# Patient Record
Sex: MALE | Race: WHITE | Employment: OTHER | URBAN - METROPOLITAN AREA
[De-identification: names, ages, dates, MRNs, and addresses within clinical notes are randomized per-mention and may not be internally consistent; named-entity substitution may affect disease eponyms.]

---

## 2017-01-04 ENCOUNTER — TELEPHONE (OUTPATIENT)
Dept: FAMILY MEDICINE CLINIC | Age: 82
End: 2017-01-04

## 2017-01-06 ENCOUNTER — HOSPITAL ENCOUNTER (OUTPATIENT)
Dept: OTHER | Age: 82
Discharge: HOME | End: 2017-01-06
Attending: PODIATRIST | Admitting: PODIATRIST

## 2017-01-06 LAB
ANION GAP SERPL CALCULATED.3IONS-SCNC: 14 MMOL/L (ref 3–16)
BASOPHILS ABSOLUTE: 0.1 K/UL (ref 0–0.2)
BASOPHILS RELATIVE PERCENT: 0.8 %
BUN BLDV-MCNC: 39 MG/DL (ref 7–20)
CALCIUM SERPL-MCNC: 9.3 MG/DL (ref 8.3–10.6)
CHLORIDE BLD-SCNC: 106 MMOL/L (ref 99–110)
CO2: 25 MMOL/L (ref 21–32)
CREAT SERPL-MCNC: 1.3 MG/DL (ref 0.8–1.3)
EOSINOPHILS ABSOLUTE: 0.2 K/UL (ref 0–0.6)
EOSINOPHILS RELATIVE PERCENT: 3.1 %
GFR AFRICAN AMERICAN: >60
GFR NON-AFRICAN AMERICAN: 53
GLUCOSE BLD-MCNC: 235 MG/DL (ref 70–99)
HCT VFR BLD CALC: 30.6 % (ref 40.5–52.5)
HEMOGLOBIN: 10.3 G/DL (ref 13.5–17.5)
INR BLD: 2.73 (ref 0.85–1.16)
LYMPHOCYTES ABSOLUTE: 1 K/UL (ref 1–5.1)
LYMPHOCYTES RELATIVE PERCENT: 13.1 %
MCH RBC QN AUTO: 32 PG (ref 26–34)
MCHC RBC AUTO-ENTMCNC: 33.8 G/DL (ref 31–36)
MCV RBC AUTO: 94.6 FL (ref 80–100)
MONOCYTES ABSOLUTE: 0.7 K/UL (ref 0–1.3)
MONOCYTES RELATIVE PERCENT: 9.2 %
NEUTROPHILS ABSOLUTE: 5.7 K/UL (ref 1.7–7.7)
NEUTROPHILS RELATIVE PERCENT: 73.8 %
PDW BLD-RTO: 15.8 % (ref 12.4–15.4)
PLATELET # BLD: 213 K/UL (ref 135–450)
PMV BLD AUTO: 10.3 FL (ref 5–10.5)
POTASSIUM SERPL-SCNC: 5.1 MMOL/L (ref 3.5–5.1)
PROTHROMBIN TIME: 31.8 SEC (ref 9.8–13)
RBC # BLD: 3.23 M/UL (ref 4.2–5.9)
SEDIMENTATION RATE, ERYTHROCYTE: 47 MM/HR (ref 0–20)
SODIUM BLD-SCNC: 145 MMOL/L (ref 136–145)
URIC ACID, SERUM: 10.1 MG/DL (ref 3.5–7.2)
WBC # BLD: 7.8 K/UL (ref 4–11)

## 2017-01-07 LAB
ESTIMATED AVERAGE GLUCOSE: 125.5 MG/DL
HBA1C MFR BLD: 6 %

## 2017-01-09 ENCOUNTER — TELEPHONE (OUTPATIENT)
Dept: CARDIOLOGY CLINIC | Age: 82
End: 2017-01-09

## 2017-01-09 ENCOUNTER — TELEPHONE (OUTPATIENT)
Dept: PHARMACY | Age: 82
End: 2017-01-09

## 2017-01-16 ENCOUNTER — ANTI-COAG VISIT (OUTPATIENT)
Dept: PHARMACY | Age: 82
End: 2017-01-16

## 2017-01-16 DIAGNOSIS — I48.20 CHRONIC ATRIAL FIBRILLATION (HCC): ICD-10-CM

## 2017-01-16 LAB
INR BLD: 1.2
PROTIME: 14 SECONDS

## 2017-01-18 ENCOUNTER — NURSE ONLY (OUTPATIENT)
Dept: CARDIOLOGY CLINIC | Age: 82
End: 2017-01-18

## 2017-01-18 DIAGNOSIS — Z95.0 CARDIAC PACEMAKER: ICD-10-CM

## 2017-01-18 DIAGNOSIS — Z95.0 PACEMAKER: ICD-10-CM

## 2017-01-18 DIAGNOSIS — I48.20 CHRONIC ATRIAL FIBRILLATION (HCC): ICD-10-CM

## 2017-01-18 PROCEDURE — 93296 REM INTERROG EVL PM/IDS: CPT | Performed by: INTERNAL MEDICINE

## 2017-01-18 PROCEDURE — 93294 REM INTERROG EVL PM/LDLS PM: CPT | Performed by: INTERNAL MEDICINE

## 2017-01-26 ENCOUNTER — TELEPHONE (OUTPATIENT)
Dept: FAMILY MEDICINE CLINIC | Age: 82
End: 2017-01-26

## 2017-01-27 ENCOUNTER — HOSPITAL ENCOUNTER (OUTPATIENT)
Dept: OTHER | Age: 82
Discharge: HOME | End: 2017-01-27
Attending: INTERNAL MEDICINE | Admitting: INTERNAL MEDICINE

## 2017-01-27 ENCOUNTER — OFFICE VISIT (OUTPATIENT)
Dept: FAMILY MEDICINE CLINIC | Age: 82
End: 2017-01-27

## 2017-01-27 ENCOUNTER — TELEPHONE (OUTPATIENT)
Dept: FAMILY MEDICINE CLINIC | Age: 82
End: 2017-01-27

## 2017-01-27 VITALS
HEART RATE: 88 BPM | DIASTOLIC BLOOD PRESSURE: 70 MMHG | TEMPERATURE: 97.7 F | HEIGHT: 70 IN | RESPIRATION RATE: 16 BRPM | SYSTOLIC BLOOD PRESSURE: 124 MMHG | BODY MASS INDEX: 24.48 KG/M2 | WEIGHT: 171 LBS

## 2017-01-27 DIAGNOSIS — M1A.9XX1 CHRONIC GOUT INVOLVING TOE OF LEFT FOOT WITH TOPHUS, UNSPECIFIED CAUSE: ICD-10-CM

## 2017-01-27 DIAGNOSIS — D64.9 ANEMIA, UNSPECIFIED TYPE: Primary | ICD-10-CM

## 2017-01-27 LAB — URIC ACID, SERUM: 9.4 MG/DL (ref 3.5–7.2)

## 2017-01-27 PROCEDURE — 4040F PNEUMOC VAC/ADMIN/RCVD: CPT | Performed by: INTERNAL MEDICINE

## 2017-01-27 PROCEDURE — G8420 CALC BMI NORM PARAMETERS: HCPCS | Performed by: INTERNAL MEDICINE

## 2017-01-27 PROCEDURE — 99213 OFFICE O/P EST LOW 20 MIN: CPT | Performed by: INTERNAL MEDICINE

## 2017-01-27 PROCEDURE — 1036F TOBACCO NON-USER: CPT | Performed by: INTERNAL MEDICINE

## 2017-01-27 PROCEDURE — G8598 ASA/ANTIPLAT THER USED: HCPCS | Performed by: INTERNAL MEDICINE

## 2017-01-27 PROCEDURE — G8427 DOCREV CUR MEDS BY ELIG CLIN: HCPCS | Performed by: INTERNAL MEDICINE

## 2017-01-27 PROCEDURE — 1123F ACP DISCUSS/DSCN MKR DOCD: CPT | Performed by: INTERNAL MEDICINE

## 2017-01-27 PROCEDURE — G8484 FLU IMMUNIZE NO ADMIN: HCPCS | Performed by: INTERNAL MEDICINE

## 2017-01-27 RX ORDER — ALLOPURINOL 100 MG/1
100 TABLET ORAL DAILY
Qty: 30 TABLET | Refills: 3 | Status: SHIPPED | OUTPATIENT
Start: 2017-01-27 | End: 2017-03-06 | Stop reason: SDUPTHER

## 2017-01-27 ASSESSMENT — ENCOUNTER SYMPTOMS
SHORTNESS OF BREATH: 0
WHEEZING: 0

## 2017-02-06 RX ORDER — RAMIPRIL 10 MG/1
CAPSULE ORAL
Qty: 60 CAPSULE | Refills: 5 | Status: SHIPPED | OUTPATIENT
Start: 2017-02-06 | End: 2017-08-04 | Stop reason: SDUPTHER

## 2017-02-08 ENCOUNTER — NURSE ONLY (OUTPATIENT)
Dept: FAMILY MEDICINE CLINIC | Age: 82
End: 2017-02-08

## 2017-02-08 DIAGNOSIS — D64.9 ANEMIA, UNSPECIFIED TYPE: ICD-10-CM

## 2017-02-08 DIAGNOSIS — R19.5 HEME POSITIVE STOOL: Primary | ICD-10-CM

## 2017-02-08 LAB
CONTROL: POSITIVE
HEMOCCULT STL QL: POSITIVE

## 2017-02-08 PROCEDURE — 99999 PR OFFICE/OUTPT VISIT,PROCEDURE ONLY: CPT | Performed by: INTERNAL MEDICINE

## 2017-02-08 PROCEDURE — 82274 ASSAY TEST FOR BLOOD FECAL: CPT | Performed by: INTERNAL MEDICINE

## 2017-02-14 ENCOUNTER — TELEPHONE (OUTPATIENT)
Dept: FAMILY MEDICINE CLINIC | Age: 82
End: 2017-02-14

## 2017-02-15 ENCOUNTER — ANTI-COAG VISIT (OUTPATIENT)
Dept: PHARMACY | Age: 82
End: 2017-02-15

## 2017-02-15 DIAGNOSIS — I48.20 CHRONIC ATRIAL FIBRILLATION (HCC): ICD-10-CM

## 2017-02-15 LAB
INR BLD: 3.1
PROTIME: 37.1 SECONDS

## 2017-03-01 ENCOUNTER — HOSPITAL ENCOUNTER (OUTPATIENT)
Dept: OTHER | Age: 82
Discharge: HOME | End: 2017-03-31
Attending: INTERNAL MEDICINE | Admitting: INTERNAL MEDICINE

## 2017-03-02 RX ORDER — SOTALOL HYDROCHLORIDE 80 MG/1
TABLET ORAL
Qty: 60 TABLET | Refills: 5 | Status: SHIPPED | OUTPATIENT
Start: 2017-03-02 | End: 2017-09-05 | Stop reason: SDUPTHER

## 2017-03-03 ENCOUNTER — OFFICE VISIT (OUTPATIENT)
Dept: FAMILY MEDICINE CLINIC | Age: 82
End: 2017-03-03

## 2017-03-03 VITALS
SYSTOLIC BLOOD PRESSURE: 116 MMHG | HEART RATE: 72 BPM | WEIGHT: 174 LBS | RESPIRATION RATE: 16 BRPM | DIASTOLIC BLOOD PRESSURE: 72 MMHG | TEMPERATURE: 97.5 F | HEIGHT: 70 IN | BODY MASS INDEX: 24.91 KG/M2

## 2017-03-03 DIAGNOSIS — M1A.9XX0 CHRONIC GOUT WITHOUT TOPHUS, UNSPECIFIED CAUSE, UNSPECIFIED SITE: Primary | ICD-10-CM

## 2017-03-03 DIAGNOSIS — I48.20 CHRONIC ATRIAL FIBRILLATION (HCC): ICD-10-CM

## 2017-03-03 DIAGNOSIS — E11.51 DM (DIABETES MELLITUS), TYPE 2 WITH PERIPHERAL VASCULAR COMPLICATIONS (HCC): ICD-10-CM

## 2017-03-03 DIAGNOSIS — H90.0 HEARING LOSS, CONDUCTIVE, BILATERAL: ICD-10-CM

## 2017-03-03 DIAGNOSIS — Z01.00 DIABETIC EYE EXAM (HCC): ICD-10-CM

## 2017-03-03 DIAGNOSIS — M1A.9XX0 CHRONIC GOUT WITHOUT TOPHUS, UNSPECIFIED CAUSE, UNSPECIFIED SITE: ICD-10-CM

## 2017-03-03 DIAGNOSIS — E11.9 DIABETIC EYE EXAM (HCC): ICD-10-CM

## 2017-03-03 LAB
ALBUMIN SERPL-MCNC: 4.4 G/DL (ref 3.4–5)
ANION GAP SERPL CALCULATED.3IONS-SCNC: 15 MMOL/L (ref 3–16)
BUN BLDV-MCNC: 26 MG/DL (ref 7–20)
CALCIUM SERPL-MCNC: 9.2 MG/DL (ref 8.3–10.6)
CHLORIDE BLD-SCNC: 102 MMOL/L (ref 99–110)
CO2: 24 MMOL/L (ref 21–32)
CREAT SERPL-MCNC: 1.1 MG/DL (ref 0.8–1.3)
GFR AFRICAN AMERICAN: >60
GFR NON-AFRICAN AMERICAN: >60
GLUCOSE BLD-MCNC: 224 MG/DL (ref 70–99)
PHOSPHORUS: 3.3 MG/DL (ref 2.5–4.9)
POTASSIUM SERPL-SCNC: 5.1 MMOL/L (ref 3.5–5.1)
SODIUM BLD-SCNC: 141 MMOL/L (ref 136–145)
URIC ACID, SERUM: 7.6 MG/DL (ref 3.5–7.2)

## 2017-03-03 PROCEDURE — 99214 OFFICE O/P EST MOD 30 MIN: CPT | Performed by: INTERNAL MEDICINE

## 2017-03-03 PROCEDURE — G8427 DOCREV CUR MEDS BY ELIG CLIN: HCPCS | Performed by: INTERNAL MEDICINE

## 2017-03-03 PROCEDURE — G8484 FLU IMMUNIZE NO ADMIN: HCPCS | Performed by: INTERNAL MEDICINE

## 2017-03-03 PROCEDURE — 1036F TOBACCO NON-USER: CPT | Performed by: INTERNAL MEDICINE

## 2017-03-03 PROCEDURE — G8420 CALC BMI NORM PARAMETERS: HCPCS | Performed by: INTERNAL MEDICINE

## 2017-03-03 PROCEDURE — G8598 ASA/ANTIPLAT THER USED: HCPCS | Performed by: INTERNAL MEDICINE

## 2017-03-03 PROCEDURE — 4040F PNEUMOC VAC/ADMIN/RCVD: CPT | Performed by: INTERNAL MEDICINE

## 2017-03-03 PROCEDURE — 1123F ACP DISCUSS/DSCN MKR DOCD: CPT | Performed by: INTERNAL MEDICINE

## 2017-03-03 ASSESSMENT — ENCOUNTER SYMPTOMS
WHEEZING: 0
BLOOD IN STOOL: 0
SHORTNESS OF BREATH: 0

## 2017-03-06 DIAGNOSIS — E11.51 DM (DIABETES MELLITUS), TYPE 2 WITH PERIPHERAL VASCULAR COMPLICATIONS (HCC): Primary | ICD-10-CM

## 2017-03-06 DIAGNOSIS — M1A.9XX1 CHRONIC GOUT INVOLVING TOE OF LEFT FOOT WITH TOPHUS, UNSPECIFIED CAUSE: ICD-10-CM

## 2017-03-06 RX ORDER — ALLOPURINOL 100 MG/1
200 TABLET ORAL DAILY
Qty: 30 TABLET | Refills: 3 | Status: SHIPPED | OUTPATIENT
Start: 2017-03-06 | End: 2017-12-16 | Stop reason: SDUPTHER

## 2017-03-07 ENCOUNTER — TELEPHONE (OUTPATIENT)
Dept: FAMILY MEDICINE CLINIC | Age: 82
End: 2017-03-07

## 2017-03-07 ENCOUNTER — TELEPHONE (OUTPATIENT)
Dept: CARDIOLOGY CLINIC | Age: 82
End: 2017-03-07

## 2017-03-09 ENCOUNTER — HOSPITAL ENCOUNTER (OUTPATIENT)
Dept: OTHER | Age: 82
Discharge: HOME | End: 2017-03-09
Attending: INTERNAL MEDICINE | Admitting: INTERNAL MEDICINE

## 2017-03-09 DIAGNOSIS — E11.51 DM (DIABETES MELLITUS), TYPE 2 WITH PERIPHERAL VASCULAR COMPLICATIONS (HCC): ICD-10-CM

## 2017-03-09 LAB
ESTIMATED AVERAGE GLUCOSE: 119.8 MG/DL
HBA1C MFR BLD: 5.8 %

## 2017-03-15 ENCOUNTER — ANTI-COAG VISIT (OUTPATIENT)
Dept: PHARMACY | Age: 82
End: 2017-03-15

## 2017-03-15 DIAGNOSIS — I48.20 CHRONIC ATRIAL FIBRILLATION (HCC): ICD-10-CM

## 2017-03-15 LAB
INR BLD: 2.2
PROTIME: 26.2 SECONDS

## 2017-03-15 RX ORDER — AMLODIPINE BESYLATE AND ATORVASTATIN CALCIUM 5; 20 MG/1; MG/1
TABLET, FILM COATED ORAL
Qty: 30 TABLET | Refills: 6 | Status: SHIPPED | OUTPATIENT
Start: 2017-03-15 | End: 2017-09-28 | Stop reason: SDUPTHER

## 2017-03-27 ENCOUNTER — PAT TELEPHONE (OUTPATIENT)
Dept: PREADMISSION TESTING | Age: 82
End: 2017-03-27

## 2017-03-27 ENCOUNTER — SURG/PROC ORDERS (OUTPATIENT)
Dept: ANESTHESIOLOGY | Age: 82
End: 2017-03-27

## 2017-03-27 VITALS — HEIGHT: 70 IN | BODY MASS INDEX: 25.05 KG/M2 | WEIGHT: 175 LBS

## 2017-03-27 RX ORDER — SODIUM CHLORIDE 0.9 % (FLUSH) 0.9 %
10 SYRINGE (ML) INJECTION PRN
Status: CANCELLED | OUTPATIENT
Start: 2017-03-27

## 2017-03-27 RX ORDER — SODIUM CHLORIDE 0.9 % (FLUSH) 0.9 %
10 SYRINGE (ML) INJECTION EVERY 12 HOURS SCHEDULED
Status: CANCELLED | OUTPATIENT
Start: 2017-03-27

## 2017-03-27 RX ORDER — SODIUM CHLORIDE 9 MG/ML
INJECTION, SOLUTION INTRAVENOUS CONTINUOUS
Status: CANCELLED | OUTPATIENT
Start: 2017-03-27

## 2017-03-29 ENCOUNTER — HOSPITAL ENCOUNTER (OUTPATIENT)
Dept: ENDOSCOPY | Age: 82
Discharge: HOME | End: 2017-03-29
Attending: INTERNAL MEDICINE | Admitting: INTERNAL MEDICINE

## 2017-03-29 VITALS
OXYGEN SATURATION: 98 % | SYSTOLIC BLOOD PRESSURE: 123 MMHG | DIASTOLIC BLOOD PRESSURE: 71 MMHG | HEART RATE: 84 BPM | RESPIRATION RATE: 18 BRPM | TEMPERATURE: 97.1 F

## 2017-03-29 LAB
ANION GAP SERPL CALCULATED.3IONS-SCNC: 15 MMOL/L (ref 3–16)
APTT: 27.9 SEC (ref 21–31.8)
BUN BLDV-MCNC: 21 MG/DL (ref 7–20)
CALCIUM SERPL-MCNC: 9.7 MG/DL (ref 8.3–10.6)
CHLORIDE BLD-SCNC: 98 MMOL/L (ref 99–110)
CO2: 26 MMOL/L (ref 21–32)
CREAT SERPL-MCNC: 1 MG/DL (ref 0.8–1.3)
GFR AFRICAN AMERICAN: >60
GFR NON-AFRICAN AMERICAN: >60
GLUCOSE BLD-MCNC: 140 MG/DL (ref 70–99)
GLUCOSE BLD-MCNC: 153 MG/DL (ref 70–99)
GLUCOSE BLD-MCNC: 155 MG/DL (ref 70–99)
INR BLD: 1.12 (ref 0.85–1.15)
PERFORMED ON: ABNORMAL
PERFORMED ON: ABNORMAL
POTASSIUM SERPL-SCNC: 4.5 MMOL/L (ref 3.5–5.1)
PROTHROMBIN TIME: 12.6 SEC (ref 9.6–13)
SODIUM BLD-SCNC: 139 MMOL/L (ref 136–145)

## 2017-03-29 PROCEDURE — 93010 ELECTROCARDIOGRAM REPORT: CPT | Performed by: INTERNAL MEDICINE

## 2017-03-29 RX ORDER — FENTANYL CITRATE 50 UG/ML
50 INJECTION, SOLUTION INTRAMUSCULAR; INTRAVENOUS EVERY 5 MIN PRN
Status: DISCONTINUED | OUTPATIENT
Start: 2017-03-29 | End: 2017-03-30 | Stop reason: HOSPADM

## 2017-03-29 RX ORDER — MEPERIDINE HYDROCHLORIDE 25 MG/ML
12.5 INJECTION INTRAMUSCULAR; INTRAVENOUS; SUBCUTANEOUS EVERY 5 MIN PRN
Status: DISCONTINUED | OUTPATIENT
Start: 2017-03-29 | End: 2017-03-30 | Stop reason: HOSPADM

## 2017-03-29 RX ORDER — SODIUM CHLORIDE 0.9 % (FLUSH) 0.9 %
10 SYRINGE (ML) INJECTION PRN
Status: DISCONTINUED | OUTPATIENT
Start: 2017-03-29 | End: 2017-03-30 | Stop reason: HOSPADM

## 2017-03-29 RX ORDER — OXYCODONE HYDROCHLORIDE AND ACETAMINOPHEN 5; 325 MG/1; MG/1
2 TABLET ORAL PRN
Status: ACTIVE | OUTPATIENT
Start: 2017-03-29 | End: 2017-03-29

## 2017-03-29 RX ORDER — ONDANSETRON 2 MG/ML
4 INJECTION INTRAMUSCULAR; INTRAVENOUS
Status: ACTIVE | OUTPATIENT
Start: 2017-03-29 | End: 2017-03-29

## 2017-03-29 RX ORDER — SODIUM CHLORIDE 0.9 % (FLUSH) 0.9 %
10 SYRINGE (ML) INJECTION EVERY 12 HOURS SCHEDULED
Status: DISCONTINUED | OUTPATIENT
Start: 2017-03-29 | End: 2017-03-30 | Stop reason: HOSPADM

## 2017-03-29 RX ORDER — SODIUM CHLORIDE 9 MG/ML
INJECTION, SOLUTION INTRAVENOUS CONTINUOUS
Status: DISCONTINUED | OUTPATIENT
Start: 2017-03-29 | End: 2017-03-30 | Stop reason: HOSPADM

## 2017-03-29 RX ORDER — FENTANYL CITRATE 50 UG/ML
25 INJECTION, SOLUTION INTRAMUSCULAR; INTRAVENOUS EVERY 5 MIN PRN
Status: DISCONTINUED | OUTPATIENT
Start: 2017-03-29 | End: 2017-03-30 | Stop reason: HOSPADM

## 2017-03-29 RX ORDER — MORPHINE SULFATE 2 MG/ML
1 INJECTION, SOLUTION INTRAMUSCULAR; INTRAVENOUS EVERY 5 MIN PRN
Status: DISCONTINUED | OUTPATIENT
Start: 2017-03-29 | End: 2017-03-30 | Stop reason: HOSPADM

## 2017-03-29 RX ORDER — OXYCODONE HYDROCHLORIDE AND ACETAMINOPHEN 5; 325 MG/1; MG/1
1 TABLET ORAL PRN
Status: ACTIVE | OUTPATIENT
Start: 2017-03-29 | End: 2017-03-29

## 2017-03-29 RX ORDER — MORPHINE SULFATE 2 MG/ML
2 INJECTION, SOLUTION INTRAMUSCULAR; INTRAVENOUS EVERY 5 MIN PRN
Status: DISCONTINUED | OUTPATIENT
Start: 2017-03-29 | End: 2017-03-30 | Stop reason: HOSPADM

## 2017-03-29 ASSESSMENT — PAIN SCALES - GENERAL
PAINLEVEL_OUTOF10: 0
PAINLEVEL_OUTOF10: 0

## 2017-03-29 ASSESSMENT — ENCOUNTER SYMPTOMS: SHORTNESS OF BREATH: 0

## 2017-03-30 LAB
EKG ATRIAL RATE: 76 BPM
EKG DIAGNOSIS: NORMAL
EKG Q-T INTERVAL: 360 MS
EKG QRS DURATION: 76 MS
EKG QTC CALCULATION (BAZETT): 454 MS
EKG R AXIS: 70 DEGREES
EKG T AXIS: -36 DEGREES
EKG VENTRICULAR RATE: 96 BPM

## 2017-04-12 ENCOUNTER — ANTI-COAG VISIT (OUTPATIENT)
Dept: PHARMACY | Age: 82
End: 2017-04-12

## 2017-04-12 DIAGNOSIS — I48.20 CHRONIC ATRIAL FIBRILLATION (HCC): ICD-10-CM

## 2017-04-12 LAB — INR BLD: 2.3

## 2017-04-19 ENCOUNTER — NURSE ONLY (OUTPATIENT)
Dept: CARDIOLOGY CLINIC | Age: 82
End: 2017-04-19

## 2017-04-19 DIAGNOSIS — Z95.0 CARDIAC PACEMAKER: ICD-10-CM

## 2017-04-19 DIAGNOSIS — I48.20 CHRONIC ATRIAL FIBRILLATION (HCC): ICD-10-CM

## 2017-04-19 DIAGNOSIS — Z95.0 PACEMAKER: ICD-10-CM

## 2017-04-19 PROCEDURE — 93296 REM INTERROG EVL PM/IDS: CPT | Performed by: INTERNAL MEDICINE

## 2017-04-19 PROCEDURE — 93294 REM INTERROG EVL PM/LDLS PM: CPT | Performed by: INTERNAL MEDICINE

## 2017-05-10 ENCOUNTER — ANTI-COAG VISIT (OUTPATIENT)
Dept: PHARMACY | Age: 82
End: 2017-05-10

## 2017-05-10 DIAGNOSIS — I48.20 CHRONIC ATRIAL FIBRILLATION (HCC): ICD-10-CM

## 2017-05-10 LAB
INR BLD: 1.7
PROTIME: 20.8 SECONDS

## 2017-05-17 ENCOUNTER — OFFICE VISIT (OUTPATIENT)
Dept: CARDIOLOGY CLINIC | Age: 82
End: 2017-05-17

## 2017-05-17 VITALS
DIASTOLIC BLOOD PRESSURE: 62 MMHG | WEIGHT: 174 LBS | HEIGHT: 70 IN | HEART RATE: 78 BPM | SYSTOLIC BLOOD PRESSURE: 120 MMHG | BODY MASS INDEX: 24.91 KG/M2 | OXYGEN SATURATION: 96 %

## 2017-05-17 DIAGNOSIS — I10 ESSENTIAL HYPERTENSION: ICD-10-CM

## 2017-05-17 DIAGNOSIS — Z95.0 CARDIAC PACEMAKER: ICD-10-CM

## 2017-05-17 DIAGNOSIS — E78.00 HYPERCHOLESTEREMIA: ICD-10-CM

## 2017-05-17 DIAGNOSIS — I25.10 CORONARY ARTERY DISEASE INVOLVING NATIVE CORONARY ARTERY OF NATIVE HEART WITHOUT ANGINA PECTORIS: Primary | ICD-10-CM

## 2017-05-17 PROCEDURE — 4040F PNEUMOC VAC/ADMIN/RCVD: CPT | Performed by: INTERNAL MEDICINE

## 2017-05-17 PROCEDURE — 1036F TOBACCO NON-USER: CPT | Performed by: INTERNAL MEDICINE

## 2017-05-17 PROCEDURE — G8598 ASA/ANTIPLAT THER USED: HCPCS | Performed by: INTERNAL MEDICINE

## 2017-05-17 PROCEDURE — G8420 CALC BMI NORM PARAMETERS: HCPCS | Performed by: INTERNAL MEDICINE

## 2017-05-17 PROCEDURE — 1123F ACP DISCUSS/DSCN MKR DOCD: CPT | Performed by: INTERNAL MEDICINE

## 2017-05-17 PROCEDURE — G8427 DOCREV CUR MEDS BY ELIG CLIN: HCPCS | Performed by: INTERNAL MEDICINE

## 2017-05-17 PROCEDURE — 99214 OFFICE O/P EST MOD 30 MIN: CPT | Performed by: INTERNAL MEDICINE

## 2017-06-07 ENCOUNTER — ANTI-COAG VISIT (OUTPATIENT)
Dept: PHARMACY | Age: 82
End: 2017-06-07

## 2017-06-07 DIAGNOSIS — I48.20 CHRONIC ATRIAL FIBRILLATION (HCC): ICD-10-CM

## 2017-06-07 LAB
INR BLD: 2.5
PROTIME: 29.5 SECONDS

## 2017-06-12 ENCOUNTER — OFFICE VISIT (OUTPATIENT)
Dept: FAMILY MEDICINE CLINIC | Age: 82
End: 2017-06-12

## 2017-06-12 VITALS
TEMPERATURE: 97.7 F | BODY MASS INDEX: 25.05 KG/M2 | HEIGHT: 70 IN | OXYGEN SATURATION: 98 % | DIASTOLIC BLOOD PRESSURE: 64 MMHG | SYSTOLIC BLOOD PRESSURE: 122 MMHG | WEIGHT: 175 LBS | HEART RATE: 70 BPM | RESPIRATION RATE: 18 BRPM

## 2017-06-12 DIAGNOSIS — I10 ESSENTIAL HYPERTENSION: ICD-10-CM

## 2017-06-12 DIAGNOSIS — M10.072 ACUTE IDIOPATHIC GOUT INVOLVING TOE OF LEFT FOOT: ICD-10-CM

## 2017-06-12 DIAGNOSIS — R35.1 NOCTURIA: ICD-10-CM

## 2017-06-12 DIAGNOSIS — I48.20 CHRONIC ATRIAL FIBRILLATION (HCC): ICD-10-CM

## 2017-06-12 DIAGNOSIS — E11.51 DM (DIABETES MELLITUS), TYPE 2 WITH PERIPHERAL VASCULAR COMPLICATIONS (HCC): ICD-10-CM

## 2017-06-12 DIAGNOSIS — E78.00 HYPERCHOLESTEREMIA: Primary | ICD-10-CM

## 2017-06-12 DIAGNOSIS — E55.9 VITAMIN D DEFICIENCY: ICD-10-CM

## 2017-06-12 PROCEDURE — G8427 DOCREV CUR MEDS BY ELIG CLIN: HCPCS | Performed by: INTERNAL MEDICINE

## 2017-06-12 PROCEDURE — G8598 ASA/ANTIPLAT THER USED: HCPCS | Performed by: INTERNAL MEDICINE

## 2017-06-12 PROCEDURE — 1123F ACP DISCUSS/DSCN MKR DOCD: CPT | Performed by: INTERNAL MEDICINE

## 2017-06-12 PROCEDURE — G8417 CALC BMI ABV UP PARAM F/U: HCPCS | Performed by: INTERNAL MEDICINE

## 2017-06-12 PROCEDURE — 4040F PNEUMOC VAC/ADMIN/RCVD: CPT | Performed by: INTERNAL MEDICINE

## 2017-06-12 PROCEDURE — 99214 OFFICE O/P EST MOD 30 MIN: CPT | Performed by: INTERNAL MEDICINE

## 2017-06-12 PROCEDURE — 1036F TOBACCO NON-USER: CPT | Performed by: INTERNAL MEDICINE

## 2017-06-12 ASSESSMENT — ENCOUNTER SYMPTOMS
CONSTIPATION: 0
DIARRHEA: 0
SHORTNESS OF BREATH: 0
WHEEZING: 0

## 2017-06-12 ASSESSMENT — PATIENT HEALTH QUESTIONNAIRE - PHQ9
1. LITTLE INTEREST OR PLEASURE IN DOING THINGS: 0
SUM OF ALL RESPONSES TO PHQ QUESTIONS 1-9: 0
2. FEELING DOWN, DEPRESSED OR HOPELESS: 0
SUM OF ALL RESPONSES TO PHQ9 QUESTIONS 1 & 2: 0

## 2017-06-19 ENCOUNTER — HOSPITAL ENCOUNTER (OUTPATIENT)
Dept: OTHER | Age: 82
Discharge: HOME | End: 2017-06-19
Attending: INTERNAL MEDICINE | Admitting: INTERNAL MEDICINE

## 2017-06-19 DIAGNOSIS — E11.51 DM (DIABETES MELLITUS), TYPE 2 WITH PERIPHERAL VASCULAR COMPLICATIONS (HCC): ICD-10-CM

## 2017-06-19 DIAGNOSIS — E55.9 VITAMIN D DEFICIENCY: ICD-10-CM

## 2017-06-19 DIAGNOSIS — E78.00 HYPERCHOLESTEREMIA: ICD-10-CM

## 2017-06-19 LAB
A/G RATIO: 1.4 (ref 1.1–2.2)
ALBUMIN SERPL-MCNC: 4.1 G/DL (ref 3.4–5)
ALP BLD-CCNC: 62 U/L (ref 40–129)
ALT SERPL-CCNC: 22 U/L (ref 10–40)
ANION GAP SERPL CALCULATED.3IONS-SCNC: 12 MMOL/L (ref 3–16)
AST SERPL-CCNC: 24 U/L (ref 15–37)
BILIRUB SERPL-MCNC: 0.8 MG/DL (ref 0–1)
BUN BLDV-MCNC: 24 MG/DL (ref 7–20)
CALCIUM SERPL-MCNC: 9 MG/DL (ref 8.3–10.6)
CHLORIDE BLD-SCNC: 105 MMOL/L (ref 99–110)
CHOLESTEROL, TOTAL: 118 MG/DL (ref 0–199)
CO2: 25 MMOL/L (ref 21–32)
CREAT SERPL-MCNC: 1 MG/DL (ref 0.8–1.3)
ESTIMATED AVERAGE GLUCOSE: 122.6 MG/DL
GFR AFRICAN AMERICAN: >60
GFR NON-AFRICAN AMERICAN: >60
GLOBULIN: 2.9 G/DL
GLUCOSE BLD-MCNC: 142 MG/DL (ref 70–99)
HBA1C MFR BLD: 5.9 %
HDLC SERPL-MCNC: 37 MG/DL (ref 40–60)
LDL CHOLESTEROL CALCULATED: 52 MG/DL
POTASSIUM SERPL-SCNC: 4.9 MMOL/L (ref 3.5–5.1)
SODIUM BLD-SCNC: 142 MMOL/L (ref 136–145)
TOTAL PROTEIN: 7 G/DL (ref 6.4–8.2)
TRIGL SERPL-MCNC: 143 MG/DL (ref 0–150)
VITAMIN D 25-HYDROXY: 32.9 NG/ML
VLDLC SERPL CALC-MCNC: 29 MG/DL

## 2017-06-20 RX ORDER — WARFARIN SODIUM 5 MG/1
TABLET ORAL
Qty: 90 TABLET | Refills: 1 | Status: SHIPPED | OUTPATIENT
Start: 2017-06-20 | End: 2018-02-02 | Stop reason: SDUPTHER

## 2017-07-05 ENCOUNTER — ANTI-COAG VISIT (OUTPATIENT)
Dept: PHARMACY | Age: 82
End: 2017-07-05

## 2017-07-05 DIAGNOSIS — I48.20 CHRONIC ATRIAL FIBRILLATION (HCC): ICD-10-CM

## 2017-07-05 LAB — INR BLD: 2.4

## 2017-07-17 ENCOUNTER — TELEPHONE (OUTPATIENT)
Dept: CARDIOLOGY CLINIC | Age: 82
End: 2017-07-17

## 2017-07-17 ENCOUNTER — OFFICE VISIT (OUTPATIENT)
Dept: FAMILY MEDICINE CLINIC | Age: 82
End: 2017-07-17

## 2017-07-17 VITALS
RESPIRATION RATE: 18 BRPM | DIASTOLIC BLOOD PRESSURE: 66 MMHG | SYSTOLIC BLOOD PRESSURE: 130 MMHG | BODY MASS INDEX: 25.05 KG/M2 | HEIGHT: 70 IN | OXYGEN SATURATION: 97 % | HEART RATE: 62 BPM | WEIGHT: 175 LBS | TEMPERATURE: 97.6 F

## 2017-07-17 DIAGNOSIS — I48.20 CHRONIC ATRIAL FIBRILLATION (HCC): ICD-10-CM

## 2017-07-17 DIAGNOSIS — E11.51 DM (DIABETES MELLITUS), TYPE 2 WITH PERIPHERAL VASCULAR COMPLICATIONS (HCC): ICD-10-CM

## 2017-07-17 DIAGNOSIS — Z95.0 CARDIAC PACEMAKER: Primary | ICD-10-CM

## 2017-07-17 DIAGNOSIS — I10 ESSENTIAL HYPERTENSION: ICD-10-CM

## 2017-07-17 PROCEDURE — 99213 OFFICE O/P EST LOW 20 MIN: CPT | Performed by: INTERNAL MEDICINE

## 2017-07-17 PROCEDURE — G8427 DOCREV CUR MEDS BY ELIG CLIN: HCPCS | Performed by: INTERNAL MEDICINE

## 2017-07-17 PROCEDURE — G8598 ASA/ANTIPLAT THER USED: HCPCS | Performed by: INTERNAL MEDICINE

## 2017-07-17 PROCEDURE — 1036F TOBACCO NON-USER: CPT | Performed by: INTERNAL MEDICINE

## 2017-07-17 PROCEDURE — G8417 CALC BMI ABV UP PARAM F/U: HCPCS | Performed by: INTERNAL MEDICINE

## 2017-07-17 PROCEDURE — 4040F PNEUMOC VAC/ADMIN/RCVD: CPT | Performed by: INTERNAL MEDICINE

## 2017-07-17 PROCEDURE — 1123F ACP DISCUSS/DSCN MKR DOCD: CPT | Performed by: INTERNAL MEDICINE

## 2017-07-17 PROCEDURE — 93000 ELECTROCARDIOGRAM COMPLETE: CPT | Performed by: INTERNAL MEDICINE

## 2017-07-17 ASSESSMENT — ENCOUNTER SYMPTOMS
SHORTNESS OF BREATH: 0
DIARRHEA: 0
BACK PAIN: 1
WHEEZING: 0
CONSTIPATION: 0

## 2017-08-02 ENCOUNTER — TELEPHONE (OUTPATIENT)
Dept: PHARMACY | Age: 82
End: 2017-08-02

## 2017-08-02 ENCOUNTER — NURSE ONLY (OUTPATIENT)
Dept: CARDIOLOGY CLINIC | Age: 82
End: 2017-08-02

## 2017-08-02 DIAGNOSIS — Z95.0 PACEMAKER: ICD-10-CM

## 2017-08-02 DIAGNOSIS — Z95.0 CARDIAC PACEMAKER: ICD-10-CM

## 2017-08-02 DIAGNOSIS — I48.20 CHRONIC ATRIAL FIBRILLATION (HCC): ICD-10-CM

## 2017-08-02 PROCEDURE — 93294 REM INTERROG EVL PM/LDLS PM: CPT | Performed by: INTERNAL MEDICINE

## 2017-08-02 PROCEDURE — 93296 REM INTERROG EVL PM/IDS: CPT | Performed by: INTERNAL MEDICINE

## 2017-08-07 RX ORDER — RAMIPRIL 10 MG/1
CAPSULE ORAL
Qty: 60 CAPSULE | Refills: 11 | Status: SHIPPED | OUTPATIENT
Start: 2017-08-07 | End: 2018-08-06 | Stop reason: SDUPTHER

## 2017-08-11 ENCOUNTER — ANTI-COAG VISIT (OUTPATIENT)
Dept: PHARMACY | Age: 82
End: 2017-08-11

## 2017-08-11 DIAGNOSIS — I48.20 CHRONIC ATRIAL FIBRILLATION (HCC): ICD-10-CM

## 2017-08-11 LAB — INR BLD: 3.1

## 2017-09-08 ENCOUNTER — ANTI-COAG VISIT (OUTPATIENT)
Dept: PHARMACY | Age: 82
End: 2017-09-08

## 2017-09-08 DIAGNOSIS — I48.20 CHRONIC ATRIAL FIBRILLATION (HCC): ICD-10-CM

## 2017-09-08 LAB
INR BLD: 3.2
PROTIME: 38.7 SECONDS

## 2017-09-18 ENCOUNTER — OFFICE VISIT (OUTPATIENT)
Dept: FAMILY MEDICINE CLINIC | Age: 82
End: 2017-09-18

## 2017-09-18 VITALS
WEIGHT: 175 LBS | HEIGHT: 70 IN | RESPIRATION RATE: 18 BRPM | BODY MASS INDEX: 25.05 KG/M2 | DIASTOLIC BLOOD PRESSURE: 68 MMHG | TEMPERATURE: 98 F | SYSTOLIC BLOOD PRESSURE: 136 MMHG | HEART RATE: 68 BPM | OXYGEN SATURATION: 98 %

## 2017-09-18 DIAGNOSIS — Z95.0 CARDIAC PACEMAKER: ICD-10-CM

## 2017-09-18 DIAGNOSIS — I25.10 CORONARY ARTERY DISEASE INVOLVING NATIVE CORONARY ARTERY OF NATIVE HEART WITHOUT ANGINA PECTORIS: ICD-10-CM

## 2017-09-18 DIAGNOSIS — H90.0 HEARING LOSS, CONDUCTIVE, BILATERAL: ICD-10-CM

## 2017-09-18 DIAGNOSIS — Z79.01 ANTICOAGULANT LONG-TERM USE: ICD-10-CM

## 2017-09-18 DIAGNOSIS — N18.30 CKD STAGE 3 DUE TO TYPE 2 DIABETES MELLITUS (HCC): ICD-10-CM

## 2017-09-18 DIAGNOSIS — E11.51 DM (DIABETES MELLITUS), TYPE 2 WITH PERIPHERAL VASCULAR COMPLICATIONS (HCC): Primary | ICD-10-CM

## 2017-09-18 DIAGNOSIS — E11.22 CKD STAGE 3 DUE TO TYPE 2 DIABETES MELLITUS (HCC): ICD-10-CM

## 2017-09-18 DIAGNOSIS — I10 ESSENTIAL HYPERTENSION: ICD-10-CM

## 2017-09-18 DIAGNOSIS — M1A.9XX0 CHRONIC GOUT WITHOUT TOPHUS, UNSPECIFIED CAUSE, UNSPECIFIED SITE: ICD-10-CM

## 2017-09-18 DIAGNOSIS — I48.20 CHRONIC ATRIAL FIBRILLATION (HCC): ICD-10-CM

## 2017-09-18 PROCEDURE — 99214 OFFICE O/P EST MOD 30 MIN: CPT | Performed by: INTERNAL MEDICINE

## 2017-09-18 PROCEDURE — G0008 ADMIN INFLUENZA VIRUS VAC: HCPCS | Performed by: INTERNAL MEDICINE

## 2017-09-18 PROCEDURE — G8598 ASA/ANTIPLAT THER USED: HCPCS | Performed by: INTERNAL MEDICINE

## 2017-09-18 PROCEDURE — G8427 DOCREV CUR MEDS BY ELIG CLIN: HCPCS | Performed by: INTERNAL MEDICINE

## 2017-09-18 PROCEDURE — 4040F PNEUMOC VAC/ADMIN/RCVD: CPT | Performed by: INTERNAL MEDICINE

## 2017-09-18 PROCEDURE — 1036F TOBACCO NON-USER: CPT | Performed by: INTERNAL MEDICINE

## 2017-09-18 PROCEDURE — G8417 CALC BMI ABV UP PARAM F/U: HCPCS | Performed by: INTERNAL MEDICINE

## 2017-09-18 PROCEDURE — 90662 IIV NO PRSV INCREASED AG IM: CPT | Performed by: INTERNAL MEDICINE

## 2017-09-18 PROCEDURE — 1123F ACP DISCUSS/DSCN MKR DOCD: CPT | Performed by: INTERNAL MEDICINE

## 2017-09-18 ASSESSMENT — ENCOUNTER SYMPTOMS
CONSTIPATION: 0
SHORTNESS OF BREATH: 1
DIARRHEA: 0
WHEEZING: 0

## 2017-09-21 ENCOUNTER — HOSPITAL ENCOUNTER (OUTPATIENT)
Dept: OTHER | Age: 82
Discharge: HOME | End: 2017-09-21
Attending: INTERNAL MEDICINE | Admitting: INTERNAL MEDICINE

## 2017-09-21 DIAGNOSIS — E11.51 DM (DIABETES MELLITUS), TYPE 2 WITH PERIPHERAL VASCULAR COMPLICATIONS (HCC): ICD-10-CM

## 2017-09-21 DIAGNOSIS — I10 ESSENTIAL HYPERTENSION: ICD-10-CM

## 2017-09-21 LAB
ALBUMIN SERPL-MCNC: 4.2 G/DL (ref 3.4–5)
ANION GAP SERPL CALCULATED.3IONS-SCNC: 13 MMOL/L (ref 3–16)
BUN BLDV-MCNC: 36 MG/DL (ref 7–20)
CALCIUM SERPL-MCNC: 9.1 MG/DL (ref 8.3–10.6)
CHLORIDE BLD-SCNC: 103 MMOL/L (ref 99–110)
CO2: 26 MMOL/L (ref 21–32)
CREAT SERPL-MCNC: 1.2 MG/DL (ref 0.8–1.3)
GFR AFRICAN AMERICAN: >60
GFR NON-AFRICAN AMERICAN: 58
GLUCOSE BLD-MCNC: 153 MG/DL (ref 70–99)
PHOSPHORUS: 4.1 MG/DL (ref 2.5–4.9)
POTASSIUM SERPL-SCNC: 5 MMOL/L (ref 3.5–5.1)
SODIUM BLD-SCNC: 142 MMOL/L (ref 136–145)

## 2017-09-22 ENCOUNTER — TELEPHONE (OUTPATIENT)
Dept: FAMILY MEDICINE CLINIC | Age: 82
End: 2017-09-22

## 2017-09-22 DIAGNOSIS — R79.9 ELEVATED BUN: Primary | ICD-10-CM

## 2017-09-22 LAB
ESTIMATED AVERAGE GLUCOSE: 119.8 MG/DL
HBA1C MFR BLD: 5.8 %

## 2017-09-29 ENCOUNTER — ANTI-COAG VISIT (OUTPATIENT)
Dept: PHARMACY | Age: 82
End: 2017-09-29

## 2017-09-29 DIAGNOSIS — I48.20 CHRONIC ATRIAL FIBRILLATION (HCC): ICD-10-CM

## 2017-09-29 LAB
INR BLD: 3.3
PROTIME: 39.5 SECONDS

## 2017-10-01 RX ORDER — AMLODIPINE BESYLATE AND ATORVASTATIN CALCIUM 5; 20 MG/1; MG/1
TABLET, FILM COATED ORAL
Qty: 90 TABLET | Refills: 3 | Status: SHIPPED | OUTPATIENT
Start: 2017-10-01 | End: 2018-09-19 | Stop reason: SDUPTHER

## 2017-10-27 ENCOUNTER — ANTI-COAG VISIT (OUTPATIENT)
Dept: PHARMACY | Age: 82
End: 2017-10-27

## 2017-10-27 DIAGNOSIS — I48.20 CHRONIC ATRIAL FIBRILLATION (HCC): ICD-10-CM

## 2017-10-27 LAB
INR BLD: 4
PROTIME: 47.9 SECONDS

## 2017-10-27 NOTE — PROGRESS NOTES
Mr. Frank Gatica is a 80 y.o. y/o male with history of Afib who presents today for anticoagulation monitoring and adjustment. Pertinent PMH: ICD-pacemaker.  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  Patient Reported Findings:  Yes     No  [x]   []       Patient verifies current dosing regimen as listed  []   [x]       S/S bleeding/bruising/swelling/SOB  []   [x]       Blood in urine or stool  []   [x]       Procedures scheduled in the future at this time  []   [x]       Missed Dose  []   [x]       Extra Dose  []   [x]       Change in medications  [x]   []       Change in health/diet/appetite- has not been eating vegetables because he has had more back pain   []   [x]       Change in alcohol use  []   [x]       Change in activity  []   [x]       Hospital admission  []   [x]       Emergency department visit  []   [x]       Other complaints    Clinical Outcomes:  Yes     No  []   [x]       Major bleeding event  []   [x]       Thromboembolic event  Duration of warfarin Therapy: indefinite  INR Range:  2.0-3.0    INR 4.0 today d/t diet changes recently  Hold dose today only then decrease weekly dose to 2.5 mg on Mon, Wed & Fri  and 5 mg all other days(8.3% decrease)  Recheck INR 2 weeks with wife    Refferring cardiologist is Dr. Prema Soto   INR (no units)   Date Value   10/27/2017 4   09/29/2017 3.3   09/08/2017 3.2   08/11/2017 3.1

## 2017-11-01 ENCOUNTER — HOSPITAL ENCOUNTER (OUTPATIENT)
Dept: OTHER | Age: 82
Discharge: HOME | End: 2017-11-30
Attending: INTERNAL MEDICINE | Admitting: INTERNAL MEDICINE

## 2017-11-08 ENCOUNTER — NURSE ONLY (OUTPATIENT)
Dept: CARDIOLOGY CLINIC | Age: 82
End: 2017-11-08

## 2017-11-08 DIAGNOSIS — Z95.0 CARDIAC PACEMAKER: ICD-10-CM

## 2017-11-08 DIAGNOSIS — Z95.0 PACEMAKER: ICD-10-CM

## 2017-11-08 DIAGNOSIS — I48.20 CHRONIC ATRIAL FIBRILLATION (HCC): ICD-10-CM

## 2017-11-08 PROCEDURE — 93296 REM INTERROG EVL PM/IDS: CPT | Performed by: INTERNAL MEDICINE

## 2017-11-08 PROCEDURE — 93294 REM INTERROG EVL PM/LDLS PM: CPT | Performed by: INTERNAL MEDICINE

## 2017-11-10 ENCOUNTER — ANTI-COAG VISIT (OUTPATIENT)
Dept: PHARMACY | Age: 82
End: 2017-11-10

## 2017-11-10 DIAGNOSIS — I48.20 CHRONIC ATRIAL FIBRILLATION (HCC): ICD-10-CM

## 2017-11-10 LAB
INR BLD: 1.9
PROTIME: 23.2 SECONDS

## 2017-11-10 NOTE — PROGRESS NOTES
Mr. Marlyn Villalobos is a 80 y.o. y/o male with history of Afib who presents today for anticoagulation monitoring and adjustment. Pertinent PMH: ICD-pacemaker. 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  Patient Reported Findings:  Yes     No  [x]   []       Patient verifies current dosing regimen as listed  []   [x]       S/S bleeding/bruising/swelling/SOB  []   [x]       Blood in urine or stool  []   [x]       Procedures scheduled in the future at this time  []   [x]       Missed Dose  []   [x]       Extra Dose  [x]   []       Change in medications patient realized after looking at medication list that was supposed to be taking allopurinol twice daily rather than once daily. Patient is going to begin taking as directed, twice daily starting today, could cause an increase in INR  [x]   []       Change in health/diet/appetite- has been eating vegetables in order to help INR  []   [x]       Change in alcohol use  []   [x]       Change in activity  []   [x]       Hospital admission  []   [x]       Emergency department visit  []   [x]       Other complaints    Clinical Outcomes:  Yes     No  []   [x]       Major bleeding event  []   [x]       Thromboembolic event  Duration of warfarin Therapy: indefinite  INR Range:  2.0-3.0    INR 1.9 today   Continue weekly dose of 2.5 mg on Mon, Wed & Fri  and 5 mg all other days  Return to normal vit k/vegetable intake  Recheck INR 2 weeks with wife, 11/27    Dr. Robbin Lafleur has left, need to determine new cardiologist and get new collaborative at next appt.      Refferring cardiologist is Dr. Robbin Lafleur   INR (no units)   Date Value   11/10/2017 1.9   10/27/2017 4   09/29/2017 3.3   09/08/2017 3.2

## 2017-11-14 ENCOUNTER — HOSPITAL ENCOUNTER (OUTPATIENT)
Dept: OTHER | Age: 82
Discharge: HOME | End: 2017-11-14
Attending: INTERNAL MEDICINE | Admitting: INTERNAL MEDICINE

## 2017-11-14 DIAGNOSIS — R79.9 ELEVATED BUN: ICD-10-CM

## 2017-11-14 LAB
ALBUMIN SERPL-MCNC: 4.4 G/DL (ref 3.4–5)
ANION GAP SERPL CALCULATED.3IONS-SCNC: 15 MMOL/L (ref 3–16)
BUN BLDV-MCNC: 27 MG/DL (ref 7–20)
CALCIUM SERPL-MCNC: 9 MG/DL (ref 8.3–10.6)
CHLORIDE BLD-SCNC: 104 MMOL/L (ref 99–110)
CO2: 24 MMOL/L (ref 21–32)
CREAT SERPL-MCNC: 1.1 MG/DL (ref 0.8–1.3)
GFR AFRICAN AMERICAN: >60
GFR NON-AFRICAN AMERICAN: >60
GLUCOSE BLD-MCNC: 237 MG/DL (ref 70–99)
PHOSPHORUS: 3 MG/DL (ref 2.5–4.9)
POTASSIUM SERPL-SCNC: 4.6 MMOL/L (ref 3.5–5.1)
SODIUM BLD-SCNC: 143 MMOL/L (ref 136–145)

## 2017-11-17 ENCOUNTER — NURSE ONLY (OUTPATIENT)
Dept: FAMILY MEDICINE CLINIC | Age: 82
End: 2017-11-17

## 2017-11-17 ENCOUNTER — TELEPHONE (OUTPATIENT)
Dept: FAMILY MEDICINE CLINIC | Age: 82
End: 2017-11-17

## 2017-11-17 DIAGNOSIS — R73.9 HIGH BLOOD SUGAR: Primary | ICD-10-CM

## 2017-11-17 LAB — HBA1C MFR BLD: 6.3 %

## 2017-11-17 PROCEDURE — 83036 HEMOGLOBIN GLYCOSYLATED A1C: CPT | Performed by: INTERNAL MEDICINE

## 2017-11-17 NOTE — TELEPHONE ENCOUNTER
Call pt ,  hga1c is up 1/2 of a point    Would like him to start metformin  Watch for gi side effects. Did he change his diet? Fu in  3 months.    Tell him to ck glucose  1-2 times a day  If glucose goes down too low let us know  Let us know with any sweating , shaking

## 2017-11-27 ENCOUNTER — ANTI-COAG VISIT (OUTPATIENT)
Dept: PHARMACY | Age: 82
End: 2017-11-27

## 2017-11-27 DIAGNOSIS — I48.20 CHRONIC ATRIAL FIBRILLATION (HCC): ICD-10-CM

## 2017-11-27 LAB
INR BLD: 2.4
PROTIME: 28.6 SECONDS

## 2017-12-01 ENCOUNTER — HOSPITAL ENCOUNTER (OUTPATIENT)
Dept: OTHER | Age: 82
Discharge: HOME | End: 2017-12-31
Attending: INTERNAL MEDICINE | Admitting: INTERNAL MEDICINE

## 2017-12-16 DIAGNOSIS — M1A.9XX1 CHRONIC GOUT INVOLVING TOE OF LEFT FOOT WITH TOPHUS, UNSPECIFIED CAUSE: ICD-10-CM

## 2017-12-18 RX ORDER — ALLOPURINOL 100 MG/1
200 TABLET ORAL DAILY
Qty: 60 TABLET | Refills: 0 | Status: SHIPPED | OUTPATIENT
Start: 2017-12-18 | End: 2018-01-18 | Stop reason: SDUPTHER

## 2017-12-28 ENCOUNTER — ANTI-COAG VISIT (OUTPATIENT)
Dept: PHARMACY | Age: 82
End: 2017-12-28

## 2017-12-28 DIAGNOSIS — I48.20 CHRONIC ATRIAL FIBRILLATION (HCC): ICD-10-CM

## 2017-12-28 LAB
INR BLD: 2.9
PROTIME: 34.6 SECONDS

## 2017-12-28 NOTE — PROGRESS NOTES
Mr. Suzanne Hernandez is a 80 y.o. y/o male with history of Afib who presents today for anticoagulation monitoring and adjustment. Pertinent PMH: ICD-pacemaker. 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  Patient Reported Findings:  Yes     No  [x]   []       Patient verifies current dosing regimen as listed  []   [x]       S/S bleeding/bruising/swelling/SOB  []   [x]       Blood in urine or stool  []   [x]       Procedures scheduled in the future at this time  []   [x]       Missed Dose  []   [x]       Extra Dose  [x]   []       Change in medications patient realized after looking at medication list that was supposed to be taking allopurinol twice daily rather than once daily. Patient is going to begin taking as directed, twice daily starting today, could cause an increase in INR  [x]   []       Change in health/diet/appetite- has been eating vegetables in order to help INR  []   [x]       Change in alcohol use  []   [x]       Change in activity  []   [x]       Hospital admission  []   [x]       Emergency department visit  []   [x]       Other complaints    Clinical Outcomes:  Yes     No  []   [x]       Major bleeding event  []   [x]       Thromboembolic event  Duration of warfarin Therapy: indefinite  INR Range:  2.0-3.0    INR 2.9 today   Continue weekly dose of 2.5 mg on Mon, Wed & Fri  and 5 mg all other days  Encouraged to maintain a consistency of vegetables/salads.   Recheck INR in 4 weeks with wife, 1/25     Referring cardiologist will be Dr. Enrique Ramírez   INR (no units)   Date Value   12/28/2017 2.9   11/27/2017 2.4   11/10/2017 1.9   10/27/2017 4

## 2018-01-01 ENCOUNTER — HOSPITAL ENCOUNTER (OUTPATIENT)
Dept: OTHER | Age: 83
Discharge: HOME | End: 2018-01-31
Attending: INTERNAL MEDICINE | Admitting: INTERNAL MEDICINE

## 2018-01-18 DIAGNOSIS — M1A.9XX1 CHRONIC GOUT INVOLVING TOE OF LEFT FOOT WITH TOPHUS, UNSPECIFIED CAUSE: ICD-10-CM

## 2018-01-18 RX ORDER — ALLOPURINOL 100 MG/1
200 TABLET ORAL DAILY
Qty: 60 TABLET | Refills: 0 | Status: SHIPPED | OUTPATIENT
Start: 2018-01-18 | End: 2018-02-19 | Stop reason: SDUPTHER

## 2018-01-22 ENCOUNTER — HOSPITAL ENCOUNTER (OUTPATIENT)
Dept: OTHER | Age: 83
Discharge: HOME | End: 2018-01-22
Attending: INTERNAL MEDICINE | Admitting: INTERNAL MEDICINE

## 2018-01-22 ENCOUNTER — OFFICE VISIT (OUTPATIENT)
Dept: FAMILY MEDICINE CLINIC | Age: 83
End: 2018-01-22

## 2018-01-22 VITALS
SYSTOLIC BLOOD PRESSURE: 110 MMHG | DIASTOLIC BLOOD PRESSURE: 60 MMHG | HEART RATE: 87 BPM | WEIGHT: 169 LBS | RESPIRATION RATE: 12 BRPM | BODY MASS INDEX: 24.6 KG/M2

## 2018-01-22 DIAGNOSIS — R05.9 COUGH: ICD-10-CM

## 2018-01-22 DIAGNOSIS — E11.22 CKD STAGE 3 DUE TO TYPE 2 DIABETES MELLITUS (HCC): ICD-10-CM

## 2018-01-22 DIAGNOSIS — N18.30 CKD STAGE 3 DUE TO TYPE 2 DIABETES MELLITUS (HCC): ICD-10-CM

## 2018-01-22 DIAGNOSIS — E11.51 DM (DIABETES MELLITUS), TYPE 2 WITH PERIPHERAL VASCULAR COMPLICATIONS (HCC): ICD-10-CM

## 2018-01-22 DIAGNOSIS — I10 ESSENTIAL HYPERTENSION: ICD-10-CM

## 2018-01-22 DIAGNOSIS — E78.00 HYPERCHOLESTEREMIA: ICD-10-CM

## 2018-01-22 DIAGNOSIS — E11.51 DM (DIABETES MELLITUS), TYPE 2 WITH PERIPHERAL VASCULAR COMPLICATIONS (HCC): Primary | ICD-10-CM

## 2018-01-22 LAB
A/G RATIO: 1.7 (ref 1.1–2.2)
ALBUMIN SERPL-MCNC: 4.4 G/DL (ref 3.4–5)
ALP BLD-CCNC: 83 U/L (ref 40–129)
ALT SERPL-CCNC: 21 U/L (ref 10–40)
ANION GAP SERPL CALCULATED.3IONS-SCNC: 14 MMOL/L (ref 3–16)
AST SERPL-CCNC: 25 U/L (ref 15–37)
BILIRUB SERPL-MCNC: 0.6 MG/DL (ref 0–1)
BUN BLDV-MCNC: 27 MG/DL (ref 7–20)
CALCIUM SERPL-MCNC: 9.3 MG/DL (ref 8.3–10.6)
CHLORIDE BLD-SCNC: 101 MMOL/L (ref 99–110)
CO2: 26 MMOL/L (ref 21–32)
CREAT SERPL-MCNC: 1.1 MG/DL (ref 0.8–1.3)
CREATININE URINE: 110.1 MG/DL (ref 39–259)
GFR AFRICAN AMERICAN: >60
GFR NON-AFRICAN AMERICAN: >60
GLOBULIN: 2.6 G/DL
GLUCOSE BLD-MCNC: 125 MG/DL (ref 70–99)
MICROALBUMIN UR-MCNC: 22 MG/DL
MICROALBUMIN/CREAT UR-RTO: 199.8 MG/G (ref 0–30)
POTASSIUM SERPL-SCNC: 5.1 MMOL/L (ref 3.5–5.1)
SODIUM BLD-SCNC: 141 MMOL/L (ref 136–145)
TOTAL PROTEIN: 7 G/DL (ref 6.4–8.2)

## 2018-01-22 PROCEDURE — G8484 FLU IMMUNIZE NO ADMIN: HCPCS | Performed by: INTERNAL MEDICINE

## 2018-01-22 PROCEDURE — 1123F ACP DISCUSS/DSCN MKR DOCD: CPT | Performed by: INTERNAL MEDICINE

## 2018-01-22 PROCEDURE — G8427 DOCREV CUR MEDS BY ELIG CLIN: HCPCS | Performed by: INTERNAL MEDICINE

## 2018-01-22 PROCEDURE — 4040F PNEUMOC VAC/ADMIN/RCVD: CPT | Performed by: INTERNAL MEDICINE

## 2018-01-22 PROCEDURE — 99214 OFFICE O/P EST MOD 30 MIN: CPT | Performed by: INTERNAL MEDICINE

## 2018-01-22 PROCEDURE — G8598 ASA/ANTIPLAT THER USED: HCPCS | Performed by: INTERNAL MEDICINE

## 2018-01-22 PROCEDURE — G8420 CALC BMI NORM PARAMETERS: HCPCS | Performed by: INTERNAL MEDICINE

## 2018-01-22 PROCEDURE — 1036F TOBACCO NON-USER: CPT | Performed by: INTERNAL MEDICINE

## 2018-01-22 RX ORDER — AMOXICILLIN AND CLAVULANATE POTASSIUM 875; 125 MG/1; MG/1
1 TABLET, FILM COATED ORAL 2 TIMES DAILY
Qty: 16 TABLET | Refills: 0 | Status: SHIPPED | OUTPATIENT
Start: 2018-01-22 | End: 2018-01-30

## 2018-01-22 ASSESSMENT — ENCOUNTER SYMPTOMS
COUGH: 1
WHEEZING: 0
DIARRHEA: 0
VOMITING: 0

## 2018-01-22 NOTE — PROGRESS NOTES
DAILY Yes Cathy Barr MD   warfarin (COUMADIN) 5 MG tablet TAKE 1 TABLET BY MOUTH DAILY, EXCEPT 1/2 TABLET ON FRIDAYS AS DIRECTED. Yes Cathy Barr MD   Accu-Chek Softclix Lancets MISC Test once daily. Yes Amelia Avendaño MD   glucose blood VI test strips (ACCU-CHEK AKOSUA) strip Test 3 times daily. He is a newly diagnosed diabetic who will require medication titration; Dx: E11.51 Yes Amelia Avendaño MD   Blood Glucose Monitoring Suppl (ACCU-CHEK AKOSUA PLUS) W/DEVICE KIT 1 kit by Does not apply route 3 times daily Patient to check blood sugar 3 times a day and PRN, he is a newly diagnosed diabetic who will require medication titration ;  LABA1C      8.6    ; ICD code- 250.02. Yes Tony Shin MD   warfarin (COUMADIN) 2.5 MG tablet Take 2.5 mg by mouth Once Indications: only on Friday  Yes Historical Provider, MD   vitamin D (CHOLECALCIFEROL) 1000 UNITS TABS tablet Take 1,000 Units by mouth daily Yes Historical Provider, MD   loratadine (CLARITIN) 10 MG tablet Take 10 mg by mouth daily. Yes Historical Provider, MD   aspirin 81 MG EC tablet Take 81 mg by mouth daily.  Yes Historical Provider, MD       Past Surgical History:   Procedure Laterality Date    ABSCESS DRAINAGE  7/20/15    right foot    APPENDECTOMY      CARDIAC CATHETERIZATION  2003    Left    COLONOSCOPY      CORONARY ARTERY BYPASS GRAFT  1996    x3    KIDNEY STONE SURGERY  1980    OTHER SURGICAL HISTORY Right 7/17/15    right fifth toe I and D    PACEMAKER PLACEMENT  2005    TOE AMPUTATION Right 7.16.15    right pinkey toe     TONSILLECTOMY AND ADENOIDECTOMY         Social History     Social History    Marital status:      Spouse name: Keyona Rodriguez Number of children: 1    Years of education: N/A     Occupational History    retired--[employer]      Social History Main Topics    Smoking status: Former Smoker     Years: 0.50     Quit date: 1/10/1950    Smokeless tobacco: Never Used      Comment: counseled on tobacco exposure avoidance   

## 2018-01-23 ENCOUNTER — OFFICE VISIT (OUTPATIENT)
Dept: CARDIOLOGY CLINIC | Age: 83
End: 2018-01-23

## 2018-01-23 ENCOUNTER — PROCEDURE VISIT (OUTPATIENT)
Dept: CARDIOLOGY CLINIC | Age: 83
End: 2018-01-23

## 2018-01-23 VITALS
SYSTOLIC BLOOD PRESSURE: 130 MMHG | DIASTOLIC BLOOD PRESSURE: 60 MMHG | HEART RATE: 62 BPM | BODY MASS INDEX: 24.21 KG/M2 | HEIGHT: 70 IN | WEIGHT: 169.1 LBS

## 2018-01-23 DIAGNOSIS — I10 ESSENTIAL HYPERTENSION: Primary | ICD-10-CM

## 2018-01-23 DIAGNOSIS — Z95.0 CARDIAC PACEMAKER: ICD-10-CM

## 2018-01-23 DIAGNOSIS — I25.10 CORONARY ARTERY DISEASE INVOLVING NATIVE CORONARY ARTERY OF NATIVE HEART WITHOUT ANGINA PECTORIS: ICD-10-CM

## 2018-01-23 PROCEDURE — 99214 OFFICE O/P EST MOD 30 MIN: CPT | Performed by: NURSE PRACTITIONER

## 2018-01-23 PROCEDURE — 93280 PM DEVICE PROGR EVAL DUAL: CPT | Performed by: INTERNAL MEDICINE

## 2018-01-23 PROCEDURE — 1123F ACP DISCUSS/DSCN MKR DOCD: CPT | Performed by: NURSE PRACTITIONER

## 2018-01-23 PROCEDURE — 4040F PNEUMOC VAC/ADMIN/RCVD: CPT | Performed by: NURSE PRACTITIONER

## 2018-01-23 PROCEDURE — G8420 CALC BMI NORM PARAMETERS: HCPCS | Performed by: NURSE PRACTITIONER

## 2018-01-23 PROCEDURE — 1036F TOBACCO NON-USER: CPT | Performed by: NURSE PRACTITIONER

## 2018-01-23 PROCEDURE — G8484 FLU IMMUNIZE NO ADMIN: HCPCS | Performed by: NURSE PRACTITIONER

## 2018-01-23 PROCEDURE — G8598 ASA/ANTIPLAT THER USED: HCPCS | Performed by: NURSE PRACTITIONER

## 2018-01-23 PROCEDURE — G8427 DOCREV CUR MEDS BY ELIG CLIN: HCPCS | Performed by: NURSE PRACTITIONER

## 2018-01-23 NOTE — COMMUNICATION BODY
Cardiac Follow up    Referring Provider:  Ashkan Randolph MD     Chief Complaint   Patient presents with    Coronary Artery Disease     No cardiac complaints    Atrial Fibrillation    Hypertension        History of Present Illness:  Mr. Breana Mclaughlin is here for a follow up visit. He underwent coronary bypass graft surgery in 1996 presents for reevaluation. He has h/o HTN and PAF  He has SSS and has a [employer] pacer. He is on coumadin for PAF. Today, Shira Isabel states that he is doing well cardiac wise. He states that he is busy helping take care of his wife at home and he worries about her and her declining psychological condition. He has no chest pain shortness of breath palpitations dizziness or edema. He recently had URI and had to cancel his stress echo    Past Medical History: has a past medical history of Actinic keratosis; Actinic keratosis; Atrial fibrillation ([address] Bilateral carotid artery stenosis; CAD (coronary artery disease); Cellulitis; Glucose intolerance (malabsorption); Hypertrophy of prostate without urinary obstruction and other lower urinary tract symptoms (LUTS); Intermittent atrial fibrillation ([address] Kidney stone; Occult blood in stool; and Pacemaker. Surgical History: has a past surgical history that includes Tonsillectomy and adenoidectomy; Appendectomy; Kidney stone surgery (1980); Coronary artery bypass graft (1996); Cardiac catheterization (2003); pacemaker placement (2005); other surgical history (Right, 7/17/15); Abscess Drainage (7/20/15); Toe amputation (Right, 7.16.15); and Colonoscopy. Social History: reports that he quit smoking about 68 years ago. He quit after 0.50 years of use. He has never used smokeless tobacco. He reports that he does not drink alcohol or use drugs. Family History:family history includes Diabetes in his mother; Stroke in his father.    Home Medications:  Outpatient Encounter Prescriptions as of 1/23/2018   Medication Sig Dispense Refill   

## 2018-01-23 NOTE — LETTER
artery bypass graft (1996); Cardiac catheterization (2003); pacemaker placement (2005); other surgical history (Right, 7/17/15); Abscess Drainage (7/20/15); Toe amputation (Right, 7.16.15); and Colonoscopy. Social History: reports that he quit smoking about 68 years ago. He quit after 0.50 years of use. He has never used smokeless tobacco. He reports that he does not drink alcohol or use drugs. Family History:family history includes Diabetes in his mother; Stroke in his father. Home Medications:  Outpatient Encounter Prescriptions as of 1/23/2018   Medication Sig Dispense Refill    amoxicillin-clavulanate (AUGMENTIN) 875-125 MG per tablet Take 1 tablet by mouth 2 times daily for 8 days 16 tablet 0    allopurinol (ZYLOPRIM) 100 MG tablet Take 2 tablets by mouth daily 60 tablet 0    metFORMIN (GLUCOPHAGE) 500 MG tablet Take 1 tablet by mouth 2 times daily (with meals) 60 tablet 3    amLODIPine-atorvastatatin (CADUET) 5-20 MG per tablet TAKE 1 TABLET BY MOUTH EVERY DAY 90 tablet 3    sotalol (BETAPACE) 80 MG tablet TAKE 1 TABLET BY MOUTH TWICE DAILY 180 tablet 3    ramipril (ALTACE) 10 MG capsule TAKE 1 CAPSULE BY MOUTH TWICE DAILY 60 capsule 11    warfarin (COUMADIN) 5 MG tablet TAKE 1 TABLET BY MOUTH DAILY, EXCEPT 1/2 TABLET ON FRIDAYS AS DIRECTED. 90 tablet 1    Accu-Chek Softclix Lancets MISC Test once daily. 100 each 3    glucose blood VI test strips (ACCU-CHEK AKOSUA) strip Test 3 times daily. He is a newly diagnosed diabetic who will require medication titration;  Dx: E11.51 100 each 4    Blood Glucose Monitoring Suppl (ACCU-CHEK AKOSUA PLUS) W/DEVICE KIT 1 kit by Does not apply route 3 times daily Patient to check blood sugar 3 times a day and PRN, he is a newly diagnosed diabetic who will require medication titration ;  LABA1C      8.6    ; ICD code- 250.02. 1 kit 0    warfarin (COUMADIN) 2.5 MG tablet Take 2.5 mg by mouth Once Indications: only on Friday

## 2018-01-23 NOTE — PROGRESS NOTES
Cardiac Follow up    Referring Provider:  Marlene Alba MD     Chief Complaint   Patient presents with    Coronary Artery Disease     No cardiac complaints    Atrial Fibrillation    Hypertension        History of Present Illness:  Mr. Kanu Valderrama is here for a follow up visit. He underwent coronary bypass graft surgery in 1996 presents for reevaluation. He has h/o HTN and PAF  He has SSS and has a [employer] pacer. He is on coumadin for PAF. Today, Maria M Lara states that he is doing well cardiac wise. He states that he is busy helping take care of his wife at home and he worries about her and her declining psychological condition. He has no chest pain shortness of breath palpitations dizziness or edema. He recently had URI and had to cancel his stress echo    Past Medical History: has a past medical history of Actinic keratosis; Actinic keratosis; Atrial fibrillation ([address] Bilateral carotid artery stenosis; CAD (coronary artery disease); Cellulitis; Glucose intolerance (malabsorption); Hypertrophy of prostate without urinary obstruction and other lower urinary tract symptoms (LUTS); Intermittent atrial fibrillation ([address] Kidney stone; Occult blood in stool; and Pacemaker. Surgical History: has a past surgical history that includes Tonsillectomy and adenoidectomy; Appendectomy; Kidney stone surgery (1980); Coronary artery bypass graft (1996); Cardiac catheterization (2003); pacemaker placement (2005); other surgical history (Right, 7/17/15); Abscess Drainage (7/20/15); Toe amputation (Right, 7.16.15); and Colonoscopy. Social History: reports that he quit smoking about 68 years ago. He quit after 0.50 years of use. He has never used smokeless tobacco. He reports that he does not drink alcohol or use drugs. Family History:family history includes Diabetes in his mother; Stroke in his father.    Home Medications:  Outpatient Encounter Prescriptions as of 1/23/2018   Medication Sig Dispense Refill    vertigo. No mouth sores or sore throat. · Cardiovascular: Reviewed in HPI  · Respiratory: No cough or wheezing, no sputum production. No hematemesis. · Gastrointestinal: No abdominal pain, appetite loss, blood in stools. No change in bowel or bladder habits. · Genitourinary: No dysuria, trouble voiding, or hematuria. · Musculoskeletal:  No gait disturbance, weakness or joint complaints. · Integumentary: No rash or pruritis. · Neurological: No headache, diplopia, change in muscle strength, numbness or tingling. No change in gait, balance, coordination, mood, affect, memory, mentation, behavior. · Psychiatric: No anxiety, no depression. · Endocrine: No malaise, fatigue or temperature intolerance. No excessive thirst, fluid intake, or urination. No tremor. · Hematologic/Lymphatic: No abnormal bruising or bleeding, blood clots or swollen lymph nodes. · Allergic/Immunologic: No nasal congestion or hives. Physical Examination:    Vitals:    01/23/18 0910   BP: 130/60   Pulse: 62        Constitutional and General Appearance:  Well-nourished, well-developed, in no acute distress   Respiratory:  · Normal excursion and expansion without use of accessory muscles  · Resp Auscultation: Normal breath sounds without dullness  Cardiovascular:  · The apical impulses not displaced  · Heart tones are crisp and normal  · Cervical veins are not engorged  · The carotid upstroke is normal in amplitude and contour. Soft left carotid bruit. · regular rate and rhythm  ·  No murmurs, gallops, or rubs  · There is no clubbing, cyanosis of the extremities.   · L ft edema and red   · Femoral Arteries: 2+ and equal  · Pedal Pulses: 2+ and equal   Abdomen:  · No masses or tenderness  · Bowel sounds present  · No organomegaly appreciated  Neurological/Psychiatric:  · Alert and oriented in all spheres  · Moves all extremities well  · Exhibits normal gait balance and coordination  · No abnormalities of mood, affect, memory, mentation,

## 2018-01-25 ENCOUNTER — ANTI-COAG VISIT (OUTPATIENT)
Dept: PHARMACY | Age: 83
End: 2018-01-25

## 2018-01-25 DIAGNOSIS — I48.20 CHRONIC ATRIAL FIBRILLATION (HCC): ICD-10-CM

## 2018-01-25 LAB
INR BLD: 2.5
PROTIME: 29.8 SECONDS

## 2018-02-01 ENCOUNTER — HOSPITAL ENCOUNTER (OUTPATIENT)
Dept: OTHER | Age: 83
Discharge: HOME | End: 2018-02-28
Attending: INTERNAL MEDICINE | Admitting: INTERNAL MEDICINE

## 2018-02-02 RX ORDER — WARFARIN SODIUM 5 MG/1
TABLET ORAL
Qty: 90 TABLET | Refills: 1 | Status: SHIPPED | OUTPATIENT
Start: 2018-02-02 | End: 2019-01-22 | Stop reason: SDUPTHER

## 2018-02-19 DIAGNOSIS — M1A.9XX1 CHRONIC GOUT INVOLVING TOE OF LEFT FOOT WITH TOPHUS, UNSPECIFIED CAUSE: ICD-10-CM

## 2018-02-19 RX ORDER — ALLOPURINOL 100 MG/1
200 TABLET ORAL DAILY
Qty: 60 TABLET | Refills: 3 | Status: SHIPPED | OUTPATIENT
Start: 2018-02-19 | End: 2018-06-20 | Stop reason: SDUPTHER

## 2018-02-22 ENCOUNTER — ANTI-COAG VISIT (OUTPATIENT)
Dept: PHARMACY | Age: 83
End: 2018-02-22

## 2018-02-22 DIAGNOSIS — I48.20 CHRONIC ATRIAL FIBRILLATION (HCC): ICD-10-CM

## 2018-02-22 LAB
INR BLD: 1.7
PROTIME: 20.3 SECONDS

## 2018-02-22 NOTE — PROGRESS NOTES
Mr. Mary Lara is a 80 y.o. y/o male with history of Afib who presents today for anticoagulation monitoring and adjustment. Pertinent PMH: ICD-pacemaker. 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  Patient Reported Findings:  Yes     No  [x]   []       Patient verifies current dosing regimen as listed  []   [x]       S/S bleeding/bruising/swelling/SOB  []   [x]       Blood in urine or stool  []   [x]       Procedures scheduled in the future at this time  []   [x]       Missed Dose  []   [x]       Extra Dose  [x]   []       Change in medications started on metformin   [x]   []       Change in health/diet/appetite-states that since it is winter, hasn't been eating as many vegetables   []   [x]       Change in alcohol use  []   [x]       Change in activity  []   [x]       Hospital admission  []   [x]       Emergency department visit  []   [x]       Other complaints    Clinical Outcomes:  Yes     No  []   [x]       Major bleeding event  []   [x]       Thromboembolic event  Duration of warfarin Therapy: indefinite  INR Range:  2.0-3.0    INR 1.7 today   Since normally therapeutic at this weekly dose, take 5 mg tomorrow then continue weekly dose of 2.5 mg on Mon, Wed & Fri  and 5 mg all other days  Encouraged to maintain a consistency of vegetables/salads.   Recheck INR in 3 weeks with wife, 3/15    Referring cardiologist will be Dr. Iraj Herrera   INR (no units)   Date Value   02/22/2018 1.7   01/25/2018 2.5   12/28/2017 2.9   11/27/2017 2.4

## 2018-02-23 DIAGNOSIS — I25.10 CORONARY ARTERY DISEASE INVOLVING NATIVE CORONARY ARTERY OF NATIVE HEART WITHOUT ANGINA PECTORIS: Primary | ICD-10-CM

## 2018-02-23 DIAGNOSIS — E78.00 HYPERCHOLESTEREMIA: ICD-10-CM

## 2018-02-26 ENCOUNTER — HOSPITAL ENCOUNTER (OUTPATIENT)
Dept: NON INVASIVE DIAGNOSTICS | Age: 83
Discharge: HOME | End: 2018-02-26
Attending: NURSE PRACTITIONER | Admitting: NURSE PRACTITIONER

## 2018-02-26 DIAGNOSIS — I25.10 ATHEROSCLEROTIC HEART DISEASE OF NATIVE CORONARY ARTERY WITHOUT ANGINA PECTORIS: ICD-10-CM

## 2018-02-26 LAB
LEFT VENTRICULAR EJECTION FRACTION HIGH VALUE: 55 %
LEFT VENTRICULAR EJECTION FRACTION MODE: NORMAL

## 2018-03-01 ENCOUNTER — HOSPITAL ENCOUNTER (OUTPATIENT)
Dept: OTHER | Age: 83
Discharge: HOME | End: 2018-03-31
Attending: INTERNAL MEDICINE | Admitting: INTERNAL MEDICINE

## 2018-03-12 ENCOUNTER — PAT TELEPHONE (OUTPATIENT)
Dept: PREADMISSION TESTING | Age: 83
End: 2018-03-12

## 2018-03-12 VITALS — BODY MASS INDEX: 24.73 KG/M2 | WEIGHT: 167 LBS | HEIGHT: 69 IN

## 2018-03-12 NOTE — PROGRESS NOTES
[facility] time___0930_________        Surgery time___1030_________    Take the following medications with a sip of water:    Do not eat or drink anything after 12:00 midnight prior to your surgery. EXCEPT PREP  This includes water chewing gum, mints and ice chips. You may brush your teeth and gargle the morning of your surgery, but do not swallow the water    You may be asked to stop blood thinners such as Coumadin, Plavix, Fragmin, Lovenox, etc., or any anti-inflammatories such as:  Aspirin, Ibuprofen, Advil, Naproxen prior to your surgery. We also ask that you stop any OTC medications such as fish oil, vitamin E, glucosamine, garlic, Multivitamins, COQ 10, etc.    We ask that you do not smoke 24 hours prior to surgery  We ask that you do not  drink any alcoholic beverages 24 hours prior to surgery     You must make arrangements for a responsible adult to take you home after your surgery. For your safety you will not be allowed to leave alone or drive yourself home. Your surgery will be cancelled if you do not have a ride home. Also for your safety, it is strongly suggested that someone stay with you the first 24 hours after your surgery. A parent or legal guardian must accompany a child scheduled for surgery and plan to stay at the hospital until the child is discharged. Please do not bring other children with you. For your comfort, please wear simple loose fitting clothing to the hospital.  Please do not bring valuables. Do not wear any make-up or nail polish on your fingers or toes      For your safety, please do not wear any jewelry or body piercing's on the day of surgery. All jewelry must be removed. If you have dentures, they will be removed before going to operating room. For your convenience, we will provide you with a container.     If you wear contact lenses or glasses, they will be removed, please bring a case for them.     If you have a living will and a durable power of  for healthcare, please bring in a copy. As part of our patient safety program to minimize surgical site infections, we ask you to do the following:    · Please notify your surgeon if you develop any illness between         now and the  day of your surgery. · This includes a cough, cold, fever, sore throat, nausea,         or vomiting, and diarrhea, etc.  ·  Please notify your surgeon if you experience dizziness, shortness         of breath or blurred vision between now and the time of your surgery. You may shower the night before surgery or the morning of   your surgery with an antibacterial soap. You will need to bring a photo ID and insurance card    [facility] has an onsite pharmacy, would you like to utilize our pharmacy     If you will be staying overnight and use a C-pap machine, please bring   your C-pap to hospital     Our goal is to provide you with excellent care, therefore, visitors will be limited to two(2) in the room at a time so that we may focus on providing this care for you. Please contact pre-admission testing if you have any further questions. [facility] phone number:  [phone]  Please note these are generalized instructions for all surgical cases, you may be provided with more specific instructions according to your surgery.

## 2018-03-14 ENCOUNTER — TELEPHONE (OUTPATIENT)
Dept: PHARMACY | Age: 83
End: 2018-03-14

## 2018-03-16 RX ORDER — SODIUM CHLORIDE 0.9 % (FLUSH) 0.9 %
10 SYRINGE (ML) INJECTION PRN
Status: CANCELLED | OUTPATIENT
Start: 2018-03-16

## 2018-03-16 RX ORDER — SODIUM CHLORIDE 0.9 % (FLUSH) 0.9 %
10 SYRINGE (ML) INJECTION EVERY 12 HOURS SCHEDULED
Status: CANCELLED | OUTPATIENT
Start: 2018-03-16

## 2018-03-16 RX ORDER — SODIUM CHLORIDE 9 MG/ML
INJECTION, SOLUTION INTRAVENOUS CONTINUOUS
Status: CANCELLED | OUTPATIENT
Start: 2018-03-16

## 2018-03-19 ENCOUNTER — HOSPITAL ENCOUNTER (OUTPATIENT)
Dept: ENDOSCOPY | Age: 83
Discharge: HOME | End: 2018-03-20
Attending: INTERNAL MEDICINE | Admitting: INTERNAL MEDICINE

## 2018-03-21 ENCOUNTER — ANTI-COAG VISIT (OUTPATIENT)
Dept: PHARMACY | Age: 83
End: 2018-03-21

## 2018-03-21 DIAGNOSIS — I48.20 CHRONIC ATRIAL FIBRILLATION (HCC): ICD-10-CM

## 2018-03-21 LAB
INR BLD: 2.4
PROTIME: 28.9 SECONDS

## 2018-03-28 ENCOUNTER — HOSPITAL ENCOUNTER (OUTPATIENT)
Dept: ENDOSCOPY | Age: 83
Discharge: HOME | End: 2018-03-28
Admitting: INTERNAL MEDICINE

## 2018-03-28 VITALS
WEIGHT: 161.8 LBS | DIASTOLIC BLOOD PRESSURE: 56 MMHG | HEIGHT: 69 IN | OXYGEN SATURATION: 98 % | HEART RATE: 62 BPM | BODY MASS INDEX: 23.96 KG/M2 | RESPIRATION RATE: 18 BRPM | SYSTOLIC BLOOD PRESSURE: 120 MMHG | TEMPERATURE: 97 F

## 2018-03-28 LAB
GLUCOSE BLD-MCNC: 112 MG/DL (ref 70–99)
INTERNATIONAL NORMALIZATION RATIO, POC: 1.1
PERFORMED ON: ABNORMAL
PROTHROMBIN TIME, POC: 13.3

## 2018-03-28 RX ORDER — OXYCODONE HYDROCHLORIDE 5 MG/1
10 TABLET ORAL PRN
Status: ACTIVE | OUTPATIENT
Start: 2018-03-28 | End: 2018-03-28

## 2018-03-28 RX ORDER — MORPHINE SULFATE 4 MG/ML
1 INJECTION, SOLUTION INTRAMUSCULAR; INTRAVENOUS EVERY 5 MIN PRN
Status: DISCONTINUED | OUTPATIENT
Start: 2018-03-28 | End: 2018-03-29 | Stop reason: HOSPADM

## 2018-03-28 RX ORDER — SODIUM CHLORIDE 0.9 % (FLUSH) 0.9 %
10 SYRINGE (ML) INJECTION EVERY 12 HOURS SCHEDULED
Status: DISCONTINUED | OUTPATIENT
Start: 2018-03-28 | End: 2018-03-29 | Stop reason: HOSPADM

## 2018-03-28 RX ORDER — HYDRALAZINE HYDROCHLORIDE 20 MG/ML
5 INJECTION INTRAMUSCULAR; INTRAVENOUS EVERY 10 MIN PRN
Status: DISCONTINUED | OUTPATIENT
Start: 2018-03-28 | End: 2018-03-29 | Stop reason: HOSPADM

## 2018-03-28 RX ORDER — MEPERIDINE HYDROCHLORIDE 25 MG/ML
12.5 INJECTION INTRAMUSCULAR; INTRAVENOUS; SUBCUTANEOUS EVERY 5 MIN PRN
Status: DISCONTINUED | OUTPATIENT
Start: 2018-03-28 | End: 2018-03-29 | Stop reason: HOSPADM

## 2018-03-28 RX ORDER — DIPHENHYDRAMINE HYDROCHLORIDE 50 MG/ML
12.5 INJECTION INTRAMUSCULAR; INTRAVENOUS
Status: ACTIVE | OUTPATIENT
Start: 2018-03-28 | End: 2018-03-28

## 2018-03-28 RX ORDER — LABETALOL HYDROCHLORIDE 5 MG/ML
5 INJECTION, SOLUTION INTRAVENOUS EVERY 10 MIN PRN
Status: DISCONTINUED | OUTPATIENT
Start: 2018-03-28 | End: 2018-03-29 | Stop reason: HOSPADM

## 2018-03-28 RX ORDER — METOCLOPRAMIDE HYDROCHLORIDE 5 MG/ML
10 INJECTION INTRAMUSCULAR; INTRAVENOUS
Status: ACTIVE | OUTPATIENT
Start: 2018-03-28 | End: 2018-03-28

## 2018-03-28 RX ORDER — SODIUM CHLORIDE 9 MG/ML
INJECTION, SOLUTION INTRAVENOUS CONTINUOUS
Status: DISCONTINUED | OUTPATIENT
Start: 2018-03-28 | End: 2018-03-29 | Stop reason: HOSPADM

## 2018-03-28 RX ORDER — SODIUM CHLORIDE 9 MG/ML
INJECTION, SOLUTION INTRAVENOUS CONTINUOUS
Status: DISCONTINUED | OUTPATIENT
Start: 2018-03-28 | End: 2018-03-28 | Stop reason: SDUPTHER

## 2018-03-28 RX ORDER — OXYCODONE HYDROCHLORIDE 5 MG/1
5 TABLET ORAL PRN
Status: ACTIVE | OUTPATIENT
Start: 2018-03-28 | End: 2018-03-28

## 2018-03-28 RX ORDER — PROMETHAZINE HYDROCHLORIDE 25 MG/ML
6.25 INJECTION, SOLUTION INTRAMUSCULAR; INTRAVENOUS
Status: ACTIVE | OUTPATIENT
Start: 2018-03-28 | End: 2018-03-28

## 2018-03-28 RX ORDER — SODIUM CHLORIDE 0.9 % (FLUSH) 0.9 %
10 SYRINGE (ML) INJECTION PRN
Status: DISCONTINUED | OUTPATIENT
Start: 2018-03-28 | End: 2018-03-29 | Stop reason: HOSPADM

## 2018-03-28 RX ADMIN — SODIUM CHLORIDE: 9 INJECTION, SOLUTION INTRAVENOUS at 09:26

## 2018-03-28 ASSESSMENT — PAIN SCALES - GENERAL
PAINLEVEL_OUTOF10: 0
PAINLEVEL_OUTOF10: 0

## 2018-03-28 ASSESSMENT — PAIN SCALES - WONG BAKER: WONGBAKER_NUMERICALRESPONSE: 0

## 2018-03-28 ASSESSMENT — PAIN - FUNCTIONAL ASSESSMENT: PAIN_FUNCTIONAL_ASSESSMENT: 0-10

## 2018-03-28 NOTE — ANESTHESIA PRE-OP
Historical Provider, MD       Current medications:    Current Outpatient Prescriptions   Medication Sig Dispense Refill    allopurinol (ZYLOPRIM) 100 MG tablet Take 2 tablets by mouth daily 60 tablet 3    warfarin (COUMADIN) 5 MG tablet 1 tab daily except for half tab Fridays as directed 90 tablet 1    glucose blood VI test strips (ACCU-CHEK AKOSUA) strip Test 3 times daily. He is a newly diagnosed diabetic who will require medication titration; Dx: E11.51 100 each 4    metFORMIN (GLUCOPHAGE) 500 MG tablet Take 1 tablet by mouth 2 times daily (with meals) 60 tablet 3    amLODIPine-atorvastatatin (CADUET) 5-20 MG per tablet TAKE 1 TABLET BY MOUTH EVERY DAY 90 tablet 3    sotalol (BETAPACE) 80 MG tablet TAKE 1 TABLET BY MOUTH TWICE DAILY 180 tablet 3    ramipril (ALTACE) 10 MG capsule TAKE 1 CAPSULE BY MOUTH TWICE DAILY 60 capsule 11    Accu-Chek Softclix Lancets MISC Test once daily. 100 each 3    Blood Glucose Monitoring Suppl (ACCU-CHEK AKOSUA PLUS) W/DEVICE KIT 1 kit by Does not apply route 3 times daily Patient to check blood sugar 3 times a day and PRN, he is a newly diagnosed diabetic who will require medication titration ;  LABA1C      8.6    ; ICD code- 250.02. 1 kit 0    warfarin (COUMADIN) 2.5 MG tablet Take 2.5 mg by mouth Once       vitamin D (CHOLECALCIFEROL) 1000 UNITS TABS tablet Take 1,000 Units by mouth daily      loratadine (CLARITIN) 10 MG tablet Take 10 mg by mouth daily.  aspirin 81 MG EC tablet Take 81 mg by mouth daily.        Current Facility-Administered Medications   Medication Dose Route Frequency Provider Last Rate Last Dose    0.9 % sodium chloride infusion   Intravenous Continuous Darlene Cockayne, MD        0.9 % sodium chloride infusion   Intravenous Continuous James Travis MD        sodium chloride flush 0.9 % injection 10 mL  10 mL Intravenous 2 times per day James Travis MD        sodium chloride flush 0.9 % injection 10 mL  10 mL Intravenous PRN James Travis MD exposure avoidance    Alcohol use No                                Counseling given: Not Answered      Vital Signs (Current): There were no vitals filed for this visit. BP Readings from Last 3 Encounters:   01/23/18 130/60   01/22/18 110/60   09/18/17 136/68       NPO Status:                                                                                 BMI:   Wt Readings from Last 3 Encounters:   03/12/18 167 lb (75.8 kg)   01/23/18 169 lb 1.6 oz (76.7 kg)   01/22/18 169 lb (76.7 kg)     There is no height or weight on file to calculate BMI. Anesthesia Evaluation  Patient summary reviewed and Nursing notes reviewed no history of anesthetic complications:   Airway: Mallampati: II  TM distance: >3 FB   Neck ROM: full  Mouth opening: > = 3 FB Dental:          Pulmonary:Negative Pulmonary ROS                              Cardiovascular:    (+) hypertension:, pacemaker:, CAD:, CABG/stent:, dysrhythmias: atrial fibrillation,                   Neuro/Psych:   Negative Neuro/Psych ROS              GI/Hepatic/Renal:             Endo/Other:    (+) DiabetesType II DM, , .                 Abdominal:           Vascular: negative vascular ROS. Anesthesia Plan      general     ASA 1    (28-year-old male presents for colonoscopy. Plan general anesthesia with ASA standard monitors. Questions answered. Patient agreeable with anesthetic plan.  )  Induction: intravenous. Anesthetic plan and risks discussed with patient. Plan discussed with CRNA.     Attending anesthesiologist reviewed and agrees with Pre Eval content        Mary De La Rosa MD   3/28/2018

## 2018-04-01 ENCOUNTER — HOSPITAL ENCOUNTER (OUTPATIENT)
Dept: OTHER | Age: 83
Discharge: HOME | End: 2018-04-30
Attending: INTERNAL MEDICINE | Admitting: INTERNAL MEDICINE

## 2018-04-18 ENCOUNTER — ANTI-COAG VISIT (OUTPATIENT)
Dept: PHARMACY | Age: 83
End: 2018-04-18

## 2018-04-18 DIAGNOSIS — I48.20 CHRONIC ATRIAL FIBRILLATION (HCC): ICD-10-CM

## 2018-04-18 LAB
INR BLD: 2.1
PROTIME: 25.5 SECONDS

## 2018-04-23 ENCOUNTER — OFFICE VISIT (OUTPATIENT)
Dept: FAMILY MEDICINE CLINIC | Age: 83
End: 2018-04-23

## 2018-04-23 VITALS
OXYGEN SATURATION: 98 % | HEIGHT: 69 IN | SYSTOLIC BLOOD PRESSURE: 120 MMHG | RESPIRATION RATE: 16 BRPM | DIASTOLIC BLOOD PRESSURE: 60 MMHG | BODY MASS INDEX: 24.59 KG/M2 | WEIGHT: 166 LBS | HEART RATE: 64 BPM

## 2018-04-23 DIAGNOSIS — R97.20 ELEVATED PSA: ICD-10-CM

## 2018-04-23 DIAGNOSIS — R35.1 NOCTURIA: Primary | ICD-10-CM

## 2018-04-23 DIAGNOSIS — I10 ESSENTIAL HYPERTENSION: ICD-10-CM

## 2018-04-23 DIAGNOSIS — E11.51 DM (DIABETES MELLITUS), TYPE 2 WITH PERIPHERAL VASCULAR COMPLICATIONS (HCC): ICD-10-CM

## 2018-04-23 PROCEDURE — 99214 OFFICE O/P EST MOD 30 MIN: CPT | Performed by: INTERNAL MEDICINE

## 2018-04-23 PROCEDURE — G8598 ASA/ANTIPLAT THER USED: HCPCS | Performed by: INTERNAL MEDICINE

## 2018-04-23 PROCEDURE — 1036F TOBACCO NON-USER: CPT | Performed by: INTERNAL MEDICINE

## 2018-04-23 PROCEDURE — 1123F ACP DISCUSS/DSCN MKR DOCD: CPT | Performed by: INTERNAL MEDICINE

## 2018-04-23 PROCEDURE — G8420 CALC BMI NORM PARAMETERS: HCPCS | Performed by: INTERNAL MEDICINE

## 2018-04-23 PROCEDURE — G8427 DOCREV CUR MEDS BY ELIG CLIN: HCPCS | Performed by: INTERNAL MEDICINE

## 2018-04-23 PROCEDURE — 4040F PNEUMOC VAC/ADMIN/RCVD: CPT | Performed by: INTERNAL MEDICINE

## 2018-04-23 ASSESSMENT — ENCOUNTER SYMPTOMS
SHORTNESS OF BREATH: 1
WHEEZING: 0

## 2018-04-30 DIAGNOSIS — E11.51 DM (DIABETES MELLITUS), TYPE 2 WITH PERIPHERAL VASCULAR COMPLICATIONS (HCC): ICD-10-CM

## 2018-04-30 DIAGNOSIS — R35.1 NOCTURIA: ICD-10-CM

## 2018-04-30 DIAGNOSIS — I10 ESSENTIAL HYPERTENSION: ICD-10-CM

## 2018-04-30 DIAGNOSIS — R97.20 ELEVATED PSA: ICD-10-CM

## 2018-04-30 LAB
A/G RATIO: 1.6 (ref 1.1–2.2)
ALBUMIN SERPL-MCNC: 4.3 G/DL (ref 3.4–5)
ALP BLD-CCNC: 62 U/L (ref 40–129)
ALT SERPL-CCNC: 13 U/L (ref 10–40)
ANION GAP SERPL CALCULATED.3IONS-SCNC: 14 MMOL/L (ref 3–16)
AST SERPL-CCNC: 17 U/L (ref 15–37)
BILIRUB SERPL-MCNC: 0.6 MG/DL (ref 0–1)
BUN BLDV-MCNC: 28 MG/DL (ref 7–20)
CALCIUM SERPL-MCNC: 8.9 MG/DL (ref 8.3–10.6)
CHLORIDE BLD-SCNC: 103 MMOL/L (ref 99–110)
CO2: 24 MMOL/L (ref 21–32)
CREAT SERPL-MCNC: 1.1 MG/DL (ref 0.8–1.3)
CREATININE URINE: 68.7 MG/DL (ref 39–259)
GFR AFRICAN AMERICAN: >60
GFR NON-AFRICAN AMERICAN: >60
GLOBULIN: 2.7 G/DL
GLUCOSE BLD-MCNC: 252 MG/DL (ref 70–99)
MICROALBUMIN UR-MCNC: 14.7 MG/DL
MICROALBUMIN/CREAT UR-RTO: 214 MG/G (ref 0–30)
POTASSIUM SERPL-SCNC: 4.8 MMOL/L (ref 3.5–5.1)
PROSTATE SPECIFIC ANTIGEN: 2.83 NG/ML (ref 0–4)
SODIUM BLD-SCNC: 141 MMOL/L (ref 136–145)
TOTAL PROTEIN: 7 G/DL (ref 6.4–8.2)

## 2018-05-01 ENCOUNTER — HOSPITAL ENCOUNTER (OUTPATIENT)
Dept: OTHER | Age: 83
Discharge: HOME | End: 2018-05-31
Attending: INTERNAL MEDICINE | Admitting: INTERNAL MEDICINE

## 2018-05-01 ENCOUNTER — NURSE ONLY (OUTPATIENT)
Dept: CARDIOLOGY CLINIC | Age: 83
End: 2018-05-01

## 2018-05-01 DIAGNOSIS — Z95.0 PACEMAKER: ICD-10-CM

## 2018-05-01 DIAGNOSIS — I48.20 CHRONIC ATRIAL FIBRILLATION (HCC): ICD-10-CM

## 2018-05-01 LAB
ESTIMATED AVERAGE GLUCOSE: 116.9 MG/DL
HBA1C MFR BLD: 5.7 %

## 2018-05-02 PROCEDURE — 93296 REM INTERROG EVL PM/IDS: CPT | Performed by: INTERNAL MEDICINE

## 2018-05-02 PROCEDURE — 93294 REM INTERROG EVL PM/LDLS PM: CPT | Performed by: INTERNAL MEDICINE

## 2018-05-09 DIAGNOSIS — R97.20 ELEVATED PSA: Primary | ICD-10-CM

## 2018-05-16 ENCOUNTER — ANTI-COAG VISIT (OUTPATIENT)
Dept: PHARMACY | Age: 83
End: 2018-05-16

## 2018-05-16 DIAGNOSIS — I48.20 CHRONIC ATRIAL FIBRILLATION (HCC): ICD-10-CM

## 2018-05-16 LAB
INR BLD: 1.8
PROTIME: 21.9 SECONDS

## 2018-06-01 ENCOUNTER — HOSPITAL ENCOUNTER (OUTPATIENT)
Dept: OTHER | Age: 83
Discharge: HOME | End: 2018-06-30
Attending: INTERNAL MEDICINE | Admitting: INTERNAL MEDICINE

## 2018-06-13 ENCOUNTER — ANTI-COAG VISIT (OUTPATIENT)
Dept: PHARMACY | Age: 83
End: 2018-06-13

## 2018-06-13 DIAGNOSIS — I48.20 CHRONIC ATRIAL FIBRILLATION (HCC): ICD-10-CM

## 2018-06-13 LAB
INR BLD: 1.7
PROTIME: 20.1 SECONDS

## 2018-06-20 DIAGNOSIS — M1A.9XX1 CHRONIC GOUT INVOLVING TOE OF LEFT FOOT WITH TOPHUS, UNSPECIFIED CAUSE: ICD-10-CM

## 2018-06-20 RX ORDER — ALLOPURINOL 100 MG/1
200 TABLET ORAL DAILY
Qty: 60 TABLET | Refills: 5 | Status: SHIPPED | OUTPATIENT
Start: 2018-06-20 | End: 2018-12-19 | Stop reason: SDUPTHER

## 2018-07-01 ENCOUNTER — HOSPITAL ENCOUNTER (OUTPATIENT)
Dept: OTHER | Age: 83
Discharge: HOME | End: 2018-07-31
Attending: INTERNAL MEDICINE | Admitting: INTERNAL MEDICINE

## 2018-07-13 ENCOUNTER — ANTI-COAG VISIT (OUTPATIENT)
Dept: PHARMACY | Age: 83
End: 2018-07-13

## 2018-07-13 DIAGNOSIS — I48.20 CHRONIC ATRIAL FIBRILLATION (HCC): ICD-10-CM

## 2018-07-13 LAB
INR BLD: 3.5
PROTIME: 41.5 SECONDS

## 2018-07-23 ENCOUNTER — OFFICE VISIT (OUTPATIENT)
Dept: FAMILY MEDICINE CLINIC | Age: 83
End: 2018-07-23

## 2018-07-23 VITALS
HEART RATE: 68 BPM | OXYGEN SATURATION: 98 % | DIASTOLIC BLOOD PRESSURE: 80 MMHG | HEIGHT: 69 IN | RESPIRATION RATE: 16 BRPM | BODY MASS INDEX: 24.88 KG/M2 | WEIGHT: 168 LBS | SYSTOLIC BLOOD PRESSURE: 140 MMHG

## 2018-07-23 DIAGNOSIS — I10 ESSENTIAL HYPERTENSION: ICD-10-CM

## 2018-07-23 DIAGNOSIS — M1A.9XX0 CHRONIC GOUT WITHOUT TOPHUS, UNSPECIFIED CAUSE, UNSPECIFIED SITE: ICD-10-CM

## 2018-07-23 DIAGNOSIS — E13.69 OSTEOMYELITIS DUE TO SECONDARY DIABETES (HCC): ICD-10-CM

## 2018-07-23 DIAGNOSIS — I48.20 CHRONIC ATRIAL FIBRILLATION (HCC): ICD-10-CM

## 2018-07-23 DIAGNOSIS — Z13.220 SCREENING FOR HYPERLIPIDEMIA: Primary | ICD-10-CM

## 2018-07-23 DIAGNOSIS — E11.51 DM (DIABETES MELLITUS), TYPE 2 WITH PERIPHERAL VASCULAR COMPLICATIONS (HCC): ICD-10-CM

## 2018-07-23 DIAGNOSIS — M86.9 OSTEOMYELITIS DUE TO SECONDARY DIABETES (HCC): ICD-10-CM

## 2018-07-23 PROCEDURE — 1123F ACP DISCUSS/DSCN MKR DOCD: CPT | Performed by: INTERNAL MEDICINE

## 2018-07-23 PROCEDURE — 4040F PNEUMOC VAC/ADMIN/RCVD: CPT | Performed by: INTERNAL MEDICINE

## 2018-07-23 PROCEDURE — G8598 ASA/ANTIPLAT THER USED: HCPCS | Performed by: INTERNAL MEDICINE

## 2018-07-23 PROCEDURE — 1036F TOBACCO NON-USER: CPT | Performed by: INTERNAL MEDICINE

## 2018-07-23 PROCEDURE — G8427 DOCREV CUR MEDS BY ELIG CLIN: HCPCS | Performed by: INTERNAL MEDICINE

## 2018-07-23 PROCEDURE — G8420 CALC BMI NORM PARAMETERS: HCPCS | Performed by: INTERNAL MEDICINE

## 2018-07-23 PROCEDURE — 1101F PT FALLS ASSESS-DOCD LE1/YR: CPT | Performed by: INTERNAL MEDICINE

## 2018-07-23 PROCEDURE — 99214 OFFICE O/P EST MOD 30 MIN: CPT | Performed by: INTERNAL MEDICINE

## 2018-07-23 ASSESSMENT — PATIENT HEALTH QUESTIONNAIRE - PHQ9
SUM OF ALL RESPONSES TO PHQ9 QUESTIONS 1 & 2: 0
2. FEELING DOWN, DEPRESSED OR HOPELESS: 0
1. LITTLE INTEREST OR PLEASURE IN DOING THINGS: 0
SUM OF ALL RESPONSES TO PHQ QUESTIONS 1-9: 0

## 2018-07-23 ASSESSMENT — ENCOUNTER SYMPTOMS
SHORTNESS OF BREATH: 1
WHEEZING: 1
BLOOD IN STOOL: 0
NAUSEA: 0
DIARRHEA: 0
CONSTIPATION: 1

## 2018-07-23 NOTE — PROGRESS NOTES
7/23/2018    Chief Complaint   Patient presents with    Diabetes     f/u   dm     Lab Results   Component Value Date    LABA1C 5.7 04/30/2018    LABA1C 6.3 11/17/2017    LABA1C 5.8 09/21/2017       Not cking with fingersticks - does not want to  Do this ,  Says it hurts  Watching diet  Dropped out of rehab, setting up his own exercise    afib  Pacemaker   - ck ed regularly    Sees dr held but he  Retired 1 months ago  On coumadin    Had  Amputation left baby toe for gout yrs ago. No foot problems now        HPI    Review of Systems   Constitutional: Negative for appetite change and unexpected weight change. Respiratory: Positive for shortness of breath (if he does too much) and wheezing (occasionally wheezing,  not using inhaler , but has one). Gastrointestinal: Positive for constipation (sometimes). Negative for blood in stool, diarrhea and nausea. Neurological: Negative for dizziness and light-headedness.        Health Maintenance   Topic Date Due    Shingles Vaccine (1 of 2 - 2 Dose Series) 11/12/1983    Diabetic retinal exam  12/15/2016    Lipid screen  06/19/2018    Flu vaccine (1) 09/01/2018    Diabetic foot exam  09/18/2018    A1C test (Diabetic or Prediabetic)  10/30/2018    Potassium monitoring  04/30/2019    Creatinine monitoring  04/30/2019    DTaP/Tdap/Td vaccine (3 - Td) 01/11/2026    Pneumococcal low/med risk  Completed      Social History     Social History    Marital status:      Spouse name: Negrita Blanton Number of children: 1    Years of education: N/A     Occupational History    retired--[employer]      Social History Main Topics    Smoking status: Former Smoker     Years: 0.50     Quit date: 1/10/1950    Smokeless tobacco: Never Used      Comment: counseled on tobacco exposure avoidance    Alcohol use No    Drug use: No    Sexual activity: Not Asked     Other Topics Concern    None     Social History Narrative    None     Family History   Problem Relation Age of Onset   

## 2018-07-24 ENCOUNTER — OFFICE VISIT (OUTPATIENT)
Dept: CARDIOLOGY CLINIC | Age: 83
End: 2018-07-24

## 2018-07-24 ENCOUNTER — HOSPITAL ENCOUNTER (OUTPATIENT)
Dept: OTHER | Age: 83
Discharge: HOME | End: 2018-07-24
Attending: INTERNAL MEDICINE | Admitting: INTERNAL MEDICINE

## 2018-07-24 VITALS
HEIGHT: 69 IN | BODY MASS INDEX: 24.73 KG/M2 | HEART RATE: 82 BPM | DIASTOLIC BLOOD PRESSURE: 80 MMHG | SYSTOLIC BLOOD PRESSURE: 130 MMHG | WEIGHT: 167 LBS

## 2018-07-24 DIAGNOSIS — Z79.01 ANTICOAGULANT LONG-TERM USE: ICD-10-CM

## 2018-07-24 DIAGNOSIS — Z13.220 SCREENING FOR HYPERLIPIDEMIA: ICD-10-CM

## 2018-07-24 DIAGNOSIS — E11.51 DM (DIABETES MELLITUS), TYPE 2 WITH PERIPHERAL VASCULAR COMPLICATIONS (HCC): ICD-10-CM

## 2018-07-24 DIAGNOSIS — E78.00 HYPERCHOLESTEREMIA: Primary | ICD-10-CM

## 2018-07-24 DIAGNOSIS — I10 ESSENTIAL HYPERTENSION: ICD-10-CM

## 2018-07-24 DIAGNOSIS — Z95.0 CARDIAC PACEMAKER: ICD-10-CM

## 2018-07-24 LAB
ALBUMIN SERPL-MCNC: 4.3 G/DL (ref 3.4–5)
ANION GAP SERPL CALCULATED.3IONS-SCNC: 8 MMOL/L (ref 3–16)
BUN BLDV-MCNC: 19 MG/DL (ref 7–20)
CALCIUM SERPL-MCNC: 9.3 MG/DL (ref 8.3–10.6)
CHLORIDE BLD-SCNC: 106 MMOL/L (ref 99–110)
CHOLESTEROL, TOTAL: 110 MG/DL (ref 0–199)
CO2: 27 MMOL/L (ref 21–32)
CREAT SERPL-MCNC: 1 MG/DL (ref 0.8–1.3)
ESTIMATED AVERAGE GLUCOSE: 99.7 MG/DL
GFR AFRICAN AMERICAN: >60
GFR NON-AFRICAN AMERICAN: >60
GLUCOSE BLD-MCNC: 134 MG/DL (ref 70–99)
HBA1C MFR BLD: 5.1 %
HDLC SERPL-MCNC: 36 MG/DL (ref 40–60)
LDL CHOLESTEROL CALCULATED: 51 MG/DL
PHOSPHORUS: 3 MG/DL (ref 2.5–4.9)
POTASSIUM SERPL-SCNC: 4.8 MMOL/L (ref 3.5–5.1)
SODIUM BLD-SCNC: 141 MMOL/L (ref 136–145)
TRIGL SERPL-MCNC: 116 MG/DL (ref 0–150)
VLDLC SERPL CALC-MCNC: 23 MG/DL

## 2018-07-24 PROCEDURE — G8427 DOCREV CUR MEDS BY ELIG CLIN: HCPCS | Performed by: NURSE PRACTITIONER

## 2018-07-24 PROCEDURE — 1036F TOBACCO NON-USER: CPT | Performed by: NURSE PRACTITIONER

## 2018-07-24 PROCEDURE — 4040F PNEUMOC VAC/ADMIN/RCVD: CPT | Performed by: NURSE PRACTITIONER

## 2018-07-24 PROCEDURE — 1123F ACP DISCUSS/DSCN MKR DOCD: CPT | Performed by: NURSE PRACTITIONER

## 2018-07-24 PROCEDURE — G8420 CALC BMI NORM PARAMETERS: HCPCS | Performed by: NURSE PRACTITIONER

## 2018-07-24 PROCEDURE — G8598 ASA/ANTIPLAT THER USED: HCPCS | Performed by: NURSE PRACTITIONER

## 2018-07-24 PROCEDURE — 1101F PT FALLS ASSESS-DOCD LE1/YR: CPT | Performed by: NURSE PRACTITIONER

## 2018-07-24 PROCEDURE — 99214 OFFICE O/P EST MOD 30 MIN: CPT | Performed by: NURSE PRACTITIONER

## 2018-07-24 NOTE — LETTER
[address] [address] [address] [address]  Phone: [phone]  Fax: [phone]    PRIYA Mari CNP        July 24, 2018     María Tran, [address] [address]    Patient: Jocelyn Alfaro  MR Number: [id number]  YOB: 1933  Date of Visit: 7/24/2018    Dear Dr. Rhonda SMITH:           Cardiac Follow up    Referring Provider:  María Tran MD     Chief Complaint   Patient presents with    Coronary Artery Disease     No cardiac complaints    Hyperlipidemia        History of Present Illness:  Mr. Paige Sepulveda is here for a follow up visit. He underwent coronary bypass graft surgery in 1996 presents for reevaluation. He has h/o HTN and PAF  He has SSS and has a [employer] pacer. He is on coumadin for PAF. Today, Brie Phillip states that he is doing well cardiac wise. He states that he is busy helping take care of his wife at home and he worries about her and her declining psychological condition. , this has worsened. He has no chest pain, shortness of breath, palpitations, dizziness or edema. He has not needed any NTG    Past Medical History: has a past medical history of Actinic keratosis; Actinic keratosis; Atrial fibrillation ([address] Bilateral carotid artery stenosis; CAD (coronary artery disease); Cellulitis; Diabetes mellitus ([address] Glucose intolerance (malabsorption); Hyperlipidemia; Hypertension; Hypertrophy of prostate without urinary obstruction and other lower urinary tract symptoms (LUTS); Intermittent atrial fibrillation ([address] Kidney stone; Occult blood in stool; and Pacemaker. Surgical History: has a past surgical history that includes Tonsillectomy and adenoidectomy; Appendectomy; Kidney stone surgery (1980); Coronary artery bypass graft (1996); Cardiac catheterization (2003); pacemaker placement (2005); other surgical history (Right, 7/17/15);  Abscess · Constitutional: there has been no unanticipated weight loss. There's been no change in energy level, sleep pattern, or activity level. · Eyes: No visual changes or diplopia. No scleral icterus. · ENT: No Headaches, hearing loss or vertigo. No mouth sores or sore throat. · Cardiovascular: Reviewed in HPI  · Respiratory: No cough or wheezing, no sputum production. No hematemesis. · Gastrointestinal: No abdominal pain, appetite loss, blood in stools. No change in bowel or bladder habits. · Genitourinary: No dysuria, trouble voiding, or hematuria. · Musculoskeletal:  No gait disturbance, weakness or joint complaints. · Integumentary: No rash or pruritis. · Neurological: No headache, diplopia, change in muscle strength, numbness or tingling. No change in gait, balance, coordination, mood, affect, memory, mentation, behavior. · Psychiatric: No anxiety, no depression. · Endocrine: No malaise, fatigue or temperature intolerance. No excessive thirst, fluid intake, or urination. No tremor. · Hematologic/Lymphatic: No abnormal bruising or bleeding, blood clots or swollen lymph nodes. · Allergic/Immunologic: No nasal congestion or hives. Physical Examination:    Vitals:    07/24/18 0852   BP: 130/80   Pulse: 82        Constitutional and General Appearance:  Well-nourished, well-developed, in no acute distress   Respiratory:  · Normal excursion and expansion without use of accessory muscles  · Resp Auscultation: Normal breath sounds without dullness  Cardiovascular:  · The apical impulses not displaced  · Heart tones are crisp and normal  · Cervical veins are not engorged  · The carotid upstroke is normal in amplitude and contour. Soft left carotid bruit. · regular rate and rhythm  ·  No murmurs, gallops, or rubs  · There is no clubbing, cyanosis of the extremities.   · L ft edema and red   · Femoral Arteries: 2+ and equal  · Pedal Pulses: 2+ and equal   Abdomen:  · No masses or tenderness

## 2018-07-24 NOTE — COMMUNICATION BODY
Cardiac Follow up    Referring Provider:  Dexter Ignacio MD     Chief Complaint   Patient presents with    Coronary Artery Disease     No cardiac complaints    Hyperlipidemia        History of Present Illness:  Mr. Verna Rico is here for a follow up visit. He underwent coronary bypass graft surgery in 1996 presents for reevaluation. He has h/o HTN and PAF  He has SSS and has a [employer] pacer. He is on coumadin for PAF. Today, De Dupont states that he is doing well cardiac wise. He states that he is busy helping take care of his wife at home and he worries about her and her declining psychological condition. , this has worsened. He has no chest pain, shortness of breath, palpitations, dizziness or edema. He has not needed any NTG    Past Medical History: has a past medical history of Actinic keratosis; Actinic keratosis; Atrial fibrillation ([address] Bilateral carotid artery stenosis; CAD (coronary artery disease); Cellulitis; Diabetes mellitus ([address] Glucose intolerance (malabsorption); Hyperlipidemia; Hypertension; Hypertrophy of prostate without urinary obstruction and other lower urinary tract symptoms (LUTS); Intermittent atrial fibrillation ([address] Kidney stone; Occult blood in stool; and Pacemaker. Surgical History: has a past surgical history that includes Tonsillectomy and adenoidectomy; Appendectomy; Kidney stone surgery (1980); Coronary artery bypass graft (1996); Cardiac catheterization (2003); pacemaker placement (2005); other surgical history (Right, 7/17/15); Abscess Drainage (7/20/15); Toe amputation (Right, 7.16.15); and Colonoscopy (03/28/2018). Social History: reports that he quit smoking about 68 years ago. He quit after 0.50 years of use. He has never used smokeless tobacco. He reports that he does not drink alcohol or use drugs. Family History:family history includes Diabetes in his mother; Stroke in his father.    Home Medications:  Outpatient Encounter Prescriptions as of 7/24/2018 Medication Sig Dispense Refill    vitamin D (CHOLECALCIFEROL) 1000 UNIT TABS tablet Take 1,000 Units by mouth daily      metFORMIN (GLUCOPHAGE) 500 MG tablet TAKE 1 TABLET BY MOUTH TWICE DAILY WITH MEALS 180 tablet 1    allopurinol (ZYLOPRIM) 100 MG tablet TAKE 2 TABLETS BY MOUTH DAILY 60 tablet 5    warfarin (COUMADIN) 5 MG tablet 1 tab daily except for half tab Fridays as directed 90 tablet 1    glucose blood VI test strips (ACCU-CHEK AKOSUA) strip Test 3 times daily. He is a newly diagnosed diabetic who will require medication titration; Dx: E11.51 100 each 4    amLODIPine-atorvastatatin (CADUET) 5-20 MG per tablet TAKE 1 TABLET BY MOUTH EVERY DAY 90 tablet 3    sotalol (BETAPACE) 80 MG tablet TAKE 1 TABLET BY MOUTH TWICE DAILY 180 tablet 3    ramipril (ALTACE) 10 MG capsule TAKE 1 CAPSULE BY MOUTH TWICE DAILY 60 capsule 11    Accu-Chek Softclix Lancets MISC Test once daily. 100 each 3    Blood Glucose Monitoring Suppl (ACCU-CHEK AKOSUA PLUS) W/DEVICE KIT 1 kit by Does not apply route 3 times daily Patient to check blood sugar 3 times a day and PRN, he is a newly diagnosed diabetic who will require medication titration ;  LABA1C      8.6    ; ICD code- 250.02. 1 kit 0    warfarin (COUMADIN) 2.5 MG tablet Take 2.5 mg by mouth Once       loratadine (CLARITIN) 10 MG tablet Take 10 mg by mouth daily. No facility-administered encounter medications on file as of 7/24/2018. Allergies:  Patient has no known allergies. Review of Systems:   · Constitutional: there has been no unanticipated weight loss. There's been no change in energy level, sleep pattern, or activity level. · Eyes: No visual changes or diplopia. No scleral icterus. · ENT: No Headaches, hearing loss or vertigo. No mouth sores or sore throat. · Cardiovascular: Reviewed in HPI  · Respiratory: No cough or wheezing, no sputum production. No hematemesis.     · Gastrointestinal: No abdominal pain, appetite loss, blood in stools. No change in bowel or bladder habits. · Genitourinary: No dysuria, trouble voiding, or hematuria. · Musculoskeletal:  No gait disturbance, weakness or joint complaints. · Integumentary: No rash or pruritis. · Neurological: No headache, diplopia, change in muscle strength, numbness or tingling. No change in gait, balance, coordination, mood, affect, memory, mentation, behavior. · Psychiatric: No anxiety, no depression. · Endocrine: No malaise, fatigue or temperature intolerance. No excessive thirst, fluid intake, or urination. No tremor. · Hematologic/Lymphatic: No abnormal bruising or bleeding, blood clots or swollen lymph nodes. · Allergic/Immunologic: No nasal congestion or hives. Physical Examination:    Vitals:    07/24/18 0852   BP: 130/80   Pulse: 82        Constitutional and General Appearance:  Well-nourished, well-developed, in no acute distress   Respiratory:  · Normal excursion and expansion without use of accessory muscles  · Resp Auscultation: Normal breath sounds without dullness  Cardiovascular:  · The apical impulses not displaced  · Heart tones are crisp and normal  · Cervical veins are not engorged  · The carotid upstroke is normal in amplitude and contour. Soft left carotid bruit. · regular rate and rhythm  ·  No murmurs, gallops, or rubs  · There is no clubbing, cyanosis of the extremities. · L ft edema and red   · Femoral Arteries: 2+ and equal  · Pedal Pulses: 2+ and equal   Abdomen:  · No masses or tenderness  · Bowel sounds present  · No organomegaly appreciated  Neurological/Psychiatric:  · Alert and oriented in all spheres  · Moves all extremities well  · Exhibits normal gait balance and coordination  · No abnormalities of mood, affect, memory, mentation, or behavior are noted    2/18  Assessment/Plan:     1. CAD (coronary artery disease)-(last stress echo was neg 3/11)    2. Cardiac pacemaker    3. [id number]-vt coumadin-   4.  HTN (hypertension)      Problem: Coronary artery disease     1996: Aortocoronary bypass graft surgery  2003: Coronary arteriogram  2006: Nuclear stress test:  Inferolateral fixed defect, small to moderate. LVEF 60%. 9.5 minutes Camilo protocol. 2/2018Summary   Normal stress echocardiogram study  Normal left ventricle size, wall thickness and systolic function with an   estimated ejection fraction of 55%.  -No regional wall motion abnormalities are seen.   -Diastolic filling parameters suggest grade II diastolic dysfunction.   X/Z=53.17   -Mitral annular calcification is present.   -Mild to moderate mitral regurgitation.   -Mildly calcified aortic valve with mildly decreased mobility consistent   with mild aortic stenosis.   -Mild aortic insufficiency is present.   -Mild tricuspid regurgitation with a RVSP of 35 mmHg.   -Mild pulmonic regurgitation present.   -Dilated left atrium with a volume of 65 ml.   -Pacer / ICD wire is visualized in the right heart.      Remains  active and free of adverse events or symptoms. Continue secondary prevention and current medical regimen. No complaints of angina, stress echo in about one month    Problem: Hypertension     Stable. Consistent followup. Compliant with the medical regimen    Problem: Hyperlipidemia  6/16  HDL- 37, LDL- 70. Semiannual lipid checks with an LDL goal of less than 70 was reinforced. Followed by PCP    Problem: Diabetes  6/23/15 A1C-7.2. Followed by PCP. Problem:  Atrial fibrillation-paroxysmal  Interrogated today, he has at fib about 25% of time since 2014  Reviewed with DR. Prince Justin in the past-will continue sotolol since his at fib is under control    Problem: Sick sinus syndrome, dual-chamber pacemaker - recent device change  Very rare palpitations, non-persistent. No dizziness or syncope. On sotalol and anticoagulation. [employer] Pacemaker. Receives regular interrogations. Continue transtelephonic pacemaker evaluation.      Problem: Carotid disease  2007 Carotid US: <40% bilateral

## 2018-07-24 NOTE — PROGRESS NOTES
stools. No change in bowel or bladder habits. · Genitourinary: No dysuria, trouble voiding, or hematuria. · Musculoskeletal:  No gait disturbance, weakness or joint complaints. · Integumentary: No rash or pruritis. · Neurological: No headache, diplopia, change in muscle strength, numbness or tingling. No change in gait, balance, coordination, mood, affect, memory, mentation, behavior. · Psychiatric: No anxiety, no depression. · Endocrine: No malaise, fatigue or temperature intolerance. No excessive thirst, fluid intake, or urination. No tremor. · Hematologic/Lymphatic: No abnormal bruising or bleeding, blood clots or swollen lymph nodes. · Allergic/Immunologic: No nasal congestion or hives. Physical Examination:    Vitals:    07/24/18 0852   BP: 130/80   Pulse: 82        Constitutional and General Appearance:  Well-nourished, well-developed, in no acute distress   Respiratory:  · Normal excursion and expansion without use of accessory muscles  · Resp Auscultation: Normal breath sounds without dullness  Cardiovascular:  · The apical impulses not displaced  · Heart tones are crisp and normal  · Cervical veins are not engorged  · The carotid upstroke is normal in amplitude and contour. Soft left carotid bruit. · regular rate and rhythm  ·  No murmurs, gallops, or rubs  · There is no clubbing, cyanosis of the extremities. · L ft edema and red   · Femoral Arteries: 2+ and equal  · Pedal Pulses: 2+ and equal   Abdomen:  · No masses or tenderness  · Bowel sounds present  · No organomegaly appreciated  Neurological/Psychiatric:  · Alert and oriented in all spheres  · Moves all extremities well  · Exhibits normal gait balance and coordination  · No abnormalities of mood, affect, memory, mentation, or behavior are noted    2/18  Assessment/Plan:     1. CAD (coronary artery disease)-(last stress echo was neg 3/11)    2. Cardiac pacemaker    3. [id number]-gt coumadin-   4.  HTN (hypertension)      Problem:

## 2018-07-26 ENCOUNTER — ANTI-COAG VISIT (OUTPATIENT)
Dept: PHARMACY | Age: 83
End: 2018-07-26

## 2018-07-26 LAB
INR BLD: 2.8
PROTIME: 33.5 SECONDS

## 2018-08-01 ENCOUNTER — HOSPITAL ENCOUNTER (OUTPATIENT)
Dept: OTHER | Age: 83
Discharge: HOME | End: 2018-08-31
Attending: INTERNAL MEDICINE | Admitting: INTERNAL MEDICINE

## 2018-08-06 RX ORDER — RAMIPRIL 10 MG/1
CAPSULE ORAL
Qty: 60 CAPSULE | Refills: 6 | Status: ON HOLD | OUTPATIENT
Start: 2018-08-06 | End: 2018-10-27 | Stop reason: HOSPADM

## 2018-08-07 ENCOUNTER — NURSE ONLY (OUTPATIENT)
Dept: CARDIOLOGY CLINIC | Age: 83
End: 2018-08-07

## 2018-08-07 DIAGNOSIS — Z95.0 PACEMAKER: ICD-10-CM

## 2018-08-07 DIAGNOSIS — I48.91 ATRIAL FIBRILLATION, UNSPECIFIED TYPE (HCC): ICD-10-CM

## 2018-08-07 PROCEDURE — 93294 REM INTERROG EVL PM/LDLS PM: CPT | Performed by: INTERNAL MEDICINE

## 2018-08-07 PROCEDURE — 93296 REM INTERROG EVL PM/IDS: CPT | Performed by: INTERNAL MEDICINE

## 2018-08-09 ENCOUNTER — ANTI-COAG VISIT (OUTPATIENT)
Dept: PHARMACY | Age: 83
End: 2018-08-09

## 2018-08-09 DIAGNOSIS — I48.91 ATRIAL FIBRILLATION, UNSPECIFIED TYPE (HCC): ICD-10-CM

## 2018-08-09 LAB
INR BLD: 2.4
PROTIME: 28.5 SECONDS

## 2018-09-01 ENCOUNTER — HOSPITAL ENCOUNTER (OUTPATIENT)
Dept: OTHER | Age: 83
Discharge: HOME | End: 2018-09-01
Attending: INTERNAL MEDICINE | Admitting: INTERNAL MEDICINE

## 2018-09-07 ENCOUNTER — ANTI-COAG VISIT (OUTPATIENT)
Dept: PHARMACY | Age: 83
End: 2018-09-07

## 2018-09-07 DIAGNOSIS — I48.91 ATRIAL FIBRILLATION, UNSPECIFIED TYPE (HCC): ICD-10-CM

## 2018-09-07 LAB
INR BLD: 3.3
PROTIME: 39.4 SECONDS

## 2018-09-07 NOTE — PROGRESS NOTES
Mr. Samanta Rojas is a 80 y.o. y/o male with history of Afib   He presents today for anticoagulation monitoring and adjustment. Pertinent PMH: ICD-pacemaker. 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  Patient Reported Findings:  Yes     No  [x]   []       Patient verifies current dosing regimen as listed  []   [x]       S/S bleeding/bruising/swelling/SOB  []   [x]       Blood in urine or stool  []   [x]       Procedures scheduled in the future at this time   []   [x]       Missed Dose  []   [x]       Extra Dose  []   [x]       Change in medications  [x]   []       Change in health/diet/appetite He has not had the normal vegetables/salads as usual.  []   [x]       Change in alcohol use  []   [x]       Change in activity  []   [x]       Hospital admission  []   [x]       Emergency department visit  []   [x]       Other complaints    Clinical Outcomes:  Yes     No  []   [x]       Major bleeding event  []   [x]       Thromboembolic event  Duration of warfarin Therapy: indefinite  INR Range:  2.0-3.0    INR 3.3 today  Continue same weekly dose of 2.5 mg on Mon, Wed & Fri and 5 mg all other days   Encouraged to maintain a consistency of vegetables/salads.   Recheck INR in 4 weeks with wife    Referring cardiologist will be Dr. Clarissa Lobato   INR (no units)   Date Value   09/07/2018 3.3   08/09/2018 2.4   07/26/2018 2.8   07/13/2018 3.5

## 2018-09-19 RX ORDER — AMLODIPINE BESYLATE AND ATORVASTATIN CALCIUM 5; 20 MG/1; MG/1
1 TABLET, FILM COATED ORAL DAILY
Qty: 90 TABLET | Refills: 2 | Status: ON HOLD | OUTPATIENT
Start: 2018-09-19 | End: 2018-10-27

## 2018-09-19 NOTE — TELEPHONE ENCOUNTER
E prescribed prescription for amlodipine-atorvastatin 5/20 mg # 90 with 2 refills to [facility] [phone]

## 2018-09-20 RX ORDER — WARFARIN SODIUM 2.5 MG/1
TABLET ORAL
Qty: 90 TABLET | Refills: 2 | Status: SHIPPED | OUTPATIENT
Start: 2018-09-20 | End: 2019-05-07 | Stop reason: DRUGHIGH

## 2018-09-20 RX ORDER — WARFARIN SODIUM 2.5 MG/1
2.5 TABLET ORAL
Qty: 1 TABLET | Refills: 0 | Status: SHIPPED | OUTPATIENT
Start: 2018-09-20 | End: 2018-09-20 | Stop reason: SDUPTHER

## 2018-10-05 ENCOUNTER — ANTI-COAG VISIT (OUTPATIENT)
Dept: PHARMACY | Age: 83
End: 2018-10-05
Payer: MEDICARE

## 2018-10-05 DIAGNOSIS — I48.91 ATRIAL FIBRILLATION, UNSPECIFIED TYPE (HCC): ICD-10-CM

## 2018-10-05 LAB — INTERNATIONAL NORMALIZATION RATIO, POC: 2.1

## 2018-10-05 PROCEDURE — 85610 PROTHROMBIN TIME: CPT

## 2018-10-05 PROCEDURE — 99211 OFF/OP EST MAY X REQ PHY/QHP: CPT

## 2018-10-05 NOTE — PROGRESS NOTES
Mr. Efren Maria is a 80 y.o. y/o male with history of Afib   He presents today for anticoagulation monitoring and adjustment. Pertinent PMH: ICD-pacemaker. 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  Patient Reported Findings:  Yes     No  [x]   []       Patient verifies current dosing regimen as listed  []   [x]       S/S bleeding/bruising/swelling/SOB  []   [x]       Blood in urine or stool  []   [x]       Procedures scheduled in the future at this time   []   [x]       Missed Dose  []   [x]       Extra Dose  []   [x]       Change in medications  [x]   []       Change in health/diet/appetite He has not had the normal vegetables/salads as usual.  []   [x]       Change in alcohol use  []   [x]       Change in activity  []   [x]       Hospital admission  []   [x]       Emergency department visit  [x]   []       Other complaints patient states that he p/u warfarin 4 mg tablets from [facility] last week. States that they are blue so 4 mg tablets. MD authorized 5 mg tablets from chart, called [facility] to determine what patient p/u. As patient should be using 5 mg tablets (peach). [facility] verified that 2.5 mg tablets were filled. Fixed chart to reflect correct tablets on hand     Clinical Outcomes:  Yes     No  []   [x]       Major bleeding event  []   [x]       Thromboembolic event  Duration of warfarin Therapy: indefinite  INR Range:  2.0-3.0    INR 2.1 today  Continue same weekly dose of 2.5 mg on Mon, Wed & Fri and 5 mg all other days   Encouraged to maintain a consistency of vegetables/salads.   Recheck INR in 4 weeks with wife, 11/2    Referring cardiologist will be Dr. Carloyn Gitelman   INR (no units)   Date Value   10/05/2018 2.1   09/07/2018 3.3   08/09/2018 2.4   07/26/2018 2.8   07/13/2018 3.5

## 2018-10-16 RX ORDER — SOTALOL HYDROCHLORIDE 80 MG/1
TABLET ORAL
Qty: 180 TABLET | Refills: 3 | Status: SHIPPED | OUTPATIENT
Start: 2018-10-16 | End: 2019-10-14 | Stop reason: SDUPTHER

## 2018-10-23 ENCOUNTER — OFFICE VISIT (OUTPATIENT)
Dept: FAMILY MEDICINE CLINIC | Age: 83
End: 2018-10-23
Payer: MEDICARE

## 2018-10-23 VITALS
WEIGHT: 164 LBS | HEART RATE: 60 BPM | RESPIRATION RATE: 14 BRPM | DIASTOLIC BLOOD PRESSURE: 80 MMHG | SYSTOLIC BLOOD PRESSURE: 120 MMHG | BODY MASS INDEX: 24.29 KG/M2 | OXYGEN SATURATION: 96 % | HEIGHT: 69 IN

## 2018-10-23 DIAGNOSIS — E11.51 DM (DIABETES MELLITUS), TYPE 2 WITH PERIPHERAL VASCULAR COMPLICATIONS (HCC): Primary | ICD-10-CM

## 2018-10-23 DIAGNOSIS — E13.69 OSTEOMYELITIS DUE TO SECONDARY DIABETES (HCC): ICD-10-CM

## 2018-10-23 DIAGNOSIS — N18.30 CKD STAGE 3 DUE TO TYPE 2 DIABETES MELLITUS (HCC): ICD-10-CM

## 2018-10-23 DIAGNOSIS — E11.51 DM (DIABETES MELLITUS), TYPE 2 WITH PERIPHERAL VASCULAR COMPLICATIONS (HCC): ICD-10-CM

## 2018-10-23 DIAGNOSIS — I10 ESSENTIAL HYPERTENSION: ICD-10-CM

## 2018-10-23 DIAGNOSIS — M86.9 OSTEOMYELITIS DUE TO SECONDARY DIABETES (HCC): ICD-10-CM

## 2018-10-23 DIAGNOSIS — E11.22 CKD STAGE 3 DUE TO TYPE 2 DIABETES MELLITUS (HCC): ICD-10-CM

## 2018-10-23 DIAGNOSIS — R80.9 MICROALBUMINURIA: ICD-10-CM

## 2018-10-23 LAB
A/G RATIO: 1.5 (ref 1.1–2.2)
ALBUMIN SERPL-MCNC: 4.4 G/DL (ref 3.4–5)
ALP BLD-CCNC: 79 U/L (ref 40–129)
ALT SERPL-CCNC: 14 U/L (ref 10–40)
ANION GAP SERPL CALCULATED.3IONS-SCNC: 15 MMOL/L (ref 3–16)
AST SERPL-CCNC: 21 U/L (ref 15–37)
BILIRUB SERPL-MCNC: 0.7 MG/DL (ref 0–1)
BUN BLDV-MCNC: 25 MG/DL (ref 7–20)
CALCIUM SERPL-MCNC: 9.8 MG/DL (ref 8.3–10.6)
CHLORIDE BLD-SCNC: 101 MMOL/L (ref 99–110)
CO2: 25 MMOL/L (ref 21–32)
CREAT SERPL-MCNC: 1.1 MG/DL (ref 0.8–1.3)
GFR AFRICAN AMERICAN: >60
GFR NON-AFRICAN AMERICAN: >60
GLOBULIN: 3 G/DL
GLUCOSE BLD-MCNC: 102 MG/DL (ref 70–99)
POTASSIUM SERPL-SCNC: 5.9 MMOL/L (ref 3.5–5.1)
SODIUM BLD-SCNC: 141 MMOL/L (ref 136–145)
TOTAL PROTEIN: 7.4 G/DL (ref 6.4–8.2)

## 2018-10-23 PROCEDURE — G8420 CALC BMI NORM PARAMETERS: HCPCS | Performed by: INTERNAL MEDICINE

## 2018-10-23 PROCEDURE — 4040F PNEUMOC VAC/ADMIN/RCVD: CPT | Performed by: INTERNAL MEDICINE

## 2018-10-23 PROCEDURE — 99214 OFFICE O/P EST MOD 30 MIN: CPT | Performed by: INTERNAL MEDICINE

## 2018-10-23 PROCEDURE — 1101F PT FALLS ASSESS-DOCD LE1/YR: CPT | Performed by: INTERNAL MEDICINE

## 2018-10-23 PROCEDURE — 1036F TOBACCO NON-USER: CPT | Performed by: INTERNAL MEDICINE

## 2018-10-23 PROCEDURE — G8427 DOCREV CUR MEDS BY ELIG CLIN: HCPCS | Performed by: INTERNAL MEDICINE

## 2018-10-23 PROCEDURE — G8598 ASA/ANTIPLAT THER USED: HCPCS | Performed by: INTERNAL MEDICINE

## 2018-10-23 PROCEDURE — 1123F ACP DISCUSS/DSCN MKR DOCD: CPT | Performed by: INTERNAL MEDICINE

## 2018-10-23 PROCEDURE — G8482 FLU IMMUNIZE ORDER/ADMIN: HCPCS | Performed by: INTERNAL MEDICINE

## 2018-10-23 ASSESSMENT — ENCOUNTER SYMPTOMS
WHEEZING: 0
CONSTIPATION: 0
DIARRHEA: 0
SHORTNESS OF BREATH: 1

## 2018-10-23 NOTE — PROGRESS NOTES
Yes Mardel Essex, APRN - CNP   amLODIPine-atorvastatatin (CADUET) 5-20 MG per tablet Take 1 tablet by mouth daily Yes Mardel Essex, APRN - CNP   ramipril (ALTACE) 10 MG capsule TAKE 1 CAPSULE BY MOUTH TWICE DAILY Yes PRIYA Fonseca CNP   vitamin D (CHOLECALCIFEROL) 1000 UNIT TABS tablet Take 1,000 Units by mouth daily Yes Historical Provider, MD   metFORMIN (GLUCOPHAGE) 500 MG tablet TAKE 1 TABLET BY MOUTH TWICE DAILY WITH MEALS Yes Imer Lezama MD   allopurinol (ZYLOPRIM) 100 MG tablet TAKE 2 TABLETS BY MOUTH DAILY Yes Imer Lezama MD   warfarin (COUMADIN) 5 MG tablet 1 tab daily except for half tab Fridays as directed Yes Mardel Essex, APRN - CNP   glucose blood VI test strips (ACCU-CHEK AKOSUA) strip Test 3 times daily. He is a newly diagnosed diabetic who will require medication titration; Dx: E11.51 Yes Imer Lezama MD   Accu-Chek Softclix Lancets MISC Test once daily. Yes Jessica Hutchinson MD   Blood Glucose Monitoring Suppl (ACCU-CHEK AKOSUA PLUS) W/DEVICE KIT 1 kit by Does not apply route 3 times daily Patient to check blood sugar 3 times a day and PRN, he is a newly diagnosed diabetic who will require medication titration ;  LABA1C      8.6    ; ICD code- 250.02. Yes Peng Manzano MD   loratadine (CLARITIN) 10 MG tablet Take 10 mg by mouth daily.    Yes Historical Provider, MD       Past Surgical History:   Procedure Laterality Date    ABSCESS DRAINAGE  7/20/15    right foot    APPENDECTOMY      CARDIAC CATHETERIZATION  2003    Left    COLONOSCOPY  03/28/2018    dr Bernice Alfonso    x3   [facility]    OTHER SURGICAL HISTORY Right 7/17/15    right fifth toe I and D    PACEMAKER PLACEMENT  2005    TOE AMPUTATION Right 7.16.15    right pinkey toe     TONSILLECTOMY AND ADENOIDECTOMY         Social History     Social History    Marital status:      Spouse name: Lawrence Estrada Number of children: 3   

## 2018-10-24 LAB
ESTIMATED AVERAGE GLUCOSE: 96.8 MG/DL
HBA1C MFR BLD: 5 %

## 2018-10-26 ENCOUNTER — HOSPITAL ENCOUNTER (OUTPATIENT)
Age: 83
Setting detail: OBSERVATION
Discharge: HOME | End: 2018-10-27
Attending: EMERGENCY MEDICINE | Admitting: HOSPITALIST
Payer: MEDICARE

## 2018-10-26 ENCOUNTER — TELEPHONE (OUTPATIENT)
Dept: FAMILY MEDICINE CLINIC | Age: 83
End: 2018-10-26

## 2018-10-26 ENCOUNTER — NURSE ONLY (OUTPATIENT)
Dept: FAMILY MEDICINE CLINIC | Age: 83
End: 2018-10-26
Payer: MEDICARE

## 2018-10-26 DIAGNOSIS — E87.5 HYPERKALEMIA: Primary | ICD-10-CM

## 2018-10-26 DIAGNOSIS — R79.9 ELEVATED BUN: ICD-10-CM

## 2018-10-26 DIAGNOSIS — D64.9 CHRONIC ANEMIA: ICD-10-CM

## 2018-10-26 DIAGNOSIS — I49.9 IRREGULAR HEART RATE: Primary | ICD-10-CM

## 2018-10-26 DIAGNOSIS — E87.5 HYPERKALEMIA: ICD-10-CM

## 2018-10-26 LAB
A/G RATIO: 1.6 (ref 1.1–2.2)
ALBUMIN SERPL-MCNC: 4.5 G/DL (ref 3.4–5)
ALBUMIN SERPL-MCNC: 4.7 G/DL (ref 3.4–5)
ALP BLD-CCNC: 76 U/L (ref 40–129)
ALT SERPL-CCNC: 16 U/L (ref 10–40)
ANION GAP SERPL CALCULATED.3IONS-SCNC: 11 MMOL/L (ref 3–16)
ANION GAP SERPL CALCULATED.3IONS-SCNC: 8 MMOL/L (ref 3–16)
AST SERPL-CCNC: 21 U/L (ref 15–37)
BASOPHILS ABSOLUTE: 0 K/UL (ref 0–0.2)
BASOPHILS RELATIVE PERCENT: 0.7 %
BILIRUB SERPL-MCNC: 0.6 MG/DL (ref 0–1)
BILIRUBIN URINE: NEGATIVE
BLOOD, URINE: NEGATIVE
BUN BLDV-MCNC: 26 MG/DL (ref 7–20)
BUN BLDV-MCNC: 29 MG/DL (ref 7–20)
CALCIUM SERPL-MCNC: 9.2 MG/DL (ref 8.3–10.6)
CALCIUM SERPL-MCNC: 9.5 MG/DL (ref 8.3–10.6)
CHLORIDE BLD-SCNC: 104 MMOL/L (ref 99–110)
CHLORIDE BLD-SCNC: 105 MMOL/L (ref 99–110)
CLARITY: CLEAR
CO2: 26 MMOL/L (ref 21–32)
CO2: 28 MMOL/L (ref 21–32)
COLOR: YELLOW
CREAT SERPL-MCNC: 1 MG/DL (ref 0.8–1.3)
CREAT SERPL-MCNC: 1 MG/DL (ref 0.8–1.3)
EOSINOPHILS ABSOLUTE: 0.3 K/UL (ref 0–0.6)
EOSINOPHILS RELATIVE PERCENT: 3.9 %
EPITHELIAL CELLS, UA: 1 /HPF (ref 0–5)
GFR AFRICAN AMERICAN: >60
GFR AFRICAN AMERICAN: >60
GFR NON-AFRICAN AMERICAN: >60
GFR NON-AFRICAN AMERICAN: >60
GLOBULIN: 2.9 G/DL
GLUCOSE BLD-MCNC: 109 MG/DL (ref 70–99)
GLUCOSE BLD-MCNC: 119 MG/DL (ref 70–99)
GLUCOSE URINE: NEGATIVE MG/DL
HCT VFR BLD CALC: 27.2 % (ref 40.5–52.5)
HEMOGLOBIN: 9.5 G/DL (ref 13.5–17.5)
HYALINE CASTS: 2 /LPF (ref 0–8)
INR BLD: 2.7 (ref 0.86–1.14)
KETONES, URINE: NEGATIVE MG/DL
LEUKOCYTE ESTERASE, URINE: ABNORMAL
LYMPHOCYTES ABSOLUTE: 1.2 K/UL (ref 1–5.1)
LYMPHOCYTES RELATIVE PERCENT: 17.8 %
MCH RBC QN AUTO: 33 PG (ref 26–34)
MCHC RBC AUTO-ENTMCNC: 34.8 G/DL (ref 31–36)
MCV RBC AUTO: 95 FL (ref 80–100)
MICROSCOPIC EXAMINATION: YES
MONOCYTES ABSOLUTE: 0.7 K/UL (ref 0–1.3)
MONOCYTES RELATIVE PERCENT: 10.5 %
NEUTROPHILS ABSOLUTE: 4.4 K/UL (ref 1.7–7.7)
NEUTROPHILS RELATIVE PERCENT: 67.1 %
NITRITE, URINE: NEGATIVE
PDW BLD-RTO: 16.5 % (ref 12.4–15.4)
PH UA: 7
PHOSPHORUS: 3.7 MG/DL (ref 2.5–4.9)
PLATELET # BLD: 248 K/UL (ref 135–450)
PMV BLD AUTO: 9.2 FL (ref 5–10.5)
POTASSIUM REFLEX MAGNESIUM: 5.6 MMOL/L (ref 3.5–5.1)
POTASSIUM SERPL-SCNC: 6.2 MMOL/L (ref 3.5–5.1)
PROTEIN UA: 30 MG/DL
PROTHROMBIN TIME: 30.8 SEC (ref 9.8–13)
RBC # BLD: 2.87 M/UL (ref 4.2–5.9)
RBC UA: 2 /HPF (ref 0–4)
SODIUM BLD-SCNC: 140 MMOL/L (ref 136–145)
SODIUM BLD-SCNC: 142 MMOL/L (ref 136–145)
SPECIFIC GRAVITY UA: 1.01
TOTAL PROTEIN: 7.4 G/DL (ref 6.4–8.2)
URINE REFLEX TO CULTURE: YES
URINE TYPE: ABNORMAL
UROBILINOGEN, URINE: 1 E.U./DL
WBC # BLD: 6.6 K/UL (ref 4–11)
WBC UA: 11 /HPF (ref 0–5)

## 2018-10-26 PROCEDURE — 96374 THER/PROPH/DIAG INJ IV PUSH: CPT

## 2018-10-26 PROCEDURE — G0378 HOSPITAL OBSERVATION PER HR: HCPCS

## 2018-10-26 PROCEDURE — 87086 URINE CULTURE/COLONY COUNT: CPT

## 2018-10-26 PROCEDURE — 85025 COMPLETE CBC W/AUTO DIFF WBC: CPT

## 2018-10-26 PROCEDURE — 99291 CRITICAL CARE FIRST HOUR: CPT

## 2018-10-26 PROCEDURE — 85610 PROTHROMBIN TIME: CPT

## 2018-10-26 PROCEDURE — 6370000000 HC RX 637 (ALT 250 FOR IP): Performed by: PHYSICIAN ASSISTANT

## 2018-10-26 PROCEDURE — 6360000002 HC RX W HCPCS: Performed by: PHYSICIAN ASSISTANT

## 2018-10-26 PROCEDURE — 93005 ELECTROCARDIOGRAM TRACING: CPT | Performed by: PHYSICIAN ASSISTANT

## 2018-10-26 PROCEDURE — 93000 ELECTROCARDIOGRAM COMPLETE: CPT | Performed by: INTERNAL MEDICINE

## 2018-10-26 PROCEDURE — 81001 URINALYSIS AUTO W/SCOPE: CPT

## 2018-10-26 PROCEDURE — 80053 COMPREHEN METABOLIC PANEL: CPT

## 2018-10-26 PROCEDURE — 87186 SC STD MICRODIL/AGAR DIL: CPT

## 2018-10-26 PROCEDURE — 36415 COLL VENOUS BLD VENIPUNCTURE: CPT

## 2018-10-26 PROCEDURE — 87077 CULTURE AEROBIC IDENTIFY: CPT

## 2018-10-26 RX ORDER — SODIUM POLYSTYRENE SULFONATE 15 G/60ML
30 SUSPENSION ORAL; RECTAL ONCE
Status: COMPLETED | OUTPATIENT
Start: 2018-10-26 | End: 2018-10-26

## 2018-10-26 RX ORDER — WARFARIN SODIUM 5 MG/1
2.5 TABLET ORAL
Status: DISCONTINUED | OUTPATIENT
Start: 2018-10-26 | End: 2018-10-27 | Stop reason: HOSPADM

## 2018-10-26 RX ORDER — FUROSEMIDE 10 MG/ML
40 INJECTION INTRAMUSCULAR; INTRAVENOUS ONCE
Status: COMPLETED | OUTPATIENT
Start: 2018-10-26 | End: 2018-10-26

## 2018-10-26 RX ORDER — WARFARIN SODIUM 5 MG/1
5 TABLET ORAL
Status: DISCONTINUED | OUTPATIENT
Start: 2018-10-27 | End: 2018-10-27 | Stop reason: HOSPADM

## 2018-10-26 RX ADMIN — SODIUM POLYSTYRENE SULFONATE 30 G: 15 SUSPENSION ORAL; RECTAL at 21:50

## 2018-10-26 RX ADMIN — FUROSEMIDE 40 MG: 10 INJECTION, SOLUTION INTRAMUSCULAR; INTRAVENOUS at 21:50

## 2018-10-26 ASSESSMENT — ENCOUNTER SYMPTOMS
STRIDOR: 0
ABDOMINAL PAIN: 0
CONSTIPATION: 0
COLOR CHANGE: 0
NAUSEA: 0
VOMITING: 0
SHORTNESS OF BREATH: 0
COUGH: 0
BACK PAIN: 0
WHEEZING: 0
DIARRHEA: 0
ABDOMINAL DISTENTION: 0

## 2018-10-27 VITALS
DIASTOLIC BLOOD PRESSURE: 64 MMHG | TEMPERATURE: 97.9 F | SYSTOLIC BLOOD PRESSURE: 135 MMHG | OXYGEN SATURATION: 97 % | WEIGHT: 160.3 LBS | HEIGHT: 69 IN | BODY MASS INDEX: 23.74 KG/M2 | HEART RATE: 67 BPM | RESPIRATION RATE: 16 BRPM

## 2018-10-27 LAB
A/G RATIO: 1.5 (ref 1.1–2.2)
ALBUMIN SERPL-MCNC: 4.1 G/DL (ref 3.4–5)
ALP BLD-CCNC: 71 U/L (ref 40–129)
ALT SERPL-CCNC: 17 U/L (ref 10–40)
ANION GAP SERPL CALCULATED.3IONS-SCNC: 10 MMOL/L (ref 3–16)
AST SERPL-CCNC: 22 U/L (ref 15–37)
BASOPHILS ABSOLUTE: 0.1 K/UL (ref 0–0.2)
BASOPHILS RELATIVE PERCENT: 0.7 %
BILIRUB SERPL-MCNC: 0.7 MG/DL (ref 0–1)
BUN BLDV-MCNC: 25 MG/DL (ref 7–20)
CALCIUM SERPL-MCNC: 9.2 MG/DL (ref 8.3–10.6)
CHLORIDE BLD-SCNC: 102 MMOL/L (ref 99–110)
CO2: 27 MMOL/L (ref 21–32)
CREAT SERPL-MCNC: 1 MG/DL (ref 0.8–1.3)
EOSINOPHILS ABSOLUTE: 0.3 K/UL (ref 0–0.6)
EOSINOPHILS RELATIVE PERCENT: 3.8 %
GFR AFRICAN AMERICAN: >60
GFR NON-AFRICAN AMERICAN: >60
GLOBULIN: 2.8 G/DL
GLUCOSE BLD-MCNC: 122 MG/DL (ref 70–99)
GLUCOSE BLD-MCNC: 122 MG/DL (ref 70–99)
GLUCOSE BLD-MCNC: 168 MG/DL (ref 70–99)
HCT VFR BLD CALC: 26.5 % (ref 40.5–52.5)
HEMOGLOBIN: 9.3 G/DL (ref 13.5–17.5)
INR BLD: 2.6 (ref 0.86–1.14)
LYMPHOCYTES ABSOLUTE: 1.7 K/UL (ref 1–5.1)
LYMPHOCYTES RELATIVE PERCENT: 20.7 %
MAGNESIUM: 2.2 MG/DL (ref 1.8–2.4)
MCH RBC QN AUTO: 33.2 PG (ref 26–34)
MCHC RBC AUTO-ENTMCNC: 35.2 G/DL (ref 31–36)
MCV RBC AUTO: 94.3 FL (ref 80–100)
MONOCYTES ABSOLUTE: 0.7 K/UL (ref 0–1.3)
MONOCYTES RELATIVE PERCENT: 9.2 %
NEUTROPHILS ABSOLUTE: 5.3 K/UL (ref 1.7–7.7)
NEUTROPHILS RELATIVE PERCENT: 65.6 %
PDW BLD-RTO: 16.3 % (ref 12.4–15.4)
PERFORMED ON: ABNORMAL
PERFORMED ON: ABNORMAL
PLATELET # BLD: 235 K/UL (ref 135–450)
PMV BLD AUTO: 9.3 FL (ref 5–10.5)
POTASSIUM SERPL-SCNC: 4.8 MMOL/L (ref 3.5–5.1)
POTASSIUM SERPL-SCNC: 4.9 MMOL/L (ref 3.5–5.1)
PROTHROMBIN TIME: 29.6 SEC (ref 9.8–13)
RBC # BLD: 2.8 M/UL (ref 4.2–5.9)
SODIUM BLD-SCNC: 139 MMOL/L (ref 136–145)
TOTAL PROTEIN: 6.9 G/DL (ref 6.4–8.2)
WBC # BLD: 8 K/UL (ref 4–11)

## 2018-10-27 PROCEDURE — G0378 HOSPITAL OBSERVATION PER HR: HCPCS

## 2018-10-27 PROCEDURE — 93010 ELECTROCARDIOGRAM REPORT: CPT | Performed by: INTERNAL MEDICINE

## 2018-10-27 PROCEDURE — 6370000000 HC RX 637 (ALT 250 FOR IP): Performed by: INTERNAL MEDICINE

## 2018-10-27 PROCEDURE — 85610 PROTHROMBIN TIME: CPT

## 2018-10-27 PROCEDURE — 80053 COMPREHEN METABOLIC PANEL: CPT

## 2018-10-27 PROCEDURE — 83735 ASSAY OF MAGNESIUM: CPT

## 2018-10-27 PROCEDURE — 85025 COMPLETE CBC W/AUTO DIFF WBC: CPT

## 2018-10-27 PROCEDURE — 36415 COLL VENOUS BLD VENIPUNCTURE: CPT

## 2018-10-27 PROCEDURE — 2580000003 HC RX 258: Performed by: HOSPITALIST

## 2018-10-27 PROCEDURE — 6370000000 HC RX 637 (ALT 250 FOR IP): Performed by: HOSPITALIST

## 2018-10-27 PROCEDURE — 84132 ASSAY OF SERUM POTASSIUM: CPT

## 2018-10-27 PROCEDURE — 94760 N-INVAS EAR/PLS OXIMETRY 1: CPT

## 2018-10-27 RX ORDER — DEXTROSE MONOHYDRATE 25 G/50ML
12.5 INJECTION, SOLUTION INTRAVENOUS PRN
Status: DISCONTINUED | OUTPATIENT
Start: 2018-10-27 | End: 2018-10-27 | Stop reason: HOSPADM

## 2018-10-27 RX ORDER — SOTALOL HYDROCHLORIDE 80 MG/1
80 TABLET ORAL 2 TIMES DAILY
Status: DISCONTINUED | OUTPATIENT
Start: 2018-10-27 | End: 2018-10-27 | Stop reason: HOSPADM

## 2018-10-27 RX ORDER — SODIUM CHLORIDE 0.9 % (FLUSH) 0.9 %
10 SYRINGE (ML) INJECTION EVERY 12 HOURS SCHEDULED
Status: DISCONTINUED | OUTPATIENT
Start: 2018-10-27 | End: 2018-10-27 | Stop reason: HOSPADM

## 2018-10-27 RX ORDER — NICOTINE POLACRILEX 4 MG
15 LOZENGE BUCCAL PRN
Status: DISCONTINUED | OUTPATIENT
Start: 2018-10-27 | End: 2018-10-27 | Stop reason: HOSPADM

## 2018-10-27 RX ORDER — DEXTROSE MONOHYDRATE 50 MG/ML
100 INJECTION, SOLUTION INTRAVENOUS PRN
Status: DISCONTINUED | OUTPATIENT
Start: 2018-10-27 | End: 2018-10-27 | Stop reason: HOSPADM

## 2018-10-27 RX ORDER — ACETAMINOPHEN 80 MG
TABLET,CHEWABLE ORAL
Status: COMPLETED
Start: 2018-10-27 | End: 2018-10-27

## 2018-10-27 RX ORDER — ALLOPURINOL 100 MG/1
200 TABLET ORAL DAILY
Status: DISCONTINUED | OUTPATIENT
Start: 2018-10-27 | End: 2018-10-27

## 2018-10-27 RX ORDER — FAMOTIDINE 20 MG/1
20 TABLET, FILM COATED ORAL 2 TIMES DAILY
Status: DISCONTINUED | OUTPATIENT
Start: 2018-10-27 | End: 2018-10-27 | Stop reason: HOSPADM

## 2018-10-27 RX ORDER — ATORVASTATIN CALCIUM 20 MG/1
20 TABLET, FILM COATED ORAL DAILY
Status: DISCONTINUED | OUTPATIENT
Start: 2018-10-27 | End: 2018-10-27 | Stop reason: HOSPADM

## 2018-10-27 RX ORDER — AMLODIPINE BESYLATE 5 MG/1
5 TABLET ORAL DAILY
Status: DISCONTINUED | OUTPATIENT
Start: 2018-10-27 | End: 2018-10-27 | Stop reason: HOSPADM

## 2018-10-27 RX ORDER — AMLODIPINE BESYLATE AND ATORVASTATIN CALCIUM 5; 20 MG/1; MG/1
1 TABLET, FILM COATED ORAL DAILY
Status: DISCONTINUED | OUTPATIENT
Start: 2018-10-27 | End: 2018-10-27 | Stop reason: CLARIF

## 2018-10-27 RX ORDER — ACETAMINOPHEN 325 MG/1
650 TABLET ORAL EVERY 4 HOURS PRN
Status: DISCONTINUED | OUTPATIENT
Start: 2018-10-27 | End: 2018-10-27 | Stop reason: HOSPADM

## 2018-10-27 RX ORDER — ONDANSETRON 2 MG/ML
4 INJECTION INTRAMUSCULAR; INTRAVENOUS EVERY 6 HOURS PRN
Status: DISCONTINUED | OUTPATIENT
Start: 2018-10-27 | End: 2018-10-27 | Stop reason: HOSPADM

## 2018-10-27 RX ORDER — ALLOPURINOL 100 MG/1
100 TABLET ORAL 2 TIMES DAILY
Status: DISCONTINUED | OUTPATIENT
Start: 2018-10-27 | End: 2018-10-27 | Stop reason: HOSPADM

## 2018-10-27 RX ORDER — SODIUM CHLORIDE 0.9 % (FLUSH) 0.9 %
10 SYRINGE (ML) INJECTION PRN
Status: DISCONTINUED | OUTPATIENT
Start: 2018-10-27 | End: 2018-10-27 | Stop reason: HOSPADM

## 2018-10-27 RX ORDER — ALLOPURINOL 100 MG/1
100 TABLET ORAL 2 TIMES DAILY
Status: DISCONTINUED | OUTPATIENT
Start: 2018-10-27 | End: 2018-10-27

## 2018-10-27 RX ORDER — AMLODIPINE BESYLATE AND ATORVASTATIN CALCIUM 5; 20 MG/1; MG/1
1 TABLET, FILM COATED ORAL DAILY
COMMUNITY
End: 2019-06-19 | Stop reason: DRUGHIGH

## 2018-10-27 RX ADMIN — FAMOTIDINE 20 MG: 20 TABLET, FILM COATED ORAL at 05:22

## 2018-10-27 RX ADMIN — VITAMIN D, TAB 1000IU (100/BT) 1000 UNITS: 25 TAB at 10:12

## 2018-10-27 RX ADMIN — AMLODIPINE BESYLATE 5 MG: 5 TABLET ORAL at 10:12

## 2018-10-27 RX ADMIN — Medication: at 05:27

## 2018-10-27 RX ADMIN — SOTALOL HYDROCHLORIDE 80 MG: 80 TABLET ORAL at 05:25

## 2018-10-27 RX ADMIN — FAMOTIDINE 20 MG: 20 TABLET, FILM COATED ORAL at 10:12

## 2018-10-27 RX ADMIN — ALLOPURINOL 100 MG: 100 TABLET ORAL at 10:12

## 2018-10-27 RX ADMIN — WARFARIN SODIUM 2.5 MG: 5 TABLET ORAL at 05:23

## 2018-10-27 RX ADMIN — SOTALOL HYDROCHLORIDE 80 MG: 80 TABLET ORAL at 10:12

## 2018-10-27 RX ADMIN — Medication 10 ML: at 10:12

## 2018-10-27 RX ADMIN — ATORVASTATIN CALCIUM 20 MG: 20 TABLET, FILM COATED ORAL at 10:12

## 2018-10-27 ASSESSMENT — PAIN SCALES - GENERAL
PAINLEVEL_OUTOF10: 0
PAINLEVEL_OUTOF10: 0

## 2018-10-27 NOTE — DISCHARGE SUMMARY
appearance:  No apparent distress, appears stated age and cooperative. HEENT:  Normal cephalic, atraumatic without obvious deformity. Pupils equal, round, and reactive to light. Extra ocular muscles intact. Conjunctivae/corneas clear. Neck: Supple, with full range of motion. No jugular venous distention. Trachea midline. Respiratory:  Normal respiratory effort. Clear to auscultation, bilaterally without Rales/Wheezes/Rhonchi. Cardiovascular:  Regular rate and rhythm with normal S1/S2 without murmurs, rubs or gallops. Abdomen: Soft, non-tender, non-distended with normal bowel sounds. Musculoskeletal:  No clubbing, cyanosis or edema bilaterally. Full range of motion without deformity. Skin: Skin color, texture, turgor normal.  No rashes or lesions. Neurologic:  Neurovascularly intact without any focal sensory/motor deficits. Cranial nerves: II-XII intact, grossly non-focal.  Psychiatric:  Alert and oriented, thought content appropriate, normal insight  Capillary Refill: Brisk,< 3 seconds   Peripheral Pulses: +2 palpable, equal bilaterally       Labs: For convenience and continuity at follow-up the following most recent labs are provided:      CBC:    Lab Results   Component Value Date    WBC 8.0 10/27/2018    HGB 9.3 10/27/2018    HCT 26.5 10/27/2018     10/27/2018       Renal:    Lab Results   Component Value Date     10/27/2018    K 4.9 10/27/2018    K 5.6 10/26/2018     10/27/2018    CO2 27 10/27/2018    BUN 25 10/27/2018    CREATININE 1.0 10/27/2018    CALCIUM 9.2 10/27/2018    PHOS 3.7 10/26/2018         Significant Diagnostic Studies    Radiology:   No orders to display          Consults:     IP CONSULT TO NEPHROLOGY  IP CONSULT TO HOSPITALIST  IP CONSULT TO PHARMACY    Disposition:  Home      Condition at Discharge: Stable    Discharge Instructions/Follow-up:  Held ACEI.  F/u with nephro in 1 week     Code Status:  Full Code     Activity: activity as tolerated    Diet: cardiac concerns please feel free to contact me at [phone].

## 2018-10-27 NOTE — ED PROVIDER NOTES
Previous Medications    ACCU-CHEK SOFTCLIX LANCETS MISC    Test once daily. ALLOPURINOL (ZYLOPRIM) 100 MG TABLET    TAKE 2 TABLETS BY MOUTH DAILY    AMLODIPINE-ATORVASTATATIN (CADUET) 5-20 MG PER TABLET    Take 1 tablet by mouth daily    BLOOD GLUCOSE MONITORING SUPPL (ACCU-CHEK AKOSUA PLUS) W/DEVICE KIT    1 kit by Does not apply route 3 times daily Patient to check blood sugar 3 times a day and PRN, he is a newly diagnosed diabetic who will require medication titration ;  LABA1C      8.6    ; ICD code- 250.02. GLUCOSE BLOOD VI TEST STRIPS (ACCU-CHEK AKOSUA) STRIP    Test 3 times daily. He is a newly diagnosed diabetic who will require medication titration; Dx: E11.51    LORATADINE (CLARITIN) 10 MG TABLET    Take 10 mg by mouth daily. METFORMIN (GLUCOPHAGE) 500 MG TABLET    TAKE 1 TABLET BY MOUTH TWICE DAILY WITH MEALS    RAMIPRIL (ALTACE) 10 MG CAPSULE    TAKE 1 CAPSULE BY MOUTH TWICE DAILY    SOTALOL (BETAPACE) 80 MG TABLET    TAKE 1 TABLET BY MOUTH TWICE DAILY    VITAMIN D (CHOLECALCIFEROL) 1000 UNIT TABS TABLET    Take 1,000 Units by mouth daily    WARFARIN (COUMADIN) 2.5 MG TABLET    2.5mg  MWF,and 5mg all other days    WARFARIN (COUMADIN) 5 MG TABLET    1 tab daily except for half tab Fridays as directed         ALLERGIES     Patient has no known allergies.     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 The patient tolerated their visit well. They were seen and evaluated by the attending physician who agreed with the assessment and plan. The patient and / or thefamily were informed of the results of any tests, a time was given to answer questions, a plan was proposed and they agreed with plan. FINAL IMPRESSION      1. Hyperkalemia    2. Chronic anemia    3. Elevated BUN          DISPOSITION/PLAN   DISPOSITION Decision To Admit 10/26/2018 09:29:48 PM      PATIENT REFERREDTO:  No follow-up provider specified.     DISCHARGE MEDICATIONS:  New Prescriptions    No medications on file       DISCONTINUED MEDICATIONS:  Discontinued Medications    No medications on file              (Please note that portions ofthis note were completed with a voice recognition program.  Efforts were made to edit the dictations but occasionally words are mis-transcribed.)    Consuelo Vazquez PA-C (electronically signed)           Consuelo Vazquez PA-C  10/26/18 5070

## 2018-10-27 NOTE — ED NOTES
Pt report given to JOSHUA Lebron, states no questions or concerns at this time.       [address], RN  10/26/18 4921

## 2018-10-27 NOTE — CONSULTS
hydroxide, ondansetron, acetaminophen      Allergies: Patient has no known allergies. REVIEW OF SYSTEMS:  As mentioned in HPI    All other 10-point review of systems: negative. PHYSICAL EXAM:  Patient Vitals for the past 24 hrs:   BP Temp Temp src Pulse Resp SpO2 Height Weight   10/27/18 0412 - - - - 20 95 % - -   10/27/18 0057 (!) 146/68 97.2 °F (36.2 °C) Temporal 63 20 96 % 5' 9\" (1.753 m) 160 lb 4.8 oz (72.7 kg)   10/26/18 2345 108/65 98 °F (36.7 °C) Oral 92 20 95 % - -   10/26/18 2330 121/82 - - 92 19 94 % - -   10/26/18 2315 116/85 - - 90 19 93 % - -   10/26/18 2300 (!) 120/107 - - 93 20 94 % - -   10/26/18 2245 (!) 139/108 - - 95 21 95 % - -   10/26/18 2230 (!) 154/113 - - 96 19 - - -   10/26/18 2130 (!) 139/55 - - 68 21 96 % - -   10/26/18 2100 (!) 135/48 - - 72 23 98 % - -   10/26/18 2041 (!) 141/113 - - 79 12 98 % - -   10/26/18 1907 (!) 176/71 98.8 °F (37.1 °C) - 65 18 98 % 5' 9\" (1.753 m) 164 lb (74.4 kg)     No intake or output data in the 24 hours ending 10/27/18 0801    Physical Exam   Constitutional: He is oriented to person, place, and time. He appears well-developed and well-nourished. HENT:   Head: Normocephalic and atraumatic. Eyes: Conjunctivae are normal. No scleral icterus. Neck: Neck supple. No JVD present. Cardiovascular: Regular rhythm and normal heart sounds. No murmur heard. Pulmonary/Chest: Effort normal and breath sounds normal. No respiratory distress. Abdominal: Soft. Bowel sounds are normal. There is no tenderness. Musculoskeletal: He exhibits no edema or deformity. Neurological: He is alert and oriented to person, place, and time. Skin: Skin is warm and dry. Psychiatric: He has a normal mood and affect.  His behavior is normal.         DATA:  Diagnostic tests reviewed by me for today's visit:    Recent Labs      10/26/18   1920  10/27/18   0550   WBC  6.6  8.0   HCT  27.2*  26.5*   PLT  248  235     Iron Saturation:  No components found for:

## 2018-10-27 NOTE — PROGRESS NOTES
Clinical Pharmacist Note:    Pharmacy consulted by Dr. José Duarte to dose warfarin for hx of afib. Goal INR range: 2-3    INR today: 2.7  Plan to start home warfarin dose: 2.5mg MWF and 5mg all other days. Include now as patient has not had dose yet today. Daily INR is ordered. Pharmacy will continue to follow.      Ariane Sherman, PharmD, [facility]

## 2018-10-27 NOTE — PROGRESS NOTES
Admission completed, see doc flowsheets. Pt currently resting quietly in bed, has no complaints at this time, call light within reach, will continue to monitor.   Andres Ballard

## 2018-10-28 LAB
ORGANISM: ABNORMAL
ORGANISM: ABNORMAL
URINE CULTURE, ROUTINE: ABNORMAL

## 2018-10-29 ENCOUNTER — HOSPITAL ENCOUNTER (OUTPATIENT)
Age: 83
Discharge: HOME | End: 2018-10-29
Payer: MEDICARE

## 2018-10-29 LAB
ANION GAP SERPL CALCULATED.3IONS-SCNC: 13 MMOL/L (ref 3–16)
BUN BLDV-MCNC: 34 MG/DL (ref 7–20)
CALCIUM SERPL-MCNC: 9.3 MG/DL (ref 8.3–10.6)
CHLORIDE BLD-SCNC: 101 MMOL/L (ref 99–110)
CO2: 27 MMOL/L (ref 21–32)
CREAT SERPL-MCNC: 1.2 MG/DL (ref 0.8–1.3)
EKG ATRIAL RATE: 71 BPM
EKG DIAGNOSIS: NORMAL
EKG P AXIS: 64 DEGREES
EKG P-R INTERVAL: 196 MS
EKG Q-T INTERVAL: 448 MS
EKG QRS DURATION: 82 MS
EKG QTC CALCULATION (BAZETT): 486 MS
EKG R AXIS: -25 DEGREES
EKG T AXIS: 63 DEGREES
EKG VENTRICULAR RATE: 71 BPM
GFR AFRICAN AMERICAN: >60
GFR NON-AFRICAN AMERICAN: 58
GLUCOSE BLD-MCNC: 122 MG/DL (ref 70–99)
POTASSIUM SERPL-SCNC: 5.1 MMOL/L (ref 3.5–5.1)
SODIUM BLD-SCNC: 141 MMOL/L (ref 136–145)

## 2018-10-29 PROCEDURE — 36415 COLL VENOUS BLD VENIPUNCTURE: CPT

## 2018-10-29 PROCEDURE — 80048 BASIC METABOLIC PNL TOTAL CA: CPT

## 2018-10-30 ENCOUNTER — OFFICE VISIT (OUTPATIENT)
Dept: FAMILY MEDICINE CLINIC | Age: 83
End: 2018-10-30
Payer: MEDICARE

## 2018-10-30 ENCOUNTER — TELEPHONE (OUTPATIENT)
Dept: FAMILY MEDICINE CLINIC | Age: 83
End: 2018-10-30

## 2018-10-30 VITALS
OXYGEN SATURATION: 96 % | BODY MASS INDEX: 23.99 KG/M2 | RESPIRATION RATE: 14 BRPM | DIASTOLIC BLOOD PRESSURE: 70 MMHG | HEIGHT: 69 IN | HEART RATE: 76 BPM | WEIGHT: 162 LBS | SYSTOLIC BLOOD PRESSURE: 110 MMHG

## 2018-10-30 DIAGNOSIS — D64.9 ANEMIA, UNSPECIFIED TYPE: ICD-10-CM

## 2018-10-30 DIAGNOSIS — E87.5 HYPERKALEMIA: Primary | ICD-10-CM

## 2018-10-30 LAB
FERRITIN: 934.4 NG/ML (ref 30–400)
IRON SATURATION: 34 % (ref 20–50)
IRON: 81 UG/DL (ref 59–158)
TOTAL IRON BINDING CAPACITY: 238 UG/DL (ref 260–445)

## 2018-10-30 PROCEDURE — 4040F PNEUMOC VAC/ADMIN/RCVD: CPT | Performed by: INTERNAL MEDICINE

## 2018-10-30 PROCEDURE — G8420 CALC BMI NORM PARAMETERS: HCPCS | Performed by: INTERNAL MEDICINE

## 2018-10-30 PROCEDURE — 1036F TOBACCO NON-USER: CPT | Performed by: INTERNAL MEDICINE

## 2018-10-30 PROCEDURE — 1101F PT FALLS ASSESS-DOCD LE1/YR: CPT | Performed by: INTERNAL MEDICINE

## 2018-10-30 PROCEDURE — G8598 ASA/ANTIPLAT THER USED: HCPCS | Performed by: INTERNAL MEDICINE

## 2018-10-30 PROCEDURE — 99214 OFFICE O/P EST MOD 30 MIN: CPT | Performed by: INTERNAL MEDICINE

## 2018-10-30 PROCEDURE — 1123F ACP DISCUSS/DSCN MKR DOCD: CPT | Performed by: INTERNAL MEDICINE

## 2018-10-30 PROCEDURE — G8482 FLU IMMUNIZE ORDER/ADMIN: HCPCS | Performed by: INTERNAL MEDICINE

## 2018-10-30 PROCEDURE — G8427 DOCREV CUR MEDS BY ELIG CLIN: HCPCS | Performed by: INTERNAL MEDICINE

## 2018-10-30 RX ORDER — AMOXICILLIN AND CLAVULANATE POTASSIUM 875; 125 MG/1; MG/1
1 TABLET, FILM COATED ORAL EVERY 12 HOURS
Qty: 14 TABLET | Refills: 0 | Status: SHIPPED | OUTPATIENT
Start: 2018-10-30 | End: 2018-11-06

## 2018-10-30 ASSESSMENT — ENCOUNTER SYMPTOMS
BLOOD IN STOOL: 0
CONSTIPATION: 1

## 2018-10-30 NOTE — PROGRESS NOTES
heard.  Pulmonary/Chest: Effort normal and breath sounds normal. He has no wheezes. Abdominal: There is no tenderness. Musculoskeletal: He exhibits no edema. Skin: Skin is warm and dry. Psychiatric: He has a normal mood and affect. His behavior is normal. Judgment and thought content normal.       Wt Readings from Last 3 Encounters:   10/30/18 162 lb (73.5 kg)   10/27/18 160 lb 4.8 oz (72.7 kg)   10/23/18 164 lb (74.4 kg)       BP Readings from Last 3 Encounters:   10/30/18 110/70   10/27/18 135/64   10/23/18 120/80         Renee Sandoval was seen today for follow-up from hospital.    Diagnoses and all orders for this visit:    Hyperkalemia  Better.  k  5.1 now    Anemia, unspecified type  -     POCT Fecal Immunochemical Test (FIT); Future  -     Ferritin; Future  -     Iron and TIBC; Future    if he can't be seen on Friday with nephrology then call me for  A lab order for  Renal.  Anemia ? New   Ck fit test iron studies  May need egd.   Fu  1 months

## 2018-11-02 ENCOUNTER — ANTI-COAG VISIT (OUTPATIENT)
Dept: PHARMACY | Age: 83
End: 2018-11-02
Payer: MEDICARE

## 2018-11-02 DIAGNOSIS — I48.91 ATRIAL FIBRILLATION, UNSPECIFIED TYPE (HCC): ICD-10-CM

## 2018-11-02 LAB — INTERNATIONAL NORMALIZATION RATIO, POC: 2.6

## 2018-11-02 PROCEDURE — 85610 PROTHROMBIN TIME: CPT

## 2018-11-02 PROCEDURE — 99211 OFF/OP EST MAY X REQ PHY/QHP: CPT

## 2018-11-09 DIAGNOSIS — D64.9 ANEMIA, UNSPECIFIED TYPE: ICD-10-CM

## 2018-11-09 LAB
CONTROL: POSITIVE
HEMOCCULT STL QL: POSITIVE

## 2018-11-09 PROCEDURE — 82274 ASSAY TEST FOR BLOOD FECAL: CPT | Performed by: INTERNAL MEDICINE

## 2018-11-13 ENCOUNTER — NURSE ONLY (OUTPATIENT)
Dept: CARDIOLOGY CLINIC | Age: 83
End: 2018-11-13
Payer: MEDICARE

## 2018-11-13 DIAGNOSIS — Z95.0 PACEMAKER: ICD-10-CM

## 2018-11-13 DIAGNOSIS — I48.91 ATRIAL FIBRILLATION, UNSPECIFIED TYPE (HCC): ICD-10-CM

## 2018-11-13 PROCEDURE — 93296 REM INTERROG EVL PM/IDS: CPT | Performed by: INTERNAL MEDICINE

## 2018-11-13 PROCEDURE — 93294 REM INTERROG EVL PM/LDLS PM: CPT | Performed by: INTERNAL MEDICINE

## 2018-11-13 NOTE — LETTER
[address] [facility] [phone]  [address]   [address] [address] [phone]    [facility]          11/14/18        [address] [phone]        Dear Fara Penn    This letter is to inform you that we received the transmission from your monitor at home that checks your pacemaker and/or defibrillator, or implanted heart monitor. Your pacemaker shows normal function. The next date your monitor will automatically transmit will be 2-19-19. Please do not send additional routine transmissions unless specifically requested. Your device and monitor are wireless and most transmit cellularly, but please periodically check your monitor is still plugged in to the electrical outlet. If you still use the telephone land line to send please ensure the connection to the phone eleni is secure. This will help to ensure successful automatic transmissions in the future. Also, the monitor needs to be close to you while sleeping at night. Please be aware that the remote device transmission sites are periodically monitored only during regular business hours during which simultaneous in-office device clinics are being run. If your transmission requires attention, we will contact you as soon as possible. Thank you.             [facility]

## 2018-11-20 ENCOUNTER — ANESTHESIA EVENT (OUTPATIENT)
Dept: ENDOSCOPY | Age: 83
End: 2018-11-20
Payer: MEDICARE

## 2018-11-21 ENCOUNTER — ANESTHESIA (OUTPATIENT)
Dept: ENDOSCOPY | Age: 83
End: 2018-11-21
Payer: MEDICARE

## 2018-11-21 ENCOUNTER — HOSPITAL ENCOUNTER (OUTPATIENT)
Age: 83
Setting detail: OUTPATIENT SURGERY
Discharge: HOME | End: 2018-11-21
Attending: INTERNAL MEDICINE | Admitting: INTERNAL MEDICINE
Payer: MEDICARE

## 2018-11-21 VITALS
TEMPERATURE: 97.8 F | HEIGHT: 70 IN | OXYGEN SATURATION: 100 % | BODY MASS INDEX: 23.68 KG/M2 | DIASTOLIC BLOOD PRESSURE: 66 MMHG | HEART RATE: 88 BPM | WEIGHT: 165.4 LBS | RESPIRATION RATE: 16 BRPM | SYSTOLIC BLOOD PRESSURE: 113 MMHG

## 2018-11-21 VITALS — OXYGEN SATURATION: 99 % | SYSTOLIC BLOOD PRESSURE: 109 MMHG | DIASTOLIC BLOOD PRESSURE: 63 MMHG

## 2018-11-21 LAB
GLUCOSE BLD-MCNC: 117 MG/DL (ref 70–99)
INTERNATIONAL NORMALIZATION RATIO, POC: 1.2
PERFORMED ON: ABNORMAL
PROTHROMBIN TIME, POC: 13.8

## 2018-11-21 PROCEDURE — 7100000011 HC PHASE II RECOVERY - ADDTL 15 MIN: Performed by: INTERNAL MEDICINE

## 2018-11-21 PROCEDURE — 3609012800 HC EGD DIAGNOSTIC ONLY: Performed by: INTERNAL MEDICINE

## 2018-11-21 PROCEDURE — 2500000003 HC RX 250 WO HCPCS: Performed by: NURSE ANESTHETIST, CERTIFIED REGISTERED

## 2018-11-21 PROCEDURE — 7100000010 HC PHASE II RECOVERY - FIRST 15 MIN: Performed by: INTERNAL MEDICINE

## 2018-11-21 PROCEDURE — 6360000002 HC RX W HCPCS: Performed by: NURSE ANESTHETIST, CERTIFIED REGISTERED

## 2018-11-21 PROCEDURE — 3700000000 HC ANESTHESIA ATTENDED CARE: Performed by: INTERNAL MEDICINE

## 2018-11-21 PROCEDURE — 2580000003 HC RX 258: Performed by: ANESTHESIOLOGY

## 2018-11-21 RX ORDER — ONDANSETRON 2 MG/ML
4 INJECTION INTRAMUSCULAR; INTRAVENOUS
Status: DISCONTINUED | OUTPATIENT
Start: 2018-11-21 | End: 2018-11-21 | Stop reason: HOSPADM

## 2018-11-21 RX ORDER — SODIUM CHLORIDE 0.9 % (FLUSH) 0.9 %
10 SYRINGE (ML) INJECTION EVERY 12 HOURS SCHEDULED
Status: DISCONTINUED | OUTPATIENT
Start: 2018-11-21 | End: 2018-11-21 | Stop reason: HOSPADM

## 2018-11-21 RX ORDER — LIDOCAINE HYDROCHLORIDE 20 MG/ML
INJECTION, SOLUTION INFILTRATION; PERINEURAL PRN
Status: DISCONTINUED | OUTPATIENT
Start: 2018-11-21 | End: 2018-11-21 | Stop reason: SDUPTHER

## 2018-11-21 RX ORDER — SODIUM CHLORIDE 9 MG/ML
INJECTION, SOLUTION INTRAVENOUS CONTINUOUS
Status: DISCONTINUED | OUTPATIENT
Start: 2018-11-21 | End: 2018-11-21 | Stop reason: HOSPADM

## 2018-11-21 RX ORDER — PROPOFOL 10 MG/ML
INJECTION, EMULSION INTRAVENOUS PRN
Status: DISCONTINUED | OUTPATIENT
Start: 2018-11-21 | End: 2018-11-21 | Stop reason: SDUPTHER

## 2018-11-21 RX ORDER — SODIUM CHLORIDE 0.9 % (FLUSH) 0.9 %
10 SYRINGE (ML) INJECTION PRN
Status: DISCONTINUED | OUTPATIENT
Start: 2018-11-21 | End: 2018-11-21 | Stop reason: HOSPADM

## 2018-11-21 RX ADMIN — LIDOCAINE HYDROCHLORIDE 60 MG: 20 INJECTION, SOLUTION INFILTRATION; PERINEURAL at 08:27

## 2018-11-21 RX ADMIN — PROPOFOL 50 MG: 10 INJECTION, EMULSION INTRAVENOUS at 08:27

## 2018-11-21 RX ADMIN — SODIUM CHLORIDE: 0.9 INJECTION, SOLUTION INTRAVENOUS at 07:56

## 2018-11-21 RX ADMIN — PROPOFOL 20 MG: 10 INJECTION, EMULSION INTRAVENOUS at 08:32

## 2018-11-21 ASSESSMENT — PAIN - FUNCTIONAL ASSESSMENT: PAIN_FUNCTIONAL_ASSESSMENT: 0-10

## 2018-11-21 ASSESSMENT — PAIN SCALES - GENERAL
PAINLEVEL_OUTOF10: 0

## 2018-11-21 ASSESSMENT — ENCOUNTER SYMPTOMS: SHORTNESS OF BREATH: 0

## 2018-11-21 NOTE — ANESTHESIA PRE PROCEDURE
Facility-Administered Medications   Medication Dose Route Frequency Provider Last Rate Last Dose    0.9 % sodium chloride infusion   Intravenous Continuous Niranjan Hager MD 75 mL/hr at 11/21/18 0756      sodium chloride flush 0.9 % injection 10 mL  10 mL Intravenous 2 times per day Niranjan Hager MD        sodium chloride flush 0.9 % injection 10 mL  10 mL Intravenous PRN Niranjan Hager MD           Allergies:  No Known Allergies    Problem List:    Patient Active Problem List   Diagnosis Code    CAD (coronary artery disease)-sees dr held-(last stress echo was neg 3/11) I25.10    Cardiac pacemaker Z95.0    Benign localized hyperplasia of prostate without urinary obstruction and other lower urinary tract symptoms (LUTS) N40.0    Gout-uric acid >7.5--advised proph tx M10.9    Anticoagulant long-term use-monitored a dr alfaro office Z79.01    Hypercholesteremia E78.00    Carotid bruit(bilat-nl duplex u/s 10/10) R09.89    Vitamin D deficiency-(advised 1000IU/day) E55.9    Actinic keratoses L57.0    Elevated PSA-(was 4.2 10/10-repeat 6 mo)(was 5.12 1/11-then 4.4 2/12)-sees dr Genna Cotton for this R97.20    S/P colonoscopy-4/07-neg per pt,  3/18 repeat colonoscopy polyp-repeat 5 yr Z98.890    Hearing loss, conductive, bilateral-seeing ent-dr quinn for hearing aides H90.0    Diabetic eye exam-(sees dr Placido Espinal exam 12/15)--wnl Z01.00, E11.9    MICROALBUMINURIA-on ace already R80.9    Anemia--post op 8/15--started otc iron bid D64.9    Osteomyelitis due to secondary diabetes (HCC)--+ group b strept--s/p amputation great toe 8/15 E13.69, M86.9    Atrial fibrillation ([facility] [address] [address] I48.91    DM (diabetes mellitus), type 2 with peripheral vascular complications (HCC)--s/p amputation great toe post osteomylitis  E11.51    Essential hypertension I10    CKD stage 3 due to type 2 diabetes mellitus ([address] E11.22, N18.3    Hyperkalemia E87.5       Past Medical History:        Diagnosis Date    Actinic

## 2018-11-26 ENCOUNTER — OFFICE VISIT (OUTPATIENT)
Dept: FAMILY MEDICINE CLINIC | Age: 83
End: 2018-11-26
Payer: MEDICARE

## 2018-11-26 VITALS
SYSTOLIC BLOOD PRESSURE: 136 MMHG | BODY MASS INDEX: 23.91 KG/M2 | RESPIRATION RATE: 14 BRPM | HEIGHT: 70 IN | OXYGEN SATURATION: 96 % | DIASTOLIC BLOOD PRESSURE: 84 MMHG | HEART RATE: 80 BPM | WEIGHT: 167 LBS

## 2018-11-26 DIAGNOSIS — E87.5 HYPERKALEMIA: Primary | ICD-10-CM

## 2018-11-26 DIAGNOSIS — I48.91 ATRIAL FIBRILLATION, UNSPECIFIED TYPE (HCC): ICD-10-CM

## 2018-11-26 DIAGNOSIS — E78.00 HYPERCHOLESTEREMIA: ICD-10-CM

## 2018-11-26 DIAGNOSIS — E11.51 DM (DIABETES MELLITUS), TYPE 2 WITH PERIPHERAL VASCULAR COMPLICATIONS (HCC): ICD-10-CM

## 2018-11-26 DIAGNOSIS — R09.89 CHEST CRACKLES: ICD-10-CM

## 2018-11-26 PROCEDURE — 1101F PT FALLS ASSESS-DOCD LE1/YR: CPT | Performed by: INTERNAL MEDICINE

## 2018-11-26 PROCEDURE — G8598 ASA/ANTIPLAT THER USED: HCPCS | Performed by: INTERNAL MEDICINE

## 2018-11-26 PROCEDURE — G8482 FLU IMMUNIZE ORDER/ADMIN: HCPCS | Performed by: INTERNAL MEDICINE

## 2018-11-26 PROCEDURE — 99214 OFFICE O/P EST MOD 30 MIN: CPT | Performed by: INTERNAL MEDICINE

## 2018-11-26 PROCEDURE — 1123F ACP DISCUSS/DSCN MKR DOCD: CPT | Performed by: INTERNAL MEDICINE

## 2018-11-26 PROCEDURE — 4040F PNEUMOC VAC/ADMIN/RCVD: CPT | Performed by: INTERNAL MEDICINE

## 2018-11-26 PROCEDURE — G8420 CALC BMI NORM PARAMETERS: HCPCS | Performed by: INTERNAL MEDICINE

## 2018-11-26 PROCEDURE — 1036F TOBACCO NON-USER: CPT | Performed by: INTERNAL MEDICINE

## 2018-11-26 PROCEDURE — G8427 DOCREV CUR MEDS BY ELIG CLIN: HCPCS | Performed by: INTERNAL MEDICINE

## 2018-11-26 ASSESSMENT — ENCOUNTER SYMPTOMS
COUGH: 1
DIARRHEA: 0
SHORTNESS OF BREATH: 0
CONSTIPATION: 1
WHEEZING: 0

## 2018-11-26 NOTE — PROGRESS NOTES
11/26/2018    This is a 80 y.o. male   Chief Complaint   Patient presents with    Abnormal Lab     f/u hyperkalemia   . Making it he says. Saw dr Sonny Silverman last week and had an endoscopy and was told everything looked ok  Liver us this week    Had severe hyperkalemia and was hospitalized . Saw dr Denice Carcamo  Nephrology on  10.27.18    Had pacemaker ck ok  Saw podiatry and was ok    Never check bp. Glucose   115-1130 am  150-170   3-4 hr after eating  HPI    Review of Systems   Constitutional: Negative for appetite change and unexpected weight change. Respiratory: Positive for cough (coughs occasionally   , chokes on pills sometimes. ). Negative for shortness of breath and wheezing. Gastrointestinal: Positive for constipation (taking miralax and it is working). Negative for diarrhea. Neurological: Negative for dizziness and light-headedness. Past Medical History:   Diagnosis Date    Actinic keratosis     Actinic keratosis     Atrial fibrillation (HCC)     Bilateral carotid artery stenosis     CAD (coronary artery disease)     Cellulitis 7/15/2015    right foot    Diabetes mellitus (HCC)     Glucose intolerance (malabsorption)     Hyperlipidemia     Hypertension     Hypertrophy of prostate without urinary obstruction and other lower urinary tract symptoms (LUTS)     Intermittent atrial fibrillation (HCC)     Kidney stone     Occult blood in stool     Hx of    Pacemaker     Permanent       Prior to Visit Medications    Medication Sig Taking?  Authorizing Provider   amLODIPine-atorvastatatin (CADUET) 5-20 MG per tablet Take 1 tablet by mouth daily Yes Historical Provider, MD   sotalol (BETAPACE) 80 MG tablet TAKE 1 TABLET BY MOUTH TWICE DAILY Yes PRIYA Akbar CNP   warfarin (COUMADIN) 2.5 MG tablet 2.5mg  MWF,and 5mg all other days Yes PRIYA Akbar CNP   vitamin D (CHOLECALCIFEROL) 1000 UNIT TABS tablet Take 1,000 Units by mouth daily Yes Historical

## 2018-11-27 ENCOUNTER — ANTI-COAG VISIT (OUTPATIENT)
Dept: PHARMACY | Age: 83
End: 2018-11-27
Payer: MEDICARE

## 2018-11-27 DIAGNOSIS — I48.91 ATRIAL FIBRILLATION, UNSPECIFIED TYPE (HCC): ICD-10-CM

## 2018-11-27 LAB — INTERNATIONAL NORMALIZATION RATIO, POC: 1.5

## 2018-11-27 PROCEDURE — 99212 OFFICE O/P EST SF 10 MIN: CPT

## 2018-11-27 PROCEDURE — 85610 PROTHROMBIN TIME: CPT

## 2018-11-28 ENCOUNTER — HOSPITAL ENCOUNTER (OUTPATIENT)
Age: 83
Discharge: HOME | End: 2018-11-28
Payer: MEDICARE

## 2018-11-28 ENCOUNTER — HOSPITAL ENCOUNTER (OUTPATIENT)
Dept: GENERAL RADIOLOGY | Age: 83
Discharge: HOME | End: 2018-11-28
Payer: MEDICARE

## 2018-11-28 DIAGNOSIS — D64.9 ANEMIA, UNSPECIFIED TYPE: ICD-10-CM

## 2018-11-28 DIAGNOSIS — E11.51 DM (DIABETES MELLITUS), TYPE 2 WITH PERIPHERAL VASCULAR COMPLICATIONS (HCC): ICD-10-CM

## 2018-11-28 DIAGNOSIS — R09.89 CHEST CRACKLES: ICD-10-CM

## 2018-11-28 DIAGNOSIS — E87.5 HYPERKALEMIA: ICD-10-CM

## 2018-11-28 LAB
ALBUMIN SERPL-MCNC: 4.3 G/DL (ref 3.4–5)
ANION GAP SERPL CALCULATED.3IONS-SCNC: 14 MMOL/L (ref 3–16)
BUN BLDV-MCNC: 25 MG/DL (ref 7–20)
CALCIUM SERPL-MCNC: 9.4 MG/DL (ref 8.3–10.6)
CHLORIDE BLD-SCNC: 102 MMOL/L (ref 99–110)
CO2: 26 MMOL/L (ref 21–32)
CREAT SERPL-MCNC: 1 MG/DL (ref 0.8–1.3)
GFR AFRICAN AMERICAN: >60
GFR NON-AFRICAN AMERICAN: >60
GLUCOSE BLD-MCNC: 179 MG/DL (ref 70–99)
IRON SATURATION: 27 % (ref 20–50)
IRON: 57 UG/DL (ref 59–158)
PHOSPHORUS: 3.7 MG/DL (ref 2.5–4.9)
POTASSIUM SERPL-SCNC: 4.5 MMOL/L (ref 3.5–5.1)
SODIUM BLD-SCNC: 142 MMOL/L (ref 136–145)
TOTAL IRON BINDING CAPACITY: 209 UG/DL (ref 260–445)

## 2018-11-28 PROCEDURE — 83540 ASSAY OF IRON: CPT

## 2018-11-28 PROCEDURE — 83550 IRON BINDING TEST: CPT

## 2018-11-28 PROCEDURE — 80069 RENAL FUNCTION PANEL: CPT

## 2018-11-28 PROCEDURE — 71046 X-RAY EXAM CHEST 2 VIEWS: CPT

## 2018-11-28 PROCEDURE — 83036 HEMOGLOBIN GLYCOSYLATED A1C: CPT

## 2018-11-28 PROCEDURE — 36415 COLL VENOUS BLD VENIPUNCTURE: CPT

## 2018-11-29 LAB
ESTIMATED AVERAGE GLUCOSE: 99.7 MG/DL
HBA1C MFR BLD: 5.1 %

## 2018-11-30 ENCOUNTER — HOSPITAL ENCOUNTER (OUTPATIENT)
Dept: ULTRASOUND IMAGING | Age: 83
Discharge: HOME | End: 2018-11-30
Payer: MEDICARE

## 2018-11-30 DIAGNOSIS — R19.5 OCCULT BLOOD IN STOOLS: ICD-10-CM

## 2018-11-30 PROCEDURE — 76700 US EXAM ABDOM COMPLETE: CPT

## 2018-12-18 ENCOUNTER — ANTI-COAG VISIT (OUTPATIENT)
Dept: PHARMACY | Age: 83
End: 2018-12-18
Payer: MEDICARE

## 2018-12-18 DIAGNOSIS — I48.91 ATRIAL FIBRILLATION, UNSPECIFIED TYPE (HCC): ICD-10-CM

## 2018-12-18 LAB — INTERNATIONAL NORMALIZATION RATIO, POC: 2.4

## 2018-12-18 PROCEDURE — 85610 PROTHROMBIN TIME: CPT

## 2018-12-18 PROCEDURE — 99211 OFF/OP EST MAY X REQ PHY/QHP: CPT

## 2018-12-18 NOTE — PROGRESS NOTES
Mr. Emmanuel Oliver is a 80 y.o. y/o male with history of Afib   He presents today for anticoagulation monitoring and adjustment. Pertinent PMH: ICD-pacemaker. 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  Patient Reported Findings:  Yes     No  [x]   []       Patient verifies current dosing regimen as listed  []   [x]       S/S bleeding/bruising/swelling/SOB  []   [x]       Blood in urine or stool patient states that last stool sample provided to MD has little blood so MD worried and has patient completed multiple tests. []   [x]       Procedures scheduled in the future at this time   []   [x]       Missed Dose  []   [x]       Extra Dose  []   [x]       Change in medications  []   [x]       Change in health/diet/appetite  []   [x]       Change in alcohol use  []   [x]       Change in activity  []   [x]       Hospital admission   []   [x]       Emergency department visit  []   [x]       Other complaints    Clinical Outcomes:  Yes     No  []   [x]       Major bleeding event  []   [x]       Thromboembolic event  Duration of warfarin Therapy: indefinite  INR Range:  2.0-3.0    INR 2.4 today  Continue same weekly dose of 2.5 mg on Mon, Wed & Fri and 5 mg all other days   Presents today without wife. Encouraged to maintain a consistency of vegetables/salads.   Recheck INR in 4 weeks, 1/15    Referring cardiologist will be Dr. Ron Ferrell   INR (no units)   Date Value   12/18/2018 2.4   11/27/2018 1.5   11/21/2018 1.2   11/02/2018 2.6   10/27/2018 2.60 (H)   10/26/2018 2.70 (H)   09/07/2018 3.3   08/09/2018 2.4

## 2018-12-28 PROBLEM — Z98.890 H/O ESOPHAGOGASTRODUODENOSCOPY: Status: ACTIVE | Noted: 2018-12-28

## 2019-01-15 ENCOUNTER — ANTI-COAG VISIT (OUTPATIENT)
Dept: PHARMACY | Age: 84
End: 2019-01-15
Payer: MEDICARE

## 2019-01-15 DIAGNOSIS — I48.91 ATRIAL FIBRILLATION, UNSPECIFIED TYPE (HCC): ICD-10-CM

## 2019-01-15 LAB — INTERNATIONAL NORMALIZATION RATIO, POC: 2

## 2019-01-15 PROCEDURE — 85610 PROTHROMBIN TIME: CPT

## 2019-01-15 PROCEDURE — 99211 OFF/OP EST MAY X REQ PHY/QHP: CPT

## 2019-01-22 ENCOUNTER — OFFICE VISIT (OUTPATIENT)
Dept: CARDIOLOGY CLINIC | Age: 84
End: 2019-01-22
Payer: MEDICARE

## 2019-01-22 ENCOUNTER — HOSPITAL ENCOUNTER (OUTPATIENT)
Age: 84
Discharge: HOME | End: 2019-01-22
Payer: MEDICARE

## 2019-01-22 VITALS
OXYGEN SATURATION: 98 % | DIASTOLIC BLOOD PRESSURE: 60 MMHG | HEIGHT: 70 IN | HEART RATE: 66 BPM | SYSTOLIC BLOOD PRESSURE: 158 MMHG | BODY MASS INDEX: 24.05 KG/M2 | WEIGHT: 168 LBS

## 2019-01-22 DIAGNOSIS — I48.91 ATRIAL FIBRILLATION, UNSPECIFIED TYPE (HCC): Primary | ICD-10-CM

## 2019-01-22 DIAGNOSIS — E78.2 MIXED HYPERLIPIDEMIA: ICD-10-CM

## 2019-01-22 DIAGNOSIS — Z95.0 CARDIAC PACEMAKER: ICD-10-CM

## 2019-01-22 DIAGNOSIS — I10 ESSENTIAL HYPERTENSION: ICD-10-CM

## 2019-01-22 DIAGNOSIS — I48.91 ATRIAL FIBRILLATION, UNSPECIFIED TYPE (HCC): ICD-10-CM

## 2019-01-22 LAB
ALT SERPL-CCNC: 16 U/L (ref 10–40)
ANION GAP SERPL CALCULATED.3IONS-SCNC: 13 MMOL/L (ref 3–16)
AST SERPL-CCNC: 19 U/L (ref 15–37)
BUN BLDV-MCNC: 27 MG/DL (ref 7–20)
CALCIUM SERPL-MCNC: 9.5 MG/DL (ref 8.3–10.6)
CHLORIDE BLD-SCNC: 104 MMOL/L (ref 99–110)
CHOLESTEROL, TOTAL: 151 MG/DL (ref 0–199)
CO2: 26 MMOL/L (ref 21–32)
CREAT SERPL-MCNC: 1 MG/DL (ref 0.8–1.3)
GFR AFRICAN AMERICAN: >60
GFR NON-AFRICAN AMERICAN: >60
GLUCOSE BLD-MCNC: 218 MG/DL (ref 70–99)
HCT VFR BLD CALC: 28.4 % (ref 40.5–52.5)
HDLC SERPL-MCNC: 35 MG/DL (ref 40–60)
HEMOGLOBIN: 9.9 G/DL (ref 13.5–17.5)
IRON SATURATION: 34 % (ref 20–50)
IRON: 68 UG/DL (ref 59–158)
LDL CHOLESTEROL CALCULATED: 70 MG/DL
MCH RBC QN AUTO: 33.6 PG (ref 26–34)
MCHC RBC AUTO-ENTMCNC: 35 G/DL (ref 31–36)
MCV RBC AUTO: 96 FL (ref 80–100)
PDW BLD-RTO: 17.1 % (ref 12.4–15.4)
PLATELET # BLD: 212 K/UL (ref 135–450)
PMV BLD AUTO: 9.5 FL (ref 5–10.5)
POTASSIUM SERPL-SCNC: 4.8 MMOL/L (ref 3.5–5.1)
RBC # BLD: 2.96 M/UL (ref 4.2–5.9)
SODIUM BLD-SCNC: 143 MMOL/L (ref 136–145)
TOTAL IRON BINDING CAPACITY: 200 UG/DL (ref 260–445)
TRIGL SERPL-MCNC: 231 MG/DL (ref 0–150)
VLDLC SERPL CALC-MCNC: 46 MG/DL
WBC # BLD: 5.9 K/UL (ref 4–11)

## 2019-01-22 PROCEDURE — 84450 TRANSFERASE (AST) (SGOT): CPT

## 2019-01-22 PROCEDURE — 93000 ELECTROCARDIOGRAM COMPLETE: CPT | Performed by: NURSE PRACTITIONER

## 2019-01-22 PROCEDURE — 99214 OFFICE O/P EST MOD 30 MIN: CPT | Performed by: NURSE PRACTITIONER

## 2019-01-22 PROCEDURE — 1123F ACP DISCUSS/DSCN MKR DOCD: CPT | Performed by: NURSE PRACTITIONER

## 2019-01-22 PROCEDURE — 1036F TOBACCO NON-USER: CPT | Performed by: NURSE PRACTITIONER

## 2019-01-22 PROCEDURE — 36415 COLL VENOUS BLD VENIPUNCTURE: CPT

## 2019-01-22 PROCEDURE — 4040F PNEUMOC VAC/ADMIN/RCVD: CPT | Performed by: NURSE PRACTITIONER

## 2019-01-22 PROCEDURE — 80061 LIPID PANEL: CPT

## 2019-01-22 PROCEDURE — G8598 ASA/ANTIPLAT THER USED: HCPCS | Performed by: NURSE PRACTITIONER

## 2019-01-22 PROCEDURE — 80048 BASIC METABOLIC PNL TOTAL CA: CPT

## 2019-01-22 PROCEDURE — 83550 IRON BINDING TEST: CPT

## 2019-01-22 PROCEDURE — 1101F PT FALLS ASSESS-DOCD LE1/YR: CPT | Performed by: NURSE PRACTITIONER

## 2019-01-22 PROCEDURE — G8427 DOCREV CUR MEDS BY ELIG CLIN: HCPCS | Performed by: NURSE PRACTITIONER

## 2019-01-22 PROCEDURE — G8482 FLU IMMUNIZE ORDER/ADMIN: HCPCS | Performed by: NURSE PRACTITIONER

## 2019-01-22 PROCEDURE — 85027 COMPLETE CBC AUTOMATED: CPT

## 2019-01-22 PROCEDURE — 83540 ASSAY OF IRON: CPT

## 2019-01-22 PROCEDURE — 84460 ALANINE AMINO (ALT) (SGPT): CPT

## 2019-01-22 PROCEDURE — G8420 CALC BMI NORM PARAMETERS: HCPCS | Performed by: NURSE PRACTITIONER

## 2019-01-22 RX ORDER — WARFARIN SODIUM 5 MG/1
TABLET ORAL
Qty: 90 TABLET | Refills: 1 | Status: SHIPPED | OUTPATIENT
Start: 2019-01-22 | End: 2019-02-14 | Stop reason: DRUGHIGH

## 2019-01-24 ENCOUNTER — TELEPHONE (OUTPATIENT)
Dept: PHARMACY | Age: 84
End: 2019-01-24

## 2019-02-14 ENCOUNTER — ANTI-COAG VISIT (OUTPATIENT)
Dept: PHARMACY | Age: 84
End: 2019-02-14
Payer: MEDICARE

## 2019-02-14 DIAGNOSIS — I48.91 ATRIAL FIBRILLATION, UNSPECIFIED TYPE (HCC): ICD-10-CM

## 2019-02-14 LAB — INTERNATIONAL NORMALIZATION RATIO, POC: 2.5

## 2019-02-14 PROCEDURE — 99211 OFF/OP EST MAY X REQ PHY/QHP: CPT

## 2019-02-14 PROCEDURE — 85610 PROTHROMBIN TIME: CPT

## 2019-02-19 ENCOUNTER — NURSE ONLY (OUTPATIENT)
Dept: CARDIOLOGY CLINIC | Age: 84
End: 2019-02-19
Payer: MEDICARE

## 2019-02-19 DIAGNOSIS — I48.91 ATRIAL FIBRILLATION, UNSPECIFIED TYPE (HCC): ICD-10-CM

## 2019-02-19 DIAGNOSIS — Z95.0 PACEMAKER: ICD-10-CM

## 2019-02-21 PROCEDURE — 93296 REM INTERROG EVL PM/IDS: CPT | Performed by: INTERNAL MEDICINE

## 2019-02-21 PROCEDURE — 93294 REM INTERROG EVL PM/LDLS PM: CPT | Performed by: INTERNAL MEDICINE

## 2019-03-05 ENCOUNTER — OFFICE VISIT (OUTPATIENT)
Dept: FAMILY MEDICINE CLINIC | Age: 84
End: 2019-03-05
Payer: MEDICARE

## 2019-03-05 VITALS
TEMPERATURE: 97.4 F | RESPIRATION RATE: 16 BRPM | OXYGEN SATURATION: 99 % | DIASTOLIC BLOOD PRESSURE: 82 MMHG | BODY MASS INDEX: 24.02 KG/M2 | WEIGHT: 167.8 LBS | HEART RATE: 104 BPM | SYSTOLIC BLOOD PRESSURE: 136 MMHG | HEIGHT: 70 IN

## 2019-03-05 DIAGNOSIS — I48.91 ATRIAL FIBRILLATION, UNSPECIFIED TYPE (HCC): ICD-10-CM

## 2019-03-05 DIAGNOSIS — E11.9 TYPE 2 DIABETES MELLITUS WITHOUT COMPLICATION, WITHOUT LONG-TERM CURRENT USE OF INSULIN (HCC): ICD-10-CM

## 2019-03-05 DIAGNOSIS — N18.30 CKD STAGE 3 DUE TO TYPE 2 DIABETES MELLITUS (HCC): Primary | ICD-10-CM

## 2019-03-05 DIAGNOSIS — E11.51 DM (DIABETES MELLITUS), TYPE 2 WITH PERIPHERAL VASCULAR COMPLICATIONS (HCC): ICD-10-CM

## 2019-03-05 DIAGNOSIS — E11.22 CKD STAGE 3 DUE TO TYPE 2 DIABETES MELLITUS (HCC): Primary | ICD-10-CM

## 2019-03-05 DIAGNOSIS — D64.9 ANEMIA, UNSPECIFIED TYPE: ICD-10-CM

## 2019-03-05 LAB — HBA1C MFR BLD: 5.4 %

## 2019-03-05 PROCEDURE — G8427 DOCREV CUR MEDS BY ELIG CLIN: HCPCS | Performed by: INTERNAL MEDICINE

## 2019-03-05 PROCEDURE — G8482 FLU IMMUNIZE ORDER/ADMIN: HCPCS | Performed by: INTERNAL MEDICINE

## 2019-03-05 PROCEDURE — G8420 CALC BMI NORM PARAMETERS: HCPCS | Performed by: INTERNAL MEDICINE

## 2019-03-05 PROCEDURE — 1101F PT FALLS ASSESS-DOCD LE1/YR: CPT | Performed by: INTERNAL MEDICINE

## 2019-03-05 PROCEDURE — 1036F TOBACCO NON-USER: CPT | Performed by: INTERNAL MEDICINE

## 2019-03-05 PROCEDURE — 4040F PNEUMOC VAC/ADMIN/RCVD: CPT | Performed by: INTERNAL MEDICINE

## 2019-03-05 PROCEDURE — 1123F ACP DISCUSS/DSCN MKR DOCD: CPT | Performed by: INTERNAL MEDICINE

## 2019-03-05 PROCEDURE — 83036 HEMOGLOBIN GLYCOSYLATED A1C: CPT | Performed by: INTERNAL MEDICINE

## 2019-03-05 PROCEDURE — G8598 ASA/ANTIPLAT THER USED: HCPCS | Performed by: INTERNAL MEDICINE

## 2019-03-05 PROCEDURE — 99214 OFFICE O/P EST MOD 30 MIN: CPT | Performed by: INTERNAL MEDICINE

## 2019-03-05 ASSESSMENT — PATIENT HEALTH QUESTIONNAIRE - PHQ9
2. FEELING DOWN, DEPRESSED OR HOPELESS: 0
SUM OF ALL RESPONSES TO PHQ9 QUESTIONS 1 & 2: 0
SUM OF ALL RESPONSES TO PHQ QUESTIONS 1-9: 0
SUM OF ALL RESPONSES TO PHQ QUESTIONS 1-9: 0
1. LITTLE INTEREST OR PLEASURE IN DOING THINGS: 0

## 2019-03-05 ASSESSMENT — ENCOUNTER SYMPTOMS
NAUSEA: 0
COUGH: 1
SHORTNESS OF BREATH: 1
VOMITING: 0

## 2019-03-06 DIAGNOSIS — D64.9 ANEMIA, UNSPECIFIED TYPE: ICD-10-CM

## 2019-03-06 LAB
BASOPHILS ABSOLUTE: 0 K/UL (ref 0–0.2)
BASOPHILS RELATIVE PERCENT: 0.7 %
EOSINOPHILS ABSOLUTE: 0.4 K/UL (ref 0–0.6)
EOSINOPHILS RELATIVE PERCENT: 5.8 %
FERRITIN: 643.5 NG/ML (ref 30–400)
HCT VFR BLD CALC: 29.5 % (ref 40.5–52.5)
HEMOGLOBIN: 10.2 G/DL (ref 13.5–17.5)
LYMPHOCYTES ABSOLUTE: 1 K/UL (ref 1–5.1)
LYMPHOCYTES RELATIVE PERCENT: 14.8 %
MCH RBC QN AUTO: 33.2 PG (ref 26–34)
MCHC RBC AUTO-ENTMCNC: 34.4 G/DL (ref 31–36)
MCV RBC AUTO: 96.3 FL (ref 80–100)
MONOCYTES ABSOLUTE: 0.6 K/UL (ref 0–1.3)
MONOCYTES RELATIVE PERCENT: 8.4 %
NEUTROPHILS ABSOLUTE: 4.7 K/UL (ref 1.7–7.7)
NEUTROPHILS RELATIVE PERCENT: 70.3 %
PDW BLD-RTO: 17 % (ref 12.4–15.4)
PLATELET # BLD: 234 K/UL (ref 135–450)
PMV BLD AUTO: 9.9 FL (ref 5–10.5)
RBC # BLD: 3.07 M/UL (ref 4.2–5.9)
WBC # BLD: 6.7 K/UL (ref 4–11)

## 2019-03-07 DIAGNOSIS — R79.89 ELEVATED FERRITIN LEVEL: ICD-10-CM

## 2019-03-07 DIAGNOSIS — D64.9 ANEMIA, UNSPECIFIED TYPE: Primary | ICD-10-CM

## 2019-03-13 DIAGNOSIS — M1A.9XX1 CHRONIC GOUT INVOLVING TOE OF LEFT FOOT WITH TOPHUS, UNSPECIFIED CAUSE: ICD-10-CM

## 2019-03-14 RX ORDER — ALLOPURINOL 100 MG/1
200 TABLET ORAL DAILY
Qty: 180 TABLET | Refills: 1 | Status: SHIPPED | OUTPATIENT
Start: 2019-03-14 | End: 2019-09-09 | Stop reason: SDUPTHER

## 2019-03-19 ENCOUNTER — ANTI-COAG VISIT (OUTPATIENT)
Dept: PHARMACY | Age: 84
End: 2019-03-19
Payer: MEDICARE

## 2019-03-19 DIAGNOSIS — I48.91 ATRIAL FIBRILLATION, UNSPECIFIED TYPE (HCC): ICD-10-CM

## 2019-03-19 LAB — INTERNATIONAL NORMALIZATION RATIO, POC: 2.1

## 2019-03-19 PROCEDURE — 85610 PROTHROMBIN TIME: CPT

## 2019-03-19 PROCEDURE — 99211 OFF/OP EST MAY X REQ PHY/QHP: CPT

## 2019-04-16 ENCOUNTER — ANTI-COAG VISIT (OUTPATIENT)
Dept: PHARMACY | Age: 84
End: 2019-04-16
Payer: MEDICARE

## 2019-04-16 DIAGNOSIS — I48.91 ATRIAL FIBRILLATION, UNSPECIFIED TYPE (HCC): ICD-10-CM

## 2019-04-16 LAB — INTERNATIONAL NORMALIZATION RATIO, POC: 2.9

## 2019-04-16 PROCEDURE — 99211 OFF/OP EST MAY X REQ PHY/QHP: CPT

## 2019-04-16 PROCEDURE — 85610 PROTHROMBIN TIME: CPT

## 2019-04-16 NOTE — TELEPHONE ENCOUNTER
Warfarin prescription phoned in and LM on VM to [address] [phone] under Dr. Lavon Aly  Warfarin 5 mg tabs  Take 2.5 mg on Mon, Wed and Fri and 5 mg all other days of the week  90 days (66 tablets)   2 refills

## 2019-05-06 ENCOUNTER — OFFICE VISIT (OUTPATIENT)
Dept: FAMILY MEDICINE CLINIC | Age: 84
End: 2019-05-06
Payer: MEDICARE

## 2019-05-06 VITALS
SYSTOLIC BLOOD PRESSURE: 124 MMHG | WEIGHT: 171 LBS | DIASTOLIC BLOOD PRESSURE: 80 MMHG | OXYGEN SATURATION: 96 % | BODY MASS INDEX: 25.33 KG/M2 | RESPIRATION RATE: 14 BRPM | HEIGHT: 69 IN | HEART RATE: 78 BPM

## 2019-05-06 DIAGNOSIS — Z00.00 ROUTINE GENERAL MEDICAL EXAMINATION AT A HEALTH CARE FACILITY: Primary | ICD-10-CM

## 2019-05-06 PROCEDURE — 4040F PNEUMOC VAC/ADMIN/RCVD: CPT | Performed by: INTERNAL MEDICINE

## 2019-05-06 PROCEDURE — G0439 PPPS, SUBSEQ VISIT: HCPCS | Performed by: INTERNAL MEDICINE

## 2019-05-06 PROCEDURE — 1123F ACP DISCUSS/DSCN MKR DOCD: CPT | Performed by: INTERNAL MEDICINE

## 2019-05-06 PROCEDURE — G8598 ASA/ANTIPLAT THER USED: HCPCS | Performed by: INTERNAL MEDICINE

## 2019-05-06 ASSESSMENT — PATIENT HEALTH QUESTIONNAIRE - PHQ9
SUM OF ALL RESPONSES TO PHQ QUESTIONS 1-9: 1
SUM OF ALL RESPONSES TO PHQ QUESTIONS 1-9: 1

## 2019-05-06 ASSESSMENT — LIFESTYLE VARIABLES: HOW OFTEN DO YOU HAVE A DRINK CONTAINING ALCOHOL: 0

## 2019-05-06 ASSESSMENT — ANXIETY QUESTIONNAIRES: GAD7 TOTAL SCORE: 1

## 2019-05-06 NOTE — PROGRESS NOTES
Medicare Annual Wellness Visit  Name: Marilee Kim Date: 2019   MRN: [id number] Sex: Male   Age: 80 y.o. Ethnicity: Non-/Non    : 1933 Race: Daniella Mcclellan is here for Medicare AWV    Screenings for behavioral, psychosocial and functional/safety risks, and cognitive dysfunction are all negative except as indicated below. These results, as well as other patient data from the [address] form, are documented in Flowsheets linked to this Encounter. No Known Allergies  Prior to Visit Medications    Medication Sig Taking? Authorizing Provider   blood glucose test strips (ACCU-CHEK AKOSUA PLUS) strip TEST BLOOD SUGAR THREE TIMES DAILY AS DIRECTED Yes Romayne Brazen, MD   allopurinol (ZYLOPRIM) 100 MG tablet TAKE 2 TABLETS BY MOUTH DAILY Yes Romayne Brazen, MD   amLODIPine-atorvastatatin (CADUET) 5-20 MG per tablet Take 1 tablet by mouth daily Yes Historical Provider, MD   sotalol (BETAPACE) 80 MG tablet TAKE 1 TABLET BY MOUTH TWICE DAILY Yes PRIYA William CNP   warfarin (COUMADIN) 2.5 MG tablet 2.5mg  MWF,and 5mg all other days Yes PRIYA William CNP   vitamin D (CHOLECALCIFEROL) 1000 UNIT TABS tablet Take 1,000 Units by mouth daily Yes Historical Provider, MD   Accu-Chechristine Softclix Lancets MISC Test once daily. Yes Jaylon Morales MD   Blood Glucose Monitoring Suppl (ACCU-CHEK AKOSUA PLUS) W/DEVICE KIT 1 kit by Does not apply route 3 times daily Patient to check blood sugar 3 times a day and PRN, he is a newly diagnosed diabetic who will require medication titration ;  LABA1C      8.6    ; ICD code- 250.02. Yes Mata Weber MD   loratadine (CLARITIN) 10 MG tablet Take 10 mg by mouth daily.    Yes Historical Provider, MD     Past Medical History:   Diagnosis Date    Actinic keratosis     Actinic keratosis     Atrial fibrillation ([facility] [address]     Bilateral carotid artery stenosis     CAD (coronary artery disease)     Cellulitis 7/15/2015    right foot    Diabetes mellitus ([facility] [address] [facility]     DM (diabetes mellitus), type 2 with peripheral vascular complications (HCC)--s/p amputation great toe post osteomylitis  9/11/2015    Glucose intolerance (malabsorption)     Hyperlipidemia     Hypertension     Hypertrophy of prostate without urinary obstruction and other lower urinary tract symptoms (LUTS)     Intermittent atrial fibrillation ([address]     Kidney stone     Occult blood in stool     Hx of    Pacemaker     Permanent     Past Surgical History:   Procedure Laterality Date    ABSCESS DRAINAGE  7/20/15    right foot    APPENDECTOMY      CARDIAC CATHETERIZATION  2003    Left    COLONOSCOPY  03/28/2018    dr Ariadna Salgado    x3   [facility]    OTHER SURGICAL HISTORY Right 7/17/15    right fifth toe I and D    PACEMAKER PLACEMENT  2005    OR ESOPHAGOGASTRODUODENOSCOPY TRANSORAL DIAGNOSTIC N/A 11/21/2018    EGD DIAGNOSTIC ONLY performed by Jasbir Stokes MD at [address] [place] [address] Right 7.16.15    right pinkey toe     TONSILLECTOMY AND ADENOIDECTOMY       Family History   Problem Relation Age of Onset    Stroke Father     Diabetes Mother        CareTeam (Including outside providers/suppliers regularly involved in providing care):   Patient Care Team:  Arleen Neely MD as PCP - General (Internal Medicine)  Arleen Neely MD as PCP - S Attributed Provider    Wt Readings from Last 3 Encounters:   05/06/19 171 lb (77.6 kg)   03/05/19 167 lb 12.8 oz (76.1 kg)   01/18/19 168 lb (76.2 kg)     Vitals:    05/06/19 1457   BP: 124/80   Site: Left Upper Arm   Position: Sitting   Cuff Size: Medium Adult   Pulse: 78   Resp: 14   SpO2: 96%   Weight: 171 lb (77.6 kg)   Height: 5' 9\" (1.753 m)     Body mass index is 25.25 kg/m². Based upon direct observation of the patient, evaluation of cognition reveals recent and remote memory intact.       Patient's complete Health Risk Assessment and screening values have been reviewed and are found in Flowsheets. The following problems were reviewed today and where indicated follow up appointments were made and/or referrals ordered. Positive Risk Factor Screenings with Interventions:     General Health:  General  In general, how would you say your health is?: Good  In the past 7 days, have you experienced any of the following?  New or Increased Pain, New or Increased Fatigue, Loneliness, Social Isolation, Stress or Anger?: (!) Stress  Do you get the social and emotional support that you need?: Yes  Do you have a Living Will?: Yes  General Health Risk Interventions:  · Works crossword puzzles  · tredmill  · Doing ok   · Does not want counseling  · reads    Hearing/Vision:  Hearing/Vision  Do you or your family notice any trouble with your hearing?: (!) Yes  Do you have difficulty driving, watching TV, or doing any of your daily activities because of your eyesight?: No  Have you had an eye exam within the past year?: Yes  Hearing/Vision Interventions:  · Hearing concerns: sees audiology  bilat hearing aids  ·     Personalized Preventive Plan   Current Health Maintenance Status  Immunization History   Administered Date(s) Administered    Influenza Vaccine, unspecified formulation 10/22/2015, 09/13/2016    Influenza Virus Vaccine 10/04/2010, 11/02/2011, 11/25/2013    Influenza, High Dose (Fluzone 65 yrs and older) 09/12/2014, 09/18/2017, 10/12/2018    Pneumococcal 13-valent Conjugate ([id number]) 12/16/2014    Pneumococcal Polysaccharide ([id number]) 10/02/2008    Tdap (Boostrix, Adacel) 10/04/2010, 01/11/2016    Zoster Live (Zostavax) 02/16/2012        Health Maintenance   Topic Date Due    Shingles Vaccine (2 of 3) 04/12/2012    A1C test (Diabetic or Prediabetic)  09/05/2019    Diabetic foot exam  10/23/2019    Diabetic retinal exam  01/10/2020    Lipid screen  01/22/2020    Potassium monitoring  01/22/2020    Creatinine monitoring  01/22/2020    DTaP/Tdap/Td vaccine (3 - Td) 01/11/2026    Flu vaccine  Completed    Pneumococcal 65+ years Vaccine  Completed     Recommendations for Preventive Services Due: see orders and patient instructions/AVS.  .   Recommended screening schedule for the next 5-10 years is provided to the patient in written form: see Patient Instructions/AVS.     doing well  Continue  Recommend shingles vaccine

## 2019-05-06 NOTE — PATIENT INSTRUCTIONS
Personalized Preventive Plan for Chandrakant Nava - 5/6/2019  Medicare offers a range of preventive health benefits. Some of the tests and screenings are paid in full while other may be subject to a deductible, co-insurance, and/or copay. Some of these benefits include a comprehensive review of your medical history including lifestyle, illnesses that may run in your family, and various assessments and screenings as appropriate. After reviewing your medical record and screening and assessments performed today your provider may have ordered immunizations, labs, imaging, and/or referrals for you. A list of these orders (if applicable) as well as your Preventive Care list are included within your After Visit Summary for your review. Other Preventive Recommendations:    · A preventive eye exam performed by an eye specialist is recommended every 1-2 years to screen for glaucoma; cataracts, macular degeneration, and other eye disorders. · A preventive dental visit is recommended every 6 months. · Try to get at least 150 minutes of exercise per week or 10,000 steps per day on a pedometer . · Order or download the FREE \"Exercise & Physical Activity: Your Everyday Guide\" from [employer]. Call 8-842.238.6442 or search [employer] online. · You need 0654-1511 mg of calcium and 4601-0188 IU of vitamin D per day. It is possible to meet your calcium requirement with diet alone, but a vitamin D supplement is usually necessary to meet this goal.  · When exposed to the sun, use a sunscreen that protects against both UVA and UVB radiation with an SPF of 30 or greater. Reapply every 2 to 3 hours or after sweating, drying off with a towel, or swimming. · Always wear a seat belt when traveling in a car. Always wear a helmet when riding a bicycle or motorcycle.

## 2019-05-07 ENCOUNTER — ANTI-COAG VISIT (OUTPATIENT)
Dept: PHARMACY | Age: 84
End: 2019-05-07
Payer: MEDICARE

## 2019-05-07 DIAGNOSIS — I48.91 ATRIAL FIBRILLATION, UNSPECIFIED TYPE (HCC): ICD-10-CM

## 2019-05-07 LAB — INTERNATIONAL NORMALIZATION RATIO, POC: 2.6

## 2019-05-07 PROCEDURE — 99211 OFF/OP EST MAY X REQ PHY/QHP: CPT

## 2019-05-07 PROCEDURE — 85610 PROTHROMBIN TIME: CPT

## 2019-05-07 RX ORDER — WARFARIN SODIUM 5 MG/1
5 TABLET ORAL DAILY
COMMUNITY
End: 2020-03-11 | Stop reason: SDUPTHER

## 2019-05-07 NOTE — PROGRESS NOTES
Mr. Kade Dunn is a 80 y.o. y/o male with history of Afib   He presents today for anticoagulation monitoring and adjustment. Pertinent PMH: ICD-pacemaker. 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  Patient Reported Findings:  Yes     No  [x]   []       Patient verifies current dosing regimen as listed  []   [x]       S/S bleeding/bruising/swelling/SOB  []   [x]       Blood in urine or stool   []   [x]       Procedures scheduled in the future at this time   []   [x]       Missed Dose  []   [x]       Extra Dose  []   [x]       Change in medications  []   [x]       Change in health/diet/appetite  []   [x]       Change in alcohol use  []   [x]       Change in activity  []   [x]       Hospital admission   []   [x]       Emergency department visit  []   [x]       Other complaints    Clinical Outcomes:  Yes     No  []   [x]       Major bleeding event  []   [x]       Thromboembolic event  Duration of warfarin Therapy: indefinite  INR Range:  2.0-3.0    INR 2.6 today  Continue same weekly dose of 2.5 mg on Mon, Wed & Fri and 5 mg all other days   Encouraged to maintain a consistency of vegetables/salads.   Recheck INR in 3 weeks on 5/28    Referring cardiologist will be Dr. Nick Block   INR (no units)   Date Value   05/07/2019 2.6   04/16/2019 2.9   03/19/2019 2.1   02/14/2019 2.5   10/27/2018 2.60 (H)   10/26/2018 2.70 (H)   09/07/2018 3.3   08/09/2018 2.4

## 2019-05-21 ENCOUNTER — NURSE ONLY (OUTPATIENT)
Dept: CARDIOLOGY CLINIC | Age: 84
End: 2019-05-21

## 2019-05-21 DIAGNOSIS — Z95.0 PACEMAKER: ICD-10-CM

## 2019-05-21 NOTE — LETTER
[facility] [phone]  [address]   [address] [address] [phone]    Pacemaker/Defibrillator Clinic          05/22/19        [address] [id number]        Dear Bernadine Baird    This letter is to inform you that we received the transmission from your monitor at home that checks your pacemaker and/or defibrillator, or implanted heart monitor. Your pacemaker shows normal function. The next date your monitor will automatically transmit will be 9/3/19. Your device and monitor are wireless and most transmit cellularly, but please periodically check your monitor is still plugged in to the electrical outlet. If you still use the telephone land line to send please ensure the connection to the phone eleni is secure. This will help to ensure successful automatic transmissions in the future. Also, the monitor needs to be close to you while sleeping at night. Please be aware that the remote device transmission sites are periodically monitored only during regular business hours during which simultaneous in-office device clinics are being run. If your transmission requires attention, we will contact you as soon as possible. Thank you.             [facility]

## 2019-05-28 ENCOUNTER — TELEPHONE (OUTPATIENT)
Dept: PHARMACY | Age: 84
End: 2019-05-28

## 2019-05-28 NOTE — TELEPHONE ENCOUNTER
Patient called to cancel appointments for himself and wife for Tuesday 5/28. Wife is having surgery and meeting with surgeon was scheduled for today. Patient will call back to r/s.

## 2019-06-05 ENCOUNTER — ANTI-COAG VISIT (OUTPATIENT)
Dept: PHARMACY | Age: 84
End: 2019-06-05
Payer: MEDICARE

## 2019-06-05 DIAGNOSIS — I48.91 ATRIAL FIBRILLATION, UNSPECIFIED TYPE (HCC): ICD-10-CM

## 2019-06-05 LAB — INTERNATIONAL NORMALIZATION RATIO, POC: 2.1

## 2019-06-05 PROCEDURE — 85610 PROTHROMBIN TIME: CPT

## 2019-06-05 PROCEDURE — 99211 OFF/OP EST MAY X REQ PHY/QHP: CPT

## 2019-06-05 NOTE — PROGRESS NOTES
Mr. Hugo Hanna is a 80 y.o. y/o male with history of Afib   He presents today for anticoagulation monitoring and adjustment. Pertinent PMH: ICD-pacemaker. 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  Patient Reported Findings:  Yes     No  [x]   []       Patient verifies current dosing regimen as listed  []   [x]       S/S bleeding/bruising/swelling/SOB  []   [x]       Blood in urine or stool   []   [x]       Procedures scheduled in the future at this time   []   [x]       Missed Dose  []   [x]       Extra Dose  []   [x]       Change in medications  []   [x]       Change in health/diet/appetite  []   [x]       Change in alcohol use  []   [x]       Change in activity  []   [x]       Hospital admission   []   [x]       Emergency department visit  []   [x]       Other complaints    Clinical Outcomes:  Yes     No  []   [x]       Major bleeding event  []   [x]       Thromboembolic event  Duration of warfarin Therapy: indefinite  INR Range:  2.0-3.0    INR 2.1 today  Continue same weekly dose of 2.5 mg on Mon, Wed & Fri and 5 mg all other days   Encouraged to maintain a consistency of vegetables/salads.   Recheck INR in 3 weeks on 6/25    Referring cardiologist will be Dr. Kurt Jimenes   INR (no units)   Date Value   06/05/2019 2.1   05/07/2019 2.6   04/16/2019 2.9   03/19/2019 2.1   10/27/2018 2.60 (H)   10/26/2018 2.70 (H)   09/07/2018 3.3   08/09/2018 2.4

## 2019-06-19 RX ORDER — AMLODIPINE BESYLATE AND ATORVASTATIN CALCIUM 5; 20 MG/1; MG/1
1 TABLET, FILM COATED ORAL DAILY
Qty: 90 TABLET | Refills: 3 | Status: SHIPPED | COMMUNITY
Start: 2019-06-19 | End: 2019-06-24 | Stop reason: DRUGHIGH

## 2019-06-24 RX ORDER — AMLODIPINE BESYLATE AND ATORVASTATIN CALCIUM 5; 20 MG/1; MG/1
1 TABLET, FILM COATED ORAL DAILY
Qty: 90 TABLET | Refills: 1 | OUTPATIENT
Start: 2019-06-24 | End: 2019-12-18 | Stop reason: SDUPTHER

## 2019-06-24 RX ORDER — AMLODIPINE BESYLATE AND ATORVASTATIN CALCIUM 5; 20 MG/1; MG/1
1 TABLET, FILM COATED ORAL DAILY
Qty: 90 TABLET | Refills: 3 | Status: SHIPPED | COMMUNITY
Start: 2019-06-24 | End: 2019-06-24 | Stop reason: DRUGHIGH

## 2019-06-24 RX ORDER — AMLODIPINE BESYLATE AND ATORVASTATIN CALCIUM 5; 20 MG/1; MG/1
1 TABLET, FILM COATED ORAL DAILY
Qty: 90 TABLET | Refills: 3 | Status: SHIPPED | COMMUNITY
Start: 2019-06-24 | End: 2019-06-24 | Stop reason: SDUPTHER

## 2019-06-25 ENCOUNTER — ANTI-COAG VISIT (OUTPATIENT)
Dept: PHARMACY | Age: 84
End: 2019-06-25
Payer: MEDICARE

## 2019-06-25 DIAGNOSIS — I48.91 ATRIAL FIBRILLATION, UNSPECIFIED TYPE (HCC): ICD-10-CM

## 2019-06-25 LAB — INTERNATIONAL NORMALIZATION RATIO, POC: 2.2

## 2019-06-25 PROCEDURE — 99211 OFF/OP EST MAY X REQ PHY/QHP: CPT

## 2019-06-25 PROCEDURE — 85610 PROTHROMBIN TIME: CPT

## 2019-06-25 NOTE — PROGRESS NOTES
Mr. Elan Braxton is a 80 y.o. y/o male with history of Afib   He presents today for anticoagulation monitoring and adjustment. Pertinent PMH: ICD-pacemaker. 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  Patient Reported Findings:  Yes     No  [x]   []       Patient verifies current dosing regimen as listed  []   [x]       S/S bleeding/bruising/swelling/SOB  []   [x]       Blood in urine or stool   []   [x]       Procedures scheduled in the future at this time   []   [x]       Missed Dose  []   [x]       Extra Dose  []   [x]       Change in medications  []   [x]       Change in health/diet/appetite  []   [x]       Change in alcohol use  []   [x]       Change in activity  []   [x]       Hospital admission   []   [x]       Emergency department visit  []   [x]       Other complaints    Clinical Outcomes:  Yes     No  []   [x]       Major bleeding event  []   [x]       Thromboembolic event  Duration of warfarin Therapy: indefinite  INR Range:  2.0-3.0    INR 2.2 today  Continue same weekly dose of 2.5 mg on Mon, Wed & Fri and 5 mg all other days   Encouraged to maintain a consistency of vegetables/salads.   Recheck INR in 3 weeks on 7/16  Return to 4 weeks after     Referring cardiologist will be Dr. Natividad Acosta   INR (no units)   Date Value   06/25/2019 2.2   06/05/2019 2.1   05/07/2019 2.6   04/16/2019 2.9   10/27/2018 2.60 (H)   10/26/2018 2.70 (H)   09/07/2018 3.3   08/09/2018 2.4

## 2019-07-16 ENCOUNTER — ANTI-COAG VISIT (OUTPATIENT)
Dept: PHARMACY | Age: 84
End: 2019-07-16
Payer: MEDICARE

## 2019-07-16 DIAGNOSIS — I48.91 ATRIAL FIBRILLATION, UNSPECIFIED TYPE (HCC): ICD-10-CM

## 2019-07-16 LAB — INTERNATIONAL NORMALIZATION RATIO, POC: 3

## 2019-07-16 PROCEDURE — 85610 PROTHROMBIN TIME: CPT

## 2019-07-16 PROCEDURE — 99211 OFF/OP EST MAY X REQ PHY/QHP: CPT

## 2019-07-23 ENCOUNTER — OFFICE VISIT (OUTPATIENT)
Dept: CARDIOLOGY CLINIC | Age: 84
End: 2019-07-23
Payer: MEDICARE

## 2019-07-23 VITALS
HEIGHT: 70 IN | WEIGHT: 172 LBS | SYSTOLIC BLOOD PRESSURE: 130 MMHG | DIASTOLIC BLOOD PRESSURE: 70 MMHG | HEART RATE: 68 BPM | OXYGEN SATURATION: 98 % | BODY MASS INDEX: 24.62 KG/M2

## 2019-07-23 DIAGNOSIS — I48.91 ATRIAL FIBRILLATION, UNSPECIFIED TYPE (HCC): Primary | ICD-10-CM

## 2019-07-23 DIAGNOSIS — Z95.0 CARDIAC PACEMAKER: ICD-10-CM

## 2019-07-23 DIAGNOSIS — E78.00 HYPERCHOLESTEREMIA: ICD-10-CM

## 2019-07-23 DIAGNOSIS — I25.10 CORONARY ARTERY DISEASE INVOLVING NATIVE CORONARY ARTERY OF NATIVE HEART WITHOUT ANGINA PECTORIS: ICD-10-CM

## 2019-07-23 PROCEDURE — 1123F ACP DISCUSS/DSCN MKR DOCD: CPT | Performed by: NURSE PRACTITIONER

## 2019-07-23 PROCEDURE — G8419 CALC BMI OUT NRM PARAM NOF/U: HCPCS | Performed by: NURSE PRACTITIONER

## 2019-07-23 PROCEDURE — 1036F TOBACCO NON-USER: CPT | Performed by: NURSE PRACTITIONER

## 2019-07-23 PROCEDURE — 4040F PNEUMOC VAC/ADMIN/RCVD: CPT | Performed by: NURSE PRACTITIONER

## 2019-07-23 PROCEDURE — 99214 OFFICE O/P EST MOD 30 MIN: CPT | Performed by: NURSE PRACTITIONER

## 2019-07-23 PROCEDURE — 93000 ELECTROCARDIOGRAM COMPLETE: CPT | Performed by: NURSE PRACTITIONER

## 2019-07-23 PROCEDURE — G8598 ASA/ANTIPLAT THER USED: HCPCS | Performed by: NURSE PRACTITIONER

## 2019-07-23 PROCEDURE — G8427 DOCREV CUR MEDS BY ELIG CLIN: HCPCS | Performed by: NURSE PRACTITIONER

## 2019-07-23 NOTE — LETTER
· Pedal Pulses: 2+ and equal   Abdomen:  · No masses or tenderness  · Bowel sounds present  · No organomegaly appreciated  Neurological/Psychiatric:  · Alert and oriented in all spheres  · Moves all extremities well  · Exhibits normal gait balance and coordination  · No abnormalities of mood, affect, memory, mentation, or behavior are noted    2/18  Assessment/Plan:     1. CAD (coronary artery disease)-(last stress echo was neg 3/11)    2. Cardiac pacemaker    3. LALF-zlppwlxipfqc-iq coumadin-   4. HTN (hypertension)      Problem: Coronary artery disease     1996: Aortocoronary bypass graft surgery  2003: Coronary arteriogram  2006: Nuclear stress test:  Inferolateral fixed defect, small to moderate. LVEF 60%. [facility]5 [facility]   Normal stress echocardiogram study  Normal left ventricle size, wall thickness and systolic function with an   estimated ejection fraction of 55%.  -No regional wall motion abnormalities are seen.   -Diastolic filling parameters suggest grade II diastolic dysfunction.   C/S=33.62   -Mitral annular calcification is present.   -Mild to moderate mitral regurgitation.   -Mildly calcified aortic valve with mildly decreased mobility consistent   with mild aortic stenosis.   -Mild aortic insufficiency is present.   -Mild tricuspid regurgitation with a RVSP of 35 mmHg.   -Mild pulmonic regurgitation present.   -Dilated left atrium with a volume of 65 ml.   -Pacer / ICD wire is visualized in the right heart.           Problem: Hypertension     Stable. Consistent followup. Compliant with the medical regimen    Problem: Hyperlipidemia  Followed by PCP  Semiannual lipid checks with an LDL goal of less than 70 was reinforced. Problem: Diabetes  6/23/15 A1C-7.2. Followed by PCP. Problem:  Atrial fibrillation-paroxysmal  Interrogated last ov he has at fib about 25% of time since 2014  Reviewed with DR. Vega Martin in the past-will continue sotolol since his at

## 2019-07-23 NOTE — PROGRESS NOTES
neg 3/11)    2. Cardiac pacemaker    3. GEVA-ogtabpwtxuss-cr coumadin-   4. HTN (hypertension)      Problem: Coronary artery disease     1996: Aortocoronary bypass graft surgery  2003: Coronary arteriogram  2006: Nuclear stress test:  Inferolateral fixed defect, small to moderate. LVEF 60%. 9.5 minutes Camilo protocol. [facility]   Normal stress echocardiogram study  Normal left ventricle size, wall thickness and systolic function with an   estimated ejection fraction of 55%.  -No regional wall motion abnormalities are seen.   -Diastolic filling parameters suggest grade II diastolic dysfunction.   O/X=48.37   -Mitral annular calcification is present.   -Mild to moderate mitral regurgitation.   -Mildly calcified aortic valve with mildly decreased mobility consistent   with mild aortic stenosis.   -Mild aortic insufficiency is present.   -Mild tricuspid regurgitation with a RVSP of 35 mmHg.   -Mild pulmonic regurgitation present.   -Dilated left atrium with a volume of 65 ml.   -Pacer / ICD wire is visualized in the right heart.           Problem: Hypertension     Stable. Consistent followup. Compliant with the medical regimen    Problem: Hyperlipidemia  Followed by PCP  Semiannual lipid checks with an LDL goal of less than 70 was reinforced. Problem: Diabetes  6/23/15 A1C-7.2. Followed by PCP. Problem:  Atrial fibrillation-paroxysmal  Interrogated last ov he has at fib about 25% of time since 2014  Reviewed with DR. Kya Moran in the past-will continue sotolol since his at fib is under control,  EKG today NSR reviewed by me    Problem: Sick sinus syndrome, dual-chamber pacemaker - recent device change  Very rare palpitations, non-persistent. No dizziness or syncope. On sotalol and anticoagulation. [facility] Pacemaker. Receives regular interrogations. Continue transtelephonic pacemaker evaluation. Problem: Carotid disease  2007 Carotid US: <40% bilateral ICA. PLAN:  1.  Medications reviewed, no

## 2019-08-05 ENCOUNTER — OFFICE VISIT (OUTPATIENT)
Dept: FAMILY MEDICINE CLINIC | Age: 84
End: 2019-08-05
Payer: MEDICARE

## 2019-08-05 VITALS
HEART RATE: 86 BPM | OXYGEN SATURATION: 99 % | BODY MASS INDEX: 24.62 KG/M2 | HEIGHT: 70 IN | DIASTOLIC BLOOD PRESSURE: 82 MMHG | SYSTOLIC BLOOD PRESSURE: 122 MMHG | WEIGHT: 172 LBS | RESPIRATION RATE: 16 BRPM

## 2019-08-05 DIAGNOSIS — N40.1 NOCTURIA ASSOCIATED WITH BENIGN PROSTATIC HYPERPLASIA: ICD-10-CM

## 2019-08-05 DIAGNOSIS — E11.9 TYPE 2 DIABETES MELLITUS WITHOUT COMPLICATION, WITHOUT LONG-TERM CURRENT USE OF INSULIN (HCC): ICD-10-CM

## 2019-08-05 DIAGNOSIS — I48.91 ATRIAL FIBRILLATION, UNSPECIFIED TYPE (HCC): ICD-10-CM

## 2019-08-05 DIAGNOSIS — R35.0 URINARY FREQUENCY: ICD-10-CM

## 2019-08-05 DIAGNOSIS — E78.1 HYPERTRIGLYCERIDEMIA: ICD-10-CM

## 2019-08-05 DIAGNOSIS — E55.9 VITAMIN D DEFICIENCY: ICD-10-CM

## 2019-08-05 DIAGNOSIS — Z95.0 CARDIAC PACEMAKER: ICD-10-CM

## 2019-08-05 DIAGNOSIS — R35.1 NOCTURIA ASSOCIATED WITH BENIGN PROSTATIC HYPERPLASIA: ICD-10-CM

## 2019-08-05 DIAGNOSIS — Z79.01 ANTICOAGULANT LONG-TERM USE: ICD-10-CM

## 2019-08-05 DIAGNOSIS — R35.0 URINE FREQUENCY: ICD-10-CM

## 2019-08-05 DIAGNOSIS — D64.9 ANEMIA, UNSPECIFIED TYPE: Primary | ICD-10-CM

## 2019-08-05 PROCEDURE — G8427 DOCREV CUR MEDS BY ELIG CLIN: HCPCS | Performed by: INTERNAL MEDICINE

## 2019-08-05 PROCEDURE — 4040F PNEUMOC VAC/ADMIN/RCVD: CPT | Performed by: INTERNAL MEDICINE

## 2019-08-05 PROCEDURE — 93000 ELECTROCARDIOGRAM COMPLETE: CPT | Performed by: INTERNAL MEDICINE

## 2019-08-05 PROCEDURE — 99214 OFFICE O/P EST MOD 30 MIN: CPT | Performed by: INTERNAL MEDICINE

## 2019-08-05 PROCEDURE — 1123F ACP DISCUSS/DSCN MKR DOCD: CPT | Performed by: INTERNAL MEDICINE

## 2019-08-05 PROCEDURE — G8419 CALC BMI OUT NRM PARAM NOF/U: HCPCS | Performed by: INTERNAL MEDICINE

## 2019-08-05 PROCEDURE — 1036F TOBACCO NON-USER: CPT | Performed by: INTERNAL MEDICINE

## 2019-08-05 PROCEDURE — G8598 ASA/ANTIPLAT THER USED: HCPCS | Performed by: INTERNAL MEDICINE

## 2019-08-05 RX ORDER — ACETAMINOPHEN 325 MG/1
650 TABLET ORAL EVERY 6 HOURS PRN
COMMUNITY

## 2019-08-05 RX ORDER — TAMSULOSIN HYDROCHLORIDE 0.4 MG/1
0.4 CAPSULE ORAL DAILY
Qty: 90 CAPSULE | Refills: 1 | Status: SHIPPED | OUTPATIENT
Start: 2019-08-05 | End: 2020-01-29

## 2019-08-05 ASSESSMENT — ENCOUNTER SYMPTOMS
WHEEZING: 0
CONSTIPATION: 0
SHORTNESS OF BREATH: 1
DIARRHEA: 0

## 2019-08-05 NOTE — PROGRESS NOTES
2019    Chief Complaint   Patient presents with   [place]       HPI    Pacemaker is working well. Saw cardiologist last week  Saw foot doctor. Urinates day and night every 3-4 hours. He bothered by the freq urination    Anemia   Not taking iron. No black or bloody stool    Vit d   Taking 5000iu a day       dm type 2 appears resolved  Last hga1c was low    Lab Results   Component Value Date    LABA1C 5.4 2019    LABA1C 5.1 2018    LABA1C 5.0 10/23/2018     On warfarin for afib. Review of Systems   Constitutional: Negative for appetite change and unexpected weight change. Respiratory: Positive for shortness of breath (with over exertion). Negative for wheezing. Gastrointestinal: Negative for constipation (takes miralax) and diarrhea. Neurological: Negative for dizziness and light-headedness.        Health Maintenance   Topic Date Due    Shingles Vaccine (2 of 3) 2012    Flu vaccine (1) 2019    A1C test (Diabetic or Prediabetic)  2019    Diabetic foot exam  10/23/2019    Diabetic retinal exam  01/10/2020    Lipid screen  2020    Potassium monitoring  2020    Creatinine monitoring  2020    Annual Wellness Visit (AWV)  2020    DTaP/Tdap/Td vaccine (3 - Td) 2026    Pneumococcal 65+ years Vaccine  Completed      Social History     Socioeconomic History    Marital status:      Spouse name: Ivy Genao Number of children: 3    Years of education: None    Highest education level: None   Occupational History    Occupation: retired--[employer]   Social Needs    Financial resource strain: None    Food insecurity:     Worry: None     Inability: None    Transportation needs:     Medical: None     Non-medical: None   Tobacco Use    Smoking status: Former Smoker     Years: 0.50     Last attempt to quit: 1/10/1950     Years since quittin.6    Smokeless tobacco: Never Used    Tobacco comment: counseled on tobacco exposure avoidance   Substance and Sexual Activity    Alcohol use: No     Alcohol/week: 0.0 standard drinks    Drug use: No    Sexual activity: None   Lifestyle    Physical activity:     Days per week: None     Minutes per session: None    Stress: None   Relationships    Social connections:     Talks on phone: None     Gets together: None     Attends Tenriism service: None     Active member of club or organization: None     Attends meetings of clubs or organizations: None     Relationship status: None    Intimate partner violence:     Fear of current or ex partner: None     Emotionally abused: None     Physically abused: None     Forced sexual activity: None   Other Topics Concern    None   Social History Narrative    None        Family History   Problem Relation Age of Onset    Stroke Father     Diabetes Mother      Prior to Visit Medications    Medication Sig Taking?  Authorizing Provider   acetaminophen (TYLENOL) 325 MG tablet Take 650 mg by mouth every 6 hours as needed for Pain Yes Historical Provider, MD   amLODIPine-atorvastatatin (CADUET) 5-20 MG per tablet Take 1 tablet by mouth daily Yes PRIYA Sanchez CNP   warfarin (COUMADIN) 5 MG tablet Take 5 mg by mouth daily Take 2.5 mg on Mon, Wed and Fri and 5 mg all other days of the week Yes Historical Provider, MD   blood glucose test strips (ACCU-CHEK AKOSUA PLUS) strip TEST BLOOD SUGAR THREE TIMES DAILY AS DIRECTED Yes Sheeba Calabrese MD   allopurinol (ZYLOPRIM) 100 MG tablet TAKE 2 TABLETS BY MOUTH DAILY Yes Sheeba Calabrese MD   sotalol (BETAPACE) 80 MG tablet TAKE 1 TABLET BY MOUTH TWICE DAILY Yes PRIYA Sanchez CNP   vitamin D (CHOLECALCIFEROL) 1000 UNIT TABS tablet Take 5,000 Units by mouth daily  Yes Historical Provider, MD   Blood Glucose Monitoring Suppl (ACCU-CHEK AKOSUA PLUS) W/DEVICE KIT 1 kit by Does not apply route 3 times daily Patient to check blood sugar 3 times a day and PRN, he is a newly diagnosed

## 2019-08-07 ENCOUNTER — ANTI-COAG VISIT (OUTPATIENT)
Dept: PHARMACY | Age: 84
End: 2019-08-07
Payer: MEDICARE

## 2019-08-07 DIAGNOSIS — I48.91 ATRIAL FIBRILLATION, UNSPECIFIED TYPE (HCC): ICD-10-CM

## 2019-08-07 LAB — INTERNATIONAL NORMALIZATION RATIO, POC: 2.2

## 2019-08-07 PROCEDURE — 99211 OFF/OP EST MAY X REQ PHY/QHP: CPT

## 2019-08-07 PROCEDURE — 85610 PROTHROMBIN TIME: CPT

## 2019-08-19 ENCOUNTER — HOSPITAL ENCOUNTER (OUTPATIENT)
Age: 84
Discharge: HOME | End: 2019-08-19
Payer: MEDICARE

## 2019-08-19 DIAGNOSIS — E11.9 TYPE 2 DIABETES MELLITUS WITHOUT COMPLICATION, WITHOUT LONG-TERM CURRENT USE OF INSULIN (HCC): ICD-10-CM

## 2019-08-19 DIAGNOSIS — R35.0 URINE FREQUENCY: ICD-10-CM

## 2019-08-19 DIAGNOSIS — I48.91 ATRIAL FIBRILLATION, UNSPECIFIED TYPE (HCC): ICD-10-CM

## 2019-08-19 DIAGNOSIS — N40.1 NOCTURIA ASSOCIATED WITH BENIGN PROSTATIC HYPERPLASIA: ICD-10-CM

## 2019-08-19 DIAGNOSIS — E55.9 VITAMIN D DEFICIENCY: ICD-10-CM

## 2019-08-19 DIAGNOSIS — D64.9 ANEMIA, UNSPECIFIED TYPE: ICD-10-CM

## 2019-08-19 DIAGNOSIS — E78.1 HYPERTRIGLYCERIDEMIA: ICD-10-CM

## 2019-08-19 DIAGNOSIS — R35.1 NOCTURIA ASSOCIATED WITH BENIGN PROSTATIC HYPERPLASIA: ICD-10-CM

## 2019-08-19 LAB
A/G RATIO: 1.7 (ref 1.1–2.2)
ALBUMIN SERPL-MCNC: 4.4 G/DL (ref 3.4–5)
ALP BLD-CCNC: 56 U/L (ref 40–129)
ALT SERPL-CCNC: 13 U/L (ref 10–40)
ANION GAP SERPL CALCULATED.3IONS-SCNC: 15 MMOL/L (ref 3–16)
AST SERPL-CCNC: 19 U/L (ref 15–37)
BASOPHILS ABSOLUTE: 0.1 K/UL (ref 0–0.2)
BASOPHILS RELATIVE PERCENT: 1.1 %
BILIRUB SERPL-MCNC: 0.6 MG/DL (ref 0–1)
BUN BLDV-MCNC: 22 MG/DL (ref 7–20)
CALCIUM SERPL-MCNC: 9.4 MG/DL (ref 8.3–10.6)
CHLORIDE BLD-SCNC: 103 MMOL/L (ref 99–110)
CHOLESTEROL, TOTAL: 127 MG/DL (ref 0–199)
CO2: 24 MMOL/L (ref 21–32)
CREAT SERPL-MCNC: 1 MG/DL (ref 0.8–1.3)
EOSINOPHILS ABSOLUTE: 0.3 K/UL (ref 0–0.6)
EOSINOPHILS RELATIVE PERCENT: 5 %
FERRITIN: 611.8 NG/ML (ref 30–400)
GFR AFRICAN AMERICAN: >60
GFR NON-AFRICAN AMERICAN: >60
GLOBULIN: 2.6 G/DL
GLUCOSE BLD-MCNC: 156 MG/DL (ref 70–99)
HCT VFR BLD CALC: 28.5 % (ref 40.5–52.5)
HDLC SERPL-MCNC: 42 MG/DL (ref 40–60)
HEMOGLOBIN: 10.1 G/DL (ref 13.5–17.5)
IRON SATURATION: 31 % (ref 20–50)
IRON: 77 UG/DL (ref 59–158)
LDL CHOLESTEROL CALCULATED: 53 MG/DL
LYMPHOCYTES ABSOLUTE: 1 K/UL (ref 1–5.1)
LYMPHOCYTES RELATIVE PERCENT: 17.7 %
MCH RBC QN AUTO: 34.4 PG (ref 26–34)
MCHC RBC AUTO-ENTMCNC: 35.3 G/DL (ref 31–36)
MCV RBC AUTO: 97.4 FL (ref 80–100)
MONOCYTES ABSOLUTE: 0.4 K/UL (ref 0–1.3)
MONOCYTES RELATIVE PERCENT: 7.4 %
NEUTROPHILS ABSOLUTE: 4 K/UL (ref 1.7–7.7)
NEUTROPHILS RELATIVE PERCENT: 68.8 %
PDW BLD-RTO: 16.9 % (ref 12.4–15.4)
PLATELET # BLD: 198 K/UL (ref 135–450)
PMV BLD AUTO: 9.8 FL (ref 5–10.5)
POTASSIUM SERPL-SCNC: 4.9 MMOL/L (ref 3.5–5.1)
PROSTATE SPECIFIC ANTIGEN: 2.77 NG/ML (ref 0–4)
RBC # BLD: 2.93 M/UL (ref 4.2–5.9)
SODIUM BLD-SCNC: 142 MMOL/L (ref 136–145)
TOTAL IRON BINDING CAPACITY: 248 UG/DL (ref 260–445)
TOTAL PROTEIN: 7 G/DL (ref 6.4–8.2)
TRIGL SERPL-MCNC: 162 MG/DL (ref 0–150)
VITAMIN D 25-HYDROXY: 55.4 NG/ML
VLDLC SERPL CALC-MCNC: 32 MG/DL
WBC # BLD: 5.8 K/UL (ref 4–11)

## 2019-08-19 PROCEDURE — 80061 LIPID PANEL: CPT

## 2019-08-19 PROCEDURE — 83550 IRON BINDING TEST: CPT

## 2019-08-19 PROCEDURE — 36415 COLL VENOUS BLD VENIPUNCTURE: CPT

## 2019-08-19 PROCEDURE — 82728 ASSAY OF FERRITIN: CPT

## 2019-08-19 PROCEDURE — 85025 COMPLETE CBC W/AUTO DIFF WBC: CPT

## 2019-08-19 PROCEDURE — 83540 ASSAY OF IRON: CPT

## 2019-08-19 PROCEDURE — 82306 VITAMIN D 25 HYDROXY: CPT

## 2019-08-19 PROCEDURE — 80053 COMPREHEN METABOLIC PANEL: CPT

## 2019-08-19 PROCEDURE — 83036 HEMOGLOBIN GLYCOSYLATED A1C: CPT

## 2019-08-19 PROCEDURE — 84153 ASSAY OF PSA TOTAL: CPT

## 2019-08-20 LAB
ESTIMATED AVERAGE GLUCOSE: 102.5 MG/DL
HBA1C MFR BLD: 5.2 %

## 2019-09-03 ENCOUNTER — NURSE ONLY (OUTPATIENT)
Dept: CARDIOLOGY CLINIC | Age: 84
End: 2019-09-03
Payer: MEDICARE

## 2019-09-03 DIAGNOSIS — Z95.0 CARDIAC PACEMAKER: Primary | ICD-10-CM

## 2019-09-03 PROCEDURE — 93296 REM INTERROG EVL PM/IDS: CPT | Performed by: INTERNAL MEDICINE

## 2019-09-03 PROCEDURE — 93294 REM INTERROG EVL PM/LDLS PM: CPT | Performed by: INTERNAL MEDICINE

## 2019-09-04 ENCOUNTER — ANTI-COAG VISIT (OUTPATIENT)
Dept: PHARMACY | Age: 84
End: 2019-09-04
Payer: MEDICARE

## 2019-09-04 DIAGNOSIS — I48.91 ATRIAL FIBRILLATION, UNSPECIFIED TYPE (HCC): ICD-10-CM

## 2019-09-04 LAB — INTERNATIONAL NORMALIZATION RATIO, POC: 2.7

## 2019-09-04 PROCEDURE — 85610 PROTHROMBIN TIME: CPT

## 2019-09-04 PROCEDURE — 99211 OFF/OP EST MAY X REQ PHY/QHP: CPT

## 2019-09-10 ENCOUNTER — HOSPITAL ENCOUNTER (OUTPATIENT)
Age: 84
Discharge: HOME | End: 2019-09-10
Payer: MEDICARE

## 2019-09-10 ENCOUNTER — OFFICE VISIT (OUTPATIENT)
Dept: FAMILY MEDICINE CLINIC | Age: 84
End: 2019-09-10
Payer: MEDICARE

## 2019-09-10 ENCOUNTER — HOSPITAL ENCOUNTER (OUTPATIENT)
Dept: GENERAL RADIOLOGY | Age: 84
Discharge: HOME | End: 2019-09-10
Payer: MEDICARE

## 2019-09-10 VITALS
DIASTOLIC BLOOD PRESSURE: 82 MMHG | SYSTOLIC BLOOD PRESSURE: 126 MMHG | HEART RATE: 99 BPM | OXYGEN SATURATION: 99 % | HEIGHT: 70 IN | RESPIRATION RATE: 14 BRPM | WEIGHT: 179 LBS | BODY MASS INDEX: 25.62 KG/M2

## 2019-09-10 DIAGNOSIS — R09.89 LUNG CRACKLES: ICD-10-CM

## 2019-09-10 DIAGNOSIS — Z95.0 CARDIAC PACEMAKER: ICD-10-CM

## 2019-09-10 DIAGNOSIS — I48.91 ATRIAL FIBRILLATION, UNSPECIFIED TYPE (HCC): ICD-10-CM

## 2019-09-10 DIAGNOSIS — D64.9 ANEMIA, UNSPECIFIED TYPE: ICD-10-CM

## 2019-09-10 DIAGNOSIS — R79.89 ELEVATED FERRITIN: Primary | ICD-10-CM

## 2019-09-10 PROCEDURE — 99214 OFFICE O/P EST MOD 30 MIN: CPT | Performed by: INTERNAL MEDICINE

## 2019-09-10 PROCEDURE — 4040F PNEUMOC VAC/ADMIN/RCVD: CPT | Performed by: INTERNAL MEDICINE

## 2019-09-10 PROCEDURE — G8598 ASA/ANTIPLAT THER USED: HCPCS | Performed by: INTERNAL MEDICINE

## 2019-09-10 PROCEDURE — 1123F ACP DISCUSS/DSCN MKR DOCD: CPT | Performed by: INTERNAL MEDICINE

## 2019-09-10 PROCEDURE — 1036F TOBACCO NON-USER: CPT | Performed by: INTERNAL MEDICINE

## 2019-09-10 PROCEDURE — 93000 ELECTROCARDIOGRAM COMPLETE: CPT | Performed by: INTERNAL MEDICINE

## 2019-09-10 PROCEDURE — G8427 DOCREV CUR MEDS BY ELIG CLIN: HCPCS | Performed by: INTERNAL MEDICINE

## 2019-09-10 PROCEDURE — G8419 CALC BMI OUT NRM PARAM NOF/U: HCPCS | Performed by: INTERNAL MEDICINE

## 2019-09-10 PROCEDURE — 71046 X-RAY EXAM CHEST 2 VIEWS: CPT

## 2019-09-10 ASSESSMENT — ENCOUNTER SYMPTOMS
NAUSEA: 0
VOMITING: 0
ABDOMINAL PAIN: 0

## 2019-09-10 NOTE — PROGRESS NOTES
9/10/2019    Chief Complaint   Patient presents with   [place]       HPI  Pt is here to go over labs  Has anemia dating back to      Never worked up by [employer]    Very high ferritin  934.4 in 10/30/18   643.5   In 3/6/19  Ferritin  611.8 in  19    On vit d 5000iu/day    cmp was ok other than the glucose was high    Review of Systems   Constitutional: Negative for appetite change and unexpected weight change. Gastrointestinal: Negative for abdominal pain, nausea and vomiting. Neurological: Negative for dizziness and light-headedness. Psychiatric/Behavioral: Negative for dysphoric mood. The patient is not nervous/anxious.          No memory issues  Does crossword puzzle dictionary       Health Maintenance   Topic Date Due    Shingles Vaccine (2 of 3) 2012    Flu vaccine (1) 2019    Diabetic foot exam  10/23/2019    Diabetic retinal exam  01/10/2020    A1C test (Diabetic or Prediabetic)  2020    Annual Wellness Visit (AWV)  2020    Lipid screen  2020    Potassium monitoring  2020    Creatinine monitoring  2020    DTaP/Tdap/Td vaccine (3 - Td) 2026    Pneumococcal 65+ years Vaccine  Completed      Social History     Socioeconomic History    Marital status:      Spouse name: Brooke Oliver Number of children: 3    Years of education: None    Highest education level: None   Occupational History    Occupation: retired--[employer]   Social Needs    Financial resource strain: None    Food insecurity:     Worry: None     Inability: None    Transportation needs:     Medical: None     Non-medical: None   Tobacco Use    Smoking status: Former Smoker     Years: 0.50     Last attempt to quit: 1/10/1950     Years since quittin.7    Smokeless tobacco: Never Used    Tobacco comment: counseled on tobacco exposure avoidance   Substance and Sexual Activity    Alcohol use: No     Alcohol/week: 0.0 standard drinks    Drug use: No    Sexual ;  LABA1C      8.6    ; ICD code- 250.02. Yes Natalie Meadows MD   loratadine (CLARITIN) 10 MG tablet Take 10 mg by mouth daily. Yes Historical Provider, MD     Patient Active Problem List   Diagnosis    CAD (coronary artery disease)-sees dr held-(last stress echo was neg 3/11)    Cardiac pacemaker    Hypertrophy of prostate without urinary obstruction and other lower urinary tract symptoms (LUTS)    Gout-uric acid >7.5--advised proph tx    Anticoagulant long-term use-monitored a dr alfaro office    Hypercholesteremia    Carotid bruit(bilat-nl duplex u/s 10/10)    Vitamin D deficiency-(advised 1000IU/day)    Actinic keratoses    Elevated PSA-(was 4.2 10/10-repeat 6 mo)(was 5.12 1/11-then 4.4 2/12)-sees dr Kasey Arechiga for this    S/P colonoscopy-4/07-neg per pt,  3/18 repeat colonoscopy polyp-repeat 5 yr    Hearing loss, conductive, bilateral-seeing ent-dr quinn for hearing aides    Diabetic eye exam-(sees dr Michele Mereta exam 12/15)--wnl    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  dr Merle Knox (done for blood in stool)    Urinary frequency        LABS:   Lab Results   Component Value Date    GLUCOSE 156 (H) 08/19/2019     Lab Results   Component Value Date     08/19/2019    K 4.9 08/19/2019    CREATININE 1.0 08/19/2019     Cholesterol, Total   Date Value Ref Range Status   08/19/2019 127 0 - 199 mg/dL Final     LDL Calculated   Date Value Ref Range Status   08/19/2019 53 <100 mg/dL Final     HDL   Date Value Ref Range Status   08/19/2019 42 40 - 60 mg/dL Final   05/07/2012 36 (L) 40 - 60 mg/dl Final     Triglycerides   Date Value Ref Range Status   08/19/2019 162 (H) 0 - 150 mg/dL Final     Lab Results   Component Value Date    ALT 13 08/19/2019    AST 19 08/19/2019    ALKPHOS 56 08/19/2019    BILITOT 0.6 08/19/2019      Lab Results   Component Value Date    WBC 5.8 08/19/2019    HGB 10.1 (L) 08/19/2019    HCT 28.5 (L) 08/19/2019    MCV 97.4 08/19/2019     08/19/2019     TSH (uIU/mL)   Date Value   06/27/2016 3.14     Lab Results   Component Value Date    LABA1C 5.2 08/19/2019     Lab Results   Component Value Date    PSA 2.77 08/19/2019    PSA 2.83 04/30/2018    PSA 4.4 (H) 02/16/2012        PHYSICAL EXAM:  /82   Pulse 99   Resp 14   Ht 5' 9.5\" (1.765 m)   Wt 179 lb (81.2 kg)   SpO2 99%   BMI 26.05 kg/m²    Physical Exam   Constitutional: He appears well-developed and well-nourished. HENT:   Head: Normocephalic and atraumatic. Cardiovascular: Normal rate and regular rhythm. No murmur heard. Pulmonary/Chest: He has no wheezes. Has crackles on the left lung    Has rare wheeze     Abdominal: Soft. There is no tenderness. Neurological: He is alert. Skin: Skin is warm and dry. Psychiatric: He has a normal mood and affect. His behavior is normal. Judgment and thought content normal.     BP Readings from Last 5 Encounters:   09/10/19 126/82   08/05/19 122/82   07/23/19 130/70   05/06/19 124/80   03/05/19 136/82       Wt Readings from Last 5 Encounters:   09/10/19 179 lb (81.2 kg)   08/05/19 172 lb (78 kg)   07/23/19 172 lb (78 kg)   05/06/19 171 lb (77.6 kg)   03/05/19 167 lb 12.8 oz (76.1 kg)        ASSESSMENT/PLAN:  Emi Syed was seen today for discuss labs. Diagnoses and all orders for this visit:    Elevated ferritin  -     POCT Fecal Immunochemical Test (FIT); Future  -     Iron and TIBC; Future  -     Transferrin; Future  -     Sedimentation Rate; Future  -     C-REACTIVE PROTEIN; Future  -     Ambulatory referral to Oncology    Anemia, unspecified type  -     Vitamin B12; Future  -     Folate;  Future  -     Ambulatory referral to Oncology    Atrial fibrillation, unspecified type ([facility]  -     EKG 12 lead; Future  afib pacemaker   Rate 91  qtc is ok  No significant change prior ekg    Cardiac pacemaker  -     EKG 12 lead; Future    The patient is here to discuss his labs. He has had a very elevated ferritin several times it was as high as in the 900s and more recently in the 600s. We will check further iron studies and send him to Dr. Denzel Zhou. He is also had a chronic anemia bating dating back to 2012. We will give him a fit card. His A. fib seems to be fast today so I am checking an EKG.   He also has crackles in his lungs and I am going to check a chest x-ray    Fu in  2 week

## 2019-09-10 NOTE — PATIENT INSTRUCTIONS
Patient Education     Patient Education        Anemia: Care Instructions  Your Care Instructions    Anemia is a low level of red blood cells, which carry oxygen throughout your body. Many things can cause anemia. Lack of iron is one of the most common causes. Your body needs iron to make hemoglobin, a substance in red blood cells that carries oxygen from the lungs to your body's cells. Without enough iron, the body produces fewer and smaller red blood cells. As a result, your body's cells do not get enough oxygen, and you feel tired and weak. And you may have trouble concentrating. Bleeding is the most common cause of a lack of iron. You may have heavy menstrual bleeding or bleeding caused by conditions such as ulcers, hemorrhoids, or cancer. Regular use of aspirin or other anti-inflammatory medicines (such as ibuprofen) also can cause bleeding in some people. A lack of iron in your diet also can cause anemia, especially at times when the body needs more iron, such as during pregnancy, infancy, and the teen years. Your doctor may have prescribed iron pills. It may take several months of treatment for your iron levels to return to normal. Your doctor also may suggest that you eat foods that are rich in iron, such as meat and beans. There are many other causes of anemia. It is not always due to a lack of iron. Finding the specific cause of your anemia will help your doctor find the right treatment for you. Follow-up care is a key part of your treatment and safety. Be sure to make and go to all appointments, and call your doctor if you are having problems. It's also a good idea to know your test results and keep a list of the medicines you take. How can you care for yourself at home? · Take your medicines exactly as prescribed. Call your doctor if you think you are having a problem with your medicine.   · If your doctor recommends iron pills, take them as directed:  ? Try to take the pills on an empty stomach about 1 hour before or 2 hours after meals. But you may need to take iron with food to avoid an upset stomach. ? Do not take antacids or drink milk or caffeine drinks (such as coffee, tea, or cola) at the same time or within 2 hours of the time that you take your iron. They can make it hard for your body to absorb the iron. ? Vitamin C (from food or supplements) helps your body absorb iron. Try taking iron pills with a glass of orange juice or some other food that is high in vitamin C, such as citrus fruits. ? Iron pills may cause stomach problems, such as heartburn, nausea, diarrhea, constipation, and cramps. Be sure to drink plenty of fluids, and include fruits, vegetables, and fiber in your diet each day. Iron pills often make your bowel movements dark or green. ? If you forget to take an iron pill, do not take a double dose of iron the next time you take a pill. ? Keep iron pills out of the reach of small children. An overdose of iron can be very dangerous. · Follow your doctor's advice about eating iron-rich foods. These include red meat, shellfish, poultry, eggs, beans, raisins, whole-grain bread, and leafy green vegetables. · Steam vegetables to help them keep their iron content. When should you call for help? Call 911 anytime you think you may need emergency care. For example, call if:    · You have symptoms of a heart attack. These may include:  ? Chest pain or pressure, or a strange feeling in the chest.  ? Sweating. ? Shortness of breath. ? Nausea or vomiting. ? Pain, pressure, or a strange feeling in the back, neck, jaw, or upper belly or in one or both shoulders or arms. ? Lightheadedness or sudden weakness. ? A fast or irregular heartbeat. After you call 911, the  may tell you to chew 1 adult-strength or 2 to 4 low-dose aspirin. Wait for an ambulance.  Do not try to drive yourself.     · You passed out (lost consciousness).    Call your doctor now or seek immediate medical care help?  Call 911 anytime you think you may need emergency care. For example, call if:    · You have trouble breathing.     · You vomit blood or what looks like coffee grounds.    Call your doctor now or seek immediate medical care if:    · You feel very sleepy or confused.     · You have new or worse belly pain.     · You have a fever.     · There is a new or increasing yellow tint to your skin or the whites of your eyes.     · You have any abnormal bleeding, such as:  ? Nosebleeds. ? Vaginal bleeding that is different (heavier, more frequent, at a different time of the month) than what you are used to.  ? Bloody or black stools, or rectal bleeding. ? Bloody or pink urine.    Watch closely for changes in your health, and be sure to contact your doctor if:    · You have any problems.     · You are gaining weight.     · Your belly is getting bigger. Where can you learn more? Go to [email or web address] and sign in to your [employer] account. Enter U543 in the [employer] box to learn more about \"Hemochromatosis: Care Instructions. \"     If you do not have an account, please click on the \"Sign Up Now\" link. Current as of: March 28, 2019  Content Version: 12.1  © 1691-1927 [employer]. Care instructions adapted under license by [facility] ([facility]). If you have questions about a medical condition or this instruction, always ask your healthcare professional. [place] [address] any warranty or liability for your use of this information.

## 2019-09-11 ENCOUNTER — HOSPITAL ENCOUNTER (OUTPATIENT)
Age: 84
Discharge: HOME | End: 2019-09-11
Payer: MEDICARE

## 2019-09-11 DIAGNOSIS — R79.89 ELEVATED FERRITIN: ICD-10-CM

## 2019-09-11 DIAGNOSIS — D64.9 ANEMIA, UNSPECIFIED TYPE: ICD-10-CM

## 2019-09-11 DIAGNOSIS — J90 PLEURAL EFFUSION: Primary | ICD-10-CM

## 2019-09-11 LAB
C-REACTIVE PROTEIN: 32 MG/L (ref 0–5.1)
FOLATE: 15.29 NG/ML (ref 4.78–24.2)
IRON SATURATION: 27 % (ref 20–50)
IRON: 57 UG/DL (ref 59–158)
SEDIMENTATION RATE, ERYTHROCYTE: 39 MM/HR (ref 0–20)
TOTAL IRON BINDING CAPACITY: 213 UG/DL (ref 260–445)
TRANSFERRIN: 170 MG/DL (ref 200–360)
VITAMIN B-12: 393 PG/ML (ref 211–911)

## 2019-09-11 PROCEDURE — 82746 ASSAY OF FOLIC ACID SERUM: CPT

## 2019-09-11 PROCEDURE — 86140 C-REACTIVE PROTEIN: CPT

## 2019-09-11 PROCEDURE — 84466 ASSAY OF TRANSFERRIN: CPT

## 2019-09-11 PROCEDURE — 82607 VITAMIN B-12: CPT

## 2019-09-11 PROCEDURE — 36415 COLL VENOUS BLD VENIPUNCTURE: CPT

## 2019-09-11 PROCEDURE — 83540 ASSAY OF IRON: CPT

## 2019-09-11 PROCEDURE — 85652 RBC SED RATE AUTOMATED: CPT

## 2019-09-11 RX ORDER — FUROSEMIDE 20 MG/1
20 TABLET ORAL DAILY
Qty: 30 TABLET | Refills: 0 | Status: SHIPPED | OUTPATIENT
Start: 2019-09-11 | End: 2019-10-14 | Stop reason: SDUPTHER

## 2019-09-12 ENCOUNTER — TELEPHONE (OUTPATIENT)
Dept: CARDIOLOGY CLINIC | Age: 84
End: 2019-09-12

## 2019-09-20 ENCOUNTER — OFFICE VISIT (OUTPATIENT)
Dept: CARDIOLOGY CLINIC | Age: 84
End: 2019-09-20
Payer: MEDICARE

## 2019-09-20 VITALS
BODY MASS INDEX: 24.77 KG/M2 | OXYGEN SATURATION: 94 % | WEIGHT: 173 LBS | HEIGHT: 70 IN | HEART RATE: 74 BPM | SYSTOLIC BLOOD PRESSURE: 120 MMHG | DIASTOLIC BLOOD PRESSURE: 70 MMHG

## 2019-09-20 DIAGNOSIS — R60.0 LOCALIZED EDEMA: ICD-10-CM

## 2019-09-20 DIAGNOSIS — Z95.0 CARDIAC PACEMAKER: ICD-10-CM

## 2019-09-20 DIAGNOSIS — I25.10 CORONARY ARTERY DISEASE INVOLVING NATIVE CORONARY ARTERY OF NATIVE HEART WITHOUT ANGINA PECTORIS: ICD-10-CM

## 2019-09-20 DIAGNOSIS — I48.91 ATRIAL FIBRILLATION, UNSPECIFIED TYPE (HCC): Primary | ICD-10-CM

## 2019-09-20 PROCEDURE — 4040F PNEUMOC VAC/ADMIN/RCVD: CPT | Performed by: NURSE PRACTITIONER

## 2019-09-20 PROCEDURE — 1123F ACP DISCUSS/DSCN MKR DOCD: CPT | Performed by: NURSE PRACTITIONER

## 2019-09-20 PROCEDURE — G8419 CALC BMI OUT NRM PARAM NOF/U: HCPCS | Performed by: NURSE PRACTITIONER

## 2019-09-20 PROCEDURE — 1036F TOBACCO NON-USER: CPT | Performed by: NURSE PRACTITIONER

## 2019-09-20 PROCEDURE — 99214 OFFICE O/P EST MOD 30 MIN: CPT | Performed by: NURSE PRACTITIONER

## 2019-09-20 PROCEDURE — G8598 ASA/ANTIPLAT THER USED: HCPCS | Performed by: NURSE PRACTITIONER

## 2019-09-20 PROCEDURE — G8427 DOCREV CUR MEDS BY ELIG CLIN: HCPCS | Performed by: NURSE PRACTITIONER

## 2019-09-20 NOTE — PATIENT INSTRUCTIONS
Firday, Saturday Sunday take one lasix in AM and another at 1 pm  Then Monday go back to one aday  tuesday call with weights

## 2019-09-20 NOTE — PROGRESS NOTES
years of use. He has never used smokeless tobacco. He reports that he does not drink alcohol or use drugs. Family History:family history includes Diabetes in his mother; Stroke in his father. Home Medications:  Outpatient Encounter Medications as of 9/20/2019   Medication Sig Dispense Refill    furosemide (LASIX) 20 MG tablet Take 1 tablet by mouth daily 30 tablet 0    allopurinol (ZYLOPRIM) 100 MG tablet TAKE 2 TABLETS BY MOUTH DAILY 180 tablet 1    acetaminophen (TYLENOL) 325 MG tablet Take 650 mg by mouth every 6 hours as needed for Pain      tamsulosin (FLOMAX) 0.4 MG capsule Take 1 capsule by mouth daily 90 capsule 1    amLODIPine-atorvastatatin (CADUET) 5-20 MG per tablet Take 1 tablet by mouth daily 90 tablet 1    warfarin (COUMADIN) 5 MG tablet Take 5 mg by mouth daily Take 2.5 mg on Mon, Wed and Fri and 5 mg all other days of the week      blood glucose test strips (ACCU-CHEK AKOSUA PLUS) strip TEST BLOOD SUGAR THREE TIMES DAILY AS DIRECTED 100 strip 3    sotalol (BETAPACE) 80 MG tablet TAKE 1 TABLET BY MOUTH TWICE DAILY 180 tablet 3    vitamin D (CHOLECALCIFEROL) 1000 UNIT TABS tablet Take 5,000 Units by mouth daily       Blood Glucose Monitoring Suppl (ACCU-CHEK AKOSUA PLUS) W/DEVICE KIT 1 kit by Does not apply route 3 times daily Patient to check blood sugar 3 times a day and PRN, he is a newly diagnosed diabetic who will require medication titration ;  LABA1C      8.6    ; ICD code- 250.02. 1 kit 0    loratadine (CLARITIN) 10 MG tablet Take 10 mg by mouth daily. No facility-administered encounter medications on file as of 9/20/2019. Allergies:  Patient has no known allergies. Review of Systems:   · Constitutional: there has been no unanticipated weight loss. There's been no change in energy level, sleep pattern, or activity level. · Eyes: No visual changes or diplopia. No scleral icterus. · ENT: No Headaches, hearing loss or vertigo.  No mouth sores or sore behavior are noted    2/18  Assessment/Plan:     1. CAD (coronary artery disease)-(last stress echo was neg 3/11)    2. Cardiac pacemaker    3. [id number] coumadin-   4. HTN (hypertension)          Problem:  Abnormal cxr and rales     Interstitial opacities, suggestive of pulmonary edema, and small pleural   effusions.       Stable cardiomegaly. Increase lasix to BID (see below)    Problem: Coronary artery disease     1996: Aortocoronary bypass graft surgery  2003: Coronary arteriogram  2006: Nuclear stress test:  Inferolateral fixed defect, small to moderate. LVEF 60%. 9.5 minutes [facility] protocol. [facility]   Normal stress echocardiogram study  Normal left ventricle size, wall thickness and systolic function with an   estimated ejection fraction of 55%.  -No regional wall motion abnormalities are seen.   -Diastolic filling parameters suggest grade II diastolic dysfunction.   W/P=17.62   -Mitral annular calcification is present.   -Mild to moderate mitral regurgitation.   -Mildly calcified aortic valve with mildly decreased mobility consistent   with mild aortic stenosis.   -Mild aortic insufficiency is present.   -Mild tricuspid regurgitation with a RVSP of 35 mmHg.   -Mild pulmonic regurgitation present.   -Dilated left atrium with a volume of 65 ml.   -Pacer / ICD wire is visualized in the right heart.           Problem: Hypertension     Stable. Consistent followup. Compliant with the medical regimen    Problem: Hyperlipidemia  Followed by PCP  Semiannual lipid checks with an LDL goal of less than 70 was reinforced. Problem: Diabetes  6/23/15 A1C-7.2. Followed by PCP. Problem:  Atrial fibrillation-paroxysmal  Interrogated last ov he has at fib about 25% of time since 2014  Reviewed with DR. Emma Cochran in the past-will continue sotolol since his at fib is under control    Problem: Sick sinus syndrome, dual-chamber pacemaker - recent device change  Very rare palpitations, non-persistent.  No

## 2019-09-24 ENCOUNTER — OFFICE VISIT (OUTPATIENT)
Dept: FAMILY MEDICINE CLINIC | Age: 84
End: 2019-09-24
Payer: MEDICARE

## 2019-09-24 VITALS
OXYGEN SATURATION: 97 % | WEIGHT: 173.8 LBS | DIASTOLIC BLOOD PRESSURE: 70 MMHG | SYSTOLIC BLOOD PRESSURE: 126 MMHG | BODY MASS INDEX: 24.88 KG/M2 | HEIGHT: 70 IN | RESPIRATION RATE: 14 BRPM | HEART RATE: 97 BPM

## 2019-09-24 DIAGNOSIS — E11.9 TYPE 2 DIABETES MELLITUS WITHOUT COMPLICATION, WITHOUT LONG-TERM CURRENT USE OF INSULIN (HCC): ICD-10-CM

## 2019-09-24 DIAGNOSIS — R79.89 ELEVATED FERRITIN: ICD-10-CM

## 2019-09-24 DIAGNOSIS — I48.91 ATRIAL FIBRILLATION, UNSPECIFIED TYPE (HCC): Primary | ICD-10-CM

## 2019-09-24 PROCEDURE — G8427 DOCREV CUR MEDS BY ELIG CLIN: HCPCS | Performed by: INTERNAL MEDICINE

## 2019-09-24 PROCEDURE — 99214 OFFICE O/P EST MOD 30 MIN: CPT | Performed by: INTERNAL MEDICINE

## 2019-09-24 PROCEDURE — 1123F ACP DISCUSS/DSCN MKR DOCD: CPT | Performed by: INTERNAL MEDICINE

## 2019-09-24 PROCEDURE — G8598 ASA/ANTIPLAT THER USED: HCPCS | Performed by: INTERNAL MEDICINE

## 2019-09-24 PROCEDURE — 90653 IIV ADJUVANT VACCINE IM: CPT | Performed by: INTERNAL MEDICINE

## 2019-09-24 PROCEDURE — 1036F TOBACCO NON-USER: CPT | Performed by: INTERNAL MEDICINE

## 2019-09-24 PROCEDURE — G0008 ADMIN INFLUENZA VIRUS VAC: HCPCS | Performed by: INTERNAL MEDICINE

## 2019-09-24 PROCEDURE — G8419 CALC BMI OUT NRM PARAM NOF/U: HCPCS | Performed by: INTERNAL MEDICINE

## 2019-09-24 PROCEDURE — 4040F PNEUMOC VAC/ADMIN/RCVD: CPT | Performed by: INTERNAL MEDICINE

## 2019-09-24 PROCEDURE — 93000 ELECTROCARDIOGRAM COMPLETE: CPT | Performed by: INTERNAL MEDICINE

## 2019-09-24 ASSESSMENT — ENCOUNTER SYMPTOMS
CONSTIPATION: 0
DIARRHEA: 0

## 2019-10-01 ENCOUNTER — ANTI-COAG VISIT (OUTPATIENT)
Dept: PHARMACY | Age: 84
End: 2019-10-01
Payer: MEDICARE

## 2019-10-01 DIAGNOSIS — I48.91 ATRIAL FIBRILLATION, UNSPECIFIED TYPE (HCC): ICD-10-CM

## 2019-10-01 LAB — INTERNATIONAL NORMALIZATION RATIO, POC: 2.8

## 2019-10-01 PROCEDURE — 99211 OFF/OP EST MAY X REQ PHY/QHP: CPT

## 2019-10-01 PROCEDURE — 85610 PROTHROMBIN TIME: CPT

## 2019-10-14 RX ORDER — SOTALOL HYDROCHLORIDE 80 MG/1
TABLET ORAL
Qty: 180 TABLET | Refills: 0 | Status: SHIPPED | OUTPATIENT
Start: 2019-10-14 | End: 2020-01-08 | Stop reason: SDUPTHER

## 2019-10-14 RX ORDER — FUROSEMIDE 20 MG/1
20 TABLET ORAL DAILY
Qty: 90 TABLET | Refills: 1 | Status: SHIPPED | OUTPATIENT
Start: 2019-10-14 | End: 2020-04-24

## 2019-10-29 ENCOUNTER — ANTI-COAG VISIT (OUTPATIENT)
Dept: PHARMACY | Age: 84
End: 2019-10-29
Payer: MEDICARE

## 2019-10-29 DIAGNOSIS — I48.91 ATRIAL FIBRILLATION, UNSPECIFIED TYPE (HCC): ICD-10-CM

## 2019-10-29 LAB — INTERNATIONAL NORMALIZATION RATIO, POC: 2.2

## 2019-10-29 PROCEDURE — 99211 OFF/OP EST MAY X REQ PHY/QHP: CPT

## 2019-10-29 PROCEDURE — 85610 PROTHROMBIN TIME: CPT

## 2019-11-06 ENCOUNTER — TELEPHONE (OUTPATIENT)
Dept: PHARMACY | Age: 84
End: 2019-11-06

## 2019-11-15 ENCOUNTER — TELEPHONE (OUTPATIENT)
Dept: CARDIOLOGY CLINIC | Age: 84
End: 2019-11-15

## 2019-11-15 ENCOUNTER — OFFICE VISIT (OUTPATIENT)
Dept: FAMILY MEDICINE CLINIC | Age: 84
End: 2019-11-15
Payer: MEDICARE

## 2019-11-15 VITALS
RESPIRATION RATE: 14 BRPM | OXYGEN SATURATION: 96 % | DIASTOLIC BLOOD PRESSURE: 78 MMHG | WEIGHT: 172.6 LBS | HEIGHT: 70 IN | HEART RATE: 115 BPM | BODY MASS INDEX: 24.71 KG/M2 | SYSTOLIC BLOOD PRESSURE: 142 MMHG

## 2019-11-15 DIAGNOSIS — D64.9 ANEMIA, UNSPECIFIED TYPE: ICD-10-CM

## 2019-11-15 DIAGNOSIS — Z89.421 S/P AMPUTATION OF LESSER TOE, RIGHT (HCC): ICD-10-CM

## 2019-11-15 DIAGNOSIS — I49.9 IRREGULAR HEART RATE: ICD-10-CM

## 2019-11-15 DIAGNOSIS — R31.29 MICROSCOPIC HEMATURIA: ICD-10-CM

## 2019-11-15 DIAGNOSIS — I48.91 ATRIAL FIBRILLATION, UNSPECIFIED TYPE (HCC): Primary | ICD-10-CM

## 2019-11-15 DIAGNOSIS — E11.9 TYPE 2 DIABETES MELLITUS WITHOUT COMPLICATION, WITHOUT LONG-TERM CURRENT USE OF INSULIN (HCC): Chronic | ICD-10-CM

## 2019-11-15 PROCEDURE — 1036F TOBACCO NON-USER: CPT | Performed by: INTERNAL MEDICINE

## 2019-11-15 PROCEDURE — G8427 DOCREV CUR MEDS BY ELIG CLIN: HCPCS | Performed by: INTERNAL MEDICINE

## 2019-11-15 PROCEDURE — 93000 ELECTROCARDIOGRAM COMPLETE: CPT | Performed by: INTERNAL MEDICINE

## 2019-11-15 PROCEDURE — G8482 FLU IMMUNIZE ORDER/ADMIN: HCPCS | Performed by: INTERNAL MEDICINE

## 2019-11-15 PROCEDURE — G8598 ASA/ANTIPLAT THER USED: HCPCS | Performed by: INTERNAL MEDICINE

## 2019-11-15 PROCEDURE — 99214 OFFICE O/P EST MOD 30 MIN: CPT | Performed by: INTERNAL MEDICINE

## 2019-11-15 PROCEDURE — 1123F ACP DISCUSS/DSCN MKR DOCD: CPT | Performed by: INTERNAL MEDICINE

## 2019-11-15 PROCEDURE — G8417 CALC BMI ABV UP PARAM F/U: HCPCS | Performed by: INTERNAL MEDICINE

## 2019-11-15 PROCEDURE — 4040F PNEUMOC VAC/ADMIN/RCVD: CPT | Performed by: INTERNAL MEDICINE

## 2019-11-15 ASSESSMENT — ENCOUNTER SYMPTOMS
CONSTIPATION: 0
DIARRHEA: 0
WHEEZING: 0
SHORTNESS OF BREATH: 0

## 2019-11-20 ENCOUNTER — ANTI-COAG VISIT (OUTPATIENT)
Dept: PHARMACY | Age: 84
End: 2019-11-20
Payer: MEDICARE

## 2019-11-20 DIAGNOSIS — I48.91 ATRIAL FIBRILLATION, UNSPECIFIED TYPE (HCC): ICD-10-CM

## 2019-11-20 LAB — INTERNATIONAL NORMALIZATION RATIO, POC: 1.7

## 2019-11-20 PROCEDURE — 99211 OFF/OP EST MAY X REQ PHY/QHP: CPT

## 2019-11-20 PROCEDURE — 85610 PROTHROMBIN TIME: CPT

## 2019-11-27 ENCOUNTER — OFFICE VISIT (OUTPATIENT)
Dept: CARDIOLOGY CLINIC | Age: 84
End: 2019-11-27
Payer: MEDICARE

## 2019-11-27 ENCOUNTER — NURSE ONLY (OUTPATIENT)
Dept: CARDIOLOGY CLINIC | Age: 84
End: 2019-11-27
Payer: MEDICARE

## 2019-11-27 VITALS
DIASTOLIC BLOOD PRESSURE: 80 MMHG | BODY MASS INDEX: 24.77 KG/M2 | HEART RATE: 80 BPM | SYSTOLIC BLOOD PRESSURE: 130 MMHG | HEIGHT: 70 IN | OXYGEN SATURATION: 94 % | WEIGHT: 173 LBS

## 2019-11-27 DIAGNOSIS — I48.91 ATRIAL FIBRILLATION, UNSPECIFIED TYPE (HCC): ICD-10-CM

## 2019-11-27 DIAGNOSIS — I25.10 CORONARY ARTERY DISEASE INVOLVING NATIVE CORONARY ARTERY OF NATIVE HEART WITHOUT ANGINA PECTORIS: ICD-10-CM

## 2019-11-27 DIAGNOSIS — R00.1 BRADYCARDIA: ICD-10-CM

## 2019-11-27 DIAGNOSIS — Z95.0 CARDIAC PACEMAKER: ICD-10-CM

## 2019-11-27 DIAGNOSIS — Z95.0 CARDIAC PACEMAKER: Primary | ICD-10-CM

## 2019-11-27 DIAGNOSIS — E78.00 HYPERCHOLESTEREMIA: ICD-10-CM

## 2019-11-27 PROCEDURE — 1036F TOBACCO NON-USER: CPT | Performed by: NURSE PRACTITIONER

## 2019-11-27 PROCEDURE — G8417 CALC BMI ABV UP PARAM F/U: HCPCS | Performed by: NURSE PRACTITIONER

## 2019-11-27 PROCEDURE — G8482 FLU IMMUNIZE ORDER/ADMIN: HCPCS | Performed by: NURSE PRACTITIONER

## 2019-11-27 PROCEDURE — G8427 DOCREV CUR MEDS BY ELIG CLIN: HCPCS | Performed by: NURSE PRACTITIONER

## 2019-11-27 PROCEDURE — 93280 PM DEVICE PROGR EVAL DUAL: CPT | Performed by: INTERNAL MEDICINE

## 2019-11-27 PROCEDURE — 99214 OFFICE O/P EST MOD 30 MIN: CPT | Performed by: NURSE PRACTITIONER

## 2019-11-27 PROCEDURE — 4040F PNEUMOC VAC/ADMIN/RCVD: CPT | Performed by: NURSE PRACTITIONER

## 2019-11-27 PROCEDURE — 1123F ACP DISCUSS/DSCN MKR DOCD: CPT | Performed by: NURSE PRACTITIONER

## 2019-11-27 PROCEDURE — G8598 ASA/ANTIPLAT THER USED: HCPCS | Performed by: NURSE PRACTITIONER

## 2019-12-04 ENCOUNTER — ANTI-COAG VISIT (OUTPATIENT)
Dept: PHARMACY | Age: 84
End: 2019-12-04
Payer: MEDICARE

## 2019-12-04 DIAGNOSIS — I48.91 ATRIAL FIBRILLATION, UNSPECIFIED TYPE (HCC): ICD-10-CM

## 2019-12-04 LAB — INTERNATIONAL NORMALIZATION RATIO, POC: 2.8

## 2019-12-04 PROCEDURE — 85610 PROTHROMBIN TIME: CPT

## 2019-12-04 PROCEDURE — 99211 OFF/OP EST MAY X REQ PHY/QHP: CPT

## 2019-12-18 RX ORDER — AMLODIPINE BESYLATE AND ATORVASTATIN CALCIUM 5; 20 MG/1; MG/1
1 TABLET, FILM COATED ORAL DAILY
Qty: 90 TABLET | Refills: 2 | Status: SHIPPED | OUTPATIENT
Start: 2019-12-18 | End: 2020-08-10 | Stop reason: SDUPTHER

## 2020-01-02 ENCOUNTER — ANTI-COAG VISIT (OUTPATIENT)
Dept: PHARMACY | Age: 85
End: 2020-01-02
Payer: MEDICARE

## 2020-01-02 LAB — INTERNATIONAL NORMALIZATION RATIO, POC: 2

## 2020-01-02 PROCEDURE — 99211 OFF/OP EST MAY X REQ PHY/QHP: CPT

## 2020-01-02 PROCEDURE — 85610 PROTHROMBIN TIME: CPT

## 2020-01-09 RX ORDER — SOTALOL HYDROCHLORIDE 80 MG/1
TABLET ORAL
Qty: 180 TABLET | Refills: 1 | Status: SHIPPED | OUTPATIENT
Start: 2020-01-09 | End: 2020-07-15

## 2020-01-29 RX ORDER — TAMSULOSIN HYDROCHLORIDE 0.4 MG/1
0.4 CAPSULE ORAL DAILY
Qty: 90 CAPSULE | Refills: 1 | Status: SHIPPED | OUTPATIENT
Start: 2020-01-29 | End: 2020-05-07

## 2020-01-30 ENCOUNTER — ANTI-COAG VISIT (OUTPATIENT)
Dept: PHARMACY | Age: 85
End: 2020-01-30
Payer: MEDICARE

## 2020-01-30 LAB — INTERNATIONAL NORMALIZATION RATIO, POC: 2.5

## 2020-01-30 PROCEDURE — 85610 PROTHROMBIN TIME: CPT

## 2020-01-30 PROCEDURE — 99211 OFF/OP EST MAY X REQ PHY/QHP: CPT

## 2020-03-05 ENCOUNTER — TELEPHONE (OUTPATIENT)
Dept: PHARMACY | Age: 85
End: 2020-03-05

## 2020-03-05 NOTE — TELEPHONE ENCOUNTER
----- Message from Linda Slater sent at 3/5/2020 11:04 AM EST -----  Regardin Richard Ruiz  Patient called to inform pharmacy of new medication he's on. I also spoke with [facility] to confirm correct information: receiving Procrit 40,000 unit injection once every two weeks until red blood cell count is consistently under control again. Patient was concerned INR might affected. Patient's phone: [phone].

## 2020-03-05 NOTE — TELEPHONE ENCOUNTER
Patient called to r/s missed appointment for himself and his wife Alexa Foley: r/s for 3/10 @ 2:30 and 2:45. Patient also called to inform clinic about new medication [facility] has put him on, but couldn't remember the dosage: Epocricharlie alpha. Waiting on phone call back from [facility].

## 2020-03-06 RX ORDER — WARFARIN SODIUM 5 MG/1
TABLET ORAL
Qty: 66 TABLET | OUTPATIENT
Start: 2020-03-06

## 2020-03-06 RX ORDER — ALLOPURINOL 100 MG/1
200 TABLET ORAL DAILY
Qty: 180 TABLET | Refills: 1 | Status: SHIPPED | OUTPATIENT
Start: 2020-03-06 | End: 2020-05-07 | Stop reason: ALTCHOICE

## 2020-03-06 NOTE — TELEPHONE ENCOUNTER
Call ,  Let pt know that his cardiologist I believe rx the coumadin  He needs to let pharmacy know who rx the coumadin

## 2020-03-10 ENCOUNTER — ANTI-COAG VISIT (OUTPATIENT)
Dept: PHARMACY | Age: 85
End: 2020-03-10
Payer: MEDICARE

## 2020-03-10 LAB — INTERNATIONAL NORMALIZATION RATIO, POC: 2.5

## 2020-03-10 PROCEDURE — 99211 OFF/OP EST MAY X REQ PHY/QHP: CPT

## 2020-03-10 PROCEDURE — 85610 PROTHROMBIN TIME: CPT

## 2020-03-10 NOTE — PROGRESS NOTES
Mr. Jc Villalobos is a 80 y.o. y/o male with history of Afib   He presents today for anticoagulation monitoring and adjustment. Pertinent PMH: ICD-pacemaker. 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    Patient Reported Findings:  Yes     No  [x]   []       Patient verifies current dosing regimen as listed  []   [x]       S/S bleeding/bruising/swelling/SOB  []   [x]       Blood in urine or stool   []   [x]       Procedures scheduled in the future at this time   []   [x]       Missed Dose  []   [x]       Extra Dose  [x]   []       Change in medications- receiving Procrit 40,000 unit injection once every two weeks until red blood cell count is consistently under control again  []   [x]       Change in health/diet/appetite consistent greens -> continues  []   [x]       Change in alcohol use  []   [x]       Change in activity  []   [x]       Hospital admission   []   [x]       Emergency department visit  []   [x]       Other complaints    Clinical Outcomes:  Yes     No  []   [x]       Major bleeding event  []   [x]       Thromboembolic event  Duration of warfarin Therapy: indefinite  INR Range:  2.0-3.0     Presents to appt today accompanied by wife    INR 2.5 again today   Continue same weekly dose of 2.5 mg on Mon, Wed & Fri and 5 mg all other days   Encouraged to maintain a consistency of vegetables/salads.   Recheck INR in 4 weeks on 4/7 with wife    Referring cardiologist will be Dr. Kyle Friday   INR (no units)   Date Value   03/10/2020 2.5   01/30/2020 2.5   01/02/2020 2   12/04/2019 2.8   10/27/2018 2.60 (H)   10/26/2018 2.70 (H)   09/07/2018 3.3   08/09/2018 2.4

## 2020-03-11 RX ORDER — WARFARIN SODIUM 5 MG/1
TABLET ORAL
Qty: 90 TABLET | OUTPATIENT
Start: 2020-03-11

## 2020-03-12 RX ORDER — WARFARIN SODIUM 5 MG/1
5 TABLET ORAL DAILY
Qty: 90 TABLET | Refills: 3 | Status: SHIPPED | OUTPATIENT
Start: 2020-03-12 | End: 2021-03-22 | Stop reason: SDUPTHER

## 2020-03-17 ENCOUNTER — TELEPHONE (OUTPATIENT)
Dept: CARDIOLOGY CLINIC | Age: 85
End: 2020-03-17

## 2020-03-18 ENCOUNTER — OFFICE VISIT (OUTPATIENT)
Dept: FAMILY MEDICINE CLINIC | Age: 85
End: 2020-03-18
Payer: MEDICARE

## 2020-03-18 VITALS
HEIGHT: 70 IN | RESPIRATION RATE: 14 BRPM | WEIGHT: 166 LBS | BODY MASS INDEX: 23.77 KG/M2 | OXYGEN SATURATION: 96 % | SYSTOLIC BLOOD PRESSURE: 122 MMHG | HEART RATE: 79 BPM | DIASTOLIC BLOOD PRESSURE: 66 MMHG

## 2020-03-18 DIAGNOSIS — I10 ESSENTIAL HYPERTENSION: ICD-10-CM

## 2020-03-18 DIAGNOSIS — E11.9 TYPE 2 DIABETES MELLITUS WITHOUT COMPLICATION, WITHOUT LONG-TERM CURRENT USE OF INSULIN (HCC): Chronic | ICD-10-CM

## 2020-03-18 LAB
A/G RATIO: 1.5 (ref 1.1–2.2)
ALBUMIN SERPL-MCNC: 4.8 G/DL (ref 3.4–5)
ALP BLD-CCNC: 87 U/L (ref 40–129)
ALT SERPL-CCNC: 18 U/L (ref 10–40)
ANION GAP SERPL CALCULATED.3IONS-SCNC: 13 MMOL/L (ref 3–16)
AST SERPL-CCNC: 23 U/L (ref 15–37)
BILIRUB SERPL-MCNC: 0.8 MG/DL (ref 0–1)
BUN BLDV-MCNC: 28 MG/DL (ref 7–20)
CALCIUM SERPL-MCNC: 10.2 MG/DL (ref 8.3–10.6)
CHLORIDE BLD-SCNC: 98 MMOL/L (ref 99–110)
CO2: 28 MMOL/L (ref 21–32)
CREAT SERPL-MCNC: 0.9 MG/DL (ref 0.8–1.3)
GFR AFRICAN AMERICAN: >60
GFR NON-AFRICAN AMERICAN: >60
GLOBULIN: 3.1 G/DL
GLUCOSE BLD-MCNC: 155 MG/DL (ref 70–99)
POTASSIUM SERPL-SCNC: 4.9 MMOL/L (ref 3.5–5.1)
SODIUM BLD-SCNC: 139 MMOL/L (ref 136–145)
TOTAL PROTEIN: 7.9 G/DL (ref 6.4–8.2)

## 2020-03-18 PROCEDURE — 1036F TOBACCO NON-USER: CPT | Performed by: INTERNAL MEDICINE

## 2020-03-18 PROCEDURE — G8427 DOCREV CUR MEDS BY ELIG CLIN: HCPCS | Performed by: INTERNAL MEDICINE

## 2020-03-18 PROCEDURE — G8420 CALC BMI NORM PARAMETERS: HCPCS | Performed by: INTERNAL MEDICINE

## 2020-03-18 PROCEDURE — G8510 SCR DEP NEG, NO PLAN REQD: HCPCS | Performed by: INTERNAL MEDICINE

## 2020-03-18 PROCEDURE — 99214 OFFICE O/P EST MOD 30 MIN: CPT | Performed by: INTERNAL MEDICINE

## 2020-03-18 PROCEDURE — 1123F ACP DISCUSS/DSCN MKR DOCD: CPT | Performed by: INTERNAL MEDICINE

## 2020-03-18 PROCEDURE — 4040F PNEUMOC VAC/ADMIN/RCVD: CPT | Performed by: INTERNAL MEDICINE

## 2020-03-18 PROCEDURE — G8482 FLU IMMUNIZE ORDER/ADMIN: HCPCS | Performed by: INTERNAL MEDICINE

## 2020-03-18 ASSESSMENT — ENCOUNTER SYMPTOMS
SHORTNESS OF BREATH: 1
NAUSEA: 0
CONSTIPATION: 1
WHEEZING: 1

## 2020-03-18 ASSESSMENT — PATIENT HEALTH QUESTIONNAIRE - PHQ9
2. FEELING DOWN, DEPRESSED OR HOPELESS: 0
SUM OF ALL RESPONSES TO PHQ QUESTIONS 1-9: 0
SUM OF ALL RESPONSES TO PHQ9 QUESTIONS 1 & 2: 0
1. LITTLE INTEREST OR PLEASURE IN DOING THINGS: 0
SUM OF ALL RESPONSES TO PHQ QUESTIONS 1-9: 0

## 2020-03-18 NOTE — PROGRESS NOTES
Last attempt to quit: 1/10/1950     Years since quittin.2    Smokeless tobacco: Never Used    Tobacco comment: counseled on tobacco exposure avoidance   Substance and Sexual Activity    Alcohol use: No     Alcohol/week: 0.0 standard drinks    Drug use: No    Sexual activity: None   Lifestyle    Physical activity     Days per week: None     Minutes per session: None    Stress: None   Relationships    Social connections     Talks on phone: None     Gets together: None     Attends Mandaen service: None     Active member of club or organization: None     Attends meetings of clubs or organizations: None     Relationship status: None    Intimate partner violence     Fear of current or ex partner: None     Emotionally abused: None     Physically abused: None     Forced sexual activity: None   Other Topics Concern    None   Social History Narrative    None        Family History   Problem Relation Age of Onset    Stroke Father     Diabetes Mother      Prior to Visit Medications    Medication Sig Taking?  Authorizing Provider   EPOETIN BIBIANA IJ Inject as directed Yes Historical Provider, MD   warfarin (COUMADIN) 5 MG tablet Take 1 tablet by mouth daily Take 2.5 mg on Mon, Wed and Fri and 5 mg all other days of the week Yes PRIYA Obando CNP   allopurinol (ZYLOPRIM) 100 MG tablet TAKE 2 TABLETS BY MOUTH DAILY Yes Izabela Ambriz MD   tamsulosin (FLOMAX) 0.4 MG capsule TAKE 1 CAPSULE BY MOUTH DAILY Yes Izabela Ambriz MD   sotalol (BETAPACE) 80 MG tablet TAKE 1 TABLET BY MOUTH TWICE DAILY Yes PRIYA Obando CNP   amLODIPine-atorvastatatin (CADUET) 5-20 MG per tablet Take 1 tablet by mouth daily Yes Gracie Little MD   furosemide (LASIX) 20 MG tablet Take 1 tablet by mouth daily Yes Izabela Ambriz MD   acetaminophen (TYLENOL) 325 MG tablet Take 650 mg by mouth every 6 hours as needed for Pain Yes Historical Provider, MD   blood glucose test strips (ACCU-CHEK AKOSUA PLUS) strip TEST 142/78   09/24/19 126/70   09/20/19 120/70       Wt Readings from Last 5 Encounters:   03/18/20 166 lb (75.3 kg)   11/27/19 173 lb (78.5 kg)   11/15/19 172 lb 9.6 oz (78.3 kg)   09/24/19 173 lb 12.8 oz (78.8 kg)   09/20/19 173 lb (78.5 kg)        ASSESSMENT/PLAN:  Aamir Mulligan was seen today for hypertension. Diagnoses and all orders for this visit:    Type 2 diabetes mellitus without complication, without long-term current use of insulin (HCC)  -     Hemoglobin A1C; Future    Vitamin D deficiency-(advised 1000IU/day)    Essential hypertension  -     Comprehensive Metabolic Panel; Future    Hyperkalemia    Hypercholesteremia    DM  Resolved. On vit d  bp is great  Will ck labs today  Not on cholesterol med  Oncology managing anemia    Seeing cardiology   For edema will not inc lasix.   Do not want him to have to come back for renal due to coronavius  Advised wearing compression hose- edema not that bad   Fu in    3-4 months

## 2020-03-19 LAB
ESTIMATED AVERAGE GLUCOSE: 108.3 MG/DL
HBA1C MFR BLD: 5.4 %

## 2020-04-02 ENCOUNTER — TELEPHONE (OUTPATIENT)
Dept: PHARMACY | Age: 85
End: 2020-04-02

## 2020-04-06 DIAGNOSIS — I48.91 ATRIAL FIBRILLATION, UNSPECIFIED TYPE (HCC): ICD-10-CM

## 2020-04-06 LAB
INR BLD: 2.33 (ref 0.86–1.14)
PROTHROMBIN TIME: 27.3 SEC (ref 10–13.2)

## 2020-04-07 ENCOUNTER — ANTI-COAG VISIT (OUTPATIENT)
Dept: PHARMACY | Age: 85
End: 2020-04-07
Payer: MEDICARE

## 2020-04-07 PROCEDURE — 99211 OFF/OP EST MAY X REQ PHY/QHP: CPT

## 2020-04-07 NOTE — PROGRESS NOTES
Mr. Bladimir Cavazos is a 80 y.o. y/o male with history of Afib   He presents today for anticoagulation monitoring and adjustment. Pertinent PMH: ICD-pacemaker. 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    Patient Reported Findings:  Yes     No  [x]   []       Patient verifies current dosing regimen as listed  []   [x]       S/S bleeding/bruising/swelling/SOB  []   [x]       Blood in urine or stool   []   [x]       Procedures scheduled in the future at this time   []   [x]       Missed Dose  []   [x]       Extra Dose  [x]   []       Change in medications- receiving Procrit 40,000 unit injection once every two weeks until red blood cell count is consistently under control again---> no changes   []   [x]       Change in health/diet/appetite consistent greens -> continues  []   [x]       Change in alcohol use  []   [x]       Change in activity  []   [x]       Hospital admission   []   [x]       Emergency department visit  []   [x]       Other complaints    Clinical Outcomes:  Yes     No  []   [x]       Major bleeding event  []   [x]       Thromboembolic event  Duration of warfarin Therapy: indefinite  INR Range:  2.0-3.0     Presents to appt today accompanied by wife    INR 2.33 via lab draw at [facility] yesterday  Continue same weekly dose of 2.5 mg on Mon, Wed & Fri and 5 mg all other days. Encouraged to maintain a consistency of vegetables/salads.   Recheck INR in 4 weeks on 5/4 with wife    Referring cardiologist will be Dr. Malvin Keenan   INR (no units)   Date Value   04/06/2020 2.33 (H)   03/10/2020 2.5   01/30/2020 2.5   01/02/2020 2   12/04/2019 2.8   10/27/2018 2.60 (H)   10/26/2018 2.70 (H)   09/07/2018 3.3

## 2020-04-24 RX ORDER — FUROSEMIDE 20 MG/1
20 TABLET ORAL DAILY
Qty: 90 TABLET | Refills: 1 | Status: ON HOLD | OUTPATIENT
Start: 2020-04-24 | End: 2020-12-08 | Stop reason: HOSPADM

## 2020-05-04 DIAGNOSIS — I48.91 ATRIAL FIBRILLATION, UNSPECIFIED TYPE (HCC): ICD-10-CM

## 2020-05-04 LAB
INR BLD: 2.21 (ref 0.86–1.14)
PROTHROMBIN TIME: 25.8 SEC (ref 10–13.2)

## 2020-05-05 ENCOUNTER — ANTI-COAG VISIT (OUTPATIENT)
Dept: PHARMACY | Age: 85
End: 2020-05-05
Payer: MEDICARE

## 2020-05-05 PROCEDURE — 99211 OFF/OP EST MAY X REQ PHY/QHP: CPT

## 2020-05-07 ENCOUNTER — OFFICE VISIT (OUTPATIENT)
Dept: CARDIOLOGY CLINIC | Age: 85
End: 2020-05-07
Payer: MEDICARE

## 2020-05-07 VITALS
HEART RATE: 62 BPM | DIASTOLIC BLOOD PRESSURE: 70 MMHG | OXYGEN SATURATION: 98 % | HEIGHT: 70 IN | WEIGHT: 173 LBS | BODY MASS INDEX: 24.77 KG/M2 | SYSTOLIC BLOOD PRESSURE: 130 MMHG

## 2020-05-07 PROCEDURE — G8427 DOCREV CUR MEDS BY ELIG CLIN: HCPCS | Performed by: NURSE PRACTITIONER

## 2020-05-07 PROCEDURE — 1036F TOBACCO NON-USER: CPT | Performed by: NURSE PRACTITIONER

## 2020-05-07 PROCEDURE — 1123F ACP DISCUSS/DSCN MKR DOCD: CPT | Performed by: NURSE PRACTITIONER

## 2020-05-07 PROCEDURE — 4040F PNEUMOC VAC/ADMIN/RCVD: CPT | Performed by: NURSE PRACTITIONER

## 2020-05-07 PROCEDURE — 99214 OFFICE O/P EST MOD 30 MIN: CPT | Performed by: NURSE PRACTITIONER

## 2020-05-07 PROCEDURE — G8417 CALC BMI ABV UP PARAM F/U: HCPCS | Performed by: NURSE PRACTITIONER

## 2020-05-07 RX ORDER — THIAMINE MONONITRATE (VIT B1) 100 MG
1000 TABLET ORAL DAILY
COMMUNITY

## 2020-05-07 NOTE — PROGRESS NOTES
[address]     Outpatient Follow Up Note    Mel Montoya is 80 y.o. male who presents today with a history of CAD s/p CABG '96, PAF, SSS s/p PPM '05, HTN and hyperlipidemia. 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 He tires more easly. He gets epoetin every 14 days that has recently been prescribed. His count was 9.4 and now at 10.7. Subjective:   he denies significant chest pain. There is SOB off/on if he over does it. The patient denies orthopnea/PND. The patient has swelling a little more right now since he's not too active. He's had swelling off/on for sometime though. The patients weight is b/w 169-173# . The patient is not experiencing palpitations or dizziness. These symptoms are stable since the last OV. With regard to medication therapy the patient has been compliant with prescribed regimen. They have tolerated therapy to date.      Past Medical History:   Diagnosis Date    Actinic keratosis     Actinic keratosis     Atrial fibrillation (HCC)     Bilateral carotid artery stenosis     CAD (coronary artery disease)     Cellulitis 7/15/2015    right foot    Diabetes mellitus ([facility] [address]     DM (diabetes mellitus), type 2 with peripheral vascular complications (HCC)--s/p amputation great toe post osteomylitis  2015    Glucose intolerance (malabsorption)     Hyperlipidemia     Hypertension     Hypertrophy of prostate without urinary obstruction and other lower urinary tract symptoms (LUTS)     Intermittent atrial fibrillation (HCC)     Kidney stone     Occult blood in stool     Hx of    Pacemaker     Permanent     Social History:    Social History     Tobacco Use   Smoking Status Former Smoker    Years: 0.50    Last attempt to quit: 1/10/1950    Years since quittin.3   Smokeless Tobacco Never Used   Tobacco Comment counseled on tobacco exposure avoidance     Current Medications:  Current Outpatient Medications   Medication Sig Dispense Refill    furosemide (LASIX) 20 MG tablet TAKE 1 TABLET BY MOUTH DAILY 90 tablet 1    EPOETIN BIBIANA IJ Inject as directed      warfarin (COUMADIN) 5 MG tablet Take 1 tablet by mouth daily Take 2.5 mg on Mon, Wed and Fri and 5 mg all other days of the week 90 tablet 3    allopurinol (ZYLOPRIM) 100 MG tablet TAKE 2 TABLETS BY MOUTH DAILY 180 tablet 1    tamsulosin (FLOMAX) 0.4 MG capsule TAKE 1 CAPSULE BY MOUTH DAILY 90 capsule 1    sotalol (BETAPACE) 80 MG tablet TAKE 1 TABLET BY MOUTH TWICE DAILY 180 tablet 1    amLODIPine-atorvastatatin (CADUET) 5-20 MG per tablet Take 1 tablet by mouth daily 90 tablet 2    acetaminophen (TYLENOL) 325 MG tablet Take 650 mg by mouth every 6 hours as needed for Pain      blood glucose test strips (ACCU-CHEK AKOSUA PLUS) strip TEST BLOOD SUGAR THREE TIMES DAILY AS DIRECTED 100 strip 3    vitamin D (CHOLECALCIFEROL) 1000 UNIT TABS tablet Take 5,000 Units by mouth daily       Blood Glucose Monitoring Suppl (ACCU-CHEK AKOSUA PLUS) W/DEVICE KIT 1 kit by Does not apply route 3 times daily Patient to check blood sugar 3 times a day and PRN, he is a newly diagnosed diabetic who will require medication titration ;  LABA1C      8.6    ; ICD code- 250.02. 1 kit 0    loratadine (CLARITIN) 10 MG tablet Take 10 mg by mouth daily. No current facility-administered medications for this visit. REVIEW OF SYSTEMS:    CONSTITUTIONAL: No major weight gain or loss, fatigue, weakness, night sweats or fever. HEENT: No new vision difficulties or ringing in the ears. RESPIRATORY: No new SOB, PND, orthopnea or cough. CARDIOVASCULAR: See HPI  GI: No nausea, vomiting, diarrhea, constipation, abdominal pain or changes in bowel habits. : No urinary frequency, urgency, incontinence hematuria or dysuria. SKIN: No cyanosis or skin lesions.   MUSCULOSKELETAL: No new 198 08/19/2019     No components found for: CHLPL  Lab Results   Component Value Date    TRIG 162 (H) 08/19/2019    TRIG 231 (H) 01/22/2019    TRIG 116 07/24/2018     Lab Results   Component Value Date    HDL 42 08/19/2019    HDL 35 (L) 01/22/2019    HDL 36 (L) 07/24/2018     Lab Results   Component Value Date    LDLCALC 53 08/19/2019    LDLCALC 70 01/22/2019    LDLCALC 51 07/24/2018     Lab Results   Component Value Date    LABVLDL 32 08/19/2019    LABVLDL 46 01/22/2019    LABVLDL 23 07/24/2018     Lab Results   Component Value Date    INR 2.21 (H) 05/04/2020    INR 2.33 (H) 04/06/2020    INR 2.5 03/10/2020    PROTIME 25.8 (H) 05/04/2020    PROTIME 27.3 (H) 04/06/2020    PROTIME 13.8 11/21/2018         Radiology Review:  Pertinent images / reports were reviewed as a part of this visit and reveals the following:    Device interrogation: Nov '19: All sensing and pacing parameters are within normal range. Noted AF. Patient remains  on coumadin. Today we lowered the Max Track Rate to 100 bpm from 130 bpm  ~AP 17%  ~ 34%    Stress Echo: Feb '18:     Summary   Normal stress echocardiogram study. Rest      ECG   Normal sinus rhythm. Stress      Stress Type: Exercise      Stress Protocol: Camilo      Rest HR: 63 bpm                           HR BP Product: 28374   Rest BP: 124/74 mmHg                      Max Exercise: 7 METS   Stress Peak HR: 120 bpm   Stress Peak BP: 141/66 mmHg   Predicted HR: 136 bpm   % of predicted HR: 88   Test Duration: 5 min   Reason for Termination: Target heart rate      Results      Echo (rest): Normal (LVEF >50%)      Echo (stress): Normal (LVEF >50%)      Echo   Baseline resting echocardiogram shows normal global LV systolic function   with an ejection fraction of 55% and uniform myocardial segmental wall   motion. Following stress there was uniform augmentation of all myocardial   segments with appropriate hyperdynamic LV systolic response to stress.       ECG   Normal (Negative)

## 2020-05-07 NOTE — PATIENT INSTRUCTIONS
Increase lasix / furosemide : 20 mg twice a day for the next five days then resume 20 mg daily thereafter    Labs in one week    Try keeping your legs elevated when sitting    Call me next week and let me know how your legs look    appt in three months since you see your PCP in one month

## 2020-05-11 ENCOUNTER — HOSPITAL ENCOUNTER (OUTPATIENT)
Age: 85
Discharge: HOME | End: 2020-05-11
Payer: MEDICARE

## 2020-05-11 LAB
ANION GAP SERPL CALCULATED.3IONS-SCNC: 12 MMOL/L (ref 3–16)
BUN BLDV-MCNC: 28 MG/DL (ref 7–20)
CALCIUM SERPL-MCNC: 9.6 MG/DL (ref 8.3–10.6)
CHLORIDE BLD-SCNC: 98 MMOL/L (ref 99–110)
CO2: 29 MMOL/L (ref 21–32)
CREAT SERPL-MCNC: 1.1 MG/DL (ref 0.8–1.3)
GFR AFRICAN AMERICAN: >60
GFR NON-AFRICAN AMERICAN: >60
GLUCOSE BLD-MCNC: 275 MG/DL (ref 70–99)
POTASSIUM SERPL-SCNC: 4.5 MMOL/L (ref 3.5–5.1)
SODIUM BLD-SCNC: 139 MMOL/L (ref 136–145)

## 2020-05-11 PROCEDURE — 36415 COLL VENOUS BLD VENIPUNCTURE: CPT

## 2020-05-11 PROCEDURE — 80048 BASIC METABOLIC PNL TOTAL CA: CPT

## 2020-05-12 ENCOUNTER — TELEPHONE (OUTPATIENT)
Dept: CARDIOLOGY CLINIC | Age: 85
End: 2020-05-12

## 2020-05-12 NOTE — TELEPHONE ENCOUNTER
Notes recorded by Tasha Thomas MA on 5/12/2020 at 8:19 AM EDT  Called left message to callback.   ------    Notes recorded by PRIYA Barrientos CNP on 5/12/2020 at 7:41 AM EDT  Looks ok

## 2020-05-12 NOTE — TELEPHONE ENCOUNTER
I spoke with pt and relayed message per NPTS. He stated that he got his foot checked out and the dr said that it is fluid in his foot. He was prescribed compression stockings.

## 2020-05-28 ENCOUNTER — NURSE ONLY (OUTPATIENT)
Dept: CARDIOLOGY CLINIC | Age: 85
End: 2020-05-28
Payer: MEDICARE

## 2020-05-28 PROCEDURE — 93294 REM INTERROG EVL PM/LDLS PM: CPT | Performed by: INTERNAL MEDICINE

## 2020-05-28 PROCEDURE — 93296 REM INTERROG EVL PM/IDS: CPT | Performed by: INTERNAL MEDICINE

## 2020-06-01 DIAGNOSIS — I48.91 ATRIAL FIBRILLATION, UNSPECIFIED TYPE (HCC): ICD-10-CM

## 2020-06-01 DIAGNOSIS — E11.9 TYPE 2 DIABETES MELLITUS WITHOUT COMPLICATION, WITHOUT LONG-TERM CURRENT USE OF INSULIN (HCC): ICD-10-CM

## 2020-06-01 LAB
ALBUMIN SERPL-MCNC: 4.4 G/DL (ref 3.4–5)
ANION GAP SERPL CALCULATED.3IONS-SCNC: 10 MMOL/L (ref 3–16)
BUN BLDV-MCNC: 22 MG/DL (ref 7–20)
CALCIUM SERPL-MCNC: 9 MG/DL (ref 8.3–10.6)
CHLORIDE BLD-SCNC: 101 MMOL/L (ref 99–110)
CO2: 28 MMOL/L (ref 21–32)
CREAT SERPL-MCNC: 0.9 MG/DL (ref 0.8–1.3)
GFR AFRICAN AMERICAN: >60
GFR NON-AFRICAN AMERICAN: >60
GLUCOSE BLD-MCNC: 103 MG/DL (ref 70–99)
INR BLD: 2.81 (ref 0.86–1.14)
PHOSPHORUS: 4 MG/DL (ref 2.5–4.9)
POTASSIUM SERPL-SCNC: 4.5 MMOL/L (ref 3.5–5.1)
PROTHROMBIN TIME: 32.9 SEC (ref 10–13.2)
SODIUM BLD-SCNC: 139 MMOL/L (ref 136–145)

## 2020-06-02 ENCOUNTER — ANTI-COAG VISIT (OUTPATIENT)
Dept: PHARMACY | Age: 85
End: 2020-06-02
Payer: MEDICARE

## 2020-06-02 PROCEDURE — 99211 OFF/OP EST MAY X REQ PHY/QHP: CPT

## 2020-06-19 ENCOUNTER — VIRTUAL VISIT (OUTPATIENT)
Dept: FAMILY MEDICINE CLINIC | Age: 85
End: 2020-06-19
Payer: MEDICARE

## 2020-06-19 PROCEDURE — 99442 PR PHYS/QHP TELEPHONE EVALUATION 11-20 MIN: CPT | Performed by: INTERNAL MEDICINE

## 2020-06-19 ASSESSMENT — ENCOUNTER SYMPTOMS
SHORTNESS OF BREATH: 1
COUGH: 0
CONSTIPATION: 0
DIARRHEA: 0

## 2020-06-19 NOTE — PROGRESS NOTES
Bladimir Cavazos is a 80 y.o. male evaluated via telephone on 6/19/2020. Chief Complaint   Patient presents with    Hypertension        HPI    Not cking bp often   Was at [facility] this week. He is getting a shot q week   Going for  2 months now  Was getting epotin shots  Goes back in  3 weeks to be tested. Dm     Lab Results   Component Value Date    LABA1C 5.4 03/18/2020    LABA1C 5.2 08/19/2019    LABA1C 5.4 03/05/2019     Urinating several times a day and during the night. Was told maybe it was his prostate. Getting up  3-4 times a night and during the day  No dysuria  Taking water pill      Review of Systems   Constitutional: Negative for chills and fever. Respiratory: Positive for shortness of breath (only if he over exerts). Negative for cough. Gastrointestinal: Negative for constipation and diarrhea. Neurological: Negative for dizziness and light-headedness. Orders Only on 06/01/2020   Component Date Value Ref Range Status    Protime 06/01/2020 32.9* 10.0 - 13.2 sec Final    Comment: Effective 11-19-19 09:00am EST  Please note reference ranges have  changed for PT and INR Testing.  INR 06/01/2020 2.81* 0.86 - 1.14 Final    Comment: Effective 11/19/19 at 09:00am EST    Normal: 0.86 - 1.14  Therapeutic: 2.0 - 3.0  Pros. Valve: 2.5 - 3.5  AMI: 2.0 - 3.0      Sodium 06/01/2020 139  136 - 145 mmol/L Final    Potassium 06/01/2020 4.5  3.5 - 5.1 mmol/L Final    Chloride 06/01/2020 101  99 - 110 mmol/L Final    CO2 06/01/2020 28  21 - 32 mmol/L Final    Anion Gap 06/01/2020 10  3 - 16 Final    Glucose 06/01/2020 103* 70 - 99 mg/dL Final    BUN 06/01/2020 22* 7 - 20 mg/dL Final    CREATININE 06/01/2020 0.9  0.8 - 1.3 mg/dL Final    GFR Non- 06/01/2020 >60  >60 Final    Comment: >60 mL/min/1.73m2 EGFR, calc. for ages 25 and older using the  MDRD formula (not corrected for weight), is valid for stable  renal function.       GFR  06/01/2020 >60  >60

## 2020-06-22 ENCOUNTER — NURSE ONLY (OUTPATIENT)
Dept: FAMILY MEDICINE CLINIC | Age: 85
End: 2020-06-22

## 2020-06-22 ENCOUNTER — TELEPHONE (OUTPATIENT)
Dept: FAMILY MEDICINE CLINIC | Age: 85
End: 2020-06-22

## 2020-06-22 VITALS — DIASTOLIC BLOOD PRESSURE: 82 MMHG | SYSTOLIC BLOOD PRESSURE: 142 MMHG

## 2020-06-22 DIAGNOSIS — E78.00 HYPERCHOLESTEREMIA: ICD-10-CM

## 2020-06-22 DIAGNOSIS — I48.91 ATRIAL FIBRILLATION, UNSPECIFIED TYPE (HCC): ICD-10-CM

## 2020-06-22 DIAGNOSIS — E11.9 TYPE 2 DIABETES MELLITUS WITHOUT COMPLICATION, WITHOUT LONG-TERM CURRENT USE OF INSULIN (HCC): Chronic | ICD-10-CM

## 2020-06-22 DIAGNOSIS — R35.0 URINE FREQUENCY: ICD-10-CM

## 2020-06-22 LAB
A/G RATIO: 1.6 (ref 1.1–2.2)
ALBUMIN SERPL-MCNC: 4.1 G/DL (ref 3.4–5)
ALP BLD-CCNC: 71 U/L (ref 40–129)
ALT SERPL-CCNC: 14 U/L (ref 10–40)
ANION GAP SERPL CALCULATED.3IONS-SCNC: 14 MMOL/L (ref 3–16)
AST SERPL-CCNC: 20 U/L (ref 15–37)
BILIRUB SERPL-MCNC: 0.6 MG/DL (ref 0–1)
BILIRUBIN URINE: NEGATIVE
BLOOD, URINE: NEGATIVE
BUN BLDV-MCNC: 27 MG/DL (ref 7–20)
CALCIUM SERPL-MCNC: 8.7 MG/DL (ref 8.3–10.6)
CHLORIDE BLD-SCNC: 103 MMOL/L (ref 99–110)
CHOLESTEROL, TOTAL: 109 MG/DL (ref 0–199)
CLARITY: CLEAR
CO2: 24 MMOL/L (ref 21–32)
COLOR: YELLOW
CREAT SERPL-MCNC: 1 MG/DL (ref 0.8–1.3)
CREATININE URINE: 86.3 MG/DL (ref 39–259)
EPITHELIAL CELLS, UA: 2 /HPF (ref 0–5)
GFR AFRICAN AMERICAN: >60
GFR NON-AFRICAN AMERICAN: >60
GLOBULIN: 2.6 G/DL
GLUCOSE BLD-MCNC: 261 MG/DL (ref 70–99)
GLUCOSE URINE: 100 MG/DL
HDLC SERPL-MCNC: 37 MG/DL (ref 40–60)
HYALINE CASTS: 3 /LPF (ref 0–8)
INR BLD: 1.91 (ref 0.86–1.14)
KETONES, URINE: NEGATIVE MG/DL
LDL CHOLESTEROL CALCULATED: 48 MG/DL
LEUKOCYTE ESTERASE, URINE: ABNORMAL
MICROALBUMIN UR-MCNC: 53.3 MG/DL
MICROALBUMIN/CREAT UR-RTO: 617.6 MG/G (ref 0–30)
MICROSCOPIC EXAMINATION: YES
NITRITE, URINE: NEGATIVE
PH UA: 5.5 (ref 5–8)
POTASSIUM SERPL-SCNC: 4.4 MMOL/L (ref 3.5–5.1)
PROSTATE SPECIFIC ANTIGEN: 2.82 NG/ML (ref 0–4)
PROTEIN UA: 100 MG/DL
PROTHROMBIN TIME: 22.3 SEC (ref 10–13.2)
RBC UA: 1 /HPF (ref 0–4)
SODIUM BLD-SCNC: 141 MMOL/L (ref 136–145)
SPECIFIC GRAVITY UA: 1.02 (ref 1–1.03)
TOTAL PROTEIN: 6.7 G/DL (ref 6.4–8.2)
TRIGL SERPL-MCNC: 120 MG/DL (ref 0–150)
URINE TYPE: ABNORMAL
UROBILINOGEN, URINE: 0.2 E.U./DL
VLDLC SERPL CALC-MCNC: 24 MG/DL
WBC UA: 15 /HPF (ref 0–5)

## 2020-06-23 LAB
ESTIMATED AVERAGE GLUCOSE: 114 MG/DL
HBA1C MFR BLD: 5.6 %
URINE CULTURE, ROUTINE: NORMAL

## 2020-06-30 ENCOUNTER — ANTI-COAG VISIT (OUTPATIENT)
Dept: PHARMACY | Age: 85
End: 2020-06-30
Payer: MEDICARE

## 2020-06-30 VITALS — TEMPERATURE: 97.6 F

## 2020-06-30 LAB — INTERNATIONAL NORMALIZATION RATIO, POC: 2.4

## 2020-06-30 PROCEDURE — 85610 PROTHROMBIN TIME: CPT

## 2020-06-30 PROCEDURE — 99211 OFF/OP EST MAY X REQ PHY/QHP: CPT

## 2020-07-15 RX ORDER — SOTALOL HYDROCHLORIDE 80 MG/1
TABLET ORAL
Qty: 180 TABLET | Refills: 1 | Status: SHIPPED | OUTPATIENT
Start: 2020-07-15 | End: 2020-08-19

## 2020-07-16 ENCOUNTER — TELEPHONE (OUTPATIENT)
Dept: PHARMACY | Age: 85
End: 2020-07-16

## 2020-07-16 NOTE — TELEPHONE ENCOUNTER
----- Message from Shayy Kim sent at 7/16/2020  1:06 PM EDT -----  Regarding: new medication  Contact: Patient  Please call patient about new medication. [phone]. Lisinopril 2mg x2 daily.

## 2020-07-28 ENCOUNTER — ANTI-COAG VISIT (OUTPATIENT)
Dept: PHARMACY | Age: 85
End: 2020-07-28
Payer: MEDICARE

## 2020-07-28 VITALS — TEMPERATURE: 97.8 F

## 2020-07-28 LAB — INTERNATIONAL NORMALIZATION RATIO, POC: 3.6

## 2020-07-28 PROCEDURE — 85610 PROTHROMBIN TIME: CPT

## 2020-07-28 PROCEDURE — 99211 OFF/OP EST MAY X REQ PHY/QHP: CPT

## 2020-07-28 NOTE — PROGRESS NOTES
Mr. Lawrence Mcdonald is a 80 y.o. y/o male with history of Afib   He presents today for anticoagulation monitoring and adjustment. Pertinent PMH: ICD-pacemaker. 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    Patient Reported Findings:  Yes     No  [x]   []       Patient verifies current dosing regimen as listed  []   [x]       S/S bleeding/bruising/swelling/SOB  []   [x]       Blood in urine or stool   []   [x]       Procedures scheduled in the future at this time   []   [x]       Missed Dose  []   [x]       Extra Dose  [x]   []       Change in medications- receiving Procrit 40,000 unit injection once every two weeks until red blood cell count is consistently under control again---> no changes ---> lasix was adjusted---> [facility] for injections---> lisinopril     []   [x]       Change in health/diet/appetite consistent greens -> continues---> erratic diet   []   [x]       Change in alcohol use  []   [x]       Change in activity  []   [x]       Hospital admission   []   [x]       Emergency department visit  []   [x]       Other complaints    Clinical Outcomes:  Yes     No  []   [x]       Major bleeding event  []   [x]       Thromboembolic event  Duration of warfarin Therapy: indefinite  INR Range:  2.0-3.0     INR was 3.6 today dt tylenol and less greens   Hold tonight then continue same weekly dose of 2.5 mg on Mon, Wed & Fri and 5 mg all other days. Encouraged to maintain a consistency of vegetables/salads.   Recheck INR in 4 weeks on 8/25    Referring cardiologist will be Dr. Magui Jaime   INR (no units)   Date Value   07/28/2020 3.6   06/30/2020 2.4   06/22/2020 1.91 (H)   06/01/2020 2.81 (H)   05/04/2020 2.21 (H)   04/06/2020 2.33 (H)       CLINICAL PHARMACY CONSULT: MED RECONCILIATION/REVIEW ADDENDUM    For Pharmacy Admin Tracking Only    PHSO: Yes  Total # of Interventions Recommended: 1  - Decreased Dose #: 1  - Maintenance Safety Lab Monitoring #: 1  Total Interventions Accepted: 1  Time Spent (min): [address], PharmD

## 2020-08-10 ENCOUNTER — TELEPHONE (OUTPATIENT)
Dept: CARDIOLOGY CLINIC | Age: 85
End: 2020-08-10

## 2020-08-10 ENCOUNTER — OFFICE VISIT (OUTPATIENT)
Dept: CARDIOLOGY CLINIC | Age: 85
End: 2020-08-10
Payer: MEDICARE

## 2020-08-10 ENCOUNTER — NURSE ONLY (OUTPATIENT)
Dept: CARDIOLOGY CLINIC | Age: 85
End: 2020-08-10
Payer: MEDICARE

## 2020-08-10 VITALS
HEART RATE: 127 BPM | HEIGHT: 70 IN | DIASTOLIC BLOOD PRESSURE: 54 MMHG | BODY MASS INDEX: 23.88 KG/M2 | WEIGHT: 166.8 LBS | SYSTOLIC BLOOD PRESSURE: 112 MMHG | OXYGEN SATURATION: 97 %

## 2020-08-10 PROCEDURE — 93000 ELECTROCARDIOGRAM COMPLETE: CPT | Performed by: INTERNAL MEDICINE

## 2020-08-10 PROCEDURE — 1036F TOBACCO NON-USER: CPT | Performed by: INTERNAL MEDICINE

## 2020-08-10 PROCEDURE — 1123F ACP DISCUSS/DSCN MKR DOCD: CPT | Performed by: INTERNAL MEDICINE

## 2020-08-10 PROCEDURE — G8420 CALC BMI NORM PARAMETERS: HCPCS | Performed by: INTERNAL MEDICINE

## 2020-08-10 PROCEDURE — 99214 OFFICE O/P EST MOD 30 MIN: CPT | Performed by: INTERNAL MEDICINE

## 2020-08-10 PROCEDURE — 93280 PM DEVICE PROGR EVAL DUAL: CPT | Performed by: INTERNAL MEDICINE

## 2020-08-10 PROCEDURE — G8427 DOCREV CUR MEDS BY ELIG CLIN: HCPCS | Performed by: INTERNAL MEDICINE

## 2020-08-10 PROCEDURE — 4040F PNEUMOC VAC/ADMIN/RCVD: CPT | Performed by: INTERNAL MEDICINE

## 2020-08-10 RX ORDER — AMLODIPINE BESYLATE AND ATORVASTATIN CALCIUM 5; 20 MG/1; MG/1
1 TABLET, FILM COATED ORAL DAILY
Qty: 90 TABLET | Refills: 4 | Status: ON HOLD | OUTPATIENT
Start: 2020-08-10 | End: 2020-12-08 | Stop reason: HOSPADM

## 2020-08-10 NOTE — PROGRESS NOTES
[facility]  Cardiac Consult     Referring Provider:  Lilian Hall MD     Chief Complaint   Patient presents with    Coronary Artery Disease    Other     ICD    3 Month Follow-Up        History of Present Illness:   80 y.o.male formally seen by Dr. Ulises Ambrose seen in f/u for afib, CAD, HTN, hyperlipidemia and pacemaker placement. He has been fatigued for several months. He is being treated for anemia by [facility] with EPO. Etiology somewhat unclear. He cannot feel heart racing. \"Just tired\". On anticoagulation with coumadin followed by PCP. Tolerating without bleeding. No chest pain. Pacer check today with afib 40% of time. Somewhat fast. 128 bpm today. Past Medical History:   has a past medical history of Actinic keratosis, Actinic keratosis, Atrial fibrillation ([address] Bilateral carotid artery stenosis, CAD (coronary artery disease), Cellulitis, Diabetes mellitus ([address] DM (diabetes mellitus), type 2 with peripheral vascular complications (HCC)--s/p amputation great toe post osteomylitis , Glucose intolerance (malabsorption), Hyperlipidemia, Hypertension, Hypertrophy of prostate without urinary obstruction and other lower urinary tract symptoms (LUTS), Intermittent atrial fibrillation ([address] Kidney stone, Occult blood in stool, and Pacemaker. Surgical History:   has a past surgical history that includes Tonsillectomy and adenoidectomy; Appendectomy; Kidney stone surgery (); Coronary artery bypass graft (); Cardiac catheterization (); pacemaker placement (); other surgical history (Right, 7/17/15); Abscess Drainage (7/20/15); Toe amputation (Right, 7.16.15); Colonoscopy (2018); and pr esophagogastroduodenoscopy transoral diagnostic (N/A, 2018).      Social History:  Social History     Tobacco Use    Smoking status: Former Smoker     Years: 0.50     Last attempt to quit: 1/10/1950     Years since quittin.6    Smokeless tobacco: Never Used    Tobacco comment: counseled on tobacco exposure avoidance   Substance Use Topics    Alcohol use: No     Alcohol/week: 0.0 standard drinks        Family History:  family history includes Diabetes in his mother; Stroke in his father. Allergies:  Patient has no known allergies. Home Medications:  Prior to Visit Medications    Medication Sig Taking? Authorizing Provider   sotalol (BETAPACE) 80 MG tablet TAKE 1 TABLET BY MOUTH TWICE DAILY Yes Navi Raza, APRN - CNP   lisinopril (PRINIVIL;ZESTRIL) 5 MG tablet Take 1 tablet by mouth daily  Patient taking differently: Take 2.5 mg by mouth 2 times daily  Yes Marion Munguia MD   vitamin B-1 (THIAMINE) 100 MG tablet Take 100 mg by mouth daily Yes Historical Provider, MD   furosemide (LASIX) 20 MG tablet TAKE 1 TABLET BY MOUTH DAILY Yes Brooklyn Henson MD   EPOETIN BIBIANA IJ Inject as directed every 14 days  Yes Historical Provider, MD   warfarin (COUMADIN) 5 MG tablet Take 1 tablet by mouth daily Take 2.5 mg on Mon, Wed and Fri and 5 mg all other days of the week Yes Navi Raza, APRN - CNP   amLODIPine-atorvastatatin (CADUET) 5-20 MG per tablet Take 1 tablet by mouth daily Yes Ewa Ace MD   acetaminophen (TYLENOL) 325 MG tablet Take 650 mg by mouth every 6 hours as needed for Pain Yes Historical Provider, MD   blood glucose test strips (ACCU-CHEK AKOSUA PLUS) strip TEST BLOOD SUGAR THREE TIMES DAILY AS DIRECTED Yes Brooklyn Henson MD   vitamin D (CHOLECALCIFEROL) 1000 UNIT TABS tablet Take 5,000 Units by mouth daily  Yes Historical Provider, MD   Blood Glucose Monitoring Suppl (ACCU-CHEK AKOSUA PLUS) W/DEVICE KIT 1 kit by Does not apply route 3 times daily Patient to check blood sugar 3 times a day and PRN, he is a newly diagnosed diabetic who will require medication titration ;  LABA1C      8.6    ; ICD code- 250.02. Yes Karen Virk MD   loratadine (CLARITIN) 10 MG tablet Take 10 mg by mouth daily.    Yes Historical Provider, MD       [x] Medications and dosages reviewed. ROS:  [x]Full ROS obtained and negative except as mentioned in HPI      Physical Examination:    Vitals:    08/10/20 1007   BP: (!) 112/54   Site: Left Upper Arm   Position: Sitting   Cuff Size: Medium Adult   Pulse: 127   SpO2: 97%   Weight: 166 lb 12.8 oz (75.7 kg)   Height: 5' 9.5\" (1.765 m)        · GENERAL: Well developed, well nourished, No acute distress  · NEUROLOGICAL: Alert and oriented  · PSYCH: Calm affect  · SKIN: Warm and dry, No visible rash,   · EYES: Pupils equal and round, Sclera non-icteric,   · HENT:  External ears and nose unremarkable, mucus membranes moist  · MUSCULOSKELETAL: Normal cephalic, neck supple  · CAROTID: Normal upstroke, no bruits  · CARDIAC: JVP normal, Normal PMI, tachycardic regular rate and rhythm, normal S1S2, no murmur, rub, or gallop  · RESPIRATORY: Normal respiratory effort, clear to auscultation bilaterally  · EXTREMITIES: No LE edema  · GASTROINTESTINAL: normal bowel sounds, soft, non-tender, No hepatomegaly       Assessment:            CAD:  Stable without angina. Continue medical therapy. Last stress ECHO 2018-normal  CAB [facility]-no report  Cardiac [id number]-no report    HTN:  BP (!) 112/54 (Site: Left Upper Arm, Position: Sitting, Cuff Size: Medium Adult)   Pulse 127   Ht 5' 9.5\" (1.765 m)   Wt 166 lb 12.8 oz (75.7 kg)   SpO2 97%   BMI 24.28 kg/m²   Well controlled. Continue current therapy    Hyperlipidemia:  Controlled  LDL=48 2020  Continue current therapy    Afib:  PAF.  40% afib  In afib today and fast-128 bpm.  Start lopressor 25 BID for rate control  Refer to EP  On anticoagulation    Pacemaker:  Check today  Normal function but frequent afib  Follow    Anemia:  Followed by ALEJA WELLER    Plan:  Add lopressor 25 BID for rate control  Refer to EP    Thank you for allowing me to participate in the care of this individual.      Francie Zuñiga M.D., [facility]

## 2020-08-10 NOTE — PROGRESS NOTES
Patient comes in for programming evaluation for his pacemaker. All sensing and pacing parameters are within normal range. Battery life greater than 8 years. AP 17%,  25 %. AF with burden of 43%. Some with RVR. Today in AF w/RVR in the 130s. Pt remains on Warfarin and Metoprolol. No changes need to be made at this time. Please see interrogation for more detail. Patient will see MMK and follow up in 3 months in office or remotely.

## 2020-08-17 ENCOUNTER — TELEPHONE (OUTPATIENT)
Dept: PHARMACY | Age: 85
End: 2020-08-17

## 2020-08-18 NOTE — PROGRESS NOTES
[facility]   Electrophysiology Consultation   Date: 8/19/2020   Reason for Consultation: Establish care (PAF, SSS PPM)  Consult Requesting Physician: Alyse Gay MD      Chief Complaint   Patient presents with    Atrial Fibrillation     palpitations         HPI: Lawrence Mcdonald is a 80 y.o. male with a PMH of CAD s/p CABG '96, PAF, SSS s/p PPM '05 (Gen change 2015), HTN and hyperlipidemia. 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 He is in atrial flutter per ECG today with a rate of 118. He has not had an echo in two years we will re-check this. We will increase his sotalol to 120 mg.     He has been more tired and has more dyspnea on exertion       Past Medical History:   Diagnosis Date    Actinic keratosis     Actinic keratosis     Atrial fibrillation ([address]     Bilateral carotid artery stenosis     CAD (coronary artery disease)     Cellulitis 7/15/2015    right foot    Diabetes mellitus ([address]     DM (diabetes mellitus), type 2 with peripheral vascular complications (HCC)--s/p amputation great toe post osteomylitis  9/11/2015    Glucose intolerance (malabsorption)     Hyperlipidemia     Hypertension     Hypertrophy of prostate without urinary obstruction and other lower urinary tract symptoms (LUTS)     Intermittent atrial fibrillation ([address]     Kidney stone     Occult blood in stool     Hx of    Pacemaker     Permanent        Past Surgical History:   Procedure Laterality Date    ABSCESS DRAINAGE  7/20/15    right foot    APPENDECTOMY      CARDIAC CATHETERIZATION  2003    Left    COLONOSCOPY  03/28/2018    dr Ward Cluster    x3   [facility]    OTHER SURGICAL HISTORY Right 7/17/15    right fifth toe I and D    PACEMAKER PLACEMENT  2005    MI ESOPHAGOGASTRODUODENOSCOPY TRANSORAL DIAGNOSTIC N/A 11/21/2018    EGD DIAGNOSTIC ONLY performed by Landy Lr MD at [facility] Right 7.16.15    right pinkey toe     TONSILLECTOMY AND ADENOIDECTOMY         Allergies:  No Known Allergies    Social History:   reports that he quit smoking about 70 years ago. He quit after 0.50 years of use. He has never used smokeless tobacco. He reports that he does not drink alcohol or use drugs. Family History:  family history includes Diabetes in his mother; Stroke in his father. Reviewed. Denies family history of sudden cardiac death, arrhythmia, premature CAD    Review of System:  All other systems reviewed and are negative except for that noted above. Pertinent negatives are:   General: negative for fever, chills   Ophthalmic ROS: negative for - eye pain or loss of vision  ENT ROS: negative for - headaches, sore throat   Respiratory: negative for - cough, sputum  Cardiovascular: Reviewed in HPI  Gastrointestinal: negative for - abdominal pain, diarrhea, N/V  Hematology: negative for - bleeding, blood clots, bruising or jaundice  Genito-Urinary:  negative for - Dysuria or incontinence  Musculoskeletal: negative for - Joint swelling, muscle pain  Neurological: negative for - confusion, dizziness, headaches   Psychiatric: No anxiety, no depression. Dermatological: negative for - rash    Physical Examination:  Vitals:    08/19/20 1504   BP: 133/78   Pulse: 128   Resp: 18      Wt Readings from Last 3 Encounters:   08/19/20 167 lb 12.8 oz (76.1 kg)   08/10/20 166 lb 12.8 oz (75.7 kg)   07/14/20 168 lb 9.6 oz (76.5 kg)       · Constitutional: Oriented. No distress. · Head: Normocephalic and atraumatic. · Mouth/Throat: Oropharynx is clear and moist.   · Eyes: Conjunctivae normal. EOM are normal.   · Neck: Neck supple. No rigidity. No JVD present. · Cardiovascular: Normal rate, riregular rhythm, S1&S2. · Pulmonary/Chest: Bilateral respiratory sounds. No wheezes, No rhonchi. · Abdominal: Soft. Bowel sounds present.  No distension, No tenderness. · Musculoskeletal: No tenderness. No edema    · Lymphadenopathy: Has no cervical adenopathy. · Neurological: Alert and oriented. Cranial nerve appears intact, No Gross deficit   · Skin: Skin is warm and dry. No rash noted. · Psychiatric: Has a normal behavior       Labs, diagnostic and imaging results reviewed. Reviewed. Lab Results   Component Value Date    TSH 3.14 2016    TSH 2.56 08/15/2011    CREATININE 1.0 2020    CREATININE 0.9 2020    AST 20 2020    ALT 14 2020       EC20  Atrial flutter    Echo: 2018   Summary   -Normal left ventricle size, wall thickness and systolic function with an   estimated ejection fraction of 55%.   -No regional wall motion abnormalities are seen. -Diastolic filling parameters suggest grade II diastolic dysfunction. E/e=16.25   -Mitral annular calcification is present. -Mild to moderate mitral regurgitation.   -Mildly calcified aortic valve with mildly decreased mobility consistent   with mild aortic stenosis. -Mild aortic insufficiency is present. -Mild tricuspid regurgitation with a RVSP of 35 mmHg. -Mild pulmonic regurgitation present.   -Dilated left atrium with a volume of 65 ml.   -Pacer / ICD wire is visualized in the right heart. Stress Echo 2018   Summary   Normal stress echocardiogram study. Rest      ECG   Normal sinus rhythm.       Stress      Stress Type: Exercise      Stress Protocol: Camilo      Rest HR: 63 bpm                           HR BP Product: [id number]   Rest BP: 124/74 mmHg                      Max Exercise: 7 METS   Stress Peak HR: 120 bpm   Stress Peak BP: 141/66 mmHg   Predicted HR: 136 bpm   % of predicted HR: 88   Test Duration: 5 min   Reason for Termination: Target heart rate      Results      Echo (rest): Normal (LVEF >50%)      Echo (stress): Normal (LVEF >50%)      Echo   Baseline resting echocardiogram shows normal global LV systolic function   with tablet Take 10 mg by mouth daily. No current facility-administered medications for this visit. Assessment and plan:     SSS   S/p PPM 2005 (Gen change 2014)       [employer] [place] dual chamber    The CIED was interrogated and programmed and I supervised and reviewed all the data. All findings and changes are in device interrogation sheat and reflect my personal interpretation and changes and is scanned to Epic. AP 3.7%  63% 9 years remaining battery 76% afib burden    PAF/Flutter   -ECG today shows atrial flutter with uncontrolled rate. On sotalol 80 mg bid and metoprolol     Device shows 76% burden.   -We will increase his sotalol to 120 mg BID   -Follow up NP in 6 weeks for further adjustments   -Echo ordered, complains of fatigue    -[id number] Vasc score and anticoagulation discussed. High risk for stroke and thromboembolism. Anticoagulation is recommended. I discussed anticoagulation to decrease the risk of thromboembolic events including stroke. Benefits and alternatives were discussed with patient. Risk of bleeding was discussed. Patient verbalized understanding.    -On coumadin managed by the [facility]   -Metoprolol 25 mg BID   -Atrial fibrillation ablation procedure was discussed. We discussed the need for repeat procedure. On average patients may need more than one ablation procedure.     -Risks associated with ablation include but not limited to allergic reaction to the medications, pain, bleeding, infection, nerve injury, injury to diaphragm(breathing muscle), pulmonary embolus(blood clot in lungs), deep vein blood clot, pneumothorax, hemothorax, acute renal failure, cardiac perforation,  tamponade, need for emergent surgery (open heart), permanent pacemaker, pulmonary vein stenosis, left atrial to esophageal fistula, stroke, myocardial infarction and death. Difference between atrial fibrillation and atrial flutter discussed and treatment discussed.      After increasing sotalol I am hoping

## 2020-08-19 ENCOUNTER — NURSE ONLY (OUTPATIENT)
Dept: CARDIOLOGY CLINIC | Age: 85
End: 2020-08-19
Payer: MEDICARE

## 2020-08-19 ENCOUNTER — OFFICE VISIT (OUTPATIENT)
Dept: CARDIOLOGY CLINIC | Age: 85
End: 2020-08-19
Payer: MEDICARE

## 2020-08-19 VITALS
HEART RATE: 128 BPM | WEIGHT: 167.8 LBS | HEIGHT: 69 IN | BODY MASS INDEX: 24.85 KG/M2 | SYSTOLIC BLOOD PRESSURE: 133 MMHG | RESPIRATION RATE: 18 BRPM | DIASTOLIC BLOOD PRESSURE: 78 MMHG

## 2020-08-19 PROCEDURE — 1036F TOBACCO NON-USER: CPT | Performed by: INTERNAL MEDICINE

## 2020-08-19 PROCEDURE — G8420 CALC BMI NORM PARAMETERS: HCPCS | Performed by: INTERNAL MEDICINE

## 2020-08-19 PROCEDURE — 4040F PNEUMOC VAC/ADMIN/RCVD: CPT | Performed by: INTERNAL MEDICINE

## 2020-08-19 PROCEDURE — 99204 OFFICE O/P NEW MOD 45 MIN: CPT | Performed by: INTERNAL MEDICINE

## 2020-08-19 PROCEDURE — 93280 PM DEVICE PROGR EVAL DUAL: CPT | Performed by: INTERNAL MEDICINE

## 2020-08-19 PROCEDURE — 1123F ACP DISCUSS/DSCN MKR DOCD: CPT | Performed by: INTERNAL MEDICINE

## 2020-08-19 PROCEDURE — G8427 DOCREV CUR MEDS BY ELIG CLIN: HCPCS | Performed by: INTERNAL MEDICINE

## 2020-08-19 RX ORDER — SOTALOL HYDROCHLORIDE 120 MG/1
120 TABLET ORAL 2 TIMES DAILY
Qty: 180 TABLET | Refills: 3 | Status: SHIPPED | OUTPATIENT
Start: 2020-08-19 | End: 2021-04-28 | Stop reason: ALTCHOICE

## 2020-08-19 NOTE — PATIENT INSTRUCTIONS
Take sotalol 1.5 tablets twice a day until you run out when you get the new script I sent to your pharmacy it will be 1 tablet twice a day

## 2020-08-19 NOTE — PROGRESS NOTES
Patient comes in for programming evaluation for his pacemaker. All sensing and pacing parameters are within normal range. Battery life greater than 8 years. AP 3.7%,  63 %. AF/L with burden of 76%. Some with RVR. Today in AFL w/RVR in the 130s. Pt remains on Warfarin and Metoprolol. No changes need to be made at this time. Please see interrogation for more detail. Patient will see Dr. Ousmane Alvarado today and follow up in 3 months in office or remotely.

## 2020-08-25 ENCOUNTER — ANTI-COAG VISIT (OUTPATIENT)
Dept: PHARMACY | Age: 85
End: 2020-08-25
Payer: MEDICARE

## 2020-08-25 ENCOUNTER — TELEPHONE (OUTPATIENT)
Dept: CARDIOLOGY CLINIC | Age: 85
End: 2020-08-25

## 2020-08-25 VITALS — TEMPERATURE: 97.5 F

## 2020-08-25 LAB — INTERNATIONAL NORMALIZATION RATIO, POC: 2.6

## 2020-08-25 PROCEDURE — 85610 PROTHROMBIN TIME: CPT

## 2020-08-25 PROCEDURE — 99211 OFF/OP EST MAY X REQ PHY/QHP: CPT

## 2020-08-25 NOTE — PROGRESS NOTES
Mr. Kenneth Alvarado is a 80 y.o. y/o male with history of Afib   He presents today for anticoagulation monitoring and adjustment. Pertinent PMH: ICD-pacemaker. 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    Patient Reported Findings:  Yes     No  [x]   []       Patient verifies current dosing regimen as listed  []   [x]       S/S bleeding/bruising/swelling/SOB  []   [x]       Blood in urine or stool   []   [x]       Procedures scheduled in the future at this time -Procedure on 9/3 (derm procedure to remove moles) calling to ask about holding instructions today. []   [x]       Missed Dose  []   [x]       Extra Dose  [x]   []       Change in medications- receiving Procrit 40,000 unit injection once every two weeks until red blood cell count is consistently under control again---> no changes ---> lasix was adjusted---> [facility] for injections---> lisinopril---> sotalol inc   []   [x]       Change in health/diet/appetite consistent greens -> continues---> erratic diet---> no changes, no NVD   []   [x]       Change in alcohol use  []   [x]       Change in activity  []   [x]       Hospital admission   []   [x]       Emergency department visit  []   [x]       Other complaints    Clinical Outcomes:  Yes     No  []   [x]       Major bleeding event  []   [x]       Thromboembolic event  Duration of warfarin Therapy: indefinite  INR Range:  2.0-3.0     INR was 2.6 today   Procedure on 9/3 (derm procedure to remove moles) calling to ask about holding instructions today. Continue same weekly dose of 2.5 mg on Mon, Wed & Fri and 5 mg all other days. Encouraged to maintain a consistency of vegetables/salads.   Recheck INR in 4 weeks on 9/22- may need to RS based on holding instructions for upcoming procedure etc. He will call to discuss    Referring cardiologist will be Dr. Sherrill Youngblood  INR (no units)   Date Value   08/25/2020 2.6   07/28/2020 3.6   06/30/2020 2.4   06/22/2020 1.91 (H)   06/01/2020 2.81 (H)   05/04/2020 2.21 (H)

## 2020-08-25 NOTE — TELEPHONE ENCOUNTER
Mr. Elza Joseph is having excision lesion w/reconstruction bilateral lower eyelids on 9/3/20 at [facility]. He needs to know how may days he needs to be off warfin. Please call him.    Thanks

## 2020-08-26 ENCOUNTER — TELEPHONE (OUTPATIENT)
Dept: CARDIOLOGY CLINIC | Age: 85
End: 2020-08-26

## 2020-08-26 NOTE — TELEPHONE ENCOUNTER
Pt calling he needs to know how many days to stop taking his warfarin prior to his surgery on 09/03/20 pls call to advise thank you.

## 2020-08-26 NOTE — TELEPHONE ENCOUNTER
Patient was in a flutter in 130's and sotalol started. MMK would prefer surgery be postponed until he is more stable. Needs to come back in for an appt and EKG. Mr Christel Valladares said this is an elective surgery and he can push it back if need be. He is asking if he should be taking Metoprolol and Sotalol both. Discussed with Dr Tete Gillette and he can take both. LMOM for [facility] for them to call back.

## 2020-08-28 ENCOUNTER — OFFICE VISIT (OUTPATIENT)
Dept: CARDIOLOGY CLINIC | Age: 85
End: 2020-08-28
Payer: MEDICARE

## 2020-08-28 VITALS
OXYGEN SATURATION: 98 % | HEART RATE: 88 BPM | WEIGHT: 165.2 LBS | RESPIRATION RATE: 18 BRPM | SYSTOLIC BLOOD PRESSURE: 132 MMHG | BODY MASS INDEX: 28.2 KG/M2 | DIASTOLIC BLOOD PRESSURE: 74 MMHG | HEIGHT: 64 IN

## 2020-08-28 PROCEDURE — G8417 CALC BMI ABV UP PARAM F/U: HCPCS | Performed by: INTERNAL MEDICINE

## 2020-08-28 PROCEDURE — 1123F ACP DISCUSS/DSCN MKR DOCD: CPT | Performed by: INTERNAL MEDICINE

## 2020-08-28 PROCEDURE — 99214 OFFICE O/P EST MOD 30 MIN: CPT | Performed by: INTERNAL MEDICINE

## 2020-08-28 PROCEDURE — 4040F PNEUMOC VAC/ADMIN/RCVD: CPT | Performed by: INTERNAL MEDICINE

## 2020-08-28 PROCEDURE — 1036F TOBACCO NON-USER: CPT | Performed by: INTERNAL MEDICINE

## 2020-08-28 PROCEDURE — G8427 DOCREV CUR MEDS BY ELIG CLIN: HCPCS | Performed by: INTERNAL MEDICINE

## 2020-08-28 PROCEDURE — 93000 ELECTROCARDIOGRAM COMPLETE: CPT | Performed by: INTERNAL MEDICINE

## 2020-08-28 NOTE — PROGRESS NOTES
[facility]  Cardiac Consult     Referring Provider:  Alexandra Barraza MD     Chief Complaint   Patient presents with    Follow-up     no complaints     Coronary Artery Disease    Atrial Fibrillation        History of Present Illness:   80 y.o.male formally seen by Dr. Amber De Luna seen in f/u for afib, CAD, HTN, hyperlipidemia and pacemaker placement. He was recently seen with frequent paroxysmal afib. He was seen by EP and Sotalol increased. He has a hard time telling when he is in afib. He is feeling well. He denies chest pain, dyspnea or palpitations. INRs though coumadin clinic and tolerating anticoagulation without bleeding. He is scheduled for eye lid surgery next Thursday and is asking about holding coumadin. Past Medical History:   has a past medical history of Actinic keratosis, Actinic keratosis, Atrial fibrillation ([address] Bilateral carotid artery stenosis, CAD (coronary artery disease), Cellulitis, Diabetes mellitus ([address] DM (diabetes mellitus), type 2 with peripheral vascular complications (HCC)--s/p amputation great toe post osteomylitis , Glucose intolerance (malabsorption), Hyperlipidemia, Hypertension, Hypertrophy of prostate without urinary obstruction and other lower urinary tract symptoms (LUTS), Intermittent atrial fibrillation ([address] Kidney stone, Occult blood in stool, and Pacemaker. Surgical History:   has a past surgical history that includes Tonsillectomy and adenoidectomy; Appendectomy; Kidney stone surgery (1980); Coronary artery bypass graft (1996); Cardiac catheterization (2003); pacemaker placement (2005); other surgical history (Right, 7/17/15); Abscess Drainage (7/20/15); Toe amputation (Right, 7.16.15); Colonoscopy (03/28/2018); and pr esophagogastroduodenoscopy transoral diagnostic (N/A, 11/21/2018).      Social History:  Social History     Tobacco Use    Smoking status: Former Smoker     Years: 0.50     Last attempt to quit: 1/10/1950     Years since quitting: will require medication titration ;  LABA1C      8.6    ; ICD code- 250.02. Yes Jovanni Odonnell MD   loratadine (CLARITIN) 10 MG tablet Take 10 mg by mouth daily. Yes Historical Provider, MD       [x] Medications and dosages reviewed. ROS:  [x]Full ROS obtained and negative except as mentioned in HPI      Physical Examination:    Vitals:    20 0931   BP: 132/74   Site: Left Upper Arm   Position: Sitting   Cuff Size: Medium Adult   Pulse: 88   Resp: 18   SpO2: 98%   Weight: 165 lb 3.2 oz (74.9 kg)   Height: 5' 4\" (1.626 m)        · GENERAL: Well developed, well nourished, No acute distress  · NEUROLOGICAL: Alert and oriented  · PSYCH: Calm affect  · SKIN: Warm and dry, No visible rash,   · EYES: Pupils equal and round, Sclera non-icteric,   · HENT:  External ears and nose unremarkable, mucus membranes moist  · MUSCULOSKELETAL: Normal cephalic, neck supple  · CAROTID: Normal upstroke, no bruits  · CARDIAC: JVP normal, Normal PMI,  Regular rate and rhythm, normal S1S2, no murmur, rub, or gallop  · RESPIRATORY: Normal respiratory effort, Clear to auscultation bilaterally  · EXTREMITIES: No LE edema  · GASTROINTESTINAL: normal bowel sounds, soft, non-tender, No hepatomegaly     EKG: NSR, normal Qt    Assessment:       CAD:  Stable without angina. Continue current medical therapy  Last stress ECHO 2018-normal  CAB [facility]-no report  Cardiac [id number]-no report    HTN:  /74 (Site: Left Upper Arm, Position: Sitting, Cuff Size: Medium Adult)   Pulse 88   Resp 18   Ht 5' 4\" (1.626 m)   Wt 165 lb 3.2 oz (74.9 kg)   SpO2 98%   BMI 28.36 kg/m²   Well controlled. Continue current medical therapy    Hyperlipidemia:  Controlled  LDL=48 2020  Continue current medical therapy    Afib:  PAF.  40% afib on recent pacer check  On anticoagulation  Sotalol increased to 120 BID by EP  Sinus today  follow     Pacemaker:  Stable  Follow q 3 months    Anemia:  Followed by [facility]    Preop:  Discussed risks/benefits of holding coumadin.   Surgery is elective and he has chosen to wait until afib issues calm down    Plan:  Same Rx  F/u EP as scheduled  F/u me few months    Thank you for allowing me to participate in the care of this individual.      Shyam Browne M.D., [facility]

## 2020-08-28 NOTE — PATIENT INSTRUCTIONS
No medication changes  Echo scheduled Oct 2nd  Follow up with Juan Lala as scheduled Oct 7th  Follow up with Dr Madison Haynes in 6 months  Call with any issues

## 2020-09-09 RX ORDER — ALLOPURINOL 100 MG/1
200 TABLET ORAL DAILY
Qty: 180 TABLET | Refills: 1 | OUTPATIENT
Start: 2020-09-09

## 2020-09-22 ENCOUNTER — ANTI-COAG VISIT (OUTPATIENT)
Dept: PHARMACY | Age: 85
End: 2020-09-22
Payer: MEDICARE

## 2020-09-22 LAB — INTERNATIONAL NORMALIZATION RATIO, POC: 3

## 2020-09-22 PROCEDURE — 85610 PROTHROMBIN TIME: CPT

## 2020-09-22 PROCEDURE — 99211 OFF/OP EST MAY X REQ PHY/QHP: CPT

## 2020-10-01 ENCOUNTER — NURSE ONLY (OUTPATIENT)
Dept: FAMILY MEDICINE CLINIC | Age: 85
End: 2020-10-01
Payer: MEDICARE

## 2020-10-01 PROCEDURE — 90694 VACC AIIV4 NO PRSRV 0.5ML IM: CPT | Performed by: NURSE PRACTITIONER

## 2020-10-01 PROCEDURE — G0008 ADMIN INFLUENZA VIRUS VAC: HCPCS | Performed by: NURSE PRACTITIONER

## 2020-10-01 NOTE — PROGRESS NOTES
Vaccine Information Sheet, \"Influenza - Inactivated\"  given to Dustin Foley, or parent/legal guardian of  Dustin Foley and verbalized understanding. Patient responses:    Have you ever had a reaction to a flu vaccine? NO  Are you able to eat eggs without adverse effects? YES  Do you have any current illness? NOHave you ever had [place] Syndrome? NO    Flu vaccine given per order. Please see immunization tab.

## 2020-10-02 ENCOUNTER — HOSPITAL ENCOUNTER (OUTPATIENT)
Dept: NON INVASIVE DIAGNOSTICS | Age: 85
Discharge: HOME | End: 2020-10-02
Payer: MEDICARE

## 2020-10-02 PROCEDURE — 93306 TTE W/DOPPLER COMPLETE: CPT

## 2020-10-06 ENCOUNTER — HOSPITAL ENCOUNTER (OUTPATIENT)
Age: 85
Discharge: HOME | End: 2020-10-06
Payer: MEDICARE

## 2020-10-06 LAB
ANION GAP SERPL CALCULATED.3IONS-SCNC: 12 MMOL/L (ref 3–16)
BUN BLDV-MCNC: 29 MG/DL (ref 7–20)
CALCIUM SERPL-MCNC: 9.3 MG/DL (ref 8.3–10.6)
CHLORIDE BLD-SCNC: 103 MMOL/L (ref 99–110)
CO2: 26 MMOL/L (ref 21–32)
CREAT SERPL-MCNC: 1 MG/DL (ref 0.8–1.3)
CREATININE URINE: 102.7 MG/DL (ref 39–259)
GFR AFRICAN AMERICAN: >60
GFR NON-AFRICAN AMERICAN: >60
GLUCOSE BLD-MCNC: 146 MG/DL (ref 70–99)
MICROALBUMIN UR-MCNC: 83.9 MG/DL
MICROALBUMIN/CREAT UR-RTO: 816.9 MG/G (ref 0–30)
POTASSIUM SERPL-SCNC: 4.8 MMOL/L (ref 3.5–5.1)
SODIUM BLD-SCNC: 141 MMOL/L (ref 136–145)

## 2020-10-06 PROCEDURE — 82570 ASSAY OF URINE CREATININE: CPT

## 2020-10-06 PROCEDURE — 36415 COLL VENOUS BLD VENIPUNCTURE: CPT

## 2020-10-06 PROCEDURE — 80048 BASIC METABOLIC PNL TOTAL CA: CPT

## 2020-10-06 PROCEDURE — 82043 UR ALBUMIN QUANTITATIVE: CPT

## 2020-10-06 NOTE — PROGRESS NOTES
[address]   Electrophysiology  Cathryn Dooley, APRN-CNP  Attending EP: Dr. Romulo Marie  Date: 10/7/2020  I had the privilege of visiting Vishnu Reyes in the office. Chief Complaint:   Chief Complaint   Patient presents with    Atrial Fibrillation     History of Present Illness: History obtained from patient and medical record. Vishnu Reyes is 80 y.o. male with a past medical history of HTN, HLD, DM, anemia (follows at [facility]) CAD s/p CABG 1996, SSS s/p PPM (2005, gen change 2015) and atrial fibrillation. Interval history: Today, Vishnu Reyes is being seen for routine follow up for afib and device monitoring. He is taking sotalol 120 mg bid and tolerating with decrease in AF burden from 76% to 20%. Admits to chronic BLE edema that has not changed. Denies SOB, orthopnea, or PND. Denies CP, palpitations, or dizziness. He is under a lot of stress at home. He does not check BP or HR. He weighs himself frequently with no significant changes. He limits his sodium, and notices a worsening in his swelling with increased sodium intake. He remains independent with wife. He is being seen by nephrology for frequent urination and does not think he is taking the lisinopril anymore. Denies having chest pain, palpitations, shortness of breath, orthopnea/PND, cough, or dizziness at the time of this visit. With regard to medication therapy the patient has been compliant with prescribed regimen. He has tolerated therapy to date. Allergies:  No Known Allergies  Home Medications:  Prior to Visit Medications    Medication Sig Taking?  Authorizing Provider   sotalol (BETAPACE) 120 MG tablet Take 1 tablet by mouth 2 times daily  Patient taking differently: Take 120 mg by mouth three times daily   Loreta Goodwin MD   amLODIPine-atorvastatatin (CADUET) 5-20 MG per tablet Take 1 tablet by mouth daily  Pradeep Zuñiga MD   metoprolol tartrate (LOPRESSOR) 25 MG tablet Take 1 tablet by mouth 2 times daily  Sheeba Chowdhury MD   lisinopril (PRINIVIL;ZESTRIL) 5 MG tablet Take 1 tablet by mouth daily  Patient taking differently: Take 2.5 mg by mouth 2 times daily   Tiffanie Herrera MD   vitamin B-1 (THIAMINE) 100 MG tablet Take 100 mg by mouth daily  Historical Provider, MD   furosemide (LASIX) 20 MG tablet TAKE 1 TABLET BY MOUTH DAILY  Marlen Jaramillo MD   EPOETIN BIBIANA IJ Inject as directed every 14 days   Historical Provider, MD   warfarin (COUMADIN) 5 MG tablet Take 1 tablet by mouth daily Take 2.5 mg on Mon, Wed and Fri and 5 mg all other days of the week  PRIYA Barragan - CNP   acetaminophen (TYLENOL) 325 MG tablet Take 650 mg by mouth every 6 hours as needed for Pain  Historical Provider, MD   blood glucose test strips (ACCU-CHEK AKOSUA PLUS) strip TEST BLOOD SUGAR THREE TIMES DAILY AS DIRECTED  Marlen Jaramillo MD   vitamin D (CHOLECALCIFEROL) 1000 UNIT TABS tablet Take 5,000 Units by mouth daily   Historical Provider, MD   Blood Glucose Monitoring Suppl (ACCU-CHEK AKOSUA PLUS) W/DEVICE KIT 1 kit by Does not apply route 3 times daily Patient to check blood sugar 3 times a day and PRN, he is a newly diagnosed diabetic who will require medication titration ;  LABA1C      8.6    ; ICD code- 250.02.  Chicho Casiano MD   loratadine (CLARITIN) 10 MG tablet Take 10 mg by mouth daily.     Historical Provider, MD      Past Medical History:  Past Medical History:   Diagnosis Date    Actinic keratosis     Actinic keratosis     Atrial fibrillation ([facility] [address]     Bilateral carotid artery stenosis     CAD (coronary artery disease)     Cellulitis 7/15/2015    right foot    Diabetes mellitus ([facility] [address]     DM (diabetes mellitus), type 2 with peripheral vascular complications (HCC)--s/p amputation great toe post osteomylitis  9/11/2015    Glucose intolerance (malabsorption)     Hyperlipidemia     Hypertension     Hypertrophy of prostate without urinary obstruction and other lower urinary tract symptoms (LUTS) Encounters:   08/28/20 165 lb 3.2 oz (74.9 kg)   08/19/20 167 lb 12.8 oz (76.1 kg)   08/10/20 166 lb 12.8 oz (75.7 kg)      Constitutional: Cooperative and in no apparent distress, and appears well nourished   Skin: Warm and pink; no pallor, cyanosis, bruising, or clubbing   HEENT: Symmetric and normocephalic. Conjunctiva pink with clear sclera. Mucus membranes pink and moist. No visible masses/goiter   Respiratory: Respirations symmetric and unlabored. Lungs clear to auscultation bilaterally, no wheezing, rhonchi, or crackles.  Cardiovascular:  regular rate and rhythm. S1 & S2 present without rubs, or gallops. Peripheral pulses 2+, capillary refill < 3 seconds. negative elevation of JVP. 1+ pitting edema BLE, + systolic murmur   Gastrointestinal: Abdomen soft and round.  Musculoskeletal:  No focal weakness.  Neurological/Psych: Awake and orientated to person, place and time. Calm affect, appropriate mood. Pertinent labs, diagnostic, device, and imaging results reviewed as a part of this visit    LABS    CBC:   Lab Results   Component Value Date    WBC 5.8 08/19/2019    HGB 10.1 (L) 08/19/2019    HCT 28.5 (L) 08/19/2019    MCV 97.4 08/19/2019     08/19/2019     BMP:   Lab Results   Component Value Date    CREATININE 1.0 06/22/2020    BUN 27 (H) 06/22/2020     06/22/2020    K 4.4 06/22/2020     06/22/2020    CO2 24 06/22/2020     CrCl cannot be calculated (Unknown ideal weight.).    Lab Results   Component Value Date    BNP 79 01/10/2012       Thyroid:   Lab Results   Component Value Date    TSH 3.14 06/27/2016     Lipid Panel:   Lab Results   Component Value Date    CHOL 109 06/22/2020    HDL 37 06/22/2020    HDL 36 05/07/2012    TRIG 120 06/22/2020     LFTs:  Lab Results   Component Value Date    ALT 14 06/22/2020    AST 20 06/22/2020    ALKPHOS 71 06/22/2020    BILITOT 0.6 06/22/2020     Coags:   Lab Results   Component Value Date    PROTIME 22.3 (H) 06/22/2020    INR 3 09/22/2020 APTT 27.9 03/29/2017     ECG: 10/6/2020 SR HR 62, , , QTc 471    Echo: 10/2/2020  Summary   Left ventricular cavity size is mildly dilated with mild concentric left   ventricular hypertrophy. Left ventricular function is difficult to estimate due to arrhythmia but is   estimated at 35-40%. Grade II diastolic dysfunction with elevated LV filling pressures. Mild to moderate mitral regurgitation. The left atrium is severely dilated. Aortic valve leaflets appear calcified. Mild aortic stenosis with a peak   velocity of 254m/s and a mean pressure gradient of 15mmHg. Trivial aortic   regurgitation. Stress echo: 2/26/2018  Summary   Normal stress echocardiogram study  Echo   Baseline resting echocardiogram shows normal global LV systolic function   with an ejection fraction of 55% and uniform myocardial segmental wall   motion. Following stress there was uniform augmentation of all myocardial   segments with appropriate hyperdynamic LV systolic response to stress. Assessment:  SSS/Implantable device   - S/p dual chamber PPm 2005 (gen change 2015)   - The CIED was interrogated and I reviewed all data which shows 20% AF burden and brief AF episodes with improved rate control   - Device interrogation today shows SADAF 9 yrs, AT/AF 20%, AP 49%,  21%  Paroxysmal Atrial Fibrillation/Flutter  - ECG today shows SR  - On Sotalol 120 mg bid and Lopressor 25 mg bid  - Denies symptoms  - [id number] Vasc score and anticoagulation discussed. High risk for stroke and thromboembolism. Anticoagulation is recommended.   ~ On warfarin, denies s/s of bleeding  - Afib risk factors including age, HTN, obesity, inactivity and TISHA were discussed with patient. Risk factor modification recommended   ~ TSH 3.14 (6/27/2016)     - Treatment options including cardioversion, rate control strategy, antiarrhythmics, anticoagulation and possible ablation were discussed with patient.  Risks, benefits and alternative of each treatment options were explained. All questions answered    ~ Ablation can be considered, however overall AF burden is significantly improved on sotalol, discussed with Dr. Viktoria Berry and will leave on current dose and monitor for clinical symptoms of CHF  LV Dysfunction   - Echo shows new LV dysfunction, EF 35-40%    - Appears compensated, 1+ chronic BLE edema    - Continue OMT  CAD   - Hx of remote CABG   - CAB [facility]-no report   - Cardiac [id number]-no report   - Follows with Dr. Katy Sutton   - Denies CP  HTN-goal <130/80   - Controlled   - Continue current medications   - Encouraged patient to check BP at home, log and bring to office visits  - Discussed lifestyle modifications, weight loss, low sodium diet  Plan  - BIV upgrade was discussed and recommended and handout given  - Can consider increasing lisinopril   - He will call if he is interested in PPM upgrade, gave handout and he will discuss with wife  - Call if symptoms worsen  - Resume lisinopril, he is not sure why he is not taking, OK per nephrology    F/U: Follow-up with EP in 3 months   -Follow up with device clinic as scheduled  -Call [facility] at [phone] with any questions    Diet & Exercise:   The patient is counseled to follow a low salt diet to assure blood pressure remains controlled for cardiovascular risk factor modification   The patient is counseled to avoid excess caffeine, and energy drinks as this may exacerbated ectopy and arrhythmia   The patient is counseled to lose weight to control cardiovascular risk factors   Exercise program discussed: To improve overall cardiovascular health, the patient is instructed to increase cardiovascular related activities with a goal of 150 min/week of moderate level activity or 10,000 steps per day. Encouraged to perform as much activity as tolerated    Quality Metrics  1. Tobacco Cessation Counseling: Nonsmoker, N/A   2. Retake of BP if >140/90: N/A  3.    Documentation to PCP: Note

## 2020-10-07 ENCOUNTER — TELEPHONE (OUTPATIENT)
Dept: CARDIOLOGY CLINIC | Age: 85
End: 2020-10-07

## 2020-10-07 ENCOUNTER — NURSE ONLY (OUTPATIENT)
Dept: CARDIOLOGY CLINIC | Age: 85
End: 2020-10-07
Payer: MEDICARE

## 2020-10-07 ENCOUNTER — OFFICE VISIT (OUTPATIENT)
Dept: CARDIOLOGY CLINIC | Age: 85
End: 2020-10-07
Payer: MEDICARE

## 2020-10-07 VITALS
HEART RATE: 62 BPM | DIASTOLIC BLOOD PRESSURE: 78 MMHG | BODY MASS INDEX: 24.71 KG/M2 | HEIGHT: 69 IN | WEIGHT: 166.8 LBS | SYSTOLIC BLOOD PRESSURE: 134 MMHG | OXYGEN SATURATION: 98 %

## 2020-10-07 PROCEDURE — G8420 CALC BMI NORM PARAMETERS: HCPCS | Performed by: NURSE PRACTITIONER

## 2020-10-07 PROCEDURE — 1036F TOBACCO NON-USER: CPT | Performed by: NURSE PRACTITIONER

## 2020-10-07 PROCEDURE — 93000 ELECTROCARDIOGRAM COMPLETE: CPT | Performed by: NURSE PRACTITIONER

## 2020-10-07 PROCEDURE — 93280 PM DEVICE PROGR EVAL DUAL: CPT | Performed by: INTERNAL MEDICINE

## 2020-10-07 PROCEDURE — 1123F ACP DISCUSS/DSCN MKR DOCD: CPT | Performed by: NURSE PRACTITIONER

## 2020-10-07 PROCEDURE — 4040F PNEUMOC VAC/ADMIN/RCVD: CPT | Performed by: NURSE PRACTITIONER

## 2020-10-07 PROCEDURE — G8484 FLU IMMUNIZE NO ADMIN: HCPCS | Performed by: NURSE PRACTITIONER

## 2020-10-07 PROCEDURE — 99214 OFFICE O/P EST MOD 30 MIN: CPT | Performed by: NURSE PRACTITIONER

## 2020-10-07 PROCEDURE — G8427 DOCREV CUR MEDS BY ELIG CLIN: HCPCS | Performed by: NURSE PRACTITIONER

## 2020-10-07 RX ORDER — LISINOPRIL 5 MG/1
5 TABLET ORAL DAILY
Qty: 90 TABLET | Refills: 1 | Status: SHIPPED | OUTPATIENT
Start: 2020-10-07 | End: 2020-10-19

## 2020-10-07 NOTE — PROGRESS NOTES
Patient comes in for programming evaluation for his pacemaker. All sensing and pacing parameters are within normal range. Battery life 9 years  AP 49%.  21%. AF with a 20% burden. Last on 10/7/2020, longest 17 hours. Patient remains on warfarin, sotalol and metoprolol. No changes need to be made at this time. Please see interrogation for more detail. Patient will see Jeb Lewis today and follow up in 3 months in office or remotely.

## 2020-10-07 NOTE — TELEPHONE ENCOUNTER
Called and spoke to the patient about the message below he stated that he would like to do the 2.5mg 2x day.

## 2020-10-07 NOTE — TELEPHONE ENCOUNTER
Please call patient and let him know he should be taking lisinopril 5 mg daily or it is ok to take 2.5 mg two times daily if he prefers. I reviewed notes from nephrology and he needs this medication for his low EF.       Teena Ulrich, APRN-CNP

## 2020-10-07 NOTE — PATIENT INSTRUCTIONS
- Call office if you would like to proceed with pacemaker upgrade  - No medication changes  - Call office if you have a worsening of symptoms  - Limit sodium to 2 g daily   - Follow up in 2 months NPAL

## 2020-10-20 ENCOUNTER — ANTI-COAG VISIT (OUTPATIENT)
Dept: PHARMACY | Age: 85
End: 2020-10-20
Payer: MEDICARE

## 2020-10-20 VITALS — TEMPERATURE: 96.9 F

## 2020-10-20 LAB — INTERNATIONAL NORMALIZATION RATIO, POC: 4.3

## 2020-10-20 PROCEDURE — 85610 PROTHROMBIN TIME: CPT

## 2020-10-20 PROCEDURE — 99211 OFF/OP EST MAY X REQ PHY/QHP: CPT

## 2020-10-20 NOTE — PROGRESS NOTES
Mr. Hill Price is a 80 y.o. y/o male with history of Afib   He presents today for anticoagulation monitoring and adjustment. Pertinent PMH: ICD-pacemaker. 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    Patient Reported Findings:  Yes     No  [x]   []       Patient verifies current dosing regimen as listed per wife uses pill box  []   [x]       S/S bleeding/bruising/swelling/SOB- denies  []   [x]       Blood in urine or stool denies  [x]   []       Procedures scheduled in the future at this time -Procedure on 9/3 (derm procedure to remove moles) calling to ask about holding instructions today. --> cardiology wants to replace line to pacemaker, he will let us know when scheduled and how long he is holding for  []   [x]       Missed Dose- denies  []   [x]       Extra Dose- denies  []   [x]       Change in medications- receiving Procrit 40,000 unit injection once every two weeks until red blood cell count is consistently under control again---> no changes--> lisinopril inc to 10mg daily   []   [x]       Change in health/diet/appetite consistent greens -> continues---> erratic diet---> no changes, no NVD--> has been eating less, appetite has been deminished   []   [x]       Change in alcohol use  []   [x]       Change in activity  []   [x]       Hospital admission   []   [x]       Emergency department visit  []   [x]       Other complaints--> Patient has been very tired and not able to function well, getting epoetin alpha injection at hemotologist office     Clinical Outcomes:  Yes     No  []   [x]       Major bleeding event  []   [x]       Thromboembolic event  Duration of warfarin Therapy: indefinite  INR Range:  2.0-3.0     INR was 4.3 today d/t diminished appetite and less vit k foods consumed  Hold tonight then decrease weekly dose to 5mg Sun Tues Thurs and 2.5mg all other days (9.1%). Encouraged to maintain a consistency of vegetables/salads.   Recheck INR in 2 weeks on 10/20    Referring cardiologist will be Dr. Kimberli Durand  INR (no units)   Date Value   10/20/2020 4.3   09/22/2020 3   08/25/2020 2.6   07/28/2020 3.6   06/22/2020 1.91 (H)   06/01/2020 2.81 (H)   05/04/2020 2.21 (H)   04/06/2020 2.33 (H)       CLINICAL PHARMACY CONSULT: MED RECONCILIATION/REVIEW ADDENDUM    For Pharmacy Admin Tracking Only    PHSO: Yes  Total # of Interventions Recommended: 1  - Decreased Dose #: 1  - Maintenance Safety Lab Monitoring #: 1  Total Interventions Accepted: 1  Time Spent (min): Earline Bennett, PharmD

## 2020-11-03 ENCOUNTER — ANTI-COAG VISIT (OUTPATIENT)
Dept: PHARMACY | Age: 85
End: 2020-11-03
Payer: MEDICARE

## 2020-11-03 VITALS — TEMPERATURE: 97.5 F

## 2020-11-03 LAB — INTERNATIONAL NORMALIZATION RATIO, POC: 2.2

## 2020-11-03 PROCEDURE — 99211 OFF/OP EST MAY X REQ PHY/QHP: CPT

## 2020-11-03 PROCEDURE — 85610 PROTHROMBIN TIME: CPT

## 2020-11-03 NOTE — PROGRESS NOTES
11/03/2020 2.2   10/20/2020 4.3   09/22/2020 3   08/25/2020 2.6   06/22/2020 1.91 (H)   06/01/2020 2.81 (H)   05/04/2020 2.21 (H)   04/06/2020 2.33 (H)       CLINICAL PHARMACY CONSULT: MED RECONCILIATIN/REVIEW ADDENDUM    For Pharmacy Admin Tracking Only    PHSO: Yes  Total # of Interventions Recommended: 0  - Maintenance Safety Lab Monitoring #: 1  Total Interventions Accepted: 0  Time Spent (min): 15    Raman Camejo, PharmD

## 2020-11-17 ENCOUNTER — ANTI-COAG VISIT (OUTPATIENT)
Dept: PHARMACY | Age: 85
End: 2020-11-17
Payer: MEDICARE

## 2020-11-17 VITALS — TEMPERATURE: 96.9 F

## 2020-11-17 LAB — INTERNATIONAL NORMALIZATION RATIO, POC: 3

## 2020-11-17 PROCEDURE — 85610 PROTHROMBIN TIME: CPT

## 2020-11-17 PROCEDURE — 99211 OFF/OP EST MAY X REQ PHY/QHP: CPT

## 2020-11-17 NOTE — PROGRESS NOTES
Mr. Jonita Favre is a 80 y.o. y/o male with history of Afib   He presents today for anticoagulation monitoring and adjustment. Pertinent PMH: ICD-pacemaker. 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    Patient Reported Findings:  Yes     No  [x]   []       Patient verifies current dosing regimen as listed per wife uses pill box  []   [x]       S/S bleeding/bruising/swelling/SOB- denies  []   [x]       Blood in urine or stool denies  [x]   []       Procedures scheduled in the future at this time -Procedure on 9/3 (derm procedure to remove moles) calling to ask about holding instructions today. --> cardiology wants to replace line to pacemaker, he will let us know when scheduled and how long he is holding for  []   [x]       Missed Dose- denies  []   [x]       Extra Dose- denies  []   [x]       Change in medications- receiving Procrit 40,000 unit injection once every two weeks until red blood cell count is consistently under control again---> no changes--> lisinopril inc to 10mg daily --> no changes   []   [x]       Change in health/diet/appetite consistent greens -> continues---> erratic diet---> no changes, no NVD--> has been eating less, appetite has been diminished --> no changes, no NVD    []   [x]       Change in alcohol use  []   [x]       Change in activity  []   [x]       Hospital admission   []   [x]       Emergency department visit  []   [x]       Other complaints--> Patient has been very tired and not able to function well, getting epoetin alpha injection at hemotologist office     Clinical Outcomes:  Yes     No  []   [x]       Major bleeding event  []   [x]       Thromboembolic event  Duration of warfarin Therapy: indefinite  INR Range:  2.0-3.0     INR was 3.0  Continue 5mg Sun Tues Thurs and 2.5mg all other days   Encouraged to maintain a consistency of vegetables/salads.   Recheck INR in 4 weeks, 12/15 with Kenan Felix     Referring cardiologist will be Dr. Jered Garcia  INR (no units)   Date Value 11/17/2020 3.0   11/03/2020 2.2   10/20/2020 4.3   09/22/2020 3   06/22/2020 1.91 (H)   06/01/2020 2.81 (H)   05/04/2020 2.21 (H)   04/06/2020 2.33 (H)       CLINICAL PHARMACY CONSULT: MED RECONCILIATIN/REVIEW ADDENDUM    For Pharmacy Admin Tracking Only    PHSO: Yes   Total # of Interventions Recommended: 0  - Maintenance Safety Lab Monitoring #: 1  Total Interventions Accepted: 0  Time Spent (min): 15    Kalina CantuD

## 2020-12-01 ENCOUNTER — TELEPHONE (OUTPATIENT)
Dept: FAMILY MEDICINE CLINIC | Age: 85
End: 2020-12-01

## 2020-12-03 ENCOUNTER — OFFICE VISIT (OUTPATIENT)
Dept: PRIMARY CARE CLINIC | Age: 85
End: 2020-12-03
Payer: MEDICARE

## 2020-12-03 PROCEDURE — G8420 CALC BMI NORM PARAMETERS: HCPCS | Performed by: NURSE PRACTITIONER

## 2020-12-03 PROCEDURE — G8428 CUR MEDS NOT DOCUMENT: HCPCS | Performed by: NURSE PRACTITIONER

## 2020-12-03 PROCEDURE — 99211 OFF/OP EST MAY X REQ PHY/QHP: CPT | Performed by: NURSE PRACTITIONER

## 2020-12-03 NOTE — PROGRESS NOTES
Ck Damon received a viral test for COVID-19. They were educated on isolation and quarantine as appropriate. For any symptoms, they were directed to seek care from their PCP, given contact information to establish with a doctor, directed to an urgent care or the emergency room.

## 2020-12-04 ENCOUNTER — APPOINTMENT (OUTPATIENT)
Dept: GENERAL RADIOLOGY | Age: 85
DRG: 871 | End: 2020-12-04
Payer: MEDICARE

## 2020-12-04 VITALS
SYSTOLIC BLOOD PRESSURE: 130 MMHG | HEART RATE: 78 BPM | TEMPERATURE: 98.2 F | BODY MASS INDEX: 24.6 KG/M2 | WEIGHT: 169 LBS | OXYGEN SATURATION: 94 % | DIASTOLIC BLOOD PRESSURE: 60 MMHG | RESPIRATION RATE: 16 BRPM

## 2020-12-04 LAB — SARS-COV-2, NAA: NOT DETECTED

## 2020-12-04 PROCEDURE — 71045 X-RAY EXAM CHEST 1 VIEW: CPT

## 2020-12-04 PROCEDURE — 4500000002 HC ER NO CHARGE

## 2020-12-04 RX ORDER — ALLOPURINOL 100 MG/1
100 TABLET ORAL DAILY
COMMUNITY
End: 2021-04-21

## 2020-12-05 ENCOUNTER — HOSPITAL ENCOUNTER (INPATIENT)
Age: 85
LOS: 3 days | Discharge: HOME | DRG: 871 | End: 2020-12-08
Attending: STUDENT IN AN ORGANIZED HEALTH CARE EDUCATION/TRAINING PROGRAM | Admitting: FAMILY MEDICINE
Payer: MEDICARE

## 2020-12-05 ENCOUNTER — HOSPITAL ENCOUNTER (EMERGENCY)
Age: 85
Discharge: LEFT BEFORE DISCHARGE | DRG: 871 | End: 2020-12-05
Payer: MEDICARE

## 2020-12-05 ENCOUNTER — TELEPHONE (OUTPATIENT)
Dept: FAMILY MEDICINE CLINIC | Age: 85
End: 2020-12-05

## 2020-12-05 PROBLEM — J18.9 PNEUMONIA: Status: ACTIVE | Noted: 2020-12-05

## 2020-12-05 LAB
A/G RATIO: 1.2 (ref 1.1–2.2)
ABO/RH: NORMAL
ALBUMIN SERPL-MCNC: 4.1 G/DL (ref 3.4–5)
ALP BLD-CCNC: 86 U/L (ref 40–129)
ALT SERPL-CCNC: 12 U/L (ref 10–40)
ANION GAP SERPL CALCULATED.3IONS-SCNC: 8 MMOL/L (ref 3–16)
ANTIBODY SCREEN: NORMAL
APTT: 54.3 SEC (ref 24.2–36.2)
AST SERPL-CCNC: 20 U/L (ref 15–37)
BASOPHILS ABSOLUTE: 0 K/UL (ref 0–0.2)
BASOPHILS RELATIVE PERCENT: 0.3 %
BILIRUB SERPL-MCNC: 0.7 MG/DL (ref 0–1)
BUN BLDV-MCNC: 33 MG/DL (ref 7–20)
CALCIUM SERPL-MCNC: 9.1 MG/DL (ref 8.3–10.6)
CHLORIDE BLD-SCNC: 105 MMOL/L (ref 99–110)
CO2: 25 MMOL/L (ref 21–32)
CREAT SERPL-MCNC: 1.2 MG/DL (ref 0.8–1.3)
D DIMER: 370 NG/ML DDU (ref 0–229)
EOSINOPHILS ABSOLUTE: 0.2 K/UL (ref 0–0.6)
EOSINOPHILS RELATIVE PERCENT: 1.2 %
FIBRINOGEN: 557 MG/DL (ref 200–397)
GFR AFRICAN AMERICAN: >60
GFR NON-AFRICAN AMERICAN: 57
GLOBULIN: 3.3 G/DL
GLUCOSE BLD-MCNC: 108 MG/DL (ref 70–99)
GLUCOSE BLD-MCNC: 170 MG/DL (ref 70–99)
GLUCOSE BLD-MCNC: 179 MG/DL (ref 70–99)
HCT VFR BLD CALC: 24.3 % (ref 40.5–52.5)
HEMOGLOBIN: 8.3 G/DL (ref 13.5–17.5)
INR BLD: 5.12 (ref 0.86–1.14)
LACTATE DEHYDROGENASE: 330 U/L (ref 100–190)
LACTIC ACID: 1 MMOL/L (ref 0.4–2)
LYMPHOCYTES ABSOLUTE: 1 K/UL (ref 1–5.1)
LYMPHOCYTES RELATIVE PERCENT: 7.4 %
MCH RBC QN AUTO: 31.8 PG (ref 26–34)
MCHC RBC AUTO-ENTMCNC: 34.1 G/DL (ref 31–36)
MCV RBC AUTO: 93.3 FL (ref 80–100)
MONOCYTES ABSOLUTE: 1.2 K/UL (ref 0–1.3)
MONOCYTES RELATIVE PERCENT: 9.1 %
NEUTROPHILS ABSOLUTE: 11.1 K/UL (ref 1.7–7.7)
NEUTROPHILS RELATIVE PERCENT: 82 %
PDW BLD-RTO: 19.4 % (ref 12.4–15.4)
PERFORMED ON: ABNORMAL
PERFORMED ON: ABNORMAL
PLATELET # BLD: 205 K/UL (ref 135–450)
PMV BLD AUTO: 8.7 FL (ref 5–10.5)
POTASSIUM REFLEX MAGNESIUM: 4.7 MMOL/L (ref 3.5–5.1)
PROCALCITONIN: 0.16 NG/ML (ref 0–0.15)
PROTHROMBIN TIME: 60.4 SEC (ref 10–13.2)
RBC # BLD: 2.6 M/UL (ref 4.2–5.9)
SODIUM BLD-SCNC: 138 MMOL/L (ref 136–145)
TOTAL PROTEIN: 7.4 G/DL (ref 6.4–8.2)
TROPONIN: 0.02 NG/ML
WBC # BLD: 13.5 K/UL (ref 4–11)

## 2020-12-05 PROCEDURE — 2580000003 HC RX 258: Performed by: PHYSICIAN ASSISTANT

## 2020-12-05 PROCEDURE — 83540 ASSAY OF IRON: CPT

## 2020-12-05 PROCEDURE — 86140 C-REACTIVE PROTEIN: CPT

## 2020-12-05 PROCEDURE — 87040 BLOOD CULTURE FOR BACTERIA: CPT

## 2020-12-05 PROCEDURE — 85025 COMPLETE CBC W/AUTO DIFF WBC: CPT

## 2020-12-05 PROCEDURE — 80053 COMPREHEN METABOLIC PANEL: CPT

## 2020-12-05 PROCEDURE — 6370000000 HC RX 637 (ALT 250 FOR IP): Performed by: STUDENT IN AN ORGANIZED HEALTH CARE EDUCATION/TRAINING PROGRAM

## 2020-12-05 PROCEDURE — 85379 FIBRIN DEGRADATION QUANT: CPT

## 2020-12-05 PROCEDURE — 6360000002 HC RX W HCPCS: Performed by: PHYSICIAN ASSISTANT

## 2020-12-05 PROCEDURE — 96375 TX/PRO/DX INJ NEW DRUG ADDON: CPT

## 2020-12-05 PROCEDURE — 85384 FIBRINOGEN ACTIVITY: CPT

## 2020-12-05 PROCEDURE — 82728 ASSAY OF FERRITIN: CPT

## 2020-12-05 PROCEDURE — 6370000000 HC RX 637 (ALT 250 FOR IP): Performed by: PHYSICIAN ASSISTANT

## 2020-12-05 PROCEDURE — 94640 AIRWAY INHALATION TREATMENT: CPT

## 2020-12-05 PROCEDURE — 87449 NOS EACH ORGANISM AG IA: CPT

## 2020-12-05 PROCEDURE — 82306 VITAMIN D 25 HYDROXY: CPT

## 2020-12-05 PROCEDURE — 83550 IRON BINDING TEST: CPT

## 2020-12-05 PROCEDURE — 96374 THER/PROPH/DIAG INJ IV PUSH: CPT

## 2020-12-05 PROCEDURE — 86850 RBC ANTIBODY SCREEN: CPT

## 2020-12-05 PROCEDURE — 93005 ELECTROCARDIOGRAM TRACING: CPT | Performed by: PHYSICIAN ASSISTANT

## 2020-12-05 PROCEDURE — 99284 EMERGENCY DEPT VISIT MOD MDM: CPT

## 2020-12-05 PROCEDURE — U0003 INFECTIOUS AGENT DETECTION BY NUCLEIC ACID (DNA OR RNA); SEVERE ACUTE RESPIRATORY SYNDROME CORONAVIRUS 2 (SARS-COV-2) (CORONAVIRUS DISEASE [COVID-19]), AMPLIFIED PROBE TECHNIQUE, MAKING USE OF HIGH THROUGHPUT TECHNOLOGIES AS DESCRIBED BY CMS-2020-01-R: HCPCS

## 2020-12-05 PROCEDURE — 2580000003 HC RX 258: Performed by: FAMILY MEDICINE

## 2020-12-05 PROCEDURE — 83615 LACTATE (LD) (LDH) ENZYME: CPT

## 2020-12-05 PROCEDURE — 83605 ASSAY OF LACTIC ACID: CPT

## 2020-12-05 PROCEDURE — 82746 ASSAY OF FOLIC ACID SERUM: CPT

## 2020-12-05 PROCEDURE — 84145 PROCALCITONIN (PCT): CPT

## 2020-12-05 PROCEDURE — 94760 N-INVAS EAR/PLS OXIMETRY 1: CPT

## 2020-12-05 PROCEDURE — 2060000000 HC ICU INTERMEDIATE R&B

## 2020-12-05 PROCEDURE — 83036 HEMOGLOBIN GLYCOSYLATED A1C: CPT

## 2020-12-05 PROCEDURE — 85730 THROMBOPLASTIN TIME PARTIAL: CPT

## 2020-12-05 PROCEDURE — 85610 PROTHROMBIN TIME: CPT

## 2020-12-05 PROCEDURE — 86901 BLOOD TYPING SEROLOGIC RH(D): CPT

## 2020-12-05 PROCEDURE — 86900 BLOOD TYPING SEROLOGIC ABO: CPT

## 2020-12-05 PROCEDURE — 82607 VITAMIN B-12: CPT

## 2020-12-05 PROCEDURE — 84484 ASSAY OF TROPONIN QUANT: CPT

## 2020-12-05 PROCEDURE — 6370000000 HC RX 637 (ALT 250 FOR IP): Performed by: FAMILY MEDICINE

## 2020-12-05 RX ORDER — ALBUTEROL SULFATE 90 UG/1
2 AEROSOL, METERED RESPIRATORY (INHALATION) EVERY 6 HOURS PRN
Status: DISCONTINUED | OUTPATIENT
Start: 2020-12-05 | End: 2020-12-08 | Stop reason: HOSPADM

## 2020-12-05 RX ORDER — SODIUM CHLORIDE 0.9 % (FLUSH) 0.9 %
10 SYRINGE (ML) INJECTION PRN
Status: DISCONTINUED | OUTPATIENT
Start: 2020-12-05 | End: 2020-12-08 | Stop reason: HOSPADM

## 2020-12-05 RX ORDER — POLYETHYLENE GLYCOL 3350 17 G/17G
17 POWDER, FOR SOLUTION ORAL DAILY PRN
Status: DISCONTINUED | OUTPATIENT
Start: 2020-12-05 | End: 2020-12-08 | Stop reason: HOSPADM

## 2020-12-05 RX ORDER — ONDANSETRON 2 MG/ML
4 INJECTION INTRAMUSCULAR; INTRAVENOUS EVERY 6 HOURS PRN
Status: DISCONTINUED | OUTPATIENT
Start: 2020-12-05 | End: 2020-12-08 | Stop reason: HOSPADM

## 2020-12-05 RX ORDER — ATORVASTATIN CALCIUM 20 MG/1
20 TABLET, FILM COATED ORAL DAILY
Status: DISCONTINUED | OUTPATIENT
Start: 2020-12-05 | End: 2020-12-08 | Stop reason: HOSPADM

## 2020-12-05 RX ORDER — DEXTROSE MONOHYDRATE 25 G/50ML
12.5 INJECTION, SOLUTION INTRAVENOUS PRN
Status: DISCONTINUED | OUTPATIENT
Start: 2020-12-05 | End: 2020-12-08 | Stop reason: HOSPADM

## 2020-12-05 RX ORDER — PROMETHAZINE HYDROCHLORIDE 25 MG/1
12.5 TABLET ORAL EVERY 6 HOURS PRN
Status: DISCONTINUED | OUTPATIENT
Start: 2020-12-05 | End: 2020-12-08 | Stop reason: HOSPADM

## 2020-12-05 RX ORDER — AMLODIPINE BESYLATE AND ATORVASTATIN CALCIUM 5; 20 MG/1; MG/1
1 TABLET, FILM COATED ORAL DAILY
Status: DISCONTINUED | OUTPATIENT
Start: 2020-12-05 | End: 2020-12-05 | Stop reason: CLARIF

## 2020-12-05 RX ORDER — DEXTROSE MONOHYDRATE 50 MG/ML
100 INJECTION, SOLUTION INTRAVENOUS PRN
Status: DISCONTINUED | OUTPATIENT
Start: 2020-12-05 | End: 2020-12-08 | Stop reason: HOSPADM

## 2020-12-05 RX ORDER — AMLODIPINE BESYLATE 5 MG/1
5 TABLET ORAL DAILY
Status: DISCONTINUED | OUTPATIENT
Start: 2020-12-05 | End: 2020-12-07

## 2020-12-05 RX ORDER — ACETAMINOPHEN 650 MG/1
650 SUPPOSITORY RECTAL EVERY 6 HOURS PRN
Status: DISCONTINUED | OUTPATIENT
Start: 2020-12-05 | End: 2020-12-08 | Stop reason: HOSPADM

## 2020-12-05 RX ORDER — ACETAMINOPHEN 325 MG/1
650 TABLET ORAL EVERY 6 HOURS PRN
Status: DISCONTINUED | OUTPATIENT
Start: 2020-12-05 | End: 2020-12-08 | Stop reason: HOSPADM

## 2020-12-05 RX ORDER — BENZONATATE 100 MG/1
100 CAPSULE ORAL 3 TIMES DAILY PRN
Status: DISCONTINUED | OUTPATIENT
Start: 2020-12-05 | End: 2020-12-08 | Stop reason: HOSPADM

## 2020-12-05 RX ORDER — GUAIFENESIN 600 MG/1
600 TABLET, EXTENDED RELEASE ORAL ONCE
Status: COMPLETED | OUTPATIENT
Start: 2020-12-05 | End: 2020-12-05

## 2020-12-05 RX ORDER — INSULIN LISPRO 100 [IU]/ML
0-12 INJECTION, SOLUTION INTRAVENOUS; SUBCUTANEOUS
Status: DISCONTINUED | OUTPATIENT
Start: 2020-12-05 | End: 2020-12-06

## 2020-12-05 RX ORDER — NICOTINE POLACRILEX 4 MG
15 LOZENGE BUCCAL PRN
Status: DISCONTINUED | OUTPATIENT
Start: 2020-12-05 | End: 2020-12-08 | Stop reason: HOSPADM

## 2020-12-05 RX ORDER — INSULIN LISPRO 100 [IU]/ML
0-6 INJECTION, SOLUTION INTRAVENOUS; SUBCUTANEOUS NIGHTLY
Status: DISCONTINUED | OUTPATIENT
Start: 2020-12-05 | End: 2020-12-06

## 2020-12-05 RX ORDER — CODEINE PHOSPHATE AND GUAIFENESIN 10; 100 MG/5ML; MG/5ML
5 SOLUTION ORAL EVERY 4 HOURS PRN
Status: DISCONTINUED | OUTPATIENT
Start: 2020-12-05 | End: 2020-12-08 | Stop reason: HOSPADM

## 2020-12-05 RX ORDER — SODIUM CHLORIDE 0.9 % (FLUSH) 0.9 %
10 SYRINGE (ML) INJECTION EVERY 12 HOURS SCHEDULED
Status: DISCONTINUED | OUTPATIENT
Start: 2020-12-05 | End: 2020-12-08 | Stop reason: HOSPADM

## 2020-12-05 RX ADMIN — Medication 10 ML: at 19:55

## 2020-12-05 RX ADMIN — PROMETHAZINE HYDROCHLORIDE 12.5 MG: 25 TABLET ORAL at 19:55

## 2020-12-05 RX ADMIN — GUAIFENESIN 600 MG: 600 TABLET, EXTENDED RELEASE ORAL at 16:49

## 2020-12-05 RX ADMIN — INSULIN LISPRO 2 UNITS: 100 INJECTION, SOLUTION INTRAVENOUS; SUBCUTANEOUS at 19:01

## 2020-12-05 RX ADMIN — METOPROLOL TARTRATE 25 MG: 25 TABLET, FILM COATED ORAL at 20:03

## 2020-12-05 RX ADMIN — GUAIFENESIN AND CODEINE PHOSPHATE 5 ML: 100; 10 SOLUTION ORAL at 19:55

## 2020-12-05 RX ADMIN — ATORVASTATIN CALCIUM 20 MG: 20 TABLET, FILM COATED ORAL at 18:08

## 2020-12-05 RX ADMIN — AMLODIPINE BESYLATE 5 MG: 5 TABLET ORAL at 18:07

## 2020-12-05 RX ADMIN — AZITHROMYCIN MONOHYDRATE 500 MG: 500 INJECTION, POWDER, LYOPHILIZED, FOR SOLUTION INTRAVENOUS at 15:11

## 2020-12-05 RX ADMIN — Medication 1 G: at 15:06

## 2020-12-05 RX ADMIN — Medication 2 PUFF: at 14:27

## 2020-12-05 ASSESSMENT — ENCOUNTER SYMPTOMS
SHORTNESS OF BREATH: 1
NAUSEA: 0
CONSTIPATION: 0
ABDOMINAL PAIN: 0
VOICE CHANGE: 0
RHINORRHEA: 1
VOMITING: 0
DIARRHEA: 0
SORE THROAT: 0
STRIDOR: 0
ABDOMINAL DISTENTION: 0
WHEEZING: 0
TROUBLE SWALLOWING: 0
COLOR CHANGE: 0
BACK PAIN: 0
COUGH: 1

## 2020-12-05 NOTE — ED PROVIDER NOTES
cough and shortness of breath. Negative for wheezing and stridor. Cardiovascular: Negative for chest pain, palpitations and leg swelling. Gastrointestinal: Negative for abdominal distention, abdominal pain, constipation, diarrhea, nausea and vomiting. Endocrine: Negative. Genitourinary: Negative. Musculoskeletal: Negative for back pain, neck pain and neck stiffness. Skin: Negative for color change, pallor, rash and wound. Neurological: Negative for dizziness, tremors, seizures, syncope, facial asymmetry, speech difficulty, weakness, light-headedness, numbness and headaches. Psychiatric/Behavioral: Negative for confusion. All other systems reviewed and are negative. Positives and Pertinent negatives as per HPI. Except as noted above in the ROS, all other systems were reviewed and negative.        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  9/11/2015    Glucose intolerance (malabsorption)     Hyperlipidemia     Hypertension     Hypertrophy of prostate without urinary obstruction and other lower urinary tract symptoms (LUTS)     Intermittent atrial fibrillation ([address]     Kidney stone     Occult blood in stool     Hx of    Pacemaker     Permanent         SURGICAL HISTORY     Past Surgical History:   Procedure Laterality Date    ABSCESS DRAINAGE  7/20/15    right foot    APPENDECTOMY      CARDIAC CATHETERIZATION  2003    Left    COLONOSCOPY  03/28/2018    dr Pancho Blackwood    x3   [facility]    OTHER SURGICAL HISTORY Right 7/17/15    right fifth toe I and D    PACEMAKER PLACEMENT  2005    TX ESOPHAGOGASTRODUODENOSCOPY TRANSORAL DIAGNOSTIC N/A 11/21/2018    EGD DIAGNOSTIC ONLY performed by Ulyess Gosselin, MD at [place] [address] Right 7.16.15    right pinkey toe     TONSILLECTOMY AND ADENOIDECTOMY           CURRENTMEDICATIONS       Previous Medications    ACETAMINOPHEN (TYLENOL) 325 MG TABLET    Take 650 mg by mouth every 6 hours as needed for Pain    ALLOPURINOL (ZYLOPRIM) 100 MG TABLET    Take 100 mg by mouth daily    AMLODIPINE-ATORVASTATATIN (CADUET) 5-20 MG PER TABLET    Take 1 tablet by mouth daily    BLOOD GLUCOSE MONITORING SUPPL (ACCU-CHEK AKOSUA PLUS) W/DEVICE KIT    1 kit by Does not apply route 3 times daily Patient to check blood sugar 3 times a day and PRN, he is a newly diagnosed diabetic who will require medication titration ;  LABA1C      8.6    ; ICD code- 250.02. BLOOD GLUCOSE TEST STRIPS (ACCU-CHEK AKOSUA PLUS) STRIP    TEST BLOOD SUGAR THREE TIMES DAILY AS DIRECTED    EPOETIN BIBIANA IJ    Inject as directed every 14 days     FUROSEMIDE (LASIX) 20 MG TABLET    TAKE 1 TABLET BY MOUTH DAILY    LISINOPRIL (PRINIVIL;ZESTRIL) 10 MG TABLET    Take 1 tablet by mouth daily    LORATADINE (CLARITIN) 10 MG TABLET    Take 10 mg by mouth daily. METOPROLOL TARTRATE (LOPRESSOR) 25 MG TABLET    Take 1 tablet by mouth 2 times daily    SOTALOL (BETAPACE) 120 MG TABLET    Take 1 tablet by mouth 2 times daily    VITAMIN B-1 (THIAMINE) 100 MG TABLET    Take 100 mg by mouth daily    VITAMIN D (CHOLECALCIFEROL) 1000 UNIT TABS TABLET    Take 5,000 Units by mouth daily     WARFARIN (COUMADIN) 5 MG TABLET    Take 1 tablet by mouth daily Take 2.5 mg on Mon, Wed and Fri and 5 mg all other days of the week         ALLERGIES     Patient has no known allergies.     FAMILYHISTORY       Family History   Problem Relation Age of Onset    Stroke Father     Diabetes Mother           SOCIAL HISTORY       Social History     Tobacco Use    Smoking status: Former Smoker than 2 seconds. Coloration: Skin is not jaundiced or pale. Findings: No bruising, erythema, lesion or rash. Neurological:      General: No focal deficit present. Mental Status: He is alert and oriented to person, place, and time.    Psychiatric:         Mood and Affect: Mood normal.         Behavior: Behavior normal.         DIAGNOSTIC RESULTS   LABS:    Labs Reviewed   CBC WITH AUTO DIFFERENTIAL - Abnormal; Notable for the following components:       Result Value    WBC 13.5 (*)     RBC 2.60 (*)     Hemoglobin 8.3 (*)     Hematocrit 24.3 (*)     RDW 19.4 (*)     Neutrophils Absolute 11.1 (*)     All other components within normal limits    Narrative:     Performed at:  [facility] [address] [place]   Phone [phone]   COMPREHENSIVE METABOLIC PANEL W/ REFLEX TO MG FOR LOW K - Abnormal; Notable for the following components:    Glucose 179 (*)     BUN 33 (*)     GFR Non- 57 (*)     All other components within normal limits    Narrative:     Performed at:  [facility] [address]  [place]   Phone [phone]   PROTIME-INR - Abnormal; Notable for the following components:    Protime 60.4 (*)     INR 5.12 (*)     All other components within normal limits    Narrative:     CALL  [facility] tel. [phone],  Coag results called to and read back by Misty LWEIS, 12/05/2020  14:43, by Christiano Hamm  Performed at:  [facility] [address],  [place]   Phone [phone]   APTT - Abnormal; Notable for the following components:    aPTT 54.3 (*)     All other components within normal limits    Narrative:     Performed at:  [facility] [address] [employer]   Phone [phone]   FIBRINOGEN - Abnormal; Notable for the following components:    Fibrinogen 557 (*)     All other components such as CT, Ultrasound and MRI are read by the radiologist. Plain radiographic images are visualized and preliminarily interpreted by the ED Provider with the below findings:        Interpretation per the Radiologist below, if available at the time of this note:    No orders to display     Xr Chest Portable    Result Date: 12/4/2020  EXAMINATION: ONE XRAY VIEW OF THE CHEST 12/4/2020 11:14 pm COMPARISON: Chest 09/10/2019 HISTORY: ORDERING SYSTEM PROVIDED HISTORY: cough TECHNOLOGIST PROVIDED HISTORY: Reason for exam:->cough Reason for Exam: Cough (Cough and congestion. COVID test was negative, resulted today. PCP gave mucinex but continues to drain ) Acuity: Unknown Type of Exam: Unknown FINDINGS: The cardiac silhouette is borderline enlarged. Calcifications involving the aorta reflect atherosclerosis. The mediastinal and hilar silhouettes appear unremarkable. Scattered pulmonary infiltrates predominate medial mid inferior right lung and across the mid and lower left lung. Partial obscuration and lateral portion of the left hemidiaphragm. No pneumothorax is seen. No acute osseous abnormality is identified. Stable sequela from CABG and pacemaker. 1. Scattered pulmonary infiltrates predominate lower left lung. Possible pneumonia, particularly medial lower right lung and central and lateral lower left lung. 2. Calcific atherosclerosis aorta. 3. Borderline cardiomegaly. PROCEDURES   Unless otherwise noted below, none     Procedures    CRITICAL CARE TIME   Critical Care  There was a high probability of life-threatening clinical deterioration in the patient's condition requiring my urgent intervention. Total critical care time with the patient was 33 minutes excluding separately reportable procedures. Critical care required due to patients pneumonia and concern for early sepsis/ even considered covid19 prompting medical management, consultation and admission.        CONSULTS:  IP CONSULT TO HOSPITALIST      EMERGENCY DEPARTMENT COURSE and DIFFERENTIAL DIAGNOSIS/MDM:   Vitals:    Vitals:    12/05/20 1255 12/05/20 1429   BP: (!) 108/54    Pulse: 61    Resp: 18    Temp: 97.7 °F (36.5 °C)    TempSrc: Oral    SpO2: 95% 99%   Weight: 165 lb (74.8 kg)    Height: 5' 9\" (1.753 m)        Patient was given the following medications:  Medications   albuterol sulfate  (90 Base) MCG/ACT inhaler 2 puff (2 puffs Inhalation Given 12/5/20 1427)   cefTRIAXone (ROCEPHIN) 1 g in sterile water 10 mL IV syringe (has no administration in time range)   azithromycin (ZITHROMAX) 500 mg in D5W 250ml Vial Mate (has no administration in time range)         This patient presents to the emergency department with complaints of cough and congestion. Chest x-ray shows evidence of pneumonia. He has an elevated procalcitonin level. We did order antibiotics. The patient received breathing treatments for wheezing in the bases. Patient has an elevated troponin but has a supratherapeutic INR. My suspicion is relatively low for breakthrough PE or ACS. His hemoglobin is low, however patient states that this is a chronic issue. He actually follows up with a hematologist at [facility] for this issue. Given his age and risk factors, we do feel admission is warranted for further management. Patient and family understand and agree with plan. FINAL IMPRESSION      1. Pneumonia due to organism    2. Supratherapeutic INR    3. Elevated troponin    4. Elevated procalcitonin    5. Anticoagulated on Coumadin    6. Chronic anemia          DISPOSITION/PLAN   DISPOSITION Decision To Admit 12/05/2020 02:51:48 PM      PATIENT REFERREDTO:  No follow-up provider specified.     DISCHARGE MEDICATIONS:  New Prescriptions    No medications on file       DISCONTINUED MEDICATIONS:  Discontinued Medications    No medications on file              (Please note that portions of this note were completed with a voice recognition program.  Efforts were made to edit the dictations but occasionally words are mis-transcribed.)    Rayna Robertson PA-C (electronically signed)           Rayna Robertson PA-C  12/05/20 7649

## 2020-12-05 NOTE — ED NOTES
Pt last had his injection @ [facility] for his H/H on 11/11/2020. Was suppose to go 12/2/2020 but got it rescheduled to next week because he didn't feel well. Abigail MOLINA made aware.      William Andrews RN  12/05/20 0839

## 2020-12-05 NOTE — PROGRESS NOTES
Clinical Pharmacy Note: Pharmacy to Dose Warfarin    Pharmacy consulted by Dr. Virginie Trimble to dose warfarin. Stacey Sutherland is a 80 y.o. male  is receiving warfarin for indication: Afib    INR Goal Range: 2.0 - 3.0   Prior to admission warfarin dosing regimen: 5 mg Sun/Tues/Thur, and 2.5 mg all other days. INR today:   Lab Results   Component Value Date    INR 5.12 12/05/2020       Assessment/Plan:  INR is supratherapeutic on prior to admission dosing regimen. Based on today's assessment, hold warfarin. Daily INR is ordered. Pharmacy will continue to monitor and make adjustments to regimen as necessary.      Thank you for the consult,     Jennifer Francis, PharmD  PGY-1 Pharmacy Resident  [id number]

## 2020-12-05 NOTE — H&P
HOSPITALISTS HISTORY AND PHYSICAL    12/5/2020 6:44 PM    Patient Information:  Soniya Min is a 80 y.o. male [id number]  PCP:  Georgette Araiza MD (Tel: [phone] )    Chief complaint:    Chief Complaint   Patient presents with    Cough     Pt states dx with pneumonia last night, left ama and didn't see doc was told to come back by PCP to get medications        History of Present Illness:  Nelson Armstrong is a 80 y.o. male   Presents with c/o cough , congestion , dyspnea , fatigue ongoing for 4 days. Chest xray showed left sided pneumonia. He was tested neg for COVID 2 days ago. REVIEW OF SYSTEMS:   Constitutional: Negative for fever,chills or night sweats  ENT: Negative for rhinorrhea, epistaxis, hoarseness, sore throat. Respiratory: Negative for shortness of breath,wheezing  Cardiovascular: Negative for chest pain, palpitations   Gastrointestinal: Negative for nausea, vomiting, diarrhea  Genitourinary: Negative for polyuria, dysuria   Hematologic/Lymphatic: Negative for bleeding tendency, easy bruising  Musculoskeletal: Negative for myalgias and arthralgias  Neurologic: Negative for confusion,dysarthria. Skin: Negative for itching,rash  Psychiatric: Negative for depression,anxiety, agitation. Endocrine: Negative for polydipsia,polyuria,heat /cold intolerance.     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 Past Surgical History:   has a past surgical history that includes Tonsillectomy and adenoidectomy; Appendectomy; Kidney stone surgery (1980); Coronary artery bypass graft (1996); Cardiac catheterization (2003); pacemaker placement (2005); other surgical history (Right, 7/17/15); Abscess Drainage (7/20/15); Toe amputation (Right, 7.16.15); Colonoscopy (03/28/2018); and pr esophagogastroduodenoscopy transoral diagnostic (N/A, 11/21/2018). Medications:  No current facility-administered medications on file prior to encounter.       Current Outpatient Medications on File Prior to Encounter   Medication Sig Dispense Refill    allopurinol (ZYLOPRIM) 100 MG tablet Take 100 mg by mouth daily      lisinopril (PRINIVIL;ZESTRIL) 10 MG tablet Take 1 tablet by mouth daily 30 tablet 3    sotalol (BETAPACE) 120 MG tablet Take 1 tablet by mouth 2 times daily 180 tablet 3    amLODIPine-atorvastatatin (CADUET) 5-20 MG per tablet Take 1 tablet by mouth daily 90 tablet 4    metoprolol tartrate (LOPRESSOR) 25 MG tablet Take 1 tablet by mouth 2 times daily 180 tablet 4    vitamin B-1 (THIAMINE) 100 MG tablet Take 100 mg by mouth daily      furosemide (LASIX) 20 MG tablet TAKE 1 TABLET BY MOUTH DAILY 90 tablet 1    EPOETIN BIBIANA IJ Inject as directed every 14 days       warfarin (COUMADIN) 5 MG tablet Take 1 tablet by mouth daily Take 2.5 mg on Mon, Wed and Fri and 5 mg all other days of the week 90 tablet 3    acetaminophen (TYLENOL) 325 MG tablet Take 650 mg by mouth every 6 hours as needed for Pain      blood glucose test strips (ACCU-CHEK AKOSUA PLUS) strip TEST BLOOD SUGAR THREE TIMES DAILY AS DIRECTED 100 strip 3    vitamin D (CHOLECALCIFEROL) 1000 UNIT TABS tablet Take 5,000 Units by mouth daily       Blood Glucose Monitoring Suppl (ACCU-CHEK AKOSUA PLUS) W/DEVICE KIT 1 kit by Does not apply route 3 times daily Patient to check blood sugar 3 times a day and PRN, he is a newly diagnosed diabetic who will require medication titration ;  LABA1C      8.6    ; ICD code- 250.02. 1 kit 0    loratadine (CLARITIN) 10 MG tablet Take 10 mg by mouth daily.          Current Facility-Administered Medications   Medication Dose Route Frequency Provider Last Rate Last Dose    albuterol sulfate  (90 Base) MCG/ACT inhaler 2 puff  2 puff Inhalation Q6H PRN Rayna Robertson PA-C   2 puff at 12/05/20 1427    [START ON 12/6/2020] cefTRIAXone (ROCEPHIN) 1 g in sterile water 10 mL IV syringe  1 g Intravenous Q24H MD Endy Winchester ON 12/6/2020] azithromycin (ZITHROMAX) 500 mg in D5W 250ml Vial Mate  500 mg Intravenous Q24H Ish Rutherford MD        metoprolol tartrate (LOPRESSOR) tablet 25 mg  25 mg Oral BID Ish Rutherford MD        sodium chloride flush 0.9 % injection 10 mL  10 mL Intravenous 2 times per day Ish Rutherford MD        sodium chloride flush 0.9 % injection 10 mL  10 mL Intravenous PRN Ish Rutherford MD        acetaminophen (TYLENOL) tablet 650 mg  650 mg Oral Q6H PRN Ish Rutherford MD        Or   Endy Nuno acetaminophen (TYLENOL) suppository 650 mg  650 mg Rectal Q6H PRN Ish Rutherford MD        polyethylene glycol (GLYCOLAX) packet 17 g  17 g Oral Daily PRN Ish Rutherford MD        promethazine (PHENERGAN) tablet 12.5 mg  12.5 mg Oral Q6H PRN Ish Rutherford MD        Or    ondansetron (ZOFRAN) injection 4 mg  4 mg Intravenous Q6H PRN Ish Rutherford MD        amLODIPine (NORVASC) tablet 5 mg  5 mg Oral Daily Mercedes Koehler MD   5 mg at 12/05/20 1807    And    atorvastatin (LIPITOR) tablet 20 mg  20 mg Oral Daily Ish Rutherford MD   20 mg at 12/05/20 1808    warfarin (COUMADIN) daily dosing (placeholder)   Other Warren Lopez MD        glucose (GLUTOSE) 40 % oral gel 15 g  15 g Oral PRN Ish Rutherford MD        dextrose 50 % IV solution  12.5 g Intravenous PRN Ish Rutherford MD        glucagon (rDNA) injection 1 mg  1 mg Intramuscular PRN Ish Rutherford MD        dextrose 5 % solution  100 mL/hr Intravenous PRN Grover Giron MD        insulin lispro (1 Unit Dial) 0-12 Units  0-12 Units Subcutaneous TID WC Mercedes Dallas MD        insulin lispro (1 Unit Dial) 0-6 Units  0-6 Units Subcutaneous Nightly Grover Giron MD         Allergies:  No Known Allergies     Social History:   reports that he quit smoking about 70 years ago. He quit after 0.50 years of use. He has never used smokeless tobacco. He reports that he does not drink alcohol or use drugs. Family History:  family history includes Diabetes in his mother; Stroke in his father. ,     Physical Exam:  /69   Pulse 66   Temp 97.4 °F (36.3 °C) (Oral)   Resp 16   Ht 5' 9\" (1.753 m)   Wt 161 lb 11.2 oz (73.3 kg)   SpO2 95%   BMI 23.88 kg/m²     General appearance:  Appears comfortable. Well nourished  Eyes: Sclera clear, pupils equal  ENT: Moist mucus membranes, no thrush. Trachea midline. Cardiovascular: Regular rhythm, normal S1, S2. No murmur, gallop, rub. No edema in lower extremities  Respiratory: Clear to auscultation bilaterally, no wheeze, good inspiratory effort  Gastrointestinal: Abdomen soft, non-tender, not distended, normal bowel sounds  Musculoskeletal: No cyanosis in digits, neck supple  Neurology: Cranial nerves grossly intact. Alert and oriented in time, place and person. No speech or motor deficits  Psychiatry: Appropriate affect.  Not agitated  Skin: Warm, dry, normal turgor, no rash    Labs:  CBC:   Lab Results   Component Value Date    WBC 13.5 12/05/2020    RBC 2.60 12/05/2020    HGB 8.3 12/05/2020    HCT 24.3 12/05/2020    MCV 93.3 12/05/2020    MCH 31.8 12/05/2020    MCHC 34.1 12/05/2020    RDW 19.4 12/05/2020     12/05/2020    MPV 8.7 12/05/2020     BMP:    Lab Results   Component Value Date     12/05/2020    K 4.7 12/05/2020     12/05/2020    CO2 25 12/05/2020    BUN 33 12/05/2020    CREATININE 1.2 12/05/2020    CALCIUM 9.1 12/05/2020    GFRAA >60 12/05/2020    GFRAA >60 06/05/2013    LABGLOM 57 12/05/2020    GLUCOSE 179 12/05/2020       Chest Xray:   EKG:        Problem List  Active Problems:    Pneumonia  Resolved Problems:    * No resolved hospital problems. *        Assessment/Plan:         1. LEft lower lobe pneumonia  Cultures including COVID pending  Cont IV abx  Droplet precautions  D-dimer is elevated  Ferritin is pending    Type 2 dM   Cont sliding scale insulin  Carb control diet    Chronic anemia   Will check b12, folate  and iron levels    Admit as inpatient. I anticipate hospitalization spanning more than two midnights for investigation and treatment of the above medically necessary diagnoses.       Brooke Beckford MD    12/5/2020 6:44 PM

## 2020-12-05 NOTE — ED PROVIDER NOTES
I independently performed a history and physical on Jonita Favre. All diagnostic, treatment, and disposition decisions were made by myself in conjunction with the advanced practice provider. Briefly, this is a 80 y.o. male here for cough, congestion x 4 days. Attempted to be seen in the ER yesterday but left without being seen. His chest x-ray did show evidence of pneumonia and he returned to the ER for further treatment. He does feel short of breath with exertion and improved at rest.  He does have a history of chronic anemia for which he gets erythropoietin injections. He is due for this on Wednesday. On exam pt is resting comfortably  Cardiac RRR, no murmur  Lungs clear bilaterally, no increased work of breathing  Abdomen soft nontender  No calf edema or tenderness  Neuro no drift in extremities       The Ekg interpreted by me in the absence of a cardiologist shows. atrial paced rhythm with a rate of 61  Axis is   Normal  QTc is  normal  Intervals and Durations are unremarkable. No specific ST-T wave changes appreciated. No evidence of acute ischemia. No significant change from prior EKG dated 10/26/2018      XR chest 12/4/20     1. Scattered pulmonary infiltrates predominate lower left lung.  Possible    pneumonia, particularly medial lower right lung and central and lateral lower    left lung. 2. Calcific atherosclerosis aorta. 3. Borderline cardiomegaly.           Medications   albuterol sulfate  (90 Base) MCG/ACT inhaler 2 puff (2 puffs Inhalation Given 12/5/20 1427)   cefTRIAXone (ROCEPHIN) 1 g in sterile water 10 mL IV syringe (1 g Intravenous Given 12/5/20 1506)   azithromycin (ZITHROMAX) 500 mg in D5W 250ml Vial Mate (0 mg Intravenous Stopped 12/5/20 1649)   guaiFENesin (MUCINEX) extended release tablet 600 mg (600 mg Oral Given 12/5/20 1649)       No orders to display     Medications   albuterol sulfate  (90 Base) MCG/ACT inhaler 2 puff (2 puffs Inhalation Given 12/5/20 1427)   cefTRIAXone (ROCEPHIN) 1 g in sterile water 10 mL IV syringe (1 g Intravenous Given 12/5/20 1506)   azithromycin (ZITHROMAX) 500 mg in D5W 250ml Vial Mate (0 mg Intravenous Stopped 12/5/20 1649)   guaiFENesin (MUCINEX) extended release tablet 600 mg (600 mg Oral Given 12/5/20 1649)       Pt is 30-year-old male presenting to the emergency room cough, congestion and intermittent shortness of breath. Found to have pneumonia on chest x-ray yesterday. No hypoxia here with stable BP. He does have leukocytosis but not septic at this time. He is also having mild trop elevation without chest pain. EKG is nonischemic today. Will require further trending. We will admit for HCAP and ACS r/o. He is agreeable to plan. Patient Referrals:  No follow-up provider specified. Discharge Medications:  New Prescriptions    No medications on file       FINAL IMPRESSION  1. Pneumonia due to organism    2. Supratherapeutic INR    3. Elevated troponin    4. Elevated procalcitonin    5. Anticoagulated on Coumadin    6. Chronic anemia        Blood pressure 125/86, pulse 87, temperature 97.7 °F (36.5 °C), temperature source Oral, resp. rate 21, height 5' 9\" (1.753 m), weight 165 lb (74.8 kg), SpO2 96 %.      For further details of [facility] emergency department encounter, please see documentation by advanced practice provider        Westley Kilgore MD  12/05/20 1704       Westley Kilgore MD  12/05/20 6428 29.1

## 2020-12-05 NOTE — ED NOTES
Report called to [facility] on 3T.      Ben Amado RN  12/05/20 1869

## 2020-12-05 NOTE — TELEPHONE ENCOUNTER
Daughter called and said he was in the ER for  5 hr and was never seen. She is not with the patient. I could not find the hippa form so I did not discuss cxr  Or covid results. ER diid cxr-but pt was not seen. Wife denies a fever or chills. Has a cough. cxr reviewed and he has possible pna bilat lungs. Advised to go to the hospital . If [facility] wait time is several hours then can try another hospital.  I do not want to rx abx for him without evaluating clinical status.   covid test done and negative on  12/3/20

## 2020-12-06 LAB
ANION GAP SERPL CALCULATED.3IONS-SCNC: 9 MMOL/L (ref 3–16)
BUN BLDV-MCNC: 26 MG/DL (ref 7–20)
C-REACTIVE PROTEIN: 126 MG/L (ref 0–5.1)
CALCIUM SERPL-MCNC: 9 MG/DL (ref 8.3–10.6)
CHLORIDE BLD-SCNC: 104 MMOL/L (ref 99–110)
CO2: 23 MMOL/L (ref 21–32)
CREAT SERPL-MCNC: 1.2 MG/DL (ref 0.8–1.3)
D DIMER: 385 NG/ML DDU (ref 0–229)
EKG ATRIAL RATE: 61 BPM
EKG DIAGNOSIS: NORMAL
EKG P AXIS: 0 DEGREES
EKG P-R INTERVAL: 148 MS
EKG Q-T INTERVAL: 502 MS
EKG QRS DURATION: 116 MS
EKG QTC CALCULATION (BAZETT): 505 MS
EKG R AXIS: -40 DEGREES
EKG T AXIS: 28 DEGREES
EKG VENTRICULAR RATE: 61 BPM
ESTIMATED AVERAGE GLUCOSE: 111.2 MG/DL
FERRITIN: 932.9 NG/ML (ref 30–400)
FOLATE: 11.53 NG/ML (ref 4.78–24.2)
GFR AFRICAN AMERICAN: >60
GFR NON-AFRICAN AMERICAN: 57
GLUCOSE BLD-MCNC: 135 MG/DL (ref 70–99)
GLUCOSE BLD-MCNC: 201 MG/DL (ref 70–99)
GLUCOSE BLD-MCNC: 206 MG/DL (ref 70–99)
GLUCOSE BLD-MCNC: 232 MG/DL (ref 70–99)
GLUCOSE BLD-MCNC: 237 MG/DL (ref 70–99)
HBA1C MFR BLD: 5.5 %
HCT VFR BLD CALC: 25.4 % (ref 40.5–52.5)
HEMOGLOBIN: 8.7 G/DL (ref 13.5–17.5)
INR BLD: 5.68 (ref 0.86–1.14)
IRON SATURATION: 10 % (ref 20–50)
IRON: 18 UG/DL (ref 59–158)
L. PNEUMOPHILA SEROGP 1 UR AG: NORMAL
MCH RBC QN AUTO: 31.6 PG (ref 26–34)
MCHC RBC AUTO-ENTMCNC: 34.2 G/DL (ref 31–36)
MCV RBC AUTO: 92.5 FL (ref 80–100)
PDW BLD-RTO: 19 % (ref 12.4–15.4)
PERFORMED ON: ABNORMAL
PLATELET # BLD: 239 K/UL (ref 135–450)
PMV BLD AUTO: 9.2 FL (ref 5–10.5)
POTASSIUM SERPL-SCNC: 4.4 MMOL/L (ref 3.5–5.1)
PROTHROMBIN TIME: 67 SEC (ref 10–13.2)
RBC # BLD: 2.74 M/UL (ref 4.2–5.9)
SARS-COV-2, PCR: NOT DETECTED
SODIUM BLD-SCNC: 136 MMOL/L (ref 136–145)
STREP PNEUMONIAE ANTIGEN, URINE: NORMAL
TOTAL IRON BINDING CAPACITY: 188 UG/DL (ref 260–445)
TROPONIN: 0.02 NG/ML
VITAMIN B-12: 536 PG/ML (ref 211–911)
VITAMIN D 25-HYDROXY: 48.5 NG/ML
WBC # BLD: 15.4 K/UL (ref 4–11)

## 2020-12-06 PROCEDURE — 2060000000 HC ICU INTERMEDIATE R&B

## 2020-12-06 PROCEDURE — 87070 CULTURE OTHR SPECIMN AEROBIC: CPT

## 2020-12-06 PROCEDURE — 85610 PROTHROMBIN TIME: CPT

## 2020-12-06 PROCEDURE — 6370000000 HC RX 637 (ALT 250 FOR IP): Performed by: NURSE PRACTITIONER

## 2020-12-06 PROCEDURE — 93010 ELECTROCARDIOGRAM REPORT: CPT | Performed by: INTERNAL MEDICINE

## 2020-12-06 PROCEDURE — 6360000002 HC RX W HCPCS: Performed by: FAMILY MEDICINE

## 2020-12-06 PROCEDURE — 84484 ASSAY OF TROPONIN QUANT: CPT

## 2020-12-06 PROCEDURE — 85379 FIBRIN DEGRADATION QUANT: CPT

## 2020-12-06 PROCEDURE — 36415 COLL VENOUS BLD VENIPUNCTURE: CPT

## 2020-12-06 PROCEDURE — 6370000000 HC RX 637 (ALT 250 FOR IP): Performed by: PHYSICIAN ASSISTANT

## 2020-12-06 PROCEDURE — 6370000000 HC RX 637 (ALT 250 FOR IP): Performed by: FAMILY MEDICINE

## 2020-12-06 PROCEDURE — 2580000003 HC RX 258: Performed by: FAMILY MEDICINE

## 2020-12-06 PROCEDURE — 87205 SMEAR GRAM STAIN: CPT

## 2020-12-06 PROCEDURE — 85027 COMPLETE CBC AUTOMATED: CPT

## 2020-12-06 PROCEDURE — 80048 BASIC METABOLIC PNL TOTAL CA: CPT

## 2020-12-06 RX ORDER — INSULIN LISPRO 100 [IU]/ML
0-18 INJECTION, SOLUTION INTRAVENOUS; SUBCUTANEOUS
Status: DISCONTINUED | OUTPATIENT
Start: 2020-12-07 | End: 2020-12-08 | Stop reason: HOSPADM

## 2020-12-06 RX ORDER — INSULIN LISPRO 100 [IU]/ML
0-3 INJECTION, SOLUTION INTRAVENOUS; SUBCUTANEOUS NIGHTLY
Status: CANCELLED | OUTPATIENT
Start: 2020-12-06

## 2020-12-06 RX ORDER — INSULIN LISPRO 100 [IU]/ML
0-6 INJECTION, SOLUTION INTRAVENOUS; SUBCUTANEOUS
Status: CANCELLED | OUTPATIENT
Start: 2020-12-06

## 2020-12-06 RX ORDER — INSULIN LISPRO 100 [IU]/ML
0-9 INJECTION, SOLUTION INTRAVENOUS; SUBCUTANEOUS NIGHTLY
Status: DISCONTINUED | OUTPATIENT
Start: 2020-12-06 | End: 2020-12-08 | Stop reason: HOSPADM

## 2020-12-06 RX ADMIN — METOPROLOL TARTRATE 25 MG: 25 TABLET, FILM COATED ORAL at 09:45

## 2020-12-06 RX ADMIN — INSULIN LISPRO 4 UNITS: 100 INJECTION, SOLUTION INTRAVENOUS; SUBCUTANEOUS at 17:31

## 2020-12-06 RX ADMIN — INSULIN LISPRO 3 UNITS: 100 INJECTION, SOLUTION INTRAVENOUS; SUBCUTANEOUS at 21:59

## 2020-12-06 RX ADMIN — Medication 10 ML: at 21:54

## 2020-12-06 RX ADMIN — BENZONATATE 100 MG: 100 CAPSULE ORAL at 00:15

## 2020-12-06 RX ADMIN — ATORVASTATIN CALCIUM 20 MG: 20 TABLET, FILM COATED ORAL at 09:45

## 2020-12-06 RX ADMIN — AMLODIPINE BESYLATE 5 MG: 5 TABLET ORAL at 09:45

## 2020-12-06 RX ADMIN — Medication 10 ML: at 09:51

## 2020-12-06 RX ADMIN — INSULIN LISPRO 4 UNITS: 100 INJECTION, SOLUTION INTRAVENOUS; SUBCUTANEOUS at 13:37

## 2020-12-06 RX ADMIN — Medication 1 G: at 14:38

## 2020-12-06 RX ADMIN — METOPROLOL TARTRATE 25 MG: 25 TABLET, FILM COATED ORAL at 21:53

## 2020-12-06 RX ADMIN — AZITHROMYCIN MONOHYDRATE 500 MG: 500 INJECTION, POWDER, LYOPHILIZED, FOR SOLUTION INTRAVENOUS at 14:42

## 2020-12-06 NOTE — PLAN OF CARE
Problem: Falls - Risk of:  Goal: Will remain free from falls  Description: Will remain free from falls  12/6/2020 1109 by Tobi Strickland RN  Outcome: Ongoing  12/5/2020 2110 by Ander Tao RN  Note: Patient free from harm. ID bands on, bed in lowest position, call light in reach. Patient instructed to call for help if needed. Patient educated on ambulation and safety. Goal: Absence of physical injury  Description: Absence of physical injury  Outcome: Ongoing     Problem: Gas Exchange - Impaired:  Goal: Levels of oxygenation will improve  Description: Levels of oxygenation will improve  12/6/2020 1109 by Tobi Strickland RN  Outcome: Ongoing  12/5/2020 2110 by Ander Tao RN  Note: Pt O2 sats > 90 %on RA.

## 2020-12-06 NOTE — PROGRESS NOTES
PT's O2 sats were going from 88-91, put PT on 3L, O2 sats in 95, patient resting peacefully. Will continue to assess patient and titrate as appropriately.

## 2020-12-06 NOTE — PROGRESS NOTES
[facility]    12/6/2020 9:03 AM        Name: Minnie Rojo . Admitted: 12/5/2020  Primary Care Provider: Monae Wong MD (Tel: [phone])      Chief Complaint   Patient presents with    Cough     Pt states dx with pneumonia last night, left ama and didn't see doc was told to come back by PCP to get medications       Brief History: Patient is an 81 yo male with hx PAF, s/p PPM, carotid stenosis, CAD/CABG, DM2, HTN, HLD. He presented to hospital with cough, dyspnea, fatigue onset 4 days earlier. CXR with LLL pneumonia. He had negative COVID test (ISAAC) 12/3. Procalcitonin 0.16. He has been started on IV azithromycin and ceftriaxone. O2 sats > 90% on room air. Subjective:  Presently resting in bed. States he feels better then he did on admission. Still with frequent cough, productive white to grey sputum. Denies chest pain, shortness of breath at rest, chills. Remains afebrile. O2 margy stable room air.      Reviewed interval ancillary notes    Current Medications  albuterol sulfate  (90 Base) MCG/ACT inhaler 2 puff, Q6H PRN  cefTRIAXone (ROCEPHIN) 1 g in sterile water 10 mL IV syringe, Q24H  azithromycin (ZITHROMAX) 500 mg in D5W 250ml Vial Mate, Q24H  metoprolol tartrate (LOPRESSOR) tablet 25 mg, BID  sodium chloride flush 0.9 % injection 10 mL, 2 times per day  sodium chloride flush 0.9 % injection 10 mL, PRN  acetaminophen (TYLENOL) tablet 650 mg, Q6H PRN    Or  acetaminophen (TYLENOL) suppository 650 mg, Q6H PRN  polyethylene glycol (GLYCOLAX) packet 17 g, Daily PRN  promethazine (PHENERGAN) tablet 12.5 mg, Q6H PRN    Or  ondansetron (ZOFRAN) injection 4 mg, Q6H PRN  amLODIPine (NORVASC) tablet 5 mg, Daily    And  atorvastatin (LIPITOR) tablet 20 mg, Daily  warfarin (COUMADIN) daily dosing (placeholder), RX Placeholder  glucose (GLUTOSE) 40 % oral gel 15 g, PRN  dextrose 50 % IV solution, PRN  glucagon (rDNA) injection 1 mg, PRN  dextrose 5 % solution, PRN  insulin lispro (1 Unit Dial) 0-12 Units, TID WC  insulin lispro (1 Unit Dial) 0-6 Units, Nightly  guaiFENesin-codeine (GUAIFENESIN AC) 100-10 MG/5ML liquid 5 mL, Q4H PRN  benzonatate (TESSALON) capsule 100 mg, TID PRN        Objective:  /80   Pulse 85   Temp 98.6 °F (37 °C) (Oral)   Resp 18   Ht 5' 9\" (1.753 m)   Wt 161 lb 11.2 oz (73.3 kg)   SpO2 91%   BMI 23.88 kg/m²     Intake/Output Summary (Last 24 hours) at 12/6/2020 0903  Last data filed at 12/6/2020 0515  Gross per 24 hour   Intake 240 ml   Output 1600 ml   Net -1360 ml      Wt Readings from Last 3 Encounters:   12/05/20 161 lb 11.2 oz (73.3 kg)   12/04/20 169 lb (76.7 kg)   10/19/20 163 lb 12.8 oz (74.3 kg)       General appearance:  Appears comfortable  ENT: Moist oral mucosa. Cardiovascular: Regular rhythm, normal S1, S2. No murmur. No edema in lower extremities, paced rhythm on monitor  Respiratory: Not using accessory muscles. Frequent congested cough. Diminished, no wheezes. GI: Abdomen soft, no tenderness, not distended, normal bowel sounds  Musculoskeletal: No cyanosis in digits, neck supple  Neurology: CN 2-12 grossly intact. No speech or motor deficits  Psych: Normal affect. Alert and oriented in time, place and person  Skin: Warm, dry, normal turgor    Labs and Tests:  CBC:   Recent Labs     12/05/20  1410   WBC 13.5*   HGB 8.3*        BMP:    Recent Labs     12/05/20  1410      K 4.7      CO2 25   BUN 33*   CREATININE 1.2   GLUCOSE 179*     Hepatic:   Recent Labs     12/05/20  1410   AST 20   ALT 12   BILITOT 0.7   ALKPHOS 86     Results for Jorge Mark (MRN [id number]) as of 12/6/2020 09:26   Ref. Range 12/5/2020 17:44 12/5/2020 20:00 12/6/2020 07:28   POC Glucose Latest Ref Range: 70 - 99 mg/dl 170 (H) 108 (H) 135 (H)       CXR 12/4/2020:  1.  Scattered pulmonary infiltrates predominate lower left lung.  Possible pneumonia, particularly medial lower right lung and central and lateral lower    left lung. 2. Calcific atherosclerosis aorta. 3. Borderline cardiomegaly. Problem List  Active Problems:    Pneumonia  Resolved Problems:    * No resolved hospital problems. *       Assessment & Plan:   1. LLL pneumonia. WBC 13.3, lactic acid 1.0, procalcitonin 0.16. UAT for legionella and strep pneumoniae results pending. Continue IV azithromycin and ceftriaxone. Blood culture results pending. Check CBC today. Afebrile  2. Suspected COVID-19 infection. He had negative test (ISAAC) 12/3, repeat results pending. , , ferritin 932, D-dimer 370. Repeat d-dimer. Not requiring O2 at present. Continue droplet plus isolation. 3. DM2. A1c 5.5. Diet controlled. BG values controlled. Switch to low dose correction. 4. Chronic anemia. Hgb vitamin B12 and folate within normal limits, he is iron deficient, reluctant to administer IV venofer with active infection. 5. PAF. Presently atrial paced on sotalol. INR supra-therapeutic, 5.68, no signs active bleeding. Continue to hold warfarin. 6. CAD. Troponin 0.02, recheck level. Denies chest pain, no acute changes on EKG. Continue beta blocker and statin. No asa secondary warfarin. 7. HTN. Controlled. Continue amlodipine, lisinopril currently on hold.        Diet: DIET CARB CONTROL;  Code:Full Code  DVT PPX: warfarin (supra-therapeutic)      PRIYA Seymour CNP   12/6/2020 9:03 AM

## 2020-12-06 NOTE — PLAN OF CARE
Problem: Gas Exchange - Impaired:  Goal: Levels of oxygenation will improve  Description: Levels of oxygenation will improve  Outcome: Ongoing     PT recent admit from the ED, VSS, on RA, O2 sats stable, productive cough frequently, with +3 pitting edema in his Les. Admission done and message passed off to night shift RN.

## 2020-12-06 NOTE — PROGRESS NOTES
Pharmacy to Dose Warfarin    Pharmacy consulted to dose warfarin for Afib. INR Goal: 2-3    INR today: 5.68    Assessment/Plan:  - INR is supratherapeutic  - Will hold dose tonight  - Daily INR ordered    Pharmacy will continue to follow.     Thanks,  Annabel Engel, PharmD  PGY-1 Pharmacy Resident  [id number]

## 2020-12-07 ENCOUNTER — APPOINTMENT (OUTPATIENT)
Dept: CT IMAGING | Age: 85
DRG: 871 | End: 2020-12-07
Payer: MEDICARE

## 2020-12-07 PROBLEM — J32.4 PANSINUSITIS: Status: ACTIVE | Noted: 2020-12-07

## 2020-12-07 PROBLEM — J69.0 ASPIRATION PNEUMONIA (HCC): Status: ACTIVE | Noted: 2020-12-07

## 2020-12-07 LAB
ANION GAP SERPL CALCULATED.3IONS-SCNC: 10 MMOL/L (ref 3–16)
BUN BLDV-MCNC: 32 MG/DL (ref 7–20)
CALCIUM SERPL-MCNC: 9.1 MG/DL (ref 8.3–10.6)
CHLORIDE BLD-SCNC: 106 MMOL/L (ref 99–110)
CO2: 24 MMOL/L (ref 21–32)
CREAT SERPL-MCNC: 1 MG/DL (ref 0.8–1.3)
GFR AFRICAN AMERICAN: >60
GFR NON-AFRICAN AMERICAN: >60
GLUCOSE BLD-MCNC: 136 MG/DL (ref 70–99)
GLUCOSE BLD-MCNC: 137 MG/DL (ref 70–99)
GLUCOSE BLD-MCNC: 189 MG/DL (ref 70–99)
GLUCOSE BLD-MCNC: 206 MG/DL (ref 70–99)
GLUCOSE BLD-MCNC: 233 MG/DL (ref 70–99)
HCT VFR BLD CALC: 24.7 % (ref 40.5–52.5)
HEMOGLOBIN: 8.5 G/DL (ref 13.5–17.5)
INR BLD: 3.3 (ref 0.86–1.14)
MCH RBC QN AUTO: 32.3 PG (ref 26–34)
MCHC RBC AUTO-ENTMCNC: 34.6 G/DL (ref 31–36)
MCV RBC AUTO: 93.3 FL (ref 80–100)
PDW BLD-RTO: 18.8 % (ref 12.4–15.4)
PERFORMED ON: ABNORMAL
PLATELET # BLD: 239 K/UL (ref 135–450)
PMV BLD AUTO: 8.6 FL (ref 5–10.5)
POTASSIUM SERPL-SCNC: 4.7 MMOL/L (ref 3.5–5.1)
PROTHROMBIN TIME: 38.7 SEC (ref 10–13.2)
RBC # BLD: 2.64 M/UL (ref 4.2–5.9)
SODIUM BLD-SCNC: 140 MMOL/L (ref 136–145)
WBC # BLD: 10.5 K/UL (ref 4–11)

## 2020-12-07 PROCEDURE — 92526 ORAL FUNCTION THERAPY: CPT

## 2020-12-07 PROCEDURE — 70486 CT MAXILLOFACIAL W/O DYE: CPT

## 2020-12-07 PROCEDURE — 99223 1ST HOSP IP/OBS HIGH 75: CPT | Performed by: INTERNAL MEDICINE

## 2020-12-07 PROCEDURE — 6370000000 HC RX 637 (ALT 250 FOR IP): Performed by: NURSE PRACTITIONER

## 2020-12-07 PROCEDURE — 92610 EVALUATE SWALLOWING FUNCTION: CPT

## 2020-12-07 PROCEDURE — 2580000003 HC RX 258: Performed by: FAMILY MEDICINE

## 2020-12-07 PROCEDURE — 85610 PROTHROMBIN TIME: CPT

## 2020-12-07 PROCEDURE — 6370000000 HC RX 637 (ALT 250 FOR IP): Performed by: STUDENT IN AN ORGANIZED HEALTH CARE EDUCATION/TRAINING PROGRAM

## 2020-12-07 PROCEDURE — 97161 PT EVAL LOW COMPLEX 20 MIN: CPT

## 2020-12-07 PROCEDURE — 97165 OT EVAL LOW COMPLEX 30 MIN: CPT

## 2020-12-07 PROCEDURE — 6370000000 HC RX 637 (ALT 250 FOR IP): Performed by: INTERNAL MEDICINE

## 2020-12-07 PROCEDURE — 80048 BASIC METABOLIC PNL TOTAL CA: CPT

## 2020-12-07 PROCEDURE — 2700000000 HC OXYGEN THERAPY PER DAY

## 2020-12-07 PROCEDURE — 6360000002 HC RX W HCPCS: Performed by: FAMILY MEDICINE

## 2020-12-07 PROCEDURE — 36415 COLL VENOUS BLD VENIPUNCTURE: CPT

## 2020-12-07 PROCEDURE — 97530 THERAPEUTIC ACTIVITIES: CPT

## 2020-12-07 PROCEDURE — 2060000000 HC ICU INTERMEDIATE R&B

## 2020-12-07 PROCEDURE — 6370000000 HC RX 637 (ALT 250 FOR IP): Performed by: FAMILY MEDICINE

## 2020-12-07 PROCEDURE — 6370000000 HC RX 637 (ALT 250 FOR IP): Performed by: PHYSICIAN ASSISTANT

## 2020-12-07 PROCEDURE — 85027 COMPLETE CBC AUTOMATED: CPT

## 2020-12-07 PROCEDURE — 94760 N-INVAS EAR/PLS OXIMETRY 1: CPT

## 2020-12-07 RX ORDER — FLUTICASONE PROPIONATE 50 MCG
2 SPRAY, SUSPENSION (ML) NASAL 2 TIMES DAILY
Status: DISCONTINUED | OUTPATIENT
Start: 2020-12-07 | End: 2020-12-08 | Stop reason: HOSPADM

## 2020-12-07 RX ORDER — WARFARIN SODIUM 2.5 MG/1
2.5 TABLET ORAL
Status: COMPLETED | OUTPATIENT
Start: 2020-12-07 | End: 2020-12-07

## 2020-12-07 RX ORDER — SOTALOL HYDROCHLORIDE 80 MG/1
120 TABLET ORAL 2 TIMES DAILY
Status: DISCONTINUED | OUTPATIENT
Start: 2020-12-07 | End: 2020-12-08 | Stop reason: HOSPADM

## 2020-12-07 RX ADMIN — AZITHROMYCIN MONOHYDRATE 500 MG: 500 INJECTION, POWDER, LYOPHILIZED, FOR SOLUTION INTRAVENOUS at 14:06

## 2020-12-07 RX ADMIN — BENZONATATE 100 MG: 100 CAPSULE ORAL at 05:01

## 2020-12-07 RX ADMIN — WARFARIN SODIUM 2.5 MG: 2.5 TABLET ORAL at 18:45

## 2020-12-07 RX ADMIN — SOTALOL HYDROCHLORIDE 120 MG: 80 TABLET ORAL at 20:52

## 2020-12-07 RX ADMIN — METOPROLOL TARTRATE 25 MG: 25 TABLET, FILM COATED ORAL at 09:41

## 2020-12-07 RX ADMIN — Medication 2 SPRAY: at 20:54

## 2020-12-07 RX ADMIN — Medication 10 ML: at 09:41

## 2020-12-07 RX ADMIN — METOPROLOL TARTRATE 25 MG: 25 TABLET, FILM COATED ORAL at 20:53

## 2020-12-07 RX ADMIN — Medication 10 ML: at 20:55

## 2020-12-07 RX ADMIN — FLUTICASONE PROPIONATE 2 SPRAY: 50 SPRAY, METERED NASAL at 20:53

## 2020-12-07 RX ADMIN — ATORVASTATIN CALCIUM 20 MG: 20 TABLET, FILM COATED ORAL at 09:41

## 2020-12-07 RX ADMIN — Medication 1 G: at 14:06

## 2020-12-07 RX ADMIN — INSULIN LISPRO 3 UNITS: 100 INJECTION, SOLUTION INTRAVENOUS; SUBCUTANEOUS at 21:42

## 2020-12-07 RX ADMIN — INSULIN LISPRO 3 UNITS: 100 INJECTION, SOLUTION INTRAVENOUS; SUBCUTANEOUS at 17:37

## 2020-12-07 RX ADMIN — AMLODIPINE BESYLATE 5 MG: 5 TABLET ORAL at 09:41

## 2020-12-07 RX ADMIN — INSULIN LISPRO 6 UNITS: 100 INJECTION, SOLUTION INTRAVENOUS; SUBCUTANEOUS at 12:27

## 2020-12-07 RX ADMIN — Medication 2 SPRAY: at 17:42

## 2020-12-07 RX ADMIN — GUAIFENESIN AND CODEINE PHOSPHATE 5 ML: 100; 10 SOLUTION ORAL at 05:01

## 2020-12-07 RX ADMIN — SOTALOL HYDROCHLORIDE 120 MG: 80 TABLET ORAL at 12:26

## 2020-12-07 RX ADMIN — GUAIFENESIN AND CODEINE PHOSPHATE 5 ML: 100; 10 SOLUTION ORAL at 14:00

## 2020-12-07 ASSESSMENT — PAIN SCALES - GENERAL
PAINLEVEL_OUTOF10: 0

## 2020-12-07 NOTE — PROGRESS NOTES
Physical Therapy    Facility/Department: [address]  Initial Assessment/Discharge Summary     NAME: Jonita Favre  : 1933  MRN: [id number]    Date of Service: 2020    Discharge Recommendations:  Jonita Favre scored a 23/24 on the AM-PAC short mobility form. At this time, no further PT is recommended upon discharge due to functioning at baseline. Recommend patient returns to prior setting with prior services. Home with assist PRN   PT Equipment Recommendations  Equipment Needed: No    Assessment   Assessment: Pt functioning close to baseline. No further PT needs at this time. Wanting to use RW for improved stability and has one available at home. Safe to discharge home once medically stable. Prognosis: Good  Decision Making: Low Complexity  PT Education: PT Role  Patient Education: verbalized understanding  Barriers to Learning: hearing  REQUIRES PT FOLLOW UP: No  Activity Tolerance  Activity Tolerance: Patient Tolerated treatment well       Patient Diagnosis(es): The primary encounter diagnosis was Pneumonia due to organism. Diagnoses of Supratherapeutic INR, Elevated troponin, Elevated procalcitonin, Anticoagulated on Coumadin, and Chronic anemia were also pertinent to this visit. has a past medical history of Actinic keratosis, Actinic keratosis, Atrial fibrillation (HCC), Bilateral carotid artery stenosis, CAD (coronary artery disease), Cellulitis, Diabetes mellitus ([address] DM (diabetes mellitus), type 2 with peripheral vascular complications (HCC)--s/p amputation great toe post osteomylitis , Glucose intolerance (malabsorption), Hyperlipidemia, Hypertension, Hypertrophy of prostate without urinary obstruction and other lower urinary tract symptoms (LUTS), Intermittent atrial fibrillation ([address] Kidney stone, Occult blood in stool, and Pacemaker. has a past surgical history that includes Tonsillectomy and adenoidectomy;  Appendectomy; Kidney stone surgery (); Coronary artery bypass graft (1996); Cardiac catheterization (2003); pacemaker placement (2005); other surgical history (Right, 7/17/15); Abscess Drainage (7/20/15); Toe amputation (Right, 7.16.15); Colonoscopy (03/28/2018); and pr esophagogastroduodenoscopy transoral diagnostic (N/A, 11/21/2018). Restrictions  Restrictions/Precautions  Restrictions/Precautions: Fall Risk(Medium fall risk)  Position Activity Restriction  Other position/activity restrictions: Ngoc Salazar is a 80 y.o. male who presents to the emergency department with complaints of cough and congestion for 4 days. 2 days ago, he tested negative for COVID-19. Yesterday he was seen in the emergency department for his symptoms. He left without being seen by a physician because he did not want to wait any longer in the waiting room. His chest x-ray however did show evidence of pneumonia and he was urged to come back. Patient still presents with congestion and cough. Sometimes he feels short of breath. He denies chest pain, hemoptysis, palpitations, headache, dizziness, abdominal pain, nausea, vomiting, diarrhea. He is chronically anemic and sees a specialist at [facility] for this and gets injections to improve his anemia. He denies bloody or black stool.   Vision/Hearing  Vision: Within Functional Limits  Hearing: Exceptions to WFL(R is broken)  Hearing Exceptions: Bilateral hearing aid;Hard of hearing/hearing concerns     Subjective  General  Chart Reviewed: Yes  Family / Caregiver Present: No  Diagnosis: pneumonia  General Comment  Comments: supine in bed at arrival  Subjective  Subjective: agreed to evaluation  Pain Screening  Patient Currently in Pain: Denies  Vital Signs  Patient Currently in Pain: Denies       Orientation  Orientation  Overall Orientation Status: Within Functional Limits  Social/Functional History  Social/Functional History  Lives With: Spouse  Type of Home: House  Home Layout: One level  Home Access: [facility] Shower/Tub: Tub/Shower unit, Walk-in shower, Shower chair with back  Bathroom Toilet: Standard  Bathroom Equipment: Grab bars in [facility]: Rolling walker, 4 wheeled walker, Otilio Ho, Hospital bed(patient sleeps in recliner, spouse uses hospital bed)  Receives Help From: Family  ADL Assistance: Independent  Homemaking Assistance: Independent(shares with spouse)  Ambulation Assistance: Independent  Transfer Assistance: Independent  Active : Yes  Mode of Transportation: Car  Occupation: Retired  Type of occupation: [employer]  Leisure & Hobbies: [employer]  Additional Comments: Falls: Endorses no falls in the last 6 months  Cognition        Objective     Observation/Palpation  Posture: Good    AROM RLE (degrees)  RLE AROM: WFL  AROM LLE (degrees)  LLE AROM : WFL  Strength RLE  Strength RLE: WFL  Strength LLE  Strength LLE: WFL     Sensation  Overall Sensation Status: WFL  Bed mobility  Supine to Sit: Modified independent  Scooting: Modified independent  Transfers  Sit to Stand: Supervision  Stand to sit: Supervision  Ambulation  Ambulation?: Yes  Ambulation 1  Surface: level tile  Device: Rolling Walker  Other Apparatus: O2(1L)  Assistance: Supervision  Quality of Gait: flexed posture, no LOB  Gait Deviations: Slow Shana  Distance: 100 ft  Comments: 1 L O2, remained in mid-90s during ambulation; pt had requested use of RW and agreed to use at home as needed  Stairs/Curb  Stairs?: No     Balance  Posture: Good  Sitting - Static: Good  Sitting - Dynamic: Good  Standing - Static: Good  Standing - Dynamic: Good;-        Plan   Plan  Times per week: eval and dc  Safety Devices  Type of devices: Call light within reach, Left in chair, Nurse notified, Chair alarm in place    G-Code       OutComes Score                                                  AM-PAC Score  AM-PAC Inpatient Mobility Raw Score : 23 (12/07/20 1244)  AM-PAC Inpatient T-Scale Score : 56.93 (12/07/20 1244)  Mobility Inpatient CMS 0-100% Score: 11.2 (12/07/20 1244)  Mobility Inpatient CMS G-Code Modifier : CI (12/07/20 1244)          Goals  Short term goals  Time Frame for Short term goals: no acute goals identified       Therapy Time   Individual Concurrent Group Co-treatment   Time In 1105         Time Out 1135         Minutes 30         Timed Code Treatment Minutes: [facility], [id number]

## 2020-12-07 NOTE — PROGRESS NOTES
Pharmacy to Dose Warfarin    Pharmacy consulted to dose warfarin for Afib. INR Goal: 2-3    INR today: 3.30    Assessment/Plan:  - INR supratherapeutic, however warfarin has been on hold for 2 days, expect INR will continue to drop into therapeutic range tomorrow   - Give warfarin 2.5mg this evening    Pharmacy will continue to follow.     Soila Vo PharmD  12/7/2020 11:27 AM

## 2020-12-07 NOTE — PROGRESS NOTES
Occupational Therapy   Occupational Therapy Initial Assessment/discharge summary  Date: 2020   Patient Name: Martín Edouard  MRN: [id number]     : 1933    Date of Service: 2020    Discharge Recommendations:Josep JENNIFER Couchnayeli Carias scored a  on the [facility] ADL Inpatient form. At this time, no further OT is recommended upon discharge due to patient presents at functional baseline. Recommend patient returns to prior setting with prior services. OT Equipment Recommendations  Equipment Needed: No(Patient owns needed equipment-recommend use of shower chair and RW)    Assessment   Treatment Diagnosis: Pneumonia  Prognosis: Good  Decision Making: Low Complexity  OT Education: OT Role;Plan of Care  Patient Education: Eval, discharge recommendations-patient verbalized understanding  REQUIRES OT FOLLOW UP: No  Activity Tolerance  Activity Tolerance: Patient Tolerated treatment well  Safety Devices  Safety Devices in place: Yes  Type of devices: Left in chair;Nurse notified;Call light within reach           Patient Diagnosis(es): The primary encounter diagnosis was Pneumonia due to organism. Diagnoses of Supratherapeutic INR, Elevated troponin, Elevated procalcitonin, Anticoagulated on Coumadin, and Chronic anemia were also pertinent to this visit. has a past medical history of Actinic keratosis, Actinic keratosis, Atrial fibrillation (HCC), Bilateral carotid artery stenosis, CAD (coronary artery disease), Cellulitis, Diabetes mellitus ([address] DM (diabetes mellitus), type 2 with peripheral vascular complications (HCC)--s/p amputation great toe post osteomylitis , Glucose intolerance (malabsorption), Hyperlipidemia, Hypertension, Hypertrophy of prostate without urinary obstruction and other lower urinary tract symptoms (LUTS), Intermittent atrial fibrillation ([address] Kidney stone, Occult blood in stool, and Pacemaker.    has a past surgical history that includes Tonsillectomy and adenoidectomy; Appendectomy; Kidney stone surgery (1980); Coronary artery bypass graft (1996); Cardiac catheterization (2003); pacemaker placement (2005); other surgical history (Right, 7/17/15); Abscess Drainage (7/20/15); Toe amputation (Right, 7.16.15); Colonoscopy (03/28/2018); and pr esophagogastroduodenoscopy transoral diagnostic (N/A, 11/21/2018). Treatment Diagnosis: Pneumonia      Restrictions  Restrictions/Precautions  Restrictions/Precautions: Fall Risk(Medium fall risk)  Position Activity Restriction  Other position/activity restrictions: Stacey Sutherland is a 80 y.o. male who presents to the emergency department with complaints of cough and congestion for 4 days. 2 days ago, he tested negative for COVID-19. Yesterday he was seen in the emergency department for his symptoms. He left without being seen by a physician because he did not want to wait any longer in the waiting room. His chest x-ray however did show evidence of pneumonia and he was urged to come back. Patient still presents with congestion and cough. Sometimes he feels short of breath. He denies chest pain, hemoptysis, palpitations, headache, dizziness, abdominal pain, nausea, vomiting, diarrhea. He is chronically anemic and sees a specialist at [facility] for this and gets injections to improve his anemia. He denies bloody or black stool.     Subjective   General  Chart Reviewed: Yes  Family / Caregiver Present: No  Diagnosis: Pneumonia  Subjective  Subjective: Patient supine in bed, agreeable to evaluation  Patient Currently in Pain: Denies  Pain Assessment  Pain Assessment: 0-10  Pain Level: 0  Pre Treatment Pain Screening  Intervention List: Patient able to continue with treatment  Vital Signs  Temp: 97.7 °F (36.5 °C)  Temp Source: Oral  Pulse: 98  Heart Rate Source: Monitor  Resp: 18  BP: (!) 118/56(manual)  BP Location: Left upper arm  Patient Position: Sitting  Level of Consciousness: Alert (0)  MEWS Score: 1  Patient Currently in Pain: Denies  Oxygen Therapy  SpO2: 90 %  Pulse Oximeter Device Mode: Continuous  Pulse Oximeter Device Location: Finger  O2 Device: Nasal cannula  O2 Flow Rate (L/min): 1 L/min  Social/Functional History  Social/Functional History  Lives With: Spouse  Type of Home: House  Home Layout: One level  Home Access: Level entry  Bathroom Shower/Tub: Tub/Shower unit, Walk-in shower, Shower chair with back  Bathroom Toilet: Standard  Bathroom Equipment: Grab bars in shower  Home Equipment: Rolling walker, 4 wheeled walker, Shari Lesches, Hospital bed(patient sleeps in recliner, spouse uses hospital bed)  Receives Help From: Family  ADL Assistance: Independent  Homemaking Assistance: Independent(shares with spouse)  Ambulation Assistance: Independent  Transfer Assistance: Independent  Active : Yes  Mode of Transportation: Car  Occupation: Retired  Type of occupation: [employer]  Leisure & Hobbies: [employer] puzzles  Additional Comments: Falls: Endorses no falls in the last 6 months       Objective   Vision: Within Functional Limits  Hearing: Exceptions to [facility]  Hearing Exceptions: Bilateral hearing aid;Hard of hearing/hearing concerns    Orientation  Overall Orientation Status: Within Functional Limits  Observation/Palpation  Posture: Good  Balance  Sitting Balance: Independent  Standing Balance: Supervision  Standing Balance  Time: ~100 feet with RW supervision  Wheelchair Bed Transfers  Wheelchair/Bed - Technique: Ambulating  Equipment Used: Bed;Other  Level of Asssistance: Supervision;Modified independent   ADL  Feeding: Modified independent ;Supervision  Grooming: Setup  LE Dressing: Supervision  Toileting: None(Patient has been using urinal I in room)  Additional Comments: Patient declined further ADLs at time of evaluation  Tone RUE  RUE Tone: Normotonic  Tone LUE  LUE Tone: Normotonic  Coordination  Movements Are Fluid And Coordinated: Yes     Bed mobility  Supine to Sit: Modified independent  Scooting: Modified independent  Transfers  Sit to stand: Modified independent  Stand to sit: Modified independent  Vision - Basic Assessment  Patient Visual Report: No visual complaint reported.   Cognition  Overall Cognitive Status: WFL  Perception  Overall Perceptual Status: WFL     Sensation  Overall Sensation Status: WFL        LUE AROM (degrees)  LUE AROM : WNL  RUE AROM (degrees)  RUE AROM : WNL  LUE Strength  L Hand General: 5/5  RUE Strength  R Hand General: 5/5       AM-PAC Score        AM-PAC Inpatient Daily Activity Raw Score: 22 (12/07/20 1246)  AM-PAC Inpatient ADL T-Scale Score : 47.1 (12/07/20 1246)  ADL Inpatient CMS 0-100% Score: 25.8 (12/07/20 1246)  ADL Inpatient CMS G-Code Modifier : Vibha Taveras (12/07/20 1246)    Goals  Patient Goals   Patient goals : Evaluation only       Therapy Time   Individual Concurrent Group Co-treatment   Time In 7436         Time Out 1335         Minutes 30              Timed Code Treatment Minutes:  15 Minutes    Total Treatment Minutes:  [facility], OTR/L [id number]

## 2020-12-07 NOTE — PROGRESS NOTES
Pt weaned to 1 L on nasal cannula maintaining oxygen saturation at 95%; will continue to assess. Pt expressed desire to go home, pending MD approval; [place]; he also stated that they have wheelchairs at home, if needed.

## 2020-12-07 NOTE — PROGRESS NOTES
07:28   POC Glucose Latest Ref Range: 70 - 99 mg/dl 170 (H) 108 (H) 135 (H)       CXR 12/4/2020:  1. Scattered pulmonary infiltrates predominate lower left lung.  Possible    pneumonia, particularly medial lower right lung and central and lateral lower    left lung. 2. Calcific atherosclerosis aorta. 3. Borderline cardiomegaly. Problem List  Active Problems:    Pneumonia  Resolved Problems:    * No resolved hospital problems. *       Assessment & Plan:   1. Aspiration PNA   -Likely from pansinusitis; check CT Sinuses and ENT consult   - -Cont Zithromax and Rocephin   -  2. Pansinusitis   -Start 19 [email or web address] spray, Flonase and Mucinex. Will need to finish course of Augmentin upon DC   -Might benefit from nasal endoscopy to clear sinuses  3. DM2. A1c 5.5. Diet controlled. BG values controlled. Switch to low dose correction. 4. Chronic anemia. Hgb vitamin B12 and folate within normal limits, he is iron deficient,  5. PAF. Presently atrial paced on sotalol. INR supra-therapeutic, 3.3, no signs active bleeding. Continue to hold warfarin. Resume Sotalol. Cardio consult for Afib with RVR  6. CAD. Troponin 0.02, recheck level. Denies chest pain, no acute changes on EKG. Continue Metoprolol and statin. No asa secondary warfarin. 7. HTN. Controlled. Continue amlodipine, lisinopril currently on hold. 8. PT/OT/SLP eval      Diet: DIET CARB CONTROL;  Code:Full Code  DVT PPX: warfarin (supra-therapeutic)  Dispo:  Home with no needs    Discussed with patient and nursing. He will benefit from ENT evaluation. Suspect sinuses are culprit here.       Ruby Vila MD   12/7/2020 7:51 AM

## 2020-12-07 NOTE — CONSULTS
ONLY performed by Kaci Bell MD at [facility] 32 Right 7.16.15    right pinkey toe     TONSILLECTOMY AND ADENOIDECTOMY         No Known Allergies    Social History:  Reviewed. reports that he quit smoking about 70 years ago. He quit after 0.50 years of use. He has never used smokeless tobacco. He reports that he does not drink alcohol or use drugs. Family History:  Reviewed. family history includes Diabetes in his mother; Stroke in his father. Review of System:  All other systems reviewed and are negative except for that noted above. Pertinent negatives are:     · General: negative for fever, chills   · Ophthalmic ROS: negative for - eye pain or loss of vision  · ENT ROS: negative for - headaches, sore throat   · Respiratory: negative for - cough, sputum  · Cardiovascular: Reviewed in HPI  · Gastrointestinal: negative for - abdominal pain, diarrhea, N/V  · Hematology: negative for - bleeding, blood clots, bruising or jaundice  · Genito-Urinary:  negative for - Dysuria or incontinence  · Musculoskeletal: negative for - Joint swelling, muscle pain  · Neurological: negative for - confusion, dizziness, headaches   · Psychiatric: No anxiety, no depression. · Dermatological: negative for - rash    Physical Examination:  Vitals:    20 1200   BP: (!) 118/56   Pulse: 98   Resp: 18   Temp: 97.7 °F (36.5 °C)   SpO2: 90%      In: 740 [P.O.:720;  I.V.:20]  Out: 525    Wt Readings from Last 3 Encounters:   20 161 lb 11.2 oz (73.3 kg)   20 169 lb (76.7 kg)   10/19/20 163 lb 12.8 oz (74.3 kg)     Temp  Av °F (36.7 °C)  Min: 97.7 °F (36.5 °C)  Max: 98.6 °F (37 °C)  Pulse  Av.6  Min: 79  Max: 140  BP  Min: 115/73  Max: 127/76  SpO2  Av.8 %  Min: 89 %  Max: 95 %  FiO2   Av.3 %  Min: 1 %  Max: 2 %    Intake/Output Summary (Last 24 hours) at 2020 1254  Last data filed at 2020 0941  Gross per 24 hour   Intake 740 ml   Output 525 ml   Net 215 ml · Telemetry: atrial fibrillation  · Constitutional: Oriented. No distress. · Head: Normocephalic and atraumatic. · Mouth/Throat: Oropharynx is clear and moist.   · Eyes: Conjunctivae normal. EOM are normal.   · Neck: Neck supple. No rigidity. No JVD present. · Cardiovascular: Normal rate, irregular rhythm, S1&S2. · Pulmonary/Chest: Bilateral respiratory sounds. No wheezes, No rhonchi. · Abdominal: Soft. Bowel sounds present. No distension, No tenderness. · Musculoskeletal: No tenderness. No edema    · Lymphadenopathy: Has no cervical adenopathy. · Neurological: Alert and oriented. Cranial nerve appears intact, No Gross deficit   · Skin: Skin is warm and dry. No rash noted. · Psychiatric: Has a normal behavior     Labs, diagnostic and imaging results reviewed. Reviewed.    Recent Labs     12/05/20  1410 12/06/20  1010 12/07/20  0429    136 140   K 4.7 4.4 4.7    104 106   CO2 25 23 24   BUN 33* 26* 32*   CREATININE 1.2 1.2 1.0     Recent Labs     12/05/20  1410 12/06/20  1010 12/07/20  0429   WBC 13.5* 15.4* 10.5   HGB 8.3* 8.7* 8.5*   HCT 24.3* 25.4* 24.7*   MCV 93.3 92.5 93.3    239 239     Lab Results   Component Value Date    TROPONINI 0.02 12/06/2020     Lab Results   Component Value Date    BNP 79 01/10/2012     Lab Results   Component Value Date    PROTIME 38.7 12/07/2020    PROTIME 67.0 12/06/2020    PROTIME 60.4 12/05/2020    PROTIME 13.8 11/21/2018    PROTIME 39.4 09/07/2018    PROTIME 28.5 08/09/2018    PROTIME 33.5 07/26/2018    PROTIME 13.3 03/28/2018    INR 3.30 12/07/2020    INR 5.68 12/06/2020    INR 5.12 12/05/2020     Lab Results   Component Value Date    CHOL 109 06/22/2020    HDL 37 06/22/2020    HDL 36 05/07/2012    TRIG 120 06/22/2020       ECG: Electronic atrial pacemakerLeft axis deviationIncomplete left bundle branch blockNonspecific T wave abnormalityProlonged   Echo: Conclusions      Summary   Left ventricular cavity size is mildly dilated with mild concentric left   ventricular hypertrophy. Left ventricular function is difficult to estimate due to arrhythmia but is   estimated at 35-40%. Grade II diastolic dysfunction with elevated LV filling pressures. Mild to moderate mitral regurgitation. The left atrium is severely dilated. Aortic valve leaflets appear calcified. Mild aortic stenosis with a peak   velocity of 254m/s and a mean pressure gradient of 15mmHg. Trivial aortic   regurgitation. Cath:     Scheduled Meds:   sotalol  120 mg Oral BID    warfarin  2.5 mg Oral Once    insulin lispro  0-18 Units Subcutaneous TID WC    insulin lispro  0-9 Units Subcutaneous Nightly    cefTRIAXone (ROCEPHIN) IV  1 g Intravenous Q24H    azithromycin  500 mg Intravenous Q24H    metoprolol tartrate  25 mg Oral BID    sodium chloride flush  10 mL Intravenous 2 times per day    atorvastatin  20 mg Oral Daily    warfarin (COUMADIN) daily dosing (placeholder)   Other RX Placeholder     Continuous Infusions:   dextrose       PRN Meds:.albuterol sulfate HFA, sodium chloride flush, acetaminophen **OR** acetaminophen, polyethylene glycol, promethazine **OR** ondansetron, glucose, dextrose, glucagon (rDNA), dextrose, guaiFENesin-codeine, benzonatate     Patient Active Problem List    Diagnosis Date Noted    CAD (coronary artery disease) 05/11/2011     Priority: High    Hypercholesteremia 05/11/2011     Priority: High    Elevated PSA-(was 4.2 10/10-repeat 6 mo)(was 5.12 1/11-then 4.4 2/12)-sees dr Jacki Paris for this 05/11/2011     Priority: High    Pneumonia 12/05/2020    Localized edema 09/20/2019    Elevated ferritin  - work up Dr. Trina Price  genetic screen hemachromatosis negative. 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 09/10/2019    Urinary frequency 08/05/2019    H/O esophagogastroduodenoscopy  11/18  prominent vessesls vs varices.  dr Severiano Olivas (done for blood in stool) 12/28/2018    Hyperkalemia 10/26/2018    CKD stage 3 due to type 2 diabetes mellitus ([facility] 12/30/2016    Atrial fibrillation ([facility] [address] [address] 09/11/2015    Hypertension 09/11/2015    Diabetes mellitus [facility] 09/07/2015    Anemia--post op 8/15--started otc iron bid, off now  - work up Dr. Jeff Quarles bone marrow. possible MDS 2019 08/05/2015    MICROALBUMINURIA-on ace already 08/16/2011    Hearing loss, conductive, bilateral-seeing ent-dr quinn for hearing aides 08/15/2011    Diabetic eye exam-(sees dr Squires Deaner exam 12/15)--wnl 08/15/2011    Cardiac pacemaker 05/11/2011    Hypertrophy of prostate without urinary obstruction and other lower urinary tract symptoms (LUTS) 05/11/2011    Gout-uric acid >7.5--advised proph tx 05/11/2011    Carotid bruit(bilat-nl duplex u/s 10/10) 05/11/2011    Vitamin D deficiency-(advised 1000IU/day) 05/11/2011    Actinic keratoses 05/11/2011    S/P colonoscopy-4/07-neg per pt,  3/18 repeat colonoscopy polyp-repeat 5 yr 05/11/2011      Active Hospital Problems    Diagnosis Date Noted    Pneumonia [J18.9] 12/05/2020       Assessment:       Plan:      -Paroxysmal atrial fibrillation   Patient is on Coumadin and with therapeutic INR. He is on sotalol. Will monitor for side effects of sotalol. QTC is mildly prolonged which is consistent with the effect of sotalol. Will monitor his rhythm and if he does not convert spontaneously we will consider cardioversion tomorrow. - cardiomyopathy      This is new finding and can be due to V pacing (63% last time) or sotalol. I believe the former since he has been on sotalol for a while. Management of cardiomyopathy will be more aggressive after PNA is treated. May need upgrade to Cardiac resynchronization therapy(CRT)  In future. May need ablation of Atrial fibrillation and flutter. -CAD  No chest pain. Continue current medications.      -Lower lobe pneumonia. On antibiotic. -Hypertension   BP is well controlled. Continue current meds.     -Anemia    His baseline is 9-1/2-10 for hemoglobin.   Now he is around 8.5 but is stable. His iron studies are suggestive of iron deficiency anemia with normal folate and vitamin B12 levels. May need GI evaluation to rule out GI bleed. - Permanent pacemaker [employer]  Normal function    Thank you for allowing me to participate in the care of Popeye Falk     NOTE: This report was transcribed using voice recognition software. Every effort was made to ensure accuracy, however, inadvertent computerized transcription errors may be present.

## 2020-12-07 NOTE — PROGRESS NOTES
Physician Progress Note      PATIENT:               Ivis Porter  [facility] #:                  [id number]  :                       1933  ADMIT DATE:       2020 12:56 PM  DISCH DATE:  RESPONDING  PROVIDER #:        Jacobo Huerta MD          QUERY TEXT:    Pt admitted with Pneumonia. Pt noted to have SIRS on admission: WBC: 13.5, RR:   18-24, Procalcitonin: 0.16. If possible, please document in the progress   notes and discharge summary if you are evaluating and /or treating any of the   following: The medical record reflects the following:  Risk Factors: Pneumonia  Clinical Indicators: SIRS on admission: WBC: 13.5, RR: 18-24, Procalcitonin:   0.16; Per ED: \"not toxic-appearing, Critical care required due to patients   pneumonia and concern for early sepsis\", ED addendum \"does have leukocytosis   but not septic at this time\"  Treatment: Zithromax, Rocephin  Options provided:  -- Sepsis secondary to pneumonia, present on admission  -- No Sepsis, pneumonia only  -- Other - I will add my own diagnosis  -- Disagree - Not applicable / Not valid  -- Disagree - Clinically unable to determine / Unknown  -- Refer to Clinical Documentation Reviewer    PROVIDER RESPONSE TEXT:    This patient has Sepsis secondary to pneumonia, which was present on   admission. Query created by: Zuleima Goodson on 2020 11:51 AM      QUERY TEXT:    Pt admitted with Pneumonia. Pt noted to have reduced EF with G2DD. If   possible, please document in progress notes and discharge summary if you are   evaluating and/or treating any of the following: The medical record reflects the following:  Risk Factors: HTN, CAD, hx CABG  Clinical Indicators: ECHO in October: EF: 35-40%, G2DD, Mild to moderate   mitral regurgitation, left atrium is severely dilated. Per Cards office visit   in October: \"LV Dysfunction, Echo shows new LV dysfunction, EF 35-40% ,   Appears compensated, 1+ chronic BLE edema\".  Home medication: Lasix daily,   Metoprolol, Betapace, Lisinopril, Caduet  Treatment: Continuation of some home meds: Norvasc, Lipitor, Metoprolol,   Betapace  Options provided:  -- Chronic Systolic CHF/HFrEF  -- Chronic Diastolic CHF/HFpEF  -- Chronic Systolic and Diastolic CHF  -- Other - I will add my own diagnosis  -- Disagree - Not applicable / Not valid  -- Disagree - Clinically unable to determine / Unknown  -- Refer to Clinical Documentation Reviewer    PROVIDER RESPONSE TEXT:    This patient has chronic systolic and diastolic CHF. Query created by:  Lucy Kim on 12/7/2020 12:03 PM      Electronically signed by:  11 [facility] Kendra NICHOLAS 12/7/2020 4:38 PM

## 2020-12-07 NOTE — ACP (ADVANCE CARE PLANNING)
Advanced Care Planning Note. Purpose of Encounter: Advanced care planning in light of CAD  Parties In Attendance: Patient  Decisional Capacity: Yes  Subjective: Patient with runny nose and cough  Objective: Cr 1.0  Goals of Care Determination: Patient wants limited support (NO CPR, short vent, will consider surgery, short HD, no trach, no PEG)  Plan:  CT Sinuses. IV Abx. ENT and Cardio consults  Code Status: DNR CCA   Time spent on Advanced care Plannin minutes  Advanced Care Planning Documents: Completed advanced directives on chart, wife is the POA.     Rayray Goldsmith MD  2020 7:51 AM

## 2020-12-07 NOTE — PLAN OF CARE
Problem: Falls - Risk of:  Goal: Will remain free from falls  Description: Will remain free from falls  12/6/2020 1901 by Alec Hong RN  Outcome: Ongoing     Problem: Falls - Risk of:  Goal: Absence of physical injury  Description: Absence of physical injury  12/6/2020 1901 by Alec Hong RN  Outcome: Ongoing     Problem: Gas Exchange - Impaired:  Goal: Levels of oxygenation will improve  Description: Levels of oxygenation will improve  12/6/2020 1901 by Alec Hong RN  Outcome: Ongoing     VSS throughout the shift, COVID came back negative with no indication of retesting. Sputum culture cup provided and still needs collected, IV antibiotics given during the shift, no complaints of pain.

## 2020-12-07 NOTE — PROGRESS NOTES
CLINICAL BEDSIDE SWALLOWING EVALUATION  Speech Therapy Department    Patient Name:  Cali Boyce  :  1933  Pain level:Pt did not report pain  Medical Diagnosis:   Pneumonia [J18.9]  Pneumonia [J18.9]    HPI:  \"Josep Strickland is a 80 y.o. male who presents to the emergency department with complaints of cough and congestion for 4 days. 2 days ago, he tested negative for COVID-19. Yesterday he was seen in the emergency department for his symptoms. He left without being seen by a physician because he did not want to wait any longer in the waiting room. His chest x-ray however did show evidence of pneumonia and he was urged to come back. Patient still presents with congestion and cough. Sometimes he feels short of breath. He denies chest pain, hemoptysis, palpitations, headache, dizziness, abdominal pain, nausea, vomiting, diarrhea. He is chronically anemic and sees a specialist at [facility] for this and gets injections to improve his anemia. He denies bloody or black stool. \"     Chest xray (20): Impression    1. Scattered pulmonary infiltrates predominate lower left lung.  Possible    pneumonia, particularly medial lower right lung and central and lateral lower    left lung. 2. Calcific atherosclerosis aorta. 3. Borderline cardiomegaly. SLP eval and treat orders received and acknowledged. Pt alert and oriented x4 from home. Pt denies dysphagia and reports consuming a regular/thin diet at home. Pt [place] and does not have working hearing aids currently. Treatment Diagnosis:  Oropharyngeal Dysphagia    Impressions:  Pt positioned upright in bed on O2 via nasal cannula upon arrival.  Pt maintained O2 sats >95% throughout. Oral mech exam revealed reduced lingual strength and coordination. Various texture trials provided to evaluate overall swallow function.   Pt exhibits mild oropharyngeal dysphagia characterized by decreased bolus control, prolonged mastication, suspected diet advance to least restricted diet, as clinically indicated, with no signs of aspiration   4.) Pt will improve oral motor function via bolus control exercises 5/5    Oral motor Exam:  Dentition: Adequate  Labial/Facial: Reduced strength  Lingual: Reduced strength and coordination  Voice: Hoarse    Oral Phase:   Reduced/prolonged mastication  Reduced A-P propulsion  Apparent premature bolus loss to pharynx    Pharyngeal Phase:  Apparent pharyngeal pooling  Delayed swallow initiation  Decreased laryngeal elevation via palpation   Apparent decreased pharyngeal clearing  Coughing (delayed) with inconsistent PO textures  Throat clearing (delayed) with inconsistent PO textures    Patient/Family Education:Education, results and recommendations given to the Pt and nurse, who verbalized understanding    Timed Code Treatment:0 minutes    Total Treatment Time:25 minutes    Discharge Recommendations: Speech Therapy for Speech/Dysphagia treatment at discharge. If patient discharges prior to next session this note will serve as a discharge summary. Signature:     Kieran PELLETIER CCC-SLP S.P. [id number]  Speech-Language Pathologist

## 2020-12-07 NOTE — PLAN OF CARE
Problem: Falls - Risk of:  Goal: Will remain free from falls  Description: Will remain free from falls  Outcome: Ongoing     Problem: Gas Exchange - Impaired:  Goal: Levels of oxygenation will improve  Description: Levels of oxygenation will improve  Outcome: Ongoing

## 2020-12-07 NOTE — PROGRESS NOTES
Recommend- continue abx rocephin should cover   Put on fluticasone 2 sprays each nostril 2 x daily. Also, 2 spray nasal saline q 4 hrs. Spoke to ent. Agree with abx. Will see and evaluate.

## 2020-12-08 VITALS
BODY MASS INDEX: 23.95 KG/M2 | HEART RATE: 58 BPM | SYSTOLIC BLOOD PRESSURE: 124 MMHG | OXYGEN SATURATION: 92 % | HEIGHT: 69 IN | RESPIRATION RATE: 16 BRPM | WEIGHT: 161.7 LBS | DIASTOLIC BLOOD PRESSURE: 62 MMHG | TEMPERATURE: 98.2 F

## 2020-12-08 LAB
ANION GAP SERPL CALCULATED.3IONS-SCNC: 8 MMOL/L (ref 3–16)
BUN BLDV-MCNC: 38 MG/DL (ref 7–20)
CALCIUM SERPL-MCNC: 8.9 MG/DL (ref 8.3–10.6)
CHLORIDE BLD-SCNC: 106 MMOL/L (ref 99–110)
CO2: 24 MMOL/L (ref 21–32)
CREAT SERPL-MCNC: 1.3 MG/DL (ref 0.8–1.3)
CULTURE, RESPIRATORY: NORMAL
GFR AFRICAN AMERICAN: >60
GFR NON-AFRICAN AMERICAN: 52
GLUCOSE BLD-MCNC: 122 MG/DL (ref 70–99)
GLUCOSE BLD-MCNC: 138 MG/DL (ref 70–99)
GLUCOSE BLD-MCNC: 139 MG/DL (ref 70–99)
GRAM STAIN RESULT: NORMAL
HCT VFR BLD CALC: 22.7 % (ref 40.5–52.5)
HEMOGLOBIN: 7.8 G/DL (ref 13.5–17.5)
INR BLD: 2.3 (ref 0.86–1.14)
MCH RBC QN AUTO: 31.6 PG (ref 26–34)
MCHC RBC AUTO-ENTMCNC: 34.6 G/DL (ref 31–36)
MCV RBC AUTO: 91.4 FL (ref 80–100)
PDW BLD-RTO: 19.1 % (ref 12.4–15.4)
PERFORMED ON: ABNORMAL
PERFORMED ON: ABNORMAL
PLATELET # BLD: 266 K/UL (ref 135–450)
PMV BLD AUTO: 9 FL (ref 5–10.5)
POTASSIUM SERPL-SCNC: 4.7 MMOL/L (ref 3.5–5.1)
PROTHROMBIN TIME: 26.9 SEC (ref 10–13.2)
RBC # BLD: 2.48 M/UL (ref 4.2–5.9)
SODIUM BLD-SCNC: 138 MMOL/L (ref 136–145)
TSH REFLEX: 1.67 UIU/ML (ref 0.27–4.2)
WBC # BLD: 10.1 K/UL (ref 4–11)

## 2020-12-08 PROCEDURE — 84443 ASSAY THYROID STIM HORMONE: CPT

## 2020-12-08 PROCEDURE — 80048 BASIC METABOLIC PNL TOTAL CA: CPT

## 2020-12-08 PROCEDURE — 6370000000 HC RX 637 (ALT 250 FOR IP): Performed by: INTERNAL MEDICINE

## 2020-12-08 PROCEDURE — 85027 COMPLETE CBC AUTOMATED: CPT

## 2020-12-08 PROCEDURE — 94760 N-INVAS EAR/PLS OXIMETRY 1: CPT

## 2020-12-08 PROCEDURE — 36415 COLL VENOUS BLD VENIPUNCTURE: CPT

## 2020-12-08 PROCEDURE — 6370000000 HC RX 637 (ALT 250 FOR IP): Performed by: FAMILY MEDICINE

## 2020-12-08 PROCEDURE — 6370000000 HC RX 637 (ALT 250 FOR IP): Performed by: PHYSICIAN ASSISTANT

## 2020-12-08 PROCEDURE — 99233 SBSQ HOSP IP/OBS HIGH 50: CPT | Performed by: NURSE PRACTITIONER

## 2020-12-08 PROCEDURE — 85610 PROTHROMBIN TIME: CPT

## 2020-12-08 PROCEDURE — 2580000003 HC RX 258: Performed by: FAMILY MEDICINE

## 2020-12-08 RX ORDER — WARFARIN SODIUM 2.5 MG/1
2.5 TABLET ORAL DAILY
Status: DISCONTINUED | OUTPATIENT
Start: 2020-12-08 | End: 2020-12-08 | Stop reason: HOSPADM

## 2020-12-08 RX ORDER — FUROSEMIDE 20 MG/1
20 TABLET ORAL DAILY
Qty: 60 TABLET | Refills: 0 | Status: SHIPPED | OUTPATIENT
Start: 2020-12-08 | End: 2021-09-03

## 2020-12-08 RX ORDER — DOXYCYCLINE HYCLATE 100 MG
100 TABLET ORAL 2 TIMES DAILY
Qty: 28 TABLET | Refills: 0 | Status: SHIPPED | OUTPATIENT
Start: 2020-12-08 | End: 2020-12-22

## 2020-12-08 RX ORDER — ATORVASTATIN CALCIUM 20 MG/1
20 TABLET, FILM COATED ORAL DAILY
Qty: 30 TABLET | Refills: 0 | Status: SHIPPED | OUTPATIENT
Start: 2020-12-08 | End: 2021-01-22

## 2020-12-08 RX ORDER — LISINOPRIL 5 MG/1
5 TABLET ORAL DAILY
Status: DISCONTINUED | OUTPATIENT
Start: 2020-12-08 | End: 2020-12-08 | Stop reason: HOSPADM

## 2020-12-08 RX ORDER — FLUTICASONE PROPIONATE 50 MCG
2 SPRAY, SUSPENSION (ML) NASAL 2 TIMES DAILY
Qty: 1 BOTTLE | Refills: 3 | Status: SHIPPED | OUTPATIENT
Start: 2020-12-08 | End: 2021-12-06

## 2020-12-08 RX ORDER — CODEINE PHOSPHATE AND GUAIFENESIN 10; 100 MG/5ML; MG/5ML
5 SOLUTION ORAL EVERY 4 HOURS PRN
Qty: 420 ML | Refills: 0 | Status: SHIPPED | OUTPATIENT
Start: 2020-12-08 | End: 2020-12-15

## 2020-12-08 RX ORDER — BENZONATATE 100 MG/1
100 CAPSULE ORAL 3 TIMES DAILY PRN
Qty: 21 CAPSULE | Refills: 0 | Status: SHIPPED | OUTPATIENT
Start: 2020-12-08 | End: 2020-12-15

## 2020-12-08 RX ADMIN — Medication 10 ML: at 08:18

## 2020-12-08 RX ADMIN — ATORVASTATIN CALCIUM 20 MG: 20 TABLET, FILM COATED ORAL at 10:28

## 2020-12-08 RX ADMIN — SOTALOL HYDROCHLORIDE 120 MG: 80 TABLET ORAL at 10:27

## 2020-12-08 RX ADMIN — METOPROLOL TARTRATE 25 MG: 25 TABLET, FILM COATED ORAL at 10:27

## 2020-12-08 RX ADMIN — GUAIFENESIN AND CODEINE PHOSPHATE 5 ML: 100; 10 SOLUTION ORAL at 01:03

## 2020-12-08 RX ADMIN — FLUTICASONE PROPIONATE 2 SPRAY: 50 SPRAY, METERED NASAL at 08:18

## 2020-12-08 RX ADMIN — Medication 2 SPRAY: at 01:28

## 2020-12-08 RX ADMIN — Medication 2 SPRAY: at 08:17

## 2020-12-08 NOTE — PROGRESS NOTES
CLINICAL PHARMACY NOTE: MEDS TO [address] Select Patient?: No  Total # of Prescriptions Filled: 3   The following medications were delivered to the patient:  · Doxycyline hyclate 100mg  · Fluticasone nasal spray  · Benzonatate 100mg  Total # of Interventions Completed: 1  Time Spent (min): 30    Additional Documentation:  Informed patient that Doxycyline will increase INR. Let the doctor or clinic know they will need close monitoring. Saline not covered under insurance, cheaper to buy OTC.   Medications delivered- patient signed  Shauna Patel

## 2020-12-08 NOTE — DISCHARGE SUMMARY
Hospital Medicine Discharge Summary    Patient: Cindy Walsh     Gender: male  : 1933   Age: 80 y.o. MRN: [id number]    Admitting Physician: Estuardo Obando MD  Discharge Physician: Yaya Wadsworth MD     Code Status: Prior     Admit Date: 2020   Discharge Date: 2020    Disposition:  Home    Discharge Diagnoses: Active Hospital Problems    Diagnosis Date Noted    CAD (coronary artery disease) [I25.10] 2011     Priority: High    Aspiration pneumonia ([facility] [J69.0] 2020    Pansinusitis [J32.4] 2020    Ischemic cardiomyopathy [I25.5]     Benign essential HTN [I10]     Pneumonia due to organism [J18.9] 2020    CKD stage 3 due to type 2 diabetes mellitus ([facility] [facility] [E11.22, N18.30] 2016    [facility] [I48.0] 2015    Encounter for monitoring sotalol therapy [Z51.81, Z79.899] 08/15/2011    Cardiac pacemaker [Z95.0] 2011       Follow-up appointments:  one week    Outpatient to do list: F/U with PCP, Cardio and ENT    Condition at Discharge:  [facility] AT [place] Course:   Patient is an 79 yo male with hx PAF s/p PPM on Sotalol and Coumadin, carotid stenosis, CAD/CABG, chronic combined CHF,  CKD 3, DM2, HTN, HLD. He presented to hospital with cough, dyspnea, fatigue onset 4 days earlier. CXR with LLL pneumonia. He had negative COVID test (ISAAC) 12/3. Procalcitonin 0.16. He has been started on IV azithromycin and ceftriaxone. O2 sats > 90% on room air. Found to have aspiration PNA with sepsis that was POA. ENT consulted for pansinusitis on .   Will be on Doxy bid X 14 days for acute on chronic sinusitis. Will f/u with EP for outpatient ICD upgrade. May benefit from sinus surgery in future. F/u with PCP, EP and ENT.     Discharge Medications:   Discharge Medication List as of 2020  3:54 PM      START taking these medications    Details   benzonatate (TESSALON) 100 MG capsule Take 1 capsule by mouth 3 times daily as needed for Cough, Disp-21 capsule,R-0Normal      fluticasone (FLONASE) 50 MCG/ACT nasal spray 2 sprays by Each Nostril route 2 times daily, Disp-1 Bottle,R-3Normal      sodium chloride (OCEAN, BABY AYR) 0.65 % nasal spray 2 sprays by Nasal route every 4 hours, Disp-5 Bottle,R-0Normal      doxycycline hyclate (VIBRA-TABS) 100 MG tablet Take 1 tablet by mouth 2 times daily for 14 days, Disp-28 tablet,R-0Normal      guaiFENesin-codeine (GUAIFENESIN AC) 100-10 MG/5ML liquid Take 5 mLs by mouth every 4 hours as needed for Cough for up to 7 days. , Disp-420 mL,R-0Print      atorvastatin (LIPITOR) 20 MG tablet Take 1 tablet by mouth daily, Disp-30 tablet,R-0Normal           Discharge Medication List as of 12/8/2020  3:54 PM      CONTINUE these medications which have CHANGED    Details   furosemide (LASIX) 20 MG tablet Take 1 tablet by mouth daily, Disp-60 tablet,R-0Normal           Discharge Medication List as of 12/8/2020  3:54 PM      CONTINUE these medications which have NOT CHANGED    Details   allopurinol (ZYLOPRIM) 100 MG tablet Take 100 mg by mouth dailyHistorical Med      lisinopril (PRINIVIL;ZESTRIL) 10 MG tablet Take 1 tablet by mouth daily, Disp-30 tablet,R-3Normal      sotalol (BETAPACE) 120 MG tablet Take 1 tablet by mouth 2 times daily, Disp-180 tablet,R-3Normal      metoprolol tartrate (LOPRESSOR) 25 MG tablet Take 1 tablet by mouth 2 times daily, Disp-180 tablet,R-4Normal      vitamin B-1 (THIAMINE) 100 MG tablet Take 100 mg by mouth dailyHistorical Med      EPOETIN BIBIANA IJ Inject as directed every 14 days Historical Med      warfarin (COUMADIN) 5 MG tablet Take 1 tablet by mouth daily Take 2.5 mg on Mon, Wed and Fri and 5 mg all other days of the week, Disp-90 tablet, R-3Normal      acetaminophen (TYLENOL) 325 MG tablet Take 650 mg by mouth every 6 hours as needed for PainHistorical Med      blood glucose test strips (ACCU-CHEK AKOSUA PLUS) strip Disp-100 strip, R-3, NormalTEST BLOOD SUGAR THREE TIMES DAILY AS DIRECTED      vitamin D (CHOLECALCIFEROL) 1000 UNIT TABS tablet Take 5,000 Units by mouth daily Historical Med      Blood Glucose Monitoring Suppl (ACCU-CHEK AKOSUA PLUS) W/DEVICE KIT 3 TIMES DAILY Starting 7/22/2015, Until Discontinued, Disp-1 kit, R-0, PrintPatient to check blood sugar 3 times a day and PRN, he is a newly diagnosed diabetic who will require medication titration ; LABA1C      8.6    ; ICD code- 250.02.      loratadine (CLARITIN) 10 MG tablet Take 10 mg by mouth daily. Discharge Medication List as of 12/8/2020  3:54 PM      STOP taking these medications       amLODIPine-atorvastatatin (CADUET) 5-20 MG per tablet Comments:   Reason for Stopping:               Discharge Exam:    /62   Pulse 58   Temp 98.2 °F (36.8 °C) (Oral)   Resp 16   Ht 5' 9\" (1.753 m)   Wt 161 lb 11.2 oz (73.3 kg)   SpO2 92%   BMI 23.88 kg/m²   General appearance:  Appears comfortable  ENT: Moist oral mucosa. BL sinus tenderness in frontal areas  Cardiovascular: RRR. No murmur. No edema in lower extremities, L PPM  Respiratory: BL rhonchi. No wheezing or rales  GI: Abdomen soft, no tenderness, not distended, normal bowel sounds  Musculoskeletal: No cyanosis in digits, neck supple  Neurology:  Grossly intact. No speech or motor deficits  Psych: Normal affect. Alert and oriented in time, place and person  Skin: Warm, dry, normal turgor       Labs:  For convenience and continuity at follow-up the following most recent labs are provided:    Lab Results   Component Value Date    WBC 10.1 12/08/2020    HGB 7.8 12/08/2020    HCT 22.7 12/08/2020    MCV 91.4 12/08/2020     12/08/2020     12/08/2020    K 4.7 12/08/2020    K 4.7 12/05/2020     12/08/2020    CO2 24 12/08/2020    BUN 38 12/08/2020    CREATININE 1.3 12/08/2020    CALCIUM 8.9 12/08/2020    PHOS 4.0 06/01/2020    BNP 79 01/10/2012    ALKPHOS 86 12/05/2020    ALT 12 12/05/2020    AST 20 12/05/2020    BILITOT 0.7 12/05/2020    LABALBU 4.1 12/05/2020    LDLCALC 48 06/22/2020    TRIG 120 06/22/2020     Lab Results   Component Value Date    INR 2.30 (H) 12/08/2020    INR 3.30 (H) 12/07/2020    INR 5.68 (HH) 12/06/2020       Radiology:  Xr Chest Portable    Result Date: 12/4/2020  EXAMINATION: ONE XRAY VIEW OF THE CHEST 12/4/2020 11:14 pm COMPARISON: Chest 09/10/2019 HISTORY: ORDERING SYSTEM PROVIDED HISTORY: cough TECHNOLOGIST PROVIDED HISTORY: Reason for exam:->cough Reason for Exam: Cough (Cough and congestion. COVID test was negative, resulted today. PCP gave mucinex but continues to drain ) Acuity: Unknown Type of Exam: Unknown FINDINGS: The cardiac silhouette is borderline enlarged. Calcifications involving the aorta reflect atherosclerosis. The mediastinal and hilar silhouettes appear unremarkable. Scattered pulmonary infiltrates predominate medial mid inferior right lung and across the mid and lower left lung. Partial obscuration and lateral portion of the left hemidiaphragm. No pneumothorax is seen. No acute osseous abnormality is identified. Stable sequela from CABG and pacemaker. 1. Scattered pulmonary infiltrates predominate lower left lung. Possible pneumonia, particularly medial lower right lung and central and lateral lower left lung. 2. Calcific atherosclerosis aorta. 3. Borderline cardiomegaly. Ct Sinus Wo Contrast    Result Date: 12/7/2020  EXAMINATION: CT OF THE SINUS WITHOUT CONTRAST  12/7/2020 1:05 pm TECHNIQUE: CT of the sinuses was performed without the administration of intravenous contrast. Multiplanar reformatted images are provided for review. Dose modulation, iterative reconstruction, and/or weight based adjustment of the mA/kV was utilized to reduce the radiation dose to as low as reasonably achievable.  COMPARISON: None HISTORY: ORDERING SYSTEM PROVIDED HISTORY: Sinusitis, PND TECHNOLOGIST PROVIDED HISTORY: Reason for exam:->Sinusitis, PND Reason for Exam: Sinusitis, PND Acuity: Unknown Type of Exam: Unknown FINDINGS: There is a small amount of mucous within the left frontal sinus. There is significant opacification of the ethmoid air cells. The sphenoid cellules demonstrate no acute abnormality. There is complete opacification of the left maxillary sinus. The right ostiomeatal unit is patent. The left ostiomeatal unit is filled with soft tissue density. Extensive sinus disease as above. The patient was seen and examined on day of discharge and this discharge summary is in conjunction with any daily progress note from day of discharge. Time Spent on discharge is 45 minutes  in the examination, evaluation, counseling and review of medications and discharge plan. Note that more than 30 minutes was spent in preparing discharge papers, discussing discharge with patient, medication review, etc.       Signed:    Hugh Lawson MD   12/8/2020      Thank you Antonia Smith MD for the opportunity to be involved in this patient's care.  If you have any questions or concerns please feel free to contact me at [address] [place] [address]

## 2020-12-08 NOTE — PROGRESS NOTES
Resting quietly in bed, eyes closed, respirations even, responded to verbal stimuli. Noted with frequent moist productive cough, denies chest pain, SOB or diff breathing. Gave Guaifenesin per prn order. VSS.   Assessment completed, see flow charts.yris

## 2020-12-08 NOTE — PROGRESS NOTES
[address]   Electrophysiology Progress Note     Date: 12/8/2020  Admit Date: 12/5/2020     Reason for consultation: Atrial fibrillation    Chief Complaint:   Chief Complaint   Patient presents with    Cough     Pt states dx with pneumonia last night, left ama and didn't see doc was told to come back by PCP to get medications       History of Present Illness: History obtained from patient and medical record. Martín Edouard is a 80 y.o. male with a past medical history of HTN, HLD, DM, anemia (follows at [facility]) CAD s/p CABG 1996, SSS s/p PPM (2005, gen change 2015) and atrial fibrillation. Pt admitted to hospital for cough, congestion, dyspnea, and fatigue. He was found to have PNA and a sinus infection with initiation of ABX therapy. During the admission, he was noted to have episodes of atrial fibrillation and EP was consulted. Interval Hx: Today, he is being seen for follow up. He feels better each day. He is currently A-paced with underlying a-fib. He denies any symptoms from AF. Discussed need for cardioversion today. His H/H is down slightly today. Pt denies any bleeding issues. His blood pressure is stable. His only complaint is a cough and he remains on 1L of oxygen. Patient seen and examined. Clinical notes reviewed. Telemetry reviewed. No new complaints today. No major events overnight. Denies having chest pain, palpitations, shortness of breath, orthopnea/PND, cough, or dizziness at the time of this visit. Allergies:  No Known Allergies    Home Meds:  Prior to Visit Medications    Medication Sig Taking?  Authorizing Provider   allopurinol (ZYLOPRIM) 100 MG tablet Take 100 mg by mouth daily Yes Historical Provider, MD   lisinopril (PRINIVIL;ZESTRIL) 10 MG tablet Take 1 tablet by mouth daily Yes Tyrell Queen MD   sotalol (BETAPACE) 120 MG tablet Take 1 tablet by mouth 2 times daily Yes Madeleine Arredondo MD   amLODIPine-atorvastatatin (CADUET) 5-20 MG per tablet Take 1 tablet by mouth daily Yes Mario Calderón MD   metoprolol tartrate (LOPRESSOR) 25 MG tablet Take 1 tablet by mouth 2 times daily Yes Mario Calderón MD   vitamin B-1 (THIAMINE) 100 MG tablet Take 100 mg by mouth daily Yes Historical Provider, MD   furosemide (LASIX) 20 MG tablet TAKE 1 TABLET BY MOUTH DAILY Yes Summer Lazo MD   EPOETIN BIBIANA IJ Inject as directed every 14 days  Yes Historical Provider, MD   warfarin (COUMADIN) 5 MG tablet Take 1 tablet by mouth daily Take 2.5 mg on Mon, Wed and Fri and 5 mg all other days of the week Yes PRIYA Beckham - CNP   acetaminophen (TYLENOL) 325 MG tablet Take 650 mg by mouth every 6 hours as needed for Pain Yes Historical Provider, MD   blood glucose test strips (ACCU-CHEK AKOSUA PLUS) strip TEST BLOOD SUGAR THREE TIMES DAILY AS DIRECTED Yes Summer Lazo MD   vitamin D (CHOLECALCIFEROL) 1000 UNIT TABS tablet Take 5,000 Units by mouth daily  Yes Historical Provider, MD   Blood Glucose Monitoring Suppl (ACCU-CHEK AKOSUA PLUS) W/DEVICE KIT 1 kit by Does not apply route 3 times daily Patient to check blood sugar 3 times a day and PRN, he is a newly diagnosed diabetic who will require medication titration ;  LABA1C      8.6    ; ICD code- 250.02. Yes Ernst Meckel, MD   loratadine (CLARITIN) 10 MG tablet Take 10 mg by mouth daily.     Historical Provider, MD      Scheduled Meds:   warfarin  2.5 mg Oral Daily    sotalol  120 mg Oral BID    fluticasone  2 spray Each Nostril BID    sodium chloride  2 spray Each Nostril Q4H    insulin lispro  0-18 Units Subcutaneous TID     insulin lispro  0-9 Units Subcutaneous Nightly    cefTRIAXone (ROCEPHIN) IV  1 g Intravenous Q24H    azithromycin  500 mg Intravenous Q24H    metoprolol tartrate  25 mg Oral BID    sodium chloride flush  10 mL Intravenous 2 times per day    atorvastatin  20 mg Oral Daily     Continuous Infusions:   dextrose       PRN Meds:albuterol sulfate HFA, sodium chloride flush, acetaminophen **OR** acetaminophen, polyethylene glycol, promethazine **OR** ondansetron, glucose, dextrose, glucagon (rDNA), dextrose, guaiFENesin-codeine, benzonatate     Past Medical History:  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  9/11/2015    Glucose intolerance (malabsorption)     Hyperlipidemia     Hypertension     Hypertrophy of prostate without urinary obstruction and other lower urinary tract symptoms (LUTS)     Intermittent atrial fibrillation (HCC)     Kidney stone     Occult blood in stool     Hx of    Pacemaker     Permanent      Past Surgical History:    has a past surgical history that includes Tonsillectomy and adenoidectomy; Appendectomy; Kidney stone surgery (1980); Coronary artery bypass graft (1996); Cardiac catheterization (2003); pacemaker placement (2005); other surgical history (Right, 7/17/15); Abscess Drainage (7/20/15); Toe amputation (Right, 7.16.15); Colonoscopy (03/28/2018); and pr esophagogastroduodenoscopy transoral diagnostic (N/A, 11/21/2018). Social History:  Reviewed. reports that he quit smoking about 70 years ago. He quit after 0.50 years of use. He has never used smokeless tobacco. He reports that he does not drink alcohol or use drugs. Family History:  Reviewed. family history includes Diabetes in his mother; Stroke in his father.      Review of Systems:  · Constitutional: Negative for fever, night sweats, chills, weight changes, or weakness  · Skin: Negative for rash, dry skin, pruritus, bruising, bleeding, blood clots, or changes in skin pigment  · HEENT: Negative for vision changes, ringing in the ears, sore throat, dysphagia, or affect, appropriate mood    Pertinent labs, diagnostic, device, and imaging results reviewed as a part of this visit    Labs:    BMP:   Recent Labs     20  1010 20  0429 20    140 138   K 4.4 4.7 4.7    106 106   CO2 23 24 24   BUN 26* 32* 38*   CREATININE 1.2 1.0 1.3     Estimated Creatinine Clearance: 40 mL/min (based on SCr of 1.3 mg/dL). CBC:   Recent Labs     20  1010 20  0429 20   WBC 15.4* 10.5 10.1   HGB 8.7* 8.5* 7.8*   HCT 25.4* 24.7* 22.7*   MCV 92.5 93.3 91.4    239 266     Thyroid:   Lab Results   Component Value Date    TSH 3.14 2016     Lipids:   Lab Results   Component Value Date    CHOL 109 2020    HDL 37 2020    HDL 36 2012    TRIG 120 2020     LFTS:   Lab Results   Component Value Date    ALT 12 2020    AST 20 2020    ALKPHOS 86 2020    PROT 7.4 2020    PROT 7.0 2013    AGRATIO 1.2 2020    BILITOT 0.7 2020     Cardiac Enzymes:   Lab Results   Component Value Date    TROPONINI 0.02 2020    TROPONINI 0.02 2020    TROPONINI 0.00 01/10/2012     Coags:   Lab Results   Component Value Date    PROTIME 26.9 2020    PROTIME 13.8 2018    INR 2.30 2020       EC20  A-paced    ECHO: 10/2/20    Left ventricular cavity size is mildly dilated with mild concentric left ventricular hypertrophy. Left ventricular function is difficult to estimate due to arrhythmia but is estimated at 35-40%. Grade II diastolic dysfunction with elevated LV filling pressures. Mild to moderate mitral regurgitation. The left atrium is severely dilated. Aortic valve leaflets appear calcified. Mild aortic stenosis with a peak   velocity of 254m/s and a mean pressure gradient of 15mmHg. Trivial aortic regurgitation. Stress Echo:    Normal stress echocardiogram study    CT Sinus: 20  Extensive sinus disease as above    CXR: 20  1.  Scattered pulmonary infiltrates predominate lower left lung.  Possible    pneumonia, particularly medial lower right lung and central and lateral lower    left lung. 2. Calcific atherosclerosis aorta. 3. Borderline cardiomegaly.           Problem List:   Patient Active Problem List    Diagnosis Date Noted    CAD (coronary artery disease) 05/11/2011     Priority: High    Hypercholesteremia 05/11/2011     Priority: High    Elevated PSA-(was 4.2 10/10-repeat 6 mo)(was 5.12 1/11-then 4.4 2/12)-sees dr Dav Valadez for this 05/11/2011     Priority: High    Aspiration pneumonia ([address] 12/07/2020    Pansinusitis 12/07/2020    Ischemic cardiomyopathy     Benign essential HTN     Pneumonia due to organism 12/05/2020    Localized edema 09/20/2019    Elevated ferritin  - work up Dr. Mable Sanchez  genetic screen hemachromatosis negative. 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 09/10/2019    Urinary frequency 08/05/2019    H/O esophagogastroduodenoscopy  11/18  prominent vessesls vs varices. dr Garrison Pain (done for blood in stool) 12/28/2018    Hyperkalemia 10/26/2018    CKD stage 3 due to type 2 diabetes mellitus ([facility] 12/30/2016    PAF (paroxysmal atrial fibrillation) ([facility] 09/11/2015    Hypertension 09/11/2015    Diabetes mellitus ([facility] 09/07/2015    Anemia--post op 8/15--started otc iron bid, off now  - work up Dr. Mable Sanchez bone marrow.  possible MDS 2019 08/05/2015    MICROALBUMINURIA-on ace already 08/16/2011    Hearing loss, conductive, bilateral-seeing ent-dr quinn for hearing aides 08/15/2011    Encounter for monitoring sotalol therapy 08/15/2011    Cardiac pacemaker 05/11/2011    Hypertrophy of prostate without urinary obstruction and other lower urinary tract symptoms (LUTS) 05/11/2011    Gout-uric acid >7.5--advised proph tx 05/11/2011    Carotid bruit(bilat-nl duplex u/s 10/10) 05/11/2011    Vitamin D deficiency-(advised 1000IU/day) 05/11/2011    Actinic keratoses 05/11/2011    S/P colonoscopy-4/07-neg per pt,  3/18 repeat colonoscopy polyp-repeat 5 yr 05/11/2011        Assessment and Plan:     1. Paroxysmal Atrial Fibrillation  - AF likely secondary to acute infection/illness    - Currently in A-paced  - Continue metoprolol 25 mg BID, sotalol 120 mg BID   ~ QTc 505    - [id number] score:high. High risk for stroke and thromboembolism. Anticoagulation is recommended. Risk of bleeding was discussed.  ~ On Coumadin; INR 2.30    - Afib risk factors including age, HTN, obesity, inactivity and TISHA were discussed with patient. Risk factor modification recommended   ~ TSH: will check       - Treatment options including cardioversion, rate control strategy, antiarrhythmics, anticoagulation and possible ablation were discussed with patient. Risks, benefits and alternative of each treatment options were explained. All questions answered    ~ Keep NPO for DCCV today with Dr. Antionette Marsh    ~ May consider for ablation long term    2. Cardiomyopathy, Acute systolic heart failure (NYHA Class II/III)  - Appears compensated   ~ EF 35-40% per echo (12/20); previously normal  - Continue with metoprolol 25 mg BID, lisinopril 5 mg QD   ~ Resume lasix 20 mg daily at d/c   - Monitor I&Os, daily weights     - Unknown etiology ?    ~ May be secondary to high ventricular pacing   - Scheduled for Biv/CRT-D upgrade with Dr. Antionette Marsh on January 5th; Risks/benefits/alternatives discussed    3. Sick Sinus Syndrome  - S/p Dual chamber [facility] PPM (2005)  - I reviewed device interrogation. Device functioning normally.   ~ A-paced 49% V-paced 21%. AF burden 20%  - Discussed with patient  - Follow up with device clinic as scheduled    4. CAD  - Hx of CABG (1996)  - Stable  - No complaints of angina  - Continue ACEi, BB, and statin   ~ No ASA due to coumadin    5. HTN  - Controlled: Goal <130/80  - Continue current medications   ~ Resume home ACEi    6.  Anemia   - Remains low; iron studies consistent with [facility]    ~ 7.8/22.7   - Consider occult stool to rule out GI bleeding ?   - WIll need close monitoring as outpatient   - Management per hospitalist    7. PNA, Sinus Infection   - On ABX   - Management per hospitalist    Multiple medical conditions with risk of decompensation. All pertinent information and plan of care discussed with the EP physician. All questions and concerns were addressed to the patient. Alternatives to my treatment were discussed. I have discussed the above stated plan with patient and the nurse. The patient verbalized understanding and agreed with the plan. Thank you for allowing to us to participate in the care of Adithya Pugh.     PRIYA Cody-CNP  [address]   Office: [phone]

## 2020-12-08 NOTE — PROGRESS NOTES
Clinical Pharmacy Note: Angel Lyons is a 80 y.o. male  is receiving warfarin for Afib. Warfarin was on hold for supra therapeutic INR. INR (no units)   Date Value   12/08/2020 2.30 (H)   12/07/2020 3.30 (H)   12/06/2020 5.68 (HH)     Plan: Will restart warfarin at weekly regimen lower than home regimen since had been supra therapeutic. Begin Warfarin 2.5mg daily. Daily INR is ordered. Will continue to monitor. Per pharmacy consult.   Marisol Hairston PharmD 12/8/2020

## 2020-12-08 NOTE — PROGRESS NOTES
Discharge instructions given to pt with understanding and teach back. Pt discharged home with all belongings.   Neno Rodrigues RN

## 2020-12-08 NOTE — CONSULTS
[facility]      [facility]  [id number]  Primary Care Physician:  Anyi Everett MD  Date of Consultation: 12/5/2020      Chief Complaint:   Chief Complaint   Patient presents with    Cough     Pt states dx with pneumonia last night, left ama and didn't see doc was told to come back by PCP to get medications        HISTORY OF PRESENT ILLNESS  Edna Jin is a(n) 80 y.o. male who presents today for evaluation of sinusitis. Per review of the EMR, on 12/5/2020: The patient was seen in the emergency department and noted to have cough congestion that have been there for 4 days. Chest x-ray was obtained which showed scattered pulmonary infiltrates predominantly of the left lower lung. There was concern at that time for pneumonia and the patient was admitted. The patient has continued to have symptoms despite being on medical therapy. He has been prescribed Rocephin and azithromycin for his pneumonia. A CT sinus was performed which showed pansinusitis, worse on the left than the right. I independently reviewed his CT sinus from this admission. It does show evidence of acute on chronic changes. The mass mentioned in the osteomeatal complex on the left is likely just edema from chronic infection. I do not see any evidence of erosion of the skull base or invasion into the orbit. Additionally, I compared this to a prior CT sinus from 2009. Did not have significant findings of sinusitis at that time. I independently reviewed the patients past medical history, past surgical history, and social history. They are unremarkable except as noted in the HPI and below. The patient denies any family history related to the current complaint, and they deny any family history of bleeding disorders or difficulties with anesthesia unless noted below.      Patient Active Problem List   Diagnosis    CAD (coronary Problem Relation Age of Onset    Stroke Father     Diabetes Mother      Social History     Tobacco Use    Smoking status: Former Smoker     Years: 0.50     Last attempt to quit: 1/10/1950     Years since quittin.9    Smokeless tobacco: Never Used    Tobacco comment: counseled on tobacco exposure avoidance   Substance Use Topics    Alcohol use: No     Alcohol/week: 0.0 standard drinks    Drug use: No        Office Visit on 2020   Component Date Value Ref Range Status    SARS-CoV-2, ISAAC 2020 NOT DETECTED  NOT DETECTED Corrected    Comment: The SARS-CoV-2 assay is a real-time RT-PCR test intended for the  qualitative detection of nucleic acid from the SARS-CoV-2 in  respiratory specimens from individuals. Testing is limited to the  guidelines of FDA Emergency Use Authorization FDA for performing  SARS-CoV-2 testing. A Non-Detected result does not preclude the possibility of 2019-nCoV  infection since the adequacy of sample collection and/or low viral  burden may result in the presence of viral nucleic acids below the  analytical sensitivity of this test method. Test results should be used  with caution and in conjunction with other clinical and laboratory data  in making a diagnosis. Performed at: [facility], [address] [place] [address]  : David Galvin MD          DRUG/FOOD ALLERGIES: Patient has no known allergies. CURRENT MEDICATIONS  Prior to Admission medications    Medication Sig Start Date End Date Taking?  Authorizing Provider   allopurinol (ZYLOPRIM) 100 MG tablet Take 100 mg by mouth daily   Yes Historical Provider, MD   lisinopril (PRINIVIL;ZESTRIL) 10 MG tablet Take 1 tablet by mouth daily 10/19/20  Yes Yesenia Boucher MD   sotalol (BETAPACE) 120 MG tablet Take 1 tablet by mouth 2 times daily 20  Yes Alexa Littlejohn MD   amLODIPine-atorvastatatin (CADUET) 5-20 MG per tablet Take 1 tablet by mouth daily 8/10/20  Yes Jamil Adkins MD   metoprolol tartrate (LOPRESSOR) 25 MG tablet Take 1 tablet by mouth 2 times daily 8/10/20  Yes Laurie Penaloza MD   vitamin B-1 (THIAMINE) 100 MG tablet Take 100 mg by mouth daily   Yes Historical Provider, MD   furosemide (LASIX) 20 MG tablet TAKE 1 TABLET BY MOUTH DAILY 4/24/20  Yes Kaye Kendall MD   EPOETIN BIBIANA IJ Inject as directed every 14 days    Yes Historical Provider, MD   warfarin (COUMADIN) 5 MG tablet Take 1 tablet by mouth daily Take 2.5 mg on Mon, Wed and Fri and 5 mg all other days of the week 3/12/20  Yes PRIYA Patel - CNP   acetaminophen (TYLENOL) 325 MG tablet Take 650 mg by mouth every 6 hours as needed for Pain   Yes Historical Provider, MD   blood glucose test strips (ACCU-CHEK AKOSUA PLUS) strip TEST BLOOD SUGAR THREE TIMES DAILY AS DIRECTED 3/14/19  Yes Kaye Kendall MD   vitamin D (CHOLECALCIFEROL) 1000 UNIT TABS tablet Take 5,000 Units by mouth daily    Yes Historical Provider, MD   Blood Glucose Monitoring Suppl (ACCU-CHEK AKOSUA PLUS) W/DEVICE KIT 1 kit by Does not apply route 3 times daily Patient to check blood sugar 3 times a day and PRN, he is a newly diagnosed diabetic who will require medication titration ;  LABA1C      8.6    ; ICD code- 250.02. 7/22/15  Yes Vasu Broussard MD   loratadine (CLARITIN) 10 MG tablet Take 10 mg by mouth daily.       Historical Provider, MD       REVIEW OF SYSTEMS  The following systems were reviewed and revealed the following in addition to any already discussed in the HPI:    CONSTITUTIONAL: no weight loss, no fever, no night sweats, no chills  EYES: no vision changes, no blurry vision  EARS: no changes in hearing, no otalgia  NOSE: no epistaxis, no rhinorrhea  RESPIRATORY: no  Difficulty breathing, no shortness of breath  CV: no chest pain, no Peripheral vascular disease  HEME: No coagulation disorder, no Bleeding disorder  NEURO: no TIA or stroke-like symptoms  SKIN: No new rashes in the head and neck, no recent skin cancers  MOUTH: No new ulcers, no recent teeth infections  GASTROINTESTINAL: No diarrhea, stomach pain  PSYCH: No anxiety, no depression      PHYSICAL EXAM  /62   Pulse 58   Temp 98.2 °F (36.8 °C) (Oral)   Resp 16   Ht 5' 9\" (1.753 m)   Wt 161 lb 11.2 oz (73.3 kg)   SpO2 95%   BMI 23.88 kg/m²     GENERAL: No acute distress, alert and oriented, no hoarseness, strong voice  EYES: EOMI, Anti-icteric  HENT:   Head: Normocephalic and atraumatic. Face:  Symmetric, facial nerve intact, no sinus tenderness  Right Ear: Normal external ear, normal external auditory canal, intact tympanic membrane with normal mobility and aerated middle ear  Left Ear: Normal external ear, normal external auditory canal, intact tympanic membrane with normal mobility and aerated middle ear  Mouth/Oral Cavity:  normal lips, Uvula is midline, no mucosal lesions, no trismus,  dentition, normal salivary quality/flow  Oropharynx/Larynx:  normal oropharynx, normal tonsils; patient did not tolerate mirror exam due to excessive gag reflex  Nose:Normal external nasal appearance. Anterior rhinoscopy shows a normal septum. Normal turbinates. Normal mucosa   NECK: Normal range of motion, no thyromegaly, trachea is midline, no lymphadenopathy, no neck masses, no crepitus  CHEST: Normal respiratory effort, no retractions, breathing comfortably  SKIN: No rashes, normal appearing skin, no evidence of skin lesions/tumors  Neuro:  cranial nerve II-XII intact; normal gait  Cardio:  no edema        ASSESSMENT/PLAN  1. Pneumonia  2. SIRS  3. Pansinusitis  4. Septal deviation and inferior turbinate hypertrophy    I independently reviewed the patient's CT sinus from 12/7/2020 and compared it to his previous one from 2009. It does show evidence of acute on chronic changes consistent with pansinusitis. I think that treatment of his pneumonia during this admission is most pressing matter at this time.   We can evaluate him on an outpatient basis and monitor for symptom resolution of his sinus disease. Should he not improve, may be a candidate for endoscopic sinus surgery to open up his sinuses and allow them to drain better. I would recommend from a sinus perspective, to keep him on 2 sprays of nasal steroids twice daily, and if he were to be on monotherapy for solely his sinuses I would recommend doxycycline 100 mg twice daily for 2 weeks. I will arrange to have the patient follow-up with me as an outpatient. Please obtain a CT sinus for image guidance with the instructions reading please perform scan with 1 mm slices or less from vertex to mandible, include coronal and sagittal reconstructions. The current scan that he had would not be adequate for image guidance should he need surgery due to the slice thickness. The patient's chart was reviewed electronically and the patient has not yet been seen in person. This note was generated completely or in part utilizing Dragon dictation speech recognition software. Occasionally, words are mistranscribed and despite editing, the text may contain inaccuracies due to incorrect word recognition. If further clarification is needed please contact the office at [phone].

## 2020-12-09 ENCOUNTER — TELEPHONE (OUTPATIENT)
Dept: ENT CLINIC | Age: 85
End: 2020-12-09

## 2020-12-09 LAB
BLOOD CULTURE, ROUTINE: NORMAL
CULTURE, BLOOD 2: NORMAL

## 2020-12-09 NOTE — TELEPHONE ENCOUNTER
I called patient per Dr Zia Sandoval request and tried to schedule for Monday 12/14 in the [facility] office. Patient did not want to come in on 12/14 but will call to schedule for the following week.

## 2020-12-10 ENCOUNTER — TELEPHONE (OUTPATIENT)
Dept: PHARMACY | Age: 85
End: 2020-12-10

## 2020-12-10 ENCOUNTER — TELEPHONE (OUTPATIENT)
Dept: FAMILY MEDICINE CLINIC | Age: 85
End: 2020-12-10

## 2020-12-10 NOTE — TELEPHONE ENCOUNTER
----- Message from Hanna Espinal sent at 12/10/2020  4:41 PM EST -----  Subject: Message to Provider    QUESTIONS  Information for Provider? pt daughter number is [phone]. to call to   see what needs to be done for parents. address is [address]. email address [email or web address] for HIPPA forms to be   mailed or emailed too. please advise.   ---------------------------------------------------------------------------  --------------  CALL BACK INFO  What is the best way for the office to contact you? OK to leave message on   voicemail  Preferred Call Back Phone Number? [phone]  ---------------------------------------------------------------------------  --------------  SCRIPT ANSWERS  Relationship to Patient?  Self

## 2020-12-10 NOTE — TELEPHONE ENCOUNTER
Pt was in the hospital 12/5-12/8 for pneumonia. Was d/c on doxycycline x 14 days, benzonatate, atorvastatin, gauifenesin. INR on admit 5.12, warfarin was held until 12/8, resumed taking 2.5 mg. INR 2.3 at d/c.     Pt daughter, Román Enciso, called to determine if patient needed to come to clinic appt on 12/15. Explained yes pt needs to come to appt since INR was so high in hosptial and is on abx. She states that she will be talking to pt today so advised for patient to take 2.5 mg daily until RTC (15% dec). Moved appt to 5 pm that evening d/t pt inability to drive and will need son to bring to clinic.

## 2020-12-11 ENCOUNTER — TELEPHONE (OUTPATIENT)
Dept: FAMILY MEDICINE CLINIC | Age: 85
End: 2020-12-11

## 2020-12-14 ENCOUNTER — TELEPHONE (OUTPATIENT)
Dept: PHARMACY | Facility: CLINIC | Age: 85
End: 2020-12-14

## 2020-12-14 PROCEDURE — 1111F DSCHRG MED/CURRENT MED MERGE: CPT | Performed by: PHARMACIST

## 2020-12-14 NOTE — TELEPHONE ENCOUNTER
CLINICAL PHARMACY NOTE  Post-Discharge Transitions of Care ([facility])    Non-face-to-face services provided:  Assessment and support for treatment adherence and medication management-med rec completed    Subjective/Objective:  Ibeth Peña is a 80 y.o. male. Patient was discharged from [facility] on 12/8/20 with a diagnosis of pneumonia. Patient outreach to review discharge medications and provide medication review and management. Spoke with patient    No Known Allergies    Discharge Medications (as per discharging medication list found in AVS): There are NEW medications for you. START taking them after you leave the hospital  Medication Sig    benzonatate (TESSALON) 100 MG capsule Take 1 capsule by mouth 3 times daily as needed for Cough  - Rx sent to [facility]  - confirms has, working well for him when needed    fluticasone (FLONASE) 50 MCG/ACT nasal spray 2 sprays by Each Nostril route 2 times daily  - Rx sent to [facility]  - confirmed has and is taking as prescribed with benefit    sodium chloride (OCEAN, BABY AYR) 0.65 % nasal spray 2 sprays by Nasal route every 4 hours  - Rx sent to [facility]  - confirms has, working well    doxycycline hyclate (VIBRA-TABS) 100 MG tablet Take 1 tablet by mouth 2 times daily for 14 days  - Rx sent to [facility]  - confirms has and using as prescribed    guaiFENesin-codeine (GUAIFENESIN AC) 100-10 MG/5ML liquid Take 5 mLs by mouth every 4 hours as needed for Cough for up to 7 days.   - printed Rx  - confirms has if needed    atorvastatin (LIPITOR) 20 MG tablet Take 1 tablet by mouth daily  - changed from atorvastatin-amlodipine  - Rx sent to [facility]  - confirms picked up and taking as prescribed     You told us you were taking these medications at home, but the amount or how often you take this medication has CHANGED  n/a    These are medications you told us you were taking at home, CONTINUE taking them after you leave the hospital   Medication Sig    furosemide (LASIX) 20 MG tablet Take 1 tablet by mouth daily  - Rx sent to [facility]    allopurinol (ZYLOPRIM) 100 MG tablet Take 100 mg by mouth daily    lisinopril (PRINIVIL;ZESTRIL) 10 MG tablet Take 1 tablet by mouth daily    sotalol (BETAPACE) 120 MG tablet Take 1 tablet by mouth 2 times daily    metoprolol tartrate (LOPRESSOR) 25 MG tablet Take 1 tablet by mouth 2 times daily    vitamin B-1 (THIAMINE) 100 MG tablet Take 100 mg by mouth daily    EPOETIN BIBIANA IJ Inject as directed every 14 days   [facility] since missed    warfarin (COUMADIN) 5 MG tablet Take 1 tablet by mouth daily Take 2.5 mg on Mon, Wed and Fri and 5 mg all other days of the week  - per [facility], has INR test tomorrow    acetaminophen (TYLENOL) 325 MG tablet Take 650 mg by mouth every 6 hours as needed for Pain    blood glucose test strips (ACCU-CHEK AKOSUA PLUS) strip TEST BLOOD SUGAR THREE TIMES DAILY AS DIRECTED  - states not testing blood sugar regularly    vitamin D (CHOLECALCIFEROL) 1000 UNIT TABS tablet Take 5,000 Units by mouth daily   - not taking, stopped a while ago  - last vitamin D WNL 12/5/2020; historical med    Blood Glucose Monitoring Suppl (ACCU-CHEK AKOSUA PLUS) W/DEVICE KIT 1 kit by Does not apply route 3 times daily Patient to check blood sugar 3 times a day and PRN, he is a newly diagnosed diabetic who will require medication titration ;  LABA1C      8.6    ; ICD code- 250.02.  - no longer checking    loratadine (CLARITIN) 10 MG tablet Take 10 mg by mouth daily.     - taking daily prn allergies, endorses benefit with prn use     These are the medications you have told us you were taking at home, STOP taking them after you leave the hospital   · Amlodipine-atorvastatin (changed to atorvastatin monotherapy) - confirms no longer taking    Additional Medications:  Denies    Estimated Creatinine Clearance: 40 mL/min (based on SCr of 1.3 mg/dL). Component      Latest Ref Rng & Units 12/8/2020 12/7/2020 12/6/2020 12/5/2020           4:21 AM  4:29 AM 10:10 AM  2:10 PM   Creatinine      0.8 - 1.3 mg/dL 1.3 1.0 1.2 1.2   GFR Non-      >60 52 (A) >60 57 (A) 57 (A)     Component      Latest Ref Rng & Units 10/6/2020 6/22/2020           2:27 PM 11:39 AM   Creatinine      0.8 - 1.3 mg/dL 1.0 1.0   GFR Non-      >60 >60 >60       Lab Results   Component Value Date    LABA1C 5.5 12/05/2020     Lab Results   Component Value Date    .2 12/05/2020      Component      Latest Ref Rng & Units 12/5/2020 8/19/2019 6/19/2017           2:10 PM 11:24 AM  9:33 AM   Vit D, 25-Hydroxy      >=30 ng/mL 48.5 55.4 32.9       Assessment/Plan:  - Pt is taking medications as directed by discharging physician. Number of discrepancies: 1. Instructions per discharge list provided except per below documentation. Identified medication discrepancies/issues:   · Category 4 (1):  1. Testing supplies: No longer testing blood sugars, A1c well controlled at 5.5% not on antihyperglycemic medications. Removed from med list.  2. Vitamin D: no longer using, stopped some time ago. Last vitamin D level WNL 12/5/20. Removed from med list  3. Loratadine: using prn with benefit, updated med list    - Identified Potential Medication Interactions: No clinically significant interactions identified via [employer] Interaction Analysis as category D or higher. Has f/u with [facility] tomorrow 12/15/2020.    - Renal Dosing: appropriate. Monitor sotalol frequency - noted last sCr slightly above baseline. .    - Follow up appointment date (7 days for more severe illness, 14 days for others):    · Patient encouraged to follow up with PCP/specialist(s) as advised at discharge.  HFU with PCP 12/23/20     Thank you,    Briana Murillo, PharmD, [address] [facility] Pharmacist  Dept: [phone] (toll free [phone], option 7)     For [address] Patient?: Yes  Total # of Interventions Recommended: 1 - Updated Order #: 1  Total # Interventions Accepted: 1  Intervention Severity:   - Level 1 Intervention Present?: No   - Level 2 #: 0   - Level 3 #: 0  Outreach Status: Review Complete  Care Coordinator Outreach to Patient?: No  Provider Contacted?: No  Time Spent (min): 30

## 2020-12-15 ENCOUNTER — ANTI-COAG VISIT (OUTPATIENT)
Dept: PHARMACY | Age: 85
End: 2020-12-15
Payer: MEDICARE

## 2020-12-15 VITALS — TEMPERATURE: 96.8 F

## 2020-12-15 LAB — INTERNATIONAL NORMALIZATION RATIO, POC: 4.2

## 2020-12-15 PROCEDURE — 99212 OFFICE O/P EST SF 10 MIN: CPT

## 2020-12-15 PROCEDURE — 85610 PROTHROMBIN TIME: CPT

## 2020-12-15 NOTE — PROGRESS NOTES
Mr. Joaquin Helms is a 80 y.o. y/o male with history of Afib   He presents today for anticoagulation monitoring and adjustment. Pertinent PMH: ICD-pacemaker. 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 --> cardiology wants to replace line to pacemaker, he will let us know when scheduled and how long he is holding for  1/5 is having pacemaker fixed, does not know if will need to hold warfarin   []   [x]       Missed Dose- denies  []   [x]       Extra Dose- denies  [x]   []       Change in medications- receiving Procrit 40,000 unit injection once every two weeks until red blood cell count is consistently under control again---> no changes--> lisinopril inc to 10mg daily -->was prescribed doxycycline x14 days at d/c (has 1 more week). Started atorvastatin, benzonatate for cough, guaifenesin    [x]   []       Change in health/diet/appetite consistent greens -> continues---> erratic diet---> no changes, no NVD--> has been eating less, appetite has been diminished --> has been eating less since being home     []   [x]       Change in alcohol use  []   [x]       Change in activity  [x]   []       Hospital admission 12/5-12/8 for pneumonia. was d/c on doxycycline x 14 days, benzonatate, atorvastatin, gauifenesin. INR on admit 5.12, warfarin was held until 12/8, resumed taking 2.5 mg. INR 2.3 at d/c. Advised to take 2.5 mg daily until RTC, pt did not lower dose as instructed.    []   [x]       Emergency department visit  []   [x]       Other complaints--> Patient has been very tired and not able to function well, getting epoetin alpha injection at hemotologist office     Clinical Outcomes:  Yes     No  []   [x]       Major bleeding event  []   [x]       Thromboembolic event  Duration of warfarin Therapy: indefinite  INR Range:  2.0-3.0     INR is 4.2 today after being on doxycycline and has not been eating, also did not lower weekly dose as instructed, returned to hx weekly dose    Hold dose tonight then decrease weekly dose to 5 mg on Sun and 2.5 mg all other days of the week (20% dec)  Will likely have to return to higher weekly dose once finished with abx and feeling better   Encouraged to maintain a consistency of vegetables/salads.   Recheck INR in 2 weeks, 12/29  Also scheduled with Kenan Felix in 4 weeks, 1/12/21    Referring cardiologist will be Dr. Jered Garcia  INR (no units)   Date Value   12/08/2020 2.30 (H)   12/07/2020 3.30 (H)   12/06/2020 5.68 ([facility])   12/05/2020 5.12 ([facility])     CLINICAL PHARMACY CONSULT: MED RECONCILIATION/REVIEW ADDENDUM    For Pharmacy Admin Tracking Only    PHSO: Yes  Total # of Interventions Recommended: 1  - Decreased Dose #: 1  - Maintenance Safety Lab Monitoring #: 1  Total Interventions Accepted: 1  Time Spent (min): 15    Kimberly Bronson, KalinaD

## 2020-12-18 ENCOUNTER — TELEPHONE (OUTPATIENT)
Dept: CARDIOLOGY CLINIC | Age: 85
End: 2020-12-18

## 2020-12-18 NOTE — TELEPHONE ENCOUNTER
Pt daughter calling to cancel her fathers procedure with [facility] because he just got out of the hospital with pneumonia.  Jean Marie Valle please call to reschedule thank you

## 2020-12-18 NOTE — TELEPHONE ENCOUNTER
Will forward to [facility] and [facility]PSR to further evaluate. Person that called and cancelled his procedure is not on his HIPPA. Call placed to [facility].  He wants to wait until February to have his device upgrade will forward to [facility] to reschedule

## 2020-12-21 ENCOUNTER — OFFICE VISIT (OUTPATIENT)
Dept: ENT CLINIC | Age: 85
End: 2020-12-21
Payer: MEDICARE

## 2020-12-21 VITALS — SYSTOLIC BLOOD PRESSURE: 167 MMHG | TEMPERATURE: 97.1 F | DIASTOLIC BLOOD PRESSURE: 92 MMHG | HEART RATE: 81 BPM

## 2020-12-21 PROCEDURE — G8427 DOCREV CUR MEDS BY ELIG CLIN: HCPCS | Performed by: STUDENT IN AN ORGANIZED HEALTH CARE EDUCATION/TRAINING PROGRAM

## 2020-12-21 PROCEDURE — G8484 FLU IMMUNIZE NO ADMIN: HCPCS | Performed by: STUDENT IN AN ORGANIZED HEALTH CARE EDUCATION/TRAINING PROGRAM

## 2020-12-21 PROCEDURE — G8420 CALC BMI NORM PARAMETERS: HCPCS | Performed by: STUDENT IN AN ORGANIZED HEALTH CARE EDUCATION/TRAINING PROGRAM

## 2020-12-21 PROCEDURE — 31231 NASAL ENDOSCOPY DX: CPT | Performed by: STUDENT IN AN ORGANIZED HEALTH CARE EDUCATION/TRAINING PROGRAM

## 2020-12-21 PROCEDURE — 99204 OFFICE O/P NEW MOD 45 MIN: CPT | Performed by: STUDENT IN AN ORGANIZED HEALTH CARE EDUCATION/TRAINING PROGRAM

## 2020-12-23 NOTE — PROGRESS NOTES
Song      Patient Name: [facility] [place] Record Number:  [id number]  Primary Care Physician:  Gray Mendoza MD  Date of Consultation: 12/21/2020      Chief Complaint:   Chief Complaint   Patient presents with    Sinus Problem     seen for sinus infection in ER following pnumonia, some improvement        HISTORY OF PRESENT ILLNESS  Chau Allen is a(n) 80 y.o. male who presents today for evaluation of issues related to his nose. I was called about the patient when he was admitted the previous weekend for pneumonia. At that time a CT sinus was performed due to some sinus complaints he was having and this showed evidence of pansinusitis inflammation. I independently reviewed the CT scan and it shows acute on chronic changes. The patient states that he has been having intermittent nasal drainage, facial pain and pressure and decrease sense of smell. He gets several sinus infections per year. He was placed on doxycycline and fluticasone sprays and feels that since starting this and the nasal saline he has had significant improvement. He is set to finish his antibiotics here soon. I independently reviewed the patients past medical history, past surgical history, and social history. They are unremarkable except as noted in the HPI and below. The patient denies any family history related to the current complaint, and they deny any family history of bleeding disorders or difficulties with anesthesia unless noted below.      Patient Active Problem List   Diagnosis    CAD (coronary artery disease)    Cardiac pacemaker    Hypertrophy of prostate without urinary obstruction and other lower urinary tract symptoms (LUTS)    Gout-uric acid >7.5--advised proph tx    Hypercholesteremia    Carotid bruit(bilat-nl duplex u/s 10/10)    Vitamin D deficiency-(advised 1000IU/day)    Actinic keratoses    Elevated PSA-(was 4.2 10/10-repeat 6 mo)(was 5.12 1/11-then 4.4 2/12)-sees dr Agustin Richter for this    S/P colonoscopy-4/07-neg per pt,  3/18 repeat colonoscopy polyp-repeat 5 yr    Hearing loss, conductive, bilateral-seeing ent-dr quinn for hearing aides    Encounter for monitoring sotalol therapy    MICROALBUMINURIA-on ace already    Anemia--post op 8/15--started otc iron bid, off now  - work up Dr. Nova Borrero bone marrow. possible MDS 2019    Diabetes mellitus ([address]    PAF (paroxysmal atrial fibrillation) ([address]    Hypertension    CKD stage 3 due to type 2 diabetes mellitus ([address]    Hyperkalemia    H/O esophagogastroduodenoscopy  11/18  prominent vessesls vs varices. dr Angel Herring (done for blood in stool)    Urinary frequency    Elevated ferritin  - work up Dr. Nova Borrero  genetic screen hemachromatosis negative.  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    Localized edema    Pneumonia due to organism    Ischemic cardiomyopathy    Benign essential HTN    Aspiration pneumonia ([address]    Pansinusitis     Past Surgical History:   Procedure Laterality Date    ABSCESS DRAINAGE  7/20/15    right foot    APPENDECTOMY      CARDIAC CATHETERIZATION  2003    Left    COLONOSCOPY  03/28/2018    dr Olaf Mendez    x3   [facility]    OTHER SURGICAL HISTORY Right 7/17/15    right fifth toe I and D    PACEMAKER PLACEMENT  2005    ND ESOPHAGOGASTRODUODENOSCOPY TRANSORAL DIAGNOSTIC N/A 11/21/2018    EGD DIAGNOSTIC ONLY performed by London Knight MD at [place] [address] Right 7.16.15    right pinkey toe     TONSILLECTOMY AND ADENOIDECTOMY       Family History   Problem Relation Age of Onset   [place] Stroke Father     Diabetes Mother      Social History     Tobacco Use    Smoking status: Never Smoker    Smokeless tobacco: Never Used    Tobacco comment: counseled on tobacco exposure avoidance   Substance Use Topics    Alcohol use: No     Alcohol/week: 0.0 standard drinks    Drug use: No        Anti-coag visit on 12/15/2020   Component Date Value Ref Range Status    INR 12/15/2020 4.2   Final        DRUG/FOOD ALLERGIES: Patient has no known allergies. CURRENT MEDICATIONS  Prior to Admission medications    Medication Sig Start Date End Date Taking?  Authorizing Provider   fluticasone (FLONASE) 50 MCG/ACT nasal spray 2 sprays by Each Nostril route 2 times daily 12/8/20  Yes Huey Kiran MD   sodium chloride (OCEAN, BABY AYR) 0.65 % nasal spray 2 sprays by Nasal route every 4 hours 12/8/20  Yes Huey Kiran MD   furosemide (LASIX) 20 MG tablet Take 1 tablet by mouth daily 12/8/20  Yes Priscilla Yu MD   atorvastatin (LIPITOR) 20 MG tablet Take 1 tablet by mouth daily 12/8/20  Yes Priscilla Yu MD   allopurinol (ZYLOPRIM) 100 MG tablet Take 100 mg by mouth daily   Yes Historical Provider, MD   lisinopril (PRINIVIL;ZESTRIL) 10 MG tablet Take 1 tablet by mouth daily 10/19/20  Yes Casandra Dickey MD   sotalol (BETAPACE) 120 MG tablet Take 1 tablet by mouth 2 times daily 8/19/20  Yes Andrea Ordoñez MD   metoprolol tartrate (LOPRESSOR) 25 MG tablet Take 1 tablet by mouth 2 times daily 8/10/20  Yes Jenni Singletary MD   vitamin B-1 (THIAMINE) 100 MG tablet Take 100 mg by mouth daily   Yes Historical Provider, MD   EPOETIN BIBIANA IJ Inject as directed every 14 days    Yes Historical Provider, MD   warfarin (COUMADIN) 5 MG tablet Take 1 tablet by mouth daily Take 2.5 mg on Mon, Wed and Fri and 5 mg all other days of the week 3/12/20  Yes PRIYA Laguerre CNP   acetaminophen (TYLENOL) 325 MG tablet Take 650 mg by mouth every 6 hours as needed for Pain   Yes Historical Provider, MD   loratadine (CLARITIN) 10 MG tablet Take 10 mg by mouth daily as needed (allergies)    Yes Historical Provider, MD       REVIEW OF SYSTEMS  The following systems were reviewed and revealed the following in addition to any already discussed in the HPI:    CONSTITUTIONAL: no weight loss, no fever, no night sweats, no chills  EYES: no vision changes, no blurry vision  EARS: no changes in hearing, no otalgia  NOSE: no epistaxis, no rhinorrhea  RESPIRATORY: no  Difficulty breathing, no shortness of breath  CV: no chest pain, no Peripheral vascular disease  HEME: No coagulation disorder, no Bleeding disorder  NEURO: no TIA or stroke-like symptoms  SKIN: No new rashes in the head and neck, no recent skin cancers  MOUTH: No new ulcers, no recent teeth infections  GASTROINTESTINAL: No diarrhea, stomach pain  PSYCH: No anxiety, no depression      PHYSICAL EXAM  BP (!) 167/92 (Site: Left Upper Arm, Position: Sitting)   Pulse 81   Temp 97.1 °F (36.2 °C) (Temporal)     GENERAL: No acute distress, alert and oriented, no hoarseness, strong voice  EYES: EOMI, Anti-icteric  HENT:   Head: Normocephalic and atraumatic. Face:  Symmetric, facial nerve intact, no sinus tenderness  Right Ear: Normal external ear, normal external auditory canal, intact tympanic membrane with normal mobility and aerated middle ear  Left Ear: Normal external ear, normal external auditory canal, intact tympanic membrane with normal mobility and aerated middle ear  Mouth/Oral Cavity:  normal lips, Uvula is midline, no mucosal lesions, no trismus, normal dentition, normal salivary quality/flow  Oropharynx/Larynx:  normal oropharynx, normal tonsils; patient did not tolerate mirror exam due to excessive gag reflex  Nose:Normal external nasal appearance. Anterior rhinoscopy shows severely a deviated septum preventing view posteriorly. Normal turbinates.   Normal mucosa   NECK: Normal range of motion, no thyromegaly, trachea is midline, no lymphadenopathy, no neck masses, no crepitus  CHEST: Normal respiratory effort, no retractions, breathing comfortably  SKIN: No rashes, normal appearing skin, no evidence of skin lesions/tumors  Neuro:  cranial nerve II-XII intact; normal gait  Cardio:  no edema        PROCEDURE  Nasal Endoscopy (CPT code 58536)    Preop: chronic rhinitis  Postop: Same    Verbal consent was received. After topical anesthesia and decongestion had been obtained using aerosolized 1% lidocaine and oxymetazoline, a 45 degree rigid endoscope was placed into both nares with the patient in a sitting position. The following was observed:    Right Nasal Cavity and Paranasal Sinuses:  Polyp score = 0 (0 = no polyps, 1 = small polyps in middle meatus not reaching below the inferior border of the middle charu, 2 = polyps reaching below the middle border of the middle turbinate, 3= large polyps reaching the lower border of the inferior turbinate or polyps medial to the middle charu, 4= large polyps causing almost complete congestion/obstruction of the interior meatus)  Edema score = 1 (0 = absent, 1 = mild, 2 = severe)  Discharge score = 1 (0 = no discharge, 1 = clear thin discharge, 2 = thick purulent discharge)    Left Nasal Cavity and Paranasal Sinuses:    Polyp score = 0 (0 = no polyps, 1 = small polyps in middle meatus not reaching below the inferior border of the middle charu, 2 = polyps reaching below the middle border of the middle turbinate, 3= large polyps reaching the lower border of the inferior turbinate or polyps medial to the middle charu, 4= large polyps causing almost complete congestion/obstruction of the interior meatus)  Edema score = 1 (0 = absent, 1 = mild, 2 = severe)  Discharge score = 1 (0 = no discharge, 1 = clear thin discharge, 2 = thick purulent discharge)    Septum: intact and deviated right  Other:   -The inferior and middle turbinates were examined. The middle meatus, and sphenoethmoid recess was examined bilaterally. -Mild to moderate sinonasal phonation. No evidence of any active infection. No nasal polyps. -There were no complications. Tolerated well without complication. I attest that I was present for and did the entire procedure myself. ASSESSMENT/PLAN  1. Nasal obstruction      2. Hypertrophy of both inferior nasal turbinates      3.  Chronic pansinusitis      4. Chronic rhinitis    This a very pleasant 75-year-old gentleman here today for evaluation of multiple complaints related to his nose. When I was initially called about him, we had him start on fluticasone and doxycycline. He is also using nasal saline sprays. Overall he is responded very well to this treatment. I would like him to continue the fluticasone sprays twice daily and finish his antibiotics. We will see him back in 2 months to see how he does. We did discuss that should he continue to get recurrent sinus infections a turn into pneumonia, we may need to think about endoscopic sinus surgery to open up his sinuses in the future, however I am hopeful that we will be able to treat him conservatively and he will not require any intervention. Any surgery be complicated by the fact that he is on therapeutic anticoagulation and is of advanced age. Medical Decision Making: The following items were considered in medical decision making including or in addition to what was noted in the assessment and plan:   -Independent review of images  -Review / order clinical lab tests  -Review / order radiology tests  -Decision to obtain old records  -Review and summation of old records as accessed through [facility] and pertinent information was summarized in the note    This note was generated completely or in part utilizing Dragon dictation speech recognition software. Occasionally, words are mistranscribed and despite editing, the text may contain inaccuracies due to incorrect word recognition. If further clarification is needed please contact the office at [phone].

## 2020-12-29 ENCOUNTER — ANTI-COAG VISIT (OUTPATIENT)
Dept: PHARMACY | Age: 85
End: 2020-12-29
Payer: MEDICARE

## 2020-12-29 ENCOUNTER — HOSPITAL ENCOUNTER (OUTPATIENT)
Age: 85
Discharge: HOME | End: 2020-12-29
Payer: MEDICARE

## 2020-12-29 VITALS — TEMPERATURE: 97.1 F

## 2020-12-29 LAB
ANION GAP SERPL CALCULATED.3IONS-SCNC: 10 MMOL/L (ref 3–16)
BUN BLDV-MCNC: 20 MG/DL (ref 7–20)
CALCIUM SERPL-MCNC: 9.3 MG/DL (ref 8.3–10.6)
CHLORIDE BLD-SCNC: 104 MMOL/L (ref 99–110)
CO2: 28 MMOL/L (ref 21–32)
CREAT SERPL-MCNC: 0.9 MG/DL (ref 0.8–1.3)
GFR AFRICAN AMERICAN: >60
GFR NON-AFRICAN AMERICAN: >60
GLUCOSE BLD-MCNC: 137 MG/DL (ref 70–99)
INTERNATIONAL NORMALIZATION RATIO, POC: 2.1
POTASSIUM SERPL-SCNC: 4.1 MMOL/L (ref 3.5–5.1)
SODIUM BLD-SCNC: 142 MMOL/L (ref 136–145)

## 2020-12-29 PROCEDURE — 85610 PROTHROMBIN TIME: CPT

## 2020-12-29 PROCEDURE — 80048 BASIC METABOLIC PNL TOTAL CA: CPT

## 2020-12-29 PROCEDURE — 36415 COLL VENOUS BLD VENIPUNCTURE: CPT

## 2020-12-29 PROCEDURE — 99211 OFF/OP EST MAY X REQ PHY/QHP: CPT

## 2020-12-29 NOTE — PROGRESS NOTES
Mr. Ngoc Salazar is a 80 y.o. y/o male with history of Afib   He presents today for anticoagulation monitoring and adjustment. Pertinent PMH: ICD-pacemaker. 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 --> cardiology wants to replace line to pacemaker, he will let us know when scheduled and how long he is holding for  1/5 is having pacemaker fixed, does not know if will need to hold warfarin   []   [x]       Missed Dose- denies  []   [x]       Extra Dose- denies  [x]   []       Change in medications- receiving Procrit 40,000 unit injection once every two weeks until red blood cell count is consistently under control again---> no changes--> lisinopril inc to 10mg daily -->was prescribed doxycycline x14 days at d/c (has 1 more week). Started atorvastatin, benzonatate for cough, guaifenesin --> finished abx on Sat. No other changes    [x]   []       Change in health/diet/appetite consistent greens -> continues---> erratic diet---> no changes, no NVD--> has been eating less, appetite has been diminished --> has been eating less since being home --> appetite is slowly starting to return   []   [x]       Change in alcohol use  []   [x]       Change in activity  [x]   []       Hospital admission 12/5-12/8 for pneumonia. was d/c on doxycycline x 14 days, benzonatate, atorvastatin, gauifenesin. INR on admit 5.12, warfarin was held until 12/8, resumed taking 2.5 mg. INR 2.3 at d/c.   Advised to take 2.5 mg daily until RTC, pt did not lower dose as instructed. []   [x]       Emergency department visit  []   [x]       Other complaints--> Patient has been very tired and not able to function well, getting epoetin alpha injection at hemotologist office     Clinical Outcomes:  Yes     No  []   [x]       Major bleeding event  []   [x]       Thromboembolic event  Duration of warfarin Therapy: indefinite  INR Range:  2.0-3.0     INR is 2.1 today    Since recently finished abx, and appetite starting to improve, increase weekly dose to 5 mg on Sun and Thurs and 2.5 mg all other days of the week  (12% inc)  Encouraged to maintain a consistency of vegetables/salads.   Recheck INR in 2 weeks, 1/12/21    Referring cardiologist will be Dr. Cassandra Aldrich  INR (no units)   Date Value   12/15/2020 4.2   12/08/2020 2.30 (H)   12/07/2020 3.30 (H)   12/06/2020 5.68 ([place])   12/05/2020 5.12 ([place])     CLINICAL PHARMACY CONSULT: MED RECONCILIATION/REVIEW ADDENDUM    For Pharmacy Admin Tracking Only    PHSO: Yes  Total # of Interventions Recommended: 1  - Increased Dose #: 1  - Maintenance Safety Lab Monitoring #: 1  Total Interventions Accepted: 1  Time Spent (min): 15    Kalina GraysonD

## 2021-01-11 ENCOUNTER — NURSE ONLY (OUTPATIENT)
Dept: CARDIOLOGY CLINIC | Age: 86
End: 2021-01-11
Payer: MEDICARE

## 2021-01-11 DIAGNOSIS — I48.0 PAF (PAROXYSMAL ATRIAL FIBRILLATION) (HCC): ICD-10-CM

## 2021-01-11 DIAGNOSIS — Z95.0 CARDIAC PACEMAKER: ICD-10-CM

## 2021-01-11 PROCEDURE — 93294 REM INTERROG EVL PM/LDLS PM: CPT | Performed by: INTERNAL MEDICINE

## 2021-01-11 PROCEDURE — 93296 REM INTERROG EVL PM/IDS: CPT | Performed by: INTERNAL MEDICINE

## 2021-01-11 NOTE — LETTER
[address] [facility] [phone]  [address] [place] [address] [phone]    Pacemaker/Defibrillator Clinic          01/11/21        [address] [facility] [address]        Dear Prosper Trinidad    This letter is to inform you that we received the transmission from your monitor at home that checks your implanted heart device. The next date your monitor will automatically transmit will be 4-13-21. If your report needs attention we will notify you. Your device and monitor are wireless and most transmit cellularly, but please periodically check your monitor is still plugged in to the electrical outlet. If you still use the telephone land line to send please ensure the connection to the phone eleni is secure. This will help to ensure successful automatic transmissions in the future. Also, the monitor needs to be close to you while sleeping at night. Please be aware that the remote device transmission sites are periodically monitored only during regular business hours during which simultaneous in-office device clinics are being run. If your transmission requires attention, we will contact you as soon as possible. Thank you.             [address]

## 2021-01-11 NOTE — PROGRESS NOTES
We received remote transmission from patient's monitor at home. Transmission shows normal sensing and pacing function. Pt has known AF and remains on coumadin. EP physician will review. See interrogation under cardiology tab in the [address] [place] [address] [address] field for more details.

## 2021-01-12 ENCOUNTER — ANTI-COAG VISIT (OUTPATIENT)
Dept: PHARMACY | Age: 86
End: 2021-01-12
Payer: MEDICARE

## 2021-01-12 VITALS — TEMPERATURE: 97.1 F

## 2021-01-12 DIAGNOSIS — I48.0 PAF (PAROXYSMAL ATRIAL FIBRILLATION) (HCC): ICD-10-CM

## 2021-01-12 LAB — INTERNATIONAL NORMALIZATION RATIO, POC: 1.8

## 2021-01-12 PROCEDURE — 99211 OFF/OP EST MAY X REQ PHY/QHP: CPT

## 2021-01-12 PROCEDURE — 85610 PROTHROMBIN TIME: CPT

## 2021-01-12 NOTE — PROGRESS NOTES
Mr. Lazaro Alexis is a 80 y.o. y/o male with history of Afib   He presents today for anticoagulation monitoring and adjustment. Pertinent PMH: ICD-pacemaker. 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 --> cardiology wants to replace line to pacemaker, he will let us know when scheduled and how long he is holding for  1/5 is having pacemaker fixed, does not know if will need to hold warfarin   []   [x]       Missed Dose- denies  []   [x]       Extra Dose- denies  [x]   []       Change in medications- receiving Procrit 40,000 unit injection once every two weeks until red blood cell count is consistently under control again---> no changes--> lisinopril inc to 10mg daily -->was prescribed doxycycline x14 days at d/c (has 1 more week). Started atorvastatin, benzonatate for cough, guaifenesin --> finished abx on Sat. No other changes---> no changes     [x]   []       Change in health/diet/appetite consistent greens -> continues---> erratic diet---> no changes, no NVD--> has been eating less, appetite has been diminished --> has been eating less since being home --> appetite is slowly starting to return---> only had one salad, no NVD   []   [x]       Change in alcohol use  []   [x]       Change in activity  [x]   []       Hospital admission 12/5-12/8 for pneumonia. was d/c on doxycycline x 14 days, benzonatate, atorvastatin, gauifenesin. INR on admit 5.12, warfarin was held until 12/8, resumed taking 2.5 mg.  INR 2.3 at d/c.  Advised to take 2.5 mg daily until RTC, pt did not lower dose as instructed. []   [x]       Emergency department visit  []   [x]       Other complaints--> Patient has been very tired and not able to function well, getting epoetin alpha injection at hemotologist office     Clinical Outcomes:  Yes     No  []   [x]       Major bleeding event  []   [x]       Thromboembolic event  Duration of warfarin Therapy: indefinite  INR Range:  2.0-3.0     INR is 1.8 today    Dose increased at last visit, pt confirms dose, states no changes in meds/diet. Will increase once more and monitor closely. Increase weekly dose to 5 mg on Sun, Tues and Thurs and 2.5 mg all other days of the week (11.1%). Encouraged to maintain a consistency of vegetables/salads.   Recheck INR in 10 days, 1/21    Referring cardiologist will be Dr. Farhan Mejia  INR (no units)   Date Value   01/12/2021 1.8   12/29/2020 2.1   12/15/2020 4.2   12/08/2020 2.30 (H)   12/07/2020 3.30 (H)   12/06/2020 5.68 ([facility])   12/05/2020 5.12 ([facility])     CLINICAL PHARMACY CONSULT: MED RECONCILIATION/REVIEW ADDENDUM    For Pharmacy Admin Tracking Only    PHSO: Yes  Total # of Interventions Recommended: 1  - Increased Dose #: 1  - Maintenance Safety Lab Monitoring #: 1  Total Interventions Accepted: 1  Time Spent (min): [address], PharmD

## 2021-01-20 ENCOUNTER — IMMUNIZATION (OUTPATIENT)
Dept: PRIMARY CARE CLINIC | Age: 86
End: 2021-01-20
Payer: MEDICARE

## 2021-01-20 ENCOUNTER — OFFICE VISIT (OUTPATIENT)
Dept: FAMILY MEDICINE CLINIC | Age: 86
End: 2021-01-20
Payer: MEDICARE

## 2021-01-20 ENCOUNTER — HOSPITAL ENCOUNTER (OUTPATIENT)
Dept: GENERAL RADIOLOGY | Age: 86
Discharge: HOME | End: 2021-01-20
Payer: MEDICARE

## 2021-01-20 ENCOUNTER — HOSPITAL ENCOUNTER (OUTPATIENT)
Age: 86
Discharge: HOME | End: 2021-01-20
Payer: MEDICARE

## 2021-01-20 VITALS
BODY MASS INDEX: 22.94 KG/M2 | TEMPERATURE: 97.9 F | SYSTOLIC BLOOD PRESSURE: 110 MMHG | WEIGHT: 160.2 LBS | OXYGEN SATURATION: 97 % | RESPIRATION RATE: 14 BRPM | DIASTOLIC BLOOD PRESSURE: 70 MMHG | HEIGHT: 70 IN | HEART RATE: 87 BPM

## 2021-01-20 DIAGNOSIS — I49.9 IRREGULAR HEART RATE: ICD-10-CM

## 2021-01-20 DIAGNOSIS — R53.81 PHYSICAL DEBILITY: Primary | ICD-10-CM

## 2021-01-20 DIAGNOSIS — J69.0 ASPIRATION PNEUMONIA, UNSPECIFIED ASPIRATION PNEUMONIA TYPE, UNSPECIFIED LATERALITY, UNSPECIFIED PART OF LUNG (HCC): ICD-10-CM

## 2021-01-20 DIAGNOSIS — E11.51 DM (DIABETES MELLITUS), TYPE 2 WITH PERIPHERAL VASCULAR COMPLICATIONS (HCC): ICD-10-CM

## 2021-01-20 DIAGNOSIS — N18.30 CKD STAGE 3 DUE TO TYPE 2 DIABETES MELLITUS (HCC): ICD-10-CM

## 2021-01-20 DIAGNOSIS — I10 ESSENTIAL HYPERTENSION: ICD-10-CM

## 2021-01-20 DIAGNOSIS — E11.9 TYPE 2 DIABETES MELLITUS WITHOUT COMPLICATION, WITHOUT LONG-TERM CURRENT USE OF INSULIN (HCC): Chronic | ICD-10-CM

## 2021-01-20 DIAGNOSIS — J18.9 PNEUMONIA OF BOTH LUNGS DUE TO INFECTIOUS ORGANISM, UNSPECIFIED PART OF LUNG: ICD-10-CM

## 2021-01-20 DIAGNOSIS — I48.0 PAROXYSMAL ATRIAL FIBRILLATION (HCC): ICD-10-CM

## 2021-01-20 DIAGNOSIS — E11.22 CKD STAGE 3 DUE TO TYPE 2 DIABETES MELLITUS (HCC): ICD-10-CM

## 2021-01-20 PROCEDURE — 0001A PR IMM ADMN SARSCOV2 30MCG/0.3ML DIL RECON 1ST DOSE: CPT | Performed by: FAMILY MEDICINE

## 2021-01-20 PROCEDURE — 91300 COVID-19, PFIZER VACCINE 30MCG/0.3ML DOSE: CPT | Performed by: FAMILY MEDICINE

## 2021-01-20 PROCEDURE — 1036F TOBACCO NON-USER: CPT | Performed by: INTERNAL MEDICINE

## 2021-01-20 PROCEDURE — 93000 ELECTROCARDIOGRAM COMPLETE: CPT | Performed by: INTERNAL MEDICINE

## 2021-01-20 PROCEDURE — 4040F PNEUMOC VAC/ADMIN/RCVD: CPT | Performed by: INTERNAL MEDICINE

## 2021-01-20 PROCEDURE — 71046 X-RAY EXAM CHEST 2 VIEWS: CPT

## 2021-01-20 PROCEDURE — 1123F ACP DISCUSS/DSCN MKR DOCD: CPT | Performed by: INTERNAL MEDICINE

## 2021-01-20 PROCEDURE — G8484 FLU IMMUNIZE NO ADMIN: HCPCS | Performed by: INTERNAL MEDICINE

## 2021-01-20 PROCEDURE — 99214 OFFICE O/P EST MOD 30 MIN: CPT | Performed by: INTERNAL MEDICINE

## 2021-01-20 PROCEDURE — G8420 CALC BMI NORM PARAMETERS: HCPCS | Performed by: INTERNAL MEDICINE

## 2021-01-20 PROCEDURE — G8427 DOCREV CUR MEDS BY ELIG CLIN: HCPCS | Performed by: INTERNAL MEDICINE

## 2021-01-20 ASSESSMENT — ENCOUNTER SYMPTOMS
DIARRHEA: 0
NAUSEA: 0
SHORTNESS OF BREATH: 0
COUGH: 1

## 2021-01-20 ASSESSMENT — PATIENT HEALTH QUESTIONNAIRE - PHQ9
1. LITTLE INTEREST OR PLEASURE IN DOING THINGS: 1
SUM OF ALL RESPONSES TO PHQ QUESTIONS 1-9: 2

## 2021-01-20 NOTE — PROGRESS NOTES
1/20/2021    Chief Complaint   Patient presents with    Follow-up     [facility]. [facility] 12/5/20-12/8/20 for pneum. since leaving he is still recovering. feels wore out and doesnt have as much energy. HPI  Here for fu  Started as a sinus infection . Had aspiration pneumonia   Admitted on  12/5/20  Discharge 12/8/20   Had Dazey Hedges neg covid   Feels weaker and had to cane. Went home  No falls  Now. Using cane outside  Started using after his discharge. Able to get out of a chair  Getting tired easily   Daughter thinks he is more unsteady. Not coughing other than now and then    Eating well  Lost some wt. Sees  EP     Saw ent  For the sinus  Had a scope and was clear. Another sinus infection, may need surgery  Saw dr Liudmila Hood      hgb came up to 10 [facility] after shot        Dm     Lab Results   Component Value Date    LABA1C 5.5 12/05/2020    LABA1C 5.6 06/22/2020    LABA1C 5.4 03/18/2020       Review of Systems   Constitutional: Negative for chills and fever. Respiratory: Positive for cough. Negative for shortness of breath. Gastrointestinal: Negative for diarrhea and nausea. Neurological: Negative for dizziness and light-headedness.        Health Maintenance   Topic Date Due    COVID-19 Vaccine (1 of 2) 11/12/1949    Shingles Vaccine (2 of 3) 04/12/2012    Annual Wellness Visit (AWV)  05/29/2019    Lipid screen  06/22/2021    Potassium monitoring  12/29/2021    Creatinine monitoring  12/29/2021    DTaP/Tdap/Td vaccine (3 - Td) 01/11/2026    Flu vaccine  Completed    Pneumococcal 65+ years Vaccine  Completed    Hepatitis A vaccine  Aged Out    Hib vaccine  Aged Out    Meningococcal (ACWY) vaccine  Aged Out      Social History     Tobacco Use    Smoking status: Never Smoker    Smokeless tobacco: Never Used    Tobacco comment: counseled on tobacco exposure avoidance   Substance Use Topics    Alcohol use: No     Alcohol/week: 0.0 standard drinks    Drug use: No      Family History   Problem Relation Age of Onset    Stroke Father     Diabetes Mother      Prior to Visit Medications    Medication Sig Taking?  Authorizing Provider   fluticasone (FLONASE) 50 MCG/ACT nasal spray 2 sprays by Each Nostril route 2 times daily Yes Tomym Sterling MD   sodium chloride (OCEAN, BABY AYR) 0.65 % nasal spray 2 sprays by Nasal route every 4 hours Yes Tommy Sterling MD   furosemide (LASIX) 20 MG tablet Take 1 tablet by mouth daily Yes Arcenio Tran MD   atorvastatin (LIPITOR) 20 MG tablet Take 1 tablet by mouth daily Yes Arcenio Tran MD   allopurinol (ZYLOPRIM) 100 MG tablet Take 100 mg by mouth daily Yes Historical Provider, MD   lisinopril (PRINIVIL;ZESTRIL) 10 MG tablet Take 1 tablet by mouth daily Yes Kev Roque MD   sotalol (BETAPACE) 120 MG tablet Take 1 tablet by mouth 2 times daily Yes Blessing Alexander MD   metoprolol tartrate (LOPRESSOR) 25 MG tablet Take 1 tablet by mouth 2 times daily Yes Ra Chandler MD   vitamin B-1 (THIAMINE) 100 MG tablet Take 100 mg by mouth daily Yes Historical Provider, MD   EPOETIN BIBIANA IJ Inject as directed every 14 days  Yes Historical Provider, MD   warfarin (COUMADIN) 5 MG tablet Take 1 tablet by mouth daily Take 2.5 mg on Mon, Wed and Fri and 5 mg all other days of the week Yes PRIYA Reeves - CNP   acetaminophen (TYLENOL) 325 MG tablet Take 650 mg by mouth every 6 hours as needed for Pain Yes Historical Provider, MD   loratadine (CLARITIN) 10 MG tablet Take 10 mg by mouth daily as needed (allergies)  Yes Historical Provider, MD     Patient Active Problem List   Diagnosis    CAD (coronary artery disease)    Cardiac pacemaker    Hypertrophy of prostate without urinary obstruction and other lower urinary tract symptoms (LUTS)    Gout-uric acid >7.5--advised proph tx    Hypercholesteremia    Carotid bruit(bilat-nl duplex u/s 10/10)    Vitamin D deficiency-(advised 1000IU/day)    Actinic keratoses    Elevated PSA-(was 4.2 10/10-repeat 6 mo)(was 5.12 1/11-then 4.4 2/12)-sees dr Landon Valverde for this    S/P colonoscopy-4/07-neg per pt,  3/18 repeat colonoscopy polyp-repeat 5 yr    Hearing loss, conductive, bilateral-seeing ent-dr quinn for hearing aides    Encounter for monitoring sotalol therapy    MICROALBUMINURIA-on ace already    Anemia--post op 8/15--started otc iron bid, off now  - work up Dr. Kieran Saxena bone marrow. possible MDS 2019    Diabetes mellitus ([facility] [address]    PAF (paroxysmal atrial fibrillation) ([facility] [address]    Hypertension    CKD stage 3 due to type 2 diabetes mellitus ([facility] [address]    Hyperkalemia    H/O esophagogastroduodenoscopy  11/18  prominent vessesls vs varices. dr Rosetta May (done for blood in stool)    Urinary frequency    Elevated ferritin  - work up Dr. Kieran Saxena  genetic screen hemachromatosis negative.  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    Localized edema    Pneumonia due to organism    Ischemic cardiomyopathy    Benign essential HTN    Aspiration pneumonia (HCC)    Pansinusitis        LABS:   Lab Results   Component Value Date    GLUCOSE 137 (H) 12/29/2020     Lab Results   Component Value Date     12/29/2020    K 4.1 12/29/2020    CREATININE 0.9 12/29/2020     Cholesterol, Total   Date Value Ref Range Status   06/22/2020 109 0 - 199 mg/dL Final     LDL Calculated   Date Value Ref Range Status   06/22/2020 48 <100 mg/dL Final     HDL   Date Value Ref Range Status   06/22/2020 37 (L) 40 - 60 mg/dL Final   05/07/2012 36 (L) 40 - 60 mg/dl Final     Triglycerides   Date Value Ref Range Status   06/22/2020 120 0 - 150 mg/dL Final     Lab Results   Component Value Date    ALT 12 12/05/2020    AST 20 12/05/2020    ALKPHOS 86 12/05/2020    BILITOT 0.7 12/05/2020      Lab Results   Component Value Date    WBC 10.1 12/08/2020    HGB 7.8 (L) 12/08/2020    HCT 22.7 (L) 12/08/2020    MCV 91.4 12/08/2020     12/08/2020     TSH (uIU/mL)   Date Value   06/27/2016 3.14     Lab Results   Component Value Date    LABA1C 5.5 12/05/2020     Lab Results Component Value Date    PSA 2.82 06/22/2020    PSA 2.77 08/19/2019    PSA 2.83 04/30/2018        PHYSICAL EXAM:  /70   Pulse 87   Temp 97.9 °F (36.6 °C)   Resp 14   Ht 5' 9.5\" (1.765 m)   Wt 160 lb 3.2 oz (72.7 kg)   SpO2 97%   BMI 23.32 kg/m²    Physical Exam  Constitutional:       Appearance: Normal appearance. He is well-developed. HENT:      Head: Normocephalic and atraumatic. Cardiovascular:      Rate and Rhythm: Normal rate. Rhythm irregular. Pulses: Normal pulses. Heart sounds: No murmur. Comments: Has squeaks in lungs  Pulmonary:      Effort: Pulmonary effort is normal. No respiratory distress. Breath sounds: Normal breath sounds. No wheezing. Skin:     General: Skin is warm and dry. Neurological:      Mental Status: He is alert. Psychiatric:         Mood and Affect: Mood normal.         Behavior: Behavior normal.         Thought Content: Thought content normal.         Judgment: Judgment normal.       BP Readings from Last 5 Encounters:   01/20/21 110/70   12/21/20 (!) 167/92   12/08/20 124/62   12/04/20 130/60   10/19/20 (!) 173/69       Wt Readings from Last 5 Encounters:   01/20/21 160 lb 3.2 oz (72.7 kg)   12/05/20 161 lb 11.2 oz (73.3 kg)   12/04/20 169 lb (76.7 kg)   10/19/20 163 lb 12.8 oz (74.3 kg)   10/07/20 166 lb 12.8 oz (75.7 kg)      Vish Burden was seen today for follow-up. Diagnoses and all orders for this visit:    Physical debility  -     [address]    Pneumonia of both lungs due to infectious organism, unspecified part of lung  -     XR CHEST (2 VW); Future  -     Comprehensive Metabolic Panel; Future    Type 2 diabetes mellitus without complication, without long-term current use of insulin (HCC)    Essential hypertension  -     Comprehensive Metabolic Panel;  Future  bp is good  Irregular heart rate  -     EKG 12 Lead  Rate  89   Paced  Af,flutter       DM (diabetes mellitus), type 2 with peripheral vascular complications (HCC)  Fantastic control of dm  hga1c extremely low    Paroxysmal atrial fibrillation (HCC)  He is seeing cardiology  ekg done today as above    Aspiration pneumonia, unspecified aspiration pneumonia type, unspecified laterality, unspecified part of lung ([facility] [address]  Fu cxr today    CKD stage 3 due to type 2 diabetes mellitus [facility])  He sees nephrology but can't recall name  In chart there is a note from dr Swart[place] Area    Pt is weak after having pneumonia in the hospital  He needs therapy but is still driving and not home bound  Not willing yet to come in for therapy due to covid  Will give her therapy and maybe he can do tele visit  Wife says they have exercise instructions    He needs fu cxr  Had squeaks on exam today  Will share that ekg done today with cardiology dr Siomara Beard and dr Nathaly Luevano -note sent      covid vaccine today

## 2021-01-20 NOTE — Clinical Note
Hi Panfilo Castro and Lady Kearney,  I saw Amanda Torrez today in fu . He is feeling weak, debility since having pneumonia . Would like you to look at his EKG. I know he was seen recently in the office. I am getting a fu cxr also.   Thanks ,  Sydney Julio

## 2021-01-21 ENCOUNTER — ANTI-COAG VISIT (OUTPATIENT)
Dept: PHARMACY | Age: 86
End: 2021-01-21
Payer: MEDICARE

## 2021-01-21 DIAGNOSIS — I48.0 PAF (PAROXYSMAL ATRIAL FIBRILLATION) (HCC): ICD-10-CM

## 2021-01-21 LAB
A/G RATIO: 1.7 (ref 1.1–2.2)
ALBUMIN SERPL-MCNC: 4.3 G/DL (ref 3.4–5)
ALP BLD-CCNC: 72 U/L (ref 40–129)
ALT SERPL-CCNC: 14 U/L (ref 10–40)
ANION GAP SERPL CALCULATED.3IONS-SCNC: 13 MMOL/L (ref 3–16)
AST SERPL-CCNC: 19 U/L (ref 15–37)
BILIRUB SERPL-MCNC: 0.7 MG/DL (ref 0–1)
BUN BLDV-MCNC: 26 MG/DL (ref 7–20)
CALCIUM SERPL-MCNC: 9.6 MG/DL (ref 8.3–10.6)
CHLORIDE BLD-SCNC: 100 MMOL/L (ref 99–110)
CO2: 27 MMOL/L (ref 21–32)
CREAT SERPL-MCNC: 1.1 MG/DL (ref 0.8–1.3)
GFR AFRICAN AMERICAN: >60
GFR NON-AFRICAN AMERICAN: >60
GLOBULIN: 2.6 G/DL
GLUCOSE BLD-MCNC: 126 MG/DL (ref 70–99)
INTERNATIONAL NORMALIZATION RATIO, POC: 2.2
POTASSIUM SERPL-SCNC: 4.9 MMOL/L (ref 3.5–5.1)
SODIUM BLD-SCNC: 140 MMOL/L (ref 136–145)
TOTAL PROTEIN: 6.9 G/DL (ref 6.4–8.2)

## 2021-01-21 PROCEDURE — 99211 OFF/OP EST MAY X REQ PHY/QHP: CPT

## 2021-01-21 PROCEDURE — 85610 PROTHROMBIN TIME: CPT

## 2021-01-21 NOTE — PROGRESS NOTES
Mr. Ulises Lopez is a 80 y.o. y/o male with history of Afib   He presents today for anticoagulation monitoring and adjustment. Pertinent PMH: ICD-pacemaker. 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 --> cardiology wants to replace line to pacemaker, he will let us know when scheduled and how long he is holding for  1/5 is having pacemaker fixed, does not know if will need to hold warfarin   []   [x]       Missed Dose- denies  []   [x]       Extra Dose- denies  [x]   []       Change in medications- receiving Procrit 40,000 unit injection once every two weeks until red blood cell count is consistently under control again---> no changes--> lisinopril inc to 10mg daily -->was prescribed doxycycline x14 days at d/c (has 1 more week). Started atorvastatin, benzonatate for cough, guaifenesin --> finished abx on Sat. No other changes---> no changes     [x]   []       Change in health/diet/appetite consistent greens -> continues---> erratic diet---> no changes, no NVD--> has been eating less, appetite has been diminished --> has been eating less since being home --> appetite is slowly starting to return---> only had one salad, no NVD---> no changes, no NVD   []   [x]       Change in alcohol use  []   [x]       Change in activity  [x]   []       Hospital admission 12/5-12/8 for pneumonia. was d/c on doxycycline x 14 days, benzonatate, atorvastatin, gauifenesin.   INR on admit 5.12, warfarin was held until 12/8, resumed taking 2.5 mg. INR 2.3 at d/c. Advised to take 2.5 mg daily until RTC, pt did not lower dose as instructed. []   [x]       Emergency department visit  []   [x]       Other complaints--> Patient has been very tired and not able to function well, getting epoetin alpha injection at hemotologist office     Clinical Outcomes:  Yes     No  []   [x]       Major bleeding event  []   [x]       Thromboembolic event  Duration of warfarin Therapy: indefinite  INR Range:  2.0-3.0     INR is 2.2 today    Dose increased at last visit. Continue taking dose of 5 mg on Sun, Tues and Thurs and 2.5 mg all other days of the week   Encouraged to maintain a consistency of vegetables/salads.   Recheck INR in 4 weeks, 2/18    Referring cardiologist will be Dr. Deidra Lemos  INR (no units)   Date Value   01/12/2021 1.8   12/29/2020 2.1   12/15/2020 4.2   12/08/2020 2.30 (H)   12/07/2020 3.30 (H)   12/06/2020 5.68 ([place])   12/05/2020 5.12 ([place])     CLINICAL PHARMACY CONSULT: MED RECONCILIATION/REVIEW ADDENDUM    For Pharmacy Admin Tracking Only    PHSO: Yes  Total # of Interventions Recommended: 0  - Maintenance Safety Lab Monitoring #: 1  Total Interventions Accepted: 0  Time Spent (min): Via Maryann Quintero PharmD

## 2021-01-21 NOTE — PROGRESS NOTES
Please tell pt not to overdue his exertion since he is back in afib. Dr. Miki Kanner that might be contributing to his weakness. I agree with that. pls keep appt on Monday with EP cardiology.   AF can make him feel weaker than normal.

## 2021-01-22 RX ORDER — ATORVASTATIN CALCIUM 20 MG/1
20 TABLET, FILM COATED ORAL DAILY
Qty: 30 TABLET | Refills: 0 | Status: SHIPPED | OUTPATIENT
Start: 2021-01-22 | End: 2021-02-22 | Stop reason: SDUPTHER

## 2021-01-22 NOTE — TELEPHONE ENCOUNTER
Received refill request for atorvastatin from [facility] pharmacy.     Last ov:10/7/2020 NPAL    Last labs:cmp 1/20/2021, lipid 6/22/2020    Last Refill: 12/8/2020 #30 with 0 refills    Next appointment:1/25/2021 NPAL

## 2021-01-25 ENCOUNTER — NURSE ONLY (OUTPATIENT)
Dept: CARDIOLOGY CLINIC | Age: 86
End: 2021-01-25
Payer: MEDICARE

## 2021-01-25 ENCOUNTER — OFFICE VISIT (OUTPATIENT)
Dept: CARDIOLOGY CLINIC | Age: 86
End: 2021-01-25
Payer: MEDICARE

## 2021-01-25 VITALS
WEIGHT: 159.2 LBS | SYSTOLIC BLOOD PRESSURE: 158 MMHG | HEIGHT: 69 IN | BODY MASS INDEX: 23.58 KG/M2 | HEART RATE: 82 BPM | OXYGEN SATURATION: 98 % | DIASTOLIC BLOOD PRESSURE: 88 MMHG

## 2021-01-25 DIAGNOSIS — I25.10 CORONARY ARTERY DISEASE INVOLVING NATIVE CORONARY ARTERY OF NATIVE HEART WITHOUT ANGINA PECTORIS: ICD-10-CM

## 2021-01-25 DIAGNOSIS — I48.0 PAF (PAROXYSMAL ATRIAL FIBRILLATION) (HCC): ICD-10-CM

## 2021-01-25 DIAGNOSIS — I10 BENIGN ESSENTIAL HTN: ICD-10-CM

## 2021-01-25 DIAGNOSIS — I51.9 LV DYSFUNCTION: ICD-10-CM

## 2021-01-25 DIAGNOSIS — I49.5 SSS (SICK SINUS SYNDROME) (HCC): Primary | ICD-10-CM

## 2021-01-25 DIAGNOSIS — I49.5 SSS (SICK SINUS SYNDROME) (HCC): ICD-10-CM

## 2021-01-25 DIAGNOSIS — R00.1 BRADYCARDIA: ICD-10-CM

## 2021-01-25 DIAGNOSIS — Z95.0 CARDIAC PACEMAKER: ICD-10-CM

## 2021-01-25 PROCEDURE — 4040F PNEUMOC VAC/ADMIN/RCVD: CPT | Performed by: NURSE PRACTITIONER

## 2021-01-25 PROCEDURE — 1123F ACP DISCUSS/DSCN MKR DOCD: CPT | Performed by: NURSE PRACTITIONER

## 2021-01-25 PROCEDURE — G8484 FLU IMMUNIZE NO ADMIN: HCPCS | Performed by: NURSE PRACTITIONER

## 2021-01-25 PROCEDURE — 93280 PM DEVICE PROGR EVAL DUAL: CPT | Performed by: INTERNAL MEDICINE

## 2021-01-25 PROCEDURE — 1036F TOBACCO NON-USER: CPT | Performed by: NURSE PRACTITIONER

## 2021-01-25 PROCEDURE — G8427 DOCREV CUR MEDS BY ELIG CLIN: HCPCS | Performed by: NURSE PRACTITIONER

## 2021-01-25 PROCEDURE — 93000 ELECTROCARDIOGRAM COMPLETE: CPT | Performed by: NURSE PRACTITIONER

## 2021-01-25 PROCEDURE — 99214 OFFICE O/P EST MOD 30 MIN: CPT | Performed by: NURSE PRACTITIONER

## 2021-01-25 PROCEDURE — G8420 CALC BMI NORM PARAMETERS: HCPCS | Performed by: NURSE PRACTITIONER

## 2021-01-25 RX ORDER — METOPROLOL SUCCINATE 50 MG/1
75 TABLET, EXTENDED RELEASE ORAL DAILY
Qty: 135 TABLET | Refills: 1 | Status: SHIPPED | OUTPATIENT
Start: 2021-01-25 | End: 2021-08-04

## 2021-01-25 NOTE — PROGRESS NOTES
[address]   Electrophysiology  Tessa East, APRN-CNP  Attending EP: Dr. Edna Leonardo  Date: 1/25/2021  I had the privilege of visiting Donya Merrill in the office. Chief Complaint:   Chief Complaint   Patient presents with    Atrial Fibrillation     History of Present Illness: History obtained from patient and medical record. Donya Merrill is 80 y.o. male with a past medical history of HTN, HLD, DM, anemia (follows at [facility]) CAD s/p CABG 1996, SSS s/p PPM (2005, gen change 2015) and atrial fibrillation. He started on Sotalol for atrial fibrillation with a significant reduction in episodes. He is AC with warfarin. Interval history: Today, Donya Merrill is being seen for afib and HTN. He was hospitalized with PNA in december and he is slowly becoming stronger since that time. Interrogation shows AF burden 25%, which is significantly reduced on sotalol. Episodes are predominately rate controlled although she did have one episode up to 140 lasting 38 minutes. He lives independently with his wife and he is doing rehab at home building his strength and walking on the treadmill. Admits to fatigue that is gradually improving since recent hospitalization. Denies CP, palpitations, or worsening SOB. Admits to mild BLE edema that responds well to lasix, denies orthopnea. Denies having chest pain, palpitations, shortness of breath, orthopnea/PND, cough, or dizziness at the time of this visit. With regard to medication therapy the patient has been compliant with prescribed regimen. He has tolerated therapy to date. Allergies:  No Known Allergies  Home Medications:  Prior to Visit Medications    Medication Sig Taking?  Authorizing Provider   atorvastatin (LIPITOR) 20 MG tablet TAKE 1 TABLET BY MOUTH DAILY  Jose Luis Boone MD   fluticasone [facility]) 50 MCG/ACT nasal spray 2 sprays by Each Nostril route 2 times daily  Pradeep Cuevas MD   sodium chloride (OCEAN, BABY AYR) 0.65 % nasal spray 2 sprays by Nasal route every 4 hours  Sagrario Reece MD   furosemide (LASIX) 20 MG tablet Take 1 tablet by mouth daily  Pramod Regalado MD   allopurinol (ZYLOPRIM) 100 MG tablet Take 100 mg by mouth daily  Historical Provider, MD   lisinopril (PRINIVIL;ZESTRIL) 10 MG tablet Take 1 tablet by mouth daily  Rachel Mendoza MD   sotalol (BETAPACE) 120 MG tablet Take 1 tablet by mouth 2 times daily  Carmel Calderon MD   metoprolol tartrate (LOPRESSOR) 25 MG tablet Take 1 tablet by mouth 2 times daily  Moi Youngblood MD   vitamin B-1 (THIAMINE) 100 MG tablet Take 100 mg by mouth daily  Historical Provider, MD   EPOETIN BIBIANA IJ Inject as directed every 14 days   Historical Provider, MD   warfarin (COUMADIN) 5 MG tablet Take 1 tablet by mouth daily Take 2.5 mg on Mon, Wed and Fri and 5 mg all other days of the week  [place], APRN - CNP   acetaminophen (TYLENOL) 325 MG tablet Take 650 mg by mouth every 6 hours as needed for Pain  Historical Provider, MD   loratadine (CLARITIN) 10 MG tablet Take 10 mg by mouth daily as needed (allergies)   Historical Provider, MD      Past Medical History:  Past Medical History:   Diagnosis Date    Actinic keratosis     Actinic keratosis     Atrial fibrillation ([facility]     Bilateral carotid artery stenosis     CAD (coronary artery disease)     Cellulitis 7/15/2015    right foot    Diabetes mellitus ([facility]     DM (diabetes mellitus), type 2 with peripheral vascular complications (HCC)--s/p amputation great toe post osteomylitis  9/11/2015    Glucose intolerance (malabsorption)     Hyperlipidemia     Hypertension     Hypertrophy of prostate without urinary obstruction and other lower urinary tract symptoms (LUTS)     Intermittent atrial fibrillation (HCC)     Kidney stone     Occult blood in stool     Hx of    Pacemaker     Permanent     Past Surgical History:    has a past surgical history that includes Tonsillectomy and adenoidectomy;  Appendectomy; Kidney stone surgery (1980); Coronary artery bypass graft (1996); Cardiac catheterization (2003); pacemaker placement (2005); other surgical history (Right, 7/17/15); Abscess Drainage (7/20/15); Toe amputation (Right, 7.16.15); Colonoscopy (03/28/2018); and pr esophagogastroduodenoscopy transoral diagnostic (N/A, 11/21/2018). Social History:  Reviewed. reports that he has never smoked. He has never used smokeless tobacco. He reports that he does not drink alcohol or use drugs. Family History:  Reviewed. family history includes Diabetes in his mother; Stroke in his father. Denies family history of sudden cardiac death, arrhythmia, premature CAD    Review of System:  · Constitutional: No weight changes or weakness + fatigue  · HEENT: No visual changes. No mouth sores or sore throat. · Cardiovascular: denies chest pain, denies dyspnea on exertion, denies palpitations or denies loss of consciousness. No cough, hemoptysis, denies pleuritic pain, or phlebitis. denies dizziness. · Respiratory: denies cough or wheezing. · Gastrointestinal: Negative, No blood in stools. · Genitourinary: No hematuria. · Neurological: No focal weakness  · Psychiatric: No confusion, anxiety, or depression. · Hem/Lymph: Denies abnormal bruising or bleeding. Physical Examination:  There were no vitals filed for this visit. Wt Readings from Last 3 Encounters:   01/20/21 160 lb 3.2 oz (72.7 kg)   12/05/20 161 lb 11.2 oz (73.3 kg)   12/04/20 169 lb (76.7 kg)      Constitutional: Cooperative and in no apparent distress, and appears well nourished   Skin: Warm and pink; no cyanosis or bruising   HEENT: Symmetric and normocephalic. Conjunctiva pink with clear sclera. Mucus membranes pink and moist. No visible masses/goiter   Respiratory: Respirations symmetric and unlabored. Lungs clear to auscultation bilaterally, no wheezing, rhonchi, or crackles.  Cardiovascular:  Irregular rate and rhythm. S1 & S2 present, negative for murmur.  negative elevation of JVP. No peripheral edema.  Musculoskeletal:  No focal weakness.  Neurological/Psych: Awake and orientated to person, place and time. Calm affect, appropriate mood. Pertinent labs, diagnostic, device, and imaging results reviewed as a part of this visit    LABS    CBC:   Lab Results   Component Value Date    WBC 10.1 2020    HGB 7.8 (L) 2020    HCT 22.7 (L) 2020    MCV 91.4 2020     2020     BMP:   Lab Results   Component Value Date    CREATININE 1.1 2021    BUN 26 (H) 2021     2021    K 4.9 2021     2021    CO2 27 2021     CrCl cannot be calculated (Unknown ideal weight.). Lab Results   Component Value Date    BNP 79 01/10/2012       Thyroid:   Lab Results   Component Value Date    TSH 3.14 2016     Lipid Panel:   Lab Results   Component Value Date    CHOL 109 2020    HDL 37 2020    HDL 36 2012    TRIG 120 2020     LFTs:  Lab Results   Component Value Date    ALT 14 2021    AST 19 2021    ALKPHOS 72 2021    BILITOT 0.7 2021     Coags:   Lab Results   Component Value Date    PROTIME 26.9 (H) 2020    INR 2.2 2021    APTT 54.3 (H) 2020     EC2021 /afib HR 86, QRS 98, QTc 431     Echo: 10/2/2020  Summary   Left ventricular cavity size is mildly dilated with mild concentric left   ventricular hypertrophy. Left ventricular function is difficult to estimate due to arrhythmia but is   estimated at 35-40%. Grade II diastolic dysfunction with elevated LV filling pressures. Mild to moderate mitral regurgitation. The left atrium is severely dilated. Aortic valve leaflets appear calcified. Mild aortic stenosis with a peak   velocity of 254m/s and a mean pressure gradient of 15mmHg. Trivial aortic   regurgitation.   Stress echo: 2018  Summary   Normal stress echocardiogram study  Echo   Baseline resting echocardiogram shows normal global LV systolic function   with an ejection fraction of 55% and uniform myocardial segmental wall   motion. Following stress there was uniform augmentation of all myocardial   segments with appropriate hyperdynamic LV systolic response to stress. Assessment:  SSS/Implantable device   - S/p dual chamber PPm  (gen change )   - The CIED was interrogated and I reviewed all data    - Device interrogation today shows SADAF 8.1 years, AT/AF 25%, AP 40%,  18%  Paroxysmal Atrial Fibrillation/Flutter  - ECG today shows afib/flutter/  - On Sotalol 120 mg bid and Lopressor 25 mg bid  - Denies symptoms  - [id number] Vasc score and anticoagulation discussed. High risk for stroke and thromboembolism. Anticoagulation is recommended.   ~ On warfarin, follows at [facility], no s/s of bleeding  - Afib risk factors including age, HTN, obesity, inactivity and TISHA were discussed with patient. Risk factor modification recommended   ~ TSH 3.14 (2016)     - Treatment options including cardioversion, rate control strategy, antiarrhythmics, anticoagulation and possible ablation were discussed with patient. Risks, benefits and alternative of each treatment options were explained. All questions answered    ~ Ablation can be considered, however overall AF burden is significantly improved on sotalol, will leave on current dose and monitor for clinical symptoms of CHF. He is doing well today.   LV Dysfunction   - Echo shows new LV dysfunction, EF 35-40%    - Appears compensated, 1+ chronic BLE edema    - Continue OMT, switch to Toprol   - CRT therapy has been discussed in the past,  is 18%, given handouts and he would like to discuss with Dr. Peg Garnica at follow up  CAD   - Hx of remote CABG   - CAB [facility]-no report   - Cardiac spot-[id number]-no report   - Follows with Dr. Isi Ramírez   - No reports of angina  HTN-goal <130/80   - Controlled   - On lisinopril 10 mg daily, metoprolol 25 mg bid and    - Encouraged patient to check BP at home, log and bring to office visits  - Discussed lifestyle modifications, weight loss, low sodium diet  Plan  - Switch to Toprol 75 mg daily  - Call office if symptoms develop  - Monitor BP at home and call if abnormal    F/U: Follow-up with EP in 3-4 months MXA per pt request to discuss CRT   -Follow up with device clinic as scheduled  -Call [facility] at [phone] with any questions    Diet & Exercise:   The patient is counseled to follow a low salt diet to assure blood pressure remains controlled for cardiovascular risk factor modification   The patient is counseled to avoid excess caffeine, and energy drinks as this may exacerbated ectopy and arrhythmia   The patient is counseled to lose weight to control cardiovascular risk factors   Exercise program discussed: To improve overall cardiovascular health, the patient is instructed to increase cardiovascular related activities with a goal of 150 min/week of moderate level activity or 10,000 steps per day. Encouraged to perform as much activity as tolerated    I have addressed the patient's cardiac risk factors and adjusted pharmacologic treatment as needed. In addition, I have reinforced the need for patient directed risk factor modification. I independently reviewed the device check interrogation and ECG    All questions and concerns were addressed with the patient. Alternatives to treatment were discussed. Thank you for allowing to us to participate in the care of Stephane Rodgers.     PRIYA Goodrich-CNP  [facility]   Office: [phone]

## 2021-01-25 NOTE — PROGRESS NOTES
Patient comes in for programming evaluation for his pacemaker. All sensing and pacing parameters are within normal range. Battery life 9 years  AP 40%.  18%. AF with a 25% burden. 2 episodes with RVR. Patient remains on warfarin, sotalol and metoprolol. No changes need to be made at this time. Please see interrogation for more detail. Patient will see Deepa Paget today and follow up in 3 months in office or remotely.

## 2021-01-26 ENCOUNTER — TELEPHONE (OUTPATIENT)
Dept: CARDIAC CATH/INVASIVE PROCEDURES | Age: 86
End: 2021-01-26

## 2021-01-26 NOTE — TELEPHONE ENCOUNTER
----- Message from Teo Carbajal sent at 1/26/2021  3:47 PM EST -----  Regarding: scheduling  I called him to reschedule his BIV Pacemaker Lead Implant but he is going to wait 3 months to get rescheduled. He will call office when he is ready.     Thank you  Greig Lesch

## 2021-02-10 ENCOUNTER — IMMUNIZATION (OUTPATIENT)
Dept: PRIMARY CARE CLINIC | Age: 86
End: 2021-02-10
Payer: MEDICARE

## 2021-02-10 PROCEDURE — 0002A COVID-19, PFIZER VACCINE 30MCG/0.3ML DOSE: CPT | Performed by: FAMILY MEDICINE

## 2021-02-10 PROCEDURE — 91300 COVID-19, PFIZER VACCINE 30MCG/0.3ML DOSE: CPT | Performed by: FAMILY MEDICINE

## 2021-02-17 ENCOUNTER — TELEPHONE (OUTPATIENT)
Dept: PHARMACY | Age: 86
End: 2021-02-17

## 2021-02-22 RX ORDER — ATORVASTATIN CALCIUM 20 MG/1
20 TABLET, FILM COATED ORAL DAILY
Qty: 90 TABLET | Refills: 4 | Status: SHIPPED | OUTPATIENT
Start: 2021-02-22 | End: 2021-10-05 | Stop reason: SDUPTHER

## 2021-02-23 ENCOUNTER — TELEPHONE (OUTPATIENT)
Dept: CARDIAC CATH/INVASIVE PROCEDURES | Age: 86
End: 2021-02-23

## 2021-02-23 NOTE — TELEPHONE ENCOUNTER
----- Message from Saadia Brothers sent at 2/23/2021  9:05 AM EST -----  Regarding: scheduling  I called him today 2/23/21 to reschedule his BIV Pacemaker Lead Implant but he did not want to schedule right now. He was originally scheduled on 1/5/21. He said he will talk to Dr. Beatrice Kilgore about rescheduling.   Thank you

## 2021-03-03 ENCOUNTER — ANTI-COAG VISIT (OUTPATIENT)
Dept: PHARMACY | Age: 86
End: 2021-03-03
Payer: MEDICARE

## 2021-03-03 DIAGNOSIS — I48.0 PAF (PAROXYSMAL ATRIAL FIBRILLATION) (HCC): ICD-10-CM

## 2021-03-03 LAB — INTERNATIONAL NORMALIZATION RATIO, POC: 2.5

## 2021-03-03 PROCEDURE — 99211 OFF/OP EST MAY X REQ PHY/QHP: CPT

## 2021-03-03 PROCEDURE — 85610 PROTHROMBIN TIME: CPT

## 2021-03-03 NOTE — PROGRESS NOTES
Mr. Andre Wellington is a 80 y.o. y/o male with history of Afib   He presents today for anticoagulation monitoring and adjustment. Pertinent PMH: ICD-pacemaker. 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 --> cardiology wants to replace line to pacemaker, he will let us know when scheduled and how long he is holding for  1/5 is having pacemaker fixed, does not know if will need to hold warfarin   []   [x]       Missed Dose- denies  []   [x]       Extra Dose- denies  [x]   []       Change in medications- receiving Procrit 40,000 unit injection once every two weeks until red blood cell count is consistently under control again---> no changes--> lisinopril inc to 10mg daily -->was prescribed doxycycline x14 days at d/c (has 1 more week). Started atorvastatin, benzonatate for cough, guaifenesin --> finished abx on Sat. No other changes---> no changes--->  Changed to metoprolol 75mg daily      [x]   []       Change in health/diet/appetite consistent greens -> continues---> erratic diet---> no changes, no NVD--> has been eating less, appetite has been diminished --> has been eating less since being home --> appetite is slowly starting to return---> only had one salad, no NVD---> no changes, no NVD---> diet off with Kelvin Winkler in hospital    []   [x]       Change in alcohol use  []   [x]       Change in activity  [x]   []       Hospital admission 12/5-12/8 for pneumonia.  was d/c on doxycycline x 14 days, benzonatate, atorvastatin, gauifenesin. INR on admit 5.12, warfarin was held until 12/8, resumed taking 2.5 mg. INR 2.3 at d/c. Advised to take 2.5 mg daily until RTC, pt did not lower dose as instructed. []   [x]       Emergency department visit  []   [x]       Other complaints--> Patient has been very tired and not able to function well, getting epoetin alpha injection at hemotologist office     Clinical Outcomes:  Yes     No  []   [x]       Major bleeding event  []   [x]       Thromboembolic event  Duration of warfarin Therapy: indefinite  INR Range:  2.0-3.0     INR is 2.5 today    Continue taking dose of 5 mg on Sun, Tues and Thurs and 2.5 mg all other days of the week. Encouraged to maintain a consistency of vegetables/salads.   Recheck INR in 4 weeks, 3/31    Referring cardiologist will be Dr. Isi Ramírez  INR (no units)   Date Value   03/03/2021 2.5   01/21/2021 2.2   01/12/2021 1.8   12/29/2020 2.1   12/08/2020 2.30 (H)   12/07/2020 3.30 (H)   12/06/2020 5.68 ([place])   12/05/2020 5.12 ([place])     CLINICAL PHARMACY CONSULT: MED RECONCILIATION/REVIEW ADDENDUM    For Pharmacy Admin Tracking Only    PHSO: Yes  Total # of Interventions Recommended: 0  - Maintenance Safety Lab Monitoring #: 1  Total Interventions Accepted: 0  Time Spent (min): Via Maryann Quintero PharmD

## 2021-03-08 ENCOUNTER — OFFICE VISIT (OUTPATIENT)
Dept: ENT CLINIC | Age: 86
End: 2021-03-08
Payer: MEDICARE

## 2021-03-08 VITALS — TEMPERATURE: 97.2 F | HEART RATE: 73 BPM | SYSTOLIC BLOOD PRESSURE: 166 MMHG | DIASTOLIC BLOOD PRESSURE: 73 MMHG

## 2021-03-08 DIAGNOSIS — J32.4 CHRONIC PANSINUSITIS: ICD-10-CM

## 2021-03-08 DIAGNOSIS — J34.3 HYPERTROPHY OF BOTH INFERIOR NASAL TURBINATES: Primary | ICD-10-CM

## 2021-03-08 DIAGNOSIS — J34.89 NASAL OBSTRUCTION: ICD-10-CM

## 2021-03-08 DIAGNOSIS — J31.0 CHRONIC RHINITIS: ICD-10-CM

## 2021-03-08 PROCEDURE — 99213 OFFICE O/P EST LOW 20 MIN: CPT | Performed by: STUDENT IN AN ORGANIZED HEALTH CARE EDUCATION/TRAINING PROGRAM

## 2021-03-08 PROCEDURE — 31231 NASAL ENDOSCOPY DX: CPT | Performed by: STUDENT IN AN ORGANIZED HEALTH CARE EDUCATION/TRAINING PROGRAM

## 2021-03-08 PROCEDURE — G8428 CUR MEDS NOT DOCUMENT: HCPCS | Performed by: STUDENT IN AN ORGANIZED HEALTH CARE EDUCATION/TRAINING PROGRAM

## 2021-03-08 PROCEDURE — 1036F TOBACCO NON-USER: CPT | Performed by: STUDENT IN AN ORGANIZED HEALTH CARE EDUCATION/TRAINING PROGRAM

## 2021-03-08 PROCEDURE — 4040F PNEUMOC VAC/ADMIN/RCVD: CPT | Performed by: STUDENT IN AN ORGANIZED HEALTH CARE EDUCATION/TRAINING PROGRAM

## 2021-03-08 PROCEDURE — 1123F ACP DISCUSS/DSCN MKR DOCD: CPT | Performed by: STUDENT IN AN ORGANIZED HEALTH CARE EDUCATION/TRAINING PROGRAM

## 2021-03-08 PROCEDURE — G8420 CALC BMI NORM PARAMETERS: HCPCS | Performed by: STUDENT IN AN ORGANIZED HEALTH CARE EDUCATION/TRAINING PROGRAM

## 2021-03-08 PROCEDURE — G8484 FLU IMMUNIZE NO ADMIN: HCPCS | Performed by: STUDENT IN AN ORGANIZED HEALTH CARE EDUCATION/TRAINING PROGRAM

## 2021-03-08 NOTE — PROGRESS NOTES
[facility]      Patient Name: [facility] Record Number:  [id number]  Primary Care Physician:  Briseyda Briceno MD  Date of Consultation: 3/8/2021      Chief Complaint:   Chief Complaint   Patient presents with    Follow-up     nasal spray works well, no new c/o        HISTORY OF PRESENT ILLNESS  Rosa Phalen is a(n) 80 y.o. male who presents today for evaluation of issues related to his nose. I was called about the patient when he was admitted the previous weekend for pneumonia. At that time a CT sinus was performed due to some sinus complaints he was having and this showed evidence of pansinusitis inflammation. I independently reviewed the CT scan and it shows acute on chronic changes. The patient states that he has been having intermittent nasal drainage, facial pain and pressure and decrease sense of smell. He gets several sinus infections per year. He was placed on doxycycline and fluticasone sprays and feels that since starting this and the nasal saline he has had significant improvement. He is set to finish his antibiotics here soon. Update 3/8/2021:    The patient presents today for follow-up regarding his chronic sinonasal complaints. Since I last saw him he has been using the nasal spray daily and feels that this is helped him significantly. He is no longer getting drainage on the back of his throat. Unfortunately, his wife was just hospitalized and this is affecting him overall.     Patient Active Problem List   Diagnosis    CAD (coronary artery disease)    Cardiac pacemaker    Hypertrophy of prostate without urinary obstruction and other lower urinary tract symptoms (LUTS)    Gout-uric acid >7.5--advised proph tx    Hypercholesteremia    Carotid bruit(bilat-nl duplex u/s 10/10)    Vitamin D deficiency-(advised 1000IU/day)    Actinic keratoses    Elevated PSA-(was 4.2 10/10-repeat 6 mo)(was 5.12 1/11-then 4.4 drinks    Drug use: No        Anti-coag visit on 03/03/2021   Component Date Value Ref Range Status    INR 03/03/2021 2.5   Final        DRUG/FOOD ALLERGIES: Patient has no known allergies. CURRENT MEDICATIONS  Prior to Admission medications    Medication Sig Start Date End Date Taking?  Authorizing Provider   atorvastatin (LIPITOR) 20 MG tablet Take 1 tablet by mouth daily 2/22/21  Yes Velia Whitt MD   metoprolol succinate (TOPROL XL) 50 MG extended release tablet Take 1.5 tablets by mouth daily 1/25/21  Yes PRIYA Pandya CNP   fluticasone (FLONASE) 50 MCG/ACT nasal spray 2 sprays by Each Nostril route 2 times daily 12/8/20  Yes Sherri Aragon MD   sodium chloride (OCEAN, BABY AYR) 0.65 % nasal spray 2 sprays by Nasal route every 4 hours 12/8/20  Yes Sherri Aragon MD   furosemide (LASIX) 20 MG tablet Take 1 tablet by mouth daily 12/8/20  Yes Paxton Kern MD   allopurinol (ZYLOPRIM) 100 MG tablet Take 100 mg by mouth daily   Yes Historical Provider, MD   lisinopril (PRINIVIL;ZESTRIL) 10 MG tablet Take 1 tablet by mouth daily 10/19/20  Yes Tasha Bonds MD   sotalol (BETAPACE) 120 MG tablet Take 1 tablet by mouth 2 times daily 8/19/20  Yes Daniella Mccartney MD   metoprolol tartrate (LOPRESSOR) 25 MG tablet Take 1 tablet by mouth 2 times daily 8/10/20  Yes Velia Whitt MD   vitamin B-1 (THIAMINE) 100 MG tablet Take 100 mg by mouth daily   Yes Historical Provider, MD   EPOETIN BIBIANA IJ Inject as directed every 14 days    Yes Historical Provider, MD   warfarin (COUMADIN) 5 MG tablet Take 1 tablet by mouth daily Take 2.5 mg on Mon, Wed and Fri and 5 mg all other days of the week 3/12/20  Yes PRIYA Torrez CNP   acetaminophen (TYLENOL) 325 MG tablet Take 650 mg by mouth every 6 hours as needed for Pain   Yes Historical Provider, MD   loratadine (CLARITIN) 10 MG tablet Take 10 mg by mouth daily as needed (allergies)    Yes Historical Provider, MD       REVIEW OF SYSTEMS  The following systems were reviewed and revealed the following in addition to any already discussed in the HPI:    CONSTITUTIONAL: no weight loss, no fever, no night sweats, no chills  EYES: no vision changes, no blurry vision  EARS: no changes in hearing, no otalgia  NOSE: no epistaxis, no rhinorrhea  RESPIRATORY: no  Difficulty breathing, no shortness of breath  CV: no chest pain, no Peripheral vascular disease  HEME: No coagulation disorder, no Bleeding disorder  NEURO: no TIA or stroke-like symptoms  SKIN: No new rashes in the head and neck, no recent skin cancers  MOUTH: No new ulcers, no recent teeth infections  GASTROINTESTINAL: No diarrhea, stomach pain  PSYCH: No anxiety, no depression      PHYSICAL EXAM  BP (!) 166/73 (Site: Left Upper Arm)   Pulse 73   Temp 97.2 °F (36.2 °C) (Temporal)     GENERAL: No acute distress, alert and oriented, no hoarseness, strong voice  EYES: EOMI, Anti-icteric  HENT:   Head: Normocephalic and atraumatic. Face:  Symmetric, facial nerve intact, no sinus tenderness  Right Ear: Normal external ear, normal external auditory canal, intact tympanic membrane with normal mobility and aerated middle ear  Left Ear: Normal external ear, normal external auditory canal, intact tympanic membrane with normal mobility and aerated middle ear  Mouth/Oral Cavity:  normal lips, Uvula is midline, no mucosal lesions, no trismus, normal dentition, normal salivary quality/flow  Oropharynx/Larynx:  normal oropharynx, normal tonsils; patient did not tolerate mirror exam due to excessive gag reflex  Nose:Normal external nasal appearance. Anterior rhinoscopy shows severely a deviated septum preventing view posteriorly. Normal turbinates.   Normal mucosa   NECK: Normal range of motion, no thyromegaly, trachea is midline, no lymphadenopathy, no neck masses, no crepitus  CHEST: Normal respiratory effort, no retractions, breathing comfortably  SKIN: No rashes, normal appearing skin, no evidence of skin lesions/tumors  Neuro: cranial nerve II-XII intact; normal gait  Cardio:  no edema        PROCEDURE  Nasal Endoscopy (CPT code 70961)    Preop: chronic rhinitis  Postop: Same    Verbal consent was received. After topical anesthesia and decongestion had been obtained using aerosolized 1% lidocaine and oxymetazoline, a 45 degree rigid endoscope was placed into both nares with the patient in a sitting position. The following was observed:    Right Nasal Cavity and Paranasal Sinuses:  Polyp score = 0 (0 = no polyps, 1 = small polyps in middle meatus not reaching below the inferior border of the middle charu, 2 = polyps reaching below the middle border of the middle turbinate, 3= large polyps reaching the lower border of the inferior turbinate or polyps medial to the middle charu, 4= large polyps causing almost complete congestion/obstruction of the interior meatus)  Edema score = 1 (0 = absent, 1 = mild, 2 = severe)  Discharge score = 1 (0 = no discharge, 1 = clear thin discharge, 2 = thick purulent discharge)    Left Nasal Cavity and Paranasal Sinuses:    Polyp score = 0 (0 = no polyps, 1 = small polyps in middle meatus not reaching below the inferior border of the middle charu, 2 = polyps reaching below the middle border of the middle turbinate, 3= large polyps reaching the lower border of the inferior turbinate or polyps medial to the middle chaur, 4= large polyps causing almost complete congestion/obstruction of the interior meatus)  Edema score = 1 (0 = absent, 1 = mild, 2 = severe)  Discharge score = 1 (0 = no discharge, 1 = clear thin discharge, 2 = thick purulent discharge)    Septum: intact and deviated right  Other:   -The inferior and middle turbinates were examined. The middle meatus, and sphenoethmoid recess was examined bilaterally. -Mild to moderate sinonasal phonation. No evidence of any active infection. No nasal polyps. -There were no complications. Tolerated well without complication.  I attest that I was present for and did the entire procedure myself. ASSESSMENT/PLAN  1. Nasal obstruction      2. Hypertrophy of both inferior nasal turbinates      3. Chronic pansinusitis      4. Chronic rhinitis    This a very pleasant 20-year-old gentleman here today for evaluation of multiple complaints related to his nose. When I was initially called about him, we had him start on fluticasone and doxycycline. Overall, he is responded very well to medical therapy. I have asked him to continue his fluticasone nasal sprays going forward to address his sinonasal issues. We did discuss that should he continue to get recurrent sinus infections a turn into pneumonia, we may need to think about endoscopic sinus surgery to open up his sinuses in the future, however I am hopeful that we will be able to treat him conservatively and he will not require any intervention. Any surgery be complicated by the fact that he is on therapeutic anticoagulation and is of advanced age. The risks, benefits and alternatives to intranasal steroid use were discussed with the patient in detail, including the risks of burning, dryness, crusting, and occasional nosebleeds. Additional rare risks include septal perforations, mucosal atrophy, contact dermatitis as well as the potential risk of glaucoma or cataracts with sustained and prolonged use. The patient expressed understanding of the risks and wished to proceed with therapy. Medical Decision Making:   The following items were considered in medical decision making including or in addition to what was noted in the assessment and plan:   -Independent review of images  -Review / order clinical lab tests  -Review / order radiology tests  -Decision to obtain old records  -Review and summation of old records as accessed through [facility] and pertinent information was summarized in the note    This note was generated completely or in part utilizing Dragon dictation speech recognition software. Occasionally, words are mistranscribed and despite editing, the text may contain inaccuracies due to incorrect word recognition. If further clarification is needed please contact the office at [phone].

## 2021-03-23 RX ORDER — WARFARIN SODIUM 5 MG/1
5 TABLET ORAL DAILY
Qty: 90 TABLET | Refills: 2 | Status: SHIPPED | OUTPATIENT
Start: 2021-03-23 | End: 2022-03-28

## 2021-03-31 ENCOUNTER — ANTI-COAG VISIT (OUTPATIENT)
Dept: PHARMACY | Age: 86
End: 2021-03-31
Payer: MEDICARE

## 2021-03-31 DIAGNOSIS — I48.0 PAF (PAROXYSMAL ATRIAL FIBRILLATION) (HCC): ICD-10-CM

## 2021-03-31 LAB — INTERNATIONAL NORMALIZATION RATIO, POC: 3.4

## 2021-03-31 PROCEDURE — 85610 PROTHROMBIN TIME: CPT

## 2021-03-31 PROCEDURE — 99211 OFF/OP EST MAY X REQ PHY/QHP: CPT

## 2021-03-31 NOTE — PROGRESS NOTES
activity  [x]   []       Hospital admission 12/5-12/8 for pneumonia. was d/c on doxycycline x 14 days, benzonatate, atorvastatin, gauifenesin. INR on admit 5.12, warfarin was held until 12/8, resumed taking 2.5 mg. INR 2.3 at d/c. Advised to take 2.5 mg daily until RTC, pt did not lower dose as instructed. []   [x]       Emergency department visit  []   [x]       Other complaints--> Patient has been very tired and not able to function well, getting epoetin alpha injection at hemotologist office     Clinical Outcomes:  Yes     No  []   [x]       Major bleeding event  []   [x]       Thromboembolic event  Duration of warfarin Therapy: indefinite  INR Range:  2.0-3.0     INR is 3.4 today dt no greens/stress with Loman Loss being sick. Hold tonight then continue taking dose of 5 mg on Sun, Tues and Thurs and 2.5 mg all other days of the week   Encouraged to maintain a consistency of vegetables/salads.   Recheck INR in 4 weeks, 4/28 to coordinate with doctor apt    Referring cardiologist will be Dr. Hilton Byrne  INR (no units)   Date Value   03/31/2021 3.4   03/03/2021 2.5   01/21/2021 2.2   01/12/2021 1.8   12/08/2020 2.30 (H)   12/07/2020 3.30 (H)   12/06/2020 5.68 ([facility])   12/05/2020 5.12 ([facility])     CLINICAL PHARMACY CONSULT: MED RECONCILIATION/REVIEW ADDENDUM    For Pharmacy Admin Tracking Only    PHSO: Yes  Total # of Interventions Recommended: 1  - Decreased Dose #: 1  - Maintenance Safety Lab Monitoring #: 1  Total Interventions Accepted: 1  Time Spent (min): Via Maryann Quintero PharmD

## 2021-04-21 ENCOUNTER — OFFICE VISIT (OUTPATIENT)
Dept: FAMILY MEDICINE CLINIC | Age: 86
End: 2021-04-21
Payer: MEDICARE

## 2021-04-21 VITALS
OXYGEN SATURATION: 97 % | WEIGHT: 164.2 LBS | RESPIRATION RATE: 14 BRPM | HEIGHT: 70 IN | DIASTOLIC BLOOD PRESSURE: 76 MMHG | HEART RATE: 62 BPM | BODY MASS INDEX: 23.51 KG/M2 | SYSTOLIC BLOOD PRESSURE: 122 MMHG

## 2021-04-21 DIAGNOSIS — I10 ESSENTIAL HYPERTENSION: ICD-10-CM

## 2021-04-21 DIAGNOSIS — Z89.421 S/P AMPUTATION OF LESSER TOE, RIGHT (HCC): ICD-10-CM

## 2021-04-21 DIAGNOSIS — R06.89 ABNORMAL BREATH SOUNDS: Primary | ICD-10-CM

## 2021-04-21 DIAGNOSIS — E11.9 TYPE 2 DIABETES MELLITUS WITHOUT COMPLICATION, WITHOUT LONG-TERM CURRENT USE OF INSULIN (HCC): Chronic | ICD-10-CM

## 2021-04-21 LAB — HBA1C MFR BLD: 5.5 %

## 2021-04-21 PROCEDURE — 83036 HEMOGLOBIN GLYCOSYLATED A1C: CPT | Performed by: INTERNAL MEDICINE

## 2021-04-21 PROCEDURE — 1123F ACP DISCUSS/DSCN MKR DOCD: CPT | Performed by: INTERNAL MEDICINE

## 2021-04-21 PROCEDURE — 99213 OFFICE O/P EST LOW 20 MIN: CPT | Performed by: INTERNAL MEDICINE

## 2021-04-21 PROCEDURE — 4040F PNEUMOC VAC/ADMIN/RCVD: CPT | Performed by: INTERNAL MEDICINE

## 2021-04-21 PROCEDURE — 1036F TOBACCO NON-USER: CPT | Performed by: INTERNAL MEDICINE

## 2021-04-21 PROCEDURE — G8427 DOCREV CUR MEDS BY ELIG CLIN: HCPCS | Performed by: INTERNAL MEDICINE

## 2021-04-21 PROCEDURE — G8420 CALC BMI NORM PARAMETERS: HCPCS | Performed by: INTERNAL MEDICINE

## 2021-04-21 ASSESSMENT — ENCOUNTER SYMPTOMS
NAUSEA: 0
VOMITING: 0
COUGH: 0
SHORTNESS OF BREATH: 0

## 2021-04-21 NOTE — PROGRESS NOTES
12/08/2020    WBC 10.5 12/07/2020    WBC 15.4 (H) 12/06/2020    HGB 7.8 (L) 12/08/2020    HGB 8.5 (L) 12/07/2020    HGB 8.7 (L) 12/06/2020    HCT 22.7 (L) 12/08/2020    HCT 24.7 (L) 12/07/2020    HCT 25.4 (L) 12/06/2020    MCV 91.4 12/08/2020    MCV 93.3 12/07/2020    MCV 92.5 12/06/2020     12/08/2020     12/07/2020     12/06/2020       Lab Results   Component Value Date    INR 3.4 03/31/2021    INR 2.5 03/03/2021    INR 2.2 01/21/2021        Lab Results   Component Value Date    PSA 2.82 06/22/2020    PSA 2.77 08/19/2019    PSA 2.83 04/30/2018        Lab Results   Component Value Date    LABURIC 7.6 (H) 03/03/2017    LABURIC 9.4 (H) 01/27/2017    LABURIC 10.1 (H) 01/06/2017          Will get pacemaker sidney  Said he may need another wire attached with dr David Sevilla office      Review of Systems   Constitutional: Positive for unexpected weight change. Negative for appetite change. Respiratory: Negative for cough and shortness of breath. Gastrointestinal: Negative for nausea and vomiting. Neurological: Negative for dizziness and light-headedness.      Eating boxed dinners , lost wt    Health Maintenance   Topic Date Due    Shingles Vaccine (2 of 3) 04/12/2012    Annual Wellness Visit (AWV)  Never done    Lipid screen  06/22/2021    Potassium monitoring  01/20/2022    Creatinine monitoring  01/20/2022    DTaP/Tdap/Td vaccine (3 - Td) 01/11/2026    Flu vaccine  Completed    Pneumococcal 65+ years Vaccine  Completed    COVID-19 Vaccine  Completed    Hepatitis A vaccine  Aged Out    Hib vaccine  Aged Out    Meningococcal (ACWY) vaccine  Aged Out      Social History     Tobacco Use    Smoking status: Never Smoker    Smokeless tobacco: Never Used    Tobacco comment: counseled on tobacco exposure avoidance   Substance Use Topics    Alcohol use: No     Alcohol/week: 0.0 standard drinks    Drug use: No      Family History   Problem Relation Age of Onset    Stroke Father    [facility] Diabetes Mother      Prior to Visit Medications    Medication Sig Taking?  Authorizing Provider   lisinopril (PRINIVIL;ZESTRIL) 10 MG tablet Take 1 tablet by mouth daily Yes Luke Donaldson MD   warfarin (COUMADIN) 5 MG tablet Take 1 tablet by mouth daily Take 2.5 mg on Mon, Wed and Fri and 5 mg all other days of the week Yes PRIYA Waller CNP   atorvastatin (LIPITOR) 20 MG tablet Take 1 tablet by mouth daily Yes Kady Coronado MD   metoprolol succinate (TOPROL XL) 50 MG extended release tablet Take 1.5 tablets by mouth daily Yes PRIYA Waller CNP   fluticasone (FLONASE) 50 MCG/ACT nasal spray 2 sprays by Each Nostril route 2 times daily Yes Maria Guadalupe Merritt MD   sodium chloride (OCEAN, BABY AYR) 0.65 % nasal spray 2 sprays by Nasal route every 4 hours Yes Maria Guadalupe Merritt MD   furosemide (LASIX) 20 MG tablet Take 1 tablet by mouth daily Yes Alex Sal MD   sotalol (BETAPACE) 120 MG tablet Take 1 tablet by mouth 2 times daily Yes Jonna Palmer MD   vitamin B-1 (THIAMINE) 100 MG tablet Take 100 mg by mouth daily Yes Historical Provider, MD   EPOETIN BIBIANA IJ Inject as directed every 14 days  Yes Historical Provider, MD   acetaminophen (TYLENOL) 325 MG tablet Take 650 mg by mouth every 6 hours as needed for Pain Yes Historical Provider, MD   loratadine (CLARITIN) 10 MG tablet Take 10 mg by mouth daily as needed (allergies)  Yes Historical Provider, MD   allopurinol (ZYLOPRIM) 100 MG tablet Take 100 mg by mouth daily  Historical Provider, MD     Patient Active Problem List   Diagnosis    CAD (coronary artery disease)    Cardiac pacemaker    Hypertrophy of prostate without urinary obstruction and other lower urinary tract symptoms (LUTS)    Gout-uric acid >7.5--advised proph tx    Hypercholesteremia    Carotid bruit(bilat-nl duplex u/s 10/10)    Vitamin D deficiency-(advised 1000IU/day)    Actinic keratoses    Elevated PSA-(was 4.2 10/10-repeat 6 mo)(was 5.12 1/11-then 4.4 2/12)-sees dr Isra Rea for this  S/P colonoscopy-4/07-neg per pt,  3/18 repeat colonoscopy polyp-repeat 5 yr    Hearing loss, conductive, bilateral-seeing ent-dr quinn for hearing aides    Encounter for monitoring sotalol therapy    MICROALBUMINURIA-on ace already    Anemia--post op 8/15--started otc iron bid, off now  - work up Dr. Navi Levin bone marrow. 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 dr Jemal Hui (done for blood in stool)    Urinary frequency    Elevated ferritin  - work up Dr. Navi Levin  genetic screen hemachromatosis negative.  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    Localized edema    Pneumonia due to organism    Ischemic cardiomyopathy    Benign essential HTN    Aspiration pneumonia (HCC)    Pansinusitis        LABS:   Lab Results   Component Value Date    GLUCOSE 126 (H) 01/20/2021     Lab Results   Component Value Date     01/20/2021    K 4.9 01/20/2021    CREATININE 1.1 01/20/2021     Cholesterol, Total   Date Value Ref Range Status   06/22/2020 109 0 - 199 mg/dL Final     LDL Calculated   Date Value Ref Range Status   06/22/2020 48 <100 mg/dL Final     HDL   Date Value Ref Range Status   06/22/2020 37 (L) 40 - 60 mg/dL Final   05/07/2012 36 (L) 40 - 60 mg/dl Final     Triglycerides   Date Value Ref Range Status   06/22/2020 120 0 - 150 mg/dL Final     Lab Results   Component Value Date    ALT 14 01/20/2021    AST 19 01/20/2021    ALKPHOS 72 01/20/2021    BILITOT 0.7 01/20/2021      Lab Results   Component Value Date    WBC 10.1 12/08/2020    HGB 7.8 (L) 12/08/2020    HCT 22.7 (L) 12/08/2020    MCV 91.4 12/08/2020     12/08/2020     TSH (uIU/mL)   Date Value   06/27/2016 3.14     Lab Results   Component Value Date    LABA1C 5.5 04/21/2021 Lab Results   Component Value Date    PSA 2.82 06/22/2020    PSA 2.77 08/19/2019    PSA 2.83 04/30/2018        PHYSICAL EXAM:  /76   Pulse 62   Resp 14   Ht 5' 9.5\" (1.765 m)   Wt 164 lb 3.2 oz (74.5 kg)   SpO2 97%   BMI 23.90 kg/m²    Physical Exam  Constitutional:       Appearance: Normal appearance. He is well-developed. HENT:      Head: Normocephalic and atraumatic. Cardiovascular:      Rate and Rhythm: Normal rate and regular rhythm. Heart sounds: Normal heart sounds. No murmur. Pulmonary:      Effort: Pulmonary effort is normal.      Comments: Little bit of a crackle left post back  Little squeaking  On the left   Abdominal:      Palpations: Abdomen is soft. Tenderness: There is no abdominal tenderness. Skin:     General: Skin is warm and dry. Neurological:      Mental Status: He is alert. Psychiatric:         Behavior: Behavior normal.     Zahraa Joyner was seen today for follow-up. Diagnoses and all orders for this visit:    Abnormal breath sounds  -     XR CHEST (2 VW); Future    Type 2 diabetes mellitus without complication, without long-term current use of insulin (HCC)  -     POCT glycosylated hemoglobin (Hb A1C)    Essential hypertension    S/P amputation of lesser toe, right (HCC)    Other orders  -     Cancel: XR CHEST (2 VW); Future      BP Readings from Last 5 Encounters:   04/21/21 122/76   03/08/21 (!) 166/73   02/22/21 (!) 185/72   01/25/21 (!) 158/88   01/20/21 110/70       Wt Readings from Last 5 Encounters:   04/21/21 164 lb 3.2 oz (74.5 kg)   02/22/21 158 lb (71.7 kg)   01/25/21 159 lb 3.2 oz (72.2 kg)   01/20/21 160 lb 3.2 oz (72.7 kg)   12/05/20 161 lb 11.2 oz (73.3 kg)      Zahraa Joyner was seen today for follow-up. Diagnoses and all orders for this visit:    Abnormal breath sounds  -     XR CHEST (2 VW);  Future    Type 2 diabetes mellitus without complication, without long-term current use of insulin (HCC)  -     POCT glycosylated hemoglobin (Hb

## 2021-04-28 ENCOUNTER — NURSE ONLY (OUTPATIENT)
Dept: CARDIOLOGY CLINIC | Age: 86
End: 2021-04-28
Payer: MEDICARE

## 2021-04-28 ENCOUNTER — ANTI-COAG VISIT (OUTPATIENT)
Dept: PHARMACY | Age: 86
End: 2021-04-28
Payer: MEDICARE

## 2021-04-28 ENCOUNTER — OFFICE VISIT (OUTPATIENT)
Dept: CARDIOLOGY CLINIC | Age: 86
End: 2021-04-28
Payer: MEDICARE

## 2021-04-28 VITALS
OXYGEN SATURATION: 97 % | WEIGHT: 163.6 LBS | SYSTOLIC BLOOD PRESSURE: 128 MMHG | HEIGHT: 70 IN | DIASTOLIC BLOOD PRESSURE: 62 MMHG | BODY MASS INDEX: 23.42 KG/M2 | HEART RATE: 69 BPM

## 2021-04-28 DIAGNOSIS — I49.5 SSS (SICK SINUS SYNDROME) (HCC): ICD-10-CM

## 2021-04-28 DIAGNOSIS — R06.09 DOE (DYSPNEA ON EXERTION): ICD-10-CM

## 2021-04-28 DIAGNOSIS — I48.0 PAF (PAROXYSMAL ATRIAL FIBRILLATION) (HCC): ICD-10-CM

## 2021-04-28 DIAGNOSIS — I10 BENIGN ESSENTIAL HTN: ICD-10-CM

## 2021-04-28 DIAGNOSIS — I25.5 ISCHEMIC CARDIOMYOPATHY: ICD-10-CM

## 2021-04-28 DIAGNOSIS — Z95.0 CARDIAC PACEMAKER: ICD-10-CM

## 2021-04-28 DIAGNOSIS — I25.10 CORONARY ARTERY DISEASE INVOLVING NATIVE CORONARY ARTERY OF NATIVE HEART WITHOUT ANGINA PECTORIS: ICD-10-CM

## 2021-04-28 DIAGNOSIS — I48.0 PAF (PAROXYSMAL ATRIAL FIBRILLATION) (HCC): Primary | ICD-10-CM

## 2021-04-28 LAB — INTERNATIONAL NORMALIZATION RATIO, POC: 2.4

## 2021-04-28 PROCEDURE — G8420 CALC BMI NORM PARAMETERS: HCPCS | Performed by: INTERNAL MEDICINE

## 2021-04-28 PROCEDURE — 1123F ACP DISCUSS/DSCN MKR DOCD: CPT | Performed by: INTERNAL MEDICINE

## 2021-04-28 PROCEDURE — 99214 OFFICE O/P EST MOD 30 MIN: CPT | Performed by: INTERNAL MEDICINE

## 2021-04-28 PROCEDURE — 93280 PM DEVICE PROGR EVAL DUAL: CPT | Performed by: INTERNAL MEDICINE

## 2021-04-28 PROCEDURE — G8427 DOCREV CUR MEDS BY ELIG CLIN: HCPCS | Performed by: INTERNAL MEDICINE

## 2021-04-28 PROCEDURE — 4040F PNEUMOC VAC/ADMIN/RCVD: CPT | Performed by: INTERNAL MEDICINE

## 2021-04-28 PROCEDURE — 1036F TOBACCO NON-USER: CPT | Performed by: INTERNAL MEDICINE

## 2021-04-28 PROCEDURE — 99211 OFF/OP EST MAY X REQ PHY/QHP: CPT

## 2021-04-28 PROCEDURE — 85610 PROTHROMBIN TIME: CPT

## 2021-04-28 RX ORDER — AMIODARONE HYDROCHLORIDE 200 MG/1
200 TABLET ORAL DAILY
Qty: 90 TABLET | Refills: 1 | Status: SHIPPED | OUTPATIENT
Start: 2021-04-28 | End: 2021-07-08

## 2021-04-28 NOTE — PROGRESS NOTES
alcohol use  []   [x]       Change in activity  [x]   []       Hospital admission 12/5-12/8 for pneumonia. was d/c on doxycycline x 14 days, benzonatate, atorvastatin, gauifenesin. INR on admit 5.12, warfarin was held until 12/8, resumed taking 2.5 mg. INR 2.3 at d/c. Advised to take 2.5 mg daily until RTC, pt did not lower dose as instructed. []   [x]       Emergency department visit  []   [x]       Other complaints--> Patient has been very tired and not able to function well, getting epoetin alpha injection at hemotologist office     Clinical Outcomes:  Yes     No  []   [x]       Major bleeding event  []   [x]       Thromboembolic event  Duration of warfarin Therapy: indefinite  INR Range:  2.0-3.0     INR is 2.4  Continue taking dose of 5 mg on Sun, Tues and Thurs and 2.5 mg all other days of the week   Encouraged to maintain a consistency of vegetables/salads.   Recheck INR in 4 weeks, 5/25    Referring cardiologist will be Dr. Richi Valdez  INR (no units)   Date Value   03/31/2021 3.4   03/03/2021 2.5   01/21/2021 2.2   01/12/2021 1.8   12/08/2020 2.30 (H)   12/07/2020 3.30 (H)   12/06/2020 5.68 ([place])   12/05/2020 5.12 ([place])     For Pharmacy Admin Tracking Only     Total # of Interventions Recommended: 1   Total # of Interventions Accepted: 1   Time Spent (min): [address], PharmD

## 2021-04-28 NOTE — PROGRESS NOTES
[address]   Electrophysiology Follow up  Date: 4/28/2021       Chief Complaint   Patient presents with    3 Month Follow-Up     no cardiac complaints at this time     Atrial Fibrillation        HPI: Hugh Toney is a 80 y.o. male with a PMH of CAD s/p CABG '96, PAF, SSS s/p PPM '05 (Gen change 2015), HTN and hyperlipidemia. 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 He was diagnosed with Pneumonia and Covid was negative. Went into atrial fibrillation 12/7/2020. New cardiomyopathy was found on echo 10/2020 thought to be associated with long-term sotalol therapy vs increased Vpacing. Dinh Polanco presents to the office in follow up. He has some residual crackles upon auscultation in right lower lobe. Dinh Polanco also has chronic swelling in his lower extremities. Has lost a lot of weight.     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  9/11/2015    Glucose intolerance (malabsorption)     Hyperlipidemia     Hypertension     Hypertrophy of prostate without urinary obstruction and other lower urinary tract symptoms (LUTS)     Intermittent atrial fibrillation ([address]     Kidney stone     Occult blood in stool     Hx of    Pacemaker     Permanent        Past Surgical History:   Procedure Laterality Date    ABSCESS DRAINAGE  7/20/15    right foot    APPENDECTOMY      CARDIAC CATHETERIZATION  2003    Left    COLONOSCOPY  03/28/2018    dr Reinier Staley    x3   [facility]    OTHER SURGICAL HISTORY Right 7/17/15 right fifth toe I and D    PACEMAKER PLACEMENT  2005    AK ESOPHAGOGASTRODUODENOSCOPY TRANSORAL DIAGNOSTIC N/A 11/21/2018    EGD DIAGNOSTIC ONLY performed by Brittni Norris MD at [place] [address] Right 7.16.15    right pinkey toe     TONSILLECTOMY AND ADENOIDECTOMY         Allergies:  No Known Allergies    Social History:   reports that he has never smoked. He has never used smokeless tobacco. He reports that he does not drink alcohol or use drugs. Family History:  family history includes Diabetes in his mother; Stroke in his father. Reviewed. Denies family history of sudden cardiac death, arrhythmia, premature CAD    Review of System:  All other systems reviewed and are negative except for that noted above. Pertinent negatives are:   General: negative for fever, chills   Ophthalmic ROS: negative for - eye pain or loss of vision  ENT ROS: negative for - headaches, sore throat   Respiratory: negative for - cough, sputum  Cardiovascular: Reviewed in HPI  Gastrointestinal: negative for - abdominal pain, diarrhea, N/V  Hematology: negative for - bleeding, blood clots, bruising or jaundice  Genito-Urinary:  negative for - Dysuria or incontinence  Musculoskeletal: negative for - Joint swelling, muscle pain  Neurological: negative for - confusion, dizziness, headaches   Psychiatric: No anxiety, no depression. Dermatological: negative for - rash    Physical Examination:  Vitals:    04/28/21 1432   BP: 128/62   Pulse: 69   SpO2: 97%      Wt Readings from Last 3 Encounters:   04/28/21 163 lb 9.6 oz (74.2 kg)   04/21/21 164 lb 3.2 oz (74.5 kg)   02/22/21 158 lb (71.7 kg)       · Constitutional: Oriented. No distress. · Head: Normocephalic and atraumatic. · Mouth/Throat: Oropharynx is clear and moist.   · Eyes: Conjunctivae normal. EOM are normal.   · Neck: Neck supple. No rigidity. No JVD present. · Cardiovascular: Normal rate, regular rhythm, S1&S2.     · Pulmonary/Chest: Bilateral respiratory sounds. No wheezes, No rhonchi. · Abdominal: Soft. Bowel sounds present. No distension, No tenderness. · Musculoskeletal: No tenderness. No edema    · Lymphadenopathy: Has no cervical adenopathy. · Neurological: Alert and oriented. Cranial nerve appears intact, No Gross deficit   · Skin: Skin is warm and dry. No rash noted. · Psychiatric: Has a normal behavior       Labs, diagnostic and imaging results reviewed. Reviewed. Lab Results   Component Value Date    TSHREFLEX 1.67 2020    TSH 3.14 2016    TSH 2.56 08/15/2011    CREATININE 1.1 2021    CREATININE 0.9 2020    AST 19 2021    ALT 14 2021       EC21  SR with PVC's    Echo 10/2/2020   Summary   Left ventricular cavity size is mildly dilated with mild concentric left   ventricular hypertrophy. Left ventricular function is difficult to estimate due to arrhythmia but is   estimated at 35-40%. Grade II diastolic dysfunction with elevated LV filling pressures. Mild to moderate mitral regurgitation. The left atrium is severely dilated. Aortic valve leaflets appear calcified. Mild aortic stenosis with a peak   velocity of 254m/s and a mean pressure gradient of 15mmHg. Trivial aortic   regurgitation. Echo: 2018   Summary   -Normal left ventricle size, wall thickness and systolic function with an   estimated ejection fraction of 55%.   -No regional wall motion abnormalities are seen. -Diastolic filling parameters suggest grade II diastolic dysfunction. E/e=16.25   -Mitral annular calcification is present. -Mild to moderate mitral regurgitation.   -Mildly calcified aortic valve with mildly decreased mobility consistent   with mild aortic stenosis. -Mild aortic insufficiency is present. -Mild tricuspid regurgitation with a RVSP of 35 mmHg.    -Mild pulmonic regurgitation present.   -Dilated left atrium with a volume of 65 ml.   -Pacer / ICD wire is visualized in the right heart.    Stress Echo 2/26/2018   Summary   Normal stress echocardiogram study. Rest      ECG   Normal sinus rhythm. Stress      Stress Type: Exercise      Stress Protocol: Camilo      Rest HR: 63 bpm                           HR BP Product: [id number]   Rest BP: 124/74 mmHg                      Max Exercise: 7 METS   Stress Peak HR: 120 bpm   Stress Peak BP: 141/66 mmHg   Predicted HR: 136 bpm   % of predicted HR: 88   Test Duration: 5 min   Reason for Termination: Target heart rate      Results      Echo (rest): Normal (LVEF >50%)      Echo (stress): Normal (LVEF >50%)      Echo   Baseline resting echocardiogram shows normal global LV systolic function   with an ejection fraction of 55% and uniform myocardial segmental wall   motion. Following stress there was uniform augmentation of all myocardial   segments with appropriate hyperdynamic LV systolic response to stress. ECG   Normal (Negative) response to exercise. Arrhythmias   Occasional premature ventricular contractions. Occasional premature atrial contractions. Symptoms   There was stress induced fatigue.    Symptoms resolved with rest.    Medication:  Current Outpatient Medications   Medication Sig Dispense Refill    amiodarone (CORDARONE) 200 MG tablet Take 1 tablet by mouth daily 90 tablet 1    lisinopril (PRINIVIL;ZESTRIL) 10 MG tablet Take 1 tablet by mouth daily 30 tablet 3    warfarin (COUMADIN) 5 MG tablet Take 1 tablet by mouth daily Take 2.5 mg on Mon, Wed and Fri and 5 mg all other days of the week 90 tablet 2    atorvastatin (LIPITOR) 20 MG tablet Take 1 tablet by mouth daily 90 tablet 4    metoprolol succinate (TOPROL XL) 50 MG extended release tablet Take 1.5 tablets by mouth daily 135 tablet 1    fluticasone (FLONASE) 50 MCG/ACT nasal spray 2 sprays by Each Nostril route 2 times daily 1 Bottle 3    sodium chloride (OCEAN, BABY AYR) 0.65 % nasal spray 2 sprays by Nasal route every 4 hours 5 Bottle 0    furosemide questions answered      -metoprolol 25 mg BID      -[id number] Vasc score and anticoagulation discussed. High risk for stroke and thromboembolism. Anticoagulation is recommended. I discussed anticoagulation to decrease the risk of thromboembolic events including stroke. Benefits and alternatives were discussed with patient. Risk of bleeding was discussed. Patient verbalized understanding.    -On coumadin managed by the [facility]   -Metoprolol 25 mg BID     Although ablation of atrial fibrillation is an option, at this point given his age and his overall health I do not think that statin first priority. We can always revisit it depending on how he does. HTN  -Controlled: 128/62  -BP goal <130/80  -Home BP monitoring encouraged, printed information provided on how to accurately measure BP at home.  -Counseled to follow a low salt diet to assure blood pressure remains controlled for cardiovascular risk factor modification.   -The patient is counseled to get regular exercise 3-5 times per week and maintain a healthy weight reduce cardiovascular risk factors.     -Amlodipine/atorvastatin 5-20 mg, lisinopril 2.5 mg, metoprolol 25 mg    CAD   -Denies angina   -S/p CABG 1996   -Normal stress/echo 2/2018      Plan:  Stop Sotalol  Start Amiodarone 200 mg daily  Echo same day as visit with    EP f/u 6 months with NP. We will assess the ventricular pacing and burden of LIYAH schwartz and his LV function at that point to decide the next steps including possibility of adding LV lead . I independently reviewed device check interrogation     Thank you for allowing me to participate in the care of Stephanie Reynaga. Further evaluation will be based upon the patient's clinical course and testing results. All questions and concerns were addressed to the patient/family. Alternatives to my treatment were discussed.  I have discussed the above stated plan and the patient verbalized understanding and agreed with the plan.    NOTE: This report was transcribed using voice recognition software. Every effort was made to ensure accuracy, however, inadvertent computerized transcription errors may be present. Chinedu Orr MD, [facility]   Office: [phone]    Scribe attestation:  This note was scribed in the presence of Chinedu Orr MD by Anson Byrne RN    I, Dr. Chinedu Orr personally performed the services described in this documentation as scribed by RN in my presence, and it is both accurate and complete.

## 2021-04-28 NOTE — PROGRESS NOTES
Patient comes in for programming evaluation for his pacemaker. All sensing and pacing parameters are within normal range. Battery life 8-8.7 years  AP 42%.  25%. AF with a 34% burden. 6 episodes with noted RVR. Patient remains on warfarin,amiodarone and metoprolol. No changes need to be made at this time. Please see interrogation for more detail. Patient will see Dr. Blu Muller today and follow up in 3 months in office or remotely.

## 2021-05-13 ENCOUNTER — HOSPITAL ENCOUNTER (OUTPATIENT)
Dept: NON INVASIVE DIAGNOSTICS | Age: 86
Discharge: HOME | End: 2021-05-13
Payer: MEDICARE

## 2021-05-13 DIAGNOSIS — R06.09 DOE (DYSPNEA ON EXERTION): ICD-10-CM

## 2021-05-13 LAB
LV EF: 38 %
LVEF MODALITY: NORMAL

## 2021-05-13 PROCEDURE — 93306 TTE W/DOPPLER COMPLETE: CPT

## 2021-05-25 ENCOUNTER — ANTI-COAG VISIT (OUTPATIENT)
Dept: PHARMACY | Age: 86
End: 2021-05-25
Payer: MEDICARE

## 2021-05-25 DIAGNOSIS — I48.0 PAF (PAROXYSMAL ATRIAL FIBRILLATION) (HCC): Primary | ICD-10-CM

## 2021-05-25 LAB — INTERNATIONAL NORMALIZATION RATIO, POC: 2.7

## 2021-05-25 PROCEDURE — 85610 PROTHROMBIN TIME: CPT

## 2021-05-25 PROCEDURE — 99211 OFF/OP EST MAY X REQ PHY/QHP: CPT

## 2021-05-25 NOTE — PROGRESS NOTES
Mr. Eddie Chawla is a 80 y.o. y/o male with history of Afib   He presents today for anticoagulation monitoring and adjustment. Pertinent PMH: ICD-pacemaker. 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 --> cardiology wants to replace line to pacemaker, he will let us know when scheduled and how long he is holding for  1/5 is having pacemaker fixed, does not know if will need to hold warfarin   []   [x]       Missed Dose- denies  []   [x]       Extra Dose- denies  [x]   []       Change in medications- receiving Procrit 40,000 unit injection once every two weeks until red blood cell count is consistently under control again---> no changes--> lisinopril inc to 10mg daily -->was prescribed doxycycline x14 days at d/c (has 1 more week). Started atorvastatin, benzonatate for cough, guaifenesin --> finished abx on Sat.   No other changes---> no changes--->  Changed to metoprolol 75mg daily---> had both COVID shots, continues with tylenol every evening --> no changes---> stop sotalol, started amiodarone 200mg daily 4/28---> states did not start it because was concerned with the SE so stayed on sotalol and has fu apt next week with regular cardiologist   [x]   []       Change in health/diet/appetite consistent greens -> continues---> erratic diet---> no changes, no NVD--> has been eating less, appetite has been diminished --> has been eating less since being home --> appetite is slowly starting to return---> only had one salad, no NVD---> no changes, no NVD---> diet off with Jeovanny Smoker in hospital---> no greens, diet off with Jeovanny Smoker and stressed, no NVD  --> diet back to normal, no NVD  []   [x]       Change in alcohol use  []   [x]       Change in activity  [x]   []       Hospital admission 12/5-12/8 for pneumonia. was d/c on doxycycline x 14 days, benzonatate, atorvastatin, gauifenesin. INR on admit 5.12, warfarin was held until 12/8, resumed taking 2.5 mg. INR 2.3 at d/c. Advised to take 2.5 mg daily until RTC, pt did not lower dose as instructed. []   [x]       Emergency department visit  []   [x]       Other complaints--> Patient has been very tired and not able to function well, getting epoetin alpha injection at hemotologist office     Clinical Outcomes:  Yes     No  []   [x]       Major bleeding event  []   [x]       Thromboembolic event  Duration of warfarin Therapy: indefinite  INR Range:  2.0-3.0     INR is 2.7 today   Continue taking dose of 5 mg on Sun, Tues and Thurs and 2.5 mg all other days of the week. Encouraged to maintain a consistency of vegetables/salads. Recheck INR in 4 weeks, 6/22  Pt will call if he does start Amiodarone but did NOT start it in April.     Referring cardiologist will be Dr. Enrico Alfredo  INR (no units)   Date Value   04/28/2021 2.4   03/31/2021 3.4   03/03/2021 2.5   01/21/2021 2.2   12/08/2020 2.30 (H)   12/07/2020 3.30 (H)   12/06/2020 5.68 ([place])   12/05/2020 5.12 ([place])     For Pharmacy Admin Tracking Only     Total # of Interventions Recommended: 0   Total # of Interventions Accepted: 0   Time Spent (min): [address] [employer] [address], [address] [facility], PharmD

## 2021-06-22 ENCOUNTER — ANTI-COAG VISIT (OUTPATIENT)
Dept: PHARMACY | Age: 86
End: 2021-06-22
Payer: MEDICARE

## 2021-06-22 DIAGNOSIS — I48.0 PAF (PAROXYSMAL ATRIAL FIBRILLATION) (HCC): Primary | ICD-10-CM

## 2021-06-22 LAB — INTERNATIONAL NORMALIZATION RATIO, POC: 1.9

## 2021-06-22 PROCEDURE — 99211 OFF/OP EST MAY X REQ PHY/QHP: CPT

## 2021-06-22 PROCEDURE — 85610 PROTHROMBIN TIME: CPT

## 2021-06-22 NOTE — PROGRESS NOTES
Mr. Hu Kim is a 80 y.o. y/o male with history of Afib   He presents today for anticoagulation monitoring and adjustment. Pertinent PMH: ICD-pacemaker.  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    Patient Reported Findings:  Yes     No  []   [x]       Patient verifies current dosing regimen as listed per wife uses pill box --> did not lower weekly dose to 2.5 mg daily, instead followed normal weekly dose ---> confirmed dose   []   [x]       S/S bleeding/bruising/swelling/SOB- denies  []   [x]       Blood in urine or stool denies  []   [x]       Procedures scheduled in the future at this time -  [x]   []       Missed Dose- missed dose Monday   []   [x]       Extra Dose- denies  []   [x]       Change in medications- receiving Procrit 40,000 unit injection once every two weeks until red blood cell count is consistently under control again---> stop sotalol, started amiodarone 200mg daily 4/28---> states did not start it because was concerned with the SE so stayed on sotalol and has fu apt next week with regular cardiologist --> no changes    [x]   []       Change in health/diet/appetite consistent greens -> has been eating less, appetite has been diminished --> has been eating less since being home --> appetite is slowly starting to return---> only had one salad, no NVD---> no changes, no NVD---> diet off with Kenneth Due in hospital---> no greens, diet off with Kenneth Due and stressed, no NVD  --> diet back to normal, no NVD --> states that he has been stressed with wife being ill and in hospital, not eating well  []   [x]       Change in alcohol use  []   [x]       Change in activity  []   [x]       Hospital admission   []   [x]       Emergency department visit  []   [x]       Other complaints--> Patient has been very tired and not able to function well, getting epoetin alpha injection at hemotologist office     Clinical Outcomes:  Yes     No  []   [x]       Major bleeding event  []   [x]       Thromboembolic event  Duration of warfarin Therapy: indefinite  INR Range:  2.0-3.0     INR is 1.9 today after stress and missing dose acutely   Continue taking dose of 5 mg on Sun, Tues and Thurs and 2.5 mg all other days of the week. Encouraged to maintain a consistency of vegetables/salads. Recheck INR in 4 weeks, 7/20  Pt will call if he does start Amiodarone but did NOT start it in April.     Referring cardiologist will be Dr. Maureen Viera  INR (no units)   Date Value   05/25/2021 2.7   04/28/2021 2.4   03/31/2021 3.4   03/03/2021 2.5   12/08/2020 2.30 (H)   12/07/2020 3.30 (H)   12/06/2020 5.68 ([place])   12/05/2020 5.12 ([place])     For Pharmacy Admin Tracking Only     Total # of Interventions Recommended: 0   Total # of Interventions Accepted: 0   Time Spent (min): 15

## 2021-06-23 ENCOUNTER — HOSPITAL ENCOUNTER (OUTPATIENT)
Age: 86
Discharge: HOME | End: 2021-06-23
Payer: MEDICARE

## 2021-06-23 LAB
ANION GAP SERPL CALCULATED.3IONS-SCNC: 17 MMOL/L (ref 3–16)
BUN BLDV-MCNC: 24 MG/DL (ref 7–20)
CALCIUM SERPL-MCNC: 9.2 MG/DL (ref 8.3–10.6)
CHLORIDE BLD-SCNC: 107 MMOL/L (ref 99–110)
CO2: 22 MMOL/L (ref 21–32)
CREAT SERPL-MCNC: 1 MG/DL (ref 0.8–1.3)
CREATININE URINE: 67.8 MG/DL (ref 39–259)
GFR AFRICAN AMERICAN: >60
GFR NON-AFRICAN AMERICAN: >60
GLUCOSE BLD-MCNC: 114 MG/DL (ref 70–99)
MICROALBUMIN UR-MCNC: 186.9 MG/DL
MICROALBUMIN/CREAT UR-RTO: 2756.6 MG/G (ref 0–30)
POTASSIUM SERPL-SCNC: 3.8 MMOL/L (ref 3.5–5.1)
SODIUM BLD-SCNC: 146 MMOL/L (ref 136–145)

## 2021-06-23 PROCEDURE — 80048 BASIC METABOLIC PNL TOTAL CA: CPT

## 2021-06-23 PROCEDURE — 82043 UR ALBUMIN QUANTITATIVE: CPT

## 2021-06-23 PROCEDURE — 82570 ASSAY OF URINE CREATININE: CPT

## 2021-07-06 ENCOUNTER — TELEPHONE (OUTPATIENT)
Dept: FAMILY MEDICINE CLINIC | Age: 86
End: 2021-07-06

## 2021-07-06 NOTE — TELEPHONE ENCOUNTER
Pt stopped in to say \"Thank You\" to Dr. Tanner Felton for the card that she and the rest of the office signed for the loss of his wife.     Didn't say if he needed anything else just that he stopped by

## 2021-07-08 ENCOUNTER — HOSPITAL ENCOUNTER (OUTPATIENT)
Age: 86
Discharge: HOME | End: 2021-07-08
Payer: MEDICARE

## 2021-07-08 ENCOUNTER — OFFICE VISIT (OUTPATIENT)
Dept: CARDIOLOGY CLINIC | Age: 86
End: 2021-07-08
Payer: MEDICARE

## 2021-07-08 VITALS
HEART RATE: 146 BPM | DIASTOLIC BLOOD PRESSURE: 72 MMHG | SYSTOLIC BLOOD PRESSURE: 120 MMHG | WEIGHT: 161 LBS | BODY MASS INDEX: 23.05 KG/M2 | OXYGEN SATURATION: 96 % | HEIGHT: 70 IN

## 2021-07-08 DIAGNOSIS — E78.2 MIXED HYPERLIPIDEMIA: ICD-10-CM

## 2021-07-08 DIAGNOSIS — I10 BENIGN ESSENTIAL HTN: ICD-10-CM

## 2021-07-08 DIAGNOSIS — I25.10 CORONARY ARTERY DISEASE INVOLVING NATIVE CORONARY ARTERY OF NATIVE HEART WITHOUT ANGINA PECTORIS: Primary | ICD-10-CM

## 2021-07-08 DIAGNOSIS — I48.0 PAROXYSMAL ATRIAL FIBRILLATION (HCC): ICD-10-CM

## 2021-07-08 DIAGNOSIS — I49.5 SSS (SICK SINUS SYNDROME) (HCC): ICD-10-CM

## 2021-07-08 LAB
INR BLD: 1.4 (ref 0.88–1.12)
PROTHROMBIN TIME: 16.1 SEC (ref 9.9–12.7)

## 2021-07-08 PROCEDURE — 1123F ACP DISCUSS/DSCN MKR DOCD: CPT | Performed by: NURSE PRACTITIONER

## 2021-07-08 PROCEDURE — G8427 DOCREV CUR MEDS BY ELIG CLIN: HCPCS | Performed by: NURSE PRACTITIONER

## 2021-07-08 PROCEDURE — 93000 ELECTROCARDIOGRAM COMPLETE: CPT | Performed by: NURSE PRACTITIONER

## 2021-07-08 PROCEDURE — 99215 OFFICE O/P EST HI 40 MIN: CPT | Performed by: NURSE PRACTITIONER

## 2021-07-08 PROCEDURE — G8420 CALC BMI NORM PARAMETERS: HCPCS | Performed by: NURSE PRACTITIONER

## 2021-07-08 PROCEDURE — 1036F TOBACCO NON-USER: CPT | Performed by: NURSE PRACTITIONER

## 2021-07-08 PROCEDURE — 4040F PNEUMOC VAC/ADMIN/RCVD: CPT | Performed by: NURSE PRACTITIONER

## 2021-07-08 PROCEDURE — 85610 PROTHROMBIN TIME: CPT

## 2021-07-08 PROCEDURE — 36415 COLL VENOUS BLD VENIPUNCTURE: CPT

## 2021-07-08 RX ORDER — SOTALOL HYDROCHLORIDE 120 MG/1
120 TABLET ORAL 2 TIMES DAILY
COMMUNITY
End: 2021-08-23 | Stop reason: ALTCHOICE

## 2021-07-08 NOTE — PROGRESS NOTES
[facility]     Outpatient Follow Up Note    Deepika Collins is 80 y.o. male who presents today with a history of CAD s/p CABG '96, PAF, SSS s/p PPM '05 (s/p generator change '15), HTN and hyperlipidemia. 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 Rodriguez Congress / HPI:  Follow Up secondary to coronary artery disease    Subjective:     He'd lost his wife about 2 weeks ago. She'd been in pain for the past 9 yrs; had back pain. He feels anxious and depressed. He has 3 great kids taking care of him. He denies significant chest pain. He denies SOB. The patient denies orthopnea/PND. He always has some swelling in his legs. His wt is stable around 150-55#. He can feel his AF at times if he becomes over excited. He hasn't had any lately. He discussed amiodarone with his personal physcian and pharmacist and SE. He choose to stay on sotalol. The patient is not experiencing palpitations or dizziness. These symptoms are stable since the last OV. His home BP runs ~ 120-130/   With regard to medication therapy the patient has been compliant with prescribed regimen. They have tolerated therapy to date.      Past Medical History:   Diagnosis Date    Actinic keratosis     Actinic keratosis     Atrial fibrillation (HCC)     Bilateral carotid artery stenosis     CAD (coronary artery disease)     Cellulitis 7/15/2015    right foot    Diabetes mellitus ([facility] [address]     DM (diabetes mellitus), type 2 with peripheral vascular complications (HCC)--s/p amputation great toe post osteomylitis  9/11/2015    Glucose intolerance (malabsorption)     Hyperlipidemia     Hypertension     Hypertrophy of prostate without urinary obstruction and other lower urinary tract symptoms (LUTS)     Intermittent atrial fibrillation (HCC)     Kidney stone     Occult blood in stool     Hx of    Pacemaker     Permanent     Social History:    Social History     Tobacco Use   Smoking Status Never Smoker   Smokeless Tobacco Never Used   Tobacco Comment    counseled on tobacco exposure avoidance     Current Medications:  Current Outpatient Medications   Medication Sig Dispense Refill    sotalol (BETAPACE) 120 MG tablet Take 120 mg by mouth 2 times daily      lisinopril (PRINIVIL;ZESTRIL) 10 MG tablet Take 1 tablet by mouth daily 30 tablet 3    warfarin (COUMADIN) 5 MG tablet Take 1 tablet by mouth daily Take 2.5 mg on Mon, Wed and Fri and 5 mg all other days of the week 90 tablet 2    atorvastatin (LIPITOR) 20 MG tablet Take 1 tablet by mouth daily 90 tablet 4    metoprolol succinate (TOPROL XL) 50 MG extended release tablet Take 1.5 tablets by mouth daily 135 tablet 1    fluticasone (FLONASE) 50 MCG/ACT nasal spray 2 sprays by Each Nostril route 2 times daily 1 Bottle 3    sodium chloride (OCEAN, BABY AYR) 0.65 % nasal spray 2 sprays by Nasal route every 4 hours 5 Bottle 0    furosemide (LASIX) 20 MG tablet Take 1 tablet by mouth daily 60 tablet 0    vitamin B-1 (THIAMINE) 100 MG tablet Take 100 mg by mouth daily      EPOETIN BIBIANA IJ Inject as directed every 21 days       acetaminophen (TYLENOL) 325 MG tablet Take 650 mg by mouth every 6 hours as needed for Pain      loratadine (CLARITIN) 10 MG tablet Take 10 mg by mouth daily as needed (allergies)  (Patient not taking: Reported on 7/8/2021)       No current facility-administered medications for this visit. REVIEW OF SYSTEMS:    CONSTITUTIONAL: No major weight gain or loss, fatigue, weakness, night sweats or fever. HEENT: No new vision difficulties or ringing in the ears. RESPIRATORY: No new SOB, PND, orthopnea or cough. CARDIOVASCULAR: See HPI  GI: No nausea, vomiting, diarrhea, constipation, abdominal pain or changes in bowel habits. : No urinary frequency, urgency, incontinence hematuria or dysuria. SKIN: No cyanosis or skin lesions. MUSCULOSKELETAL: No new muscle or joint pain.   NEUROLOGICAL: No syncope or TIA-like symptoms. PSYCHIATRIC: No anxiety, pain, insomnia or depression    Objective:   PHYSICAL EXAM:        Vitals:    07/08/21 1053 07/08/21 1127   BP: (!) 130/90 120/72   Site: Right Upper Arm    Position: Sitting    Cuff Size: Medium Adult    Pulse: 146    SpO2: 96%    Weight: 161 lb (73 kg)    Height: 5' 9.5\" (1.765 m)        VITALS:  /72   Pulse 146   Ht 5' 9.5\" (1.765 m)   Wt 161 lb (73 kg)   SpO2 96%   BMI 23.43 kg/m²   CONSTITUTIONAL: Cooperative, no apparent distress, and appears well nourished / developed  NEUROLOGIC:  Awake and orientated to person, place and time. PSYCH: Calm affect; tearful. SKIN: Warm and dry; pale. HEENT: Sclera non-icteric, normocephalic, neck supple, no elevation of JVP, normal carotid pulses with no bruits and thyroid normal size. LUNGS:  No increased work of breathing and essentially clear  CARDIOVASCULAR:  Regular rate 144 and rhythm with + murmur 2nd ICS Rt MCL, gallops, rubs, or abnormal heart sounds, normal PMI. The apical impulses not displaced  JVP less than 8 cm H2O  Heart tones are crisp and normal  Cervical veins are not engorged  The carotid upstroke is normal in amplitude and contour without delay or bruit  JVP is not elevated  ABDOMEN:  Normal bowel sounds, non-distended and non-tender to palpation  EXT: no edema, no calf tenderness. Pulses are present bilaterally.     DATA:    Lab Results   Component Value Date    ALT 14 01/20/2021    AST 19 01/20/2021    ALKPHOS 72 01/20/2021    BILITOT 0.7 01/20/2021     Lab Results   Component Value Date    CREATININE 1.0 06/23/2021    BUN 24 (H) 06/23/2021     (H) 06/23/2021    K 3.8 06/23/2021     06/23/2021    CO2 22 06/23/2021     Lab Results   Component Value Date    WBC 10.1 12/08/2020    HGB 7.8 (L) 12/08/2020    HCT 22.7 (L) 12/08/2020    MCV 91.4 12/08/2020     12/08/2020     LABS: 6/14/21: Hgb 9.6     Hct 29.2    No components found for: CHLPL  Lab Results   Component Value Date    TRIG 120 06/22/2020    TRIG 162 (H) 08/19/2019    TRIG 231 (H) 01/22/2019     Lab Results   Component Value Date    HDL 37 (L) 06/22/2020    HDL 42 08/19/2019    HDL 35 (L) 01/22/2019     Lab Results   Component Value Date    LDLCALC 48 06/22/2020    LDLCALC 53 08/19/2019    LDLCALC 70 01/22/2019     Lab Results   Component Value Date    LABVLDL 24 06/22/2020    LABVLDL 32 08/19/2019    LABVLDL 46 01/22/2019     Lab Results   Component Value Date    INR 1.9 06/22/2021    INR 2.7 05/25/2021    INR 2.4 04/28/2021    PROTIME 26.9 (H) 12/08/2020    PROTIME 38.7 (H) 12/07/2020    PROTIME 67.0 (H) 12/06/2020         Radiology Review:  Pertinent images / reports were reviewed as a part of this visit and reveals the following:    [id number] Score for Atrial Fibrillation Stroke Risk   Risk   Factors  Component Value   C CHF No 0   H HTN Yes 1   A2 Age >= 76 Yes,  (80 y.o.) 2   D DM Yes 1   S2 Prior Stroke/TIA No 0   V Vascular Disease No 1   A Age 74-69 No,  (80 y.o.) 0   Sc Sex male 0    [id number]  Score  5   Score last updated 7/9/21 7:26 AM EDT        Echo: May '21  Summary   Left ventricular cavity size is normal with normal left ventricular wall   thickness. Overall left ventricular systolic function appears mild-moderately reduced. Ejection fraction is visually estimated to be 35-40%. There is mild diffuse hypokinesis. Grade II diastolic dysfunction with elevated LV filling pressures. Normal right ventricular size. Right ventricular systolic function is mildly reduced. The left atrium is moderate-severely dilated. Mitral valve leaflets appear mildly thickened. Mild to moderate mitral regurgitation. Moderate aortic stenosis with a peak velocity of 2.5 m/s, a mean pressure   gradient of 15 mmHg and an DONAVAN of 1.19 cm^2. Aortic valve gradients may be underestimated due to low cardiac output. Mild aortic regurgitation. Mild to moderate tricuspid regurgitation with a PASP of 45 mmHg.    Trivial pulmonic regurgitation present. Device interrogation: 4/28/21  AP 42%.  25%. AF with a 34% burden. 6 episodes with noted RVR. Patient remains on warfarin and metoprolol. Last Stress Test: '06: small to moderate sized inferolateral fixed defect consistent with infarction in the territory typical of the distal CX. EF 68%    Assessment:      Diagnosis Orders   1. Coronary artery disease involving native coronary artery of native heart without angina pectoris   ~stable. Offers no c/o CP-angina  ~diffuse HK, EF 35-40% on echo from May '21 comparable to  Study from Oct '20  ~s/p CABG '96  ~stress echo from '18 : negative  ~hx of MSD following with [facility]. Last H/H 9.6/29.2    2. Sick sinus syndrome  ~stable : s/p PPM '05 with generator change '15  ~ AP 42%,  25% on last interrogation April '21  ~sotalol recommended to change to amiodarone given LV dysfunction with EF 35%. Pt chose not to do d/t side effects. 3. Paroxysmal atrial fibrillation (HCC)   ~recurrent with rapid AP  ~cannot stay has felt palpitations d/t overwhelmed with loss of wife  ~AC managed in coumadin clinic. INR sub-therapeutic at 1.9 last checked  ~CHADsVASc 5  ~LA dilated at 5.1 cm on echo ; with mod AS and MR  ~last TSH WNL    4. Essential hypertension   ~controlled     5. Mixed hyperlipidemia   ~remains on atorvastatin   ~current profile unavailable for review      I had the opportunity to review the clinical symptoms and presentation of Ca Osorio : d/w MXA  Plan:     1. EKG: atrial flutter 2:1  2. INR today   Cardioversion with Dr. Rolan Coronado (pt unable to mentally deal with procedure today, agreeable to returning in am)  3. F/U in 2-3 weeks post-procedure    Overall the patient is stable from CV standpoint    I have addresed the patient's cardiac risk factors and adjusted pharmacologic treatment as needed. In addition, I have reinforced the need for patient directed risk factor modification.     Further evaluation will be based upon the patient's clinical course and testing results. All questions and concerns were addressed to the patient. Alternatives to my treatment were discussed. The patient is not currently smoking. The risks related to smoking were reviewed with the patient. Recommend maintaining a smoke-free lifestyle. Patient is on a beta-blocker  Patient is not on an ace-i/ARB : neg CHF  Patient is on a statin     anti-coagulation has been recommended / prescribed for this patient. Education conducted on adverse reactions including bleeding was discussed. The patient verbalizes understanding not to stop medications without discussing with us. Discussed exercise: 30-60 minutes 7 days/week  Discussed Low saturated fat/DOLORES diet. Thank you for allowing to us to participate in the care of Lyndon Garcia.     PRIYA Beckham    Documentation of today's visit sent to PCP

## 2021-07-09 ENCOUNTER — ANESTHESIA (OUTPATIENT)
Dept: CARDIAC CATH/INVASIVE PROCEDURES | Age: 86
End: 2021-07-09
Payer: MEDICARE

## 2021-07-09 ENCOUNTER — HOSPITAL ENCOUNTER (OUTPATIENT)
Dept: CARDIAC CATH/INVASIVE PROCEDURES | Age: 86
Discharge: HOME | End: 2021-07-09
Attending: INTERNAL MEDICINE | Admitting: INTERNAL MEDICINE
Payer: MEDICARE

## 2021-07-09 ENCOUNTER — HOSPITAL ENCOUNTER (OUTPATIENT)
Dept: CARDIAC CATH/INVASIVE PROCEDURES | Age: 86
Discharge: HOME | End: 2021-07-09
Payer: MEDICARE

## 2021-07-09 ENCOUNTER — ANESTHESIA EVENT (OUTPATIENT)
Dept: CARDIAC CATH/INVASIVE PROCEDURES | Age: 86
End: 2021-07-09
Payer: MEDICARE

## 2021-07-09 LAB
EKG ATRIAL RATE: 60 BPM
EKG DIAGNOSIS: NORMAL
EKG P AXIS: 20 DEGREES
EKG P-R INTERVAL: 144 MS
EKG Q-T INTERVAL: 598 MS
EKG QRS DURATION: 94 MS
EKG QTC CALCULATION (BAZETT): 598 MS
EKG R AXIS: -12 DEGREES
EKG T AXIS: -35 DEGREES
EKG VENTRICULAR RATE: 60 BPM

## 2021-07-09 PROCEDURE — 93010 ELECTROCARDIOGRAM REPORT: CPT | Performed by: INTERNAL MEDICINE

## 2021-07-09 PROCEDURE — 93005 ELECTROCARDIOGRAM TRACING: CPT | Performed by: INTERNAL MEDICINE

## 2021-07-09 PROCEDURE — 99211 OFF/OP EST MAY X REQ PHY/QHP: CPT

## 2021-07-09 NOTE — ANESTHESIA PRE PROCEDURE
Department of Anesthesiology  Preprocedure Note       Name:  Radha Aranda   Age:  80 y.o.  :  1933                                          MRN:  [id number]         Date:  2021      Surgeon: * No surgeons listed *    Procedure: * No procedures listed *    Medications prior to admission:   Prior to Admission medications    Medication Sig Start Date End Date Taking? Authorizing Provider   sotalol (BETAPACE) 120 MG tablet Take 120 mg by mouth 2 times daily    Historical Provider, MD   lisinopril (PRINIVIL;ZESTRIL) 10 MG tablet Take 1 tablet by mouth daily 3/30/21   Patrick Fuentes MD   warfarin (COUMADIN) 5 MG tablet Take 1 tablet by mouth daily Take 2.5 mg on Mon, Wed and Fri and 5 mg all other days of the week 3/23/21   PRIYA Saenz CNP   atorvastatin (LIPITOR) 20 MG tablet Take 1 tablet by mouth daily 21   Efra Hernandez MD   metoprolol succinate (TOPROL XL) 50 MG extended release tablet Take 1.5 tablets by mouth daily 21   PRIYA Saenz CNP   fluticasone (FLONASE) 50 MCG/ACT nasal spray 2 sprays by Each Nostril route 2 times daily 20   Jennifer Wolf MD   sodium chloride (OCEAN, BABY AYR) 0.65 % nasal spray 2 sprays by Nasal route every 4 hours 20   Jennifer Wolf MD   furosemide (LASIX) 20 MG tablet Take 1 tablet by mouth daily 20   Estrella Christine MD   vitamin B-1 (THIAMINE) 100 MG tablet Take 100 mg by mouth daily    Historical Provider, MD   EPOETIN BIBIANA IJ Inject as directed every 21 days     Historical Provider, MD   acetaminophen (TYLENOL) 325 MG tablet Take 650 mg by mouth every 6 hours as needed for Pain    Historical Provider, MD   loratadine (CLARITIN) 10 MG tablet Take 10 mg by mouth daily as needed (allergies)   Patient not taking: Reported on 2021    Historical Provider, MD       Current medications:    No current facility-administered medications for this encounter.        Allergies:  No Known Allergies    Problem List:    Patient Active Problem List   Diagnosis Code    CAD (coronary artery disease) I25.10    Cardiac pacemaker Z95.0    Hypertrophy of prostate without urinary obstruction and other lower urinary tract symptoms (LUTS) N40.0    Gout-uric acid >7.5--advised proph tx M10.9    Hypercholesteremia E78.00    Carotid bruit(bilat-nl duplex u/s 10/10) R09.89    Vitamin D deficiency-(advised 1000IU/day) E55.9    Actinic keratoses L57.0    Elevated PSA-(was 4.2 10/10-repeat 6 mo)(was 5.12 1/11-then 4.4 2/12)-sees dr Chauncey Fernandez for this R97.20    S/P colonoscopy-4/07-neg per pt,  3/18 repeat colonoscopy polyp-repeat 5 yr Z98.890    Hearing loss, conductive, bilateral-seeing ent-dr quinn for hearing aides H90.0    Encounter for monitoring sotalol therapy Z51.81, Z79.899    MICROALBUMINURIA-on ace already R80.9    Anemia--post op 8/15--started otc iron bid, off now  - work up Dr. Traci Mullins bone marrow. possible MDS 2019 D64.9    Diabetes mellitus ([address] E11.9    PAF (paroxysmal atrial fibrillation) (HCC) I48.0    DM (diabetes mellitus), type 2 with peripheral vascular complications (HCC)--s/p amputation great toe post osteomylitis  E11.51    Hypertension I10    Hyperkalemia E87.5    H/O esophagogastroduodenoscopy  11/18  prominent vessesls vs varices. dr Sara Ahumada (done for blood in stool) Z98.890    Urinary frequency R35.0    Elevated ferritin  - work up Dr. Traci Mullins  genetic screen hemachromatosis negative.  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 R79.89    Localized edema R60.0    Pneumonia due to organism J18.9    Ischemic cardiomyopathy I25.5    Benign essential HTN I10    Aspiration pneumonia (HCC) J69.0    Pansinusitis J32.4    S/P amputation of lesser toe, right ([address] Z89.421       Past Medical History:        Diagnosis Date    Actinic keratosis     Actinic keratosis     Atrial fibrillation (HCC)     Bilateral carotid artery stenosis     CAD (coronary artery disease)     Cellulitis 7/15/2015    right foot    Diabetes mellitus ([facility] [address]  DM (diabetes mellitus), type 2 with peripheral vascular complications (HCC)--s/p amputation great toe post osteomylitis  9/11/2015    Glucose intolerance (malabsorption)     Hyperlipidemia     Hypertension     Hypertrophy of prostate without urinary obstruction and other lower urinary tract symptoms (LUTS)     Intermittent atrial fibrillation ([address]     Kidney stone     Occult blood in stool     Hx of    Pacemaker     Permanent       Past Surgical History:        Procedure Laterality Date    ABSCESS DRAINAGE  7/20/15    right foot    APPENDECTOMY      CARDIAC CATHETERIZATION  2003    Left    COLONOSCOPY  03/28/2018    dr Pancho Blackwood    x3   [facility]    OTHER SURGICAL HISTORY Right 7/17/15    right fifth toe I and D    PACEMAKER PLACEMENT  2005    MS ESOPHAGOGASTRODUODENOSCOPY TRANSORAL DIAGNOSTIC N/A 11/21/2018    EGD DIAGNOSTIC ONLY performed by Sweta العلي MD at [place] [id number] Right 7.16.15    right pinkey toe     TONSILLECTOMY AND ADENOIDECTOMY         Social History:    Social History     Tobacco Use    Smoking status: Never Smoker    Smokeless tobacco: Never Used    Tobacco comment: counseled on tobacco exposure avoidance   Substance Use Topics    Alcohol use: No     Alcohol/week: 0.0 standard drinks                                Counseling given: Not Answered  Comment: counseled on tobacco exposure avoidance      Vital Signs (Current): There were no vitals filed for this visit.                                            BP Readings from Last 3 Encounters:   07/08/21 120/72   05/24/21 (!) 159/82   04/28/21 128/62       NPO Status:                                                                                 BMI:   Wt Readings from Last 3 Encounters:   07/08/21 161 lb (73 kg)   05/24/21 163 lb 3.2 oz (74 kg)   04/28/21 163 lb 9.6 oz (74.2 kg)     There is no height or weight on file to calculate BMI.    CBC: Lab Results   Component Value Date    WBC 10.1 12/08/2020    RBC 2.48 12/08/2020    HGB 7.8 12/08/2020    HCT 22.7 12/08/2020    MCV 91.4 12/08/2020    RDW 19.1 12/08/2020     12/08/2020       CMP:   Lab Results   Component Value Date     06/23/2021    K 3.8 06/23/2021    K 4.7 12/05/2020     06/23/2021    CO2 22 06/23/2021    BUN 24 06/23/2021    CREATININE 1.0 06/23/2021    GFRAA >60 06/23/2021    GFRAA >60 06/05/2013    AGRATIO 1.7 01/20/2021    LABGLOM >60 06/23/2021    GLUCOSE 114 06/23/2021    PROT 6.9 01/20/2021    PROT 7.0 02/18/2013    CALCIUM 9.2 06/23/2021    BILITOT 0.7 01/20/2021    ALKPHOS 72 01/20/2021    AST 19 01/20/2021    ALT 14 01/20/2021       POC Tests: No results for input(s): POCGLU, POCNA, POCK, POCCL, POCBUN, POCHEMO, POCHCT in the last 72 hours. Coags:   Lab Results   Component Value Date    PROTIME 16.1 07/08/2021    PROTIME 13.8 11/21/2018    INR 1.40 07/08/2021    APTT 54.3 12/05/2020       HCG (If Applicable): No results found for: PREGTESTUR, PREGSERUM, HCG, HCGQUANT     ABGs: No results found for: PHART, PO2ART, IUP2KLX, PUF5KHP, BEART, R5URUQPV     Type & Screen (If Applicable):  No results found for: LABABO, LABRH    Drug/Infectious Status (If Applicable):  No results found for: HIV, HEPCAB    COVID-19 Screening (If Applicable):   Lab Results   Component Value Date    COVID19 Not Detected 12/05/2020           Anesthesia Evaluation    Airway: Mallampati: II  TM distance: >3 FB   Neck ROM: full  Mouth opening: > = 3 FB Dental:          Pulmonary:                              Cardiovascular:    (+) hypertension:, pacemaker:, CAD:,         Rhythm: regular  Rate: normal                    Neuro/Psych:               GI/Hepatic/Renal:             Endo/Other:    (+) Diabetes, . Abdominal:             Vascular:           Other Findings:         Conclusions      Summary   Left ventricular cavity size is normal with normal left ventricular wall thickness. Overall left ventricular systolic function appears mild-moderately reduced. Ejection fraction is visually estimated to be 35-40%. There is mild diffuse hypokinesis. Grade II diastolic dysfunction with elevated LV filling pressures. Normal right ventricular size. Right ventricular systolic function is mildly reduced. The left atrium is moderate-severely dilated. Mitral valve leaflets appear mildly thickened. Mild to moderate mitral regurgitation. Moderate aortic stenosis with a peak velocity of 2.5 m/s, a mean pressure   gradient of 15 mmHg and an DONAVAN of 1.19 cm^2. Aortic valve gradients may be underestimated due to low cardiac output. Mild aortic regurgitation. Mild to moderate tricuspid regurgitation with a PASP of 45 mmHg. Trivial pulmonic regurgitation present. Signature      ------------------------------------------------------------------   Electronically signed by Diane Arredondo MD, [facility] (Interpreting   physician) on 05/13/2021 at 04:27 PM   ------------------------------------------------------------------       Anesthesia Plan      general     ASA 3       Induction: intravenous. Anesthetic plan and risks discussed with patient. Plan discussed with CRNA.                   Lang Jones MD   7/9/2021

## 2021-07-09 NOTE — PROGRESS NOTES
Patient was in sinus rhythm today. Since his INR has not been therapeutic we will call him and ask him to take an extra dose of warfarin. No procedure was done.

## 2021-07-20 ENCOUNTER — ANTI-COAG VISIT (OUTPATIENT)
Dept: PHARMACY | Age: 86
End: 2021-07-20
Payer: MEDICARE

## 2021-07-20 DIAGNOSIS — I48.0 PAF (PAROXYSMAL ATRIAL FIBRILLATION) (HCC): Primary | ICD-10-CM

## 2021-07-20 LAB — INTERNATIONAL NORMALIZATION RATIO, POC: 2.5

## 2021-07-20 PROCEDURE — 99211 OFF/OP EST MAY X REQ PHY/QHP: CPT

## 2021-07-20 PROCEDURE — 85610 PROTHROMBIN TIME: CPT

## 2021-07-20 NOTE — PROGRESS NOTES
Mr. Didi Hartman is a 80 y.o. y/o male with history of Afib   He presents today for anticoagulation monitoring and adjustment. Pertinent PMH: ICD-pacemaker.  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    Patient Reported Findings:  Yes     No  []   [x]       Patient verifies current dosing regimen as listed  confirmed dose   []   [x]       S/S bleeding/bruising/swelling/SOB- denies  []   [x]       Blood in urine or stool denies  [x]   []       Procedures scheduled in the future at this time - had cardiac cath 7/9. IR 1.4   [x]   []       Missed Dose- pt unsure if missed dose recently with all change   []   [x]       Extra Dose- denies  []   [x]       Change in medications- receiving Procrit 40,000 unit injection once every two weeks until red blood cell count is consistently under control again---> stop sotalol, started amiodarone 200mg daily 4/28---> states did not start it because was concerned with the SE so stayed on sotalol and has fu apt next week with regular cardiologist --> no changes    [x]   []       Change in health/diet/appetite consistent greens -> has been eating less, appetite has been diminished --> has been eating less since being home --> appetite is slowly starting to return---> only had one salad, no NVD---> no changes, no NVD---> diet off with Ruth Aníbal in hospital---> no greens, diet off with Ruth Aníbal and stressed, no NVD  --> diet back to normal, no NVD --> states that he has been stressed with wife being ill and in hospital, not eating well --> appetite has been diminished d/t wife   []   [x]       Change in alcohol use  []   [x]       Change in activity  []   [x]       Hospital admission   []   [x]       Emergency department visit  []   [x]       Other complaints--> Patient has been very tired and not able to function well, getting epoetin alpha injection at hemotologist office     Clinical Outcomes:  Yes     No  []   [x]       Major bleeding event  []   [x]       Thromboembolic event  Duration of warfarin Therapy: indefinite  INR Range:  2.0-3.0     Has been stressed with passing of wife     INR is 2.5 today    Continue taking dose of 5 mg on Sun, Tues and Thurs and 2.5 mg all other days of the week. Encouraged to maintain a consistency of vegetables/salads.   Recheck INR in 4 weeks, 8/17    Referring cardiologist will be Dr. Kimberli Durand  INR (no units)   Date Value   07/08/2021 1.40 (H)   06/22/2021 1.9   05/25/2021 2.7   04/28/2021 2.4   03/31/2021 3.4   12/08/2020 2.30 (H)   12/07/2020 3.30 (H)   12/06/2020 5.68 (Health system)     For Pharmacy Admin Tracking Only     Total # of Interventions Recommended: 0   Total # of Interventions Accepted: 0   Time Spent (min): 15

## 2021-07-21 ENCOUNTER — OFFICE VISIT (OUTPATIENT)
Dept: FAMILY MEDICINE CLINIC | Age: 86
End: 2021-07-21
Payer: MEDICARE

## 2021-07-21 VITALS
DIASTOLIC BLOOD PRESSURE: 78 MMHG | OXYGEN SATURATION: 98 % | HEART RATE: 78 BPM | HEIGHT: 70 IN | WEIGHT: 164 LBS | SYSTOLIC BLOOD PRESSURE: 124 MMHG | BODY MASS INDEX: 23.48 KG/M2 | RESPIRATION RATE: 14 BRPM

## 2021-07-21 DIAGNOSIS — E11.9 TYPE 2 DIABETES MELLITUS WITHOUT COMPLICATION, WITHOUT LONG-TERM CURRENT USE OF INSULIN (HCC): Chronic | ICD-10-CM

## 2021-07-21 DIAGNOSIS — F43.23 ADJUSTMENT REACTION WITH ANXIETY AND DEPRESSION: Primary | ICD-10-CM

## 2021-07-21 DIAGNOSIS — F43.21 GRIEF REACTION: ICD-10-CM

## 2021-07-21 LAB — HBA1C MFR BLD: 5.7 %

## 2021-07-21 PROCEDURE — 83036 HEMOGLOBIN GLYCOSYLATED A1C: CPT | Performed by: INTERNAL MEDICINE

## 2021-07-21 PROCEDURE — G8420 CALC BMI NORM PARAMETERS: HCPCS | Performed by: INTERNAL MEDICINE

## 2021-07-21 PROCEDURE — 1036F TOBACCO NON-USER: CPT | Performed by: INTERNAL MEDICINE

## 2021-07-21 PROCEDURE — 1123F ACP DISCUSS/DSCN MKR DOCD: CPT | Performed by: INTERNAL MEDICINE

## 2021-07-21 PROCEDURE — 4040F PNEUMOC VAC/ADMIN/RCVD: CPT | Performed by: INTERNAL MEDICINE

## 2021-07-21 PROCEDURE — 99214 OFFICE O/P EST MOD 30 MIN: CPT | Performed by: INTERNAL MEDICINE

## 2021-07-21 PROCEDURE — G8427 DOCREV CUR MEDS BY ELIG CLIN: HCPCS | Performed by: INTERNAL MEDICINE

## 2021-07-21 RX ORDER — SERTRALINE HYDROCHLORIDE 25 MG/1
25 TABLET, FILM COATED ORAL DAILY
Qty: 30 TABLET | Refills: 0 | Status: SHIPPED | OUTPATIENT
Start: 2021-07-21 | End: 2021-09-03

## 2021-07-21 NOTE — PROGRESS NOTES
7/21/2021    Chief Complaint   Patient presents with    Depression     pt recently lost his wife. HPI     Grief      Pt lost his wife of  79 yr  On June 24  Very upset  Crying  Wife was in the hospital  4-5 day and then into hospice  He was with her when she passed  She passed peacefully  ( she was my patient)  Pt passed while he was holding her arm. Eating ok  Sleeping  Daughter is buying groceries  Son calls about every 2-3 days  Another son  Comes over twice a week  Has one daughter retired NP- calls daily  Son in law mows lawn    DM     Lab Results   Component Value Date    LABA1C 5.7 07/21/2021    LABA1C 5.5 04/21/2021    LABA1C 5.5 12/05/2020       Diet controlled   Great control    Has microalbuminuria and is seeing     GFR is normal    Had LIVAN  scheduled  7/9/21 dr Carmenza Gutierrez it appears maybe this was canceled.     Has CAD, SSS, PAF     HTN good control today    Having eye surgery next week  Lab Results   Component Value Date    LABA1C 5.5 04/21/2021    LABA1C 5.5 12/05/2020    LABA1C 5.6 06/22/2020    LABMICR 186.90 (H) 06/23/2021    LABMICR 83.90 (H) 10/06/2020    LABMICR YES 06/22/2020    LABMICR 53.30 (H) 06/22/2020       Lab Results   Component Value Date     (H) 06/23/2021     01/20/2021     12/29/2020    K 3.8 06/23/2021    K 4.9 01/20/2021    K 4.1 12/29/2020     06/23/2021     01/20/2021     12/29/2020    CO2 22 06/23/2021    CO2 27 01/20/2021    CO2 28 12/29/2020    BUN 24 (H) 06/23/2021    BUN 26 (H) 01/20/2021    BUN 20 12/29/2020    CREATININE 1.0 06/23/2021    CREATININE 1.1 01/20/2021    CREATININE 0.9 12/29/2020    GLUCOSE 114 (H) 06/23/2021    GLUCOSE 126 (H) 01/20/2021    GLUCOSE 137 (H) 12/29/2020    CALCIUM 9.2 06/23/2021    CALCIUM 9.6 01/20/2021    CALCIUM 9.3 12/29/2020       Lab Results   Component Value Date    CHOL 109 06/22/2020    CHOL 127 08/19/2019    CHOL 151 01/22/2019    TRIG 120 06/22/2020    TRIG 162 (H) 08/19/2019 TRIG 231 (H) 01/22/2019    HDL 37 (L) 06/22/2020    HDL 42 08/19/2019    HDL 35 (L) 01/22/2019    LDLCALC 48 06/22/2020    LDLCALC 53 08/19/2019    LDLCALC 70 01/22/2019       Lab Results   Component Value Date    ALT 14 01/20/2021    ALT 12 12/05/2020    ALT 14 06/22/2020    AST 19 01/20/2021    AST 20 12/05/2020    AST 20 06/22/2020       Lab Results   Component Value Date    TSH 3.14 06/27/2016    TSH 2.56 08/15/2011    T4FREE 0.94 08/15/2011    T4FREE 0.94 08/15/2011       Lab Results   Component Value Date    WBC 10.1 12/08/2020    WBC 10.5 12/07/2020    WBC 15.4 (H) 12/06/2020    HGB 7.8 (L) 12/08/2020    HGB 8.5 (L) 12/07/2020    HGB 8.7 (L) 12/06/2020    HCT 22.7 (L) 12/08/2020    HCT 24.7 (L) 12/07/2020    HCT 25.4 (L) 12/06/2020    MCV 91.4 12/08/2020    MCV 93.3 12/07/2020    MCV 92.5 12/06/2020     12/08/2020     12/07/2020     12/06/2020       Lab Results   Component Value Date    INR 2.5 07/20/2021    INR 1.40 (H) 07/08/2021    INR 1.9 06/22/2021        Lab Results   Component Value Date    PSA 2.82 06/22/2020    PSA 2.77 08/19/2019    PSA 2.83 04/30/2018        Lab Results   Component Value Date    LABURIC 7.6 (H) 03/03/2017    LABURIC 9.4 (H) 01/27/2017    LABURIC 10.1 (H) 01/06/2017        Review of Systems   Constitutional: Negative for appetite change and unexpected weight change. Psychiatric/Behavioral: Positive for dysphoric mood and sleep disturbance. Negative for self-injury and suicidal ideas. The patient is nervous/anxious.          Not homicidal       Health Maintenance   Topic Date Due    Shingles Vaccine (2 of 3) 04/12/2012    Annual Wellness Visit (AWV)  Never done    Lipid screen  06/22/2021    Flu vaccine (1) 09/01/2021    Potassium monitoring  06/23/2022    Creatinine monitoring  06/23/2022    DTaP/Tdap/Td vaccine (3 - Td or Tdap) 01/11/2026    Pneumococcal 65+ years Vaccine  Completed    COVID-19 Vaccine  Completed    Hepatitis A vaccine  Aged C/ Rodney Breanna 19 Hib vaccine  Aged Out    Meningococcal (ACWY) vaccine  Aged Out      Social History     Tobacco Use    Smoking status: Never Smoker    Smokeless tobacco: Never Used    Tobacco comment: counseled on tobacco exposure avoidance   Vaping Use    Vaping Use: Never used   Substance Use Topics    Alcohol use: No     Alcohol/week: 0.0 standard drinks    Drug use: No      Family History   Problem Relation Age of Onset    Stroke Father     Diabetes Mother      Prior to Visit Medications    Medication Sig Taking?  Authorizing Provider   sotalol (BETAPACE) 120 MG tablet Take 120 mg by mouth 2 times daily Yes Historical Provider, MD   lisinopril (PRINIVIL;ZESTRIL) 10 MG tablet Take 1 tablet by mouth daily Yes Adelita Kolb MD   warfarin (COUMADIN) 5 MG tablet Take 1 tablet by mouth daily Take 2.5 mg on Mon, Wed and Fri and 5 mg all other days of the week Yes PRIYA Stovall CNP   atorvastatin (LIPITOR) 20 MG tablet Take 1 tablet by mouth daily Yes Tammie Dallas MD   metoprolol succinate (TOPROL XL) 50 MG extended release tablet Take 1.5 tablets by mouth daily Yes PRIYA Stovall CNP   fluticasone (FLONASE) 50 MCG/ACT nasal spray 2 sprays by Each Nostril route 2 times daily Yes Ruby Vila MD   sodium chloride (OCEAN, BABY AYR) 0.65 % nasal spray 2 sprays by Nasal route every 4 hours Yes Ruby Vila MD   furosemide (LASIX) 20 MG tablet Take 1 tablet by mouth daily Yes Jacqueline Miller MD   vitamin B-1 (THIAMINE) 100 MG tablet Take 100 mg by mouth daily Yes Historical Provider, MD   EPOETIN BIBIANA IJ Inject as directed every 21 days  Yes Historical Provider, MD   acetaminophen (TYLENOL) 325 MG tablet Take 650 mg by mouth every 6 hours as needed for Pain Yes Historical Provider, MD   loratadine (CLARITIN) 10 MG tablet Take 10 mg by mouth daily as needed (allergies)   Yes Historical Provider, MD     Patient Active Problem List   Diagnosis    CAD (coronary artery disease)    Cardiac pacemaker    Hypertrophy of prostate without urinary obstruction and other lower urinary tract symptoms (LUTS)    Gout-uric acid >7.5--advised proph tx    Hypercholesteremia    Carotid bruit(bilat-nl duplex u/s 10/10)    Vitamin D deficiency-(advised 1000IU/day)    Actinic keratoses    Elevated PSA-(was 4.2 10/10-repeat 6 mo)(was 5.12 1/11-then 4.4 2/12)-sees dr Marco Merida for this    S/P colonoscopy-4/07-neg per pt,  3/18 repeat colonoscopy polyp-repeat 5 yr    Hearing loss, conductive, bilateral-seeing ent-dr quinn for hearing aides    Encounter for monitoring sotalol therapy    MICROALBUMINURIA-on ace already    Anemia--post op 8/15--started otc iron bid, off now  - work up Dr. Lori Egan bone marrow. possible MDS 2019    Diabetes mellitus ([address]    PAF (paroxysmal atrial fibrillation) ([address]    DM (diabetes mellitus), type 2 with peripheral vascular complications (HCC)--s/p amputation great toe post osteomylitis     Hypertension    Hyperkalemia    H/O esophagogastroduodenoscopy  11/18  prominent vessesls vs varices. dr Sera Landis (done for blood in stool)    Urinary frequency    Elevated ferritin  - work up Dr. Lori Egan  genetic screen hemachromatosis negative.  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    Localized edema    Pneumonia due to organism    Ischemic cardiomyopathy    Benign essential HTN    Aspiration pneumonia (HCC)    Pansinusitis    S/P amputation of lesser toe, right (HCC)        LABS:   Lab Results   Component Value Date    GLUCOSE 114 (H) 06/23/2021     Lab Results   Component Value Date     (H) 06/23/2021    K 3.8 06/23/2021    CREATININE 1.0 06/23/2021     Cholesterol, Total   Date Value Ref Range Status   06/22/2020 109 0 - 199 mg/dL Final     LDL Calculated   Date Value Ref Range Status   06/22/2020 48 <100 mg/dL Final     HDL   Date Value Ref Range Status   06/22/2020 37 (L) 40 - 60 mg/dL Final   05/07/2012 36 (L) 40 - 60 mg/dl Final     Triglycerides   Date Value Ref Range Status   06/22/2020 120 0 - 150 mg/dL Final Lab Results   Component Value Date    ALT 14 01/20/2021    AST 19 01/20/2021    ALKPHOS 72 01/20/2021    BILITOT 0.7 01/20/2021      Lab Results   Component Value Date    WBC 10.1 12/08/2020    HGB 7.8 (L) 12/08/2020    HCT 22.7 (L) 12/08/2020    MCV 91.4 12/08/2020     12/08/2020     TSH (uIU/mL)   Date Value   06/27/2016 3.14     Lab Results   Component Value Date    LABA1C 5.5 04/21/2021     Lab Results   Component Value Date    PSA 2.82 06/22/2020    PSA 2.77 08/19/2019    PSA 2.83 04/30/2018        PHYSICAL EXAM:  /78   Pulse 78   Resp 14   Ht 5' 9.5\" (1.765 m)   Wt 164 lb (74.4 kg)   SpO2 98%   BMI 23.87 kg/m²    Physical Exam  Constitutional:       Appearance: Normal appearance. He is well-developed. HENT:      Head: Normocephalic and atraumatic. Cardiovascular:      Heart sounds: No murmur heard. Skin:     General: Skin is warm and dry. Neurological:      Mental Status: He is alert. Psychiatric:         Behavior: Behavior normal.         Thought Content: Thought content normal.         Judgment: Judgment normal.      Comments: Crying - just lost his wife       BP Readings from Last 5 Encounters:   07/21/21 124/78   07/21/21 124/78   07/08/21 120/72   05/24/21 (!) 159/82   04/28/21 128/62       Wt Readings from Last 5 Encounters:   07/21/21 164 lb (74.4 kg)   07/21/21 164 lb 6.4 oz (74.6 kg)   07/08/21 161 lb (73 kg)   05/24/21 163 lb 3.2 oz (74 kg)   04/28/21 163 lb 9.6 oz (74.2 kg)     Magnotamera Barton was seen today for depression. Diagnoses and all orders for this visit:    Adjustment reaction with anxiety and depression    Type 2 diabetes mellitus without complication, without long-term current use of insulin (HCC)  -     POCT glycosylated hemoglobin (Hb A1C)  Great control of dm  Diet controlled on no meds   Grief reaction    Other orders  -     sertraline (ZOLOFT) 25 MG tablet;  Take 1 tablet by mouth daily    Patient is having significant adjustment with the loss of his wife.  He has a very supportive family. We will start him on Zoloft 25 mg a day and have him follow-up in 3-1/2 weeks. We will not do the annual wellness today as was scheduled but will defer that to another date.

## 2021-07-29 ENCOUNTER — OFFICE VISIT (OUTPATIENT)
Dept: CARDIOLOGY CLINIC | Age: 86
End: 2021-07-29
Payer: MEDICARE

## 2021-07-29 VITALS
BODY MASS INDEX: 24.19 KG/M2 | HEIGHT: 69 IN | DIASTOLIC BLOOD PRESSURE: 80 MMHG | WEIGHT: 163.3 LBS | OXYGEN SATURATION: 96 % | HEART RATE: 73 BPM | SYSTOLIC BLOOD PRESSURE: 152 MMHG

## 2021-07-29 DIAGNOSIS — I48.0 PAROXYSMAL ATRIAL FIBRILLATION (HCC): Primary | ICD-10-CM

## 2021-07-29 DIAGNOSIS — I49.5 SSS (SICK SINUS SYNDROME) (HCC): ICD-10-CM

## 2021-07-29 DIAGNOSIS — I25.10 CORONARY ARTERY DISEASE INVOLVING NATIVE CORONARY ARTERY OF NATIVE HEART WITHOUT ANGINA PECTORIS: ICD-10-CM

## 2021-07-29 DIAGNOSIS — I10 BENIGN ESSENTIAL HTN: ICD-10-CM

## 2021-07-29 PROCEDURE — 1036F TOBACCO NON-USER: CPT | Performed by: NURSE PRACTITIONER

## 2021-07-29 PROCEDURE — 99214 OFFICE O/P EST MOD 30 MIN: CPT | Performed by: NURSE PRACTITIONER

## 2021-07-29 PROCEDURE — 1123F ACP DISCUSS/DSCN MKR DOCD: CPT | Performed by: NURSE PRACTITIONER

## 2021-07-29 PROCEDURE — 93000 ELECTROCARDIOGRAM COMPLETE: CPT | Performed by: NURSE PRACTITIONER

## 2021-07-29 PROCEDURE — G8427 DOCREV CUR MEDS BY ELIG CLIN: HCPCS | Performed by: NURSE PRACTITIONER

## 2021-07-29 PROCEDURE — G8420 CALC BMI NORM PARAMETERS: HCPCS | Performed by: NURSE PRACTITIONER

## 2021-07-29 PROCEDURE — 4040F PNEUMOC VAC/ADMIN/RCVD: CPT | Performed by: NURSE PRACTITIONER

## 2021-07-29 RX ORDER — LISINOPRIL 10 MG/1
10 TABLET ORAL 2 TIMES DAILY
Qty: 60 TABLET | Refills: 5 | Status: SHIPPED | OUTPATIENT
Start: 2021-07-29 | End: 2021-07-29

## 2021-07-29 NOTE — PATIENT INSTRUCTIONS
Increase lisinopril to 10 mg twice a day : should help lessen some of your leg swelling    Keep appt with Andressa Gallego

## 2021-07-29 NOTE — TELEPHONE ENCOUNTER
Lisinopril was sent to pharmacy today and now requesting a 90 day supply be sent in place of the 30 day. Can this please be resent?

## 2021-07-29 NOTE — PROGRESS NOTES
[address]     Outpatient Follow Up Note    Jenny Bach is 80 y.o. male who presents today with a history of CAD s/p CABG '96, PAF, SSS s/p PPM '05 (s/p generator change '15), HTN and hyperlipidemia. His other hx includes: anemia suspected component of MDS followed by [facility] [address], Dr. Ansel Gottron / HPI:  Follow Up secondary to atrial flutter with scheduled DCCV. He'd converted back into sinus rhythm prior to his procedure    Subjective:     He denies significant chest pain. He denies SOB. The patient denies orthopnea/PND. He always has some swelling in his legs. His wt is stable    He can feel his AF at times if he becomes over excited. He hasn't had any that he's aware of. He discussed amiodarone with his personal physcian and pharmacist and SE. He choose to stay on sotalol. The patient is not experiencing palpitations or dizziness. These symptoms are stable since the last OV. With regard to medication therapy the patient has been compliant with prescribed regimen. They have tolerated therapy to date.      Past Medical History:   Diagnosis Date    Actinic keratosis     Actinic keratosis     Atrial fibrillation (HCC)     Bilateral carotid artery stenosis     CAD (coronary artery disease)     Cellulitis 7/15/2015    right foot    Diabetes mellitus [address] [address] [facility]     DM (diabetes mellitus), type 2 with peripheral vascular complications (HCC)--s/p amputation great toe post osteomylitis  9/11/2015    Glucose intolerance (malabsorption)     Hyperlipidemia     Hypertension     Hypertrophy of prostate without urinary obstruction and other lower urinary tract symptoms (LUTS)     Intermittent atrial fibrillation (HCC)     Kidney stone     Occult blood in stool     Hx of    Pacemaker     Permanent     Social History:    Social History     Tobacco Use   Smoking Status Never Smoker   Smokeless Tobacco Never Used   Tobacco Comment    counseled on tobacco exposure avoidance Current Medications:  Current Outpatient Medications   Medication Sig Dispense Refill    sotalol (BETAPACE) 120 MG tablet Take 120 mg by mouth 2 times daily      lisinopril (PRINIVIL;ZESTRIL) 10 MG tablet Take 1 tablet by mouth daily 30 tablet 3    warfarin (COUMADIN) 5 MG tablet Take 1 tablet by mouth daily Take 2.5 mg on Mon, Wed and Fri and 5 mg all other days of the week 90 tablet 2    atorvastatin (LIPITOR) 20 MG tablet Take 1 tablet by mouth daily 90 tablet 4    metoprolol succinate (TOPROL XL) 50 MG extended release tablet Take 1.5 tablets by mouth daily 135 tablet 1    fluticasone (FLONASE) 50 MCG/ACT nasal spray 2 sprays by Each Nostril route 2 times daily 1 Bottle 3    sodium chloride (OCEAN, BABY AYR) 0.65 % nasal spray 2 sprays by Nasal route every 4 hours 5 Bottle 0    furosemide (LASIX) 20 MG tablet Take 1 tablet by mouth daily (Patient taking differently: Take 20 mg by mouth 2 times daily ) 60 tablet 0    vitamin B-1 (THIAMINE) 100 MG tablet Take 100 mg by mouth daily      EPOETIN BIBIANA IJ Inject as directed every 21 days       acetaminophen (TYLENOL) 325 MG tablet Take 650 mg by mouth every 6 hours as needed for Pain      sertraline (ZOLOFT) 25 MG tablet Take 1 tablet by mouth daily (Patient not taking: Reported on 2021) 30 tablet 0     No current facility-administered medications for this visit. REVIEW OF SYSTEMS:    CONSTITUTIONAL: No major weight gain or loss, fatigue, weakness, night sweats or fever. HEENT: No new vision difficulties or ringing in the ears. RESPIRATORY: No new SOB, PND, orthopnea or cough. CARDIOVASCULAR: See HPI  GI: No nausea, vomiting, diarrhea, constipation, abdominal pain or changes in bowel habits. : No urinary frequency, urgency, incontinence hematuria or dysuria. SKIN: No cyanosis or skin lesions. MUSCULOSKELETAL: No new muscle or joint pain. NEUROLOGICAL: No syncope or TIA-like symptoms.   PSYCHIATRIC: grieving ; wife  4 weeks ago : he did not start taking zoloft as prescribed    Objective:   PHYSICAL EXAM:        Vitals:    07/29/21 1348 07/29/21 1417   BP: (!) 140/80 (!) 152/80   Site: Left Upper Arm    Position: Sitting    Cuff Size: Medium Adult    Pulse: 73    SpO2: 96%    Weight: 163 lb 4.8 oz (74.1 kg)    Height: 5' 9\" (1.753 m)        VITALS:  BP (!) 140/80 (Site: Left Upper Arm, Position: Sitting, Cuff Size: Medium Adult)   Pulse 73   Ht 5' 9\" (1.753 m)   Wt 163 lb 4.8 oz (74.1 kg)   SpO2 96%   BMI 24.12 kg/m²   CONSTITUTIONAL: Cooperative, no apparent distress, and appears well nourished / developed  NEUROLOGIC:  Awake and orientated to person, place and time. PSYCH: Calm affect; tearful. SKIN: Warm and dry; pale. HEENT: Sclera non-icteric, normocephalic, neck supple, no elevation of JVP, normal carotid pulses with no bruits and thyroid normal size. LUNGS:  No increased work of breathing and essentially clear  CARDIOVASCULAR:  Regular rate 60 and rhythm with + murmur 2nd ICS Rt MCL, gallops, rubs, or abnormal heart sounds, normal PMI. The apical impulses not displaced  JVP less than 8 cm H2O  Heart tones are crisp and normal  Cervical veins are not engorged  The carotid upstroke is normal in amplitude and contour without delay or bruit  JVP is not elevated  ABDOMEN:  Normal bowel sounds, non-distended and non-tender to palpation  EXT: edmond LE pitting +1 edema, no calf tenderness. Pulses are present bilaterally.     DATA:    Lab Results   Component Value Date    ALT 14 01/20/2021    AST 19 01/20/2021    ALKPHOS 72 01/20/2021    BILITOT 0.7 01/20/2021     Lab Results   Component Value Date    CREATININE 1.0 06/23/2021    BUN 24 (H) 06/23/2021     (H) 06/23/2021    K 3.8 06/23/2021     06/23/2021    CO2 22 06/23/2021     Lab Results   Component Value Date    WBC 10.1 12/08/2020    HGB 7.8 (L) 12/08/2020    HCT 22.7 (L) 12/08/2020    MCV 91.4 12/08/2020     12/08/2020     LABS: 6/14/21: Hgb 9.6     Hct 29.2    No components found for: CHLPL  Lab Results   Component Value Date    TRIG 120 06/22/2020    TRIG 162 (H) 08/19/2019    TRIG 231 (H) 01/22/2019     Lab Results   Component Value Date    HDL 37 (L) 06/22/2020    HDL 42 08/19/2019    HDL 35 (L) 01/22/2019     Lab Results   Component Value Date    LDLCALC 48 06/22/2020    LDLCALC 53 08/19/2019    LDLCALC 70 01/22/2019     Lab Results   Component Value Date    LABVLDL 24 06/22/2020    LABVLDL 32 08/19/2019    LABVLDL 46 01/22/2019     Lab Results   Component Value Date    INR 2.5 07/20/2021    INR 1.40 (H) 07/08/2021    INR 1.9 06/22/2021    PROTIME 16.1 (H) 07/08/2021    PROTIME 26.9 (H) 12/08/2020    PROTIME 38.7 (H) 12/07/2020         Radiology Review:  Pertinent images / reports were reviewed as a part of this visit and reveals the following:    [id number] Score for Atrial Fibrillation Stroke Risk   Risk   Factors  Component Value   C CHF No 0   H HTN Yes 1   A2 Age >= 76 Yes,  (80 y.o.) 2   D DM Yes 1   S2 Prior Stroke/TIA No 0   V Vascular Disease No 1   A Age 74-69 No,  (80 y.o.) 0   Sc Sex male 0    [id number]  Score  5   Score last updated 7/9/21 7:26 AM EDT    PaceArt: 7/28/21:  Transmission shows normal sensing and pacing function. Noted AF with RVR. ~ 1%  ~AP 59%    Echo: May '21  Summary   Left ventricular cavity size is normal with normal left ventricular wall   thickness. Overall left ventricular systolic function appears mild-moderately reduced. Ejection fraction is visually estimated to be 35-40%. There is mild diffuse hypokinesis. Grade II diastolic dysfunction with elevated LV filling pressures. Normal right ventricular size. Right ventricular systolic function is mildly reduced. The left atrium is moderate-severely dilated. Mitral valve leaflets appear mildly thickened. Mild to moderate mitral regurgitation.    Moderate aortic stenosis with a peak velocity of 2.5 m/s, a mean pressure   gradient of 15 mmHg and an DONAVAN of 1.19 cm^2.   Aortic valve gradients may be underestimated due to low cardiac output. Mild aortic regurgitation. Mild to moderate tricuspid regurgitation with a PASP of 45 mmHg. Trivial pulmonic regurgitation present. Device interrogation: 4/28/21  AP 42%.  25%. AF with a 34% burden. 6 episodes with noted RVR. Patient remains on warfarin and metoprolol. Last Stress Test: '06: small to moderate sized inferolateral fixed defect consistent with infarction in the territory typical of the distal CX. EF 68%    Assessment:      Diagnosis Orders     1. Paroxysmal atrial fibrillation (HCC)   ~AP regular : continues to go in/out AF noted with device interrogation   ~denies palpitations or symptoms indicating AF  ~AC managed in coumadin clinic. INR therapeutic 2.5  ~CHADsVASc 5  ~LA dilated at 5.1 cm on echo ; with mod AS and MR  ~last TSH WNL      2. Coronary artery disease involving native coronary artery of native heart without angina pectoris   ~stable : denies angina / CP  ~diffuse HK, EF 35-40% on echo from May '21 comparable to  Study from Oct '20  ~s/p CABG '96  ~stress echo from '18 : negative  ~hx of MSD following with [facility]. Last Hgb reported at 9.7 and received epogen today    3. Sick sinus syndrome  ~stable  ~ s/p PPM '05 with generator change '15  ~ AP 59%,  1% on last interrogation 7/28  ~sotalol recommended to change to amiodarone given LV dysfunction with EF 35%. Pt chose not to do d/t side effects. 4. Essential hypertension   ~controlled     5. Mixed hyperlipidemia   ~controlled      I had the opportunity to review the clinical symptoms and presentation of Samira Padilla : d/w MXA  Plan:     1. EKG: sinus rhythm 60  2. Increase lisinopril to 10 mg bid for LE edema / afterload reductin   3. F/U as scheduled with MARLENE Wright NP    Overall the patient is stable from CV standpoint    I have addresed the patient's cardiac risk factors and adjusted pharmacologic treatment as needed.  In addition, I have reinforced the need for patient directed risk factor modification. Further evaluation will be based upon the patient's clinical course and testing results. All questions and concerns were addressed to the patient. Alternatives to my treatment were discussed. The patient is not currently smoking. The risks related to smoking were reviewed with the patient. Recommend maintaining a smoke-free lifestyle. Patient is on a beta-blocker  Patient is not on an ace-i/ARB : neg CHF  Patient is on a statin     anti-coagulation has been recommended / prescribed for this patient. Education conducted on adverse reactions including bleeding was discussed. The patient verbalizes understanding not to stop medications without discussing with us. Discussed exercise: 30-60 minutes 7 days/week  Discussed Low saturated fat/DOLORES diet. Thank you for allowing to us to participate in the care of Ca Osorio.     PRIYA Nino    Documentation of today's visit sent to PCP

## 2021-08-02 RX ORDER — LISINOPRIL 10 MG/1
TABLET ORAL
Qty: 180 TABLET | Refills: 1 | Status: ON HOLD | OUTPATIENT
Start: 2021-08-02 | End: 2021-10-26 | Stop reason: SINTOL

## 2021-08-04 RX ORDER — METOPROLOL SUCCINATE 50 MG/1
TABLET, EXTENDED RELEASE ORAL
Qty: 135 TABLET | Refills: 1 | Status: SHIPPED | OUTPATIENT
Start: 2021-08-04 | End: 2021-09-03 | Stop reason: DRUGHIGH

## 2021-08-04 NOTE — TELEPHONE ENCOUNTER
Received refill request for Metoprolol succinate from [facility].     Last ov:2021 NPAL    Last EK2021    Last Refill: 2021 #135 tabs w/1 refill    Next appointment:2021 NPAL

## 2021-08-17 ENCOUNTER — ANTI-COAG VISIT (OUTPATIENT)
Dept: PHARMACY | Age: 86
End: 2021-08-17
Payer: MEDICARE

## 2021-08-17 DIAGNOSIS — I48.0 PAF (PAROXYSMAL ATRIAL FIBRILLATION) (HCC): Primary | ICD-10-CM

## 2021-08-17 LAB — INTERNATIONAL NORMALIZATION RATIO, POC: 2.1

## 2021-08-17 PROCEDURE — 85610 PROTHROMBIN TIME: CPT

## 2021-08-17 PROCEDURE — 99211 OFF/OP EST MAY X REQ PHY/QHP: CPT

## 2021-08-17 NOTE — PROGRESS NOTES
Thromboembolic event  Duration of warfarin Therapy: indefinite  INR Range:  2.0-3.0     Has been stressed with passing of wife     INR is 2.1 today    Continue taking dose of 5 mg on Sun, Tues and Thurs and 2.5 mg all other days of the week. Encouraged to maintain a consistency of vegetables/salads.   Recheck INR in 4 weeks, 9/14    Referring cardiologist will be Dr. Kateryna Douglass  INR (no units)   Date Value   08/17/2021 2.1   07/20/2021 2.5   07/08/2021 1.40 (H)   06/22/2021 1.9   05/25/2021 2.7   12/08/2020 2.30 (H)   12/07/2020 3.30 (H)   12/06/2020 5.68 ([facility])     For Pharmacy Admin Tracking Only     Total # of Interventions Recommended: 0   Total # of Interventions Accepted: 0   Time Spent (min): 15

## 2021-08-20 ENCOUNTER — NURSE ONLY (OUTPATIENT)
Dept: CARDIOLOGY CLINIC | Age: 86
End: 2021-08-20

## 2021-08-20 ENCOUNTER — TELEPHONE (OUTPATIENT)
Dept: CARDIOLOGY CLINIC | Age: 86
End: 2021-08-20

## 2021-08-20 NOTE — TELEPHONE ENCOUNTER
Episodes of AT/SVT, as well as episode of NSVT yesterday    Please call and check on pt. Would consider increasing BB to 50 mg BID and considering GXT given prolonged NSVT if symptomatic given hx of CAD/CABG.     Xi Pierre, APRN-CNP

## 2021-08-20 NOTE — TELEPHONE ENCOUNTER
Called pt to discuss pacemaker results. Episodes of NSVT yesterday. He is depressed as he recently lost his spouse last month and is still grieving her loss    Patient denies lightheadedness, dizziness, chest pain, palpitations, orthopnea, edema, presyncope or syncope. Discussed his episodes; asymptomatic. Will increase Toprol XL to 100 mg daily. He is not interested in amiodarone therapy. Need to consider ischemic eval, but will need to discuss further with his primary cardiologist.    Will forward to Dr. Cassandra Aldrich so he is aware.     Kiarra Baig, APRN-CNP

## 2021-08-21 NOTE — RESULT ENCOUNTER NOTE
Increased in Atrial fibrillation burden. Stop sotalol. Start amiodarone 200 mg qd  Consider ablation  Reviewed.     Fang Boo MD [facility]

## 2021-08-23 ENCOUNTER — TELEPHONE (OUTPATIENT)
Dept: CARDIOLOGY | Age: 86
End: 2021-08-23

## 2021-08-23 DIAGNOSIS — Z79.899 ON AMIODARONE THERAPY: ICD-10-CM

## 2021-08-23 DIAGNOSIS — I10 ESSENTIAL HYPERTENSION: Primary | ICD-10-CM

## 2021-08-23 RX ORDER — AMIODARONE HYDROCHLORIDE 200 MG/1
200 TABLET ORAL DAILY
Qty: 90 TABLET | Refills: 1 | Status: SHIPPED | OUTPATIENT
Start: 2021-08-23 | End: 2021-08-23

## 2021-08-23 NOTE — TELEPHONE ENCOUNTER
Please call patient and let him know that Dr. Lorenzo Berry would like to switch him to 200 mg daily and he should STOP sotalol. He can started amiodarone tomorrow and can stop sotalol today, do not take any more doses. The reason is because the sotalol is not being effective anymore at preventing atrial fibrillation. We will see how much he is having when I see him in the office in a couple months and discuss options. Please also let him know we have to monitor liver and thyroid on this medication, I have ordered labs I would like him to complete sometimes this week.       PRIYA Bar-CNP

## 2021-08-23 NOTE — TELEPHONE ENCOUNTER
I spoke with pt and relayed message per NPAL. Pt stated that he had discussed Amiodarone  With pharmacist and was told it was a dangerous drug and refuses to take it. He stated that he has discussed with someone before and told them that as well and wanted to make sure that MXA was aware. DBRN has taken the phone call after, she discussed with pt his condition and options. Per MMK  Echo and ST is recommended . Metoprolol increased to 100 mg daily. DBRN will f/u with pt for further instructions.

## 2021-08-23 NOTE — TELEPHONE ENCOUNTER
Spoke to patient. He had a very hard time hearing over the phone. He was tearful due to his wife recently passing away. He would like to come into office to discuss and then decide if he wants to pursue ischemic eval. Appt made with ANDREINA.  ALFONSO not in [facility] until mid Sept.

## 2021-08-23 NOTE — TELEPHONE ENCOUNTER
----- Message from Tavon Bernstein MD sent at 8/21/2021 10:17 AM EDT -----  Please stop sotalol. Start amio 200 mg daily. Luísnayeli Menendeztori can talk to him about ablation when she sees him next if he seems to be in good enough shape.

## 2021-09-03 ENCOUNTER — NURSE ONLY (OUTPATIENT)
Dept: CARDIOLOGY CLINIC | Age: 86
End: 2021-09-03
Payer: MEDICARE

## 2021-09-03 ENCOUNTER — OFFICE VISIT (OUTPATIENT)
Dept: CARDIOLOGY CLINIC | Age: 86
End: 2021-09-03
Payer: MEDICARE

## 2021-09-03 VITALS
HEIGHT: 69 IN | DIASTOLIC BLOOD PRESSURE: 70 MMHG | SYSTOLIC BLOOD PRESSURE: 162 MMHG | OXYGEN SATURATION: 99 % | WEIGHT: 162.3 LBS | BODY MASS INDEX: 24.04 KG/M2 | HEART RATE: 80 BPM

## 2021-09-03 DIAGNOSIS — I48.0 PAROXYSMAL ATRIAL FIBRILLATION (HCC): Primary | ICD-10-CM

## 2021-09-03 DIAGNOSIS — E78.2 MIXED HYPERLIPIDEMIA: ICD-10-CM

## 2021-09-03 DIAGNOSIS — Z95.0 CARDIAC PACEMAKER: ICD-10-CM

## 2021-09-03 DIAGNOSIS — I49.5 SSS (SICK SINUS SYNDROME) (HCC): ICD-10-CM

## 2021-09-03 DIAGNOSIS — I25.10 CORONARY ARTERY DISEASE INVOLVING NATIVE CORONARY ARTERY OF NATIVE HEART WITHOUT ANGINA PECTORIS: ICD-10-CM

## 2021-09-03 DIAGNOSIS — Z79.899 LONG-TERM USE OF HIGH-RISK MEDICATION: ICD-10-CM

## 2021-09-03 DIAGNOSIS — I10 BENIGN ESSENTIAL HTN: ICD-10-CM

## 2021-09-03 DIAGNOSIS — I48.0 PAF (PAROXYSMAL ATRIAL FIBRILLATION) (HCC): ICD-10-CM

## 2021-09-03 PROCEDURE — 1123F ACP DISCUSS/DSCN MKR DOCD: CPT | Performed by: NURSE PRACTITIONER

## 2021-09-03 PROCEDURE — 1036F TOBACCO NON-USER: CPT | Performed by: NURSE PRACTITIONER

## 2021-09-03 PROCEDURE — G8420 CALC BMI NORM PARAMETERS: HCPCS | Performed by: NURSE PRACTITIONER

## 2021-09-03 PROCEDURE — 4040F PNEUMOC VAC/ADMIN/RCVD: CPT | Performed by: NURSE PRACTITIONER

## 2021-09-03 PROCEDURE — G8427 DOCREV CUR MEDS BY ELIG CLIN: HCPCS | Performed by: NURSE PRACTITIONER

## 2021-09-03 PROCEDURE — 99214 OFFICE O/P EST MOD 30 MIN: CPT | Performed by: NURSE PRACTITIONER

## 2021-09-03 RX ORDER — SOTALOL HYDROCHLORIDE 120 MG/1
120 TABLET ORAL 2 TIMES DAILY
COMMUNITY
End: 2021-10-05

## 2021-09-03 RX ORDER — FUROSEMIDE 40 MG/1
40 TABLET ORAL DAILY
Qty: 90 TABLET | Refills: 1 | Status: ON HOLD | OUTPATIENT
Start: 2021-09-03 | End: 2021-10-27 | Stop reason: HOSPADM

## 2021-09-03 RX ORDER — METOPROLOL SUCCINATE 50 MG/1
50 TABLET, EXTENDED RELEASE ORAL 2 TIMES DAILY
COMMUNITY
End: 2021-10-19 | Stop reason: SDUPTHER

## 2021-09-03 NOTE — PROGRESS NOTES
[facility]     Outpatient Follow Up Note    Minnie Client is 80 y.o. male who presents today with a history of CAD s/p CABG '96, PAF, SSS s/p PPM '05 (s/p generator change '15), HTN and hyperlipidemia. 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 Maricel Plunkett / HPI:  Follow Up secondary to atrial fib stopping sotalol and starting amiodarone. He will not take it after he read the SE of the drug. Metoprolol was increased to 100 mg daily (25 sec of NSVT noted on device transmission) and lisinopril up to 10 mg bid (BP control / afterload reduction)    Subjective:     He feels 81 yo ! He denies significant chest pain. He denies SOB. The patient denies orthopnea/PND. He always has some swelling in his legs and ankles. His wt is stable b/w 150-60#  He cannot tell if/when he's in AF. He denies having felt palpitations with his device check 8/20. The patient is not experiencing palpitations or dizziness. These symptoms are stable since the last OV. With regard to medication therapy the patient has been compliant with prescribed regimen. They have tolerated therapy to date.      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  9/11/2015    Glucose intolerance (malabsorption)     Hyperlipidemia     Hypertension     Hypertrophy of prostate without urinary obstruction and other lower urinary tract symptoms (LUTS)     Intermittent atrial fibrillation (HCC)     Kidney stone     Occult blood in stool     Hx of    Pacemaker     Permanent     Social History:    Social History     Tobacco Use   Smoking Status Never Smoker   Smokeless Tobacco Never Used   Tobacco Comment    counseled on tobacco exposure avoidance     Current Outpatient Medications   Medication Sig Dispense Refill    sotalol (BETAPACE) 120 MG tablet Take 120 mg by mouth 2 times daily      metoprolol succinate (TOPROL XL) 50 MG extended release tablet Take 50 mg by mouth 2 times daily      furosemide (LASIX) 20 MG tablet Take 1 tablet by mouth daily 90 tablet 1    lisinopril (PRINIVIL;ZESTRIL) 10 MG tablet TAKE 1 TABLET BY MOUTH TWICE DAILY 180 tablet 1    warfarin (COUMADIN) 5 MG tablet Take 1 tablet by mouth daily Take 2.5 mg on Mon, Wed and Fri and 5 mg all other days of the week 90 tablet 2    atorvastatin (LIPITOR) 20 MG tablet Take 1 tablet by mouth daily 90 tablet 4    vitamin B-1 (THIAMINE) 100 MG tablet Take 1,000 mg by mouth daily       EPOETIN BIBIANA IJ Inject as directed every 21 days       acetaminophen (TYLENOL) 325 MG tablet Take 650 mg by mouth every 6 hours as needed for Pain      fluticasone (FLONASE) 50 MCG/ACT nasal spray 2 sprays by Each Nostril route 2 times daily (Patient not taking: Reported on 9/3/2021) 1 Bottle 3    sodium chloride (OCEAN, BABY AYR) 0.65 % nasal spray 2 sprays by Nasal route every 4 hours (Patient not taking: Reported on 9/3/2021) 5 Bottle 0     No current facility-administered medications for this visit. REVIEW OF SYSTEMS:    CONSTITUTIONAL: No major weight gain or loss, fatigue, weakness, night sweats or fever. HEENT: No new vision difficulties or ringing in the ears. RESPIRATORY: No new SOB, PND, orthopnea or cough. CARDIOVASCULAR: See HPI  GI: No nausea, vomiting, diarrhea, constipation, abdominal pain or changes in bowel habits. : No urinary frequency, urgency, incontinence hematuria or dysuria. SKIN: No cyanosis or skin lesions. MUSCULOSKELETAL: No new muscle or joint pain. NEUROLOGICAL: No syncope or TIA-like symptoms.   PSYCHIATRIC: grieving ; wife  in  : he did not start taking CHLPL  Lab Results   Component Value Date    TRIG 120 06/22/2020    TRIG 162 (H) 08/19/2019    TRIG 231 (H) 01/22/2019     Lab Results   Component Value Date    HDL 37 (L) 06/22/2020    HDL 42 08/19/2019    HDL 35 (L) 01/22/2019     Lab Results   Component Value Date    LDLCALC 48 06/22/2020    LDLCALC 53 08/19/2019    LDLCALC 70 01/22/2019     Lab Results   Component Value Date    LABVLDL 24 06/22/2020    LABVLDL 32 08/19/2019    LABVLDL 46 01/22/2019     Lab Results   Component Value Date    INR 2.1 08/17/2021    INR 2.5 07/20/2021    INR 1.40 (H) 07/08/2021    PROTIME 16.1 (H) 07/08/2021    PROTIME 26.9 (H) 12/08/2020    PROTIME 38.7 (H) 12/07/2020         Radiology Review:  Pertinent images / reports were reviewed as a part of this visit and reveals the following:    [id number] Score for Atrial Fibrillation Stroke Risk   Risk   Factors  Component Value   C CHF No 0   H HTN Yes 1   A2 Age >= 76 Yes,  (80 y.o.) 2   D DM Yes 1   S2 Prior Stroke/TIA No 0   V Vascular Disease No 1   A Age 74-69 No,  (80 y.o.) 0   Sc Sex male 0    [id number]  Score  5       PaceArt: 7/28/21:  Transmission shows normal sensing and pacing function. Noted AF with RVR. ~ 1%  ~AP 59%    Echo: May '21  Summary   Left ventricular cavity size is normal with normal left ventricular wall   thickness. Overall left ventricular systolic function appears mild-moderately reduced. Ejection fraction is visually estimated to be 35-40%. There is mild diffuse hypokinesis. Grade II diastolic dysfunction with elevated LV filling pressures. Normal right ventricular size. Right ventricular systolic function is mildly reduced. The left atrium is moderate-severely dilated. Mitral valve leaflets appear mildly thickened. Mild to moderate mitral regurgitation. Moderate aortic stenosis with a peak velocity of 2.5 m/s, a mean pressure   gradient of 15 mmHg and an DONAVAN of 1.19 cm^2.    Aortic valve gradients may be underestimated due to low cardiac output. Mild aortic regurgitation. Mild to moderate tricuspid regurgitation with a PASP of 45 mmHg. Trivial pulmonic regurgitation present. Device interrogation: 4/28/21  AP 42%.  25%. AF with a 34% burden. 6 episodes with noted RVR. Patient remains on warfarin and metoprolol. Last Stress Test: '06: small to moderate sized inferolateral fixed defect consistent with infarction in the territory typical of the distal CX. EF 68%    Assessment:      Diagnosis Orders     1. Paroxysmal atrial fibrillation (HCC)   ~refuses amiodarone ; remained on betapace-AF. 25 sec of NSVT seen on 8/19 without recurrence since metoprolol increased (device interrogation done today)  ~AP regular : continues to go in/out AF noted with device interrogation   ~denies palpitations or symptoms indicating AF  ~AC managed in coumadin clinic. INR therapeutic 2.1  ~CHADsVASc 5  ~LA dilated at 5.1 cm on echo ; with mod AS and MR  ~last TSH WNL      2. Coronary artery disease involving native coronary artery of native heart without angina pectoris   ~stable : denies angina  ~diffuse HK, EF 35-40% on echo from May '21 comparable to  Study from Oct '20  ~s/p CABG '96  ~stress echo from '18 : negative  ~hx of MSD following with [facility]    3. Sick sinus syndrome  ~AP 36%  29% with interrogation today  ~stable  ~ s/p PPM '05 with generator change '15  ~sotalol recommended to change to amiodarone given LV dysfunction with EF 35%. Pt chose not to do d/t side effects >> 2nd time drug recommended. 4. Essential hypertension   ~controlled on arrival ; elevated with recheck     5. Mixed hyperlipidemia   ~controlled on atorvastatin       I had the opportunity to review the clinical symptoms and presentation of [facility] : d/w MXA  Plan:     1. myoview stress as recommended with NS-VT noted with device interrogation  2. Increase lasix to 40 mg daily : LE edema   ~BMP after one week  3.  F/U in four weeks / test dependent

## 2021-09-08 PROCEDURE — 93280 PM DEVICE PROGR EVAL DUAL: CPT | Performed by: INTERNAL MEDICINE

## 2021-09-08 NOTE — PROGRESS NOTES
Patient comes in for programming evaluation for his pacemaker. Rep Checked Device  Battery life 7.2-7.7 years  AP 36%   29%  AT/AF burden 42%  25 HVR episodes  Per Dr. Hubert Batres request, we increased the patients MTR to 130 bpm from 100 bpm.  Patient has intact conduction around 270 ms (IN) and 300 ms (AR) with  % at 29%. I adjusted AV delays and used VIP instead to encourage conduction and reduce V pacing. History of PMT with retrograde only below 75 bpm at 285 ms. PVARP set to 325 ms, shortest PVARP at 250 ms, and RRPVARP to low. Unable to set shortest PVARP higher due to adversely affecting MTR request.    Patient will see NPTS today and follow up in 3 months in office or remotely.

## 2021-09-10 ENCOUNTER — HOSPITAL ENCOUNTER (OUTPATIENT)
Age: 86
Discharge: HOME | End: 2021-09-10
Payer: MEDICARE

## 2021-09-10 ENCOUNTER — TELEPHONE (OUTPATIENT)
Dept: CARDIOLOGY CLINIC | Age: 86
End: 2021-09-10

## 2021-09-10 DIAGNOSIS — Z79.899 LONG-TERM USE OF HIGH-RISK MEDICATION: ICD-10-CM

## 2021-09-10 LAB
ANION GAP SERPL CALCULATED.3IONS-SCNC: 12 MMOL/L (ref 3–16)
BUN BLDV-MCNC: 26 MG/DL (ref 7–20)
CALCIUM SERPL-MCNC: 9.2 MG/DL (ref 8.3–10.6)
CHLORIDE BLD-SCNC: 99 MMOL/L (ref 99–110)
CO2: 30 MMOL/L (ref 21–32)
CREAT SERPL-MCNC: 1.2 MG/DL (ref 0.8–1.3)
GFR AFRICAN AMERICAN: >60
GFR NON-AFRICAN AMERICAN: 57
GLUCOSE BLD-MCNC: 182 MG/DL (ref 70–99)
POTASSIUM SERPL-SCNC: 4.2 MMOL/L (ref 3.5–5.1)
SODIUM BLD-SCNC: 141 MMOL/L (ref 136–145)

## 2021-09-10 PROCEDURE — 80048 BASIC METABOLIC PNL TOTAL CA: CPT

## 2021-09-10 PROCEDURE — 36415 COLL VENOUS BLD VENIPUNCTURE: CPT

## 2021-09-13 ENCOUNTER — HOSPITAL ENCOUNTER (OUTPATIENT)
Dept: NON INVASIVE DIAGNOSTICS | Age: 86
Discharge: HOME | End: 2021-09-13
Payer: MEDICARE

## 2021-09-13 LAB
LV EF: 35 %
LVEF MODALITY: NORMAL

## 2021-09-13 PROCEDURE — 3430000000 HC RX DIAGNOSTIC RADIOPHARMACEUTICAL: Performed by: NURSE PRACTITIONER

## 2021-09-13 PROCEDURE — 93017 CV STRESS TEST TRACING ONLY: CPT | Performed by: INTERNAL MEDICINE

## 2021-09-13 PROCEDURE — A9502 TC99M TETROFOSMIN: HCPCS | Performed by: NURSE PRACTITIONER

## 2021-09-13 PROCEDURE — 6360000002 HC RX W HCPCS: Performed by: INTERNAL MEDICINE

## 2021-09-13 PROCEDURE — 78452 HT MUSCLE IMAGE SPECT MULT: CPT | Performed by: INTERNAL MEDICINE

## 2021-09-13 PROCEDURE — 6360000002 HC RX W HCPCS: Performed by: NURSE PRACTITIONER

## 2021-09-13 RX ORDER — AMINOPHYLLINE DIHYDRATE 25 MG/ML
100 INJECTION, SOLUTION INTRAVENOUS ONCE
Status: COMPLETED | OUTPATIENT
Start: 2021-09-13 | End: 2021-09-13

## 2021-09-13 RX ADMIN — TETROFOSMIN 30 MILLICURIE: 1.38 INJECTION, POWDER, LYOPHILIZED, FOR SOLUTION INTRAVENOUS at 14:20

## 2021-09-13 RX ADMIN — TETROFOSMIN 10 MILLICURIE: 1.38 INJECTION, POWDER, LYOPHILIZED, FOR SOLUTION INTRAVENOUS at 13:14

## 2021-09-13 RX ADMIN — AMINOPHYLLINE 100 MG: 25 INJECTION, SOLUTION INTRAVENOUS at 13:48

## 2021-09-13 RX ADMIN — REGADENOSON 0.4 MG: 0.08 INJECTION, SOLUTION INTRAVENOUS at 13:40

## 2021-09-13 NOTE — PROGRESS NOTES
Instructed on Lexiscan Stress Test Procedure including possible side effects/ adverse reactions. Patient verbalizes  understanding and denies having any questions . See [address] [place] [address] Cardiology

## 2021-09-14 ENCOUNTER — TELEPHONE (OUTPATIENT)
Dept: PHARMACY | Age: 86
End: 2021-09-14

## 2021-09-14 ENCOUNTER — ANTI-COAG VISIT (OUTPATIENT)
Dept: PHARMACY | Age: 86
End: 2021-09-14
Payer: MEDICARE

## 2021-09-14 DIAGNOSIS — I48.0 PAF (PAROXYSMAL ATRIAL FIBRILLATION) (HCC): Primary | ICD-10-CM

## 2021-09-14 LAB — INTERNATIONAL NORMALIZATION RATIO, POC: 2.8

## 2021-09-14 PROCEDURE — 85610 PROTHROMBIN TIME: CPT

## 2021-09-14 PROCEDURE — 99211 OFF/OP EST MAY X REQ PHY/QHP: CPT

## 2021-09-14 NOTE — TELEPHONE ENCOUNTER
Patient called to say he has been taking 5mg on Saturdays instead of 2.5mg. Not sure how long he has been doing this. States his pillbox has 5mg in it for this Saturday and he thinks he could have been doing this for a few weeks now. States he will follow AVS and take normal dose (as pt was therapeutic on this the past visits). If patient is low in 2 weeks, 9/28, then increase to 2.5mg MWF and 5mg AOD.      Maggy Llanes, PharmD, [facility]

## 2021-09-14 NOTE — PROGRESS NOTES
Mr. Ck Damon is a 80 y.o. y/o male with history of Afib   He presents today for anticoagulation monitoring and adjustment. Pertinent PMH: ICD-pacemaker.  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    Patient Reported Findings:  Yes     No  []   [x]       Patient verifies current dosing regimen as listed  confirmed dose ---> cannot confirm dose   []   [x]       S/S bleeding/bruising/swelling/SOB- denies  []   [x]       Blood in urine or stool denies  [x]   []       Procedures scheduled in the future at this time - had cardiac cath 7/9. IR 1.4   [x]   []       Missed Dose- denies    []   [x]       Extra Dose- denies  []   [x]       Change in medications- receiving Procrit 40,000 unit injection once every two weeks until red blood cell count is consistently under control again---> stop sotalol, started amiodarone 200mg daily 4/28---> states did not start it because was concerned with the SE so stayed on sotalol and has fu apt next week with regular cardiologist --> no changes---> zoloft, inc lisinopril--->stoped sotalol, started amiodarone 200mg qd on 8/20, inc metoprolol---states did not start amiodarone dt concerns with SEs so still sotalol, inc lasix      [x]   []       Change in health/diet/appetite consistent greens -> has been eating less, appetite has been diminished --> has been eating less since being home --> appetite is slowly starting to return---> only had one salad, no NVD---> no changes, no NVD---> diet off with Rosaleen Red in hospital---> no greens, diet off with Rosaleen Red and stressed, no NVD  --> diet back to normal, no NVD --> states that he has been stressed with wife being ill and in hospital, not eating well --> appetite has been diminished d/t wife--->eating out more, no NVD   []   [x]       Change in alcohol use  []   [x]       Change in activity  []   [x]       Hospital admission   []   [x]       Emergency department visit  []   [x]       Other complaints--> Patient has been very tired and not

## 2021-09-15 ENCOUNTER — TELEPHONE (OUTPATIENT)
Dept: CARDIOLOGY CLINIC | Age: 86
End: 2021-09-15

## 2021-09-15 NOTE — TELEPHONE ENCOUNTER
Called patient that we need to cx upcoming ov with TS and that scheduling will reschedule him with [facility] in 2 weeks      Dung Hobson, PRIYA - CNP   9/15/2021 12:41 PM EDT       Per DW: needs to be seen/eval by MMK in office (per MMK

## 2021-09-16 NOTE — TELEPHONE ENCOUNTER
Nikita Jordan, where can we work pt in with [address] [place] [address]? Pt is not available on 10/8/21 but is on 10/7/21. Please advise.

## 2021-09-28 ENCOUNTER — ANTI-COAG VISIT (OUTPATIENT)
Dept: PHARMACY | Age: 86
End: 2021-09-28
Payer: MEDICARE

## 2021-09-28 DIAGNOSIS — I48.0 PAF (PAROXYSMAL ATRIAL FIBRILLATION) (HCC): Primary | ICD-10-CM

## 2021-09-28 LAB — INTERNATIONAL NORMALIZATION RATIO, POC: 3.7

## 2021-09-28 PROCEDURE — 85610 PROTHROMBIN TIME: CPT

## 2021-09-28 PROCEDURE — 99211 OFF/OP EST MAY X REQ PHY/QHP: CPT

## 2021-09-28 NOTE — PROGRESS NOTES
Mr. Deepika Collins is a 80 y.o. y/o male with history of Afib   He presents today for anticoagulation monitoring and adjustment. Pertinent PMH: ICD-pacemaker. 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 Not sure how long he has been doing this. States his pillbox has 5mg in it for this Saturday and he thinks he could have been doing this for a few weeks now. States he will follow AVS and take normal dose (as pt was therapeutic on this the past visits).  If patient is low in 2 weeks, 9/28, then increase to 2.5mg MWF and 5mg AOD. ---> confirms dose   []   [x]       S/S bleeding/bruising/swelling/SOB- denies  []   [x]       Blood in urine or stool denies  [x]   []       Procedures scheduled in the future at this time - had cardiac cath 7/9. IR 1.4   [x]   []       Missed Dose- denies    []   [x]       Extra Dose- denies  []   [x]       Change in medications- receiving Procrit 40,000 unit injection once every two weeks until red blood cell count is consistently under control again---> stop sotalol, started amiodarone 200mg daily 4/28---> states did not start it because was concerned with the SE so stayed on sotalol and has fu apt next week with regular cardiologist --> no changes---> zoloft, inc lisinopril--->stoped sotalol, started amiodarone 200mg qd on 8/20, inc metoprolol---states did not start amiodarone dt concerns with SEs so still sotalol, inc lasix--->2 tylenol in the mornings      [x]   []       Change in health/diet/appetite consistent greens -> has been eating less, appetite has been diminished --> has been eating less since being home --> appetite is slowly starting to return---> only had one salad, no NVD---> no changes, no NVD---> diet off with Cherkassi Labrador in hospital---> no greens, diet off with Ruth Spangler and stressed, no NVD  --> diet back to normal, no NVD --> states that he has been stressed with wife being ill and in hospital, not eating well --> appetite has been diminished d/t wife--->eating out more, no NVD---> no greens, no NVD   []   [x]       Change in alcohol use- doesn't drink   []   [x]       Change in activity  []   [x]       Hospital admission   []   [x]       Emergency department visit  []   [x]       Other complaints--> Patient has been very tired and not able to function well, getting epoetin alpha injection at hemotologist office     Clinical Outcomes:  Yes     No  []   [x]       Major bleeding event  []   [x]       Thromboembolic event  Duration of warfarin Therapy: indefinite  INR Range:  2.0-3.0     Has been stressed with passing of wife     INR is 3.7 today   Pt confirms that he only did 5mg three days/week and not 4 like last time. States diet has been poor. Hold tonight then continue taking dose of 5 mg on Sun, Tues and Thurs and 2.5 mg all other days of the week   Encouraged to maintain a consistency of vegetables/salads.   Recheck INR in 2 weeks, 10/12    Referring cardiologist will be Dr. Kateryna Douglass  INR (no units)   Date Value   09/28/2021 3.7   09/14/2021 2.8   08/17/2021 2.1   07/20/2021 2.5   07/08/2021 1.40 (H)   12/08/2020 2.30 (H)   12/07/2020 3.30 (H)   12/06/2020 5.68 ([facility])   For Pharmacy Admin Tracking Only     Intervention Detail: Dose Adjustment: 1, reason: Therapy Optimization   Total # of Interventions Recommended: 1   Total # of Interventions Accepted: 1   Time Spent (min): 15

## 2021-10-05 ENCOUNTER — OFFICE VISIT (OUTPATIENT)
Dept: CARDIOLOGY CLINIC | Age: 86
End: 2021-10-05
Payer: MEDICARE

## 2021-10-05 VITALS
HEIGHT: 69 IN | WEIGHT: 159.1 LBS | BODY MASS INDEX: 23.57 KG/M2 | SYSTOLIC BLOOD PRESSURE: 130 MMHG | DIASTOLIC BLOOD PRESSURE: 70 MMHG | HEART RATE: 88 BPM | OXYGEN SATURATION: 98 %

## 2021-10-05 DIAGNOSIS — R94.39 ABNORMAL CARDIOVASCULAR STRESS TEST: ICD-10-CM

## 2021-10-05 DIAGNOSIS — I10 BENIGN ESSENTIAL HTN: ICD-10-CM

## 2021-10-05 DIAGNOSIS — E78.00 HYPERCHOLESTEREMIA: ICD-10-CM

## 2021-10-05 DIAGNOSIS — I51.9 LV DYSFUNCTION: ICD-10-CM

## 2021-10-05 DIAGNOSIS — Z95.0 CARDIAC PACEMAKER: ICD-10-CM

## 2021-10-05 DIAGNOSIS — I48.0 PAF (PAROXYSMAL ATRIAL FIBRILLATION) (HCC): ICD-10-CM

## 2021-10-05 DIAGNOSIS — I25.10 CORONARY ARTERY DISEASE INVOLVING NATIVE CORONARY ARTERY OF NATIVE HEART WITHOUT ANGINA PECTORIS: Primary | ICD-10-CM

## 2021-10-05 PROCEDURE — 99214 OFFICE O/P EST MOD 30 MIN: CPT | Performed by: INTERNAL MEDICINE

## 2021-10-05 PROCEDURE — 4040F PNEUMOC VAC/ADMIN/RCVD: CPT | Performed by: INTERNAL MEDICINE

## 2021-10-05 PROCEDURE — G8427 DOCREV CUR MEDS BY ELIG CLIN: HCPCS | Performed by: INTERNAL MEDICINE

## 2021-10-05 PROCEDURE — 1123F ACP DISCUSS/DSCN MKR DOCD: CPT | Performed by: INTERNAL MEDICINE

## 2021-10-05 PROCEDURE — G8420 CALC BMI NORM PARAMETERS: HCPCS | Performed by: INTERNAL MEDICINE

## 2021-10-05 PROCEDURE — G8484 FLU IMMUNIZE NO ADMIN: HCPCS | Performed by: INTERNAL MEDICINE

## 2021-10-05 PROCEDURE — 1036F TOBACCO NON-USER: CPT | Performed by: INTERNAL MEDICINE

## 2021-10-05 RX ORDER — ATORVASTATIN CALCIUM 20 MG/1
20 TABLET, FILM COATED ORAL DAILY
Qty: 90 TABLET | Refills: 4 | Status: SHIPPED | OUTPATIENT
Start: 2021-10-05 | End: 2022-04-26 | Stop reason: SDUPTHER

## 2021-10-05 NOTE — PROGRESS NOTES
[address]  Cardiac Consult     Referring Provider:  Nieves Damon MD     Chief Complaint   Patient presents with    Coronary Artery Disease    Hypertension        History of Present Illness:   80 y. o.male formally seen by Dr. Kamari Stringer seen in f/u for afib, CAD, HTN, hyperlipidemia and pacemaker placement. He is depressed over his wife's death. He has been having increasing afib on Sotalol. He refused change to amio due to potential side effects. He has progressive LV dysfunction. Myoview with small area of ischemia. No chest pain. Afib ablation and possible biV upgrade being considered. Sent here today to discuss options for abnormal stress. Past Medical History:   has a past medical history of Actinic keratosis, Actinic keratosis, Atrial fibrillation ([address] Bilateral carotid artery stenosis, CAD (coronary artery disease), Cellulitis, Diabetes mellitus ([address] DM (diabetes mellitus), type 2 with peripheral vascular complications (HCC)--s/p amputation great toe post osteomylitis , Glucose intolerance (malabsorption), Hyperlipidemia, Hypertension, Hypertrophy of prostate without urinary obstruction and other lower urinary tract symptoms (LUTS), Intermittent atrial fibrillation ([address] Kidney stone, Occult blood in stool, and Pacemaker. Surgical History:   has a past surgical history that includes Tonsillectomy and adenoidectomy; Appendectomy; Kidney stone surgery (1980); Coronary artery bypass graft (1996); Cardiac catheterization (2003); pacemaker placement (2005); other surgical history (Right, 7/17/15); Abscess Drainage (7/20/15); Toe amputation (Right, 7.16.15); Colonoscopy (03/28/2018); and pr esophagogastroduodenoscopy transoral diagnostic (N/A, 11/21/2018). Social History:  Social History     Tobacco Use    Smoking status: Never Smoker    Smokeless tobacco: Never Used    Tobacco comment: counseled on tobacco exposure avoidance   Substance Use Topics    Alcohol use:  No Alcohol/week: 0.0 standard drinks        Family History:  family history includes Diabetes in his mother; Stroke in his father. Allergies:  Patient has no known allergies. Home Medications:  Prior to Visit Medications    Medication Sig Taking? Authorizing Provider   sotalol (BETAPACE) 120 MG tablet TAKE 1 TABLET BY MOUTH TWICE DAILY Yes PRIYA Zhong CNP   metoprolol succinate (TOPROL XL) 50 MG extended release tablet Take 50 mg by mouth 2 times daily Yes Historical Provider, MD   furosemide (LASIX) 40 MG tablet Take 1 tablet by mouth daily Yes PRIYA Zhong CNP   lisinopril (PRINIVIL;ZESTRIL) 10 MG tablet TAKE 1 TABLET BY MOUTH TWICE DAILY Yes PRIYA Zhong CNP   warfarin (COUMADIN) 5 MG tablet Take 1 tablet by mouth daily Take 2.5 mg on Mon, Wed and Fri and 5 mg all other days of the week Yes PRIYA Connolly CNP   atorvastatin (LIPITOR) 20 MG tablet Take 1 tablet by mouth daily Yes Felix Mcclellan MD   fluticasone (FLONASE) 50 MCG/ACT nasal spray 2 sprays by Each Nostril route 2 times daily Yes Lizzie Donahue MD   sodium chloride (OCEAN, BABY AYR) 0.65 % nasal spray 2 sprays by Nasal route every 4 hours Yes Lizzie Donahue MD   vitamin B-1 (THIAMINE) 100 MG tablet Take 1,000 mg by mouth daily  Yes Historical Provider, MD   EPOETIN BIBIANA IJ Inject as directed every 21 days  Yes Historical Provider, MD   acetaminophen (TYLENOL) 325 MG tablet Take 650 mg by mouth every 6 hours as needed for Pain Yes Historical Provider, MD       [x] Medications and dosages reviewed.     ROS:  [x]Full ROS obtained and negative except as mentioned in HPI      Physical Examination:    Vitals:    10/05/21 1338   BP: 130/70   Site: Left Upper Arm   Position: Sitting   Cuff Size: Medium Adult   Pulse: 88   SpO2: 98%   Weight: 159 lb 1.6 oz (72.2 kg)   Height: 5' 9\" (1.753 m)        · GENERAL: Well developed, well nourished, No acute distress  · NEUROLOGICAL: Alert and oriented  · PSYCH: Calm affect  · SKIN: Warm and dry, No visible rash,   · EYES: Pupils equal and round, Sclera non-icteric,   · HENT:  External ears and nose unremarkable, mucus membranes moist  · MUSCULOSKELETAL: Normal cephalic, neck supple  · CAROTID: Normal upstroke, no bruits  · CARDIAC: JVP normal, Normal PMI,  Regular rate and rhythm, normal S1S2, no murmur, rub, or gallop  · RESPIRATORY: Normal respiratory effort, Clear to auscultation bilaterally  · EXTREMITIES: No LE edema  · GASTROINTESTINAL: normal bowel sounds, soft, non-tender, No hepatomegaly     ECHO 5/2021    Summary   Left ventricular cavity size is normal with normal left ventricular wall   thickness. Overall left ventricular systolic function appears mild-moderately reduced. Ejection fraction is visually estimated to be 35-40%. There is mild diffuse hypokinesis. Grade II diastolic dysfunction with elevated LV filling pressures. Normal right ventricular size. Right ventricular systolic function is mildly reduced. The left atrium is moderate-severely dilated. Mitral valve leaflets appear mildly thickened. Mild to moderate mitral regurgitation. Moderate aortic stenosis with a peak velocity of 2.5 m/s, a mean pressure   gradient of 15 mmHg and an DONAVAN of 1.19 cm^2. Aortic valve gradients may be underestimated due to low cardiac output. Mild aortic regurgitation. Mild to moderate tricuspid regurgitation with a PASP of 45 mmHg. Trivial pulmonic regurgitation present. Myoview 9/2021  The left ventricular cavity size is moderately dilated. The right ventricle    is mildly dilated.        There is a small perfusion defect of mild severity in the apex and apical    inferior segment. The fixed defect has associated wall motion abnormality,    consistent with scar. There is an additional small perfusion defect of mild    severity in the mid-anterior. There is corresponding wall motion    abnormality.  The defect is consistent with ischemia.        Sum stress score of 4. No visual TID. Calculated TID is 1. 13.        Left ventricular ejection fraction is severely reduced at 35%. Assessment:       CAD:  No angina. Myoview slightly abnormal with progressive LV dysfunction. Long discussion with pt and daughter. Hesitant to proceed with invasive procedures, but feel we need to know coronary status to decide on further treatment options such as ablation, AICD, BiV ect. Plan cardiac cath possible PCI. He would not want a repeat CABG    CAB [facility]-no report  Cardiac [id number]-no report    HTN:  /70 (Site: Left Upper Arm, Position: Sitting, Cuff Size: Medium Adult)   Pulse 88   Ht 5' 9\" (1.753 m)   Wt 159 lb 1.6 oz (72.2 kg)   SpO2 98%   BMI 23.49 kg/m²   Well controlled. Continue toprol and lisinopril    Hyperlipidemia:  Controlled  LDL=48 2020  Continue current medical therapy    Afib: Increasing afib  Refused amio. Continue coumadin  After I discussed with him he is willing to use amio. I would like cath first and then discuss amio with EP    Pacemaker:  Stable  Follow q 3 months    Anemia:  Followed by [facility]  ? MDS    Cardiomyopathy:  EF normal 2018 now progressive LV dysfunction  Etiology unclear. Possible CAD, vs afib vs pacing  Plan cath as above     VT:  VT seen on pacer. Plan cath as above.  Then EP discussion of options      Plan:  Cardiac cath with Dr. Delon Price with him, pt and daughter  Discuss other treatment options pending cath result  Hold coumadin 5 days prior to cath    Thank you for allowing me to participate in the care of this individual.      Charlie Vargas M.D., [facility]

## 2021-10-12 ENCOUNTER — ANTI-COAG VISIT (OUTPATIENT)
Dept: PHARMACY | Age: 86
End: 2021-10-12
Payer: MEDICARE

## 2021-10-12 DIAGNOSIS — I48.0 PAF (PAROXYSMAL ATRIAL FIBRILLATION) (HCC): Primary | ICD-10-CM

## 2021-10-12 LAB — INTERNATIONAL NORMALIZATION RATIO, POC: 1.2

## 2021-10-12 PROCEDURE — 85610 PROTHROMBIN TIME: CPT

## 2021-10-12 PROCEDURE — 99211 OFF/OP EST MAY X REQ PHY/QHP: CPT

## 2021-10-12 NOTE — PROGRESS NOTES
Mr. Maria Del Carmen Segura is a 80 y.o. y/o male with history of Afib   He presents today for anticoagulation monitoring and adjustment. Pertinent PMH: ICD-pacemaker. 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 Not sure how long he has been doing this. States his pillbox has 5mg in it for this Saturday and he thinks he could have been doing this for a few weeks now. States he will follow AVS and take normal dose (as pt was therapeutic on this the past visits).  If patient is low in 2 weeks, 9/28, then increase to 2.5mg MWF and 5mg AOD. ---> confirms dose   []   [x]       S/S bleeding/bruising/swelling/SOB- denies  []   [x]       Blood in urine or stool denies  [x]   []       Procedures scheduled in the future at this time - had cardiac cath 7/9. IR 1.4---> holding 5 days for LV cath on 10/14, also states that having 2 moles removed from face 10/20- doesn't know if he has to hold yet, he will call and let me know  [x]   []       Missed Dose- holding since Sat    []   [x]       Extra Dose- denies  []   [x]       Change in medications- receiving Procrit 40,000 unit injection once every two weeks until red blood cell count is consistently under control again---> stop sotalol, started amiodarone 200mg daily 4/28---> states did not start it because was concerned with the SE so stayed on sotalol and has fu apt next week with regular cardiologist --> no changes---> zoloft, inc lisinopril--->stoped sotalol, started amiodarone 200mg qd on 8/20, inc metoprolol---states did not start amiodarone dt concerns with SEs so still sotalol, inc lasix--->2 tylenol in the mornings---> denies       [x]   []       Change in health/diet/appetite consistent greens -> has been eating less, appetite has been diminished --> has been eating less since being home --> appetite is slowly starting to return---> only had one salad, no NVD---> no changes, no NVD---> diet off with Lea La in hospital---> no greens, diet off with Lea La and stressed, no NVD  --> diet back to normal, no NVD --> states that he has been stressed with wife being ill and in hospital, not eating well --> appetite has been diminished d/t wife--->eating out more, no NVD---> no greens, no NVD   []   [x]       Change in alcohol use- doesn't drink   []   [x]       Change in activity  []   [x]       Hospital admission   []   [x]       Emergency department visit  []   [x]       Other complaints--> Patient has been very tired and not able to function well, getting epoetin alpha injection at hemotologist office     Clinical Outcomes:  Yes     No  []   [x]       Major bleeding event  []   [x]       Thromboembolic event  Duration of warfarin Therapy: indefinite  INR Range:  2.0-3.0     Has been stressed with passing of wife     INR is 1.2 today   States has been holding warfarin for procedure on Thursday 10/14- did not notify us. Then states has derm procedure 10/20 and not sure if he has to hold- asked pt to call today and then notify us. Will have to adjust apt if holding. Continue holding until procedure 10/14, take 7.5mg on 10/14 then continue taking dose of 5 mg on Sun, Tues and Thurs and 2.5 mg all other days of the week. Encouraged to maintain a consistency of vegetables/salads.   Recheck INR in 10 days, 10/22    Referring cardiologist will be Dr. Pastor Needs  INR (no units)   Date Value   10/12/2021 1.2   2021 3.7   2021 2.8   2021 2.1   2021 1.40 (H)   2020 2.30 (H)   2020 3.30 (H)   2020 5.68 ([facility])   For Pharmacy Admin Tracking Only     Intervention Detail: Adherence Monitorin and Dose Adjustment: 1, reason: Therapy Optimization   Total # of Interventions Recommended: 2   Total # of Interventions Accepted: 2   Time Spent (min): 15

## 2021-10-14 ENCOUNTER — HOSPITAL ENCOUNTER (OUTPATIENT)
Dept: CARDIAC CATH/INVASIVE PROCEDURES | Age: 86
Discharge: HOME | End: 2021-10-14
Attending: INTERNAL MEDICINE | Admitting: INTERNAL MEDICINE
Payer: MEDICARE

## 2021-10-14 VITALS
SYSTOLIC BLOOD PRESSURE: 170 MMHG | DIASTOLIC BLOOD PRESSURE: 74 MMHG | OXYGEN SATURATION: 99 % | HEIGHT: 69 IN | TEMPERATURE: 98 F | WEIGHT: 159 LBS | HEART RATE: 69 BPM | BODY MASS INDEX: 23.55 KG/M2 | RESPIRATION RATE: 18 BRPM

## 2021-10-14 DIAGNOSIS — I25.10 CORONARY ARTERY DISEASE INVOLVING NATIVE CORONARY ARTERY OF NATIVE HEART WITHOUT ANGINA PECTORIS: ICD-10-CM

## 2021-10-14 DIAGNOSIS — I51.9 LV DYSFUNCTION: ICD-10-CM

## 2021-10-14 DIAGNOSIS — R94.39 ABNORMAL CARDIOVASCULAR STRESS TEST: ICD-10-CM

## 2021-10-14 LAB
ANION GAP SERPL CALCULATED.3IONS-SCNC: 10 MMOL/L (ref 3–16)
BUN BLDV-MCNC: 37 MG/DL (ref 7–20)
CALCIUM SERPL-MCNC: 9.3 MG/DL (ref 8.3–10.6)
CHLORIDE BLD-SCNC: 103 MMOL/L (ref 99–110)
CO2: 27 MMOL/L (ref 21–32)
CREAT SERPL-MCNC: 1.3 MG/DL (ref 0.8–1.3)
EKG ATRIAL RATE: 69 BPM
EKG DIAGNOSIS: NORMAL
EKG P AXIS: 64 DEGREES
EKG P-R INTERVAL: 212 MS
EKG Q-T INTERVAL: 478 MS
EKG QRS DURATION: 104 MS
EKG QTC CALCULATION (BAZETT): 512 MS
EKG R AXIS: -34 DEGREES
EKG T AXIS: 57 DEGREES
EKG VENTRICULAR RATE: 69 BPM
GFR AFRICAN AMERICAN: >60
GFR NON-AFRICAN AMERICAN: 52
GLUCOSE BLD-MCNC: 180 MG/DL (ref 70–99)
HCT VFR BLD CALC: 27.1 % (ref 40.5–52.5)
HEMOGLOBIN: 9.6 G/DL (ref 13.5–17.5)
INR BLD: 1.4 (ref 0.86–1.14)
MCH RBC QN AUTO: 32.6 PG (ref 26–34)
MCHC RBC AUTO-ENTMCNC: 35.3 G/DL (ref 31–36)
MCV RBC AUTO: 92.3 FL (ref 80–100)
PDW BLD-RTO: 19.5 % (ref 12.4–15.4)
PLATELET # BLD: 269 K/UL (ref 135–450)
PMV BLD AUTO: 9.7 FL (ref 5–10.5)
POTASSIUM SERPL-SCNC: 4.3 MMOL/L (ref 3.5–5.1)
RBC # BLD: 2.94 M/UL (ref 4.2–5.9)
SODIUM BLD-SCNC: 140 MMOL/L (ref 136–145)
WBC # BLD: 9 K/UL (ref 4–11)

## 2021-10-14 PROCEDURE — 93010 ELECTROCARDIOGRAM REPORT: CPT | Performed by: INTERNAL MEDICINE

## 2021-10-14 PROCEDURE — 85610 PROTHROMBIN TIME: CPT

## 2021-10-14 PROCEDURE — 99212 OFFICE O/P EST SF 10 MIN: CPT

## 2021-10-14 PROCEDURE — 36415 COLL VENOUS BLD VENIPUNCTURE: CPT

## 2021-10-14 PROCEDURE — 80048 BASIC METABOLIC PNL TOTAL CA: CPT

## 2021-10-14 PROCEDURE — 93005 ELECTROCARDIOGRAM TRACING: CPT | Performed by: INTERNAL MEDICINE

## 2021-10-14 PROCEDURE — 85027 COMPLETE CBC AUTOMATED: CPT

## 2021-10-14 RX ORDER — SODIUM CHLORIDE 0.9 % (FLUSH) 0.9 %
5-40 SYRINGE (ML) INJECTION PRN
Status: DISCONTINUED | OUTPATIENT
Start: 2021-10-14 | End: 2021-10-14 | Stop reason: HOSPADM

## 2021-10-19 ENCOUNTER — TELEPHONE (OUTPATIENT)
Dept: PHARMACY | Age: 86
End: 2021-10-19

## 2021-10-19 ENCOUNTER — TELEPHONE (OUTPATIENT)
Dept: FAMILY MEDICINE CLINIC | Age: 86
End: 2021-10-19

## 2021-10-19 NOTE — TELEPHONE ENCOUNTER
Patient called to say cath was cancelled on 10/14, still held 5 days but then resumed warfarin as directed on 10/14. Patient will need to start holding 10/21 (another 5 days) for procedure 10/26. Take 7.5mg on Tues 10/26 then continue normal dose of 5 mg (5 mg x 1) every Sun, Tue, Thu; 2.5 mg (5 mg x 0.5) all other days. RS apt for 1 week after procedure, 11/2. Pt also states derm apt tomorrow, 10/20 is just a consult, doesn't need to hold.     Ratna Toro, PharmD, [facility]

## 2021-10-22 ENCOUNTER — HOSPITAL ENCOUNTER (OUTPATIENT)
Age: 86
Discharge: HOME | End: 2021-10-22
Payer: MEDICARE

## 2021-10-22 DIAGNOSIS — R80.9 MICROALBUMINURIA: ICD-10-CM

## 2021-10-22 DIAGNOSIS — N18.31 STAGE 3A CHRONIC KIDNEY DISEASE (HCC): ICD-10-CM

## 2021-10-22 DIAGNOSIS — I10 ESSENTIAL HYPERTENSION: ICD-10-CM

## 2021-10-22 LAB
ANION GAP SERPL CALCULATED.3IONS-SCNC: 13 MMOL/L (ref 3–16)
BUN BLDV-MCNC: 25 MG/DL (ref 7–20)
CALCIUM SERPL-MCNC: 9.1 MG/DL (ref 8.3–10.6)
CHLORIDE BLD-SCNC: 103 MMOL/L (ref 99–110)
CO2: 25 MMOL/L (ref 21–32)
CREAT SERPL-MCNC: 1.2 MG/DL (ref 0.8–1.3)
GFR AFRICAN AMERICAN: >60
GFR NON-AFRICAN AMERICAN: 57
GLUCOSE BLD-MCNC: 236 MG/DL (ref 70–99)
POTASSIUM SERPL-SCNC: 4.8 MMOL/L (ref 3.5–5.1)
SODIUM BLD-SCNC: 141 MMOL/L (ref 136–145)

## 2021-10-22 PROCEDURE — 80048 BASIC METABOLIC PNL TOTAL CA: CPT

## 2021-10-22 PROCEDURE — 36415 COLL VENOUS BLD VENIPUNCTURE: CPT

## 2021-10-26 ENCOUNTER — HOSPITAL ENCOUNTER (OUTPATIENT)
Dept: CARDIAC CATH/INVASIVE PROCEDURES | Age: 86
Setting detail: OBSERVATION
Discharge: HOME | End: 2021-10-27
Attending: INTERNAL MEDICINE | Admitting: INTERNAL MEDICINE
Payer: MEDICARE

## 2021-10-26 PROBLEM — R94.39 ABNORMAL CARDIOVASCULAR STRESS TEST: Status: ACTIVE | Noted: 2021-10-26

## 2021-10-26 LAB
ANION GAP SERPL CALCULATED.3IONS-SCNC: 10 MMOL/L (ref 3–16)
BUN BLDV-MCNC: 27 MG/DL (ref 7–20)
CALCIUM SERPL-MCNC: 9.7 MG/DL (ref 8.3–10.6)
CHLORIDE BLD-SCNC: 101 MMOL/L (ref 99–110)
CO2: 29 MMOL/L (ref 21–32)
CREAT SERPL-MCNC: 1.1 MG/DL (ref 0.8–1.3)
EKG ATRIAL RATE: 69 BPM
EKG DIAGNOSIS: NORMAL
EKG P AXIS: 6 DEGREES
EKG P-R INTERVAL: 154 MS
EKG Q-T INTERVAL: 472 MS
EKG QRS DURATION: 120 MS
EKG QTC CALCULATION (BAZETT): 505 MS
EKG R AXIS: -41 DEGREES
EKG T AXIS: 55 DEGREES
EKG VENTRICULAR RATE: 69 BPM
GFR AFRICAN AMERICAN: >60
GFR NON-AFRICAN AMERICAN: >60
GLUCOSE BLD-MCNC: 169 MG/DL (ref 70–99)
GLUCOSE BLD-MCNC: 179 MG/DL (ref 70–99)
INR BLD: 1.2 (ref 0.86–1.14)
LEFT VENTRICULAR EJECTION FRACTION MODE: NORMAL
LV EF: 50 %
PERFORMED ON: ABNORMAL
POC ACT LR: 246 SEC
POC ACT LR: 286 SEC
POTASSIUM SERPL-SCNC: 4.4 MMOL/L (ref 3.5–5.1)
SODIUM BLD-SCNC: 140 MMOL/L (ref 136–145)

## 2021-10-26 PROCEDURE — 99153 MOD SED SAME PHYS/QHP EA: CPT

## 2021-10-26 PROCEDURE — G0378 HOSPITAL OBSERVATION PER HR: HCPCS

## 2021-10-26 PROCEDURE — C9600 PERC DRUG-EL COR STENT SING: HCPCS

## 2021-10-26 PROCEDURE — 2580000003 HC RX 258

## 2021-10-26 PROCEDURE — 85347 COAGULATION TIME ACTIVATED: CPT

## 2021-10-26 PROCEDURE — 99222 1ST HOSP IP/OBS MODERATE 55: CPT | Performed by: INTERNAL MEDICINE

## 2021-10-26 PROCEDURE — C1725 CATH, TRANSLUMIN NON-LASER: HCPCS

## 2021-10-26 PROCEDURE — C1894 INTRO/SHEATH, NON-LASER: HCPCS

## 2021-10-26 PROCEDURE — 80048 BASIC METABOLIC PNL TOTAL CA: CPT

## 2021-10-26 PROCEDURE — 93571 IV DOP VEL&/PRESS C FLO 1ST: CPT | Performed by: INTERNAL MEDICINE

## 2021-10-26 PROCEDURE — 92928 PRQ TCAT PLMT NTRAC ST 1 LES: CPT | Performed by: INTERNAL MEDICINE

## 2021-10-26 PROCEDURE — 99152 MOD SED SAME PHYS/QHP 5/>YRS: CPT

## 2021-10-26 PROCEDURE — 93010 ELECTROCARDIOGRAM REPORT: CPT | Performed by: INTERNAL MEDICINE

## 2021-10-26 PROCEDURE — C1887 CATHETER, GUIDING: HCPCS

## 2021-10-26 PROCEDURE — C1769 GUIDE WIRE: HCPCS

## 2021-10-26 PROCEDURE — 6370000000 HC RX 637 (ALT 250 FOR IP): Performed by: INTERNAL MEDICINE

## 2021-10-26 PROCEDURE — 93005 ELECTROCARDIOGRAM TRACING: CPT | Performed by: INTERNAL MEDICINE

## 2021-10-26 PROCEDURE — C1874 STENT, COATED/COV W/DEL SYS: HCPCS

## 2021-10-26 PROCEDURE — 36415 COLL VENOUS BLD VENIPUNCTURE: CPT

## 2021-10-26 PROCEDURE — 6360000002 HC RX W HCPCS

## 2021-10-26 PROCEDURE — 2500000003 HC RX 250 WO HCPCS

## 2021-10-26 PROCEDURE — 93459 L HRT ART/GRFT ANGIO: CPT

## 2021-10-26 PROCEDURE — 6360000004 HC RX CONTRAST MEDICATION: Performed by: INTERNAL MEDICINE

## 2021-10-26 PROCEDURE — 85610 PROTHROMBIN TIME: CPT

## 2021-10-26 PROCEDURE — 93459 L HRT ART/GRFT ANGIO: CPT | Performed by: INTERNAL MEDICINE

## 2021-10-26 PROCEDURE — 6370000000 HC RX 637 (ALT 250 FOR IP)

## 2021-10-26 PROCEDURE — C1760 CLOSURE DEV, VASC: HCPCS

## 2021-10-26 PROCEDURE — 2709999900 HC NON-CHARGEABLE SUPPLY

## 2021-10-26 PROCEDURE — 2580000003 HC RX 258: Performed by: INTERNAL MEDICINE

## 2021-10-26 RX ORDER — GAUZE BANDAGE 2" X 2"
100 BANDAGE TOPICAL DAILY
Status: DISCONTINUED | OUTPATIENT
Start: 2021-10-26 | End: 2021-10-27 | Stop reason: HOSPADM

## 2021-10-26 RX ORDER — ONDANSETRON 2 MG/ML
4 INJECTION INTRAMUSCULAR; INTRAVENOUS EVERY 6 HOURS PRN
Status: DISCONTINUED | OUTPATIENT
Start: 2021-10-26 | End: 2021-10-27 | Stop reason: HOSPADM

## 2021-10-26 RX ORDER — SODIUM CHLORIDE 0.9 % (FLUSH) 0.9 %
5-40 SYRINGE (ML) INJECTION PRN
Status: DISCONTINUED | OUTPATIENT
Start: 2021-10-26 | End: 2021-10-27 | Stop reason: HOSPADM

## 2021-10-26 RX ORDER — CLOPIDOGREL BISULFATE 75 MG/1
75 TABLET ORAL DAILY
Status: DISCONTINUED | OUTPATIENT
Start: 2021-10-27 | End: 2021-10-27 | Stop reason: HOSPADM

## 2021-10-26 RX ORDER — ACETAMINOPHEN 325 MG/1
650 TABLET ORAL EVERY 4 HOURS PRN
Status: DISCONTINUED | OUTPATIENT
Start: 2021-10-26 | End: 2021-10-27 | Stop reason: HOSPADM

## 2021-10-26 RX ORDER — DEXTROSE MONOHYDRATE 50 MG/ML
100 INJECTION, SOLUTION INTRAVENOUS PRN
Status: DISCONTINUED | OUTPATIENT
Start: 2021-10-26 | End: 2021-10-27 | Stop reason: HOSPADM

## 2021-10-26 RX ORDER — SOTALOL HYDROCHLORIDE 80 MG/1
120 TABLET ORAL 2 TIMES DAILY
Status: DISCONTINUED | OUTPATIENT
Start: 2021-10-26 | End: 2021-10-27 | Stop reason: HOSPADM

## 2021-10-26 RX ORDER — SODIUM CHLORIDE 0.9 % (FLUSH) 0.9 %
5-40 SYRINGE (ML) INJECTION EVERY 12 HOURS SCHEDULED
Status: DISCONTINUED | OUTPATIENT
Start: 2021-10-26 | End: 2021-10-27 | Stop reason: HOSPADM

## 2021-10-26 RX ORDER — INSULIN LISPRO 100 [IU]/ML
0-3 INJECTION, SOLUTION INTRAVENOUS; SUBCUTANEOUS NIGHTLY
Status: DISCONTINUED | OUTPATIENT
Start: 2021-10-26 | End: 2021-10-27 | Stop reason: HOSPADM

## 2021-10-26 RX ORDER — SODIUM CHLORIDE 9 MG/ML
25 INJECTION, SOLUTION INTRAVENOUS PRN
Status: DISCONTINUED | OUTPATIENT
Start: 2021-10-26 | End: 2021-10-27 | Stop reason: HOSPADM

## 2021-10-26 RX ORDER — DEXTROSE MONOHYDRATE 25 G/50ML
12.5 INJECTION, SOLUTION INTRAVENOUS PRN
Status: DISCONTINUED | OUTPATIENT
Start: 2021-10-26 | End: 2021-10-27 | Stop reason: HOSPADM

## 2021-10-26 RX ORDER — AMLODIPINE BESYLATE 5 MG/1
5 TABLET ORAL DAILY
Status: DISCONTINUED | OUTPATIENT
Start: 2021-10-26 | End: 2021-10-27 | Stop reason: HOSPADM

## 2021-10-26 RX ORDER — NICOTINE POLACRILEX 4 MG
15 LOZENGE BUCCAL PRN
Status: DISCONTINUED | OUTPATIENT
Start: 2021-10-26 | End: 2021-10-27 | Stop reason: HOSPADM

## 2021-10-26 RX ORDER — SODIUM CHLORIDE 9 MG/ML
INJECTION, SOLUTION INTRAVENOUS CONTINUOUS
Status: ACTIVE | OUTPATIENT
Start: 2021-10-26 | End: 2021-10-27

## 2021-10-26 RX ORDER — ATORVASTATIN CALCIUM 20 MG/1
20 TABLET, FILM COATED ORAL DAILY
Status: DISCONTINUED | OUTPATIENT
Start: 2021-10-26 | End: 2021-10-27 | Stop reason: HOSPADM

## 2021-10-26 RX ORDER — ASPIRIN 81 MG/1
81 TABLET ORAL DAILY
Status: DISCONTINUED | OUTPATIENT
Start: 2021-10-27 | End: 2021-10-27 | Stop reason: HOSPADM

## 2021-10-26 RX ORDER — FLUTICASONE PROPIONATE 50 MCG
2 SPRAY, SUSPENSION (ML) NASAL 2 TIMES DAILY
Status: DISCONTINUED | OUTPATIENT
Start: 2021-10-26 | End: 2021-10-27 | Stop reason: HOSPADM

## 2021-10-26 RX ORDER — INSULIN LISPRO 100 [IU]/ML
0-6 INJECTION, SOLUTION INTRAVENOUS; SUBCUTANEOUS
Status: DISCONTINUED | OUTPATIENT
Start: 2021-10-26 | End: 2021-10-27 | Stop reason: HOSPADM

## 2021-10-26 RX ADMIN — AMLODIPINE BESYLATE 5 MG: 5 TABLET ORAL at 16:11

## 2021-10-26 RX ADMIN — INSULIN LISPRO 1 UNITS: 100 INJECTION, SOLUTION INTRAVENOUS; SUBCUTANEOUS at 21:17

## 2021-10-26 RX ADMIN — ATORVASTATIN CALCIUM 20 MG: 20 TABLET, FILM COATED ORAL at 16:10

## 2021-10-26 RX ADMIN — IOPAMIDOL 181 ML: 755 INJECTION, SOLUTION INTRAVENOUS at 12:09

## 2021-10-26 RX ADMIN — SODIUM CHLORIDE: 9 INJECTION, SOLUTION INTRAVENOUS at 21:03

## 2021-10-26 RX ADMIN — SODIUM CHLORIDE, PRESERVATIVE FREE 10 ML: 5 INJECTION INTRAVENOUS at 21:05

## 2021-10-26 RX ADMIN — SOTALOL HYDROCHLORIDE 120 MG: 80 TABLET ORAL at 21:04

## 2021-10-26 ASSESSMENT — PAIN SCALES - GENERAL
PAINLEVEL_OUTOF10: 0

## 2021-10-26 NOTE — CONSULTS
Office : [phone]     Fax :[phone]       Nephrology Consult Note      Patient's Name: Kris Soria  5:49 PM  10/26/2021    Reason for Consult:        Requesting Physician:  Davis Galan MD      Chief Complaint: admitted post [address] [facility]       History of Present Ilness:    Kris Soria is a 80 y.o. male with history of recent REYNA, hypertension, atrial fibrillation, diabetes, albuminuria who was recently seen by me in the office after he had acute kidney injury. His Lasix dose was  Decreased and I held his lisinopril. Repeat creatinine level came down to 1. He had elective left heart cath today. Had stent placement to RPDA. Post procedure he is doing well. Has urinated once. No intake/output data recorded.     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  9/11/2015    Glucose intolerance (malabsorption)     Hyperlipidemia     Hypertension     Hypertrophy of prostate without urinary obstruction and other lower urinary tract symptoms (LUTS)     Intermittent atrial fibrillation ([address]     Kidney stone     Occult blood in stool     Hx of    Pacemaker     Permanent       Past Surgical History:   Procedure Laterality Date    ABSCESS DRAINAGE  7/20/15    right foot    APPENDECTOMY      CARDIAC CATHETERIZATION  2003    Left    COLONOSCOPY  03/28/2018    dr Xin Barrow    x3   [address] [facility]  OTHER SURGICAL HISTORY Right 7/17/15    right fifth toe I and D    PACEMAKER PLACEMENT  2005    AR ESOPHAGOGASTRODUODENOSCOPY TRANSORAL DIAGNOSTIC N/A 11/21/2018    EGD DIAGNOSTIC ONLY performed by Christos Burden MD at [place] [address] Right 7.16.15    right pinkey toe     TONSILLECTOMY AND ADENOIDECTOMY         Family History   Problem Relation Age of Onset    Stroke Father     Diabetes Mother         reports that he has never smoked. He has never used smokeless tobacco. He reports that he does not drink alcohol and does not use drugs. Allergies:  Patient has no known allergies.     Current Medications:    sodium chloride flush 0.9 % injection 5-40 mL, PRN  thiamine mononitrate tablet 100 mg, Daily  fluticasone (FLONASE) 50 MCG/ACT nasal spray 2 spray, BID  sodium chloride (OCEAN, BABY AYR) 0.65 % nasal spray 2 spray, Q4H PRN  sotalol (BETAPACE) tablet 120 mg, BID  atorvastatin (LIPITOR) tablet 20 mg, Daily  amLODIPine (NORVASC) tablet 5 mg, Daily  0.9 % sodium chloride infusion, Continuous  sodium chloride flush 0.9 % injection 5-40 mL, 2 times per day  sodium chloride flush 0.9 % injection 5-40 mL, PRN  0.9 % sodium chloride infusion, PRN  acetaminophen (TYLENOL) tablet 650 mg, Q4H PRN  ondansetron (ZOFRAN) injection 4 mg, Q6H PRN  tirofiban (AGGRASTAT) 50 mcg/mL infusion, Continuous  [START ON 10/27/2021] aspirin EC tablet 81 mg, Daily  [START ON 10/27/2021] clopidogrel (PLAVIX) tablet 75 mg, Daily  glucose (GLUTOSE) 40 % oral gel 15 g, PRN  dextrose 50 % IV solution, PRN  glucagon (rDNA) injection 1 mg, PRN  dextrose 5 % solution, PRN  insulin lispro (1 Unit Dial) 0-6 Units, TID WC  insulin lispro (1 Unit Dial) 0-3 Units, Nightly        Review of Systems:   14 point ROS obtained but were negative except mentioned in HPI      Physical exam:     Vitals:  BP (!) 140/73   Pulse 77   Temp 98.1 °F (36.7 °C) (Temporal)   Resp 17   Ht 5' 9\" (1.753 m)   Wt 163 lb (73.9 kg)   SpO2

## 2021-10-26 NOTE — BRIEF OP NOTE
Cardiac Cath 10/26/2021:  Access: Right CFA  Hemostasis: Angio-Seal closure device    Moderate Sedation:  Start time: 1035  Stop time: 1159  2 mg versed   100 mcg fentanyl   An independent trained observer pushed medications at my direction. We monitored the patient's level of consciousness and vital signs/physiologic status throughout the procedure duration (see start and start times above). Bleeding risk: Low  LVEDP: 9 mmHg  AO: 129/55 mmHg  Estimated blood loss: Less than 20 mL  Contrast: 181 mL  Fluoroscopy time: 14.3 min. Anatomy:   LM-20% distal  LAD-100% ostial, calcified  Cx-normal  OM1-100% ostial  OM2-20% proximal  RCA-20% proximal, 40% distal, calcified  RPDA-70 to 80% proximal, FFR of 0.76  LVEF-50%  LIMA-LAD: Widely patent  SVG-OM1: Widely patent  Aortic root: Not aneurysmal, no significant aortic insufficiency, 1 patent SVG visualized. PCI: RPDA 80% to 0% with a 3.25 mm x 18 mm Xience Mariela ALEJANDRA     Impression:  1. Severe multivessel CAD. 2.  Patent LIMA-LAD and SVG-OM1 grafts. 3.  Successful PCI with ALEJANDRA x1 to RPDA. 4.  Low normal LV systolic function. Plan:  1. Attempt triple therapy with enteric-coated aspirin 81 mg daily, Plavix 75 mg daily and warfarin to complete 30 days followed by Plavix and warfarin for an additional 5 months then transition to enteric-coated aspirin 81 mg daily and warfarin thereafter. 2.  Hydration. 3.  ACE inhibitor/ARB stopped preprocedure to optimize kidney function given renal insufficiency. Discussed with nephrology who recommends resuming ACE inhibitor/ARB 3 days post procedure.

## 2021-10-26 NOTE — H&P
[address]  Cardiac Consult     Referring Provider:  Yaritza Funez MD     Chief Complaint: Abnormal stress test       History of Present Illness:   80 y. o.male formally seen by Dr. Felix Shen seen in f/u for afib, CAD, HTN, hyperlipidemia and pacemaker placement. He is depressed over his wife's death. He has been having increasing afib on Sotalol. He refused change to amio due to potential side effects. He has progressive LV dysfunction. Myoview with small area of ischemia. No chest pain. Afib ablation and possible biV upgrade being considered. Sent here today to discuss options for abnormal stress. Past Medical History:   has a past medical history of Actinic keratosis, Actinic keratosis, Atrial fibrillation ([address] Bilateral carotid artery stenosis, CAD (coronary artery disease), Cellulitis, Diabetes mellitus ([address] DM (diabetes mellitus), type 2 with peripheral vascular complications (HCC)--s/p amputation great toe post osteomylitis , Glucose intolerance (malabsorption), Hyperlipidemia, Hypertension, Hypertrophy of prostate without urinary obstruction and other lower urinary tract symptoms (LUTS), Intermittent atrial fibrillation ([address] Kidney stone, Occult blood in stool, and Pacemaker. Surgical History:   has a past surgical history that includes Tonsillectomy and adenoidectomy; Appendectomy; Kidney stone surgery (1980); Coronary artery bypass graft (1996); Cardiac catheterization (2003); pacemaker placement (2005); other surgical history (Right, 7/17/15); Abscess Drainage (7/20/15); Toe amputation (Right, 7.16.15); Colonoscopy (03/28/2018); and pr esophagogastroduodenoscopy transoral diagnostic (N/A, 11/21/2018).      Social History:  Social History     Tobacco Use    Smoking status: Never Smoker    Smokeless tobacco: Never Used    Tobacco comment: counseled on tobacco exposure avoidance   Substance Use Topics    Alcohol use: No     Alcohol/week: 0.0 standard drinks        Family History:  family history includes Diabetes in his mother; Stroke in his father. Allergies:  Patient has no known allergies. Home Medications:  Prior to Visit Medications    Medication Sig Taking? Authorizing Provider   amLODIPine (NORVASC) 5 MG tablet Take 1 tablet by mouth daily Yes Adam Early MD   sotalol (BETAPACE) 120 MG tablet TAKE 1 TABLET BY MOUTH TWICE DAILY Yes PRIYA Soria CNP   atorvastatin (LIPITOR) 20 MG tablet Take 1 tablet by mouth daily Yes Ese Joy MD   furosemide (LASIX) 40 MG tablet Take 1 tablet by mouth daily  Patient taking differently: Take 20 mg by mouth daily  Yes PRIYA Soria CNP   fluticasone (FLONASE) 50 MCG/ACT nasal spray 2 sprays by Each Nostril route 2 times daily Yes Gayle Walker MD   sodium chloride (OCEAN, BABY AYR) 0.65 % nasal spray 2 sprays by Nasal route every 4 hours Yes Gayle Walker MD   vitamin B-1 (THIAMINE) 100 MG tablet Take 1,000 mg by mouth daily  Yes Historical Provider, MD   EPOETIN BIBIANA IJ Inject as directed every 21 days  Yes Historical Provider, MD   acetaminophen (TYLENOL) 325 MG tablet Take 650 mg by mouth every 6 hours as needed for Pain Yes Historical Provider, MD   warfarin (COUMADIN) 5 MG tablet Take 1 tablet by mouth daily Take 2.5 mg on Mon, Wed and Fri and 5 mg all other days of the week  Count includes the [facility] PRIYA Stacy  CNP       [x] Medications and dosages reviewed.     ROS:  [x]Full ROS obtained and negative except as mentioned in HPI      Physical Examination:    Vitals:    10/26/21 0920   BP: (!) 174/82   Pulse: 69   Temp: 98 °F (36.7 °C)   TempSrc: Temporal   SpO2: 99%   Weight: 163 lb (73.9 kg)   Height: 5' 9\" (1.753 m)        · GENERAL: Well developed, well nourished, No acute distress  · NEUROLOGICAL: Alert and oriented  · PSYCH: Calm affect  · SKIN: Warm and dry, No visible rash,   · EYES: Pupils equal and round, Sclera non-icteric,   · HENT:  External ears and nose unremarkable, mucus membranes moist  · MUSCULOSKELETAL: Normal cephalic, neck supple  · CAROTID: Normal upstroke, no bruits  · CARDIAC: JVP normal, Normal PMI,  Regular rate and rhythm, normal S1S2, no murmur, rub, or gallop  · RESPIRATORY: Normal respiratory effort, Clear to auscultation bilaterally  · EXTREMITIES: No LE edema  · GASTROINTESTINAL: normal bowel sounds, soft, non-tender, No hepatomegaly     ECHO 5/2021    Summary   Left ventricular cavity size is normal with normal left ventricular wall   thickness. Overall left ventricular systolic function appears mild-moderately reduced. Ejection fraction is visually estimated to be 35-40%. There is mild diffuse hypokinesis. Grade II diastolic dysfunction with elevated LV filling pressures. Normal right ventricular size. Right ventricular systolic function is mildly reduced. The left atrium is moderate-severely dilated. Mitral valve leaflets appear mildly thickened. Mild to moderate mitral regurgitation. Moderate aortic stenosis with a peak velocity of 2.5 m/s, a mean pressure   gradient of 15 mmHg and an DONAVAN of 1.19 cm^2. Aortic valve gradients may be underestimated due to low cardiac output. Mild aortic regurgitation. Mild to moderate tricuspid regurgitation with a PASP of 45 mmHg. Trivial pulmonic regurgitation present. Myoview 9/2021  The left ventricular cavity size is moderately dilated. The right ventricle    is mildly dilated.        There is a small perfusion defect of mild severity in the apex and apical    inferior segment. The fixed defect has associated wall motion abnormality,    consistent with scar. There is an additional small perfusion defect of mild    severity in the mid-anterior. There is corresponding wall motion    abnormality. The defect is consistent with ischemia.        Sum stress score of 4. No visual TID. Calculated TID is 1. 13.        Left ventricular ejection fraction is severely reduced at 35%.            Assessment:       CAD:  No angina. Myoview slightly abnormal with progressive LV dysfunction. Long discussion with pt and daughter. Hesitant to proceed with invasive procedures, but feel we need to know coronary status to decide on further treatment options such as ablation, AICD, BiV ect. Plan cardiac cath possible PCI. He would not want a repeat CABG    CAB [facility]-no report  Cardiac [id number]-no report    HTN:  /70 (Site: Left Upper Arm, Position: Sitting, Cuff Size: Medium Adult)   Pulse 88   Ht 5' 9\" (1.753 m)   Wt 159 lb 1.6 oz (72.2 kg)   SpO2 98%   BMI 23.49 kg/m²   Well controlled. Continue toprol and lisinopril    Hyperlipidemia:  Controlled  LDL=48 2020  Continue current medical therapy    Afib: Increasing afib  Refused amio. Continue coumadin  After I discussed with him he is willing to use amio. I would like cath first and then discuss amio with EP    Pacemaker:  Stable  Follow q 3 months    Anemia:  Followed by [facility]  ? [facility]    Cardiomyopathy:  EF normal 2018 now progressive LV dysfunction  Etiology unclear. Possible CAD, vs afib vs pacing  Plan cath as above     VT:  VT seen on pacer. Plan cath as above. Then EP discussion of options      Plan:  Cardiac cath with Dr. Alaina Chester with him, pt and daughter  Discuss other treatment options pending cath result  Hold coumadin 5 days prior to cath    Thank you for allowing me to participate in the care of this individual.      Aidan Joseph M.D., [facility]    Patient seen and examined. Chart reviewed. No changes to H and P.  Cardiac cath today.     Tegan Hall MD

## 2021-10-26 NOTE — PRE SEDATION
Brief Pre-Op Note/Sedation Assessment      Melissa Yates  11/12/1933  [id number]  10:22 AM    Planned Procedure: Cardiac Catheterization Procedure  Post Procedure Plan: Return to same level of care  Consent: I have discussed with the patient and/or the patient representative the indication, alternatives, and the possible risks and/or complications of the planned procedure and the anesthesia methods. The patient and/or patient representative appear to understand and agree to proceed. Chief Complaint:   Anginal Equivalent  Fatigue      Indications for Cath Procedure:  1. Presentation:  Cardiomyopathy and LV Dysfunction  2. Anginal Classification within 2 weeks:  CCS III - Symptoms with everyday living activities, i.e., moderate limitation  3. Angina Symptoms Assessment:  Asymptomatic  4. Heart Failure Class within last 2 weeks:  Yes:  Heart Failure Type: Systolic Severity:  Class II - Symptoms of HF on ordinary exertion  5. Cardiovascular Instability:  No    Prior Ischemic Workup/Eval:  1. Pre-Procedural Medications: Yes: Aspirin and Beta Blockers  2. Stress Test Completed? Yes:  Stress or Imaging Studies Performed (within ANY time period):   Type:  Stress Nuclear  Results:  Positive:  Myocardial Perfusion Defects (Nuclear) Extent of Ischemia:  Intermediate    Does Patient need surgery?   Cath Valve Surgery:  No    Pre-Procedure Medical History:  Vital Signs:  BP (!) 174/82   Pulse 69   Temp 98 °F (36.7 °C) (Temporal)   Ht 5' 9\" (1.753 m)   Wt 163 lb (73.9 kg)   SpO2 99%   BMI 24.07 kg/m²     Allergies:  No Known Allergies  Medications:    Current Facility-Administered Medications   Medication Dose Route Frequency Provider Last Rate Last Admin    sodium chloride flush 0.9 % injection 5-40 mL  5-40 mL IntraVENous PRN Na Whittington MD           Past Medical History:    Past Medical History:   Diagnosis Date    Actinic keratosis     Actinic keratosis     Atrial fibrillation (HCC)     Bilateral carotid artery stenosis     CAD (coronary artery disease)     Cellulitis 7/15/2015    right foot    Diabetes mellitus [address]     DM (diabetes mellitus), type 2 with peripheral vascular complications (HCC)--s/p amputation great toe post osteomylitis  9/11/2015    Glucose intolerance (malabsorption)     Hyperlipidemia     Hypertension     Hypertrophy of prostate without urinary obstruction and other lower urinary tract symptoms (LUTS)     Intermittent atrial fibrillation ([address]     Kidney stone     Occult blood in stool     Hx of    Pacemaker     Permanent       Surgical History:    Past Surgical History:   Procedure Laterality Date    ABSCESS DRAINAGE  7/20/15    right foot    APPENDECTOMY      CARDIAC CATHETERIZATION  2003    Left    COLONOSCOPY  03/28/2018    dr Nathaly Worthington    x3   [facility]    OTHER SURGICAL HISTORY Right 7/17/15    right fifth toe I and D    PACEMAKER PLACEMENT  2005    UT ESOPHAGOGASTRODUODENOSCOPY TRANSORAL DIAGNOSTIC N/A 11/21/2018    EGD DIAGNOSTIC ONLY performed by Chiquita Burris MD at [place] [address] Right 7.16.15    right pinkey toe     TONSILLECTOMY AND ADENOIDECTOMY               Pre-Sedation:  Pre-Sedation Documentation and Exam:  I have assessed the patient and reviewed the H&P on the chart. Prior History of Anesthesia Complications:   none    Modified Mallampati:  II (soft palate, uvula, fauces visible)    ASA Classification:  Class 3 - A patient with severe systemic disease that limits activity but is not incapacitating    Elysia Scale:   Activity:  2 - Able to move 4 extremities voluntarily on command  Respiration:  2 - Able to breathe deeply and cough freely  Circulation:  2 - BP+/- 20mmHg of normal  Consciousness:  2 - Fully awake  Oxygen Saturation (color):  2 - Able to maintain oxygen saturation >92% on room air    Sedation/Anesthesia Plan:  Guard the patient's safety and welfare. Minimize physical discomfort and pain. Minimize negative psychological responses to treatment by providing sedation and analgesia and maximize the potential amnesia. Patient to meet pre-procedure discharge plan.     Medication Planned:  midazolam intravenously and fentanyl intravenously    Patient is an appropriate candidate for plan of sedation:   yes      Electronically signed by Trey Stewart MD on 10/26/2021 at 10:22 AM

## 2021-10-27 ENCOUNTER — NURSE ONLY (OUTPATIENT)
Dept: CARDIOLOGY CLINIC | Age: 86
End: 2021-10-27
Payer: MEDICARE

## 2021-10-27 VITALS
WEIGHT: 163 LBS | RESPIRATION RATE: 16 BRPM | SYSTOLIC BLOOD PRESSURE: 130 MMHG | HEIGHT: 69 IN | HEART RATE: 87 BPM | DIASTOLIC BLOOD PRESSURE: 72 MMHG | OXYGEN SATURATION: 93 % | BODY MASS INDEX: 24.14 KG/M2 | TEMPERATURE: 98.2 F

## 2021-10-27 DIAGNOSIS — D64.9 ANEMIA, UNSPECIFIED TYPE: ICD-10-CM

## 2021-10-27 DIAGNOSIS — N18.9 CHRONIC KIDNEY DISEASE, UNSPECIFIED CKD STAGE: Primary | ICD-10-CM

## 2021-10-27 DIAGNOSIS — Z95.0 CARDIAC PACEMAKER IN SITU: Primary | ICD-10-CM

## 2021-10-27 DIAGNOSIS — I50.22 CHRONIC SYSTOLIC CONGESTIVE HEART FAILURE (HCC): ICD-10-CM

## 2021-10-27 LAB
ANION GAP SERPL CALCULATED.3IONS-SCNC: 9 MMOL/L (ref 3–16)
BUN BLDV-MCNC: 22 MG/DL (ref 7–20)
CALCIUM SERPL-MCNC: 9 MG/DL (ref 8.3–10.6)
CHLORIDE BLD-SCNC: 106 MMOL/L (ref 99–110)
CO2: 26 MMOL/L (ref 21–32)
CREAT SERPL-MCNC: 0.9 MG/DL (ref 0.8–1.3)
EKG ATRIAL RATE: 144 BPM
EKG ATRIAL RATE: 69 BPM
EKG DIAGNOSIS: NORMAL
EKG DIAGNOSIS: NORMAL
EKG P AXIS: 65 DEGREES
EKG P-R INTERVAL: 214 MS
EKG Q-T INTERVAL: 452 MS
EKG Q-T INTERVAL: 480 MS
EKG QRS DURATION: 100 MS
EKG QRS DURATION: 112 MS
EKG QTC CALCULATION (BAZETT): 501 MS
EKG QTC CALCULATION (BAZETT): 514 MS
EKG R AXIS: -33 DEGREES
EKG R AXIS: -36 DEGREES
EKG T AXIS: -1 DEGREES
EKG T AXIS: 24 DEGREES
EKG VENTRICULAR RATE: 69 BPM
EKG VENTRICULAR RATE: 74 BPM
ESTIMATED AVERAGE GLUCOSE: 102.5 MG/DL
GFR AFRICAN AMERICAN: >60
GFR NON-AFRICAN AMERICAN: >60
GLUCOSE BLD-MCNC: 131 MG/DL (ref 70–99)
GLUCOSE BLD-MCNC: 176 MG/DL (ref 70–99)
HBA1C MFR BLD: 5.2 %
HCT VFR BLD CALC: 23.5 % (ref 40.5–52.5)
HCT VFR BLD CALC: 23.8 % (ref 40.5–52.5)
HEMOGLOBIN: 8.2 G/DL (ref 13.5–17.5)
HEMOGLOBIN: 8.4 G/DL (ref 13.5–17.5)
MCH RBC QN AUTO: 32.5 PG (ref 26–34)
MCH RBC QN AUTO: 34 PG (ref 26–34)
MCHC RBC AUTO-ENTMCNC: 34.2 G/DL (ref 31–36)
MCHC RBC AUTO-ENTMCNC: 35.9 G/DL (ref 31–36)
MCV RBC AUTO: 94.8 FL (ref 80–100)
MCV RBC AUTO: 95 FL (ref 80–100)
PDW BLD-RTO: 19.8 % (ref 12.4–15.4)
PDW BLD-RTO: 20.7 % (ref 12.4–15.4)
PERFORMED ON: ABNORMAL
PLATELET # BLD: 207 K/UL (ref 135–450)
PLATELET # BLD: 213 K/UL (ref 135–450)
PMV BLD AUTO: 9 FL (ref 5–10.5)
PMV BLD AUTO: 9.4 FL (ref 5–10.5)
POTASSIUM SERPL-SCNC: 4.3 MMOL/L (ref 3.5–5.1)
PRO-BNP: 3762 PG/ML (ref 0–449)
RBC # BLD: 2.48 M/UL (ref 4.2–5.9)
RBC # BLD: 2.51 M/UL (ref 4.2–5.9)
SODIUM BLD-SCNC: 141 MMOL/L (ref 136–145)
WBC # BLD: 9.6 K/UL (ref 4–11)
WBC # BLD: 9.9 K/UL (ref 4–11)

## 2021-10-27 PROCEDURE — 96365 THER/PROPH/DIAG IV INF INIT: CPT

## 2021-10-27 PROCEDURE — G0378 HOSPITAL OBSERVATION PER HR: HCPCS

## 2021-10-27 PROCEDURE — 85027 COMPLETE CBC AUTOMATED: CPT

## 2021-10-27 PROCEDURE — 6370000000 HC RX 637 (ALT 250 FOR IP): Performed by: NURSE PRACTITIONER

## 2021-10-27 PROCEDURE — 6360000002 HC RX W HCPCS: Performed by: INTERNAL MEDICINE

## 2021-10-27 PROCEDURE — 2580000003 HC RX 258: Performed by: INTERNAL MEDICINE

## 2021-10-27 PROCEDURE — 6370000000 HC RX 637 (ALT 250 FOR IP): Performed by: INTERNAL MEDICINE

## 2021-10-27 PROCEDURE — 83036 HEMOGLOBIN GLYCOSYLATED A1C: CPT

## 2021-10-27 PROCEDURE — 99232 SBSQ HOSP IP/OBS MODERATE 35: CPT | Performed by: INTERNAL MEDICINE

## 2021-10-27 PROCEDURE — 93010 ELECTROCARDIOGRAM REPORT: CPT | Performed by: INTERNAL MEDICINE

## 2021-10-27 PROCEDURE — 99217 PR OBSERVATION CARE DISCHARGE MANAGEMENT: CPT | Performed by: NURSE PRACTITIONER

## 2021-10-27 PROCEDURE — 93005 ELECTROCARDIOGRAM TRACING: CPT | Performed by: INTERNAL MEDICINE

## 2021-10-27 PROCEDURE — 83880 ASSAY OF NATRIURETIC PEPTIDE: CPT

## 2021-10-27 PROCEDURE — 36415 COLL VENOUS BLD VENIPUNCTURE: CPT

## 2021-10-27 PROCEDURE — 80048 BASIC METABOLIC PNL TOTAL CA: CPT

## 2021-10-27 RX ORDER — FUROSEMIDE 20 MG/1
20 TABLET ORAL DAILY
Status: DISCONTINUED | OUTPATIENT
Start: 2021-10-27 | End: 2021-10-27 | Stop reason: HOSPADM

## 2021-10-27 RX ORDER — METOPROLOL SUCCINATE 50 MG/1
50 TABLET, EXTENDED RELEASE ORAL 2 TIMES DAILY
Qty: 30 TABLET | Refills: 3
Start: 2021-10-27 | End: 2021-10-29

## 2021-10-27 RX ORDER — CLOPIDOGREL BISULFATE 75 MG/1
75 TABLET ORAL DAILY
Qty: 30 TABLET | Refills: 3 | Status: SHIPPED | OUTPATIENT
Start: 2021-10-28 | End: 2021-10-29

## 2021-10-27 RX ORDER — ASPIRIN 81 MG/1
81 TABLET ORAL DAILY
Qty: 30 TABLET | Refills: 0 | Status: SHIPPED | OUTPATIENT
Start: 2021-10-28 | End: 2021-11-29

## 2021-10-27 RX ORDER — METOPROLOL SUCCINATE 50 MG/1
50 TABLET, EXTENDED RELEASE ORAL ONCE
Status: COMPLETED | OUTPATIENT
Start: 2021-10-27 | End: 2021-10-27

## 2021-10-27 RX ORDER — FUROSEMIDE 20 MG/1
20 TABLET ORAL DAILY
Qty: 30 TABLET | Refills: 1 | Status: SHIPPED | OUTPATIENT
Start: 2021-10-28 | End: 2021-10-29

## 2021-10-27 RX ADMIN — IRON SUCROSE 200 MG: 20 INJECTION, SOLUTION INTRAVENOUS at 14:36

## 2021-10-27 RX ADMIN — CLOPIDOGREL BISULFATE 75 MG: 75 TABLET ORAL at 09:53

## 2021-10-27 RX ADMIN — AMLODIPINE BESYLATE 5 MG: 5 TABLET ORAL at 09:52

## 2021-10-27 RX ADMIN — METOPROLOL SUCCINATE 50 MG: 50 TABLET, EXTENDED RELEASE ORAL at 16:56

## 2021-10-27 RX ADMIN — FUROSEMIDE 20 MG: 20 TABLET ORAL at 11:19

## 2021-10-27 RX ADMIN — ATORVASTATIN CALCIUM 20 MG: 20 TABLET, FILM COATED ORAL at 09:52

## 2021-10-27 RX ADMIN — SOTALOL HYDROCHLORIDE 120 MG: 80 TABLET ORAL at 09:52

## 2021-10-27 RX ADMIN — THIAMINE HCL TAB 100 MG 100 MG: 100 TAB at 09:53

## 2021-10-27 RX ADMIN — ASPIRIN 81 MG: 81 TABLET, COATED ORAL at 09:52

## 2021-10-27 RX ADMIN — SODIUM CHLORIDE, PRESERVATIVE FREE 10 ML: 5 INJECTION INTRAVENOUS at 09:53

## 2021-10-27 ASSESSMENT — PAIN SCALES - GENERAL
PAINLEVEL_OUTOF10: 0

## 2021-10-27 NOTE — DISCHARGE SUMMARY
[address]    DISCHARGE SUMMARY      Patient ID:  Nan Jackson  [id number] 84 y.o. 11/12/1933    Admit date: 10/26/2021    Discharge date:  10/27/21    Admitting Physician: Aleah Rowe MD     Discharge NP: PRIYA Mccurdy - CNP    Admission Diagnoses: Atherosclerotic heart disease of native coronary artery without angina pectoris [I25.10]  Heart disease, unspecified [I51.9]  Abnormal result of other cardiovascular function study [R94.39]  Abnormal cardiovascular stress test [R94.39]    Discharge Diagnoses:   Patient Active Problem List   Diagnosis    CAD (coronary artery disease)    Cardiac pacemaker    Hypertrophy of prostate without urinary obstruction and other lower urinary tract symptoms (LUTS)    Gout-uric acid >7.5--advised proph tx    Hypercholesteremia    Carotid bruit(bilat-nl duplex u/s 10/10)    Vitamin D deficiency-(advised 1000IU/day)    Actinic keratoses    Elevated PSA-(was 4.2 10/10-repeat 6 mo)(was 5.12 1/11-then 4.4 2/12)-sees dr Aileen Pelletier for this    S/P colonoscopy-4/07-neg per pt,  3/18 repeat colonoscopy polyp-repeat 5 yr    Hearing loss, conductive, bilateral-seeing ent-dr quinn for hearing aides    Encounter for monitoring sotalol therapy    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 Marina Carey op 8/15--started otc iron bid, off now  - work up Dr. Ashleigh Arreola bone marrow. possible MDS 2019    Diabetes mellitus ([address]    PAF (paroxysmal atrial fibrillation) ([address]    DM (diabetes mellitus), type 2 with peripheral vascular complications (HCC)--s/p amputation great toe post osteomylitis   diet controlled     Hypertension    Hyperkalemia    H/O esophagogastroduodenoscopy  11/18  prominent vessesls vs varices. dr Nicolette Smith (done for blood in stool)    Urinary frequency    Elevated ferritin  - work up Dr. Ashleigh Arreola  genetic screen hemachromatosis negative.  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    Localized edema    Pneumonia due to organism    Ischemic cardiomyopathy    Benign essential HTN    Aspiration pneumonia ([facility]    Pansinusitis    S/P amputation of lesser toe, right (HCC)    Abnormal cardiovascular stress test         Discharged Condition: good    Hospital Course: Clive Buckner has a history of Afib, CAD s/p CABG, HTN, HLD, PPM. Having increased a fib on Sotalol. Progressive LV dysfunction (Echo 5/2021 with EF 35-40%). Had a stress test as OP that was abnormal. Admitted for [facility]. ([facility] originally cancelled 10/14/21 d/t kidney function. Lasix was decreased to 20mg and lisinopril was held to allow for renal optimization. Norvasc was added in meantime for BP control)    S/p LHC 10/26/21 revealing severe MVD. S/p PCI of RPDA with ALEJANDRA x1. Patent LIMA-LAD and SVG-OM1 grafts. LVG showed LVEF 50%. Pt is on maximally tolerated statin. Attempt triple therapy with enteric-coated aspirin 81 mg daily, Plavix 75 mg daily and warfarin to complete 30 days followed by Plavix and warfarin for an additional 5 months then transition to enteric-coated aspirin 81 mg daily and warfarin thereafter. Plan for CBC Monday (lab slip given). Discussed with Carrie Ignacio NP from [facility] who says pt would be ok for triple therapy for 30 days. Pt has follow up with [facility] 11/10/21. Pt will continue Toprol and sotalol at discharge for a fib/ NSVT. Has follow up with EP 11/1 to discuss further treatment options. Resuming lasix 20mg daily on discharge. Evaluate restarting ACE as OP based on kidney function and BP. Will check BMP and BNP on Monday (lab slip given). LVEF by [facility] at 50%. Echo 5/2021 EF 35-40%. Stress test 9/2021 EF 35%. May need to consider MUGA or repeat echo. Elevated glucose during admit, A1C 5.2. F/u with PCP. Pt will follow up with coumadin clinic Monday as scheduled. At discharge, he reports feeling well. Has no complaints of any kind. Right groin procedure site c/d/I. No hematoma or bruising. Good pulses, color, warmth, and sensation to that extremity. Able to walk without exertional DICKEY or CP. Denies chest pain, shortness of breath, palpitations, dizziness/ lightheadedness, orthopnea, edema. He feels ready to discharge. Discussed plan with daughter at bedside who is a nurse. Medications and discharge instructions reviewed. All questions and concerns addressed    Consults: IP CONSULT TO NEPHROLOGY  IP CONSULT TO CARDIAC REHAB    The patient was seen and examined. Notes, labs, and recent testing reviewed. There were not complications over night. The patient is being seen for coronary artery disease. NYHA Class: II     Objective:     /72   Pulse 87   Temp 98.2 °F (36.8 °C) (Temporal)   Resp 16   Ht 5' 9\" (1.753 m)   Wt 163 lb (73.9 kg)   SpO2 93%   BMI 24.07 kg/m²      Intake/Output Summary (Last 24 hours) at 10/27/2021 1325  Last data filed at 10/27/2021 0000  Gross per 24 hour   Intake 646.14 ml   Output 700 ml   Net -53.86 ml       Physical Exam:  General:  Awake, alert, NAD  Skin:  Warm and dry. Right groin procedure site c/d/I. No hematoma or bruising. Good pulses, color, warmth, and sensation to that extremity. Neck:  JVD<8, no bruit  Chest:  With fine crackles to bases. No SOB/DICKEY. Cardiovascular:  RRR S1S2  Abdomen:  Soft, nontender, +bowel sounds  Extremities: no edema      Significant Diagnostic Studies:     ECG: 10/27/21  Possible Left atrial enlargement  Left axis deviation  Septal infarct , age undetermined  ST & T wave abnormality, consider lateral ischemia  Prolonged QT  Atrial-paced rhythm  When compared with ECG of 26-OCT-2021 15:17,  Sinus rhythm has replaced Electronic ventricular pacemaker  Patient has returned to sinus    Echo: 5/13/21   Summary   Left ventricular cavity size is normal with normal left ventricular wall   thickness. Overall left ventricular systolic function appears mild-moderately reduced. Ejection fraction is visually estimated to be 35-40%. There is mild diffuse hypokinesis.    Grade II diastolic dysfunction with elevated LV filling pressures. Normal right ventricular size. Right ventricular systolic function is mildly reduced. The left atrium is moderate-severely dilated. Mitral valve leaflets appear mildly thickened. Mild to moderate mitral regurgitation. Moderate aortic stenosis with a peak velocity of 2.5 m/s, a mean pressure   gradient of 15 mmHg and an DONAVAN of 1.19 cm^2. Aortic valve gradients may be underestimated due to low cardiac output. Mild aortic regurgitation. Mild to moderate tricuspid regurgitation with a PASP of 45 mmHg. Trivial pulmonic regurgitation present. Stress test: 9/13/21   Summary    The overall quality of the study is good.        The left ventricular cavity size is moderately dilated. The right ventricle    is mildly dilated.        There is a small perfusion defect of mild severity in the apex and apical    inferior segment. The fixed defect has associated wall motion abnormality,    consistent with scar. There is an additional small perfusion defect of mild    severity in the mid-anterior. There is corresponding wall motion    abnormality. The defect is consistent with ischemia.        Sum stress score of 4. No visual TID. Calculated TID is 1. 13.        Left ventricular ejection fraction is severely reduced at 35%.      Myocardial perfusion is abnormal, consistent with ischemia.        Moderate risk scan        Recommendation    Discussed with Dr. Jose Valdovinos, will set up follow up appointment     [facility]: 10/26/21  Anatomy:   LM-20% distal  LAD-100% ostial, calcified  Cx-normal  OM1-100% ostial  OM2-20% proximal  RCA-20% proximal, 40% distal, calcified  RPDA-70 to 80% proximal, FFR of 0.76  LVEF-50%  LIMA-LAD: Widely patent  SVG-OM1: Widely patent  Aortic root: Not aneurysmal, no significant aortic insufficiency, 1 patent SVG visualized. PCI: RPDA 80% to 0% with a 3.25 mm x 18 mm Xience Mariela ALEJANDRA      Impression:  1. Severe multivessel CAD.   2. Patent LIMA-LAD and SVG-OM1 grafts. 3.  Successful PCI with ALEJANDRA x1 to RPDA. 4.  Low normal LV systolic function.     Plan:  1. Attempt triple therapy with enteric-coated aspirin 81 mg daily, Plavix 75 mg daily and warfarin to complete 30 days followed by Plavix and warfarin for an additional 5 months then transition to enteric-coated aspirin 81 mg daily and warfarin thereafter. 2.  Hydration. Labs:   Lab Results   Component Value Date    CREATININE 0.9 10/27/2021    BUN 22 (H) 10/27/2021     10/27/2021    K 4.3 10/27/2021     10/27/2021    CO2 26 10/27/2021      Lab Results   Component Value Date    WBC 9.6 10/27/2021    HGB 8.2 (L) 10/27/2021    HCT 23.8 (L) 10/27/2021    MCV 95.0 10/27/2021     10/27/2021      Lab Results   Component Value Date    INR 1.20 (H) 10/26/2021    PROTIME 16.1 (H) 07/08/2021      Lab Results   Component Value Date    BNP 79 01/10/2012     CARDIAC ENZYMES:No results for input(s): CKMB, CKMBINDEX, TROPONINI in the last 72 hours.     Invalid input(s): CKTOTAL;3  FASTING LIPID PANEL:  Lab Results   Component Value Date    CHOL 109 06/22/2020    HDL 37 06/22/2020    HDL 36 05/07/2012    TRIG 120 06/22/2020       Disposition: home    Patient Instructions:        Medication List      START taking these medications    aspirin 81 MG EC tablet  Take 1 tablet by mouth daily  Start taking on: October 28, 2021  Notes to patient: Use: Prevents platelets from sticking to each other, prevents platlete clot  Side effects: Stomach irritation, unusual bleeding, bruising     clopidogrel 75 MG tablet  Commonly known as: PLAVIX  Take 1 tablet by mouth daily  Start taking on: October 28, 2021  Notes to patient: Use: prevents closing of stent  Side effects: Bleeding or bruising more easily        CHANGE how you take these medications    furosemide 20 MG tablet  Commonly known as: LASIX  Take 1 tablet by mouth daily  Start taking on: October 28, 2021  What changed:   · medication strength  · how much to take  Notes to patient: Use: treat heart failure, fluid retention, lower blood pressure. Side effects: frequent urination, weakness, muscle cramps, increased sensitivity to light, nausea, and dizziness        CONTINUE taking these medications    amLODIPine 5 MG tablet  Commonly known as: NORVASC  Take 1 tablet by mouth daily  Notes to patient: Use:  Relax blood vessels and increase the supply of blood and oxygen to the heart while also reducing the heart's workload   Side effects:  Lightheadedness,low blood pressure,slower heart rate, drowsiness and  swelling of feet ankles and legs     atorvastatin 20 MG tablet  Commonly known as: LIPITOR  Take 1 tablet by mouth daily  Notes to patient: Use:  Cholesterol reduction  Side effects:  Muscle pain or soreness, stomach and intestinal upset     EPOETIN BIBIANA IJ     fluticasone 50 MCG/ACT nasal spray  Commonly known as: FLONASE  2 sprays by Each Nostril route 2 times daily  Notes to patient: Use: Allergies  Side effects: Headache, back pain, dizziness     metoprolol succinate 50 MG extended release tablet  Commonly known as:  Toprol XL  Take 1 tablet by mouth 2 times daily  Notes to patient: Use:  Lowers blood pressure and heart rate, takes workload off heart  Side effects:  Tiredness, shortness of breath, trouble sleeping, impotence     sodium chloride 0.65 % nasal spray  Commonly known as: OCEAN, BABY AYR  2 sprays by Nasal route every 4 hours     sotalol 120 MG tablet  Commonly known as: BETAPACE  TAKE 1 TABLET BY MOUTH TWICE DAILY  Notes to patient: Use:  Lowers blood pressure and heart rate, takes workload off heart  Side effects:  Tiredness, shortness of breath, trouble sleeping, impotence     Tylenol 325 MG tablet  Generic drug: acetaminophen  Notes to patient: Use: Mild to moderate pain and inflammation  Side effects: Upset stomach and small increase in bleeding risk     vitamin B-1 100 MG tablet  Commonly known as: THIAMINE  Notes to patient: Use: Supplement  Side effects: Nausea, indigestion      warfarin 5 MG tablet  Commonly known as: COUMADIN  Take as directed. If you are unsure how to take this medication, talk to your nurse or doctor. Original instructions: Take 1 tablet by mouth daily Take 2.5 mg on Mon, Wed and Fri and 5 mg all other days of the week  Notes to patient: Use: Treat and prevent blood clots  Side effects: abnormal bruising, bleeding         STOP taking these medications    lisinopril 10 MG tablet  Commonly known as: PRINIVIL;ZESTRIL           Where to Get Your Medications      These medications were sent to [address], [address] [facility] [phone] Angelica Johnson [phone] [address] [facility] [address]    Hours: 24-hours Phone: [phone]   · aspirin 81 MG EC tablet  · clopidogrel 75 MG tablet  · furosemide 20 MG tablet     Information about where to get these medications is not yet available    Ask your nurse or doctor about these medications  · metoprolol succinate 50 MG extended release tablet       The patient is on a beta-blocker  The patient is not on an ace-i/ARB  The patient is on a statin  The patient is on antiplatelet therapy  The patient does have history of AF, and is on anticoagulation    1. Overall the patient is stable from CV standpoint  2. Most recent cardiac testing has been reviewed as above. Further testing is not required at this time. 3. The patient is not currently smoking. The risks related to smoking were reviewed with the patient. Recommend maintaining a smoke-free lifestyle. Products available for smoking cessation were discussed in detail. Activity: no lifting, Driving, or Strenuous exercise for 10 days  Diet: cardiac diet  Wound Care: keep wound clean and dry    Follow-up with [address] in 1-2 weeks. Recommend follow up with PCP in 3-4 weeks.     Signed:  PRIYA Mtz - CNP, CNP, 10/27/2021, 1:25 PM  Time spent on discharge of patient: >31 minutes, including plan of care, patient education, and care coordination. NOTE:  This report was transcribed using voice recognition software. Every effort was made to ensure accuracy; however, inadvertent computerized transcription errors may be present.

## 2021-10-27 NOTE — PROGRESS NOTES
NEURO: A&O x4, hard of hearing with hearing aids     CARDIAC: V-paced, afib, NSR. intermittent rhythms      RESP: 93% on Room air      GI: Good PO intake, no BM this shift     SKIN: Intact. R groin puncture site. No hematoma r swelling noted     : Good urin eoutput     LINES: 1 PIV     GTTS: MIV @ 75/hr        See flowsheets for details, VS, MAR for meds and titration.

## 2021-10-27 NOTE — PROGRESS NOTES
Office : [phone]     Fax :[phone]       Nephrology progress Note      Patient's Name: Servando Daniels  1:12 PM  10/27/2021          History of Present Ilness:    Servando Daniels is a 80 y.o. male with history of recent REYNA, hypertension, atrial fibrillation, diabetes, albuminuria who was recently seen by me in the office after he had acute kidney injury. His Lasix dose was  Decreased and I held his lisinopril. Repeat creatinine level came down to 1. He had elective left heart cath today. Had stent placement to RPDA. Post procedure he is doing well. Has urinated once. Interval hx :    Feels better. Denies any groin pain   Creatinine stable / better at 0.9      I/O last 3 completed shifts: In: 646.1 [P.O.:240;  I.V.:406.1]  Out: 0 [Urine:700]    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  9/11/2015    Glucose intolerance (malabsorption)     Hyperlipidemia     Hypertension     Hypertrophy of prostate without urinary obstruction and other lower urinary tract symptoms (LUTS)     Intermittent atrial fibrillation ([address]     Kidney stone     Occult blood in stool     Hx of    Pacemaker     Permanent       Past Surgical History:   Procedure Laterality Date    ABSCESS DRAINAGE  7/20/15    right foot    APPENDECTOMY      CARDIAC CATHETERIZATION  2003    Left    COLONOSCOPY  03/28/2018    dr Jyoti Roberts x3   [facility]    OTHER SURGICAL HISTORY Right 7/17/15    right fifth toe I and D    PACEMAKER PLACEMENT  2005    LA ESOPHAGOGASTRODUODENOSCOPY TRANSORAL DIAGNOSTIC N/A 11/21/2018    EGD DIAGNOSTIC ONLY performed by Rhoda Liz MD at [place] [address] Right 7.16.15    right pinkey toe     TONSILLECTOMY AND ADENOIDECTOMY           Current Medications:    furosemide (LASIX) tablet 20 mg, Daily  iron sucrose (VENOFER) 200 mg in sodium chloride 0.9 % 100 mL IVPB, Once  sodium chloride flush 0.9 % injection 5-40 mL, PRN  thiamine mononitrate tablet 100 mg, Daily  fluticasone (FLONASE) 50 MCG/ACT nasal spray 2 spray, BID  sodium chloride (OCEAN, BABY AYR) 0.65 % nasal spray 2 spray, Q4H PRN  sotalol (BETAPACE) tablet 120 mg, BID  atorvastatin (LIPITOR) tablet 20 mg, Daily  amLODIPine (NORVASC) tablet 5 mg, Daily  sodium chloride flush 0.9 % injection 5-40 mL, 2 times per day  sodium chloride flush 0.9 % injection 5-40 mL, PRN  0.9 % sodium chloride infusion, PRN  acetaminophen (TYLENOL) tablet 650 mg, Q4H PRN  ondansetron (ZOFRAN) injection 4 mg, Q6H PRN  aspirin EC tablet 81 mg, Daily  clopidogrel (PLAVIX) tablet 75 mg, Daily  glucose (GLUTOSE) 40 % oral gel 15 g, PRN  dextrose 50 % IV solution, PRN  glucagon (rDNA) injection 1 mg, PRN  dextrose 5 % solution, PRN  insulin lispro (1 Unit Dial) 0-6 Units, TID WC  insulin lispro (1 Unit Dial) 0-3 Units, Nightly            Physical exam:     Vitals:  /72   Pulse 87   Temp 98.2 °F (36.8 °C) (Temporal)   Resp 16   Ht 5' 9\" (1.753 m)   Wt 163 lb (73.9 kg)   SpO2 93%   BMI 24.07 kg/m²   Constitutional:  OAA X3 NAD  Skin: no rash, turgor wnl  Heent:  eomi, mmm  Neck: no bruits or jvd noted  Cardiovascular:  S1, S2 without m/r/g  Respiratory: CTA B without w/r/r  Abdomen:  +bs, soft, nt, nd  Ext: no  lower extremity edema  Psychiatric: mood and affect appropriate  Musculoskeletal:  Rom, muscular strength intact    Labs:  CBC:   Recent Labs     10/27/21  0455 10/27/21  1130   WBC 9.9 9.6   HGB 8.4* 8.2*    213     BMP:    Recent Labs     10/26/21  0906 10/27/21  0455    141   K 4.4 4.3    106   CO2 29 26   BUN 27* 22*   CREATININE 1.1 0.9   GLUCOSE 179* 131*     Ca/Mg/Phos:   Recent Labs     10/26/21  0906 10/27/21  0455   CALCIUM 9.7 9.0     Hepatic: No results for input(s): AST, ALT, ALB, BILITOT, ALKPHOS in the last 72 hours. Troponin: No results for input(s): TROPONINI in the last 72 hours. BNP: No results for input(s): BNP in the last 72 hours. Lipids: No results for input(s): CHOL, TRIG, HDL, LDLCALC, LABVLDL in the last 72 hours. ABGs: No results for input(s): PHART, PO2ART, [id number] in the last 72 hours. INR:   Recent Labs     10/26/21  0905   INR 1.20*     UA:No results for input(s): Lorelie Hardy, GLUCOSEU, BILIRUBINUR, Theoplis Crow, BLOODU, PHUR, PROTEINU, UROBILINOGEN, NITRU, LEUKOCYTESUR, Terrea Billet in the last 72 hours. Urine Microscopic: No results for input(s): LABCAST, BACTERIA, COMU, HYALCAST, WBCUA, RBCUA, EPIU in the last 72 hours. Urine Culture: No results for input(s): LABURIN in the last 72 hours. Urine Chemistry: No results for input(s): Johana Sandman, PROTEINUR, NAUR in the last 72 hours. Assessment/Plan :      1. Recent history of REYNA. REYNA resolved. Had left heart cath yesterday   Renal function stable so far   Has base crackles on lung auscultation   Start lasix 20 mg po daily     2. Coronary artery disease status post PCI    3. Congestive heart failure.  Restart lasix 20 mg po daily     Can start low dose lisinopril in 2-3 days     Will f/u as outpt         Thank you for allowing us to participate in care of Ze Palmer         Electronically signed by: Sae Mendoza MD, 10/27/2021, 1:12 PM      [facility] [address]  Office : [phone]  Fax :[phone]

## 2021-10-27 NOTE — PROGRESS NOTES
Discharge instructions reviewed with patient and family member. Patient and family verbalized understanding. All home medications have been reviewed, questions answered and patient voiced understanding. All medication side effects reviewed and patient and family verbalized understanding. Patient given prescriptions, discharge instructions, and appointment times. Patient discharged to home with family via private car. Taken to lobby via wheelchair. Follow up appointment(s) reviewed with patient and all attempts made to schedule within 7 days of discharge. [facility]  Depression Screen    1. During the past month, have you often been bothered by feeling down, depressed, or hopeless?   no    2. During the past month, have you often been bothered by little interest or pleasure in       doing things?    no

## 2021-10-28 ENCOUNTER — CARE COORDINATION (OUTPATIENT)
Dept: CASE MANAGEMENT | Age: 86
End: 2021-10-28

## 2021-10-28 PROCEDURE — 93294 REM INTERROG EVL PM/LDLS PM: CPT | Performed by: INTERNAL MEDICINE

## 2021-10-28 PROCEDURE — 93296 REM INTERROG EVL PM/IDS: CPT | Performed by: INTERNAL MEDICINE

## 2021-10-28 NOTE — PROGRESS NOTES
We received remote transmission from patient's monitor at home. Transmission shows normal sensing and pacing function. Noted AF with RVR. Pt is on coumadin. EP physician will review. See interrogation under cardiology tab in the [address] [place] [address] [address] field for more details.

## 2021-10-28 NOTE — CARE COORDINATION
[facility] Transitions Initial Follow Up Call    Call within 2 business days of discharge: Yes    Patient: Elizabeth Reyes Patient : 1933   MRN: [id number]  Reason for Admission: Afib, [address] [facility]  Discharge Date: 10/27/21 RARS: Readmission Risk Score: 23    First attempt at 24 hour discharge call, no answer, CTN left VM with contact information and request for return call. CTN will continue with outreach call attempts.       Thersia Essex, RN

## 2021-10-29 ENCOUNTER — CARE COORDINATION (OUTPATIENT)
Dept: CASE MANAGEMENT | Age: 86
End: 2021-10-29

## 2021-10-29 DIAGNOSIS — R94.39 ABNORMAL CARDIOVASCULAR STRESS TEST: Primary | ICD-10-CM

## 2021-10-29 PROCEDURE — 1111F DSCHRG MED/CURRENT MED MERGE: CPT | Performed by: INTERNAL MEDICINE

## 2021-10-29 RX ORDER — CLOPIDOGREL BISULFATE 75 MG/1
75 TABLET ORAL DAILY
Qty: 90 TABLET | Refills: 3 | Status: SHIPPED | OUTPATIENT
Start: 2021-10-29 | End: 2021-12-28

## 2021-10-29 RX ORDER — FUROSEMIDE 20 MG/1
20 TABLET ORAL DAILY
Qty: 90 TABLET | Refills: 3 | Status: SHIPPED | OUTPATIENT
Start: 2021-10-29 | End: 2021-11-02

## 2021-10-29 RX ORDER — METOPROLOL SUCCINATE 50 MG/1
TABLET, EXTENDED RELEASE ORAL
Qty: 180 TABLET | Refills: 3 | Status: SHIPPED | OUTPATIENT
Start: 2021-10-29 | End: 2021-11-30

## 2021-10-29 NOTE — CARE COORDINATION
[facility] Transitions Initial Follow Up Call:  Patient's daughter reports that he is doing well, denies any CP, SOB. She reports that his groin cath insertion site is healing well. Discussed discharge instructions and reviewed medications, 1111F completed. Patient will follow up with cardiology 21. CTN will continue with outreach follow up calls. CTN also provided daughter with Dr. Grisel Toney office number in the event that they need to contact him. Call within 2 business days of discharge: Yes    Patient: Rach Staff Patient : 1933   MRN: [id number]  Reason for Admission: Afib, [facility]  Discharge Date: 10/27/21 RARS: Readmission Risk Score: 23      Last Discharge [facility]       Complaint Diagnosis Description Type Department Provider    10/26/21   Admission (Discharged) Nidia Mohs, MD           Spoke with: Daughter \"Daylin\"    Transitions of Care Initial Call    Was this an external facility discharge? No Discharge Facility:     Challenges to be reviewed by the provider   Additional needs identified to be addressed with provider: No  none             Method of communication with provider : none      Advance Care Planning:   Does patient have an Advance Directive: reviewed and current. Was this a readmission? No  Patient stated reason for admission:  Heart catheter  Patients top risk factors for readmission: medical condition-.    Care Transition Nurse (CTN) contacted the family by telephone to perform post hospital discharge assessment. Verified name and  with family as identifiers. Provided introduction to self, and explanation of the CTN role. CTN reviewed discharge instructions, medical action plan and red flags with family who verbalized understanding. Family given an opportunity to ask questions and does not have any further questions or concerns at this time. Were discharge instructions available to patient? Yes.  Reviewed appropriate site of care based on symptoms and resources available to patient including: PCP, Urgent care clinics and When to call 911. The family agrees to contact the PCP office for questions related to their healthcare. Medication reconciliation was performed with family, who verbalizes understanding of administration of home medications. Advised obtaining a 90-day supply of all daily and as-needed medications. Reviewed and educated family on any new and changed medications related to discharge diagnosis. Was patient discharged with a pulse oximeter? No     CTN provided contact information. Plan for follow-up call in 3-5 days based on severity of symptoms and risk factors. Plan for next call: symptom management-., self management-. and follow up appointment-.           Non-face-to-face services provided:  Obtained and reviewed discharge summary and/or continuity of care documents    Care Transitions 24 Hour Call    Do you have any ongoing symptoms?: No  Do you have a copy of your discharge instructions?: Yes  Do you have all of your prescriptions and are they filled?: Yes  Have you been contacted by a [address]?: No  Have you scheduled your follow up appointment?: Yes  How are you going to get to your appointment?: Car - family or friend to transport  Were you discharged with any Home Care or Post Acute Services: No  Do you feel like you have everything you need to keep you well at home?: Yes  Care Transitions Interventions         Follow Up  Future Appointments   Date Time Provider Sonido Bar   11/1/2021  1:30 PM SCHEDULE, [place] DEVICE CHECK FF Cardio MMA   11/1/2021  2:00 PM PRIYA Hanna CNP FF Cardio MMA   11/2/2021 11:00 AM [facility]TICOAGULATION CLIN[facility]   11/12/2021  9:15 AM PRIYA Lang CNP FF Cardio MMA   11/29/2021  1:15 PM Nadia Grossman MD [facility] AFL Nephrolo   12/6/2021 10:00 AM Brice Flor MD [facility] [place] - DYD   1/26/2022  7:30 AM SCHEDULE, [place] REMOTE TRANSMISSION FF Cardio [facility]   4/27/2022  7:30 AM SCHEDULE, [place] REMOTE TRANSMISSION FF Cardio [facility]   7/27/2022  7:30 AM SCHEDULE, [place] REMOTE TRANSMISSION FF Cardio [facility]   10/26/2022  7:30 AM SCHEDULE, [facility] TRANSMISSION  Cardio [facility]       Shannon Cabrera RN

## 2021-11-01 ENCOUNTER — HOSPITAL ENCOUNTER (OUTPATIENT)
Age: 86
Discharge: HOME | End: 2021-11-01
Payer: MEDICARE

## 2021-11-01 ENCOUNTER — ANTI-COAG VISIT (OUTPATIENT)
Dept: PHARMACY | Age: 86
End: 2021-11-01
Payer: MEDICARE

## 2021-11-01 ENCOUNTER — NURSE ONLY (OUTPATIENT)
Dept: CARDIOLOGY CLINIC | Age: 86
End: 2021-11-01
Payer: MEDICARE

## 2021-11-01 ENCOUNTER — OFFICE VISIT (OUTPATIENT)
Dept: CARDIOLOGY CLINIC | Age: 86
End: 2021-11-01
Payer: MEDICARE

## 2021-11-01 VITALS
OXYGEN SATURATION: 99 % | HEIGHT: 69 IN | WEIGHT: 162 LBS | BODY MASS INDEX: 23.99 KG/M2 | DIASTOLIC BLOOD PRESSURE: 81 MMHG | SYSTOLIC BLOOD PRESSURE: 155 MMHG | HEART RATE: 72 BPM

## 2021-11-01 DIAGNOSIS — I51.9 LV DYSFUNCTION: ICD-10-CM

## 2021-11-01 DIAGNOSIS — I48.0 PAF (PAROXYSMAL ATRIAL FIBRILLATION) (HCC): ICD-10-CM

## 2021-11-01 DIAGNOSIS — D64.9 ANEMIA, UNSPECIFIED TYPE: ICD-10-CM

## 2021-11-01 DIAGNOSIS — I50.22 CHRONIC SYSTOLIC CONGESTIVE HEART FAILURE (HCC): ICD-10-CM

## 2021-11-01 DIAGNOSIS — I10 BENIGN ESSENTIAL HTN: ICD-10-CM

## 2021-11-01 DIAGNOSIS — N18.9 CHRONIC KIDNEY DISEASE, UNSPECIFIED CKD STAGE: ICD-10-CM

## 2021-11-01 DIAGNOSIS — I48.0 PAF (PAROXYSMAL ATRIAL FIBRILLATION) (HCC): Primary | ICD-10-CM

## 2021-11-01 DIAGNOSIS — Z95.0 CARDIAC PACEMAKER: ICD-10-CM

## 2021-11-01 DIAGNOSIS — I49.5 SSS (SICK SINUS SYNDROME) (HCC): ICD-10-CM

## 2021-11-01 DIAGNOSIS — I49.5 SSS (SICK SINUS SYNDROME) (HCC): Primary | ICD-10-CM

## 2021-11-01 DIAGNOSIS — I25.10 CORONARY ARTERY DISEASE INVOLVING NATIVE CORONARY ARTERY OF NATIVE HEART WITHOUT ANGINA PECTORIS: ICD-10-CM

## 2021-11-01 LAB
ANION GAP SERPL CALCULATED.3IONS-SCNC: 14 MMOL/L (ref 3–16)
BUN BLDV-MCNC: 33 MG/DL (ref 7–20)
CALCIUM SERPL-MCNC: 9.4 MG/DL (ref 8.3–10.6)
CHLORIDE BLD-SCNC: 101 MMOL/L (ref 99–110)
CO2: 26 MMOL/L (ref 21–32)
CREAT SERPL-MCNC: 1.1 MG/DL (ref 0.8–1.3)
GFR AFRICAN AMERICAN: >60
GFR NON-AFRICAN AMERICAN: >60
GLUCOSE BLD-MCNC: 208 MG/DL (ref 70–99)
HCT VFR BLD CALC: 27.4 % (ref 40.5–52.5)
HEMOGLOBIN: 9.2 G/DL (ref 13.5–17.5)
INTERNATIONAL NORMALIZATION RATIO, POC: 1.2
MCH RBC QN AUTO: 32.7 PG (ref 26–34)
MCHC RBC AUTO-ENTMCNC: 33.8 G/DL (ref 31–36)
MCV RBC AUTO: 96.7 FL (ref 80–100)
PDW BLD-RTO: 19.7 % (ref 12.4–15.4)
PLATELET # BLD: 258 K/UL (ref 135–450)
PMV BLD AUTO: 9.6 FL (ref 5–10.5)
POTASSIUM SERPL-SCNC: 4.5 MMOL/L (ref 3.5–5.1)
PRO-BNP: 3506 PG/ML (ref 0–449)
RBC # BLD: 2.83 M/UL (ref 4.2–5.9)
SODIUM BLD-SCNC: 141 MMOL/L (ref 136–145)
WBC # BLD: 7.3 K/UL (ref 4–11)

## 2021-11-01 PROCEDURE — 36415 COLL VENOUS BLD VENIPUNCTURE: CPT

## 2021-11-01 PROCEDURE — 99214 OFFICE O/P EST MOD 30 MIN: CPT | Performed by: NURSE PRACTITIONER

## 2021-11-01 PROCEDURE — 83880 ASSAY OF NATRIURETIC PEPTIDE: CPT

## 2021-11-01 PROCEDURE — 85610 PROTHROMBIN TIME: CPT

## 2021-11-01 PROCEDURE — 1123F ACP DISCUSS/DSCN MKR DOCD: CPT | Performed by: NURSE PRACTITIONER

## 2021-11-01 PROCEDURE — G8427 DOCREV CUR MEDS BY ELIG CLIN: HCPCS | Performed by: NURSE PRACTITIONER

## 2021-11-01 PROCEDURE — 4040F PNEUMOC VAC/ADMIN/RCVD: CPT | Performed by: NURSE PRACTITIONER

## 2021-11-01 PROCEDURE — 85027 COMPLETE CBC AUTOMATED: CPT

## 2021-11-01 PROCEDURE — 1036F TOBACCO NON-USER: CPT | Performed by: NURSE PRACTITIONER

## 2021-11-01 PROCEDURE — G8420 CALC BMI NORM PARAMETERS: HCPCS | Performed by: NURSE PRACTITIONER

## 2021-11-01 PROCEDURE — 99212 OFFICE O/P EST SF 10 MIN: CPT

## 2021-11-01 PROCEDURE — G8484 FLU IMMUNIZE NO ADMIN: HCPCS | Performed by: NURSE PRACTITIONER

## 2021-11-01 PROCEDURE — 80048 BASIC METABOLIC PNL TOTAL CA: CPT

## 2021-11-01 NOTE — PATIENT INSTRUCTIONS
- No medication changes  - Will call with blood work results and medication changes  - Call office if symptoms develop  - Follow up with Carol as scheduled  - Follow up with Dr. Antonio Pak in 3 months

## 2021-11-01 NOTE — PROGRESS NOTES
Patient comes in for programming evaluation for his pacemaker. All sensing and pacing parameters are within normal range. Rep Checked Device. Battery life 7.2-8.0 years  AP 39%   22%. AT/AF with a 41% burden. Currently in AF today. PMT noted. Patient remains on warfarin, sotalol and metoprolol. Programming changes made today:  PVAB from 130 ms to 90 ms. Shortest PVARP/VREP 250 ms to 125 ms   Please see interrogation for more detail. Patient will see Wilberto Russell today and follow up in 3 months in office or remotely.

## 2021-11-01 NOTE — PROGRESS NOTES
Mr. Elizabeth Reyes is a 80 y.o. y/o male with history of Afib   He presents today for anticoagulation monitoring and adjustment. Pertinent PMH: ICD-pacemaker. 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 Not sure how long he has been doing this. States his pillbox has 5mg in it for this Saturday and he thinks he could have been doing this for a few weeks now. States he will follow AVS and take normal dose (as pt was therapeutic on this the past visits). If patient is low in 2 weeks, 9/28, then increase to 2.5mg MWF and 5mg AOD. ---> confirms dose   []   [x]       S/S bleeding/bruising/swelling/SOB- denies  []   [x]       Blood in urine or stool denies  [x]   []       Procedures scheduled in the future at this time - had cardiac cath 7/9. IR 1.4---> holding 5 days for LV cath on 10/14, also states that having 2 moles removed from face 10/20- doesn't know if he has to hold yet, he will call and let me know---.  Cath cancelled 101/14, still held 5 days, then another procedure 10/26 and held 5 days   [x]   []       Missed Dose- held for 2 procedures   []   [x]       Extra Dose- denies  []   [x]       Change in medications- receiving Procrit 40,000 unit injection once every two weeks until red blood cell count is consistently under control again---> stop sotalol, started amiodarone 200mg daily 4/28---> states did not start it because was concerned with the SE so stayed on sotalol and has fu apt next week with regular cardiologist --> no changes---> zoloft, inc lisinopril--->stoped sotalol, started amiodarone 200mg qd on 8/20, inc metoprolol---states did not start amiodarone dt concerns with SEs so still sotalol, inc lasix--->2 tylenol in the mornings---> denies---> dec lasix, held lisinopril for a few days and still holding for now, on ASA for 30 days, then plavix for 5 months then back to ASA         [x]   []       Change in health/diet/appetite consistent greens -> has been eating less, appetite has been diminished --> has been eating less since being home --> appetite is slowly starting to return---> only had one salad, no NVD---> no changes, no NVD---> diet off with Eve Garcia in hospital---> no greens, diet off with Eve Garcia and stressed, no NVD  --> diet back to normal, no NVD --> states that he has been stressed with wife being ill and in hospital, not eating well --> appetite has been diminished d/t wife--->eating out more, no NVD---> no greens, no NVD---> erratic diet, no NVD   []   [x]       Change in alcohol use- doesn't drink   []   [x]       Change in activity  []   [x]       Hospital admission -admitted after procedure 10/26---> started ASA/plavix on dc, stopped lisinopril   []   [x]       Emergency department visit  []   [x]       Other complaints--> Patient has been very tired and not able to function well, getting epoetin alpha injection at hemotologist office     Clinical Outcomes:  Yes     No  []   [x]       Major bleeding event  []   [x]       Thromboembolic event  Duration of warfarin Therapy: indefinite  INR Range:  2.0-3.0     Has been stressed with passing of wife. Kayla Waterman (daughter) is primary care now- [phone]. INR is 1.2 today after resuming warfarin 5 days ago after procedure/hospitalization. Pt held 5 days for procedure on 10/14 then it was cancelled, went back on for a few days then held again for 5 days for procedure 10/26 and was hospitalize after, did not boost when resumed. Take 7.5mg tonight then continue taking dose of 5 mg on Sun, Tues and Thurs and 2.5 mg all other days of the week. Encouraged to maintain a consistency of vegetables/salads.   Recheck INR in 1 week, 11/9    **consent form signed 11/1/2021    Referring cardiologist will be Dr. Dunia BOONE (no units)   Date Value   2021 1.2   10/26/2021 1.20 (H)   10/14/2021 1.40 (H)   10/12/2021 1.2   2021 3.7   2021 2.8   2021 1.40 (H)   2020 2.30 (H)   For Pharmacy Admin Tracking Only     Intervention Detail: Adherence Monitorin and Dose Adjustment: 1, reason: Therapy Optimization   Total # of Interventions Recommended: 2   Total # of Interventions Accepted: 2   Time Spent (min): 15

## 2021-11-01 NOTE — PROGRESS NOTES
[facility]   Electrophysiology  Thee Place, APRN-CNP  Attending EP: Dr. Ariadan Hutchinson  Date: 11/1/2021  I had the privilege of visiting Rach Mcdonnell in the office. Chief Complaint:   Chief Complaint   Patient presents with    6 Month Follow-Up    Atrial Fibrillation    Coronary Artery Disease     History of Present Illness: History obtained from patient and medical record. Rach Mcdonnell is 80 y.o. male with a past medical history of HTN, HLD, DM, anemia (follows at [facility]) CAD s/p CABG 1996, SSS s/p PPM (2005, gen change 2015) and atrial fibrillation. He started on Sotalol for atrial fibrillation with a significant reduction in episodes. He is AC with warfarin. Interval history: Today, Rach Mcdonnell is being seen for PAF and device monitoring. Also being seen for HDV post cath and acute HF. Lisinopril was stopped by Dr. Tc Chaves and he was started on amlodipine for BP control. He has been feeling well, he got blood work today, however no results available yet. He denies CP, SOB, worsening swelling. Weight has decreased this weekend by a few lbs. Groin healed well, no hematoma, bruising, or oozing. No acute complaints at today's visit. With regard to medication therapy the patient has been compliant with prescribed regimen. He has tolerated therapy to date. Assessment:  SSS/Implantable device   - S/p dual chamber PPM 2005 (gen change 2015)   - The CIED was interrogated and I reviewed all data    - Device interrogation today shows SADAF 7 yrs, AT/AF 41%, AP 39%,  22%   - AF burden is undersensing and PVAB was reduced to 90 ms from 130 ms to allow appropriate mode searching. In the future can consider adjusting the track rate. Paroxysmal Atrial Fibrillation/Flutter  - ECG today shows , underlying atrial fibrillation/flutter  - On Sotalol 120 mg bid and Lopressor 25 mg bid  - Denies symptoms  - [id number] Vasc score and anticoagulation discussed.  High risk for stroke and thromboembolism. Anticoagulation is recommended.   ~ On warfarin, follows at [facility], no s/s of bleeding  - Afib risk factors including age, HTN, obesity, inactivity and TISHA were discussed with patient. Risk factor modification recommended   ~ TSH 3.14 (2016)     - Treatment options including cardioversion, rate control strategy, antiarrhythmics, anticoagulation and possible ablation were discussed with patient. Risks, benefits and alternative of each treatment options were explained. All questions answered    ~ Ablation can be considered, however would be higher risk given age and co-morbidities. He is not interest in invasive procedures at this time and would like to avoid if at all possible. LV Dysfunction   - Echo shows new LV dysfunction, EF 35-40%    - Appears compensated, mild BLE edema 1+   - Continue Toprol and lasix, ARB stopped due to renal insufficiency per Dr. Joanette Bence   - CRT therapy has been discussed in the past,  is 18%, given handouts and he would like to discuss with Dr. Sheridan Salazar at follow up  CAD   - Hx of remote CABG and s/p PCI to [address] [place] [address] 10/26/2021   - CAB [facility]-no report   - Cardiac [id number]-no report   - Follows with Dr. Luiz Benites   - No reports of angina   - \"Attempt triple therapy with enteric-coated aspirin 81 mg daily, Plavix 75 mg daily and warfarin to complete 30 days followed by Plavix and warfarin for an additional 5 months then transition to enteric-coated aspirin 81 mg daily and warfarin thereafter. \"  HTN-goal <130/80   - Uncontrolled   - Can consider resuming lisinopril if OK with nephrology   - Encouraged patient to check BP at home, log and bring to office visits  - Discussed lifestyle modifications, weight loss, low sodium diet  Anemia   - Appears stable H/H 8.2/23.8   - Repeat today on triple therapy   Plan  - Consider resuming lisinopril if labs stable today, needs discussion with nephrology  - CBC, BMP, BNP today   - Weight daily, call if >3 lb weight gain in a day or worsening SOB  - Follow up with Carol next week as scheduled  - Stop ASA after 30 days, he will see NPTS    F/U: Follow-up with EP 3 months   -Follow up with device clinic as scheduled  -Call Ernesto Romero at [phone] with any questions    Allergies:  No Known Allergies  Home Medications:  Prior to Visit Medications    Medication Sig Taking?  Authorizing Provider   furosemide (LASIX) 20 MG tablet TAKE 1 TABLET BY MOUTH DAILY Yes Brand Due, APRN - CNP   clopidogrel (PLAVIX) 75 MG tablet TAKE 1 TABLET BY MOUTH DAILY Yes Brand Due, APRN - CNP   metoprolol succinate (TOPROL XL) 50 MG extended release tablet TAKE 1 TABLET BY MOUTH TWICE DAILY Yes Brand Due, APRN - CNP   aspirin 81 MG EC tablet Take 1 tablet by mouth daily Yes Brand Due, APRN - CNP   amLODIPine (NORVASC) 5 MG tablet Take 1 tablet by mouth daily Yes Francia Fried MD   sotalol (BETAPACE) 120 MG tablet TAKE 1 TABLET BY MOUTH TWICE DAILY Yes Jamal Vázquez APRN - CNP   atorvastatin (LIPITOR) 20 MG tablet Take 1 tablet by mouth daily Yes Kylah Batres MD   warfarin (COUMADIN) 5 MG tablet Take 1 tablet by mouth daily Take 2.5 mg on Mon, Wed and Fri and 5 mg all other days of the week Yes Selina Chino APRN - CNP   fluticasone (FLONASE) 50 MCG/ACT nasal spray 2 sprays by Each Nostril route 2 times daily Yes Roland Vincent MD   sodium chloride (OCEAN, BABY AYR) 0.65 % nasal spray 2 sprays by Nasal route every 4 hours Yes Roland Vincent MD   vitamin B-1 (THIAMINE) 100 MG tablet Take 1,000 mg by mouth daily  Yes Historical Provider, MD   EPOETIN BIBIANA IJ Inject as directed every 21 days  Yes Historical Provider, MD   acetaminophen (TYLENOL) 325 MG tablet Take 650 mg by mouth every 6 hours as needed for Pain Yes Historical Provider, MD      Past Medical History:  Past Medical History:   Diagnosis Date    Actinic keratosis     Actinic keratosis     [facility] [address]     Bilateral carotid artery stenosis     CAD (coronary artery disease)     Cellulitis 7/15/2015    right foot    Diabetes mellitus ([facility] [address]     DM (diabetes mellitus), type 2 with peripheral vascular complications (HCC)--s/p amputation great toe post osteomylitis  9/11/2015    Glucose intolerance (malabsorption)     Hyperlipidemia     Hypertension     Hypertrophy of prostate without urinary obstruction and other lower urinary tract symptoms (LUTS)     Intermittent atrial fibrillation (HCC)     Kidney stone     Occult blood in stool     Hx of    Pacemaker     Permanent     Past Surgical History:    has a past surgical history that includes Tonsillectomy and adenoidectomy; Appendectomy; Kidney stone surgery (1980); Coronary artery bypass graft (1996); Cardiac catheterization (2003); pacemaker placement (2005); other surgical history (Right, 7/17/15); Abscess Drainage (7/20/15); Toe amputation (Right, 7.16.15); Colonoscopy (03/28/2018); and pr esophagogastroduodenoscopy transoral diagnostic (N/A, 11/21/2018). Social History:  Reviewed. reports that he has never smoked. He has never used smokeless tobacco. He reports that he does not drink alcohol and does not use drugs. Family History:  Reviewed. family history includes Diabetes in his mother; Stroke in his father. Denies family history of sudden cardiac death, arrhythmia, premature CAD    Review of System:  · Constitutional: No weight changes or weakness  · HEENT: No visual changes. No mouth sores or sore throat. · Cardiovascular: denies chest pain, denies dyspnea on exertion, denies palpitations or denies loss of consciousness. No cough, hemoptysis, denies pleuritic pain, or phlebitis. denies dizziness. · Respiratory: denies cough or wheezing. · Gastrointestinal: Negative, No blood in stools. · Genitourinary: No hematuria. · Neurological: No focal weakness  · Psychiatric: No confusion, anxiety, or depression. · Hem/Lymph: Denies abnormal bruising or bleeding.     Physical Examination:  There were no vitals filed for this visit. Wt Readings from Last 3 Encounters:   10/26/21 163 lb (73.9 kg)   10/18/21 163 lb 12.8 oz (74.3 kg)   10/14/21 159 lb (72.1 kg)      Constitutional: Cooperative and in no apparent distress, and appears well nourished   Skin: Warm and pink; no cyanosis or bruising   HEENT: Symmetric and normocephalic. Conjunctiva pink with clear sclera. Mucus membranes pink and moist. No visible masses/goiter   Respiratory: Respirations symmetric and unlabored. Lungs clear to auscultation bilaterally, no wheezing, rhonchi, or crackles.  Cardiovascular:  regular rate and rhythm. S1 & S2 present, negative for murmur. negative elevation of JVP. 1+ LE edema.  Musculoskeletal:  No focal weakness.  Neurological/Psych: Awake and orientated to person, place and time. Calm affect, appropriate mood. Pertinent labs, diagnostic, device, and imaging results reviewed as a part of this visit    LABS    CBC:   Lab Results   Component Value Date    WBC 9.6 10/27/2021    HGB 8.2 (L) 10/27/2021    HCT 23.8 (L) 10/27/2021    MCV 95.0 10/27/2021     10/27/2021     BMP:   Lab Results   Component Value Date    CREATININE 0.9 10/27/2021    BUN 22 (H) 10/27/2021     10/27/2021    K 4.3 10/27/2021     10/27/2021    CO2 26 10/27/2021     Estimated Creatinine Clearance: 58 mL/min (based on SCr of 0.9 mg/dL).    Lab Results   Component Value Date    BNP 79 01/10/2012       Thyroid:   Lab Results   Component Value Date    TSH 3.14 2016     Lipid Panel:   Lab Results   Component Value Date    CHOL 109 2020    HDL 37 2020    HDL 36 2012    TRIG 120 2020     LFTs:  Lab Results   Component Value Date    ALT 14 2021    AST 19 2021    ALKPHOS 72 2021    BILITOT 0.7 2021     Coags:   Lab Results   Component Value Date    PROTIME 16.1 (H) 2021    INR 1.20 (H) 10/26/2021    APTT 54.3 (H) 2020     EC2021 , calcified  Cx-normal  OM1-100% ostial  OM2-20% proximal  RCA-20% proximal, 40% distal, calcified  RPDA-70 to 80% proximal, FFR of 0.76  LVEF-50%  LIMA-LAD: Widely patent  SVG-OM1: Widely patent  Aortic root: Not aneurysmal, no significant aortic insufficiency, 1 patent SVG visualized. PCI: RPDA 80% to 0% with a 3.25 mm x 18 mm Xience Mariela ALEJANDRA      Impression:  1. Severe multivessel CAD. 2.  Patent LIMA-LAD and SVG-OM1 grafts. 3.  Successful PCI with ALEJANDRA x1 to RPDA. 4.  Low normal LV systolic function.     Plan:  1. Attempt triple therapy with enteric-coated aspirin 81 mg daily, Plavix 75 mg daily and warfarin to complete 30 days followed by Plavix and warfarin for an additional 5 months then transition to enteric-coated aspirin 81 mg daily and warfarin thereafter. 2.  Hydration. 3.  ACE inhibitor/ARB stopped preprocedure to optimize kidney function given renal insufficiency. Discussed with nephrology who recommends resuming ACE inhibitor/ARB 3 days post procedure.     I have addressed the patient's cardiac risk factors and adjusted pharmacologic treatment as needed. In addition, I have reinforced the need for patient directed risk factor modification. I independently reviewed the device check interrogation and ECG    All questions and concerns were addressed with the patient. Alternatives to treatment were discussed. Thank you for allowing to us to participate in the care of Shruti Matos.     PRIYA Mejia-CNP  [facility]   Office: [phone]

## 2021-11-02 ENCOUNTER — TELEPHONE (OUTPATIENT)
Dept: CARDIOLOGY | Age: 86
End: 2021-11-02

## 2021-11-02 PROCEDURE — 93280 PM DEVICE PROGR EVAL DUAL: CPT | Performed by: INTERNAL MEDICINE

## 2021-11-02 RX ORDER — PANTOPRAZOLE SODIUM 40 MG/1
40 TABLET, DELAYED RELEASE ORAL DAILY
Qty: 30 TABLET | Refills: 0 | Status: SHIPPED | OUTPATIENT
Start: 2021-11-02 | End: 2022-07-28 | Stop reason: ALTCHOICE

## 2021-11-02 RX ORDER — PANTOPRAZOLE SODIUM 40 MG/1
TABLET, DELAYED RELEASE ORAL
Qty: 90 TABLET | Refills: 0 | OUTPATIENT
Start: 2021-11-02

## 2021-11-02 RX ORDER — FUROSEMIDE 20 MG/1
40 TABLET ORAL DAILY
Qty: 90 TABLET | Refills: 3
Start: 2021-11-02 | End: 2021-12-07

## 2021-11-03 ENCOUNTER — CARE COORDINATION (OUTPATIENT)
Dept: CASE MANAGEMENT | Age: 86
End: 2021-11-03

## 2021-11-05 ENCOUNTER — CARE COORDINATION (OUTPATIENT)
Dept: CASE MANAGEMENT | Age: 86
End: 2021-11-05

## 2021-11-05 NOTE — CARE COORDINATION
[facility] Transitions Follow Up Call    2021    Patient: Jeremías Obando  Patient : 1933   MRN: [id number]  Reason for Admission:   Discharge Date: 10/27/21 RARS: Readmission Risk Score: 23         Spoke with: Agustín Villanueva  Patient reports he is doing pretty good. He walks with a cane. He gets out and walk around. He is sleeping good. He denies cp, weakness, fever, sob, palpitations or fatigue. Appetite and fluid intake is good. Bladder and bowel elimination normal. Cath insertion site is all healed over. His daughter takes care of him. No questions or needs at this time. Will continue to follow. Care Transitions Subsequent and Final Call    Subsequent and Final Calls  Care Transitions Interventions  Other Interventions:            Follow Up  Future Appointments   Date Time Provider Sonido Bar   2021 10:45 AM [facility] ANTICOAGULATION CLINIC [facility]   2021 11:00 AM PRIYA Caballero - CNP FF Cardio MMA   2021  1:15 PM Magalie Mcdonald MD [facility] AFL Nephrolo   2021 10:00 AM Alex Recinos MD [facility] [place] - DYD   2022  7:30 AM SCHEDULE, [facility] TRANSMISSION FF Cardio MMA   2022  7:30 AM SCHEDULE, [facility] TRANSMISSION FF Cardio MMA   2022  7:30 AM SCHEDULE, [facility] TRANSMISSION FF Cardio MMA   10/26/2022  7:30 AM SCHEDULE, [facility] TRANSMISSION FF Cardio MMA       Josiane Restrepo LPN

## 2021-11-09 ENCOUNTER — ANTI-COAG VISIT (OUTPATIENT)
Dept: PHARMACY | Age: 86
End: 2021-11-09
Payer: MEDICARE

## 2021-11-09 DIAGNOSIS — I48.0 PAF (PAROXYSMAL ATRIAL FIBRILLATION) (HCC): Primary | ICD-10-CM

## 2021-11-09 LAB — INTERNATIONAL NORMALIZATION RATIO, POC: 1.6

## 2021-11-09 PROCEDURE — 99211 OFF/OP EST MAY X REQ PHY/QHP: CPT

## 2021-11-09 PROCEDURE — 85610 PROTHROMBIN TIME: CPT

## 2021-11-09 NOTE — PROGRESS NOTES
Mr. Case Araujo is a 80 y.o. y/o male with history of Afib   He presents today for anticoagulation monitoring and adjustment. Pertinent PMH: ICD-pacemaker. 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 Not sure how long he has been doing this. States his pillbox has 5mg in it for this Saturday and he thinks he could have been doing this for a few weeks now. States he will follow AVS and take normal dose (as pt was therapeutic on this the past visits). If patient is low in 2 weeks, 9/28, then increase to 2.5mg MWF and 5mg AOD. ---> confirms dose   []   [x]       S/S bleeding/bruising/swelling/SOB- denies  []   [x]       Blood in urine or stool denies  [x]   []       Procedures scheduled in the future at this time - had cardiac cath 7/9. IR 1.4---> holding 5 days for LV cath on 10/14, also states that having 2 moles removed from face 10/20- doesn't know if he has to hold yet, he will call and let me know---.  Cath cancelled 101/14, still held 5 days, then another procedure 10/26 and held 5 days   [x]   []       Missed Dose- denies   []   [x]       Extra Dose- denies  []   [x]       Change in medications- receiving Procrit 40,000 unit injection once every two weeks until red blood cell count is consistently under control again---> stop sotalol, started amiodarone 200mg daily 4/28---> states did not start it because was concerned with the SE so stayed on sotalol and has fu apt next week with regular cardiologist --> no changes---> zoloft, inc lisinopril--->stoped sotalol, started amiodarone 200mg qd on 8/20, inc metoprolol---states did not start amiodarone dt concerns with SEs so still sotalol, inc lasix--->2 tylenol in the mornings---> denies---> dec lasix, held lisinopril for a few days and still holding for now, on ASA for 30 days, then plavix for 5 months then back to ASA---> brought med list         [x]   []       Change in health/diet/appetite consistent greens -> has been eating less, appetite has been diminished --> has been eating less since being home --> appetite is slowly starting to return---> only had one salad, no NVD---> no changes, no NVD---> diet off with Jaye Collazo in hospital---> no greens, diet off with Jaye Collazo and stressed, no NVD  --> diet back to normal, no NVD --> states that he has been stressed with wife being ill and in hospital, not eating well --> appetite has been diminished d/t wife--->eating out more, no NVD---> no greens, no NVD---> erratic diet, no NVD   []   [x]       Change in alcohol use- doesn't drink   []   [x]       Change in activity  []   [x]       Hospital admission -admitted after procedure 10/26---> started ASA/plavix on dc, stopped lisinopril   []   [x]       Emergency department visit  []   [x]       Other complaints--> Patient has been very tired and not able to function well, getting epoetin alpha injection at hemotologist office     Clinical Outcomes:  Yes     No  []   [x]       Major bleeding event  []   [x]       Thromboembolic event  Duration of warfarin Therapy: indefinite  INR Range:  2.0-3.0     Has been stressed with passing of wife. Amber Cat (daughter) is primary care now- [phone]. INR is 1.6 today   Patient has been back on warfarin now for >1 week, doesn't think he missed/ate more greens. Will boost and continue but if low again then increase dose. Take 7.5mg tonight then continue taking dose of 5 mg on Sun, Tues and Thurs and 2.5 mg all other days of the week. Encouraged to maintain a consistency of vegetables/salads.   Recheck INR in 10 days, 11/18    **consent form signed 11/1/2021    Referring cardiologist will be Dr. Guanaco Meadows  INR (no units)   Date Value   11/09/2021 1.6   11/01/2021 1.2   10/26/2021 1.20 (H)   10/14/2021 1.40 (H)   10/12/2021 1.2   09/28/2021 3.7   07/08/2021 1.40 (H)   12/08/2020 2.30 (H)   For Pharmacy Admin Tracking Only     Intervention Detail: Dose Adjustment: 1, reason: Therapy Optimization   Total # of Interventions Recommended: 1   Total # of Interventions Accepted: 1   Time Spent (min): 15

## 2021-11-12 ENCOUNTER — CARE COORDINATION (OUTPATIENT)
Dept: CASE MANAGEMENT | Age: 86
End: 2021-11-12

## 2021-11-12 NOTE — CARE COORDINATION
[facility] Transitions Follow Up Call    2021    Patient: James Be  Patient : 1933   MRN: [id number]  Reason for Admission: Afib, [facility]  Discharge Date: 10/27/21 RARS: Readmission Risk Score: 23         Spoke with: [address] Transitions Subsequent and Final Call    Subsequent and Final Calls  Do you have any ongoing symptoms?: No  Have your medications changed?: No  Do you have any questions related to your medications?: No  Do you currently have any active services?: No  Do you have any needs or concerns that I can assist you with?: No  Identified Barriers: None  Care Transitions Interventions  Other Interventions: Follow Up: Patient reports that he is doing well, denies any questions or concerns at this time. He is taking all of his medications and will follow up with cardiology 21. CT will continue with outreach follow up calls.       Future Appointments   Date Time Provider Sonido Bar   2021  9:00 AM [facility] ANTICOAGULATION CLINIC [facility]   2021 11:00 AM Searcy Siemens, APRN - CNP FF Cardio MMA   2021  1:15 PM Amol De MD [facility] AFL Nephrolo   2021 10:00 AM Trenna Hammans, MD [facility] - DYD   2022  7:30 AM SCHEDULE, [facility] TRANSMISSION FF Cardio MMA   2022  7:30 AM SCHEDULE, [facility] TRANSMISSION FF Cardio MMA   2022  7:30 AM SCHEDULE, [facility] TRANSMISSION FF Cardio MMA   10/26/2022  7:30 AM SCHEDULE, [facility] TRANSMISSION FF Cardio MMA       Jaswant Son RN

## 2021-11-18 ENCOUNTER — ANTI-COAG VISIT (OUTPATIENT)
Dept: PHARMACY | Age: 86
End: 2021-11-18
Payer: MEDICARE

## 2021-11-18 DIAGNOSIS — I48.0 PAF (PAROXYSMAL ATRIAL FIBRILLATION) (HCC): Primary | ICD-10-CM

## 2021-11-18 LAB — INTERNATIONAL NORMALIZATION RATIO, POC: 2.6

## 2021-11-18 PROCEDURE — 85610 PROTHROMBIN TIME: CPT

## 2021-11-18 PROCEDURE — 99211 OFF/OP EST MAY X REQ PHY/QHP: CPT

## 2021-11-18 NOTE — PROGRESS NOTES
Mr. Antonia Raygoza is a 80 y.o. y/o male with history of Afib   He presents today for anticoagulation monitoring and adjustment. Pertinent PMH: ICD-pacemaker. 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    Patient Reported Findings:  Yes     No  [x]   []       Patient verifies current dosing regimen as listed  confirmed dose ---> cannot confirm dose---> Patient called to say he has been taking 5mg on Saturdays instead of 2.5mg. Not sure how long he has been doing this. States his pillbox has 5mg in it for this Saturday and he thinks he could have been doing this for a few weeks now. States he will follow AVS and take normal dose (as pt was therapeutic on this the past visits). If patient is low in 2 weeks, 9/28, then increase to 2.5mg MWF and 5mg AOD. ---> confirms dose   []   [x]       S/S bleeding/bruising/swelling/SOB- denies  []   [x]       Blood in urine or stool denies  []   [x]       Procedures scheduled in the future at this time - had cardiac cath 7/9. IR 1.4---> holding 5 days for LV cath on 10/14, also states that having 2 moles removed from face 10/20- doesn't know if he has to hold yet, he will call and let me know---.  Cath cancelled 101/14, still held 5 days, then another procedure 10/26 and held 5 days --> no changes   []   [x]       Missed Dose- denies   []   [x]       Extra Dose- denies  []   [x]       Change in medications- receiving Procrit 40,000 unit injection once every two weeks until red blood cell count is consistently under control again---> started amiodarone 200mg qd on 8/20, inc metoprolol---states did not start amiodarone dt concerns with SEs so still sotalol, inc lasix--->2 tylenol in the mornings---> denies---> dec lasix, held lisinopril for a few days and still holding for now, on ASA for 30 days, then plavix for 5 months then back to ASA---> brought med list --> no changes         []   [x]       Change in health/diet/appetite consistent greens -> has been eating less, appetite has been diminished --> eating out more, no NVD---> no greens, no NVD---> erratic diet, no NVD   []   [x]       Change in alcohol use- doesn't drink   []   [x]       Change in activity  []   [x]       Hospital admission -admitted after procedure 10/26---> started ASA/plavix on dc, stopped lisinopril   []   [x]       Emergency department visit  []   [x]       Other complaints--> Patient has been very tired and not able to function well, getting epoetin alpha injection at hemotologist office     Clinical Outcomes:  Yes     No  []   [x]       Major bleeding event  []   [x]       Thromboembolic event  Duration of warfarin Therapy: indefinite  INR Range:  2.0-3.0     Has been stressed with passing of wife. Lisette Burgess (daughter) is primary care now- [phone]. INR is 2.6 today   Continue taking dose of 5 mg on Sun, Tues and Thurs and 2.5 mg all other days of the week. Encouraged to maintain a consistency of vegetables/salads.   Recheck INR in 2 weeks, 12/2    **consent form signed 11/1/2021    Referring cardiologist will be Dr. Luli Heath  INR (no units)   Date Value   11/09/2021 1.6   11/01/2021 1.2   10/26/2021 1.20 (H)   10/14/2021 1.40 (H)   10/12/2021 1.2   09/28/2021 3.7   07/08/2021 1.40 (H)   12/08/2020 2.30 (H)         For Pharmacy Admin Tracking Only     Total # of Interventions Recommended: 0   Total # of Interventions Accepted: 0   Time Spent (min): 15

## 2021-11-22 ENCOUNTER — CARE COORDINATION (OUTPATIENT)
Dept: CASE MANAGEMENT | Age: 86
End: 2021-11-22

## 2021-11-22 NOTE — CARE COORDINATION
[facility] Transitions Follow Up Call    2021    Patient: Bria Lim  Patient : 1933   MRN: [id number]  Reason for Admission: Afib  Discharge Date: 10/27/21 RARS: Readmission Risk Score: 21         Spoke with: daughter \"Daylin\"    Care Transitions Subsequent and Final Call    Subsequent and Final Calls  Do you have any ongoing symptoms?: No  Have your medications changed?: No  Do you have any questions related to your medications?: No  Do you currently have any active services?: No  Do you have any needs or concerns that I can assist you with?: No  Identified Barriers: None  Care Transitions Interventions  Other Interventions: Follow Up: CTN reached out to patient and he is very [place], could not understand CTN and hung the phone up. CTN reached out to daughter \"Daylin\", she was very helpful and informative. Patient is doing well and will follow up with cardiology tomorrow. His Hgb is up and he is still off of Lisinopril, he will see Dr. Henrique Delgadillo 21 and discuss this medication. He is taking Lasix 40 mg. Daughter denies any questions or concerns at this time. CTN will continue with outreach follow up calls.     Future Appointments   Date Time Provider Sonido Bar   2021 11:00 AM PRIYA Soria - CNP FF Cardio MMA   2021  1:15 PM Adam Early MD [facility] AFL Nephrolo   2021  1:00 PM [facility]TICOAGULATION CLINIC [facility]   2021 10:00 AM Ching Hogan MD [facility] [place] - DYD   2022  7:30 AM SCHEDULE, [place] REMOTE TRANSMISSION FF Cardio MMA   2022  7:30 AM SCHEDULE, [place] REMOTE TRANSMISSION FF Cardio MMA   2022  7:30 AM SCHEDULE, [place] REMOTE TRANSMISSION FF Cardio MMA   10/26/2022  7:30 AM SCHEDULE, [place] REMOTE TRANSMISSION FF Cardio MMA       Xavier Mcelroy RN

## 2021-11-23 ENCOUNTER — OFFICE VISIT (OUTPATIENT)
Dept: CARDIOLOGY CLINIC | Age: 86
End: 2021-11-23
Payer: MEDICARE

## 2021-11-23 ENCOUNTER — HOSPITAL ENCOUNTER (OUTPATIENT)
Age: 86
Discharge: HOME | End: 2021-11-23
Payer: MEDICARE

## 2021-11-23 VITALS
HEART RATE: 78 BPM | BODY MASS INDEX: 24.47 KG/M2 | HEIGHT: 69 IN | WEIGHT: 165.2 LBS | SYSTOLIC BLOOD PRESSURE: 144 MMHG | OXYGEN SATURATION: 98 % | DIASTOLIC BLOOD PRESSURE: 68 MMHG

## 2021-11-23 DIAGNOSIS — I25.10 CORONARY ARTERY DISEASE INVOLVING NATIVE CORONARY ARTERY OF NATIVE HEART WITHOUT ANGINA PECTORIS: Primary | ICD-10-CM

## 2021-11-23 DIAGNOSIS — I10 BENIGN ESSENTIAL HTN: ICD-10-CM

## 2021-11-23 DIAGNOSIS — I35.0 NONRHEUMATIC AORTIC VALVE STENOSIS: ICD-10-CM

## 2021-11-23 DIAGNOSIS — I51.9 LV DYSFUNCTION: ICD-10-CM

## 2021-11-23 LAB
ANION GAP SERPL CALCULATED.3IONS-SCNC: 11 MMOL/L (ref 3–16)
BUN BLDV-MCNC: 33 MG/DL (ref 7–20)
CALCIUM SERPL-MCNC: 9.1 MG/DL (ref 8.3–10.6)
CHLORIDE BLD-SCNC: 99 MMOL/L (ref 99–110)
CO2: 28 MMOL/L (ref 21–32)
CREAT SERPL-MCNC: 1.2 MG/DL (ref 0.8–1.3)
GFR AFRICAN AMERICAN: >60
GFR NON-AFRICAN AMERICAN: 57
GLUCOSE BLD-MCNC: 155 MG/DL (ref 70–99)
POTASSIUM SERPL-SCNC: 4.3 MMOL/L (ref 3.5–5.1)
SODIUM BLD-SCNC: 138 MMOL/L (ref 136–145)

## 2021-11-23 PROCEDURE — G8420 CALC BMI NORM PARAMETERS: HCPCS | Performed by: NURSE PRACTITIONER

## 2021-11-23 PROCEDURE — 1123F ACP DISCUSS/DSCN MKR DOCD: CPT | Performed by: NURSE PRACTITIONER

## 2021-11-23 PROCEDURE — 1036F TOBACCO NON-USER: CPT | Performed by: NURSE PRACTITIONER

## 2021-11-23 PROCEDURE — 4040F PNEUMOC VAC/ADMIN/RCVD: CPT | Performed by: NURSE PRACTITIONER

## 2021-11-23 PROCEDURE — 80048 BASIC METABOLIC PNL TOTAL CA: CPT

## 2021-11-23 PROCEDURE — 36415 COLL VENOUS BLD VENIPUNCTURE: CPT

## 2021-11-23 PROCEDURE — G8484 FLU IMMUNIZE NO ADMIN: HCPCS | Performed by: NURSE PRACTITIONER

## 2021-11-23 PROCEDURE — G8427 DOCREV CUR MEDS BY ELIG CLIN: HCPCS | Performed by: NURSE PRACTITIONER

## 2021-11-23 PROCEDURE — 99214 OFFICE O/P EST MOD 30 MIN: CPT | Performed by: NURSE PRACTITIONER

## 2021-11-23 NOTE — PROGRESS NOTES
[address]     Outpatient Follow Up Note    CHIEF COMPLAINT / HPI: Hospital Follow Up secondary to status post coronary angioplasty 10/26/21    Hospital record has been reviewed  Hospital Course progressed as follows per discharge summary: . Melissa Yates has a history of Afib, CAD s/p CABG, HTN, HLD, PPM. Having increased a fib on Sotalol. ~Progressive LV dysfunction (Echo 5/2021 with EF 35-40%). ~Had a stress test as OP that was abnormal.   ~Admitted for [facility]. (LHC originally cancelled 10/14/21 d/t kidney function. Lasix was decreased to 20mg and lisinopril was held to allow for renal optimization. Norvasc was added in meantime for BP control)     S/p LHC 10/26/21 revealing severe MVD. S/p PCI of RPDA with ALEJANDRA x1. Patent LIMA-LAD and SVG-OM1 grafts. LVG showed LVEF 50%. Pt is on maximally tolerated statin. Attempt triple therapy with enteric-coated aspirin 81 mg daily, Plavix 75 mg daily and warfarin to complete 30 days followed by Plavix and warfarin for an additional 5 months then transition to enteric-coated aspirin 81 mg daily and warfarin thereafter. Plan for CBC Monday (lab slip given). Discussed with Martha Gaffney NP from [facility] who says pt would be ok for triple therapy for 30 days. Pt has follow up with [facility] 11/10/21.     Pt will continue Toprol and sotalol at discharge for a fib/ NSVT. Has follow up with EP 11/1 to discuss further treatment options. Resuming lasix 20mg daily on discharge. Evaluate restarting ACE as OP based on kidney function and BP. Will check BMP and BNP on Monday (lab slip given). LVEF by [facility] at 50%. Echo 5/2021 EF 35-40%. Stress test 9/2021 EF 35%. May need to consider MUGA or repeat echo.      Pt will follow up with coumadin clinic Monday as scheduled.     At discharge, he reports feeling well. Has no complaints of any kind. Right groin procedure site c/d/I. No hematoma or bruising. Good pulses, color, warmth, and sensation to that extremity.  Able to walk without exertional DICKEY or CP. Denies chest pain, shortness of breath, palpitations, dizziness/ lightheadedness, orthopnea, edema. He feels ready to discharge. Discussed plan with daughter at bedside who is a nurse. Kris Soria is 80 y.o. male who presents today for a routine follow up after a recent hospitalization related to the above mentioned issues. Subjective:   Since the time of discharge, the patient admits their symptoms have not changed. He denies significant chest pain. There is SOB if he walks too much or too far. His biggest problem is with tightness in his lower back making it difficult to walk. He sleeps in a recliner out of habit and comfort. He denies PND. He gets up frequently to void. The patient is not experiencing palpitations. These symptoms show no change over the last many days. With regard to medication therapy the patient has been compliant with prescribed regimen. They have tolerated therapy to date.      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  9/11/2015    Glucose intolerance (malabsorption)     Hyperlipidemia     Hypertension     Hypertrophy of prostate without urinary obstruction and other lower urinary tract symptoms (LUTS)     Intermittent atrial fibrillation (HCC)     Kidney stone     Occult blood in stool     Hx of    Pacemaker     Permanent     Social History:    Social History     Tobacco Use   Smoking Status Never Smoker   Smokeless Tobacco Never Used   Tobacco Comment    counseled on tobacco exposure avoidance     Current Medications:  Current Outpatient Medications   Medication Sig Dispense Refill    amLODIPine (NORVASC) 5 MG tablet TAKE 1 TABLET BY MOUTH DAILY 90 tablet 0    furosemide (LASIX) 20 MG tablet Take 2 tablets by mouth daily 90 tablet 3    pantoprazole (PROTONIX) 40 MG tablet Take 1 tablet by mouth daily While on triple therapy 30 tablet 0    clopidogrel (PLAVIX) 75 MG tablet TAKE 1 TABLET BY MOUTH DAILY 90 tablet 3    metoprolol succinate (TOPROL XL) 50 MG extended release tablet TAKE 1 TABLET BY MOUTH TWICE DAILY 180 tablet 3    aspirin 81 MG EC tablet Take 1 tablet by mouth daily 30 tablet 0    sotalol (BETAPACE) 120 MG tablet TAKE 1 TABLET BY MOUTH TWICE DAILY 180 tablet 1    atorvastatin (LIPITOR) 20 MG tablet Take 1 tablet by mouth daily 90 tablet 4    warfarin (COUMADIN) 5 MG tablet Take 1 tablet by mouth daily Take 2.5 mg on Mon, Wed and Fri and 5 mg all other days of the week 90 tablet 2    vitamin B-1 (THIAMINE) 100 MG tablet Take 1,000 mg by mouth daily       EPOETIN BIBIANA IJ Inject as directed every 21 days       acetaminophen (TYLENOL) 325 MG tablet Take 650 mg by mouth every 6 hours as needed for Pain      fluticasone (FLONASE) 50 MCG/ACT nasal spray 2 sprays by Each Nostril route 2 times daily (Patient not taking: Reported on 11/23/2021) 1 Bottle 3    sodium chloride (OCEAN, BABY AYR) 0.65 % nasal spray 2 sprays by Nasal route every 4 hours (Patient not taking: Reported on 11/23/2021) 5 Bottle 0     No current facility-administered medications for this visit. REVIEW OF SYSTEMS:   CONSTITUTIONAL: No major weight gain or loss, fatigue, weakness, night sweats or fever. There's been no change in energy level, sleep pattern, or activity level. HEENT: No new vision difficulties or ringing in the ears. RESPIRATORY: No new SOB, PND, orthopnea or cough. CARDIOVASCULAR: See HPI  GI: No nausea, vomiting, diarrhea, constipation, abdominal pain or changes in bowel habits. : No urinary frequency, urgency, incontinence hematuria or dysuria.   SKIN: No cyanosis or skin lesions. MUSCULOSKELETAL: No new muscle or joint pain. NEUROLOGICAL: No syncope or TIA-like symptoms. PSYCHIATRIC: No anxiety, pain, insomnia or depression    Objective:   PHYSICAL EXAM:    Vitals:    11/23/21 1051 11/23/21 1117   BP: (!) 170/70 (!) 144/68   Site: Right Upper Arm Right Upper Arm   Position: Sitting    Cuff Size: Medium Adult Medium Adult   Pulse: 78    SpO2: 98%    Weight: 165 lb 3.2 oz (74.9 kg)    Height: 5' 9\" (1.753 m)          VITALS:  BP (!) 170/70 (Site: Right Upper Arm, Position: Sitting, Cuff Size: Medium Adult)   Pulse 78   Ht 5' 9\" (1.753 m)   Wt 165 lb 3.2 oz (74.9 kg)   SpO2 98%   BMI 24.40 kg/m²     CONSTITUTIONAL: Cooperative, no apparent distress, and appears well nourished / developed  NEUROLOGIC:  Awake and orientated to person, place and time. PSYCH: Calm affect. SKIN: Warm and dry. HEENT: Sclera non-icteric, normocephalic, neck supple, no elevation of JVP, normal carotid pulses with no bruits and thyroid normal size. LUNGS:  No increased work of breathing and crackles LLL  CARDIOVASCULAR:  Regular rate 60 and rhythm with + murmur 2nd ICS Rt MCL and 4th ICS Lt MCL, gallops, rubs, or abnormal heart sounds, normal PMI. The apical impulses not displaced. Heart tones are crisp and normal                                                                                            Cervical veins are not engorged                 JVP less than 8 cm H2O                                                                              The carotid upstroke is normal in amplitude and contour without delay or bruit    ABDOMEN:  Normal bowel sounds, non-distended and non-tender to palpation   EXT: far distal edmond LE pitting edema, no calf tenderness. Pulses are present bilaterally.     DATA:    Lab Results   Component Value Date    ALT 14 01/20/2021    AST 19 01/20/2021    ALKPHOS 72 01/20/2021    BILITOT 0.7 01/20/2021     Lab Results   Component Value Date CREATININE 1.1 11/01/2021    BUN 33 (H) 11/01/2021     11/01/2021    K 4.5 11/01/2021     11/01/2021    CO2 26 11/01/2021       Lab Results   Component Value Date    WBC 7.3 11/01/2021    HGB 9.2 (L) 11/01/2021    HCT 27.4 (L) 11/01/2021    MCV 96.7 11/01/2021     11/01/2021     No components found for: CHLPL  Lab Results   Component Value Date    TRIG 120 06/22/2020    TRIG 162 (H) 08/19/2019    TRIG 231 (H) 01/22/2019     Lab Results   Component Value Date    HDL 37 (L) 06/22/2020    HDL 42 08/19/2019    HDL 35 (L) 01/22/2019     Lab Results   Component Value Date    LDLCALC 48 06/22/2020    LDLCALC 53 08/19/2019    LDLCALC 70 01/22/2019     Lab Results   Component Value Date    LABVLDL 24 06/22/2020    LABVLDL 32 08/19/2019    LABVLDL 46 01/22/2019     Lab Results   Component Value Date    INR 2.6 11/18/2021    INR 1.6 11/09/2021    INR 1.2 11/01/2021    PROTIME 16.1 (H) 07/08/2021    PROTIME 26.9 (H) 12/08/2020    PROTIME 38.7 (H) 12/07/2020     Radiology Review:  Pertinent images / reports were reviewed as a part of this visit and reveals the following:    [id number] Score for Atrial Fibrillation Stroke Risk   Risk   Factors  Component Value   C CHF Yes 1   H HTN Yes 1   A2 Age >= 76 Yes,  (80 y.o.) 2   D DM Yes 1   S2 Prior Stroke/TIA No 0   V Vascular Disease No 1   A Age 74-69 No,  (80 y.o.) 0   Sc Sex male 0    [id number]  Score  6   Score last updated 11/23/21 11:52 AM EST       Echo: May '21  Summary   Left ventricular cavity size is normal with normal left ventricular wall   thickness. Overall left ventricular systolic function appears mild-moderately reduced. Ejection fraction is visually estimated to be 35-40%. There is mild diffuse hypokinesis. Grade II diastolic dysfunction with elevated LV filling pressures. Normal right ventricular size. Right ventricular systolic function is mildly reduced. The left atrium is moderate-severely dilated.    Mitral valve leaflets appear mildly thickened. Mild to moderate mitral regurgitation. Moderate aortic stenosis with a peak velocity of 2.5 m/s, a mean pressure   gradient of 15 mmHg and an DONAVAN of 1.19 cm^2. Aortic valve gradients may be underestimated due to low cardiac output. Mild aortic regurgitation. Mild to moderate tricuspid regurgitation with a PASP of 45 mmHg. Trivial pulmonic regurgitation present    Last Stress Test: Sept '21  Summary    The overall quality of the study is good.        The left ventricular cavity size is moderately dilated. The right ventricle    is mildly dilated.        There is a small perfusion defect of mild severity in the apex and apical    inferior segment. The fixed defect has associated wall motion abnormality,    consistent with scar. There is an additional small perfusion defect of mild    severity in the mid-anterior. There is corresponding wall motion    abnormality. The defect is consistent with ischemia.        Sum stress score of 4. No visual TID. Calculated TID is 1. 13.        Left ventricular ejection fraction is severely reduced at 35%.      Myocardial perfusion is abnormal, consistent with ischemia.        Moderate risk scan       Last Angiogram: 10/26/21  Anatomy:   LM-20% distal  LAD-100% ostial, calcified  Cx-normal  OM1-100% ostial  OM2-20% proximal  RCA-20% proximal, 40% distal, calcified  RPDA-70 to 80% proximal, FFR of 0.76  LVEF-50%  LIMA-LAD: Widely patent  SVG-OM1: Widely patent  Aortic root: Not aneurysmal, no significant aortic insufficiency, 1 patent SVG visualized. PCI: RPDA 80% to 0% with a 3.25 mm x 18 mm Xience Mariela ALEJANDRA      Impression:  1. Severe multivessel CAD. 2.  Patent LIMA-LAD and SVG-OM1 grafts. 3.  Successful PCI with ALEJANDRA x1 to RPDA. 4.  Low normal LV systolic function.     Plan:  1.   Attempt triple therapy with enteric-coated aspirin 81 mg daily, Plavix 75 mg daily and warfarin to complete 30 days followed by Plavix and warfarin for an additional 5 months then transition to enteric-coated aspirin 81 mg daily and warfarin thereafter. 2.  Hydration. 3.  ACE inhibitor/ARB stopped preprocedure to optimize kidney function given renal insufficiency. Discussed with nephrology who recommends resuming ACE inhibitor/ARB 3 days post procedure. Device interrogation : 11/1/21:  AP 39%  22%. AT/AF with a 41% burden. Currently in AF today. PMT noted. Patient remains on warfarin, sotalol and metoprolol. Programming changes made today:  PVAB from 130 ms to 90 ms. Shortest PVARP/VREP 250 ms to 125 ms    Assessment:      Diagnosis Orders   1. Coronary artery disease involving native coronary artery of native heart without angina pectoris   ~s/p PTCA Rt PDA 10/26/21 : triple therapy recommended for one month (as above)  ~denies angina  ~hx of CABG '96    2. LV dysfunction   ~EF low normal by cath 10/26/21  ~LVEF 35% by NM  ~mild-mod MR noted on echo  ~hx NSVT : BB    3. Benign essential HTN   ~elevated on arrival ; suboptimal on recheck  ~remains off lisinopril   ~amlodipine added for BP control BASIC METABOLIC PANEL   4. Nonrheumatic aortic valve stenosis   ~mod AS with DONAVAN 1.19  ~denies CP / light headedness : will monitor now on lasix 40 mg daily         Patient  is stable since hospital discharge. Plan:  BMP today : plan to resume lisinopril depending on results   ~11/1/21: BUN up to 33 / creatinine 1.1, BNP up to 3506 > lasix increased to 40 mg daily      Will stop ASA 11/26 and continue plavix for another 5 months (with AC); thereafter will be on ASA and warfarin   F/U in 3 months    I have addressed the patient's cardiac risk factors and adjusted pharmacologic treatment as needed. In addition, I have reinforced the need for patient directed risk factor modification. Further evaluation will be based upon the patient's clinical course and testing results. All questions and concerns were addressed to the patient/daughter, Maynor Oleary. Alternatives to  treatment were discussed. The patient  currently  is not smoking. The risks related to smoking were reviewed with the patient. Recommend maintaining a smoke-free lifestyle. Daily weight, low sodium diet were discussed. Patient instructed to call the office with a weight gain: > 3 # over night or 5# in one week; swelling, SOB/orthopnea/PND    Dual Antiplatelet therapy / anti-coagulation has been recommended / prescribed for this patient. Education conducted on adverse reactions including bleeding was discussed. The patient verbalizes understanding. Pt is on a BB  Pt is not currently on an ace-i/ARB : held for change in renal status  Pt is on a statin      Saturated fat diet discussed  Exercise program discussed    Thank you for allowing to us to participate in the care of Skylar Montañous.       [address]  Documentation of today's visit sent to PCP

## 2021-11-24 NOTE — TELEPHONE ENCOUNTER
Last OV: 11/22/21-NPTS    Last Labs: BMP-11/22/21    Last Refill:10/28/21-30 days    Next OV: 2/23/2022

## 2021-11-29 RX ORDER — ASPIRIN 81 MG/1
TABLET, COATED ORAL
Qty: 30 TABLET | Refills: 0 | Status: SHIPPED | OUTPATIENT
Start: 2021-11-29 | End: 2021-12-06

## 2021-11-30 ENCOUNTER — CARE COORDINATION (OUTPATIENT)
Dept: CASE MANAGEMENT | Age: 86
End: 2021-11-30

## 2021-11-30 NOTE — CARE COORDINATION
[address] Transitions Follow Up Call    2021    Patient: Cholo Ortiz  Patient : 1933   MRN: [id number]  Reason for Admission: [facility] A-fib   Discharge Date: 10/27/21 RARS: Readmission Risk Score: 23         Spoke with: Jaior Sanderson verified Hipaa daughter     Spoke with [facility] who reported patient is doing well and had follow up with cardiologist yesterday and everything is good and he doesn't have to follow up for another 4 months. Patient has some slight edema in his feet and Dr. Jose Whitehead noted some crackles still in patient's lungs. CTN encouraged patient to follow fluid restriction of 2 liters and low sodium as well as elevated legs as much as possible. Patient encouraged to notify doctor should swelling, sob, or cp increase or worsen. [facility] denied any further needs at this time. CTN advised patient of use of urgent care or physicians 24 hr access line if assistance is needed after hours. Care Transitions Subsequent and Final Call    Subsequent and Final Calls  Do you have any ongoing symptoms?: No  Have your medications changed?: No  Do you have any questions related to your medications?: No  Do you currently have any active services?: No  Do you have any needs or concerns that I can assist you with?: No  Identified Barriers: None  Care Transitions Interventions  Other Interventions:            Follow Up  Future Appointments   Date Time Provider Sonido Bar   2021  1:00 PM [facility] ANTICOAGULATION CLINIC [facility]   2021 10:00 AM Ernesto Villanueva MD [facility] - DYD   2022  7:30 AM SCHEDULE, [place] REMOTE TRANSMISSION FF Cardio MMA   2022  1:00 PM Magali Ag MD FF Cardio MMA   2022 12:30 PM Nila Montoya MD [facility] FF AFL Nephrolo   2022  7:30 AM SCHEDULE, [place] REMOTE TRANSMISSION FF Cardio MMA   2022  7:30 AM SCHEDULE, [place] REMOTE TRANSMISSION FF Cardio MMA   10/26/2022  7:30 AM SCHEDULE, [place] REMOTE TRANSMISSION FF Cardio MICHAEL Coello BSN, RN, CCM  Care Transition Nurse  [phone] mobile

## 2021-12-01 RX ORDER — METOPROLOL SUCCINATE 50 MG/1
TABLET, EXTENDED RELEASE ORAL
Qty: 60 TABLET | Refills: 3 | Status: SHIPPED | OUTPATIENT
Start: 2021-12-01 | End: 2022-02-23 | Stop reason: SDUPTHER

## 2021-12-02 ENCOUNTER — ANTI-COAG VISIT (OUTPATIENT)
Dept: PHARMACY | Age: 86
End: 2021-12-02
Payer: MEDICARE

## 2021-12-02 DIAGNOSIS — I48.0 PAF (PAROXYSMAL ATRIAL FIBRILLATION) (HCC): Primary | ICD-10-CM

## 2021-12-02 LAB — INTERNATIONAL NORMALIZATION RATIO, POC: 2.4

## 2021-12-02 PROCEDURE — 99211 OFF/OP EST MAY X REQ PHY/QHP: CPT

## 2021-12-02 PROCEDURE — 85610 PROTHROMBIN TIME: CPT

## 2021-12-02 NOTE — PROGRESS NOTES
Mr. Goyo Weber is a 80 y.o. y/o male with history of Afib   He presents today for anticoagulation monitoring and adjustment. Pertinent PMH: ICD-pacemaker. 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    Patient Reported Findings:  Yes     No  [x]   []       Patient verifies current dosing regimen as listed  confirmed dose ---> cannot confirm dose---> Patient called to say he has been taking 5mg on Saturdays instead of 2.5mg. Not sure how long he has been doing this. States his pillbox has 5mg in it for this Saturday and he thinks he could have been doing this for a few weeks now. States he will follow AVS and take normal dose (as pt was therapeutic on this the past visits). If patient is low in 2 weeks, 9/28, then increase to 2.5mg MWF and 5mg AOD. ---> confirms dose   []   [x]       S/S bleeding/bruising/swelling/SOB- denies --> bruise from bumping into door    []   [x]       Blood in urine or stool denies  []   [x]       Procedures scheduled in the future at this time - had cardiac cath 7/9. IR 1.4---> holding 5 days for LV cath on 10/14, also states that having 2 moles removed from face 10/20- doesn't know if he has to hold yet, he will call and let me know---.  Cath cancelled 101/14, still held 5 days, then another procedure 10/26 and held 5 days --> no changes   []   [x]       Missed Dose- denies   []   [x]       Extra Dose- denies  []   [x]       Change in medications- receiving Procrit 40,000 unit injection once every two weeks until red blood cell count is consistently under control again---> started amiodarone 200mg qd on 8/20, inc metoprolol---states did not start amiodarone dt concerns with SEs so still sotalol, inc lasix--->2 tylenol in the mornings---> denies---> dec lasix, held lisinopril for a few days and still holding for now, on ASA for 30 days, then plavix for 5 months then back to ASA---> brought med list --> no changes --> resume lisinopril and lasix           []   [x]       Change in health/diet/appetite consistent greens -> has been eating less, appetite has been diminished --> eating out more, no NVD---> no greens, no NVD---> erratic diet, no NVD --> living alone; erratic diet. []   [x]       Change in alcohol use- doesn't drink   []   [x]       Change in activity  []   [x]       Hospital admission -admitted after procedure 10/26---> started ASA/plavix on dc, stopped lisinopril   []   [x]       Emergency department visit  []   [x]       Other complaints--> Patient has been very tired and not able to function well, getting epoetin alpha injection at hemotologist office     Clinical Outcomes:  Yes     No  []   [x]       Major bleeding event  []   [x]       Thromboembolic event  Duration of warfarin Therapy: indefinite  INR Range:  2.0-3.0     Has been stressed with passing of wife. Rosa Lima (daughter) is primary care now- [phone]. INR is 2.4 today   Continue taking dose of 5 mg on Sun, Tues and Thurs and 2.5 mg all other days of the week. Encouraged to maintain a consistency of vegetables/salads.   Recheck INR in 3 weeks, 12/21 d/t pt preference    **consent form signed 11/1/2021    Referring cardiologist will be Dr. Buck Brock  INR (no units)   Date Value   11/18/2021 2.6   11/09/2021 1.6   11/01/2021 1.2   10/26/2021 1.20 (H)   10/14/2021 1.40 (H)   10/12/2021 1.2   07/08/2021 1.40 (H)   12/08/2020 2.30 (H)         For Pharmacy Admin Tracking Only     Total # of Interventions Recommended: 0   Total # of Interventions Accepted: 0   Time Spent (min): 15

## 2021-12-06 ENCOUNTER — OFFICE VISIT (OUTPATIENT)
Dept: FAMILY MEDICINE CLINIC | Age: 86
End: 2021-12-06
Payer: MEDICARE

## 2021-12-06 VITALS
BODY MASS INDEX: 24.29 KG/M2 | HEIGHT: 69 IN | SYSTOLIC BLOOD PRESSURE: 170 MMHG | DIASTOLIC BLOOD PRESSURE: 72 MMHG | HEART RATE: 58 BPM | WEIGHT: 164 LBS | OXYGEN SATURATION: 96 %

## 2021-12-06 DIAGNOSIS — E78.00 HYPERCHOLESTEREMIA: ICD-10-CM

## 2021-12-06 DIAGNOSIS — I10 PRIMARY HYPERTENSION: ICD-10-CM

## 2021-12-06 DIAGNOSIS — I48.0 PAF (PAROXYSMAL ATRIAL FIBRILLATION) (HCC): ICD-10-CM

## 2021-12-06 DIAGNOSIS — R35.1 BENIGN PROSTATIC HYPERPLASIA WITH NOCTURIA: ICD-10-CM

## 2021-12-06 DIAGNOSIS — E11.21 TYPE 2 DIABETES MELLITUS WITH DIABETIC NEPHROPATHY, WITHOUT LONG-TERM CURRENT USE OF INSULIN (HCC): Primary | ICD-10-CM

## 2021-12-06 DIAGNOSIS — N40.1 BENIGN PROSTATIC HYPERPLASIA WITH NOCTURIA: ICD-10-CM

## 2021-12-06 PROBLEM — J69.0 ASPIRATION PNEUMONIA (HCC): Status: RESOLVED | Noted: 2020-12-07 | Resolved: 2021-12-06

## 2021-12-06 PROBLEM — R94.39 ABNORMAL CARDIOVASCULAR STRESS TEST: Status: RESOLVED | Noted: 2021-10-26 | Resolved: 2021-12-06

## 2021-12-06 PROCEDURE — G8427 DOCREV CUR MEDS BY ELIG CLIN: HCPCS | Performed by: FAMILY MEDICINE

## 2021-12-06 PROCEDURE — G8420 CALC BMI NORM PARAMETERS: HCPCS | Performed by: FAMILY MEDICINE

## 2021-12-06 PROCEDURE — 4040F PNEUMOC VAC/ADMIN/RCVD: CPT | Performed by: FAMILY MEDICINE

## 2021-12-06 PROCEDURE — 99214 OFFICE O/P EST MOD 30 MIN: CPT | Performed by: FAMILY MEDICINE

## 2021-12-06 PROCEDURE — G8484 FLU IMMUNIZE NO ADMIN: HCPCS | Performed by: FAMILY MEDICINE

## 2021-12-06 PROCEDURE — 1123F ACP DISCUSS/DSCN MKR DOCD: CPT | Performed by: FAMILY MEDICINE

## 2021-12-06 PROCEDURE — 1036F TOBACCO NON-USER: CPT | Performed by: FAMILY MEDICINE

## 2021-12-06 RX ORDER — DOXAZOSIN MESYLATE 1 MG/1
1 TABLET ORAL NIGHTLY
Qty: 30 TABLET | Refills: 3 | Status: SHIPPED | OUTPATIENT
Start: 2021-12-06 | End: 2021-12-06

## 2021-12-06 RX ORDER — DOXAZOSIN MESYLATE 1 MG/1
TABLET ORAL
Qty: 90 TABLET | Refills: 0 | Status: SHIPPED | OUTPATIENT
Start: 2021-12-06 | End: 2022-02-17 | Stop reason: DRUGHIGH

## 2021-12-06 NOTE — PROGRESS NOTES
2021    Blood pressure (!) 180/72, pulse 58, height 5' 9\" (1.753 m), weight 164 lb (74.4 kg), SpO2 96 %. Ze Palmer (:  1933) is a 80 y.o. male, here for evaluation of the following medical concerns:    Chief Complaint   Patient presents with    Other     new to you, but saw Dr. Melendrez in 2021. Here with son, Jerry Alatorre. Lives alone. In house in [address] [place] [address],[address]. Gets help from daughter, son, grandchildren. He has DM-2 history. No longer takes meds. Has ckd -3 and microalbuminuria. Was 'just at eye doctor' for eye check. Lab Results   Component Value Date    LABA1C 5.2 10/27/2021    LABA1C 5.7 2021    LABA1C 5.5 2021        Had coronary stenting in oct, Dr Mari Ojeda. Also chf,  afib (on coumadin). His LV EF improved from 35% to 50% after stenting. Now on plavix 75, (stopped ASA). Lab Results   Component Value Date    INR 2.4 2021    INR 2.6 2021    INR 1.6 2021    PROTIME 16.1 (H) 2021    PROTIME 26.9 (H) 2020    PROTIME 38.7 (H) 2020         No chest pains, dizziness, heart palpitations, dyspnea, lightheadedness, worsening edema. (has baseline trace pretib edema)  Denies orthopnea, but he sleeps in a recliner out of habit. Has ckd 3, sees Dr Jasbir Quezada  Last renal function test:   Lab Results   Component Value Date     2021    K 4.3 2021    K 4.7 2020    BUN 33 2021    CREATININE 1.2 2021     Estimated Creatinine Clearance: 43 mL/min (based on SCr of 1.2 mg/dL). He follows with [facility] for anemia, getting procrit about every 3 wks. 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 Reports q3 hr polyuria for years. Does take atorva 20.   Last lipid test:  Lab Results   Component Value Date    CHOL 109 2020    TRIG 120 2020    HDL 37 (L) 2020    LDLCALC 48 2020     Lab Results   Component Value Date    ALT 14 2021    AST 19 2021     His daughter Ruth Ignacio 2019    Localized edema    Pneumonia due to organism    Ischemic cardiomyopathy    Benign essential HTN    Aspiration pneumonia (HCC)    Pansinusitis    S/P amputation of lesser toe, right (HCC)    Abnormal cardiovascular stress test        Body mass index is 24.22 kg/m². Wt Readings from Last 3 Encounters:   12/06/21 164 lb (74.4 kg)   11/29/21 167 lb (75.8 kg)   11/23/21 165 lb 3.2 oz (74.9 kg)       BP Readings from Last 3 Encounters:   12/06/21 (!) 180/72   11/29/21 (!) 162/74   11/23/21 (!) 144/68       No Known Allergies    Prior to Visit Medications    Medication Sig Taking?  Authorizing Provider   metoprolol succinate (TOPROL XL) 50 MG extended release tablet TAKE 1 TABLET BY MOUTH TWICE DAILY Yes PRIYA Caballero CNP   ASPIRIN LOW DOSE 81 MG EC tablet TAKE 1 TABLET BY MOUTH DAILY Yes PRIYA Caballero CNP   lisinopril (PRINIVIL;ZESTRIL) 5 MG tablet TAKE 1 TABLET BY MOUTH DAILY Yes Magalie Mcdonald MD   amLODIPine (NORVASC) 5 MG tablet TAKE 1 TABLET BY MOUTH DAILY Yes Magalie Mcdonald MD   furosemide (LASIX) 20 MG tablet Take 2 tablets by mouth daily Yes PRIYA Alexandra CNP   pantoprazole (PROTONIX) 40 MG tablet Take 1 tablet by mouth daily While on triple therapy Yes PRIYA Alexandra CNP   clopidogrel (PLAVIX) 75 MG tablet TAKE 1 TABLET BY MOUTH DAILY Yes PRIYA Alexandra CNP   sotalol (BETAPACE) 120 MG tablet TAKE 1 TABLET BY MOUTH TWICE DAILY Yes PRIYA Caballero CNP   atorvastatin (LIPITOR) 20 MG tablet Take 1 tablet by mouth daily Yes Anastasia Dhaliwal MD   warfarin (COUMADIN) 5 MG tablet Take 1 tablet by mouth daily Take 2.5 mg on Mon, Wed and Fri and 5 mg all other days of the week Yes Olga Felty, APRN - CNP   vitamin B-1 (THIAMINE) 100 MG tablet Take 1,000 mg by mouth daily  Yes Historical Provider, MD   EPOETIN BIBIANA IJ Inject as directed every 21 days  Yes Historical Provider, MD   acetaminophen (TYLENOL) 325 MG tablet Take 650 mg by mouth every 6 hours as needed for Pain Yes Historical Provider, MD   fluticasone (FLONASE) 50 MCG/ACT nasal spray 2 sprays by Each Nostril route 2 times daily  Patient not taking: Reported on 11/23/2021  Arpit Mendieta MD   sodium chloride (OCEAN, BABY AYR) 0.65 % nasal spray 2 sprays by Nasal route every 4 hours  Patient not taking: Reported on 11/23/2021  Arpit Mendieta MD        Social History     Tobacco Use    Smoking status: Never Smoker    Smokeless tobacco: Never Used    Tobacco comment: counseled on tobacco exposure avoidance   Vaping Use    Vaping Use: Never used   Substance Use Topics    Alcohol use: No     Alcohol/week: 0.0 standard drinks    Drug use: No       Review of Systems As above     Physical Exam  Vitals and nursing note reviewed. Constitutional:       General: He is not in acute distress. Appearance: Normal appearance. He is well-developed. He is not diaphoretic. HENT:      Head: Normocephalic. Right Ear: Tympanic membrane normal.      Left Ear: Tympanic membrane normal.   Cardiovascular:      Rate and Rhythm: Normal rate. Rhythm irregular. Pulses: Normal pulses. Heart sounds: Murmur (2-3/6 NICHOLE all areas) heard. Pulmonary:      Effort: Pulmonary effort is normal. No respiratory distress. Breath sounds: Rales (bi-basilar rales) present. No wheezing. Musculoskeletal:      Cervical back: Normal range of motion. Comments: Trace ankle edema   Skin:     General: Skin is warm and dry. Neurological:      General: No focal deficit present. Mental Status: He is alert and oriented to person, place, and time. Mental status is at baseline. Psychiatric:         Mood and Affect: Mood normal.         Behavior: Behavior normal.         Thought Content: Thought content normal.         Judgment: Judgment normal.      Comments: Quite jolly. Hard of hearing         ASSESSMENT/PLAN:    1.  Type 2 diabetes mellitus with diabetic nephropathy, without long-term current use of insulin (HCC)  - a1c to goal withoiut meds. Continue to monitor    2. Hypercholesteremia  - due for lipid testing, but is not fasting. He is certain he is taking atorvastatin. 3. Primary hypertension  - bp high, likely due to being off lisinpril. He has only restarted 5 mg 2 days ago and did not take today. He will take lisinopril 5 mg daily and may need to returnto 10 mg dose later. Will do virtual visit with bp home monitor testing in 4 wks with his daughter. Will plan bmp retesting at that time (he goes to [facility] lab). Also starting cardura 1 mg for BPH, which may benefit bp both directly and indirectly (if he can remain asleep for longer periods of time each night). Continue norvasc 5, toprol 5-, lasix 40. Urged to try sleeping in bed if possible     4. Benign prostatic hyperplasia with nocturia  -  Start cardura. I don't see old records from [address], but he appears to have a long history of this and no sign that it is suddenly worse. So will not work up prostate at this time. 5. PAF (paroxysmal atrial fibrillation) ([facility] [address]  - is in afib today. discussed that with coumadin and plavix use, watch for gi bleeding. Return in about 4 weeks (around 1/3/2022) for follow up HTN, OK for Virtual Video Visit. An  SaveMeetingignature was used to authenticate this note.     --Sandro Quintero MD on 12/6/2021 at 10:00 AM

## 2021-12-07 RX ORDER — FUROSEMIDE 20 MG/1
TABLET ORAL
Qty: 90 TABLET | Refills: 3 | Status: SHIPPED | OUTPATIENT
Start: 2021-12-07 | End: 2022-01-06

## 2021-12-21 ENCOUNTER — ANTI-COAG VISIT (OUTPATIENT)
Dept: PHARMACY | Age: 86
End: 2021-12-21
Payer: MEDICARE

## 2021-12-21 DIAGNOSIS — I48.0 PAF (PAROXYSMAL ATRIAL FIBRILLATION) (HCC): Primary | ICD-10-CM

## 2021-12-21 LAB — INTERNATIONAL NORMALIZATION RATIO, POC: 1.4

## 2021-12-21 PROCEDURE — 85610 PROTHROMBIN TIME: CPT

## 2021-12-21 PROCEDURE — 99211 OFF/OP EST MAY X REQ PHY/QHP: CPT

## 2021-12-21 NOTE — PROGRESS NOTES
Mr. Lit Malcolm is a 80 y.o. y/o male with history of Afib   He presents today for anticoagulation monitoring and adjustment. Pertinent PMH: ICD-pacemaker. 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    Patient Reported Findings:  Yes     No  [x]   []       Patient verifies current dosing regimen as listed  confirmed dose ---> cannot confirm dose---> Patient called to say he has been taking 5mg on Saturdays instead of 2.5mg. Not sure how long he has been doing this. States his pillbox has 5mg in it for this Saturday and he thinks he could have been doing this for a few weeks now. States he will follow AVS and take normal dose (as pt was therapeutic on this the past visits). If patient is low in 2 weeks, 9/28, then increase to 2.5mg MWF and 5mg AOD. ---> confirms dose   []   [x]       S/S bleeding/bruising/swelling/SOB- denies --> bruise from bumping into door    []   [x]       Blood in urine or stool denies  []   [x]       Procedures scheduled in the future at this time - had cardiac cath 7/9. IR 1.4---> holding 5 days for LV cath on 10/14, also states that having 2 moles removed from face 10/20- doesn't know if he has to hold yet, he will call and let me know---.  Cath cancelled 101/14, still held 5 days, then another procedure 10/26 and held 5 days --> no changes   []   [x]       Missed Dose- denies   []   [x]       Extra Dose- denies  []   [x]       Change in medications- receiving Procrit 40,000 unit injection once every two weeks until red blood cell count is consistently under control again---> started amiodarone 200mg qd on 8/20, inc metoprolol---states did not start amiodarone dt concerns with SEs so still sotalol, inc lasix--->2 tylenol in the mornings---> denies---> dec lasix, held lisinopril for a few days and still holding for now, on ASA for 30 days, then plavix for 5 months then back to ASA---> brought med list --> no changes --> resume lisinopril and lasix--> started doxazosin           []   [x] Change in health/diet/appetite consistent greens -> has been eating less, appetite has been diminished --> eating out more, no NVD---> no greens, no NVD---> erratic diet, no NVD --> living alone; erratic diet--> less salads and green recently, daughter and neighbor bring meals  []   [x]       Change in alcohol use- doesn't drink   []   [x]       Change in activity  []   [x]       Hospital admission -admitted after procedure 10/26---> started ASA/plavix on dc, stopped lisinopril   []   [x]       Emergency department visit  []   [x]       Other complaints--> Patient has been very tired and not able to function well, getting epoetin alpha injection at hemotologist office     Clinical Outcomes:  Yes     No  []   [x]       Major bleeding event  []   [x]       Thromboembolic event  Duration of warfarin Therapy: indefinite  INR Range:  2.0-3.0     Has been stressed with passing of wife. Jason Stewart (daughter) is primary care now- [phone]. INR is 1.4 today   Take 7.5mg today then continue taking dose of 5 mg on Sun, Tues and Thurs and 2.5 mg all other days of the week. Encouraged to maintain a consistency of vegetables/salads.   Recheck INR in 2 weeks, 1/4     **consent form signed 11/1/2021    Referring cardiologist will be Dr. Christiano Restrepo  INR (no units)   Date Value   12/21/2021 1.4   12/02/2021 2.4   11/18/2021 2.6   11/09/2021 1.6   10/26/2021 1.20 (H)   10/14/2021 1.40 (H)   07/08/2021 1.40 (H)   12/08/2020 2.30 (H)       For Pharmacy Admin Tracking Only    Intervention Detail: Dose Adjustment: 1, reason: Therapy Optimization  Total # of Interventions Recommended: 1  Total # of Interventions Accepted: 1  Time Spent (min): 15    

## 2022-01-04 ENCOUNTER — ANTI-COAG VISIT (OUTPATIENT)
Dept: PHARMACY | Age: 87
End: 2022-01-04
Payer: MEDICARE

## 2022-01-04 DIAGNOSIS — I48.0 PAF (PAROXYSMAL ATRIAL FIBRILLATION) (HCC): Primary | ICD-10-CM

## 2022-01-04 LAB — INTERNATIONAL NORMALIZATION RATIO, POC: 2.4

## 2022-01-04 PROCEDURE — 99211 OFF/OP EST MAY X REQ PHY/QHP: CPT

## 2022-01-04 PROCEDURE — 85610 PROTHROMBIN TIME: CPT

## 2022-01-04 NOTE — PROGRESS NOTES
Mr. Kim Guzman is a 80 y.o. y/o male with history of Afib   He presents today for anticoagulation monitoring and adjustment. Pertinent PMH: ICD-pacemaker. 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    Patient Reported Findings:  Yes     No  [x]   []       Patient verifies current dosing regimen as listed  confirmed dose ---> cannot confirm dose---> Patient called to say he has been taking 5mg on Saturdays instead of 2.5mg. Not sure how long he has been doing this. States his pillbox has 5mg in it for this Saturday and he thinks he could have been doing this for a few weeks now. States he will follow AVS and take normal dose (as pt was therapeutic on this the past visits). If patient is low in 2 weeks, 9/28, then increase to 2.5mg MWF and 5mg AOD. ---> confirms dose   []   [x]       S/S bleeding/bruising/swelling/SOB- denies --> bruise from bumping into door    []   [x]       Blood in urine or stool denies  []   [x]       Procedures scheduled in the future at this time - had cardiac cath 7/9. IR 1.4---> holding 5 days for LV cath on 10/14, also states that having 2 moles removed from face 10/20- doesn't know if he has to hold yet, he will call and let me know---.  Cath cancelled 101/14, still held 5 days, then another procedure 10/26 and held 5 days --> no changes   []   [x]       Missed Dose- denies   []   [x]       Extra Dose- denies  []   [x]       Change in medications- receiving Procrit 40,000 unit injection once every two weeks until red blood cell count is consistently under control again---> started amiodarone 200mg qd on 8/20, inc metoprolol---states did not start amiodarone dt concerns with SEs so still sotalol, inc lasix--->2 tylenol in the mornings---> denies---> dec lasix, held lisinopril for a few days and still holding for now, on ASA for 30 days, then plavix for 5 months then back to ASA---> brought med list --> no changes --> resume lisinopril and lasix--> started doxazosin--> no change []   [x]       Change in health/diet/appetite consistent greens -> has been eating less, appetite has been diminished --> eating out more, no NVD---> no greens, no NVD---> erratic diet, no NVD --> living alone; erratic diet--> less salads and green recently, daughter and neighbor bring meals  []   [x]       Change in alcohol use- doesn't drink   []   [x]       Change in activity  []   [x]       Hospital admission -admitted after procedure 10/26---> started ASA/plavix on dc, stopped lisinopril   []   [x]       Emergency department visit  []   [x]       Other complaints--> Patient has been very tired and not able to function well, getting epoetin alpha injection at hemotologist office     Clinical Outcomes:  Yes     No  []   [x]       Major bleeding event  []   [x]       Thromboembolic event  Duration of warfarin Therapy: indefinite  INR Range:  2.0-3.0     Has been stressed with passing of wife. Tdaeo Winter (daughter) is primary care now- [phone]. INR is 2.4 today   Continue taking dose of 5 mg on Sun, Tues and Thurs and 2.5 mg all other days of the week. Encouraged to maintain a consistency of vegetables/salads.   Recheck INR in 2 weeks, 1/18     **consent form signed 11/1/2021    Referring cardiologist will be Dr. Omayra Gao  INR (no units)   Date Value   12/21/2021 1.4   12/02/2021 2.4   11/18/2021 2.6   11/09/2021 1.6   10/26/2021 1.20 (H)   10/14/2021 1.40 (H)   07/08/2021 1.40 (H)   12/08/2020 2.30 (H)       For Pharmacy Admin Tracking Only    Intervention Detail: Dose Adjustment: 1, reason: Therapy Optimization  Total # of Interventions Recommended: 1  Total # of Interventions Accepted: 1  Time Spent (min): 15

## 2022-01-06 ENCOUNTER — VIRTUAL VISIT (OUTPATIENT)
Dept: FAMILY MEDICINE CLINIC | Age: 87
End: 2022-01-06
Payer: MEDICARE

## 2022-01-06 DIAGNOSIS — R35.1 BENIGN PROSTATIC HYPERPLASIA WITH NOCTURIA: ICD-10-CM

## 2022-01-06 DIAGNOSIS — E11.22 CKD STAGE 3 DUE TO TYPE 2 DIABETES MELLITUS (HCC): ICD-10-CM

## 2022-01-06 DIAGNOSIS — N18.30 CKD STAGE 3 DUE TO TYPE 2 DIABETES MELLITUS (HCC): ICD-10-CM

## 2022-01-06 DIAGNOSIS — N40.1 BENIGN PROSTATIC HYPERPLASIA WITH NOCTURIA: ICD-10-CM

## 2022-01-06 DIAGNOSIS — I10 PRIMARY HYPERTENSION: Primary | ICD-10-CM

## 2022-01-06 PROCEDURE — 4040F PNEUMOC VAC/ADMIN/RCVD: CPT | Performed by: FAMILY MEDICINE

## 2022-01-06 PROCEDURE — 1123F ACP DISCUSS/DSCN MKR DOCD: CPT | Performed by: FAMILY MEDICINE

## 2022-01-06 PROCEDURE — 99214 OFFICE O/P EST MOD 30 MIN: CPT | Performed by: FAMILY MEDICINE

## 2022-01-06 PROCEDURE — G8427 DOCREV CUR MEDS BY ELIG CLIN: HCPCS | Performed by: FAMILY MEDICINE

## 2022-01-06 RX ORDER — FUROSEMIDE 20 MG/1
40 TABLET ORAL EVERY MORNING
Qty: 90 TABLET | Refills: 3 | COMMUNITY
Start: 2022-01-06 | End: 2022-01-18

## 2022-01-06 NOTE — PROGRESS NOTES
2022    TELEHEALTH EVALUATION -- Audio/Visual (During ZPNLM-55 public health emergency)    HPI:    Jose A Rayo (:  1933) has requested an audio/video evaluation for the following concern(s):    Virtual video visit for recheck bp. With daughter. Now taking lisinopril 5 mg daily.  (lisinopril stopped due to high creatinine before having imaging with contrast). Lasix dose reduced from 40 to 20 mg. Also started doxazosin for nocturia. 1 mg dose. No SE's. Wt 163    /61 today with automated cuff. Daughter says they have testes once weekly for past 4 weeks. His systolic's have been 007-313'J/ diastolic's in 11'P. Continues norvasc 5, toprol 50, lasix 20. Sees Dr Willem Granger for renal.     Last GFR 57 21. Last renal function test:   Lab Results   Component Value Date     2021    K 4.3 2021    K 4.7 2020    BUN 33 2021    CREATININE 1.2 2021     CrCl cannot be calculated (Unknown ideal weight.). Daughter reports mild leg edema. This is about his baseline. No chest pains, dizziness, heart palpitations, dyspnea, lightheadedness, worsening edema.      Review of Systems As above     Patient Active Problem List   Diagnosis    CAD (coronary artery disease)    Cardiac pacemaker    Benign prostatic hyperplasia with nocturia    Gout-uric acid >7.5--advised proph tx    Hypercholesteremia    Carotid bruit(bilat-nl duplex u/s 10/10)    Vitamin D deficiency-(advised 1000IU/day)    Actinic keratoses    Elevated PSA-(was 4.2 10/10-repeat 6 mo)(was 5.12 -then 4.4 )-sees dr Twila Orozco for this    S/P colonoscopy--neg per pt,  3/18 repeat colonoscopy polyp-repeat 5 yr    Hearing loss, conductive, bilateral-seeing ent-dr quinn for hearing aides    Encounter for monitoring sotalol therapy    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 Gali Melo op 8/15--started otc iron bid, off now  - work up Dr. Edmond lucas marrow. possible MDS 2019    PAF (paroxysmal atrial fibrillation) (HCC)    DM (diabetes mellitus), type 2 with peripheral vascular complications (HCC)--s/p amputation great toe post osteomylitis   diet controlled     Hypertension    Hyperkalemia    H/O esophagogastroduodenoscopy  11/18  prominent vessesls vs varices. dr Ceferino Polanco (done for blood in stool)    Urinary frequency    Elevated ferritin  - work up Dr. Carol Gustafson  genetic screen hemachromatosis negative. 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    Localized edema    Pneumonia due to organism    Ischemic cardiomyopathy    Pansinusitis    S/P amputation of lesser toe, right ([address]        Prior to Visit Medications    Medication Sig Taking?  Authorizing Provider   clopidogrel (PLAVIX) 75 MG tablet TAKE 1 TABLET BY MOUTH DAILY Yes PRIYA Rangel CNP   furosemide (LASIX) 20 MG tablet TAKE 1 TABLET BY MOUTH DAILY Yes Irina Marquez MD   doxazosin (CARDURA) 1 MG tablet TAKE 1 TABLET BY MOUTH EVERY NIGHT Yes Swapna Avitia MD   metoprolol succinate (TOPROL XL) 50 MG extended release tablet TAKE 1 TABLET BY MOUTH TWICE DAILY Yes PRIYA Rangel CNP   lisinopril (PRINIVIL;ZESTRIL) 5 MG tablet TAKE 1 TABLET BY MOUTH DAILY Yes Tena Flower MD   amLODIPine (NORVASC) 5 MG tablet TAKE 1 TABLET BY MOUTH DAILY Yes Tena Flower MD   pantoprazole (PROTONIX) 40 MG tablet Take 1 tablet by mouth daily While on triple therapy Yes PRIYA Keith CNP   sotalol (BETAPACE) 120 MG tablet TAKE 1 TABLET BY MOUTH TWICE DAILY Yes PRIYA Rangel CNP   atorvastatin (LIPITOR) 20 MG tablet Take 1 tablet by mouth daily Yes Irina Marquez MD   warfarin (COUMADIN) 5 MG tablet Take 1 tablet by mouth daily Take 2.5 mg on Mon, Wed and Fri and 5 mg all other days of the week Yes PRIYA Palmer CNP   vitamin B-1 (THIAMINE) 100 MG tablet Take 1,000 mg by mouth daily  Yes Historical Provider, MD   EPOETIN BIBIANA IJ Inject as directed every 21 days  Yes Historical Provider, MD   acetaminophen (TYLENOL) 325 MG tablet Take 650 mg by mouth every 6 hours as needed for Pain Yes Historical Provider, MD       Social History     Tobacco Use    Smoking status: Never Smoker    Smokeless tobacco: Never Used    Tobacco comment: counseled on tobacco exposure avoidance   Vaping Use    Vaping Use: Never used   Substance Use Topics    Alcohol use: No     Alcohol/week: 0.0 standard drinks    Drug use: No            PHYSICAL EXAMINATION:  Physical Exam  Constitutional:       General: He is not in acute distress. Appearance: Normal appearance. He is not ill-appearing. HENT:      Head: Normocephalic and atraumatic. Pulmonary:      Effort: Pulmonary effort is normal.   Musculoskeletal:         General: Normal range of motion. Cervical back: Normal range of motion. Skin:     Findings: No rash. Neurological:      General: No focal deficit present. Mental Status: He is alert and oriented to person, place, and time. Mental status is at baseline. Psychiatric:         Mood and Affect: Mood normal.         Behavior: Behavior normal.         Thought Content: Thought content normal.         Judgment: Judgment normal.          ASSESSMENT/PLAN:    1. Primary hypertension  - recommend tighter control. discussed many various options for him and finally decided together to increae lasix back go 40 mg (to also help leg edema and perhaps reduce the amount of nocturia by using Lasix in the morning. Will need to recheck renal fxn and potassium ion 1-2 wks. Continue other meds    2. CKD stage 3 due to type 2 diabetes mellitus ([facility] [address]  - recheck bmp in 1-2 wks    3. Benign prostatic hyperplasia with nocturia  - if increasing AM lasix does not help, will consider increasing cardura (doxazosin)      Return in about 6 weeks (around 2/17/2022) for follow up HTN.     Edelmira Edwards is a 80 y.o. male being evaluated by a Virtual Visit (video visit) encounter to address concerns as mentioned above. A caregiver was present when appropriate. Due to this being a TeleHealth encounter (During ETFMR-55 public health emergency), evaluation of the following organ systems was limited: Vitals/Constitutional/EENT/Resp/CV/GI//MS/Neuro/Skin/Heme-Lymph-Imm. Pursuant to the emergency declaration under the [address] [place] [address], [address] [place] [address] and the [employer] Act, this Virtual Visit was conducted with patient's (and/or legal guardian's) consent, to reduce the patient's risk of exposure to COVID-19 and provide necessary medical care. The patient (and/or legal guardian) has also been advised to contact this office for worsening conditions or problems, and seek emergency medical treatment and/or call 911 if deemed necessary. Patient identification was verified at the start of the visit: Yes    Services were provided through a video synchronous discussion virtually to substitute for in-person clinic visit. Patient and provider were located at their individual homes. --Evangelista Robles MD on 1/6/2022 at 1:17 PM    An electronic signature was used to authenticate this note.

## 2022-01-18 ENCOUNTER — ANTI-COAG VISIT (OUTPATIENT)
Dept: PHARMACY | Age: 87
End: 2022-01-18
Payer: MEDICARE

## 2022-01-18 ENCOUNTER — HOSPITAL ENCOUNTER (OUTPATIENT)
Age: 87
Discharge: HOME | End: 2022-01-18
Payer: MEDICARE

## 2022-01-18 DIAGNOSIS — E11.22 CKD STAGE 3 DUE TO TYPE 2 DIABETES MELLITUS (HCC): ICD-10-CM

## 2022-01-18 DIAGNOSIS — I48.0 PAF (PAROXYSMAL ATRIAL FIBRILLATION) (HCC): Primary | ICD-10-CM

## 2022-01-18 DIAGNOSIS — N18.30 CKD STAGE 3 DUE TO TYPE 2 DIABETES MELLITUS (HCC): ICD-10-CM

## 2022-01-18 LAB
ANION GAP SERPL CALCULATED.3IONS-SCNC: 17 MMOL/L (ref 3–16)
BUN BLDV-MCNC: 24 MG/DL (ref 7–20)
CALCIUM SERPL-MCNC: 8.8 MG/DL (ref 8.3–10.6)
CHLORIDE BLD-SCNC: 102 MMOL/L (ref 99–110)
CO2: 24 MMOL/L (ref 21–32)
CREAT SERPL-MCNC: 1.2 MG/DL (ref 0.8–1.3)
GFR AFRICAN AMERICAN: >60
GFR NON-AFRICAN AMERICAN: 57
GLUCOSE BLD-MCNC: 209 MG/DL (ref 70–99)
INTERNATIONAL NORMALIZATION RATIO, POC: 3.2
POTASSIUM SERPL-SCNC: 3.8 MMOL/L (ref 3.5–5.1)
SODIUM BLD-SCNC: 143 MMOL/L (ref 136–145)

## 2022-01-18 PROCEDURE — 99211 OFF/OP EST MAY X REQ PHY/QHP: CPT

## 2022-01-18 PROCEDURE — 80048 BASIC METABOLIC PNL TOTAL CA: CPT

## 2022-01-18 PROCEDURE — 85610 PROTHROMBIN TIME: CPT

## 2022-01-18 PROCEDURE — 36415 COLL VENOUS BLD VENIPUNCTURE: CPT

## 2022-01-18 RX ORDER — FUROSEMIDE 40 MG/1
40 TABLET ORAL EVERY MORNING
Qty: 90 TABLET | Refills: 0 | Status: SHIPPED | OUTPATIENT
Start: 2022-01-18 | End: 2022-04-04 | Stop reason: DRUGHIGH

## 2022-01-18 NOTE — PROGRESS NOTES
Mr. Shannon Maxwell is a 80 y.o. y/o male with history of Afib   He presents today for anticoagulation monitoring and adjustment. Pertinent PMH: ICD-pacemaker. 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    Patient Reported Findings:  Yes     No  [x]   []       Patient verifies current dosing regimen as listed  confirmed dose ---> cannot confirm dose---> Patient called to say he has been taking 5mg on Saturdays instead of 2.5mg. Not sure how long he has been doing this. States his pillbox has 5mg in it for this Saturday and he thinks he could have been doing this for a few weeks now. States he will follow AVS and take normal dose (as pt was therapeutic on this the past visits). If patient is low in 2 weeks, 9/28, then increase to 2.5mg MWF and 5mg AOD. ---> confirms dose   []   [x]       S/S bleeding/bruising/swelling/SOB- denies --> bruise from bumping into door    []   [x]       Blood in urine or stool denies  []   [x]       Procedures scheduled in the future at this time - had cardiac cath 7/9. IR 1.4---> holding 5 days for LV cath on 10/14, also states that having 2 moles removed from face 10/20- doesn't know if he has to hold yet, he will call and let me know---.  Cath cancelled 101/14, still held 5 days, then another procedure 10/26 and held 5 days --> no changes   []   [x]       Missed Dose- denies   []   [x]       Extra Dose- denies  []   [x]       Change in medications- receiving Procrit 40,000 unit injection once every two weeks until red blood cell count is consistently under control again---> started amiodarone 200mg qd on 8/20, inc metoprolol---states did not start amiodarone dt concerns with SEs so still sotalol, inc lasix--->2 tylenol in the mornings---> resume lisinopril and lasix--> started doxazosin--> no change          [x]   []       Change in health/diet/appetite consistent greens -> has been eating less, appetite has been diminished --> eating out more, no NVD---> no greens, no NVD---> erratic diet, no NVD --> living alone; erratic diet--> less salads and green recently, daughter and neighbor bring meals --> still eating vit k but unable to state how much   []   [x]       Change in alcohol use- doesn't drink   []   [x]       Change in activity  []   [x]       Hospital admission -admitted after procedure 10/26---> started ASA/plavix on dc, stopped lisinopril   []   [x]       Emergency department visit  []   [x]       Other complaints--> Patient has been very tired and not able to function well, getting epoetin alpha injection at hemotologist office     Clinical Outcomes:  Yes     No  []   [x]       Major bleeding event  []   [x]       Thromboembolic event  Duration of warfarin Therapy: indefinite  INR Range:  2.0-3.0     Has been stressed with passing of wife. Leida Corbett (daughter) is primary care now- [phone]. INR is 3.2 today   Continue taking dose of 5 mg on Sun, Tues and Thurs and 2.5 mg all other days of the week. Encouraged to maintain a consistency of vegetables/salads.   Recheck INR in 3 weeks, 2/8     **consent form signed 11/1/2021    Referring cardiologist will be Dr. Ebonie Dumont  INR (no units)   Date Value   01/04/2022 2.4   12/21/2021 1.4   12/02/2021 2.4   11/18/2021 2.6   10/26/2021 1.20 (H)   10/14/2021 1.40 (H)   07/08/2021 1.40 (H)   12/08/2020 2.30 (H)       For Pharmacy Admin Tracking Only    Total # of Interventions Recommended: 0  Total # of Interventions Accepted: 0  Time Spent (min): 15

## 2022-01-24 RX ORDER — CLOPIDOGREL BISULFATE 75 MG/1
75 TABLET ORAL DAILY
Qty: 90 TABLET | Refills: 4 | Status: SHIPPED | OUTPATIENT
Start: 2022-01-24 | End: 2022-01-26

## 2022-01-24 RX ORDER — CLOPIDOGREL BISULFATE 75 MG/1
75 TABLET ORAL DAILY
Qty: 90 TABLET | Refills: 4 | Status: SHIPPED | OUTPATIENT
Start: 2022-01-24 | End: 2022-01-24 | Stop reason: SDUPTHER

## 2022-01-26 ENCOUNTER — NURSE ONLY (OUTPATIENT)
Dept: CARDIOLOGY CLINIC | Age: 87
End: 2022-01-26
Payer: MEDICARE

## 2022-01-26 DIAGNOSIS — I48.0 PAF (PAROXYSMAL ATRIAL FIBRILLATION) (HCC): ICD-10-CM

## 2022-01-26 DIAGNOSIS — Z95.0 CARDIAC PACEMAKER IN SITU: ICD-10-CM

## 2022-01-26 PROCEDURE — 93296 REM INTERROG EVL PM/IDS: CPT | Performed by: INTERNAL MEDICINE

## 2022-01-26 PROCEDURE — 93294 REM INTERROG EVL PM/LDLS PM: CPT | Performed by: INTERNAL MEDICINE

## 2022-01-26 RX ORDER — CLOPIDOGREL BISULFATE 75 MG/1
75 TABLET ORAL DAILY
Qty: 90 TABLET | Refills: 1 | Status: SHIPPED | OUTPATIENT
Start: 2022-01-26 | End: 2022-05-05 | Stop reason: ALTCHOICE

## 2022-01-26 NOTE — PROGRESS NOTES
We received remote transmission from patient's monitor at home. Transmission shows normal sensing and pacing function. Noted AF and NSVT. Pt is on coumadin and toprol. EP physician will review. See interrogation under cardiology tab in the [address] [place] [address] [address] field for more details.

## 2022-02-08 ENCOUNTER — ANTI-COAG VISIT (OUTPATIENT)
Dept: PHARMACY | Age: 87
End: 2022-02-08
Payer: MEDICARE

## 2022-02-08 DIAGNOSIS — I48.0 PAF (PAROXYSMAL ATRIAL FIBRILLATION) (HCC): Primary | ICD-10-CM

## 2022-02-08 LAB — INTERNATIONAL NORMALIZATION RATIO, POC: 3.2

## 2022-02-08 PROCEDURE — 85610 PROTHROMBIN TIME: CPT

## 2022-02-08 PROCEDURE — 99211 OFF/OP EST MAY X REQ PHY/QHP: CPT

## 2022-02-08 NOTE — PROGRESS NOTES
Mr. Jericho Richmond is a 80 y.o. y/o male with history of Afib   He presents today for anticoagulation monitoring and adjustment. Pertinent PMH: ICD-pacemaker. 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    Patient Reported Findings:  Yes     No  [x]   []       Patient verifies current dosing regimen as listed  confirmed dose ---> cannot confirm dose---> Patient called to say he has been taking 5mg on Saturdays instead of 2.5mg. Not sure how long he has been doing this. States his pillbox has 5mg in it for this Saturday and he thinks he could have been doing this for a few weeks now. States he will follow AVS and take normal dose (as pt was therapeutic on this the past visits). If patient is low in 2 weeks, 9/28, then increase to 2.5mg MWF and 5mg AOD. ---> confirms dose   []   [x]       S/S bleeding/bruising/swelling/SOB- denies --> bruise from bumping into door    []   [x]       Blood in urine or stool denies  []   [x]       Procedures scheduled in the future at this time - had cardiac cath 7/9. IR 1.4---> holding 5 days for LV cath on 10/14, also states that having 2 moles removed from face 10/20- doesn't know if he has to hold yet, he will call and let me know---.  Cath cancelled 101/14, still held 5 days, then another procedure 10/26 and held 5 days --> no changes   []   [x]       Missed Dose- denies   []   [x]       Extra Dose- denies  []   [x]       Change in medications- receiving Procrit 40,000 unit injection once every two weeks until red blood cell count is consistently under control again---> started amiodarone 200mg qd on 8/20, inc metoprolol---states did not start amiodarone dt concerns with SEs so still sotalol, inc lasix--->2 tylenol in the mornings---> resume lisinopril and lasix--> started doxazosin--> no change          []   [x]       Change in health/diet/appetite consistent greens -> has been eating less, appetite has been diminished --> eating out more, no NVD---> no greens, no NVD---> erratic diet, no NVD --> living alone; erratic diet--> less salads and green recently, daughter and neighbor bring meals --> still eating vit k but unable to state how much --> no changes   []   [x]       Change in alcohol use- doesn't drink   []   [x]       Change in activity  []   [x]       Hospital admission -admitted after procedure 10/26---> started ASA/plavix on dc, stopped lisinopril   []   [x]       Emergency department visit  [x]   []       Other complaints--> Patient has been very tired and not able to function well, getting epoetin alpha injection at hemotologist office --> having trouble with low RBC      Clinical Outcomes:  Yes     No  []   [x]       Major bleeding event  []   [x]       Thromboembolic event  Duration of warfarin Therapy: indefinite  INR Range:  2.0-3.0     Has been stressed with passing of wife. Elle Beaver (daughter) is primary care now- [phone]. INR is 3.2 again today   Decrease weekly dose to 5 mg on Sun and Thurs and 2.5 mg all other days of the week. (10% dec)  Encouraged to maintain a consistency of vegetables/salads.   Recheck INR in 2 weeks, 2/23     **consent form signed 11/1/2021    Referring cardiologist will be Dr. Marion Russo  INR (no units)   Date Value   01/18/2022 3.2   01/04/2022 2.4   12/21/2021 1.4   12/02/2021 2.4   10/26/2021 1.20 (H)   10/14/2021 1.40 (H)   07/08/2021 1.40 (H)   12/08/2020 2.30 (H)       For Pharmacy Admin Tracking Only     Intervention Detail: Dose Adjustment: 1, reason: Therapy Optimization   Total # of Interventions Recommended: 1   Total # of Interventions Accepted: 1   Time Spent (min): 15

## 2022-02-17 ENCOUNTER — HOSPITAL ENCOUNTER (OUTPATIENT)
Age: 87
Discharge: HOME | End: 2022-02-17
Payer: MEDICARE

## 2022-02-17 ENCOUNTER — OFFICE VISIT (OUTPATIENT)
Dept: FAMILY MEDICINE CLINIC | Age: 87
End: 2022-02-17
Payer: MEDICARE

## 2022-02-17 ENCOUNTER — HOSPITAL ENCOUNTER (OUTPATIENT)
Dept: GENERAL RADIOLOGY | Age: 87
Discharge: HOME | End: 2022-02-17
Payer: MEDICARE

## 2022-02-17 VITALS
DIASTOLIC BLOOD PRESSURE: 76 MMHG | BODY MASS INDEX: 24.88 KG/M2 | HEART RATE: 60 BPM | SYSTOLIC BLOOD PRESSURE: 134 MMHG | OXYGEN SATURATION: 98 % | HEIGHT: 69 IN | WEIGHT: 168 LBS

## 2022-02-17 DIAGNOSIS — E78.00 HYPERCHOLESTEREMIA: ICD-10-CM

## 2022-02-17 DIAGNOSIS — M54.50 CHRONIC MIDLINE LOW BACK PAIN WITHOUT SCIATICA: ICD-10-CM

## 2022-02-17 DIAGNOSIS — N18.30 CKD STAGE 3 DUE TO TYPE 2 DIABETES MELLITUS (HCC): ICD-10-CM

## 2022-02-17 DIAGNOSIS — I10 PRIMARY HYPERTENSION: ICD-10-CM

## 2022-02-17 DIAGNOSIS — E11.22 CKD STAGE 3 DUE TO TYPE 2 DIABETES MELLITUS (HCC): ICD-10-CM

## 2022-02-17 DIAGNOSIS — Z89.421 S/P AMPUTATION OF LESSER TOE, RIGHT (HCC): ICD-10-CM

## 2022-02-17 DIAGNOSIS — R60.0 LOCALIZED EDEMA: ICD-10-CM

## 2022-02-17 DIAGNOSIS — E87.5 HYPERKALEMIA: ICD-10-CM

## 2022-02-17 DIAGNOSIS — R35.1 NOCTURIA: ICD-10-CM

## 2022-02-17 DIAGNOSIS — E11.51 DM (DIABETES MELLITUS), TYPE 2 WITH PERIPHERAL VASCULAR COMPLICATIONS (HCC): ICD-10-CM

## 2022-02-17 DIAGNOSIS — G89.29 CHRONIC MIDLINE LOW BACK PAIN WITHOUT SCIATICA: ICD-10-CM

## 2022-02-17 DIAGNOSIS — E11.51 DM (DIABETES MELLITUS), TYPE 2 WITH PERIPHERAL VASCULAR COMPLICATIONS (HCC): Primary | ICD-10-CM

## 2022-02-17 LAB
A/G RATIO: 1.5 (ref 1.1–2.2)
ALBUMIN SERPL-MCNC: 4.3 G/DL (ref 3.4–5)
ALP BLD-CCNC: 66 U/L (ref 40–129)
ALT SERPL-CCNC: 13 U/L (ref 10–40)
ANION GAP SERPL CALCULATED.3IONS-SCNC: 11 MMOL/L (ref 3–16)
AST SERPL-CCNC: 18 U/L (ref 15–37)
BILIRUB SERPL-MCNC: 0.7 MG/DL (ref 0–1)
BUN BLDV-MCNC: 31 MG/DL (ref 7–20)
CALCIUM SERPL-MCNC: 9.4 MG/DL (ref 8.3–10.6)
CHLORIDE BLD-SCNC: 105 MMOL/L (ref 99–110)
CHOLESTEROL, TOTAL: 126 MG/DL (ref 0–199)
CO2: 26 MMOL/L (ref 21–32)
CREAT SERPL-MCNC: 1 MG/DL (ref 0.8–1.3)
GFR AFRICAN AMERICAN: >60
GFR NON-AFRICAN AMERICAN: >60
GLUCOSE BLD-MCNC: 178 MG/DL (ref 70–99)
HDLC SERPL-MCNC: 42 MG/DL (ref 40–60)
LDL CHOLESTEROL CALCULATED: 57 MG/DL
POTASSIUM SERPL-SCNC: 4.7 MMOL/L (ref 3.5–5.1)
SODIUM BLD-SCNC: 142 MMOL/L (ref 136–145)
TOTAL PROTEIN: 7.1 G/DL (ref 6.4–8.2)
TRIGL SERPL-MCNC: 137 MG/DL (ref 0–150)
VLDLC SERPL CALC-MCNC: 27 MG/DL

## 2022-02-17 PROCEDURE — G8484 FLU IMMUNIZE NO ADMIN: HCPCS | Performed by: FAMILY MEDICINE

## 2022-02-17 PROCEDURE — G8420 CALC BMI NORM PARAMETERS: HCPCS | Performed by: FAMILY MEDICINE

## 2022-02-17 PROCEDURE — 4040F PNEUMOC VAC/ADMIN/RCVD: CPT | Performed by: FAMILY MEDICINE

## 2022-02-17 PROCEDURE — 1123F ACP DISCUSS/DSCN MKR DOCD: CPT | Performed by: FAMILY MEDICINE

## 2022-02-17 PROCEDURE — 1036F TOBACCO NON-USER: CPT | Performed by: FAMILY MEDICINE

## 2022-02-17 PROCEDURE — 99214 OFFICE O/P EST MOD 30 MIN: CPT | Performed by: FAMILY MEDICINE

## 2022-02-17 PROCEDURE — G8427 DOCREV CUR MEDS BY ELIG CLIN: HCPCS | Performed by: FAMILY MEDICINE

## 2022-02-17 PROCEDURE — 72100 X-RAY EXAM L-S SPINE 2/3 VWS: CPT

## 2022-02-17 RX ORDER — DOXAZOSIN 2 MG/1
2 TABLET ORAL DAILY
Qty: 90 TABLET | Refills: 0 | Status: SHIPPED | OUTPATIENT
Start: 2022-02-17 | End: 2022-05-27

## 2022-02-17 RX ORDER — TRIAMCINOLONE ACETONIDE 5 MG/G
OINTMENT TOPICAL
Qty: 30 G | Refills: 1 | Status: SHIPPED | OUTPATIENT
Start: 2022-02-17 | End: 2022-02-24

## 2022-02-17 NOTE — PROGRESS NOTES
2022    Blood pressure 134/76, pulse 60, height 5' 9\" (1.753 m), weight 168 lb (76.2 kg), SpO2 98 %. Juliette Sanchez (:  1933) is a 80 y.o. male, here for evaluation of the following medical concerns:    Chief Complaint   Patient presents with    Diabetes     f/u [facility]     Here with son, Bobo Bowers. Hx CAD, DM-2 with ckd-3. Has good diabetes control historically. Diet controlled. Does not test at home  a1c today is:   Lab Results   Component Value Date    LABA1C 5.2 10/27/2021    LABA1C 5.7 2021    LABA1C 5.5 2021        Started doxazosin for nocturia recently. She still has 3x nocturia, but this is an improvement. This has been worse over past 1-2 yrs. PSA has been stable. Lab Results   Component Value Date    PSA 2.82 2020    PSA 2.77 2019    PSA 2.83 2018     New problem today: lower back tightness that makes it hard to get around and walk steady. Onset 8 months ago  Uses cane  Not getting better or worse. No pain with sitting/laying. But with ambulation  it begins to ache and he feels unsteady. No radiation  Able to bend over and tie shoes without pain  No numbness/tingling in feet. Tylenol helps a bit  Using cane helps improve stability. No falls related to this. Has hx of CAD, afib, CM. His cardiologist is DR Jimena Kim- has appt next wk. On plavix and coumadin both. No abnormal bleeding. bp meds: lasix 40, cardura 1, lisinopril 5, norvasc 5, toprol 50. Dose of lisinopril reduced to 5 mg due to IV contrast associated acute kidney decline. But this has stabilized. Last renal function test:   Lab Results   Component Value Date     2022    K 3.8 2022    K 4.7 2020    BUN 24 2022    CREATININE 1.2 2022     Estimated Creatinine Clearance: 43 mL/min (based on SCr of 1.2 mg/dL). No chest pains, dizziness, heart palpitations, dyspnea, lightheadedness, worsening edema.    Legs still edematous mildly- dry skin is itchy also. Scratching and scoring the skin. Not using ointment from kids, which helps. He showers every 3-4 days. He does not like to wear compression hose. R  5th toe amputated due to gout 'years ago'    Takes atorva 40. No myalgias. Due for FLP testing. Patient Active Problem List   Diagnosis    CAD (coronary artery disease)    Cardiac pacemaker    Benign prostatic hyperplasia with nocturia    Gout-uric acid >7.5--advised proph tx    Hypercholesteremia    Carotid bruit(bilat-nl duplex u/s 10/10)    Vitamin D deficiency-(advised 1000IU/day)    Actinic keratoses    Elevated PSA-(was 4.2 10/10-repeat 6 mo)(was 5.12 1/11-then 4.4 2/12)-sees dr Jennifer Murray for this    S/P colonoscopy-4/07-neg per pt,  3/18 repeat colonoscopy polyp-repeat 5 yr    Hearing loss, conductive, bilateral-seeing ent-dr quinn for hearing aides    Encounter for monitoring sotalol therapy    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 James Schulz op 8/15--started otc iron bid, off now  - work up Dr. Nori Abreu bone marrow. possible MDS 2019    PAF (paroxysmal atrial fibrillation) (HCC)    DM (diabetes mellitus), type 2 with peripheral vascular complications (HCC)--s/p amputation great toe post osteomylitis   diet controlled     Hypertension    CKD stage 3 due to type 2 diabetes mellitus ([address]    Hyperkalemia    H/O esophagogastroduodenoscopy  11/18  prominent vessesls vs varices. dr Charlotte Miller (done for blood in stool)    Urinary frequency    Elevated ferritin  - work up Dr. Nori Abreu  genetic screen hemachromatosis negative. 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    Localized edema    Pneumonia due to organism    Ischemic cardiomyopathy    Pansinusitis    S/P amputation of lesser toe, right (HCC)        Body mass index is 24.81 kg/m².     Wt Readings from Last 3 Encounters:   02/17/22 168 lb (76.2 kg)   12/06/21 164 lb (74.4 kg)   11/29/21 167 lb (75.8 kg)       BP Readings from Last 3 Encounters:   02/17/22 134/76 12/06/21 (!) 170/72   11/29/21 (!) 162/74       No Known Allergies    Prior to Visit Medications    Medication Sig Taking? Authorizing Provider   clopidogrel (PLAVIX) 75 MG tablet Take 1 tablet by mouth daily Yes PRIYA Alba CNP   furosemide (LASIX) 40 MG tablet Take 1 tablet by mouth every morning Yes Briana Agrawal MD   doxazosin (CARDURA) 1 MG tablet TAKE 1 TABLET BY MOUTH EVERY NIGHT Yes Briana Agrawal MD   metoprolol succinate (TOPROL XL) 50 MG extended release tablet TAKE 1 TABLET BY MOUTH TWICE DAILY Yes PRIYA Addison CNP   lisinopril (PRINIVIL;ZESTRIL) 5 MG tablet TAKE 1 TABLET BY MOUTH DAILY Yes Don Brito MD   amLODIPine (NORVASC) 5 MG tablet TAKE 1 TABLET BY MOUTH DAILY Yes Don Brito MD   pantoprazole (PROTONIX) 40 MG tablet Take 1 tablet by mouth daily While on triple therapy Yes PRIYA Alba CNP   sotalol (BETAPACE) 120 MG tablet TAKE 1 TABLET BY MOUTH TWICE DAILY Yes PRIYA Addison CNP   atorvastatin (LIPITOR) 20 MG tablet Take 1 tablet by mouth daily Yes Ene Ramon MD   warfarin (COUMADIN) 5 MG tablet Take 1 tablet by mouth daily Take 2.5 mg on Mon, Wed and Fri and 5 mg all other days of the week Yes PRIYA Lazo CNP   vitamin B-1 (THIAMINE) 100 MG tablet Take 1,000 mg by mouth daily  Yes Historical Provider, MD   EPOETIN BIBIANA IJ Inject as directed every 21 days  Yes Historical Provider, MD   acetaminophen (TYLENOL) 325 MG tablet Take 650 mg by mouth every 6 hours as needed for Pain Yes Historical Provider, MD        Social History     Tobacco Use    Smoking status: Never Smoker    Smokeless tobacco: Never Used    Tobacco comment: counseled on tobacco exposure avoidance   Vaping Use    Vaping Use: Never used   Substance Use Topics    Alcohol use: No     Alcohol/week: 0.0 standard drinks    Drug use: No       Review of Systems As above     Physical Exam  Vitals and nursing note reviewed.    Constitutional: General: He is not in acute distress. Appearance: Normal appearance. He is well-developed. He is not diaphoretic. Cardiovascular:      Rate and Rhythm: Normal rate and regular rhythm. Pulses: Normal pulses. Heart sounds: Normal heart sounds. No murmur heard. Pulmonary:      Effort: Pulmonary effort is normal. No respiratory distress. Breath sounds: Normal breath sounds. No wheezing or rales. Musculoskeletal:      Cervical back: Normal range of motion. Comments: bilat LE's with 1+ pitting edema, red excoriated bumps    Normal ambulation. Lumbar spine: FROM. SLR neg. DTR's intact. Sensation normal LE's. Nontender over lumbar spine. Skin:     General: Skin is warm and dry. Neurological:      General: No focal deficit present. Mental Status: He is alert and oriented to person, place, and time. Mental status is at baseline. Psychiatric:         Mood and Affect: Mood normal.         Behavior: Behavior normal.         Thought Content: Thought content normal.         Judgment: Judgment normal.          ASSESSMENT/PLAN:    1. DM (diabetes mellitus), type 2 with peripheral vascular complications (HCC)--s/p amputation great toe post osteomylitis   diet controlled   - good control historically without rx- recheck a1c  - Hemoglobin A1C; Future    2. S/P amputation of lesser toe, right (HCC)  - this was due to gout, not DPN. He does not feel it affects ambulation    3. CKD stage 3 due to type 2 diabetes mellitus ([facility] [address]  - has been stable; recheck now  - Comprehensive Metabolic Panel; Future    4. Primary hypertension  - bp stable on current regimen. We are increasing doxazosin for BPH, not bp.    5. Hyperkalemia  - testing potasium today    6. Localized edema  - stable; would not advise a higher dose of lasix at this time. He defers use of compression hose.   - he does have dry skin and not likely stasis dermatitis (as it also affects arms) but I provided triamcinolone ointment to

## 2022-02-18 LAB
ESTIMATED AVERAGE GLUCOSE: 105.4 MG/DL
HBA1C MFR BLD: 5.3 %

## 2022-02-23 ENCOUNTER — ANTI-COAG VISIT (OUTPATIENT)
Dept: PHARMACY | Age: 87
End: 2022-02-23
Payer: MEDICARE

## 2022-02-23 ENCOUNTER — OFFICE VISIT (OUTPATIENT)
Dept: CARDIOLOGY CLINIC | Age: 87
End: 2022-02-23
Payer: MEDICARE

## 2022-02-23 VITALS
OXYGEN SATURATION: 98 % | HEART RATE: 62 BPM | HEIGHT: 69 IN | SYSTOLIC BLOOD PRESSURE: 148 MMHG | WEIGHT: 167 LBS | BODY MASS INDEX: 24.73 KG/M2 | DIASTOLIC BLOOD PRESSURE: 68 MMHG

## 2022-02-23 DIAGNOSIS — I48.0 PAF (PAROXYSMAL ATRIAL FIBRILLATION) (HCC): Primary | ICD-10-CM

## 2022-02-23 DIAGNOSIS — I25.5 ISCHEMIC CARDIOMYOPATHY: ICD-10-CM

## 2022-02-23 DIAGNOSIS — E78.00 HYPERCHOLESTEREMIA: ICD-10-CM

## 2022-02-23 DIAGNOSIS — D64.9 ANEMIA, UNSPECIFIED TYPE: ICD-10-CM

## 2022-02-23 DIAGNOSIS — I10 PRIMARY HYPERTENSION: ICD-10-CM

## 2022-02-23 DIAGNOSIS — I47.20 VT (VENTRICULAR TACHYCARDIA): ICD-10-CM

## 2022-02-23 DIAGNOSIS — I48.0 PAF (PAROXYSMAL ATRIAL FIBRILLATION) (HCC): ICD-10-CM

## 2022-02-23 DIAGNOSIS — Z95.0 CARDIAC PACEMAKER: ICD-10-CM

## 2022-02-23 DIAGNOSIS — I25.10 CORONARY ARTERY DISEASE INVOLVING NATIVE CORONARY ARTERY OF NATIVE HEART WITHOUT ANGINA PECTORIS: Primary | ICD-10-CM

## 2022-02-23 LAB — INTERNATIONAL NORMALIZATION RATIO, POC: 2.1

## 2022-02-23 PROCEDURE — 85610 PROTHROMBIN TIME: CPT

## 2022-02-23 PROCEDURE — 1123F ACP DISCUSS/DSCN MKR DOCD: CPT | Performed by: INTERNAL MEDICINE

## 2022-02-23 PROCEDURE — G8427 DOCREV CUR MEDS BY ELIG CLIN: HCPCS | Performed by: INTERNAL MEDICINE

## 2022-02-23 PROCEDURE — 4040F PNEUMOC VAC/ADMIN/RCVD: CPT | Performed by: INTERNAL MEDICINE

## 2022-02-23 PROCEDURE — G8420 CALC BMI NORM PARAMETERS: HCPCS | Performed by: INTERNAL MEDICINE

## 2022-02-23 PROCEDURE — 1036F TOBACCO NON-USER: CPT | Performed by: INTERNAL MEDICINE

## 2022-02-23 PROCEDURE — 99214 OFFICE O/P EST MOD 30 MIN: CPT | Performed by: INTERNAL MEDICINE

## 2022-02-23 PROCEDURE — G8484 FLU IMMUNIZE NO ADMIN: HCPCS | Performed by: INTERNAL MEDICINE

## 2022-02-23 PROCEDURE — 99211 OFF/OP EST MAY X REQ PHY/QHP: CPT

## 2022-02-23 RX ORDER — METOPROLOL SUCCINATE 50 MG/1
TABLET, EXTENDED RELEASE ORAL
Qty: 90 TABLET | Refills: 4 | Status: ON HOLD | OUTPATIENT
Start: 2022-02-23 | End: 2022-10-03 | Stop reason: HOSPADM

## 2022-02-23 RX ORDER — SOTALOL HYDROCHLORIDE 120 MG/1
TABLET ORAL
Qty: 180 TABLET | Refills: 1 | Status: SHIPPED | OUTPATIENT
Start: 2022-02-23 | End: 2022-07-28 | Stop reason: SDUPTHER

## 2022-02-23 RX ORDER — LISINOPRIL 10 MG/1
10 TABLET ORAL DAILY
COMMUNITY
End: 2022-05-17

## 2022-02-23 NOTE — PROGRESS NOTES
Mr. Nazario Gregory is a 80 y.o. y/o male with history of Afib   He presents today for anticoagulation monitoring and adjustment. Pertinent PMH: ICD-pacemaker. 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    Patient Reported Findings:  Yes     No  [x]   []       Patient verifies current dosing regimen as listed  confirmed dose ---> cannot confirm dose---> Patient called to say he has been taking 5mg on Saturdays instead of 2.5mg. Not sure how long he has been doing this. States his pillbox has 5mg in it for this Saturday and he thinks he could have been doing this for a few weeks now. States he will follow AVS and take normal dose (as pt was therapeutic on this the past visits). If patient is low in 2 weeks, 9/28, then increase to 2.5mg MWF and 5mg AOD. ---> confirms dose   []   [x]       S/S bleeding/bruising/swelling/SOB- denies --> bruise from bumping into door    []   [x]       Blood in urine or stool denies  []   [x]       Procedures scheduled in the future at this time - had cardiac cath 7/9. IR 1.4---> holding 5 days for LV cath on 10/14, also states that having 2 moles removed from face 10/20- doesn't know if he has to hold yet, he will call and let me know---.  Cath cancelled 101/14, still held 5 days, then another procedure 10/26 and held 5 days --> no changes   []   [x]       Missed Dose- denies   []   [x]       Extra Dose- denies  [x]   []       Change in medications- receiving Procrit 40,000 unit injection once every two weeks until red blood cell count is consistently under control again---> started amiodarone 200mg qd on 8/20, inc metoprolol--->states did not start amiodarone dt concerns with SEs so still sotalol, inc lasix--->2 tylenol in the mornings---> resume lisinopril and lasix--> started doxazosin--> doxazosin dose changed          []   [x]       Change in health/diet/appetite consistent greens -> has been eating less, appetite has been diminished --> no greens, no NVD---> living alone; erratic diet--> less salads and green recently, daughter and neighbor bring meals --> still eating vit k but unable to state how much --> no changes   []   [x]       Change in alcohol use- doesn't drink   []   [x]       Change in activity  []   [x]       Hospital admission -  []   [x]       Emergency department visit  [x]   []       Other complaints--> Patient has been very tired and not able to function well, getting epoetin alpha injection at hemotologist office --> having trouble with low RBC      Clinical Outcomes:  Yes     No  []   [x]       Major bleeding event  []   [x]       Thromboembolic event  Duration of warfarin Therapy: indefinite  INR Range:  2.0-3.0     Has been stressed with passing of wife. Elle Beaver (daughter) is primary care now- [phone]. INR is 2.1 today   Continue weekly dose to 5 mg on Sun and Thurs and 2.5 mg all other days of the week   Encouraged to maintain a consistency of vegetables/salads.   Recheck INR in 3 weeks, 3/16     **consent form signed 11/1/2021    Referring cardiologist will be Dr. Marion Russo  INR (no units)   Date Value   02/08/2022 3.2   01/18/2022 3.2   01/04/2022 2.4   12/21/2021 1.4   10/26/2021 1.20 (H)   10/14/2021 1.40 (H)   07/08/2021 1.40 (H)   12/08/2020 2.30 (H)       For Pharmacy Admin Tracking Only     Total # of Interventions Recommended: 0   Total # of Interventions Accepted: 0   Time Spent (min): 15

## 2022-02-23 NOTE — PROGRESS NOTES
[facility]  Cardiac Consult     Referring Provider:  Rosa Samuels MD     Chief Complaint   Patient presents with    3 Month Follow-Up    Coronary Artery Disease    Hypertension        History of Present Illness:   80 y.o.male formally seen by Dr. Victoriano Wright seen in f/u for afib, CAD, HTN, hyperlipidemia and pacemaker placement. He has been having increasing afib on Sotalol. He refused change to amio due to potential side effects. He has progressive LV dysfunction. Myoview with small area of ischemia. Afib ablation and possible biV upgrade was being considered. Cardiac cath performed 10/2021 with stenting of the PDA. He is feeling well. Denies chest pain, dyspnea palpitations, syncope or presyncope. Pacer interrogation showed 52% afib. Also NSVT. It is difficult to tell how much VT and how fast. Reviewed with Dr. Arelis Hdz who reviewed tracings. He can not tell if episodes wit rapid rate are aflutter or VT. ACE inhibitor use has been limited by increased creat and K+. Past Medical History:   has a past medical history of Actinic keratosis, Actinic keratosis, Atrial fibrillation ([address] Bilateral carotid artery stenosis, CAD (coronary artery disease), Cellulitis, Diabetes mellitus ([address] DM (diabetes mellitus), type 2 with peripheral vascular complications (HCC)--s/p amputation great toe post osteomylitis , Glucose intolerance (malabsorption), Hyperlipidemia, Hypertension, Hypertrophy of prostate without urinary obstruction and other lower urinary tract symptoms (LUTS), Intermittent atrial fibrillation ([address] Kidney stone, Occult blood in stool, and Pacemaker. Surgical History:   has a past surgical history that includes Tonsillectomy and adenoidectomy; Appendectomy; Kidney stone surgery (1980); Coronary artery bypass graft (1996); Cardiac catheterization (2003); pacemaker placement (2005); other surgical history (Right, 7/17/15); Abscess Drainage (7/20/15);  Toe amputation (Right, 7.16.15); Colonoscopy (03/28/2018); and pr esophagogastroduodenoscopy transoral diagnostic (N/A, 11/21/2018). Social History:  Social History     Tobacco Use    Smoking status: Never Smoker    Smokeless tobacco: Never Used    Tobacco comment: counseled on tobacco exposure avoidance   Substance Use Topics    Alcohol use: No     Alcohol/week: 0.0 standard drinks        Family History:  family history includes Diabetes in his mother; Stroke in his father. Allergies:  Patient has no known allergies. Home Medications:  Prior to Visit Medications    Medication Sig Taking? Authorizing Provider   amLODIPine (NORVASC) 5 MG tablet TAKE 1 TABLET BY MOUTH DAILY Yes Didi Mendieta MD   triamcinolone (ARISTOCORT) 0.5 % ointment Apply topically 2 times daily.  Yes Valentin Salinas MD   doxazosin (CARDURA) 2 MG tablet Take 1 tablet by mouth daily Yes Valentin Salinas MD   clopidogrel (PLAVIX) 75 MG tablet Take 1 tablet by mouth daily Yes PRIYA Lemons CNP   furosemide (LASIX) 40 MG tablet Take 1 tablet by mouth every morning Yes Valentin Salinas MD   metoprolol succinate (TOPROL XL) 50 MG extended release tablet TAKE 1 TABLET BY MOUTH TWICE DAILY Yes PRIYA Hinojosa CNP   lisinopril (PRINIVIL;ZESTRIL) 5 MG tablet TAKE 1 TABLET BY MOUTH DAILY Yes Didi Mendieta MD   pantoprazole (PROTONIX) 40 MG tablet Take 1 tablet by mouth daily While on triple therapy Yes PRIYA Lemons CNP   sotalol (BETAPACE) 120 MG tablet TAKE 1 TABLET BY MOUTH TWICE DAILY Yes PRIYA Hinojosa CNP   atorvastatin (LIPITOR) 20 MG tablet Take 1 tablet by mouth daily Yes Donavan Mitchell MD   warfarin (COUMADIN) 5 MG tablet Take 1 tablet by mouth daily Take 2.5 mg on Mon, Wed and Fri and 5 mg all other days of the week Yes PRIYA Hollins CNP   vitamin B-1 (THIAMINE) 100 MG tablet Take 1,000 mg by mouth daily  Yes Historical Provider, MD   EPOETIN BIBIANA IJ Inject as directed every 21 days  Yes Historical Provider, MD   acetaminophen (TYLENOL) 325 MG tablet Take 650 mg by mouth every 6 hours as needed for Pain Yes Historical Provider, MD       [x] Medications and dosages reviewed. ROS:  [x]Full ROS obtained and negative except as mentioned in HPI      Physical Examination:    Vitals:    02/23/22 1247 02/23/22 1256   BP: (!) 152/70 (!) 150/72   Site: Right Upper Arm Left Upper Arm   Position: Sitting Sitting   Cuff Size: Medium Adult Medium Adult   Pulse: 62    SpO2: 98%    Weight: 167 lb (75.8 kg)    Height: 5' 9\" (1.753 m)         · GENERAL: Well developed, well nourished, No acute distress  · NEUROLOGICAL: Alert and oriented  · PSYCH: Calm affect  · SKIN: Warm and dry, No visible rash,   · EYES: Pupils equal and round, Sclera non-icteric,   · HENT:  External ears and nose unremarkable, mucus membranes moist  · MUSCULOSKELETAL: Normal cephalic, neck supple  · CAROTID: Normal upstroke, no bruits  · CARDIAC: JVP normal, Normal PMI,  Regular rate and rhythm, normal S1S2, no murmur, rub, or gallop  · RESPIRATORY: Normal respiratory effort, Clear to auscultation bilaterally  · EXTREMITIES: No LE edema  · GASTROINTESTINAL: normal bowel sounds, soft, non-tender, No hepatomegaly     ECHO 5/2021    Summary   Left ventricular cavity size is normal with normal left ventricular wall   thickness. Overall left ventricular systolic function appears mild-moderately reduced. Ejection fraction is visually estimated to be 35-40%. There is mild diffuse hypokinesis. Grade II diastolic dysfunction with elevated LV filling pressures. Normal right ventricular size. Right ventricular systolic function is mildly reduced. The left atrium is moderate-severely dilated. Mitral valve leaflets appear mildly thickened. Mild to moderate mitral regurgitation. Moderate aortic stenosis with a peak velocity of 2.5 m/s, a mean pressure   gradient of 15 mmHg and an DONAVAN of 1.19 cm^2.    Aortic valve gradients may be underestimated due to low cardiac output. Mild aortic regurgitation. Mild to moderate tricuspid regurgitation with a PASP of 45 mmHg. Trivial pulmonic regurgitation present. Myoview 9/2021  The left ventricular cavity size is moderately dilated. The right ventricle    is mildly dilated.        There is a small perfusion defect of mild severity in the apex and apical    inferior segment. The fixed defect has associated wall motion abnormality,    consistent with scar. There is an additional small perfusion defect of mild    severity in the mid-anterior. There is corresponding wall motion    abnormality. The defect is consistent with ischemia.        Sum stress score of 4. No visual TID. Calculated TID is 1. 13.        Left ventricular ejection fraction is severely reduced at 35%. CARDIAC CATH 10/2021  Anatomy:   LM-20% distal  LAD-100% ostial, calcified  Cx-normal  OM1-100% ostial  OM2-20% proximal  RCA-20% proximal, 40% distal, calcified  RPDA-70 to 80% proximal, FFR of 0.76  LVEF-50%  LIMA-LAD: Widely patent  SVG-OM1: Widely patent  Aortic root: Not aneurysmal, no significant aortic insufficiency, 1 patent SVG visualized. PCI: RPDA 80% to 0% with a 3.25 mm x 18 mm Xience Mariela ALEJANDRA      Impression:  1. Severe multivessel CAD. 2.  Patent LIMA-LAD and SVG-OM1 grafts. 3.  Successful PCI with ALEJANDRA x1 to RPDA. 4.  Low normal LV systolic function.     Plan:  1. Attempt triple therapy with enteric-coated aspirin 81 mg daily, Plavix 75 mg daily and warfarin to complete 30 days followed by Plavix and warfarin for an additional 5 months then transition to enteric-coated aspirin 81 mg daily and warfarin thereafter. 2.  Hydration. 3.  ACE inhibitor/ARB stopped preprocedure to optimize kidney function given renal insufficiency. Discussed with nephrology who recommends resuming ACE inhibitor/ARB 3 days post procedure. Assessment:       CAD:  Cardiac cath as above with stenting PDA  10/2021.  Continue plavix x 6 months. (Through April)    CAB [facility]-no report  Cardiac [id number]-no report    HTN:  BP (!) 150/72 (Site: Left Upper Arm, Position: Sitting, Cuff Size: Medium Adult)   Pulse 62   Ht 5' 9\" (1.753 m)   Wt 167 lb (75.8 kg)   SpO2 98%   BMI 24.66 kg/m²   High today. Continue toprol and lisinopril. Hesitant to increase lisinopril due to prior renal issues. Follow for now    Hyperlipidemia:  Controlled  LDL=57   Continue lipitor    Afib: Increasing afib. 52%  Refused amio. Continue coumadin  Follow    Pacemaker:  Stable  Follow q 3 months    Anemia:  Followed by [facility]  ? MDS    Cardiomyopathy/CHF:  EF normal 2018 now progressive LV dysfunction  Etiology unclear. Possible CAD, vs afib vs pacing  Chronic systolic CHF compensated    Repeat ECHO to reassess LV function post PCI    VT:  VT seen on pacer. Amount unclear  Discussed with Dr. Lolis Laird repeat ECHO  If EF< 35% needs AICD, If Abnormal but >35% recommends EP study    I discussed with pt and his daughter. He is very reluctant to consider any further testing.  Discussed risk/benefits in detail  We will check ECHO and he will think about how aggressive he wants to be     Plan:  F/u with ECHO and discuss options    Thank you for allowing me to participate in the care of this individual.      Brad Guerrier M.D., [facility] [place]

## 2022-02-23 NOTE — PATIENT INSTRUCTIONS
No medication changes  Echocardiogram and office visit the same day     Consider whether you would want to proceed with defibrillator if strength of heart muscle is very weak

## 2022-03-13 DIAGNOSIS — E78.00 HYPERCHOLESTEREMIA: ICD-10-CM

## 2022-03-13 DIAGNOSIS — I25.10 CORONARY ARTERY DISEASE INVOLVING NATIVE CORONARY ARTERY OF NATIVE HEART WITHOUT ANGINA PECTORIS: ICD-10-CM

## 2022-03-14 RX ORDER — ATORVASTATIN CALCIUM 20 MG/1
20 TABLET, FILM COATED ORAL DAILY
Qty: 90 TABLET | Refills: 4 | OUTPATIENT
Start: 2022-03-14

## 2022-03-14 NOTE — TELEPHONE ENCOUNTER
Received refill request for Atorvastatin from [address] [place] [address].     Last ov:02/23/2022 MMK    Last labs:02/17/2022    Last Refill:10/05/202 # 90 tabs w/ 4 refills    Next appointment:04/26/2022 ALFONSO

## 2022-03-16 ENCOUNTER — ANTI-COAG VISIT (OUTPATIENT)
Dept: PHARMACY | Age: 87
End: 2022-03-16
Payer: MEDICARE

## 2022-03-16 DIAGNOSIS — I48.0 PAF (PAROXYSMAL ATRIAL FIBRILLATION) (HCC): Primary | ICD-10-CM

## 2022-03-16 LAB — INTERNATIONAL NORMALIZATION RATIO, POC: 1.8

## 2022-03-16 PROCEDURE — 85610 PROTHROMBIN TIME: CPT

## 2022-03-16 PROCEDURE — 99211 OFF/OP EST MAY X REQ PHY/QHP: CPT

## 2022-03-16 NOTE — PROGRESS NOTES
Mr. Cathy Laird is a 80 y.o. y/o male with history of Afib   He presents today for anticoagulation monitoring and adjustment. Pertinent PMH: ICD-pacemaker. 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    Patient Reported Findings:  Yes     No  [x]   []       Patient verifies current dosing regimen as listed  confirmed dose ---> cannot confirm dose---> Patient called to say he has been taking 5mg on Saturdays instead of 2.5mg. Not sure how long he has been doing this. States his pillbox has 5mg in it for this Saturday and he thinks he could have been doing this for a few weeks now. States he will follow AVS and take normal dose (as pt was therapeutic on this the past visits). If patient is low in 2 weeks, 9/28, then increase to 2.5mg MWF and 5mg AOD. ---> confirms dose   []   [x]       S/S bleeding/bruising/swelling/SOB- denies --> bruise from bumping into door --> denies  []   [x]       Blood in urine or stool denies  []   [x]       Procedures scheduled in the future at this time - had cardiac cath 7/9. IR 1.4---> holding 5 days for LV cath on 10/14, also states that having 2 moles removed from face 10/20- doesn't know if he has to hold yet, he will call and let me know---.  Cath cancelled 101/14, still held 5 days, then another procedure 10/26 and held 5 days --> no changes   []   [x]       Missed Dose- denies   []   [x]       Extra Dose- denies  []   [x]       Change in medications- receiving Procrit 40,000 unit injection once every two weeks until red blood cell count is consistently under control again---> started amiodarone 200mg qd on 8/20, inc metoprolol--->states did not start amiodarone dt concerns with SEs so still sotalol, inc lasix--->2 tylenol in the mornings---> resume lisinopril and lasix--> started doxazosin--> doxazosin dose changed --> no changes         []   [x]       Change in health/diet/appetite consistent greens -> has been eating less, appetite has been diminished --> no greens, no

## 2022-03-28 RX ORDER — WARFARIN SODIUM 5 MG/1
TABLET ORAL
Qty: 90 TABLET | Refills: 2 | Status: SHIPPED | OUTPATIENT
Start: 2022-03-28

## 2022-03-28 NOTE — TELEPHONE ENCOUNTER
Received refill request for Warfarin from [address] [place] [address].      Last OV: 11/01/2021 w/ NPAL     Last Labs: 03/16/2022 INR     Last Refill: 03/23/2021 #90 w/ 2 refills     Next Appointment: 03/23/2021 #90 w/ 2 refills

## 2022-03-30 ENCOUNTER — ANTI-COAG VISIT (OUTPATIENT)
Dept: PHARMACY | Age: 87
End: 2022-03-30
Payer: MEDICARE

## 2022-03-30 DIAGNOSIS — I48.0 PAF (PAROXYSMAL ATRIAL FIBRILLATION) (HCC): Primary | ICD-10-CM

## 2022-03-30 LAB — INTERNATIONAL NORMALIZATION RATIO, POC: 2

## 2022-03-30 PROCEDURE — 99211 OFF/OP EST MAY X REQ PHY/QHP: CPT

## 2022-03-30 PROCEDURE — 85610 PROTHROMBIN TIME: CPT

## 2022-03-30 NOTE — PROGRESS NOTES
Mr. Fuad Brown is a 80 y.o. y/o male with history of Afib   He presents today for anticoagulation monitoring and adjustment. Pertinent PMH: ICD-pacemaker. 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    Patient Reported Findings:  Yes     No  [x]   []       Patient verifies current dosing regimen as listed  confirmed dose ---> cannot confirm dose---> Patient called to say he has been taking 5mg on Saturdays instead of 2.5mg. Not sure how long he has been doing this. States his pillbox has 5mg in it for this Saturday and he thinks he could have been doing this for a few weeks now. States he will follow AVS and take normal dose (as pt was therapeutic on this the past visits). If patient is low in 2 weeks, 9/28, then increase to 2.5mg MWF and 5mg AOD. ---> confirms dose   []   [x]       S/S bleeding/bruising/swelling/SOB- denies --> bruise from bumping into door --> denies  []   [x]       Blood in urine or stool denies  []   [x]       Procedures scheduled in the future at this time - had cardiac cath 7/9. IR 1.4---> holding 5 days for LV cath on 10/14, also states that having 2 moles removed from face 10/20- doesn't know if he has to hold yet, he will call and let me know---.  Cath cancelled 101/14, still held 5 days, then another procedure 10/26 and held 5 days --> no changes   []   [x]       Missed Dose- denies   []   [x]       Extra Dose- denies  []   [x]       Change in medications- receiving Procrit 40,000 unit injection once every two weeks until red blood cell count is consistently under control again---> started amiodarone 200mg qd on 8/20, inc metoprolol--->states did not start amiodarone dt concerns with SEs so still sotalol, inc lasix--->2 tylenol in the mornings---> resume lisinopril and lasix--> started doxazosin--> doxazosin dose changed --> no changes         []   [x]       Change in health/diet/appetite consistent greens -> has been eating less, appetite has been diminished --> no greens, no NVD---> living alone; erratic diet--> less salads and green recently, daughter and neighbor bring meals --> still eating vit k but unable to state how much --> no changes   []   [x]       Change in alcohol use- doesn't drink   []   [x]       Change in activity  []   [x]       Hospital admission -  []   [x]       Emergency department visit  [x]   []       Other complaints--> Patient has been very tired and not able to function well, getting epoetin alpha injection at hemotologist office --> having trouble with low RBC      Clinical Outcomes:  Yes     No  []   [x]       Major bleeding event  []   [x]       Thromboembolic event  Duration of warfarin Therapy: indefinite  INR Range:  2.0-3.0     Has been stressed with passing of wife. Pancho Booth (daughter) is primary care now- [phone]. INR is 2.0 today   Continue weekly dose of 5 mg on Sun and Thurs and 2.5 mg all other days of the week. Encouraged to maintain a consistency of vegetables/salads.   Recheck INR in 3 weeks, 4/20    **consent form signed 11/1/2021    Referring cardiologist will be Dr. Donta Proctor  INR (no units)   Date Value   03/30/2022 2.0   03/16/2022 1.8   02/23/2022 2.1   02/08/2022 3.2   10/26/2021 1.20 (H)   10/14/2021 1.40 (H)   07/08/2021 1.40 (H)   12/08/2020 2.30 (H)       For Pharmacy Admin Tracking Only     Total # of Interventions Recommended: 0   Total # of Interventions Accepted: 0   Time Spent (min): 15

## 2022-04-14 ENCOUNTER — HOSPITAL ENCOUNTER (OUTPATIENT)
Age: 87
Discharge: HOME | End: 2022-04-14
Payer: MEDICARE

## 2022-04-14 DIAGNOSIS — R60.0 LOCALIZED EDEMA: ICD-10-CM

## 2022-04-14 LAB
ANION GAP SERPL CALCULATED.3IONS-SCNC: 14 MMOL/L (ref 3–16)
BUN BLDV-MCNC: 27 MG/DL (ref 7–20)
CALCIUM SERPL-MCNC: 9 MG/DL (ref 8.3–10.6)
CHLORIDE BLD-SCNC: 105 MMOL/L (ref 99–110)
CO2: 24 MMOL/L (ref 21–32)
CREAT SERPL-MCNC: 0.9 MG/DL (ref 0.8–1.3)
CREATININE URINE: 51 MG/DL (ref 39–259)
GFR AFRICAN AMERICAN: >60
GFR NON-AFRICAN AMERICAN: >60
GLUCOSE BLD-MCNC: 170 MG/DL (ref 70–99)
POTASSIUM SERPL-SCNC: 4.1 MMOL/L (ref 3.5–5.1)
PROTEIN PROTEIN: 58 MG/DL
SODIUM BLD-SCNC: 143 MMOL/L (ref 136–145)

## 2022-04-14 PROCEDURE — 84156 ASSAY OF PROTEIN URINE: CPT

## 2022-04-14 PROCEDURE — 82570 ASSAY OF URINE CREATININE: CPT

## 2022-04-14 PROCEDURE — 36415 COLL VENOUS BLD VENIPUNCTURE: CPT

## 2022-04-14 PROCEDURE — 80048 BASIC METABOLIC PNL TOTAL CA: CPT

## 2022-04-20 ENCOUNTER — ANTI-COAG VISIT (OUTPATIENT)
Dept: PHARMACY | Age: 87
End: 2022-04-20
Payer: MEDICARE

## 2022-04-20 DIAGNOSIS — I48.0 PAF (PAROXYSMAL ATRIAL FIBRILLATION) (HCC): Primary | ICD-10-CM

## 2022-04-20 LAB — INTERNATIONAL NORMALIZATION RATIO, POC: 2.6

## 2022-04-20 PROCEDURE — 99211 OFF/OP EST MAY X REQ PHY/QHP: CPT

## 2022-04-20 PROCEDURE — 85610 PROTHROMBIN TIME: CPT

## 2022-04-20 NOTE — PROGRESS NOTES
Mr. Cindy Walsh is a 80 y.o. y/o male with history of Afib   He presents today for anticoagulation monitoring and adjustment. Pertinent PMH: ICD-pacemaker. 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    Patient Reported Findings:  Yes     No  [x]   []       Patient verifies current dosing regimen as listed  confirmed dose  []   [x]       S/S bleeding/bruising/swelling/SOB- denies  []   [x]       Blood in urine or stool denies  []   [x]       Procedures scheduled in the future at this time - had cardiac cath 7/9. IR 1.4---> holding 5 days for LV cath on 10/14, also states that having 2 moles removed from face 10/20- doesn't know if he has to hold yet, he will call and let me know---.  Cath cancelled 101/14, still held 5 days, then another procedure 10/26 and held 5 days --> no changes   []   [x]       Missed Dose- denies   []   [x]       Extra Dose- denies  [x]   []       Change in medications- receiving Procrit 40,000 unit injection once every two weeks until red blood cell count is consistently under control again---> started amiodarone 200mg qd on 8/20, inc metoprolol--->states did not start amiodarone dt concerns with SEs so still sotalol, inc lasix--->2 tylenol in the mornings---> resume lisinopril and lasix-->  inc lasix for 7 d then returned to normal dose      []   [x]       Change in health/diet/appetite consistent greens -> has been eating less, appetite has been diminished --> no greens, no NVD---> living alone; erratic diet--> less salads and green recently, daughter and neighbor bring meals --> still eating vit k but unable to state how much --> no changes   []   [x]       Change in alcohol use- doesn't drink   []   [x]       Change in activity  []   [x]       Hospital admission -  []   [x]       Emergency department visit  [x]   []       Other complaints--> Patient has been very tired and not able to function well, getting epoetin alpha injection at hemotologist office --> having trouble with low RBC      Clinical Outcomes:  Yes     No  []   [x]       Major bleeding event  []   [x]       Thromboembolic event  Duration of warfarin Therapy: indefinite  INR Range:  2.0-3.0     Has been stressed with passing of wife. Tomy Blevins (daughter) is primary care now- [phone]. INR is 2.6 today   Continue weekly dose of 5 mg on Sun and Thurs and 2.5 mg all other days of the week. Encouraged to maintain a consistency of vegetables/salads.   Recheck INR in 4 weeks, 5/18    **consent form signed 11/1/2021    Referring cardiologist will be Dr. Randi Corral  INR (no units)   Date Value   03/30/2022 2.0   03/16/2022 1.8   02/23/2022 2.1   02/08/2022 3.2   10/26/2021 1.20 (H)   10/14/2021 1.40 (H)   07/08/2021 1.40 (H)   12/08/2020 2.30 (H)       For Pharmacy Admin Tracking Only     Total # of Interventions Recommended: 0   Total # of Interventions Accepted: 0   Time Spent (min): 15

## 2022-04-26 ENCOUNTER — OFFICE VISIT (OUTPATIENT)
Dept: CARDIOLOGY CLINIC | Age: 87
End: 2022-04-26
Payer: MEDICARE

## 2022-04-26 ENCOUNTER — HOSPITAL ENCOUNTER (OUTPATIENT)
Dept: NON INVASIVE DIAGNOSTICS | Age: 87
Discharge: HOME | End: 2022-04-26
Payer: MEDICARE

## 2022-04-26 VITALS
BODY MASS INDEX: 24.93 KG/M2 | WEIGHT: 168.3 LBS | HEIGHT: 69 IN | SYSTOLIC BLOOD PRESSURE: 134 MMHG | OXYGEN SATURATION: 95 % | HEART RATE: 62 BPM | DIASTOLIC BLOOD PRESSURE: 62 MMHG

## 2022-04-26 DIAGNOSIS — I25.5 ISCHEMIC CARDIOMYOPATHY: ICD-10-CM

## 2022-04-26 DIAGNOSIS — I25.10 CORONARY ARTERY DISEASE INVOLVING NATIVE CORONARY ARTERY OF NATIVE HEART WITHOUT ANGINA PECTORIS: ICD-10-CM

## 2022-04-26 DIAGNOSIS — Z95.0 CARDIAC PACEMAKER: Primary | ICD-10-CM

## 2022-04-26 DIAGNOSIS — I50.22 CHRONIC SYSTOLIC HEART FAILURE (HCC): ICD-10-CM

## 2022-04-26 DIAGNOSIS — N18.30 CKD STAGE 3 DUE TO TYPE 2 DIABETES MELLITUS (HCC): ICD-10-CM

## 2022-04-26 DIAGNOSIS — I48.0 PAF (PAROXYSMAL ATRIAL FIBRILLATION) (HCC): ICD-10-CM

## 2022-04-26 DIAGNOSIS — E78.00 HYPERCHOLESTEREMIA: ICD-10-CM

## 2022-04-26 DIAGNOSIS — E11.22 CKD STAGE 3 DUE TO TYPE 2 DIABETES MELLITUS (HCC): ICD-10-CM

## 2022-04-26 DIAGNOSIS — D64.9 ANEMIA, UNSPECIFIED TYPE: ICD-10-CM

## 2022-04-26 DIAGNOSIS — I47.20 VT (VENTRICULAR TACHYCARDIA): ICD-10-CM

## 2022-04-26 DIAGNOSIS — I10 PRIMARY HYPERTENSION: ICD-10-CM

## 2022-04-26 LAB
LV EF: 43 %
LVEF MODALITY: NORMAL

## 2022-04-26 PROCEDURE — 1123F ACP DISCUSS/DSCN MKR DOCD: CPT | Performed by: INTERNAL MEDICINE

## 2022-04-26 PROCEDURE — G8427 DOCREV CUR MEDS BY ELIG CLIN: HCPCS | Performed by: INTERNAL MEDICINE

## 2022-04-26 PROCEDURE — 3044F HG A1C LEVEL LT 7.0%: CPT | Performed by: INTERNAL MEDICINE

## 2022-04-26 PROCEDURE — 99214 OFFICE O/P EST MOD 30 MIN: CPT | Performed by: INTERNAL MEDICINE

## 2022-04-26 PROCEDURE — 93306 TTE W/DOPPLER COMPLETE: CPT

## 2022-04-26 PROCEDURE — 1036F TOBACCO NON-USER: CPT | Performed by: INTERNAL MEDICINE

## 2022-04-26 PROCEDURE — 4040F PNEUMOC VAC/ADMIN/RCVD: CPT | Performed by: INTERNAL MEDICINE

## 2022-04-26 PROCEDURE — G8420 CALC BMI NORM PARAMETERS: HCPCS | Performed by: INTERNAL MEDICINE

## 2022-04-26 RX ORDER — ATORVASTATIN CALCIUM 20 MG/1
20 TABLET, FILM COATED ORAL DAILY
Qty: 90 TABLET | Refills: 4 | Status: SHIPPED | OUTPATIENT
Start: 2022-04-26

## 2022-04-26 NOTE — PROGRESS NOTES
[address]  Cardiac Consult     Referring Provider:  Diana Mota MD     Chief Complaint   Patient presents with    Follow-up    Coronary Artery Disease     f/u echo    Hypertension    Cardiomyopathy        History of Present Illness:   80 y.o.male formally seen by Dr. Lita Montalvo seen in f/u for afib, CAD, HTN, hyperlipidemia and pacemaker placement. He has been having increasing afib on Sotalol. He refused change to amio due to potential side effects. He has progressive LV dysfunction. Myoview with small area of ischemia. Afib ablation and possible biV upgrade was being considered. Cardiac cath performed 10/2021 with stenting of the PDA. He is feeling well. Denies chest pain, dyspnea palpitations, syncope or presyncope. Pacer interrogation 1/2022 showed 52% afib. Also NSVT. It is difficult to tell how much VT and how fast. Reviewed with Dr. Venkat Blount who reviewed tracings. He can not tell if episodes wit rapid rate are aflutter or VT. ACE inhibitor use has been limited by increased creat and K+. He returns today for an ECHO to reevaluate LV function. EF appears improved and in the 40-45% range. He denies  syncope or presyncope. No symptomatic palpitations. He was having some palpitations and lasix increased to 80 a day for 1 week by nephrology. That improved his breathing. Labs with that stable.      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 Surgical History:   has a past surgical history that includes Tonsillectomy and adenoidectomy; Appendectomy; Kidney stone surgery (1980); Coronary artery bypass graft (1996); Cardiac catheterization (2003); pacemaker placement (2005); other surgical history (Right, 7/17/15); Abscess Drainage (7/20/15); Toe amputation (Right, 7.16.15); Colonoscopy (03/28/2018); and pr esophagogastroduodenoscopy transoral diagnostic (N/A, 11/21/2018). Social History:  Social History     Tobacco Use    Smoking status: Never Smoker    Smokeless tobacco: Never Used    Tobacco comment: counseled on tobacco exposure avoidance   Substance Use Topics    Alcohol use: No     Alcohol/week: 0.0 standard drinks        Family History:  family history includes Diabetes in his mother; Stroke in his father. Allergies:  Patient has no known allergies. Home Medications:  Prior to Visit Medications    Medication Sig Taking?  Authorizing Provider   furosemide (LASIX) 40 MG tablet Take 1 tablet by mouth every morning Yes Elizabeth Manley MD   warfarin (COUMADIN) 5 MG tablet TAKE ONE-HALF TABLET BY MOUTH ON MONDAY WEDNESDAY AND FRIDAY, AND 1 TABLET BY MOUTH ALL OTHER DAYS OF THE WEEK Yes PRIYA Kramer CNP   sotalol (BETAPACE) 120 MG tablet TAKE 1 TABLET BY MOUTH TWICE DAILY Yes Belinda Patterson MD   metoprolol succinate (TOPROL XL) 50 MG extended release tablet TAKE 1 TABLET BY MOUTH TWICE DAILY Yes Belinda Patterson MD   lisinopril (PRINIVIL;ZESTRIL) 10 MG tablet Take 10 mg by mouth daily Yes Historical Provider, MD   amLODIPine (NORVASC) 5 MG tablet TAKE 1 TABLET BY MOUTH DAILY Yes Elizabeth Manley MD   doxazosin (CARDURA) 2 MG tablet Take 1 tablet by mouth daily Yes Milagros Cantor MD   clopidogrel (PLAVIX) 75 MG tablet Take 1 tablet by mouth daily Yes PRIYA Yan CNP   pantoprazole (PROTONIX) 40 MG tablet Take 1 tablet by mouth daily While on triple therapy Yes PRIYA Yan CNP   atorvastatin (LIPITOR) 20 MG tablet Take 1 tablet by mouth daily Yes David Benedict MD   vitamin B-1 (THIAMINE) 100 MG tablet Take 1,000 mg by mouth daily  Yes Historical Provider, MD   EPOETIN BIBIANA IJ Inject as directed every 21 days  Yes Historical Provider, MD   acetaminophen (TYLENOL) 325 MG tablet Take 650 mg by mouth every 6 hours as needed for Pain Yes Historical Provider, MD       [x] Medications and dosages reviewed. ROS:  [x]Full ROS obtained and negative except as mentioned in HPI      Physical Examination:    Vitals:    04/26/22 0932   BP: 134/62   Site: Left Upper Arm   Position: Sitting   Cuff Size: Medium Adult   Pulse: 62   SpO2: 95%   Weight: 168 lb 4.8 oz (76.3 kg)   Height: 5' 9\" (1.753 m)        · GENERAL: Well developed, well nourished, No acute distress  · NEUROLOGICAL: Alert and oriented  · PSYCH: Calm affect  · SKIN: Warm and dry, No visible rash,   · EYES: Pupils equal and round, Sclera non-icteric,   · HENT:  External ears and nose unremarkable, mucus membranes moist  · MUSCULOSKELETAL: Normal cephalic, neck supple  · CAROTID: Normal upstroke, no bruits  · CARDIAC: JVP normal, Normal PMI,  Regular rate and rhythm, normal S1S2, No murmur, rub, or gallop  · RESPIRATORY: Normal respiratory effort, Clear to auscultation bilaterally  · EXTREMITIES: No LE edema  · GASTROINTESTINAL: normal bowel sounds, soft, non-tender, No hepatomegaly     ECHO 5/2021    Summary   Left ventricular cavity size is normal with normal left ventricular wall   thickness. Overall left ventricular systolic function appears mild-moderately reduced. Ejection fraction is visually estimated to be 35-40%. There is mild diffuse hypokinesis. Grade II diastolic dysfunction with elevated LV filling pressures. Normal right ventricular size. Right ventricular systolic function is mildly reduced. The left atrium is moderate-severely dilated. Mitral valve leaflets appear mildly thickened.    Mild to moderate mitral regurgitation. Moderate aortic stenosis with a peak velocity of 2.5 m/s, a mean pressure   gradient of 15 mmHg and an DONAVAN of 1.19 cm^2. Aortic valve gradients may be underestimated due to low cardiac output. Mild aortic regurgitation. Mild to moderate tricuspid regurgitation with a PASP of 45 mmHg. Trivial pulmonic regurgitation present. Myoview 9/2021  The left ventricular cavity size is moderately dilated. The right ventricle    is mildly dilated.        There is a small perfusion defect of mild severity in the apex and apical    inferior segment. The fixed defect has associated wall motion abnormality,    consistent with scar. There is an additional small perfusion defect of mild    severity in the mid-anterior. There is corresponding wall motion    abnormality. The defect is consistent with ischemia.        Sum stress score of 4. No visual TID. Calculated TID is 1. 13.        Left ventricular ejection fraction is severely reduced at 35%. CARDIAC CATH 10/2021  Anatomy:   LM-20% distal  LAD-100% ostial, calcified  Cx-normal  OM1-100% ostial  OM2-20% proximal  RCA-20% proximal, 40% distal, calcified  RPDA-70 to 80% proximal, FFR of 0.76  LVEF-50%  LIMA-LAD: Widely patent  SVG-OM1: Widely patent  Aortic root: Not aneurysmal, no significant aortic insufficiency, 1 patent SVG visualized. PCI: RPDA 80% to 0% with a 3.25 mm x 18 mm Xience Mariela ALEJANDRA      Impression:  1. Severe multivessel CAD. 2.  Patent LIMA-LAD and SVG-OM1 grafts. 3.  Successful PCI with ALEJANDRA x1 to RPDA. 4.  Low normal LV systolic function.     Plan:  1. Attempt triple therapy with enteric-coated aspirin 81 mg daily, Plavix 75 mg daily and warfarin to complete 30 days followed by Plavix and warfarin for an additional 5 months then transition to enteric-coated aspirin 81 mg daily and warfarin thereafter. 2.  Hydration. 3.  ACE inhibitor/ARB stopped preprocedure to optimize kidney function given renal insufficiency.   Discussed with nephrology who recommends resuming ACE inhibitor/ARB 3 days post procedure. ECHO today (2022)  Summary   -Mildly reduced global systolic function with an ejection fraction estimated   at 40-45%. -Global hypokinesis noted. -Severe left atrial enlargement noted. -Moderate right atrial enlargement. -Moderate aortic stenosis with a peak velocity of 3.08m/s and a mean   pressure gradient of 20mmHg. The aortic valve area is estimated at 1.14 cm^2   by continuity and .93 cm^2 to 1.25 cm^2 by planimetry. There is mild aortic   insufficiency.   -There is moderate mitral regurgitation.   -There is mild-to-moderate tricuspid regurgitation with a RVSP estimation of   46 mmHg.   -Trivial pulmonic regurgitation present.   -Grade II diastolic dysfunction with elevated LV filling pressures. Avg.   E/e'=15.6   -Pacer / ICD wire is visualized in the right heart. ASSESSMENT:      CAD:  Cardiac cath as above with stenting PDA  10/2021. Continue plavix x 6 months. (Through April)    CAB [facility]-no report  Cardiac [id number]-no report    HTN:  /62 (Site: Left Upper Arm, Position: Sitting, Cuff Size: Medium Adult)   Pulse 62   Ht 5' 9\" (1.753 m)   Wt 168 lb 4.8 oz (76.3 kg)   SpO2 95%   BMI 24.85 kg/m²   Controlled. Continue current therapy    Hyperlipidemia:  Controlled  LDL=57   Continue lipitor    Afib: Increasing afib. 52%  Refused amio. Continue coumadin  Follow    Pacemaker:  Stable  Follow q 3 months-Check planned next week    Anemia:  Followed by [facility]  ? MDS-HgB running around 8 per daughter. Not improving with meds    Cardiomyopathy/CHF:  Stable. Continue current therapy      VT:  VT seen on pacer. Amount unclear  Discussed with Dr. Abby Schwab repeat ECHO  If EF< 35% needs AICD, If Abnormal but >35% recommends EP study  Await Pacer check    I discussed with pt and his daughter. He is very reluctant to consider any further testing.  Discussed risk/benefits in detail  We will check ECHO and he will think about how aggressive he wants to be     Plan:  Stable  Same meds  Follow Pacer for possible afib vs VT  He can take extra lasix on a prn basis   f/u nephrology as planned.  Me 3 months    Thank you for allowing me to participate in the care of this individual.      Merle Lockhart M.D., [facility]

## 2022-04-26 NOTE — PATIENT INSTRUCTIONS
Make sure you take Lasix 40 mg daily (if you need to an extra 40 mg on occasion for fluid retention it is okay)  Labs before seeing Dr Familia Cuevas  Follow up in 3 months with Dr Isabel Keene with labs a few days prior    Call for any chest pain, increased shortness of breath, dizziness, lightheadedness, or passing out or if continued extra swelling

## 2022-04-27 ENCOUNTER — NURSE ONLY (OUTPATIENT)
Dept: CARDIOLOGY CLINIC | Age: 87
End: 2022-04-27
Payer: MEDICARE

## 2022-04-27 DIAGNOSIS — Z95.0 CARDIAC PACEMAKER IN SITU: ICD-10-CM

## 2022-04-27 PROCEDURE — 93294 REM INTERROG EVL PM/LDLS PM: CPT | Performed by: INTERNAL MEDICINE

## 2022-04-27 PROCEDURE — 93296 REM INTERROG EVL PM/IDS: CPT | Performed by: INTERNAL MEDICINE

## 2022-04-27 NOTE — PROGRESS NOTES
We received remote transmission from patient's monitor at home. Transmission shows normal sensing and pacing function. Noted AF and NSVT. Pt is on coumadin and toprol. EP physician will review. See interrogation under cardiology tab in the [address] [place] [address] [address] field for more details.

## 2022-05-03 ENCOUNTER — OFFICE VISIT (OUTPATIENT)
Dept: FAMILY MEDICINE CLINIC | Age: 87
End: 2022-05-03
Payer: MEDICARE

## 2022-05-03 VITALS
OXYGEN SATURATION: 97 % | BODY MASS INDEX: 24.73 KG/M2 | HEIGHT: 69 IN | DIASTOLIC BLOOD PRESSURE: 68 MMHG | WEIGHT: 167 LBS | SYSTOLIC BLOOD PRESSURE: 150 MMHG | HEART RATE: 62 BPM

## 2022-05-03 DIAGNOSIS — G89.29 CHRONIC MIDLINE LOW BACK PAIN WITHOUT SCIATICA: Primary | ICD-10-CM

## 2022-05-03 DIAGNOSIS — I25.5 ISCHEMIC CARDIOMYOPATHY: ICD-10-CM

## 2022-05-03 DIAGNOSIS — I48.0 PAF (PAROXYSMAL ATRIAL FIBRILLATION) (HCC): ICD-10-CM

## 2022-05-03 DIAGNOSIS — I25.10 CORONARY ARTERY DISEASE INVOLVING NATIVE CORONARY ARTERY OF NATIVE HEART WITHOUT ANGINA PECTORIS: ICD-10-CM

## 2022-05-03 DIAGNOSIS — M54.50 CHRONIC MIDLINE LOW BACK PAIN WITHOUT SCIATICA: Primary | ICD-10-CM

## 2022-05-03 DIAGNOSIS — I10 PRIMARY HYPERTENSION: ICD-10-CM

## 2022-05-03 DIAGNOSIS — I35.0 NONRHEUMATIC AORTIC VALVE STENOSIS: ICD-10-CM

## 2022-05-03 PROBLEM — J32.4 PANSINUSITIS: Status: RESOLVED | Noted: 2020-12-07 | Resolved: 2022-05-03

## 2022-05-03 PROBLEM — R35.0 URINARY FREQUENCY: Status: RESOLVED | Noted: 2019-08-05 | Resolved: 2022-05-03

## 2022-05-03 PROBLEM — J18.9 PNEUMONIA DUE TO ORGANISM: Status: RESOLVED | Noted: 2020-12-05 | Resolved: 2022-05-03

## 2022-05-03 PROCEDURE — 1123F ACP DISCUSS/DSCN MKR DOCD: CPT | Performed by: FAMILY MEDICINE

## 2022-05-03 PROCEDURE — 4040F PNEUMOC VAC/ADMIN/RCVD: CPT | Performed by: FAMILY MEDICINE

## 2022-05-03 PROCEDURE — 1036F TOBACCO NON-USER: CPT | Performed by: FAMILY MEDICINE

## 2022-05-03 PROCEDURE — 99214 OFFICE O/P EST MOD 30 MIN: CPT | Performed by: FAMILY MEDICINE

## 2022-05-03 PROCEDURE — G8420 CALC BMI NORM PARAMETERS: HCPCS | Performed by: FAMILY MEDICINE

## 2022-05-03 PROCEDURE — G8427 DOCREV CUR MEDS BY ELIG CLIN: HCPCS | Performed by: FAMILY MEDICINE

## 2022-05-03 RX ORDER — TRIAMCINOLONE ACETONIDE 5 MG/G
OINTMENT TOPICAL
COMMUNITY
Start: 2022-03-14 | End: 2022-08-04

## 2022-05-03 ASSESSMENT — PATIENT HEALTH QUESTIONNAIRE - PHQ9
SUM OF ALL RESPONSES TO PHQ QUESTIONS 1-9: 0
1. LITTLE INTEREST OR PLEASURE IN DOING THINGS: 0
SUM OF ALL RESPONSES TO PHQ QUESTIONS 1-9: 0
2. FEELING DOWN, DEPRESSED OR HOPELESS: 0
SUM OF ALL RESPONSES TO PHQ QUESTIONS 1-9: 0
SUM OF ALL RESPONSES TO PHQ QUESTIONS 1-9: 0
SUM OF ALL RESPONSES TO PHQ9 QUESTIONS 1 & 2: 0

## 2022-05-03 NOTE — PROGRESS NOTES
5/3/2022    Blood pressure (!) 150/68, pulse 62, height 5' 9\" (1.753 m), weight 167 lb (75.8 kg), SpO2 97 %. Saundra Rodriguez (:  1933) is a 80 y.o. male, here for evaluation of the following medical concerns:    Chief Complaint   Patient presents with    Follow-up     f/u for back, low back is stiff, so it makes him unsteady on his feet. using a cane. Here with son for f/u on back. His cardiologist, Dr Dimple Pierce, says he is about 50% in afib,     Recent coronary stent placed, on plavix x 6 mo, now off. Now on asa only. Echo was encouragin-45% EF with moderate AS and diastolic dysfunction. He has 1+ pitting leg edema. He does have DICKEY after walking to curb from house. He would like to increase his stamina. bp elevated today. Recheck 150/70 R arm     Hx anemia, due to CKD on epo shots q 2 wks. Goal 9-11 hgb he thinks. Last renal function test:   Lab Results   Component Value Date     2022    K 4.1 2022    K 4.7 2020    BUN 27 2022    CREATININE 0.9 2022     Estimated Creatinine Clearance: 57 mL/min (based on SCr of 0.9 mg/dL). He is mostly bothered by back pain/stiffness. Due to spinal stenosis. This discomfort (not pain, only stiffness he says) does not radiate. associated with LE weakness and feels a bit ataxic- the cane helps. No falls. Denies numbness in feet. Feels best to sit. He has had XR's but not a CT or MRI. Hx DM. Well controlled without meds.       Lab Results   Component Value Date    LABA1C 5.3 2022    LABA1C 5.2 10/27/2021    LABA1C 5.7 2021      Last lipid test:  Lab Results   Component Value Date    CHOL 126 2022    TRIG 137 2022    HDL 42 2022    LDLCALC 57 2022     Lab Results   Component Value Date    ALT 13 2022    AST 18 2022           Patient Active Problem List   Diagnosis    CAD (coronary artery disease)    Cardiac pacemaker    Benign prostatic hyperplasia with nocturia    Gout-uric acid >7.5--advised proph tx    Hypercholesteremia    Carotid bruit(bilat-nl duplex u/s 10/10)    Vitamin D deficiency-(advised 1000IU/day)    Actinic keratoses    Elevated PSA-(was 4.2 10/10-repeat 6 mo)(was 5.12 1/11-then 4.4 2/12)-sees dr Krystin Talley for this    S/P colonoscopy-4/07-neg per pt,  3/18 repeat colonoscopy polyp-repeat 5 yr    Hearing loss, conductive, bilateral-seeing ent-dr quinn for hearing aides    Encounter for monitoring sotalol therapy    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 Garrett Sanchez op 8/15--started otc iron bid, off now  - work up Dr. Neeraj Barr bone marrow. possible MDS 2019    PAF (paroxysmal atrial fibrillation) (HCC)    DM (diabetes mellitus), type 2 with peripheral vascular complications (HCC)--s/p amputation great toe post osteomylitis   diet controlled     Hypertension    CKD stage 3 due to type 2 diabetes mellitus [address] [address]    Hyperkalemia    H/O esophagogastroduodenoscopy  11/18  prominent vessesls vs varices. dr Shalom Mcleod (done for blood in stool)    Urinary frequency    Elevated ferritin  - work up Dr. Neeraj Barr  genetic screen hemachromatosis negative. 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    Localized edema    Pneumonia due to organism    Ischemic cardiomyopathy    Pansinusitis    S/P amputation of lesser toe, right (HCC)    VT (ventricular tachycardia) (HCC)        Body mass index is 24.66 kg/m². Wt Readings from Last 3 Encounters:   05/03/22 167 lb (75.8 kg)   04/26/22 168 lb 4.8 oz (76.3 kg)   04/04/22 169 lb 9.6 oz (76.9 kg)       BP Readings from Last 3 Encounters:   05/03/22 (!) 150/68   04/26/22 134/62   04/04/22 (!) 158/73       No Known Allergies    Prior to Visit Medications    Medication Sig Taking?  Authorizing Provider   triamcinolone (ARISTOCORT) 0.5 % ointment APPLY TOPICALLY TO THE AFFECTED AREA TWICE DAILY Yes Historical Provider, MD   atorvastatin (LIPITOR) 20 MG tablet Take 1 tablet by mouth daily Yes Kelli Valencia Kimberli Durand MD   furosemide (LASIX) 40 MG tablet Take 1 tablet by mouth every morning Yes Karina Purcell MD   warfarin (COUMADIN) 5 MG tablet TAKE ONE-HALF TABLET BY MOUTH ON MONDAY WEDNESDAY AND FRIDAY, AND 1 TABLET BY MOUTH ALL OTHER DAYS OF THE WEEK Yes Magdalene Gonsalves APRN - CNP   sotalol (BETAPACE) 120 MG tablet TAKE 1 TABLET BY MOUTH TWICE DAILY Yes Maged Peralta MD   metoprolol succinate (TOPROL XL) 50 MG extended release tablet TAKE 1 TABLET BY MOUTH TWICE DAILY Yes Maged Peralta MD   lisinopril (PRINIVIL;ZESTRIL) 10 MG tablet Take 10 mg by mouth daily Yes Historical Provider, MD   amLODIPine (NORVASC) 5 MG tablet TAKE 1 TABLET BY MOUTH DAILY Yes Karina Purcell MD   doxazosin (CARDURA) 2 MG tablet Take 1 tablet by mouth daily Yes Kusum Gr MD   clopidogrel (PLAVIX) 75 MG tablet Take 1 tablet by mouth daily Yes Antonia Olvera, APRN - CNP   pantoprazole (PROTONIX) 40 MG tablet Take 1 tablet by mouth daily While on triple therapy Yes Antonia Olvera, APRN - CNP   vitamin B-1 (THIAMINE) 100 MG tablet Take 1,000 mg by mouth daily  Yes Historical Provider, MD   EPOETIN BIBIANA IJ Inject as directed every 21 days  Yes Historical Provider, MD   acetaminophen (TYLENOL) 325 MG tablet Take 650 mg by mouth every 6 hours as needed for Pain Yes Historical Provider, MD        Social History     Tobacco Use    Smoking status: Never Smoker    Smokeless tobacco: Never Used    Tobacco comment: counseled on tobacco exposure avoidance   Vaping Use    Vaping Use: Never used   Substance Use Topics    Alcohol use: No     Alcohol/week: 0.0 standard drinks    Drug use: No       Review of Systems As above     Physical Exam  Vitals and nursing note reviewed. Constitutional:       General: He is not in acute distress. Appearance: Normal appearance. He is well-developed. He is not diaphoretic. Cardiovascular:      Rate and Rhythm: Normal rate. Rhythm irregular. Pulses: Normal pulses.       Heart sounds: Murmur (systolic, URSB) heard. Pulmonary:      Effort: Pulmonary effort is normal. No respiratory distress. Breath sounds: Rales (faint rales bilaterally) present. No wheezing. Musculoskeletal:      Cervical back: Normal range of motion. Right lower leg: Edema present. Left lower leg: Edema present. Comments: Able to arise and ambulate without assistance. Posture and gait is lightly forward leaning. Able to lumbar flex 45 degrees without pain. Limited extension however due more to stiffness than pain. Legs have 1+ pitting edema, but normal sensation to light touch (without removing shoes)   Skin:     General: Skin is warm and dry. Neurological:      General: No focal deficit present. Mental Status: He is alert and oriented to person, place, and time. Mental status is at baseline. Psychiatric:         Mood and Affect: Mood normal.         Behavior: Behavior normal.         Thought Content: Thought content normal.         Judgment: Judgment normal.         ASSESSMENT/PLAN:    1. Chronic midline low back pain without sciatica  - still suspect spinal stenosis, though has not had advanced imaging. Agree with his plan for PT to reduce pain, increase stability and core strength. Continue use of cane with walking  - [facility]    2. Primary hypertension  -  BP remains high right now. Since he plans to see both nephrology and cardiology soon, will defer any med adjustment today. With CHF/AS, would be ideal to keep bp as low as tolerated by his kidneys and brain. 3. Coronary artery disease involving native coronary artery of native heart without angina pectoris  - stable without new anginal sx presently. On medium statin, but recent LDL is <70. Not on asa due to coumadin use. OK to stop Plavix now. 4. PAF (paroxysmal atrial fibrillation) ([address]  - irreg today. Rate controlled.  INR therapeutic   Lab Results   Component Value Date    INR 2.6 04/20/2022    INR 2.0 03/30/2022    INR 1.8 03/16/2022    PROTIME 16.1 (H) 07/08/2021    PROTIME 26.9 (H) 12/08/2020    PROTIME 38.7 (H) 12/07/2020        5. Nonrheumatic aortic valve stenosis- moderate by echo 4/22  - discussed that moderate AS with CHF is a tricky situation and will need to follow with Dr Jimena Santamaria closely. 6. Ischemic cardiomyopathy- systolic/diastolic  - appears to be at his baseline with a little edema and rales. discussed importance of reporting any new symptoms, weight gain, edema increase, SOB increase, etc.  It is also important to monitor anemia (which he does, thankfully). Return in about 3 months (around 8/3/2022[facility] An  electronicsignature was used to authenticate this note.     --Cristian Iniguez MD on 5/3/2022 at 11:42 AM

## 2022-05-05 ENCOUNTER — TELEPHONE (OUTPATIENT)
Dept: CARDIOLOGY CLINIC | Age: 87
End: 2022-05-05

## 2022-05-05 PROBLEM — I47.20 VT (VENTRICULAR TACHYCARDIA): Status: RESOLVED | Noted: 2022-02-23 | Resolved: 2022-05-05

## 2022-05-05 PROBLEM — I35.0 NONRHEUMATIC AORTIC VALVE STENOSIS: Status: ACTIVE | Noted: 2022-05-05

## 2022-05-05 RX ORDER — ASPIRIN 81 MG/1
81 TABLET ORAL DAILY
Qty: 90 TABLET | Refills: 1 | COMMUNITY
Start: 2022-05-05 | End: 2022-05-27

## 2022-05-05 NOTE — TELEPHONE ENCOUNTER
Discussed with dtr. Patient is stopping Plavix (it's been 6 mos since stenting) and starting ASA 81 mg daily. Pt is also on Coumadin for Afib. Dtr verbalized understanding.

## 2022-05-05 NOTE — TELEPHONE ENCOUNTER
Pt daughter Harjinder Grider called asking when the pt should stop his Plavix? Pt had stent placement Dec, they would also like to know when he can top the Plavix how soon should he start  the Asprin? And how much and often should he take it?     Pls advise     FAVIO #[phone]

## 2022-05-05 NOTE — TELEPHONE ENCOUNTER
Per last OV 4/26/2022:    CAD:  Cardiac cath as above with stenting PDA  10/2021. Continue plavix x 6 months. (Through April).     Should ASA 81 mg or 325 MG?     7/28/2022 is his next OV with [facility].

## 2022-05-18 ENCOUNTER — HOSPITAL ENCOUNTER (OUTPATIENT)
Dept: PHYSICAL THERAPY | Age: 87
Setting detail: THERAPIES SERIES
Discharge: HOME | End: 2022-05-18
Payer: MEDICARE

## 2022-05-18 ENCOUNTER — ANTI-COAG VISIT (OUTPATIENT)
Dept: PHARMACY | Age: 87
End: 2022-05-18
Payer: MEDICARE

## 2022-05-18 DIAGNOSIS — I48.0 PAF (PAROXYSMAL ATRIAL FIBRILLATION) (HCC): Primary | ICD-10-CM

## 2022-05-18 LAB — INTERNATIONAL NORMALIZATION RATIO, POC: 2.1

## 2022-05-18 PROCEDURE — 99211 OFF/OP EST MAY X REQ PHY/QHP: CPT

## 2022-05-18 PROCEDURE — 97112 NEUROMUSCULAR REEDUCATION: CPT | Performed by: PHYSICAL THERAPIST

## 2022-05-18 PROCEDURE — 97161 PT EVAL LOW COMPLEX 20 MIN: CPT | Performed by: PHYSICAL THERAPIST

## 2022-05-18 PROCEDURE — 85610 PROTHROMBIN TIME: CPT

## 2022-05-18 PROCEDURE — 97110 THERAPEUTIC EXERCISES: CPT | Performed by: PHYSICAL THERAPIST

## 2022-05-18 NOTE — FLOWSHEET NOTE
[address] [facility]  Phone: [phone]   Fax: [phone]    Physical Therapy Treatment Note/ Progress Report:     Date:  2022    Patient Name:  Brianne Silverio    :  1933  MRN: [id number]  Restrictions/Precautions:    Medical/Treatment Diagnosis Information:  · Diagnosis: M54.50, G89.29 (ICD-10-CM) - Chronic midline low back pain without sciatica  · Treatment Diagnosis: M54.50, G89.29 (ICD-10-CM) - Chronic midline low back pain without sciatica  Insurance/Certification information:  PT Insurance Information: [place]  Physician Information:    Kelle Patel MD  Plan of care signed (Y/N): []  Yes [x]  No    Date of Patient follow up with Physician: 22     Progress Report: []  Yes  [x]  No     Date Range for reporting period:  Beginnin22  Ending:     Progress report due (10 Rx/or 30 days whichever is less): visit #10 or  (date)     Recertification due (POC duration/ or 90 days whichever is less): visit # or 12 weeks from 22 IE (date)     Visit # Insurance Allowable Auth required? 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  Depression      Treatment based classification:    [x] mobilization/manipulation   [x] stabilization   [] extension based   [x] flexion based   [] lateral shift   [] traction   [] unspecified Components:   [x] thoracolumbar   [] pelvic   [] SIJ   [] sacral   [x] hip         Comparable sign: Stiffness with sit-stand transfer    Exercises/Interventions:     Therapeutic Exercise (92648) Resistance / level Sets / Seconds Reps Notes / Cues   Piriformis stretches  H30 5 HEP   LTR  H10 10 HEP   Hip flex off EOB with strap  H30 3 HEP   Supine 90/90 HS stretches with strap  H30 3 HEP          TrA  H10 10reps HEP   Bridges  H10 10 reps HEP                 Therapeutic Activities (90180)                                          Neuromuscular Re-ed (35743)       Patient education 10'                                  Manual Intervention (59201)       Prone PA N.V      GISTM/STM       Lumbar Manip       SI Manip       Hip belt mobs       Hip LA distraction/lumbar distraction N.V                             Modalities: deferred    Pt. Education:  -pt educated on diagnosis, prognosis and expectations for rehab  -all pt questions were answered    Home Exercise Prorgam:  HEP instruction: Written HEP instructions provided and reviewed. Access Code: [email or web address]  URL: [email or web address]  Date: 05/18/2022  Prepared by: Nayely Handing     Exercises  Supine Piriformis Stretch with Foot on Ground - 2 x daily - 7 x weekly - 1 sets - 5 reps - 30 hold  Supine Hamstring Stretch - 2 x daily - 7 x weekly - 1 sets - 5 reps - 30 hold  Supine Quadriceps Stretch with Strap on Table - 2 x daily - 7 x weekly - 1 sets - 5 reps - 30 hold  Supine Lower Trunk Rotation - 2 x daily - 7 x weekly - 1-2 sets - 10 reps - 10 hold  Supine Transversus Abdominis Bracing - Hands on Stomach - 3 x daily - 7 x weekly - 10 reps - 10 hold  Bridge with Hip Abduction and Resistance - Ground Touches - 3 x daily - 7 x weekly - 10 reps - 5 hold       Therapeutic Exercise and NMR EXR  [x] (97327) Provided verbal/tactile cueing for activities related to strengthening, flexibility, endurance, ROM  for improvements in proximal hip and core control with self care, mobility, lifting and ambulation.  [] (45885) Provided verbal/tactile cueing for activities related to improving balance, coordination, kinesthetic sense, posture, motor skill, proprioception  to assist with core control in self care, mobility, lifting, and ambulation.   [] (99933) Therapist is in constant attendance of 2 or more patients providing skilled therapy interventions, but not providing any significant amount of measurable one-on-one time to either patient, for improvements in LE, proximal hip, and core control in self care, mobility, lifting, ambulation and eccentric single leg control. Therapeutic Activities:    [] (78525 or 28882) Provided verbal/tactile cueing for activities related to improving balance, coordination, kinesthetic sense, posture, motor skill, proprioception and motor activation to allow for proper function  with self care and ADLs  [] (14087) Provided training and instruction to the patient for proper core and proximal hip recruitment and positioning with ambulation re-education     Home Exercise Program:    [x] (40742) Reviewed/Progressed HEP activities related to strengthening, flexibility, endurance, ROM of core, proximal hip and LE for functional self-care, mobility, lifting and ambulation   [] (42575) Reviewed/Progressed HEP activities related to improving balance, coordination, kinesthetic sense, posture, motor skill, proprioception of core, proximal hip and LE for self care, mobility, lifting, and ambulation      Manual Treatments:  PROM / STM / Oscillations-Mobs:  G-I, II, III, IV (PA's, Inf., Post.)  [x] (39990) Provided manual therapy to mobilize proximal hip and LS spine soft tissue/joints for the purpose of modulating pain, promoting relaxation,  increasing ROM, reducing/eliminating soft tissue swelling/inflammation/restriction, improving soft tissue extensibility and allowing for proper ROM for normal function with self care, mobility, lifting and ambulation.        Charges:  Timed Code Treatment Minutes: 30'   Total Treatment Minutes: 48'       [x] EVAL - LOW (00032)   [] EVAL - MOD (15496)  [] EVAL - HIGH (57715)  [] RE-EVAL (62197)  [x] UP(55942) x1       [] Ionto  [x] NMR (93868) x 1      [] Vaso  [] Manual (55219) x       [] Ultrasound  [] TA x        [] Mech Traction (46639)  [] Aquatic Therapy x     [] ES (un) (80279):   [] Home Management Training x  [] ES(attended) (20253)   [] Dry Needling 1-2 muscles (82073):  [] Dry Needling 3+ muscles (735328  [] Group:      [] Other:       GOALS:  Patient stated goal: lessen stiffness in back/ increase strength  []? Progressing: []? Met: []? Not Met: []? Adjusted     Therapist goals for Patient:   Short Term Goals: To be achieved in: 2 weeks  1. Independent in HEP and progression per patient tolerance, in order to prevent re-injury. []? Progressing: []? Met: []? Not Met: []? Adjusted  2. Patient will have a decrease in pain to facilitate improvement in movement, function, and ADLs as indicated by Functional Deficits. []? Progressing: []? Met: []? Not Met: []? Adjusted     Long Term Goals: To be achieved in: 12 weeks  1. FOTO score of at least 66 to assist with reaching prior level of function. []? Progressing: []? Met: []? Not Met: []? Adjusted  2. Patient will demonstrate increased AROM to WNL, good LS mobility, good hip ROM to allow for proper joint functioning as indicated by patients Functional Deficits. []? Progressing: []? Met: []? Not Met: []? Adjusted  3. Patient will demonstrate an increase in Strength to good proximal hip and core activation to allow for proper functional mobility as indicated by patients Functional Deficits. []? Progressing: []? Met: []? Not Met: []? Adjusted  4. Patient will return to functional activities including walking without increased symptoms or restriction. []? Progressing: []? Met: []? Not Met: []? Adjusted  5. Patient to be able to perform sit-stand transfer with less stiffness in back(patient specific functional goal)    []? Progressing: []? Met: []? Not Met: []?  Adjusted          Overall Progression Towards Functional goals/ Treatment Progress Update:  [] Patient is progressing as expected towards functional goals listed. [] Progression is slowed due to complexities/Impairments listed. [] Progression has been slowed due to co-morbidities. [x] Plan just implemented, too soon to assess goals progression <30days   [] Goals require adjustment due to lack of progress  [] Patient is not progressing as expected and requires additional follow up with physician  [] Other    Persisting Functional Limitations/Impairments:  []Sitting [x]Standing   [x]Walking [x]Squatting/bending    []Stairs []ADL's    []Transfers []Reaching  []Housework []Job related tasks  []Driving []Sports/Recreation   []Sleeping []Other:    ASSESSMENT:  Patient presents with lumbar stiffness/discomfort, Reduce Lumbar ROM/flexibility, Decreased LE strength/balance and functional mobility skills. Pt requires skilled intervention to restore ROM, strength, functional endurance and balance in order to perform ADLs without significant symptoms or limitations. Treatment/Activity Tolerance:  [x] Patient able to complete tx  [] Patient limited by fatique  [] Patient limited by pain  [] Patient limited by other medical complications  [] Other:     Prognosis: [x] Good [] Fair  [] Poor    Patient Requires Follow-up: [x] Yes  [] No    PLAN: See eval. PT 1-2x / week for 12 weeks. [] Continue per plan of care [] Alter current plan (see comments)  [x] Plan of care initiated [] Hold pending MD visit [] Discharge    Electronically signed by: Dana Vaughn, [id number] OMT-C      Note: If patient does not return for scheduled/ recommended follow up visits, this note will serve as a discharge from care along with most recent update on progress.

## 2022-05-18 NOTE — PROGRESS NOTES
Mr. Adithya Pugh is a 80 y.o. y/o male with history of Afib   He presents today for anticoagulation monitoring and adjustment. Pertinent PMH: ICD-pacemaker. 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    Patient Reported Findings:  Yes     No  [x]   []       Patient verifies current dosing regimen as listed  confirmed dose  []   [x]       S/S bleeding/bruising/swelling/SOB- denies  []   [x]       Blood in urine or stool denies  []   [x]       Procedures scheduled in the future at this time - had cardiac cath 7/9. IR 1.4---> holding 5 days for LV cath on 10/14, also states that having 2 moles removed from face 10/20- doesn't know if he has to hold yet, he will call and let me know---.  Cath cancelled 101/14, still held 5 days, then another procedure 10/26 and held 5 days --> no changes   []   [x]       Missed Dose- denies   []   [x]       Extra Dose- denies  [x]   []       Change in medications- receiving Procrit 40,000 unit injection once every two weeks until red blood cell count is consistently under control again---> started amiodarone 200mg qd on 8/20, inc metoprolol--->states did not start amiodarone dt concerns with SEs so still sotalol, inc lasix--->2 tylenol in the mornings---> resume lisinopril and lasix-->  inc lasix for 7 d then returned to normal dose --> d/c plavix, started asa     []   [x]       Change in health/diet/appetite consistent greens -> has been eating less, appetite has been diminished --> no greens, no NVD---> living alone; erratic diet--> less salads and green recently, daughter and neighbor bring meals --> still eating vit k but unable to state how much --> no changes   []   [x]       Change in alcohol use- doesn't drink   []   [x]       Change in activity  []   [x]       Hospital admission -  []   [x]       Emergency department visit  [x]   []       Other complaints--> Patient has been very tired and not able to function well, getting epoetin alpha injection at hemotologist office --> having trouble with low RBC      Clinical Outcomes:  Yes     No  []   [x]       Major bleeding event  []   [x]       Thromboembolic event  Duration of warfarin Therapy: indefinite  INR Range:  2.0-3.0     Has been stressed with passing of wife. Cecilia Araiza (daughter) is primary care now- [phone]. INR is 2.1 today   Continue weekly dose of 5 mg on Sun and Thurs and 2.5 mg all other days of the week. Encouraged to maintain a consistency of vegetables/salads.   Recheck INR in 4 weeks, 6/15    **consent form signed 11/1/2021    Referring cardiologist will be Dr. Lidia Goins  INR (no units)   Date Value   04/20/2022 2.6   03/30/2022 2.0   03/16/2022 1.8   02/23/2022 2.1   10/26/2021 1.20 (H)   10/14/2021 1.40 (H)   07/08/2021 1.40 (H)   12/08/2020 2.30 (H)       For Pharmacy Admin Tracking Only     Total # of Interventions Recommended: 0   Total # of Interventions Accepted: 0   Time Spent (min): 15

## 2022-05-18 NOTE — PLAN OF CARE
[address] [address], [address] [place] [address] Drive  Phone: [phone]   Fax: [phone]     Physical Therapy Certification    Dear  Florence Thornton MD,    We had the pleasure of evaluating the following patient for physical therapy services at [address] [place] [address]. A summary of our findings can be found in the initial assessment below. This includes our plan of care. If you have any questions or concerns regarding these findings, please do not hesitate to contact me at the office phone number checked above. Thank you for the referral.       Physician Signature:_______________________________Date:__________________  By signing above (or electronic signature), therapists plan is approved by physician      Patient: Samira Padilla   : 1933   MRN: [id number]  Referring Physician:   Florence Thornton MD,      Evaluation Date: 2022      Medical Diagnosis Information:  Diagnosis: M54.50, G89.29 (ICD-10-CM) - Chronic midline low back pain without sciatica   Treatment Diagnosis: M54.50, G89.29 (ICD-10-CM) - Chronic midline low back pain without sciatica                                         Insurance information: PT Insurance Information: Medicare/BCBS     Precautions/ Contra-indications: PACEMAKER  Latex Allergy:  [x]NO      []YES  Preferred Language for Healthcare:   [x]English       []Other:    C-SSRS Triggered by Intake questionnaire (Past 2 wk assessment ):   [x] No, Questionnaire did not trigger screening.   [] Yes, Patient intake triggered C-SSRS Screening     [] Completed, no further action required. [] Completed, PCP notified via Epic    SUBJECTIVE: Patient stated complaint:He is mostly bothered by back pain/stiffness. HX of spinal stenosis. This discomfort (not pain, only stiffness he says) does not radiate. associated with LE weakness and feels a bit ataxic- the cane helps. No falls.  Denies numbness in feet.  Feels best to sit. Reports getting out of chair is getting harder and feel more unsteady with walking. 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 Depression/ Anxiety  Functional Outcome: FOTO physical FS primary measure score = 57; Risk adjusted = 55    Pain Scale: 4/10  Easing factors: sit/ lying down  Provocative factors: standing/walking bending    Type: [x]Constant   Intermittent  []Radiating []Localized []other:     Numbness/Tingling: Denies N/T    Occupation/School: retired. Lost wife about 1 year ago. Living Status/Prior Level of Function: Prior to this injury / incident, pt was independent with ADLs and IADLs, Lives alone in ranch home since wife past x1 year ago. Daughter and family help with groceries/yardwork. Does own cooking and driving. OBJECTIVE:   Palpation: - tenderness with palpation    Functional Mobility/Transfers: sit-stand test: 7 reps in 30 sec. Posture: sym iliac crest    Inspection:     Gait: (include devices/WB status) Uses cane.  Leans forward slightly with gait and decreased trunk/pelvis dissociation    Bandages/Dressings/Incisions: NA    Dermatomes Normal Abnormal Comments   inguinal area (L1)  x     anterior mid-thigh (L2) X     distal ant thigh/med knee (L3) x     medial lower leg and foot (L4) x     lateral lower leg and foot (L5) x     posterior calf (S1) x     medial calcaneus (S2) x         Reflexes Normal Abnormal Comments   S1-2 Seated achilles x     S1-2 Prone knee bend      L3-4 Patellar tendon x     Clonus -     Babinski          ROM  Comments   Lumbar Flex Hands to knees stretching   Lumbar Ext ~20 No pain in back     ROM LEFT RIGHT Comments   Lumbar Side Bend [facility]    Lumbar Rotation [facility] Hip Flexion [facility]    Hip Abd      [facility]ip ER [facility]    Hip IR [facility] WFL    Hip Extension      Knee Ext [facility] WFL    Knee Flex      Hamstring Flex ~50 ~50    Piriformis Mild R MildR                    Joint mobility: lumbar spine and hips   []Normal    [x]Hypo   []Hyper      Strength / Myotomes LEFT RIGHT Comments   Multifidus      Transverse Ab      Hip Flexors (L1-2) 4- 4-    Hip Abductors 3+/5 3+/5    Hip Extensors      Hip Internal Rotators      Hip External Rotators      Quads (L2-4) 4/5 4/5    Hamstrings  4/5 4/5    Ankle Plantarflexion (S1-2) 4/5 4/5    Ankle Dorsiflexion (L4-5) 4 4    Ankle Inversion 4 4    Ankle Eversion (S1-2) 4 4    Great Toe Extension (L5)        TA Muscle Contraction Scale    Criteria                                               Score  Quality of Contraction   Not Present      [] 0   Rapid, Superificial     [x] 1   Just Perceptible     [] 2     Gentle, Slow      [] 3    Substitution   Resting       [] 0   Moderate to Strong     [] 1    Subtle Perceptible     [] 2   None       [] 3    Symmetry of Contraction   Unilateral       [] 0   Bilateral/Asymmetrical     [] 1   Symmetrical       [] 2    Breathing     Inability/Difficulty Breathing during contraction [] 0   Able to hold contraction while Breathing  [x] 1    Holding   Able to Hold Contraction <10 s   [] 0   Able to Hold Contraction >10 s   [x] 1      __/10  Adapted from [employer], Copyright 2009        Neural dynamic tension testing Normal Abnormal Comments   Slump Test  - Degree of knee flexion:  -     SLR  -     0-30 -     30-70 -     Femoral nerve (L2-4) Nt         Orthopedic Special Tests:    Normal Abnormal N/A Comments   Toe walk   NT      Heel Walk NT      Fwd Bend-aberrant or innominate mvmt) -      Standing Flexion test -      Trendelenburg       Kemps/Quadrant       Paulok       HUMA/Rayray       Hip scour       SLR -      Crossed SLR       Supine to sit NT      Hip thrust       SI distraction/compression NT PA/Spring       Prone Instability test       Prone knee bend       Sacral Spring/thrust                  [x] Patient history, allergies, meds reviewed. Medical chart reviewed. See intake form. Review Of Systems (ROS):  [x]Performed Review of systems (Integumentary, CardioPulmonary, Neurological) by intake and observation. Intake form has been scanned into medical record. Patient has been instructed to contact their primary care physician regarding ROS issues if not already being addressed at this time.       Co-morbidities/Complexities (which will affect course of rehabilitation):   []None        []Hx of COVID   Arthritic conditions   []Rheumatoid arthritis (M05.9)  []Osteoarthritis (M19.91)  []Gout   Cardiovascular conditions   []Hypertension (I10)  []Hyperlipidemia (E78.5)  []Angina pectoris (I20)  []Atherosclerosis (I70)  [x]Pacemaker  [x]Hx of CABG/stent/  cardiac surgeries   Musculoskeletal conditions   []Disc pathology   []Congenital spine pathologies   []Osteoporosis (M81.8)  []Osteopenia (M85.8)  []Scoliosis       Endocrine conditions   []Hypothyroid (E03.9)  []Hyperthyroid Gastrointestinal conditions   []Constipation (G66.43)   Metabolic conditions   []Morbid obesity (E66.01)  []Diabetes type 1(E10.65) or 2 (E11.65)   []Neuropathy (G60.9)     Cardio/Pulmonary conditions   []Asthma (J45)  []Coughing   []COPD (J44.9)  []CHF  []A-fib   Psychological Disorders  []Anxiety (F41.9)  [x]Depression (F32.9)   []Other:   Developmental Disorders  []Autism (F84.0)  []CP (G80)  []Down Syndrome (Q90.9)  []Developmental delay     Neurological conditions  []Prior Stroke (I69.30)  []Parkinson's (G20)  []Encephalopathy (G93.40)  []MS (G35)  []Post-polio (G14)  []SCI  []TBI  []ALS Other conditions  []Fibromyalgia (M79.7)  []Vertigo  []Syncope  []Kidney Failure  []Cancer      []currently undergoing                treatment  []Pregnancy  []Incontinence   Prior surgeries  []involved limb  []previous spinal surgery  [] section birth  []hysterectomy  []bowel / bladder surgery  []other relevant surgeries   []Other:               Barriers to/and or personal factors that will affect rehab potential:              [x]Age  []Sex    []Smoker              []Motivation/Lack of Motivation                        [x]Co-Morbidities              []Cognitive Function, education/learning barriers              []Environmental, home barriers              []profession/work barriers  []past PT/medical experience  []other:  Justification:     Falls Risk Assessment (30 days):   [x] Falls Risk assessed and no intervention required. [] Falls Risk assessed and Patient requires intervention due to being higher risk   TUG score (>12s at risk):     [] Falls education provided, including         ASSESSMENT: Patient presents with lumbar stiffness/discomfort, Reduce Lumbar ROM/flexibility, Decreased LE strength/balance and functional mobility skills. Pt requires skilled intervention to restore ROM, strength, functional endurance and balance in order to perform ADLs without significant symptoms or limitations.   Functional Impairments:     [x]Noted lumbar/proximal hip hypomobility   []Noted lumbosacral and/or generalized hypermobility   [x]Decreased Lumbosacral/hip/LE functional ROM   [x]Decreased core/proximal hip strength and neuromuscular control    [x]Decreased LE functional strength    []Abnormal reflexes/sensation/myotomal/dermatomal deficits  []Reduced balance/proprioceptive control    []other:      Functional Activity Limitations (from functional questionnaire and intake)   []Reduced ability to tolerate prolonged functional positions   []Reduced ability or difficulty with changes of positions or transfers between positions   []Reduced ability to maintain good posture and demonstrate good body mechanics with sitting, bending, and lifting   []Reduced ability to sleep   [] Reduced ability or tolerance with driving and/or computer of the following: body structures and functions (impairments), activity limitations, and/or participation restrictions;:  [] a total of 1-2 or more elements   [x] a total of 3 or more elements   [] a total of 4 or more elements   [x] A clinical presentation with:  [x] stable and/or uncomplicated characteristics   [] evolving clinical presentation with changing characteristics  [] unstable and unpredictable characteristics;   [x] Clinical decision making of [x] low, [] moderate, [] high complexity using standardized patient assessment instrument and/or measurable assessment of functional outcome. [x] EVAL (LOW) 87969 (typically 15 minutes face-to-face)  [] EVAL (MOD) 89958 (typically 30 minutes face-to-face)  [] EVAL (HIGH) 47124 (typically 45 minutes face-to-face)  [] RE-EVAL     PLAN: Begin PT focusing on: proximal hip mobilizations, LB mobs, LB core activation, proximal hip activation, and HEP    Frequency/Duration:  1-2 days per week for 12 Weeks:  Interventions:  [x]  Therapeutic exercise including: strength training, ROM, for LE, Glutes and core   [x]  NMR activation and proprioception for glutes , LE and Core   [x]  Manual therapy as indicated for Hip complex, LE and spine to include: Dry Needling/IASTM, STM, PROM, Gr I-IV mobilizations, manipulation. [x]  Modalities as needed that may include: thermal agents, E-stim, Biofeedback, US, iontophoresis as indicated  [x]  Patient education on joint protection, postural re-education, activity modification, progression of HEP. HEP instruction: Written HEP instructions provided and reviewed. Access Code: [email or web address]  URL: [email or web address]  Date: 05/18/2022  Prepared by: Ani Awad    Exercises  Supine Piriformis Stretch with Foot on Ground - 2 x daily - 7 x weekly - 1 sets - 5 reps - 30 hold  Supine Hamstring Stretch - 2 x daily - 7 x weekly - 1 sets - 5 reps - 30 hold  Supine Quadriceps Stretch with Strap on Table - 2 x daily - 7 x weekly - 1 sets - 5 reps - 30 hold  Supine Lower Trunk Rotation - 2 x daily - 7 x weekly - 1-2 sets - 10 reps - 10 hold  Supine Transversus Abdominis Bracing - Hands on Stomach - 3 x daily - 7 x weekly - 10 reps - 10 hold  Bridge with Hip Abduction and Resistance - Ground Touches - 3 x daily - 7 x weekly - 10 reps - 5 hold    GOALS:  Patient stated goal: lessen stiffness in back/ increase strength  [] Progressing: [] Met: [] Not Met: [] Adjusted    Therapist goals for Patient:   Short Term Goals: To be achieved in: 2 weeks  1. Independent in HEP and progression per patient tolerance, in order to prevent re-injury. [] Progressing: [] Met: [] Not Met: [] Adjusted  2. Patient will have a decrease in pain to facilitate improvement in movement, function, and ADLs as indicated by Functional Deficits. [] Progressing: [] Met: [] Not Met: [] Adjusted    Long Term Goals: To be achieved in: 12 weeks  1. FOTO score of at least 66 to assist with reaching prior level of function. [] Progressing: [] Met: [] Not Met: [] Adjusted  2. Patient will demonstrate increased AROM to WNL, good LS mobility, good hip ROM to allow for proper joint functioning as indicated by patients Functional Deficits. [] Progressing: [] Met: [] Not Met: [] Adjusted  3. Patient will demonstrate an increase in Strength to good proximal hip and core activation to allow for proper functional mobility as indicated by patients Functional Deficits. [] Progressing: [] Met: [] Not Met: [] Adjusted  4. Patient will return to functional activities including walking without increased symptoms or restriction. [] Progressing: [] Met: [] Not Met: [] Adjusted  5.  Patient to be able to perform sit-stand transfer with less stiffness in back(patient specific functional goal)    [] Progressing: [] Met: [] Not Met: [] Adjusted     Electronically signed by:  Nayely Villarreal PT

## 2022-05-27 RX ORDER — DOXAZOSIN 2 MG/1
2 TABLET ORAL DAILY
Qty: 90 TABLET | Refills: 0 | Status: SHIPPED | OUTPATIENT
Start: 2022-05-27 | End: 2022-08-30

## 2022-05-27 RX ORDER — ASPIRIN 81 MG/1
TABLET ORAL
Qty: 90 TABLET | Refills: 4 | Status: SHIPPED | OUTPATIENT
Start: 2022-05-27

## 2022-06-01 ENCOUNTER — HOSPITAL ENCOUNTER (OUTPATIENT)
Age: 87
Discharge: HOME | End: 2022-06-01
Payer: MEDICARE

## 2022-06-01 ENCOUNTER — HOSPITAL ENCOUNTER (OUTPATIENT)
Dept: PHYSICAL THERAPY | Age: 87
Setting detail: THERAPIES SERIES
Discharge: HOME | End: 2022-06-01
Payer: MEDICARE

## 2022-06-01 DIAGNOSIS — I50.22 CHRONIC SYSTOLIC HEART FAILURE (HCC): ICD-10-CM

## 2022-06-01 LAB
ANION GAP SERPL CALCULATED.3IONS-SCNC: 16 MMOL/L (ref 3–16)
BUN BLDV-MCNC: 29 MG/DL (ref 7–20)
CALCIUM SERPL-MCNC: 9.5 MG/DL (ref 8.3–10.6)
CHLORIDE BLD-SCNC: 97 MMOL/L (ref 99–110)
CO2: 24 MMOL/L (ref 21–32)
CREAT SERPL-MCNC: 1.2 MG/DL (ref 0.8–1.3)
GFR AFRICAN AMERICAN: >60
GFR NON-AFRICAN AMERICAN: 57
GLUCOSE BLD-MCNC: 154 MG/DL (ref 70–99)
POTASSIUM SERPL-SCNC: 5.1 MMOL/L (ref 3.5–5.1)
PRO-BNP: 3018 PG/ML (ref 0–449)
SODIUM BLD-SCNC: 137 MMOL/L (ref 136–145)

## 2022-06-01 PROCEDURE — 97530 THERAPEUTIC ACTIVITIES: CPT | Performed by: PHYSICAL THERAPIST

## 2022-06-01 PROCEDURE — 83880 ASSAY OF NATRIURETIC PEPTIDE: CPT

## 2022-06-01 PROCEDURE — 36415 COLL VENOUS BLD VENIPUNCTURE: CPT

## 2022-06-01 PROCEDURE — 97110 THERAPEUTIC EXERCISES: CPT | Performed by: PHYSICAL THERAPIST

## 2022-06-01 PROCEDURE — 80048 BASIC METABOLIC PNL TOTAL CA: CPT

## 2022-06-01 PROCEDURE — 97140 MANUAL THERAPY 1/> REGIONS: CPT | Performed by: PHYSICAL THERAPIST

## 2022-06-01 NOTE — FLOWSHEET NOTE
[address] [place] [address]  Phone: [phone]   Fax: [phone]    Physical Therapy Treatment Note/ Progress Report:     Date:  2022    Patient Name:  Herrera Diaz    :  1933  MRN: [id number]  Restrictions/Precautions:    Medical/Treatment Diagnosis Information:  · Diagnosis: M54.50, G89.29 (ICD-10-CM) - Chronic midline low back pain without sciatica  · Treatment Diagnosis: M54.50, G89.29 (ICD-10-CM) - Chronic midline low back pain without sciatica  Insurance/Certification information:  PT Insurance Information: [place]  Physician Information:    Butch Montesinos MD  Plan of care signed (Y/N): []  Yes [x]  No    Date of Patient follow up with Physician: 22     Progress Report: []  Yes  [x]  No     Date Range for reporting period:  Beginnin22  Ending:     Progress report due (10 Rx/or 30 days whichever is less): visit #10 or  (date)     Recertification due (POC duration/ or 90 days whichever is less): visit # or 8/10/22 (date)     Visit # Insurance Allowable Auth required? Date Range   2 BMN/Medicare []  Yes  [x]  No        Units approved Units used Date Range          Latex Allergy:  [x]NO      []YES  Preferred Language for Healthcare:   [x]English       []other:    Functional Scale:           Date assessed:  FOTO physical FS primary measure score = 57; risk adjusted = 55  22    Pain level:  0/10     SUBJECTIVE:  Reports no pain just stiffness in general.  Having some trouble with the bridges and HS stretch exercises. 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  Depression      Treatment based classification:    [x] mobilization/manipulation   [x] stabilization   [] extension based   [x] flexion based   [] lateral shift   [] traction   [] unspecified Components:   [x] thoracolumbar   [] pelvic   [] SIJ   [] sacral   [x] hip         Comparable sign: Stiffness with sit-stand transfer    Exercises/Interventions:     Therapeutic Exercise (56156) Resistance / level Sets / Seconds Reps Notes / Cues   Piriformis stretches  H30 5 HEP   LTR  H10 10 HEP   Hip flex off EOB with strap  H30 3 HEP   Supine 90/90 HS stretches with strap  H30 3 HEP          TrA  H10 10reps HEP   Bridges  H10 2x10 reps HEP          Standing HR/TR  H5 2x10 reps Started to get feeling back in toes during and after exercise. Therapeutic Activities (94045)              Sit-stand from chair  H5 1x10 with hands on knees, 1x5 with cane V.C for anterior WS forward. Min A initially required to [address]. Reminders to keep feel shld width apart. Gait training with cane  5'     Step over hurdles   4 laps             Neuromuscular Re-ed (69005)       Patient education 10'                                  Manual Intervention (61949)       Prone PA 10'      GISTM/STM       Lumbar Manip       SI Manip       Hip belt mobs       Hip LA distraction/lumbar distraction 5'             Prone quad stretches  H20 3 reps             Modalities: deferred    Pt. Education:  -pt educated on diagnosis, prognosis and expectations for rehab  -all pt questions were answered    Home Exercise Prorgam:  HEP instruction: Written HEP instructions provided and reviewed. Access Code: [email or web address]  URL: [email or web address]  Date: 05/18/2022  Prepared by: Wendi Mcrae     Exercises  Supine Piriformis Stretch with Foot on Ground - 2 x daily - 7 x weekly - 1 sets - 5 reps - 30 hold  Supine Hamstring Stretch - 2 x daily - 7 x weekly - 1 sets - 5 reps - 30 hold  Supine Quadriceps Stretch with Strap on Table - 2 x daily - 7 x weekly - 1 sets - 5 reps - 30 hold  Supine Lower Trunk Rotation - 2 x daily - 7 x weekly - 1-2 sets - 10 reps - 10 hold  Supine Transversus Abdominis Bracing - Hands on Stomach - 3 x daily - 7 x weekly - 10 reps - 10 hold  Bridge with Hip Abduction and Resistance the purpose of modulating pain, promoting relaxation,  increasing ROM, reducing/eliminating soft tissue swelling/inflammation/restriction, improving soft tissue extensibility and allowing for proper ROM for normal function with self care, mobility, lifting and ambulation. Charges:  Timed Code Treatment Minutes: 48'   Total Treatment Minutes: 48'       [] EVAL - LOW (93939)   [] EVAL - MOD (47755)  [] EVAL - HIGH (04424)  [] RE-EVAL (59631)  [x] UN(58235) x1       [] Ionto  [] NMR (11281) x       [] Vaso  [x] Manual (14162) x 1      [] Ultrasound  [x] TA x 1       [] Mech Traction (88966)  [] Aquatic Therapy x     [] ES (un) (10017):   [] Home Management Training x  [] ES(attended) (09798)   [] Dry Needling 1-2 muscles (00168):  [] Dry Needling 3+ muscles (332020  [] Group:      [] Other:       GOALS:  Patient stated goal: lessen stiffness in back/ increase strength  []? Progressing: []? Met: []? Not Met: []? Adjusted     Therapist goals for Patient:   Short Term Goals: To be achieved in: 2 weeks  1. Independent in HEP and progression per patient tolerance, in order to prevent re-injury. []? Progressing: []? Met: []? Not Met: []? Adjusted  2. Patient will have a decrease in pain to facilitate improvement in movement, function, and ADLs as indicated by Functional Deficits. []? Progressing: []? Met: []? Not Met: []? Adjusted     Long Term Goals: To be achieved in: 12 weeks  1. FOTO score of at least 66 to assist with reaching prior level of function. []? Progressing: []? Met: []? Not Met: []? Adjusted  2. Patient will demonstrate increased AROM to WNL, good LS mobility, good hip ROM to allow for proper joint functioning as indicated by patients Functional Deficits. []? Progressing: []? Met: []? Not Met: []? Adjusted  3. Patient will demonstrate an increase in Strength to good proximal hip and core activation to allow for proper functional mobility as indicated by patients Functional Deficits.    []? Progressing: []? Met: []? Not Met: []? Adjusted  4. Patient will return to functional activities including walking without increased symptoms or restriction. []? Progressing: []? Met: []? Not Met: []? Adjusted  5. Patient to be able to perform sit-stand transfer with less stiffness in back(patient specific functional goal)    []? Progressing: []? Met: []? Not Met: []? Adjusted          Overall Progression Towards Functional goals/ Treatment Progress Update:  [] Patient is progressing as expected towards functional goals listed. [] Progression is slowed due to complexities/Impairments listed. [] Progression has been slowed due to co-morbidities. [x] Plan just implemented, too soon to assess goals progression <30days   [] Goals require adjustment due to lack of progress  [] Patient is not progressing as expected and requires additional follow up with physician  [] Other    Persisting Functional Limitations/Impairments:  []Sitting [x]Standing   [x]Walking [x]Squatting/bending    []Stairs []ADL's    []Transfers []Reaching  []Housework []Job related tasks  []Driving []Sports/Recreation   []Sleeping []Other:    ASSESSMENT:  Patient presents with lumbar stiffness/discomfort, Reduce Lumbar ROM/flexibility, Decreased LE strength/balance and functional mobility skills. Reports less stiffness noted at end of treatment in back and hip. Assess response to manuals. Needed V.C with sit-stand for proper ZOË and WS this visit. Was able to self correct and perform independently at end of exercise. Pt requires skilled intervention to restore ROM, strength, functional endurance and balance in order to perform ADLs without significant symptoms or limitations.   Treatment/Activity Tolerance:  [x] Patient able to complete tx  [] Patient limited by fatique  [] Patient limited by pain  [] Patient limited by other medical complications  [] Other:     Prognosis: [x] Good [] Fair  [] Poor    Patient Requires Follow-up: [x] Yes  [] No    PLAN: See eval. PT 1-2x / week for 12 weeks. [x] Continue per plan of care [] Alter current plan (see comments)  [] Plan of care initiated [] Hold pending MD visit [] Discharge    Electronically signed by: Dnaa Galdamez, [id number] OMT-C      Note: If patient does not return for scheduled/ recommended follow up visits, this note will serve as a discharge from care along with most recent update on progress.

## 2022-06-02 ENCOUNTER — TELEPHONE (OUTPATIENT)
Dept: CARDIOLOGY CLINIC | Age: 87
End: 2022-06-02

## 2022-06-02 DIAGNOSIS — E87.5 HYPERKALEMIA: Primary | ICD-10-CM

## 2022-06-02 NOTE — TELEPHONE ENCOUNTER
Reviewed with patient. Reviewed high potassium foods. Lab order placed.      ----- Message from Cristopher Rodriguez MD sent at 6/2/2022  4:09 PM EDT -----  Labs stable but potassium is on high side.  Watch K+ in diet  Recheck chem in 1 month    MMK

## 2022-06-03 ENCOUNTER — HOSPITAL ENCOUNTER (OUTPATIENT)
Dept: PHYSICAL THERAPY | Age: 87
Setting detail: THERAPIES SERIES
Discharge: HOME | End: 2022-06-03
Payer: MEDICARE

## 2022-06-03 PROCEDURE — 97110 THERAPEUTIC EXERCISES: CPT | Performed by: PHYSICAL THERAPIST

## 2022-06-03 PROCEDURE — 97112 NEUROMUSCULAR REEDUCATION: CPT | Performed by: PHYSICAL THERAPIST

## 2022-06-03 PROCEDURE — 97530 THERAPEUTIC ACTIVITIES: CPT | Performed by: PHYSICAL THERAPIST

## 2022-06-03 PROCEDURE — 97140 MANUAL THERAPY 1/> REGIONS: CPT | Performed by: PHYSICAL THERAPIST

## 2022-06-03 NOTE — FLOWSHEET NOTE
[facility]  Phone: [phone]   Fax: [phone]    Physical Therapy Treatment Note/ Progress Report:     Date:  6/3/2022    Patient Name:  Ngoc Salazar    :  1933  MRN: [id number]  Restrictions/Precautions:    Medical/Treatment Diagnosis Information:  · Diagnosis: M54.50, G89.29 (ICD-10-CM) - Chronic midline low back pain without sciatica  · Treatment Diagnosis: M54.50, G89.29 (ICD-10-CM) - Chronic midline low back pain without sciatica  Insurance/Certification information:  PT Insurance Information: [place]  Physician Information:    Lorrie Marin MD  Plan of care signed (Y/N): []  Yes [x]  No    Date of Patient follow up with Physician: 22     Progress Report: []  Yes  [x]  No     Date Range for reporting period:  Beginnin22  Ending:     Progress report due (10 Rx/or 30 days whichever is less): visit #10 or 14 (date)     Recertification due (POC duration/ or 90 days whichever is less): visit # or 8/10/22 (date)     Visit # Insurance Allowable Auth required? Date Range   3 BMN/Medicare []  Yes  [x]  No        Units approved Units used Date Range          Latex Allergy:  [x]NO      []YES  Preferred Language for Healthcare:   [x]English       []other:    Functional Scale:           Date assessed:  FOTO physical FS primary measure score = 57; risk adjusted = 55  22    Pain level:  0/10     SUBJECTIVE:  Reports no pain just stiffness in general. Still having difficulty with getting up out of chair. OBJECTIVE: See eval . Reports SOB during session toward end of session. Pulse OX : 97%, MO 70 BPM at end of treatment session this visit. 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  Depression      Treatment based classification:    [x] mobilization/manipulation   [x] stabilization   [] extension based   [x] flexion based   [] lateral shift   [] traction   [] unspecified Components:   [x] thoracolumbar   [] pelvic   [] SIJ   [] sacral   [x] hip         Comparable sign: Stiffness with sit-stand transfer    Exercises/Interventions:     Therapeutic Exercise (84851) Resistance / level Sets / Seconds Reps Notes / Cues   Piriformis stretches  H30 5 HEP   LTR  H10 10 HEP   Hip flex off EOB with strap  HEP   Supine 90/90 HS stretches with strap  HEP          TrA  H10 10reps HEP   Bridges  H10 2x10 reps HEP   Standing hip ext/abd /marches/mini squats   Verbally reviewed and PT demonstrated + to HEP this visit. Standing HR/TR  H5 2x10 reps    Therapeutic Activities (52407)              Sit-stand from chair  H5 With cane 2x6 reps,  V.C to move forward in chair   Gait training with cane  5'     Step over hurdles        Mapiliary balance #11 Random control training  3'  Using HR for support. Neuromuscular Re-ed (96948)       Patient education 10'                                  Manual Intervention (35136)       Prone PA 10'      GISTM/STM       Lumbar Manip       SI Manip       Hip belt mobs       Hip LA distraction/lumbar distraction 5'             Prone quad stretches  H20 3 reps             Modalities: deferred    Pt. Education:  -pt educated on diagnosis, prognosis and expectations for rehab  -all pt questions were answered    Home Exercise Prorgam:  HEP instruction: Written HEP instructions provided and reviewed. Access Code: [email or web address]  URL: [email or web address]  Date: 05/18/2022  Prepared by: Erica Florez     Exercises  Supine Piriformis Stretch with Foot on Ground - 2 x daily - 7 x weekly - 1 sets - 5 reps - 30 hold  Supine Hamstring Stretch - 2 x daily - 7 x weekly - 1 sets - 5 reps - 30 hold  Supine Quadriceps Stretch with Strap on Table - 2 x daily - 7 x weekly - 1 sets - 5 reps - 30 hold  Supine Lower Trunk Rotation - 2 x daily - 7 x weekly - 1-2 sets - 10 reps - 10 hold  Supine Transversus Abdominis Bracing - Hands on Stomach - 3 x daily - 7 x weekly - 10 reps - 10 hold  Bridge with Hip Abduction and Resistance - Ground Touches - 3 x daily - 7 x weekly - 10 reps - 5 hold     Access Code: [email or web address]  URL: [email or web address]  Date: 06/03/2022  Prepared by: Coral Kuhn    Exercises  Standing March with Counter Support - 1 x daily - 7 x weekly - 2-3 sets - 10 reps - 2 hold  Standing Hip Abduction with Counter Support - 1 x daily - 7 x weekly - 2-3 sets - 10 reps - 2 hold  Standing Hip Extension with Counter Support - 1 x daily - 7 x weekly - 2-3 sets - 10 reps - 2 hold  Heel Toe Raises with Counter Support - 1 x daily - 7 x weekly - 2-3 sets - 10 reps  Mini Squat with Counter Support - 1 x daily - 7 x weekly - 3 sets - 10 reps    Therapeutic Exercise and NMR EXR  [x] (64710) Provided verbal/tactile cueing for activities related to strengthening, flexibility, endurance, ROM  for improvements in proximal hip and core control with self care, mobility, lifting and ambulation. [x] (32802) Provided verbal/tactile cueing for activities related to improving balance, coordination, kinesthetic sense, posture, motor skill, proprioception  to assist with core control in self care, mobility, lifting, and ambulation.   [] (14874) Therapist is in constant attendance of 2 or more patients providing skilled therapy interventions, but not providing any significant amount of measurable one-on-one time to either patient, for improvements in LE, proximal hip, and core control in self care, mobility, lifting, ambulation and eccentric single leg control.      Therapeutic Activities:    [] (03478 or 54447) Provided verbal/tactile cueing for activities related to improving balance, coordination, kinesthetic sense, posture, motor skill, proprioception and motor activation to allow for proper function  with self care and ADLs  [] (38885) Provided training and instruction to the patient for proper core and proximal hip recruitment and positioning with ambulation re-education     Home Exercise Program:    [x] (82086) Reviewed/Progressed HEP activities related to strengthening, flexibility, endurance, ROM of core, proximal hip and LE for functional self-care, mobility, lifting and ambulation   [] (08655) Reviewed/Progressed HEP activities related to improving balance, coordination, kinesthetic sense, posture, motor skill, proprioception of core, proximal hip and LE for self care, mobility, lifting, and ambulation      Manual Treatments:  PROM / STM / Oscillations-Mobs:  G-I, II, III, IV (PA's, Inf., Post.)  [x] (27560) Provided manual therapy to mobilize proximal hip and LS spine soft tissue/joints for the purpose of modulating pain, promoting relaxation,  increasing ROM, reducing/eliminating soft tissue swelling/inflammation/restriction, improving soft tissue extensibility and allowing for proper ROM for normal function with self care, mobility, lifting and ambulation. Charges:  Timed Code Treatment Minutes: 54'   Total Treatment Minutes: 54'       [] EVAL - LOW (427 1011)   [] EVAL - MOD (10267)  [] EVAL - HIGH (69364)  [] RE-EVAL (92407)  [x] LK(77535) x1       [] Ionto  [x] NMR (74004) x 1      [] Vaso  [x] Manual (43735) x 1      [] Ultrasound  [x] TA x 1       [] Mech Traction (15475)  [] Aquatic Therapy x     [] ES (un) (24432):   [] Home Management Training x  [] ES(attended) (75228)   [] Dry Needling 1-2 muscles (19801):  [] Dry Needling 3+ muscles (791755  [] Group:      [] Other:       GOALS:  Patient stated goal: lessen stiffness in back/ increase strength  []? Progressing: []? Met: []? Not Met: []? Adjusted     Therapist goals for Patient:   Short Term Goals: To be achieved in: 2 weeks  1. Independent in HEP and progression per patient tolerance, in order to prevent re-injury. []? Progressing: []? Met: []? Not Met: []? Adjusted  2.  Patient will have a decrease in pain to facilitate improvement in movement, function, and ADLs as indicated by Functional Deficits. []? Progressing: []? Met: []? Not Met: []? Adjusted     Long Term Goals: To be achieved in: 12 weeks  1. FOTO score of at least 66 to assist with reaching prior level of function. []? Progressing: []? Met: []? Not Met: []? Adjusted  2. Patient will demonstrate increased AROM to WNL, good LS mobility, good hip ROM to allow for proper joint functioning as indicated by patients Functional Deficits. []? Progressing: []? Met: []? Not Met: []? Adjusted  3. Patient will demonstrate an increase in Strength to good proximal hip and core activation to allow for proper functional mobility as indicated by patients Functional Deficits. []? Progressing: []? Met: []? Not Met: []? Adjusted  4. Patient will return to functional activities including walking without increased symptoms or restriction. []? Progressing: []? Met: []? Not Met: []? Adjusted  5. Patient to be able to perform sit-stand transfer with less stiffness in back(patient specific functional goal)    []? Progressing: []? Met: []? Not Met: []? Adjusted          Overall Progression Towards Functional goals/ Treatment Progress Update:  [] Patient is progressing as expected towards functional goals listed. [] Progression is slowed due to complexities/Impairments listed. [] Progression has been slowed due to co-morbidities.   [x] Plan just implemented, too soon to assess goals progression <30days   [] Goals require adjustment due to lack of progress  [] Patient is not progressing as expected and requires additional follow up with physician  [] Other    Persisting Functional Limitations/Impairments:  []Sitting [x]Standing   [x]Walking [x]Squatting/bending    []Stairs []ADL's    []Transfers []Reaching  []Housework []Job related tasks  []Driving []Sports/Recreation   []Sleeping []Other:    ASSESSMENT:  Patient presents with lumbar stiffness/discomfort, Reduce Lumbar ROM/flexibility, Decreased LE strength/balance and functional mobility skills. Reports less stiffness noted at end of treatment in back and hip. Patient more SOB and increased rest breaks noted this visit. Pulse OX was 97% at end of treatment and IL 70 BPM.  Pt requires skilled intervention to restore ROM, strength, functional endurance and balance in order to perform ADLs without significant symptoms or limitations. Treatment/Activity Tolerance:  [x] Patient able to complete tx  [] Patient limited by fatique  [] Patient limited by pain  [] Patient limited by other medical complications  [] Other:     Prognosis: [x] Good [] Fair  [] Poor    Patient Requires Follow-up: [x] Yes  [] No    PLAN: See eval. PT 1-2x / week for 12 weeks. Progress as tolerated. [x] Continue per plan of care [] Alter current plan (see comments)  [] Plan of care initiated [] Hold pending MD visit [] Discharge    Electronically signed by: Dana Colon, [id number] OMT-C      Note: If patient does not return for scheduled/ recommended follow up visits, this note will serve as a discharge from care along with most recent update on progress.

## 2022-06-06 ENCOUNTER — HOSPITAL ENCOUNTER (OUTPATIENT)
Dept: PHYSICAL THERAPY | Age: 87
Setting detail: THERAPIES SERIES
Discharge: HOME | End: 2022-06-06
Payer: MEDICARE

## 2022-06-06 PROBLEM — N18.30 CHRONIC RENAL DISEASE, STAGE III (HCC): Status: ACTIVE | Noted: 2022-06-06

## 2022-06-06 PROBLEM — I50.22 CHRONIC SYSTOLIC (CONGESTIVE) HEART FAILURE (HCC): Status: ACTIVE | Noted: 2022-06-06

## 2022-06-06 PROCEDURE — 97530 THERAPEUTIC ACTIVITIES: CPT | Performed by: PHYSICAL THERAPIST

## 2022-06-06 PROCEDURE — 97112 NEUROMUSCULAR REEDUCATION: CPT | Performed by: PHYSICAL THERAPIST

## 2022-06-06 PROCEDURE — 97110 THERAPEUTIC EXERCISES: CPT | Performed by: PHYSICAL THERAPIST

## 2022-06-06 PROCEDURE — 97140 MANUAL THERAPY 1/> REGIONS: CPT | Performed by: PHYSICAL THERAPIST

## 2022-06-06 NOTE — FLOWSHEET NOTE
[address] [place] [address]  Phone: [phone]   Fax: [phone]    Physical Therapy Treatment Note/ Progress Report:     Date:  2022    Patient Name:  Radha Aranda    :  1933  MRN: [id number]  Restrictions/Precautions:    Medical/Treatment Diagnosis Information:  · Diagnosis: M54.50, G89.29 (ICD-10-CM) - Chronic midline low back pain without sciatica  · Treatment Diagnosis: M54.50, G89.29 (ICD-10-CM) - Chronic midline low back pain without sciatica  Insurance/Certification information:  PT Insurance Information: [place]  Physician Information:    Arlet Rangel MD  Plan of care signed (Y/N): []  Yes [x]  No    Date of Patient follow up with Physician: 22     Progress Report: []  Yes  [x]  No     Date Range for reporting period:  Beginnin22  Ending:     Progress report due (10 Rx/or 30 days whichever is less): visit #10 or  (date)     Recertification due (POC duration/ or 90 days whichever is less): visit # or 8/10/22 (date)     Visit # Insurance Allowable Auth required? Date Range   4 BMN/Medicare []  Yes  [x]  No        Units approved Units used Date Range          Latex Allergy:  [x]NO      []YES  Preferred Language for Healthcare:   [x]English       []other:    Functional Scale:           Date assessed:  FOTO physical FS primary measure score = 57; risk adjusted = 55  22    Pain level:  0/10     SUBJECTIVE:  Reports stiffness in general appears to be slightly less. Reports increased feeling still noted in toes versus numbness. OBJECTIVE: See eval .  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  Depression      Treatment based classification:    [x] mobilization/manipulation   [x] stabilization   [] extension based   [x] flexion based   [] lateral shift   [] traction   [] unspecified Components:   [x] thoracolumbar   [] pelvic   [] SIJ   [] sacral   [x] hip         Comparable sign: Stiffness with sit-stand transfer    Exercises/Interventions:     Therapeutic Exercise (19072) Resistance / level Sets / Seconds Reps Notes / Cues   Piriformis stretches  H30 5 HEP   LTR  H10 10 HEP   Hip flex off EOB with strap Reviewed today H30 3 HEP   Supine 90/90 HS stretches with strap  HEP   IB GS stretches  H30 5    TrA  H10 10reps HEP   Bridges  H10 2x10 reps HEP   SL clams OTB H5 2x10 reps    Standing hip ext/abd /marches/mini squats   1x10 for each. HEP   Standing HR/TR  H5 2x10 reps    Therapeutic Activities (04399)              Sit-stand from chair  H5 With cane 1x8, 1x7 reps,  V.C to move forward in chair   Gait training with cane  5'     Side stepping and forward step-overs with HHA from PT   20 ft x2    Step over hurdles        What's Trending balance #11 Random control training  3'  Using HR for support. Neuromuscular Re-ed (24342)       Patient education 10'                                  Manual Intervention (40630)       Prone PA 10'      GISTM/STM       Lumbar Manip       SI Manip       Hip belt mobs       Hip LA distraction/lumbar distraction 5'             Prone quad stretches  H20 3 reps             Modalities: deferred    Pt. Education:  -pt educated on diagnosis, prognosis and expectations for rehab  -all pt questions were answered    Home Exercise Prorgam:  HEP instruction: Written HEP instructions provided and reviewed. Access Code: [email or web address]  URL: [email or web address]  Date: 05/18/2022  Prepared by: Agustin Hart     Exercises  Supine Piriformis Stretch with Foot on Ground - 2 x daily - 7 x weekly - 1 sets - 5 reps - 30 hold  Supine Hamstring Stretch - 2 x daily - 7 x weekly - 1 sets - 5 reps - 30 hold  Supine Quadriceps Stretch with Strap on Table - 2 x daily - 7 x weekly - 1 sets - 5 reps - 30 hold  Supine Lower Trunk Rotation - 2 x daily - 7 x weekly - 1-2 sets - 10 reps - 10 hold  Supine Transversus Abdominis Bracing - Hands on Stomach - 3 x daily - 7 x weekly - 10 reps - 10 hold  Bridge with Hip Abduction and Resistance - Ground Touches - 3 x daily - 7 x weekly - 10 reps - 5 hold     Access Code: [email or web address]  URL: [email or web address]  Date: 06/03/2022  Prepared by: Herberth Roberts    Exercises  Standing March with Counter Support - 1 x daily - 7 x weekly - 2-3 sets - 10 reps - 2 hold  Standing Hip Abduction with Counter Support - 1 x daily - 7 x weekly - 2-3 sets - 10 reps - 2 hold  Standing Hip Extension with Counter Support - 1 x daily - 7 x weekly - 2-3 sets - 10 reps - 2 hold  Heel Toe Raises with Counter Support - 1 x daily - 7 x weekly - 2-3 sets - 10 reps  Mini Squat with Counter Support - 1 x daily - 7 x weekly - 3 sets - 10 reps    Therapeutic Exercise and NMR EXR  [x] (23933) Provided verbal/tactile cueing for activities related to strengthening, flexibility, endurance, ROM  for improvements in proximal hip and core control with self care, mobility, lifting and ambulation. [x] (62807) Provided verbal/tactile cueing for activities related to improving balance, coordination, kinesthetic sense, posture, motor skill, proprioception  to assist with core control in self care, mobility, lifting, and ambulation.   [] (47062) Therapist is in constant attendance of 2 or more patients providing skilled therapy interventions, but not providing any significant amount of measurable one-on-one time to either patient, for improvements in LE, proximal hip, and core control in self care, mobility, lifting, ambulation and eccentric single leg control.      Therapeutic Activities:    [x] (26204 or 60590) Provided verbal/tactile cueing for activities related to improving balance, coordination, kinesthetic sense, posture, motor skill, proprioception and motor activation to allow for proper function  with self care and ADLs  [] (24890) Provided training and instruction to the patient for proper core and proximal hip recruitment and positioning with ambulation re-education     Home Exercise Program:    [x] (92292) Reviewed/Progressed HEP activities related to strengthening, flexibility, endurance, ROM of core, proximal hip and LE for functional self-care, mobility, lifting and ambulation   [] (53048) Reviewed/Progressed HEP activities related to improving balance, coordination, kinesthetic sense, posture, motor skill, proprioception of core, proximal hip and LE for self care, mobility, lifting, and ambulation      Manual Treatments:  PROM / STM / Oscillations-Mobs:  G-I, II, III, IV (PA's, Inf., Post.)  [x] (17710) Provided manual therapy to mobilize proximal hip and LS spine soft tissue/joints for the purpose of modulating pain, promoting relaxation,  increasing ROM, reducing/eliminating soft tissue swelling/inflammation/restriction, improving soft tissue extensibility and allowing for proper ROM for normal function with self care, mobility, lifting and ambulation. Charges:  Timed Code Treatment Minutes: 54'   Total Treatment Minutes: 54'       [] EVAL - LOW (068 3451)   [] EVAL - MOD (74867)  [] EVAL - HIGH (98000)  [] RE-EVAL (33822)  [x] JX(30518) x1    [] Ionto  [x] NMR (32110) x 1      [] Vaso  [x] Manual (60030) x 1      [] Ultrasound  [x] TA x 1       [] Mech Traction (10717)  [] Aquatic Therapy x     [] ES (un) (71408):   [] Home Management Training x  [] ES(attended) (28206)   [] Dry Needling 1-2 muscles (35128):  [] Dry Needling 3+ muscles (552322  [] Group:      [] Other:       GOALS:  Patient stated goal: lessen stiffness in back/ increase strength  []? Progressing: []? Met: []? Not Met: []? Adjusted     Therapist goals for Patient:   Short Term Goals: To be achieved in: 2 weeks  1. Independent in HEP and progression per patient tolerance, in order to prevent re-injury. []? Progressing: []? Met: []? Not Met: []? Adjusted  2.  Patient will have a decrease in pain to facilitate improvement in movement, function, and ADLs as indicated by Functional Deficits. []? Progressing: []? Met: []? Not Met: []? Adjusted     Long Term Goals: To be achieved in: 12 weeks  1. FOTO score of at least 66 to assist with reaching prior level of function. []? Progressing: []? Met: []? Not Met: []? Adjusted  2. Patient will demonstrate increased AROM to WNL, good LS mobility, good hip ROM to allow for proper joint functioning as indicated by patients Functional Deficits. []? Progressing: []? Met: []? Not Met: []? Adjusted  3. Patient will demonstrate an increase in Strength to good proximal hip and core activation to allow for proper functional mobility as indicated by patients Functional Deficits. []? Progressing: []? Met: []? Not Met: []? Adjusted  4. Patient will return to functional activities including walking without increased symptoms or restriction. []? Progressing: []? Met: []? Not Met: []? Adjusted  5. Patient to be able to perform sit-stand transfer with less stiffness in back(patient specific functional goal)    []? Progressing: []? Met: []? Not Met: []? Adjusted          Overall Progression Towards Functional goals/ Treatment Progress Update:  [] Patient is progressing as expected towards functional goals listed. [] Progression is slowed due to complexities/Impairments listed. [] Progression has been slowed due to co-morbidities.   [x] Plan just implemented, too soon to assess goals progression <30days   [] Goals require adjustment due to lack of progress  [] Patient is not progressing as expected and requires additional follow up with physician  [] Other    Persisting Functional Limitations/Impairments:  []Sitting [x]Standing   [x]Walking [x]Squatting/bending    []Stairs []ADL's    []Transfers []Reaching  []Housework []Job related tasks  []Driving []Sports/Recreation   []Sleeping []Other:    ASSESSMENT:  Patient presents with lumbar stiffness/discomfort, Reduce Lumbar ROM/flexibility, Decreased LE strength/balance and functional mobility skills. Reports less stiffness noted at end of treatment in back and hip. No issues with new exercises. Requires HHA with advanced balance activities today with increased difficulty noted with front cross-overs. Pt requires skilled intervention to restore ROM, strength, functional endurance and balance in order to perform ADLs without significant symptoms or limitations. Treatment/Activity Tolerance:  [x] Patient able to complete tx  [] Patient limited by fatique  [] Patient limited by pain  [] Patient limited by other medical complications  [] Other:     Prognosis: [x] Good [] Fair  [] Poor    Patient Requires Follow-up: [x] Yes  [] No    PLAN: See eval. PT 1-2x / week for 12 weeks. Progress as tolerated. [x] Continue per plan of care [] Alter current plan (see comments)  [] Plan of care initiated [] Hold pending MD visit [] Discharge    Electronically signed by: Dana Cartagena, [id number] OMT-C      Note: If patient does not return for scheduled/ recommended follow up visits, this note will serve as a discharge from care along with most recent update on progress.

## 2022-06-08 ENCOUNTER — HOSPITAL ENCOUNTER (OUTPATIENT)
Dept: PHYSICAL THERAPY | Age: 87
Setting detail: THERAPIES SERIES
Discharge: HOME | End: 2022-06-08
Payer: MEDICARE

## 2022-06-08 PROCEDURE — 97140 MANUAL THERAPY 1/> REGIONS: CPT | Performed by: PHYSICAL THERAPIST

## 2022-06-08 PROCEDURE — 97112 NEUROMUSCULAR REEDUCATION: CPT | Performed by: PHYSICAL THERAPIST

## 2022-06-08 PROCEDURE — 97110 THERAPEUTIC EXERCISES: CPT | Performed by: PHYSICAL THERAPIST

## 2022-06-08 NOTE — FLOWSHEET NOTE
[facility], [facility]  Phone: [phone]   Fax: [phone]    Physical Therapy Treatment Note/ Progress Report:     Date:  2022    Patient Name:  Isha Sue    :  1933  MRN: [id number]  Restrictions/Precautions:    Medical/Treatment Diagnosis Information:  · Diagnosis: M54.50, G89.29 (ICD-10-CM) - Chronic midline low back pain without sciatica  · Treatment Diagnosis: M54.50, G89.29 (ICD-10-CM) - Chronic midline low back pain without sciatica  Insurance/Certification information:  PT Insurance Information: [place]  Physician Information:    Marilee Elena MD  Plan of care signed (Y/N): []  Yes [x]  No    Date of Patient follow up with Physician: 22     Progress Report: []  Yes  [x]  No     Date Range for reporting period:  Beginnin22  Ending:     Progress report due (10 Rx/or 30 days whichever is less): visit #10 or 3/38/08 (date)     Recertification due (POC duration/ or 90 days whichever is less): visit # or 8/10/22 (date)     Visit # Insurance Allowable Auth required? Date Range   5 BMN/Medicare []  Yes  [x]  No        Units approved Units used Date Range          Latex Allergy:  [x]NO      []YES  Preferred Language for Healthcare:   [x]English       []other:    Functional Scale:           Date assessed:  FOTO physical FS primary measure score = 57; risk adjusted = 55  22    Pain level:  0/10     SUBJECTIVE:  Reports stiffness in general appears to be slightly less in general. Reports increased walking activities noted today due to running errands. More tired and fatigue noted in general today. OBJECTIVE: See eval .  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  Depression      Treatment based classification:    [x] mobilization/manipulation   [x] stabilization   [] extension based   [x] flexion based   [] lateral shift   [] traction   [] unspecified Components:   [x] thoracolumbar   [] pelvic   [] SIJ   [] sacral   [x] hip         Comparable sign: Stiffness with sit-stand transfer    Exercises/Interventions:     Therapeutic Exercise (39693) Resistance / level Sets / Seconds Reps Notes / Cues   Piriformis stretches  H30 5 HEP   LTR  H10 10 HEP   Hip flex off EOB with strap Reviewed today Supine 90/90 HS stretches with strap  HEP   IB GS stretches     TrA with marches  H10 [id number] HEP   Bridges with hip add   H10 2x11 reps HEP   SL clams OTB H5 2x10 reps    Standing hip ext/abd /marches/mini squats   1 HEP   Standing HR/TR  H5 2x10 reps    Therapeutic Activities (58472)              Sit-stand from chair  H5 With cane 1x6 reps IND this visit Less reps due to fatigue today Pulse ox 99% this visit. Gait training with cane  5'     Side stepping and forward step-overs with HHA from PT   Step over hurdles        Eye-Fi balance #11 Random control training  Neuromuscular Re-ed (98497)       Patient education 10'                                  Manual Intervention (92528)       Prone PA 10'      GISTM/STM       Lumbar Manip       SI Manip       Hip belt mobs       Hip LA distraction/lumbar distraction 5'             Prone quad stretches  H20 3 reps             Modalities: deferred    Pt. Education:  -pt educated on diagnosis, prognosis and expectations for rehab  -all pt questions were answered    Home Exercise Prorgam:  HEP instruction: Written HEP instructions provided and reviewed. Access Code: [email or web address]  URL: [email or web address]  Date: 05/18/2022  Prepared by: Clark Mock     Exercises  Supine Piriformis Stretch with Foot on Ground - 2 x daily - 7 x weekly - 1 sets - 5 reps - 30 hold  Supine Hamstring Stretch - 2 x daily - 7 x weekly - 1 sets - 5 reps - 30 hold  Supine Quadriceps Stretch with Strap on Table - 2 x daily - 7 x weekly - 1 sets - 5 reps - 30 hold  Supine Lower Trunk Rotation - 2 x daily - 7 x weekly - 1-2 patient for proper core and proximal hip recruitment and positioning with ambulation re-education     Home Exercise Program:    [x] (89068) Reviewed/Progressed HEP activities related to strengthening, flexibility, endurance, ROM of core, proximal hip and LE for functional self-care, mobility, lifting and ambulation   [] (16226) Reviewed/Progressed HEP activities related to improving balance, coordination, kinesthetic sense, posture, motor skill, proprioception of core, proximal hip and LE for self care, mobility, lifting, and ambulation      Manual Treatments:  PROM / STM / Oscillations-Mobs:  G-I, II, III, IV (PA's, Inf., Post.)  [x] (28667) Provided manual therapy to mobilize proximal hip and LS spine soft tissue/joints for the purpose of modulating pain, promoting relaxation,  increasing ROM, reducing/eliminating soft tissue swelling/inflammation/restriction, improving soft tissue extensibility and allowing for proper ROM for normal function with self care, mobility, lifting and ambulation. Charges:  Timed Code Treatment Minutes: 39'   Total Treatment Minutes: 39'       [] EVAL - LOW (616 1011)   [] EVAL - MOD (82109)  [] EVAL - HIGH (40413)  [] RE-EVAL (19759)  [x] PC(55422) x1    [] Ionto  [x] NMR (98481) x 1      [] Vaso  [x] Manual (82640) x 1      [] Ultrasound  [] TA x        [] Mech Traction (78002)  [] Aquatic Therapy x     [] ES (un) (90548):   [] Home Management Training x  [] ES(attended) (20788)   [] Dry Needling 1-2 muscles (28231):  [] Dry Needling 3+ muscles (648036  [] Group:      [] Other:       GOALS:  Patient stated goal: lessen stiffness in back/ increase strength  []? Progressing: []? Met: []? Not Met: []? Adjusted     Therapist goals for Patient:   Short Term Goals: To be achieved in: 2 weeks  1. Independent in HEP and progression per patient tolerance, in order to prevent re-injury. []? Progressing: []? Met: []? Not Met: []? Adjusted  2.  Patient will have a decrease in pain to facilitate improvement in movement, function, and ADLs as indicated by Functional Deficits. []? Progressing: []? Met: []? Not Met: []? Adjusted     Long Term Goals: To be achieved in: 12 weeks  1. FOTO score of at least 66 to assist with reaching prior level of function. []? Progressing: []? Met: []? Not Met: []? Adjusted  2. Patient will demonstrate increased AROM to WNL, good LS mobility, good hip ROM to allow for proper joint functioning as indicated by patients Functional Deficits. []? Progressing: []? Met: []? Not Met: []? Adjusted  3. Patient will demonstrate an increase in Strength to good proximal hip and core activation to allow for proper functional mobility as indicated by patients Functional Deficits. []? Progressing: []? Met: []? Not Met: []? Adjusted  4. Patient will return to functional activities including walking without increased symptoms or restriction. []? Progressing: []? Met: []? Not Met: []? Adjusted  5. Patient to be able to perform sit-stand transfer with less stiffness in back(patient specific functional goal)    []? Progressing: []? Met: []? Not Met: []? Adjusted          Overall Progression Towards Functional goals/ Treatment Progress Update:  [] Patient is progressing as expected towards functional goals listed. [] Progression is slowed due to complexities/Impairments listed. [] Progression has been slowed due to co-morbidities.   [x] Plan just implemented, too soon to assess goals progression <30days   [] Goals require adjustment due to lack of progress  [] Patient is not progressing as expected and requires additional follow up with physician  [] Other    Persisting Functional Limitations/Impairments:  []Sitting [x]Standing   [x]Walking [x]Squatting/bending    []Stairs []ADL's    []Transfers []Reaching  []Housework []Job related tasks  []Driving []Sports/Recreation   []Sleeping []Other:    ASSESSMENT:  Patient presents with lumbar stiffness/discomfort, Reduce Lumbar ROM/flexibility, Decreased LE strength/balance and functional mobility skills. Reports less stiffness noted at end of treatment in back and hip. No issues with exercises this visit. Pt requires skilled intervention to restore ROM, strength, functional endurance and balance in order to perform ADLs without significant symptoms or limitations. Treatment/Activity Tolerance:  [x] Patient able to complete tx  [] Patient limited by fatique  [] Patient limited by pain  [] Patient limited by other medical complications  [] Other:     Prognosis: [x] Good [] Fair  [] Poor    Patient Requires Follow-up: [x] Yes  [] No    PLAN: See eval. PT 1-2x / week for 12 weeks. Progress as tolerated. [x] Continue per plan of care [] Alter current plan (see comments)  [] Plan of care initiated [] Hold pending MD visit [] Discharge    Electronically signed by: Dana Esposito, [id number] OMT-C      Note: If patient does not return for scheduled/ recommended follow up visits, this note will serve as a discharge from care along with most recent update on progress.

## 2022-06-13 ENCOUNTER — HOSPITAL ENCOUNTER (OUTPATIENT)
Dept: PHYSICAL THERAPY | Age: 87
Setting detail: THERAPIES SERIES
Discharge: HOME | End: 2022-06-13
Payer: MEDICARE

## 2022-06-13 PROCEDURE — 97140 MANUAL THERAPY 1/> REGIONS: CPT | Performed by: PHYSICAL THERAPIST

## 2022-06-13 PROCEDURE — 97112 NEUROMUSCULAR REEDUCATION: CPT | Performed by: PHYSICAL THERAPIST

## 2022-06-13 PROCEDURE — 97110 THERAPEUTIC EXERCISES: CPT | Performed by: PHYSICAL THERAPIST

## 2022-06-13 NOTE — FLOWSHEET NOTE
[facility]  Phone: [phone]   Fax: [phone]    Physical Therapy Treatment Note/ Progress Report:     Date:  2022    Patient Name:  Vishnu Reyes    :  1933  MRN: [id number]  Restrictions/Precautions:    Medical/Treatment Diagnosis Information:  · Diagnosis: M54.50, G89.29 (ICD-10-CM) - Chronic midline low back pain without sciatica  · Treatment Diagnosis: M54.50, G89.29 (ICD-10-CM) - Chronic midline low back pain without sciatica  Insurance/Certification information:  PT Insurance Information: [place]  Physician Information:    Randalyn Gilford MD  Plan of care signed (Y/N): []  Yes [x]  No    Date of Patient follow up with Physician: 22     Progress Report:N.V []  Yes  [x]  No     Date Range for reporting period:  Beginnin22  Ending:     Progress report due (10 Rx/or 30 days whichever is less): visit #10 or 3/62/21 (date)     Recertification due (POC duration/ or 90 days whichever is less): visit # or 8/10/22 (date)     Visit # Insurance Allowable Auth required? Date Range   6 BMN/Medicare []  Yes  [x]  No        Units approved Units used Date Range          Latex Allergy:  [x]NO      []YES  Preferred Language for Healthcare:   [x]English       []other:    Functional Scale:           Date assessed:  FOTO physical FS primary measure score = 57; risk adjusted = 55  22    Pain level:  0/10     SUBJECTIVE:  Reports noticing LE and ankles are getting stronger and numbness/tingling in feet have been abolished. Main issue is stiffness and tightness with moving and general breathing issues. OBJECTIVE: See eval .Emphasis on mobs and stretches this visit to help with stiffness.    Observation:    Test measurements:    OBJECTIVE  Test used 22 Current Score   Pain Summary  10    Functional questionnaire FOTO 57    Functional Testing       HS flexibility ~50 SLR    ROM Lumbar Flex Hands to knees     Ext ~20    Strength Hip flex L/R 4-     Hip abd L/R 3+       RESTRICTIONS/PRECAUTIONS: PACEMAKER,CABGx3 1995 due to MI,, R 5th toe amputated due to gout. Depression      Treatment based classification:    [x] mobilization/manipulation   [x] stabilization   [] extension based   [x] flexion based   [] lateral shift   [] traction   [] unspecified Components:   [x] thoracolumbar   [] pelvic   [] SIJ   [] sacral   [x] hip         Comparable sign: Stiffness with sit-stand transfer    Exercises/Interventions:     Therapeutic Exercise (00025) Resistance / level Sets / Seconds Reps Notes / Cues   Piriformis stretches  H30 5 HEP   LTR  H10 10 HEP   Hip flex off EOB with strap Reviewed today Supine 90/90 HS stretches with strap  HEP   IB GS stretches     TrA with marches  H10 [id number] HEP   Bridges with hip add   H10 2x11 reps HEP   SL clams OTB H5 2x10 reps    Standing hip ext/abd /marches/mini squats   1 HEP   Side stepping YTB  4 laps    Standing HR/TR  H5 2x10 reps    Therapeutic Activities (25234)       Airex EO/EC NBOS  H10 5 reps    SLS EO on floor  H10 3 reps    Sit-stand from chair 2# DB  H5 1x10 reps, 1x3 reps    Gait training with cane  5'     Side stepping and forward step-overs with HHA from PT   Step over hurdles        Biodex balance #11 Random control training  Neuromuscular Re-ed (57672)       Patient education 10'                                  Manual Intervention (17302)       Prone PA 10'      GISTM/STM       Lumbar Manip       SI Manip       Hip belt mobs       Hip LA distraction/lumbar distraction 5'             Prone quad stretches  H20 5 reps R side tighter than L            Modalities: deferred    Pt. Education:  -pt educated on diagnosis, prognosis and expectations for rehab  -all pt questions were answered    Home Exercise Prorgam:  HEP instruction: Written HEP instructions provided and reviewed. Access Code: [email or web address]  URL: [email or web address]  Date: 05/18/2022  Prepared by: Fany Dowd Darryl     Exercises  Supine Piriformis Stretch with Foot on Ground - 2 x daily - 7 x weekly - 1 sets - 5 reps - 30 hold  Supine Hamstring Stretch - 2 x daily - 7 x weekly - 1 sets - 5 reps - 30 hold  Supine Quadriceps Stretch with Strap on Table - 2 x daily - 7 x weekly - 1 sets - 5 reps - 30 hold  Supine Lower Trunk Rotation - 2 x daily - 7 x weekly - 1-2 sets - 10 reps - 10 hold  Supine Transversus Abdominis Bracing - Hands on Stomach - 3 x daily - 7 x weekly - 10 reps - 10 hold  Bridge with Hip Abduction and Resistance - Ground Touches - 3 x daily - 7 x weekly - 10 reps - 5 hold     Access Code: [email or web address]  URL: [email or web address]  Date: 06/03/2022  Prepared by: Ani Awad    Exercises  Standing March with Counter Support - 1 x daily - 7 x weekly - 2-3 sets - 10 reps - 2 hold  Standing Hip Abduction with Counter Support - 1 x daily - 7 x weekly - 2-3 sets - 10 reps - 2 hold  Standing Hip Extension with Counter Support - 1 x daily - 7 x weekly - 2-3 sets - 10 reps - 2 hold  Heel Toe Raises with Counter Support - 1 x daily - 7 x weekly - 2-3 sets - 10 reps  Mini Squat with Counter Support - 1 x daily - 7 x weekly - 3 sets - 10 reps    Therapeutic Exercise and NMR EXR  [x] (26853) Provided verbal/tactile cueing for activities related to strengthening, flexibility, endurance, ROM  for improvements in proximal hip and core control with self care, mobility, lifting and ambulation.   [x] (78515) Provided verbal/tactile cueing for activities related to improving balance, coordination, kinesthetic sense, posture, motor skill, proprioception  to assist with core control in self care, mobility, lifting, and ambulation.   [] (06441) Therapist is in constant attendance of 2 or more patients providing skilled therapy interventions, but not providing any significant amount of measurable one-on-one time to either patient, for improvements in LE, proximal hip, and core control in self care, mobility, lifting, ambulation and eccentric single leg control. Therapeutic Activities:    [x] (92093 or 20736) Provided verbal/tactile cueing for activities related to improving balance, coordination, kinesthetic sense, posture, motor skill, proprioception and motor activation to allow for proper function  with self care and ADLs  [] (16240) Provided training and instruction to the patient for proper core and proximal hip recruitment and positioning with ambulation re-education     Home Exercise Program:    [x] (24491) Reviewed/Progressed HEP activities related to strengthening, flexibility, endurance, ROM of core, proximal hip and LE for functional self-care, mobility, lifting and ambulation   [] (05414) Reviewed/Progressed HEP activities related to improving balance, coordination, kinesthetic sense, posture, motor skill, proprioception of core, proximal hip and LE for self care, mobility, lifting, and ambulation      Manual Treatments:  PROM / STM / Oscillations-Mobs:  G-I, II, III, IV (PA's, Inf., Post.)  [x] (14813) Provided manual therapy to mobilize proximal hip and LS spine soft tissue/joints for the purpose of modulating pain, promoting relaxation,  increasing ROM, reducing/eliminating soft tissue swelling/inflammation/restriction, improving soft tissue extensibility and allowing for proper ROM for normal function with self care, mobility, lifting and ambulation.        Charges:  Timed Code Treatment Minutes: 48'   Total Treatment Minutes: 48'       [] EVAL - LOW (046 1011)   [] EVAL - MOD (24611)  [] EVAL - HIGH (74965)  [] RE-EVAL (73659)  [x] VE(62653) x2    [] Ionto  [x] NMR (75432) x 1      [] Vaso  [x] Manual (39263) x 1      [] Ultrasound  [] TA x        [] Mech Traction (74038)  [] Aquatic Therapy x     [] ES (un) (75546):   [] Home Management Training x  [] ES(attended) (43794)   [] Dry Needling 1-2 muscles (23809):  [] Dry Needling 3+ muscles (524209  [] Group:      [] Other:       GOALS:  Patient stated goal: lessen stiffness in back/ increase strength  []? Progressing: []? Met: []? Not Met: []? Adjusted     Therapist goals for Patient:   Short Term Goals: To be achieved in: 2 weeks  1. Independent in HEP and progression per patient tolerance, in order to prevent re-injury. []? Progressing: []? Met: []? Not Met: []? Adjusted  2. Patient will have a decrease in pain to facilitate improvement in movement, function, and ADLs as indicated by Functional Deficits. []? Progressing: []? Met: []? Not Met: []? Adjusted     Long Term Goals: To be achieved in: 12 weeks  1. FOTO score of at least 66 to assist with reaching prior level of function. []? Progressing: []? Met: []? Not Met: []? Adjusted  2. Patient will demonstrate increased AROM to WNL, good LS mobility, good hip ROM to allow for proper joint functioning as indicated by patients Functional Deficits. []? Progressing: []? Met: []? Not Met: []? Adjusted  3. Patient will demonstrate an increase in Strength to good proximal hip and core activation to allow for proper functional mobility as indicated by patients Functional Deficits. []? Progressing: []? Met: []? Not Met: []? Adjusted  4. Patient will return to functional activities including walking without increased symptoms or restriction. []? Progressing: []? Met: []? Not Met: []? Adjusted  5. Patient to be able to perform sit-stand transfer with less stiffness in back(patient specific functional goal)    []? Progressing: []? Met: []? Not Met: []? Adjusted          Overall Progression Towards Functional goals/ Treatment Progress Update:  [] Patient is progressing as expected towards functional goals listed. [] Progression is slowed due to complexities/Impairments listed. [] Progression has been slowed due to co-morbidities.   [x] Plan just implemented, too soon to assess goals progression <30days   [] Goals require adjustment due to lack of progress  [] Patient is not progressing as expected and requires additional follow up with physician  [] Other    Persisting Functional Limitations/Impairments:  []Sitting [x]Standing   [x]Walking [x]Squatting/bending    []Stairs []ADL's    []Transfers []Reaching  []Housework []Job related tasks  []Driving []Sports/Recreation   []Sleeping []Other:    ASSESSMENT:  Patient presents with lumbar stiffness/discomfort, Reduce Lumbar ROM/flexibility, Decreased LE strength/balance and functional mobility skills. Reports less stiffness noted at end of treatment in back and hip. No issues with exercises this visit. Pt requires skilled intervention to restore ROM, strength, functional endurance and balance in order to perform ADLs without significant symptoms or limitations. Treatment/Activity Tolerance:  [x] Patient able to complete tx  [] Patient limited by fatique  [] Patient limited by pain  [] Patient limited by other medical complications  [] Other:     Prognosis: [x] Good [] Fair  [] Poor    Patient Requires Follow-up: [x] Yes  [] No    PLAN: See eval. PT 1-2x / week for 12 weeks. Progress as tolerated. Re-assess N.V/  [x] Continue per plan of care [] Alter current plan (see comments)  [] Plan of care initiated [] Hold pending MD visit [] Discharge    Electronically signed by: Dana Hathaway, [id number] OMT-C      Note: If patient does not return for scheduled/ recommended follow up visits, this note will serve as a discharge from care along with most recent update on progress.

## 2022-06-15 ENCOUNTER — ANTI-COAG VISIT (OUTPATIENT)
Dept: PHARMACY | Age: 87
End: 2022-06-15
Payer: MEDICARE

## 2022-06-15 DIAGNOSIS — I48.0 PAF (PAROXYSMAL ATRIAL FIBRILLATION) (HCC): Primary | ICD-10-CM

## 2022-06-15 LAB — INTERNATIONAL NORMALIZATION RATIO, POC: 1.8

## 2022-06-15 PROCEDURE — 99211 OFF/OP EST MAY X REQ PHY/QHP: CPT

## 2022-06-15 PROCEDURE — 85610 PROTHROMBIN TIME: CPT

## 2022-06-15 NOTE — PROGRESS NOTES
Mr. Maxwell Castelan is a 80 y.o. y/o male with history of Afib   He presents today for anticoagulation monitoring and adjustment. Pertinent PMH: ICD-pacemaker. 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    Patient Reported Findings:  Yes     No  [x]   []       Patient verifies current dosing regimen as listed  confirmed dose  []   [x]       S/S bleeding/bruising/swelling/SOB- denies  []   [x]       Blood in urine or stool denies  []   [x]       Procedures scheduled in the future at this time -  no changes   []   [x]       Missed Dose- denies   []   [x]       Extra Dose- denies  []   [x]       Change in medications- receiving Procrit 40,000 unit injection once every two weeks until red blood cell count is consistently under control again---> started amiodarone 200mg qd on 8/20 --->2 tylenol in the mornings---> resume lisinopril and lasix--> d/c plavix, started asa --> no changes    []   [x]       Change in health/diet/appetite consistent greens -> has been eating less, appetite has been diminished --> no greens, no NVD---> living alone; erratic diet--> less salads and green recently, daughter and neighbor bring meals --> still eating vit k but unable to state how much --> no changes   []   [x]       Change in alcohol use- doesn't drink   []   [x]       Change in activity  []   [x]       Hospital admission -  []   [x]       Emergency department visit  [x]   []       Other complaints--> Patient has been very tired and not able to function well, getting epoetin alpha injection at hemotologist office --> having trouble with low RBC      Clinical Outcomes:  Yes     No  []   [x]       Major bleeding event  []   [x]       Thromboembolic event  Duration of warfarin Therapy: indefinite  INR Range:  2.0-3.0     Has been stressed with passing of wife. Trish Contreras (daughter) is primary care now- [phone].      INR is 1.8 today   Take 5 mg tonight then continue weekly dose of 5 mg on Sun and Thurs and 2.5 mg all other days of the week. Encouraged to maintain a consistency of vegetables/salads.   Recheck INR in 3 weeks, 7/6    **consent form signed 11/1/2021    Referring cardiologist will be Dr. Carmencita Hairston  INR (no units)   Date Value   10/26/2021 1.20 (H)   10/14/2021 1.40 (H)   07/08/2021 1.40 (H)   12/08/2020 2.30 (H)     INR,(POC) (no units)   Date Value   05/18/2022 2.1   04/20/2022 2.6   03/30/2022 2.0   03/16/2022 1.8       For Pharmacy Admin Tracking Only     Intervention Detail: Dose Adjustment: 1, reason: Therapy Optimization   Total # of Interventions Recommended: 1   Total # of Interventions Accepted: 1   Time Spent (min): 15

## 2022-06-17 ENCOUNTER — HOSPITAL ENCOUNTER (OUTPATIENT)
Dept: PHYSICAL THERAPY | Age: 87
Setting detail: THERAPIES SERIES
Discharge: HOME | End: 2022-06-17
Payer: MEDICARE

## 2022-06-17 PROCEDURE — 97140 MANUAL THERAPY 1/> REGIONS: CPT | Performed by: PHYSICAL THERAPIST

## 2022-06-17 PROCEDURE — 97110 THERAPEUTIC EXERCISES: CPT | Performed by: PHYSICAL THERAPIST

## 2022-06-17 PROCEDURE — 97112 NEUROMUSCULAR REEDUCATION: CPT | Performed by: PHYSICAL THERAPIST

## 2022-06-17 NOTE — FLOWSHEET NOTE
[address] [place] [address]  Phone: [phone]   Fax: [phone]    Physical Therapy Treatment Note/ Progress Report:     Date:  2022    Patient Name:  Paulette Sutherland    :  1933  MRN: [id number]  Restrictions/Precautions:    Medical/Treatment Diagnosis Information:  · Diagnosis: M54.50, G89.29 (ICD-10-CM) - Chronic midline low back pain without sciatica  · Treatment Diagnosis: M54.50, G89.29 (ICD-10-CM) - Chronic midline low back pain without sciatica  Insurance/Certification information:  PT Insurance Information[place]  Physician Information:    Thais Galvin MD  Plan of care signed (Y/N): []  Yes [x]  No    Date of Patient follow up with Physician: 22     Progress Report: [x]  Yes  []  No     Date Range for reporting period:  Beginnin22  Endin22    Progress report due (10 Rx/or 30 days whichever is less): visit #10 or 44 (date)     Recertification due (POC duration/ or 90 days whichever is less): visit # or 8/10/22 (date)     Visit # Insurance Allowable Auth required? Date Range   7 BMN/Medicare []  Yes  [x]  No        Units approved Units used Date Range          Latex Allergy:  [x]NO      []YES  Preferred Language for Healthcare:   [x]English       []other:    Functional Scale:           Date assessed:  FOTO physical FS primary measure score = 57; risk adjusted = 55  22  FOTO= 44 22  Pain level:  0-3/10     SUBJECTIVE:  Reports noticing LE and ankles are getting stronger and numbness/tingling in feet have been abolished. Main issue is stiffness and tightness with moving and general breathing issues. Overall reports 50% improvement and has been more active in general since starting therapy.     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 Depression      Treatment based classification:    [x] mobilization/manipulation   [x] stabilization   [] extension based   [x] flexion based   [] lateral shift   [] traction   [] unspecified Components:   [x] thoracolumbar   [] pelvic   [] SIJ   [] sacral   [x] hip         Comparable sign: Stiffness with sit-stand transfer    Exercises/Interventions:     Therapeutic Exercise (69933) Resistance / level Sets / Seconds Reps Notes / Cues   Piriformis stretches  H30 5 HEP   LTR  H10 10 HEP   Hip flex off EOB with strap Reviewed today H30 3 Supine 90/90 HS stretches with strap  HEP   IB GS stretches     TrA with marches  HEP   TrA marches in Table top  H5 2x10 reps Max Verbal cues required   Dulce with hip add   HEP   Bridges with pilates ring  H5 2x10 reps    SL hip abd  H2 1x9, 1x7 reps Easily fatigued   SL clams    Standing hip ext/abd /marches/mini squats   1 HEP   Side stepping    Standing HR/TR  H5 2x10 reps    Therapeutic Activities (58846)       Airex EO/EC NBOS     SLS EO on floor     Sit-stand from chair 2# DB     Gait training with cane  5'     Side stepping and forward step-overs with HHA from PT   Step over hurdles        Biodex balance #11 Random control training  Neuromuscular Re-ed (03799)       Patient education                                  Manual Intervention (02688)       Prone PA      GISTM/STM       Lumbar Manip       SI Manip       Hip belt mobs       Hip LA distraction/lumbar distraction 10'             Prone quad stretches           Modalities: deferred    Pt.  Education:  -pt educated on diagnosis, prognosis and expectations for rehab  -all pt questions were answered    Home Exercise Prorgam:  HEP instruction: Written HEP instructions provided and reviewed. Access Code: [email or web address]  URL: [email or web address]  Date: 05/18/2022  Prepared by: Chayito FlorezManuela     Exercises  Supine Piriformis Stretch with Foot on Ground - 2 x daily - 7 x weekly - 1 sets - 5 reps - 30 hold  Supine Hamstring Stretch - 2 x daily - 7 x weekly - 1 sets - 5 reps - 30 hold  Supine Quadriceps Stretch with Strap on Table - 2 x daily - 7 x weekly - 1 sets - 5 reps - 30 hold  Supine Lower Trunk Rotation - 2 x daily - 7 x weekly - 1-2 sets - 10 reps - 10 hold  Supine Transversus Abdominis Bracing - Hands on Stomach - 3 x daily - 7 x weekly - 10 reps - 10 hold  Bridge with Hip Abduction and Resistance - Ground Touches - 3 x daily - 7 x weekly - 10 reps - 5 hold     Access Code: [email or web address]  URL: [email or web address]  Date: 06/03/2022  Prepared by: Chayito FlorezManuela    Exercises  Standing March with Counter Support - 1 x daily - 7 x weekly - 2-3 sets - 10 reps - 2 hold  Standing Hip Abduction with Counter Support - 1 x daily - 7 x weekly - 2-3 sets - 10 reps - 2 hold  Standing Hip Extension with Counter Support - 1 x daily - 7 x weekly - 2-3 sets - 10 reps - 2 hold  Heel Toe Raises with Counter Support - 1 x daily - 7 x weekly - 2-3 sets - 10 reps  Mini Squat with Counter Support - 1 x daily - 7 x weekly - 3 sets - 10 reps    Therapeutic Exercise and NMR EXR  [x] (17941) Provided verbal/tactile cueing for activities related to strengthening, flexibility, endurance, ROM  for improvements in proximal hip and core control with self care, mobility, lifting and ambulation.   [x] (86819) Provided verbal/tactile cueing for activities related to improving balance, coordination, kinesthetic sense, posture, motor skill, proprioception  to assist with core control in self care, mobility, lifting, and ambulation.   [] (78339) Therapist is in constant attendance of 2 or more patients providing skilled therapy interventions, but not providing any significant amount of measurable one-on-one time to either patient, for improvements in LE, proximal hip, and core control in self care, mobility, lifting, ambulation and eccentric single leg control. Therapeutic Activities:    [x] (38873 or 72129) Provided verbal/tactile cueing for activities related to improving balance, coordination, kinesthetic sense, posture, motor skill, proprioception and motor activation to allow for proper function  with self care and ADLs  [] (52021) Provided training and instruction to the patient for proper core and proximal hip recruitment and positioning with ambulation re-education     Home Exercise Program:    [x] (32222) Reviewed/Progressed HEP activities related to strengthening, flexibility, endurance, ROM of core, proximal hip and LE for functional self-care, mobility, lifting and ambulation   [] (05431) Reviewed/Progressed HEP activities related to improving balance, coordination, kinesthetic sense, posture, motor skill, proprioception of core, proximal hip and LE for self care, mobility, lifting, and ambulation      Manual Treatments:  PROM / STM / Oscillations-Mobs:  G-I, II, III, IV (PA's, Inf., Post.)  [x] (29196) Provided manual therapy to mobilize proximal hip and LS spine soft tissue/joints for the purpose of modulating pain, promoting relaxation,  increasing ROM, reducing/eliminating soft tissue swelling/inflammation/restriction, improving soft tissue extensibility and allowing for proper ROM for normal function with self care, mobility, lifting and ambulation.        Charges:  Timed Code Treatment Minutes: 48'   Total Treatment Minutes: 48'       [] EVAL - LOW (680 1931)   [] EVAL - MOD (15472)  [] EVAL - HIGH (72948)  [] RE-EVAL (99496)  [x] PG(13920) x2    [] Ionto  [x] NMR (19227) x 1      [] Vaso  [x] Manual (52235) x 1      [] Ultrasound  [] TA x        [] Mech Traction (88650)  [] Aquatic Therapy x     [] ES (un) (41830):   [] Home Management Training x  [] ES(attended) (39009)   [] Dry Needling 1-2 muscles (93466):  [] Dry Needling 3+ muscles (898014  [] Group:      [] Other:       GOALS:  Patient stated goal: lessen stiffness in back/ increase strength  [x]? Progressing: []? Met: []? Not Met: []? Adjusted     Therapist goals for Patient:   Short Term Goals: To be achieved in: 2 weeks  1. Independent in HEP and progression per patient tolerance, in order to prevent re-injury. []? Progressing: [x]? Met: []? Not Met: []? Adjusted  2. Patient will have a decrease in pain to facilitate improvement in movement, function, and ADLs as indicated by Functional Deficits. [x]? Progressing: []? Met: []? Not Met: []? Adjusted     Long Term Goals: To be achieved in: 12 weeks  1. FOTO score of at least 66 to assist with reaching prior level of function. [x]? Progressing: []? Met: []? Not Met: []? Adjusted  2. Patient will demonstrate increased AROM to WNL, good LS mobility, good hip ROM to allow for proper joint functioning as indicated by patients Functional Deficits. []? Progressing: [x]? Met: []? Not Met: []? Adjusted  3. Patient will demonstrate an increase in Strength to good proximal hip and core activation to allow for proper functional mobility as indicated by patients Functional Deficits. [x]? Progressing: []? Met: []? Not Met: []? Adjusted  4. Patient will return to functional activities including walking without increased symptoms or restriction. [x]? Progressing: []? Met: []? Not Met: []? Adjusted  5. Patient to be able to perform sit-stand transfer with less stiffness in back(patient specific functional goal)    [x]? Progressing: []? Met: []? Not Met: []? Adjusted          Overall Progression Towards Functional goals/ Treatment Progress Update:  [x] Patient is progressing as expected towards functional goals listed. [] Progression is slowed due to complexities/Impairments listed. [] Progression has been slowed due to co-morbidities.   [] Plan just implemented, too soon to assess goals progression <30days   [] Goals require adjustment due to lack of progress  [] Patient is not progressing as expected and requires additional follow up with physician  [] Other    Persisting Functional Limitations/Impairments:  []Sitting [x]Standing   [x]Walking [x]Squatting/bending    []Stairs []ADL's    []Transfers []Reaching  []Housework []Job related tasks  []Driving []Sports/Recreation   []Sleeping []Other:    ASSESSMENT:  Patient presents with lumbar stiffness/discomfort, Reduce Lumbar ROM/flexibility, Decreased LE strength/balance and functional mobility skills. Since Initial eval patient reports 50% improvement in general with increased mobility and strength. Increased Lumbar AROM, LE flexibility noted and hip strength even though FOTO scores regressed. Patient improved 2 reps with timed sit-stand test.   Pt requires skilled intervention to restore ROM, strength, functional endurance and balance in order to perform ADLs without significant symptoms or limitations. Treatment/Activity Tolerance:  [x] Patient able to complete tx  [] Patient limited by fatique  [] Patient limited by pain  [] Patient limited by other medical complications  [] Other:     Prognosis: [x] Good [] Fair  [] Poor    Patient Requires Follow-up: [x] Yes  [] No    PLAN: See eval. PT 1-2x / week for 12 weeks. Progress as tolerated. [x] Continue per plan of care [] Alter current plan (see comments)  [] Plan of care initiated [] Hold pending MD visit [] Discharge    Electronically signed by: Dana Saravia, [id number] OMT-C      Note: If patient does not return for scheduled/ recommended follow up visits, this note will serve as a discharge from care along with most recent update on progress.

## 2022-06-20 ENCOUNTER — HOSPITAL ENCOUNTER (OUTPATIENT)
Dept: PHYSICAL THERAPY | Age: 87
Setting detail: THERAPIES SERIES
Discharge: HOME | End: 2022-06-20
Payer: MEDICARE

## 2022-06-20 PROCEDURE — 97110 THERAPEUTIC EXERCISES: CPT | Performed by: PHYSICAL THERAPIST

## 2022-06-20 PROCEDURE — 97140 MANUAL THERAPY 1/> REGIONS: CPT | Performed by: PHYSICAL THERAPIST

## 2022-06-20 PROCEDURE — 97112 NEUROMUSCULAR REEDUCATION: CPT | Performed by: PHYSICAL THERAPIST

## 2022-06-20 NOTE — FLOWSHEET NOTE
[address] [facility]  Phone: [phone]   Fax: [phone]    Physical Therapy Treatment Note/ Progress Report:     Date:  2022    Patient Name:  Deepika Collins    :  1933  MRN: [id number]  Restrictions/Precautions:    Medical/Treatment Diagnosis Information:  · Diagnosis: M54.50, G89.29 (ICD-10-CM) - Chronic midline low back pain without sciatica  · Treatment Diagnosis: M54.50, G89.29 (ICD-10-CM) - Chronic midline low back pain without sciatica  Insurance/Certification information:  PT Insurance Information: [place]  Physician Information:    Sergio Thomas MD  Plan of care signed (Y/N): []  Yes [x]  No    Date of Patient follow up with Physician: 22     Progress Report: []  Yes  [x]  No     Date Range for reporting period:  Beginnin22  Ending:     Progress report due (10 Rx/or 30 days whichever is less): visit #10 or  (date)     Recertification due (POC duration/ or 90 days whichever is less): visit # or 8/10/22 (date)     Visit # Insurance Allowable Auth required? Date Range   8 BMN/Medicare []  Yes  [x]  No        Units approved Units used Date Range          Latex Allergy:  [x]NO      []YES  Preferred Language for Healthcare:   [x]English       []other:    Functional Scale:           Date assessed:  [facility] physical FS primary measure score = 57; risk adjusted = 55  22  FOTO= 44 22  Pain level:  0-2/10     SUBJECTIVE:  Reports noticing LE and ankles are getting stronger and numbness/tingling in feet have been abolished. Reports low back pain has been less stiff last few days. Overall reports 50% improvement and has been more active in general since starting therapy.     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 Depression      Treatment based classification:    [x] mobilization/manipulation   [x] stabilization   [] extension based   [x] flexion based   [] lateral shift   [] traction   [] unspecified Components:   [x] thoracolumbar   [] pelvic   [] SIJ   [] sacral   [x] hip         Comparable sign: Stiffness with sit-stand transfer    Exercises/Interventions:     Therapeutic Exercise (66076) Resistance / level Sets / Seconds Reps Notes / Cues   Piriformis stretches  HEP   LTR  HEP   Hip flex off EOB with strap Reviewed today Supine 90/90 HS stretches with strap  HEP   IB GS stretches  H30 5    TrA with marches  HEP   TrA marches in Table top  H5 2x10 reps Max Verbal cues required   Bridges with hip add   HEP   Bridges with pilates ring     SL hip abd  H2 1x9, 1x7 reps Easily fatigued   SL clams    Prone hip ext over EOB  H5 2x10 reps    Standing hip ext/abd /marches/mini squats   1 HEP   Hip abd basil with ball on wall  H5 10 reps    Side stepping YTB  4 laps    Standing HR/TR  H5 2x10 reps HR eccentric 6/20   Therapeutic Activities (62701)       Airex EO/EC NBOS     SLS EO on floor     Sit-stand from chair 3# DB  H5 1x8  reps, 1x7 reps    Gait training with cane  5'     Side stepping and forward step-overs with HHA from PT   Step over hurdles        Biodex balance #11 Random control training  Neuromuscular Re-ed (43406)       Patient education                                  Manual Intervention ([phone])       Prone PA      GISTM/STM       Lumbar Manip       SI Manip       Hip belt mobs       Hip LA distraction/lumbar distraction             Prone quad stretches           Modalities: deferred    Pt.  Education:  -pt educated on diagnosis, prognosis and expectations for rehab  -all pt questions were answered    Home Exercise Prorgam:  HEP instruction: Written HEP instructions provided and reviewed. Access Code: [email or web address]  URL: [email or web address]  Date: 05/18/2022  Prepared by: Kelly Lares     Exercises  Supine Piriformis Stretch with Foot on Ground - 2 x daily - 7 x weekly - 1 sets - 5 reps - 30 hold  Supine Hamstring Stretch - 2 x daily - 7 x weekly - 1 sets - 5 reps - 30 hold  Supine Quadriceps Stretch with Strap on Table - 2 x daily - 7 x weekly - 1 sets - 5 reps - 30 hold  Supine Lower Trunk Rotation - 2 x daily - 7 x weekly - 1-2 sets - 10 reps - 10 hold  Supine Transversus Abdominis Bracing - Hands on Stomach - 3 x daily - 7 x weekly - 10 reps - 10 hold  Bridge with Hip Abduction and Resistance - Ground Touches - 3 x daily - 7 x weekly - 10 reps - 5 hold     Access Code: [email or web address]  URL: [employer]/  Date: 06/03/2022  Prepared by: Kelly Lares    Exercises  Standing March with Counter Support - 1 x daily - 7 x weekly - 2-3 sets - 10 reps - 2 hold  Standing Hip Abduction with Counter Support - 1 x daily - 7 x weekly - 2-3 sets - 10 reps - 2 hold  Standing Hip Extension with Counter Support - 1 x daily - 7 x weekly - 2-3 sets - 10 reps - 2 hold  Heel Toe Raises with Counter Support - 1 x daily - 7 x weekly - 2-3 sets - 10 reps  Mini Squat with Counter Support - 1 x daily - 7 x weekly - 3 sets - 10 reps    Therapeutic Exercise and NMR EXR  [x] (67284) Provided verbal/tactile cueing for activities related to strengthening, flexibility, endurance, ROM  for improvements in proximal hip and core control with self care, mobility, lifting and ambulation.   [x] (03525) Provided verbal/tactile cueing for activities related to improving balance, coordination, kinesthetic sense, posture, motor skill, proprioception  to assist with core control in self care, mobility, lifting, and ambulation.   [] (54490) Therapist is in constant attendance of 2 or more patients providing skilled therapy interventions, but not providing any significant amount of measurable one-on-one time to either patient, for improvements in LE, proximal hip, and core control in self care, mobility, lifting, ambulation and eccentric single leg control. Therapeutic Activities:    [x] (15588 or 99304) Provided verbal/tactile cueing for activities related to improving balance, coordination, kinesthetic sense, posture, motor skill, proprioception and motor activation to allow for proper function  with self care and ADLs  [] (18110) Provided training and instruction to the patient for proper core and proximal hip recruitment and positioning with ambulation re-education     Home Exercise Program:    [x] (46182) Reviewed/Progressed HEP activities related to strengthening, flexibility, endurance, ROM of core, proximal hip and LE for functional self-care, mobility, lifting and ambulation   [] (30088) Reviewed/Progressed HEP activities related to improving balance, coordination, kinesthetic sense, posture, motor skill, proprioception of core, proximal hip and LE for self care, mobility, lifting, and ambulation      Manual Treatments:  PROM / STM / Oscillations-Mobs:  G-I, II, III, IV (PA's, Inf., Post.)  [x] (55783) Provided manual therapy to mobilize proximal hip and LS spine soft tissue/joints for the purpose of modulating pain, promoting relaxation,  increasing ROM, reducing/eliminating soft tissue swelling/inflammation/restriction, improving soft tissue extensibility and allowing for proper ROM for normal function with self care, mobility, lifting and ambulation.        Charges:  Timed Code Treatment Minutes: 48'   Total Treatment Minutes: 48'       [] EVAL - LOW (772 2251)   [] EVAL - MOD (17409)  [] EVAL - HIGH (78978)  [] RE-EVAL (96106)  [x] KT(00833) x2    [] Ionto  [x] NMR (82062) x 1      [] Vaso  [x] Manual (10015) x 1      [] Ultrasound  [] TA x        [] Mech Traction (19345)  [] Aquatic Therapy x     [] ES (un) (10677):   [] Home Management Training x  [] ES(attended) (06395)   [] Dry Needling 1-2 muscles (41319):  [] Dry Needling 3+ muscles (435510  [] Group:      [] Other:       GOALS:  Patient stated goal: lessen stiffness in back/ increase strength  [x]? Progressing: []? Met: []? Not Met: []? Adjusted     Therapist goals for Patient:   Short Term Goals: To be achieved in: 2 weeks  1. Independent in HEP and progression per patient tolerance, in order to prevent re-injury. []? Progressing: [x]? Met: []? Not Met: []? Adjusted  2. Patient will have a decrease in pain to facilitate improvement in movement, function, and ADLs as indicated by Functional Deficits. [x]? Progressing: []? Met: []? Not Met: []? Adjusted     Long Term Goals: To be achieved in: 12 weeks  1. FOTO score of at least 66 to assist with reaching prior level of function. [x]? Progressing: []? Met: []? Not Met: []? Adjusted  2. Patient will demonstrate increased AROM to WNL, good LS mobility, good hip ROM to allow for proper joint functioning as indicated by patients Functional Deficits. []? Progressing: [x]? Met: []? Not Met: []? Adjusted  3. Patient will demonstrate an increase in Strength to good proximal hip and core activation to allow for proper functional mobility as indicated by patients Functional Deficits. [x]? Progressing: []? Met: []? Not Met: []? Adjusted  4. Patient will return to functional activities including walking without increased symptoms or restriction. [x]? Progressing: []? Met: []? Not Met: []? Adjusted  5. Patient to be able to perform sit-stand transfer with less stiffness in back(patient specific functional goal)    [x]? Progressing: []? Met: []? Not Met: []? Adjusted          Overall Progression Towards Functional goals/ Treatment Progress Update:  [x] Patient is progressing as expected towards functional goals listed. [] Progression is slowed due to complexities/Impairments listed.   [] Progression has been slowed due to co-morbidities. [] Plan just implemented, too soon to assess goals progression <30days   [] Goals require adjustment due to lack of progress  [] Patient is not progressing as expected and requires additional follow up with physician  [] Other    Persisting Functional Limitations/Impairments:  []Sitting [x]Standing   [x]Walking [x]Squatting/bending    []Stairs []ADL's    []Transfers []Reaching  []Housework []Job related tasks  []Driving []Sports/Recreation   []Sleeping []Other:    ASSESSMENT:  Patient presents with lumbar stiffness/discomfort, Reduce Lumbar ROM/flexibility, Decreased LE strength/balance and functional mobility skills. Since Initial eval patient reports 50% improvement in general with increased mobility and strength. Increased Lumbar AROM, LE flexibility noted and hip strength even though FOTO scores regressed. Patient improved 2 reps with timed sit-stand test.   Pt requires skilled intervention to restore ROM, strength, functional endurance and balance in order to perform ADLs without significant symptoms or limitations. Treatment/Activity Tolerance:  [x] Patient able to complete tx  [] Patient limited by fatique  [] Patient limited by pain  [] Patient limited by other medical complications  [] Other:     Prognosis: [x] Good [] Fair  [] Poor    Patient Requires Follow-up: [x] Yes  [] No    PLAN: See eval. PT 1-2x / week for 12 weeks. Progress as tolerated. [x] Continue per plan of care [] Alter current plan (see comments)  [] Plan of care initiated [] Hold pending MD visit [] Discharge    Electronically signed by: Dana Flores, [id number] OMT-C      Note: If patient does not return for scheduled/ recommended follow up visits, this note will serve as a discharge from care along with most recent update on progress.

## 2022-06-22 ENCOUNTER — HOSPITAL ENCOUNTER (OUTPATIENT)
Dept: PHYSICAL THERAPY | Age: 87
Setting detail: THERAPIES SERIES
Discharge: HOME | End: 2022-06-22
Payer: MEDICARE

## 2022-06-22 NOTE — FLOWSHEET NOTE
Physical Therapy  Cancellation/No-show Note  Patient Name:  Lauren Howard  :  1933   Date:  2022  Cancelled visits to date: 1 ( )  No-shows to date: 0    Patient status for today's appointment patient:  [x]  Cancelled  []  Rescheduled appointment  []  No-show     Reason given by patient:  []  Patient ill  [x]  Conflicting appointment  []  No transportation    []  Conflict with work  []  No reason given  []  Other:     Comments:      Phone call information:   []  Phone call made today to patient at _ time at number provided:      []  Patient answered, conversation as follows:    []  Patient did not answer, message left as follows:  []  Phone call not made today  [x]  Phone call not needed - pt contacted us to cancel and provided reason for cancellation. Patient told PT regarding [employer] appointment conflicting with PT appointment this date.     Electronically signed by:  Coral Kuhn PT

## 2022-07-06 ENCOUNTER — TELEPHONE (OUTPATIENT)
Dept: PHARMACY | Age: 87
End: 2022-07-06

## 2022-07-06 ENCOUNTER — ANTI-COAG VISIT (OUTPATIENT)
Dept: PHARMACY | Age: 87
End: 2022-07-06
Payer: MEDICARE

## 2022-07-06 ENCOUNTER — HOSPITAL ENCOUNTER (OUTPATIENT)
Dept: PHYSICAL THERAPY | Age: 87
Setting detail: THERAPIES SERIES
Discharge: HOME | End: 2022-07-06
Payer: MEDICARE

## 2022-07-06 DIAGNOSIS — I48.0 PAF (PAROXYSMAL ATRIAL FIBRILLATION) (HCC): Primary | ICD-10-CM

## 2022-07-06 LAB — INTERNATIONAL NORMALIZATION RATIO, POC: 2.2

## 2022-07-06 PROCEDURE — 99211 OFF/OP EST MAY X REQ PHY/QHP: CPT

## 2022-07-06 PROCEDURE — 85610 PROTHROMBIN TIME: CPT

## 2022-07-06 PROCEDURE — 97112 NEUROMUSCULAR REEDUCATION: CPT | Performed by: PHYSICAL THERAPIST

## 2022-07-06 PROCEDURE — 97110 THERAPEUTIC EXERCISES: CPT | Performed by: PHYSICAL THERAPIST

## 2022-07-06 PROCEDURE — 97530 THERAPEUTIC ACTIVITIES: CPT | Performed by: PHYSICAL THERAPIST

## 2022-07-06 NOTE — PROGRESS NOTES
week.  Encouraged to maintain a consistency of vegetables/salads.   Recheck INR in 3 weeks, 7/26    **consent form signed 11/1/2021    Referring cardiologist will be Dr. Yan Thomas  INR (no units)   Date Value   10/26/2021 1.20 (H)   10/14/2021 1.40 (H)   07/08/2021 1.40 (H)   12/08/2020 2.30 (H)     INR,(POC) (no units)   Date Value   07/06/2022 2.2   06/15/2022 1.8   05/18/2022 2.1   04/20/2022 2.6       For Pharmacy Admin Tracking Only     Total # of Interventions Recommended: 0   Total # of Interventions Accepted: 0   Time Spent (min): 15

## 2022-07-06 NOTE — FLOWSHEET NOTE
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 Depression      Treatment based classification:    [x] mobilization/manipulation   [x] stabilization   [] extension based   [x] flexion based   [] lateral shift   [] traction   [] unspecified Components:   [x] thoracolumbar   [] pelvic   [] SIJ   [] sacral   [x] hip         Comparable sign: Stiffness with sit-stand transfer    Exercises/Interventions:     Therapeutic Exercise (22679) Resistance / level Sets / Seconds Reps Notes / Cues   Piriformis stretches  HEP   LTR  HEP   Hip flex off EOB with strap Reviewed today Supine 90/90 HS stretches with strap  HEP   IB GS stretches  H30 5    TrA with marches  HEP   TrA marches in Table top  Max Verbal cues required   [place] with hip add   HEP   Bridges with pilates ring     SL hip abd  SL clams    Prone hip ext over EOB  H5 2x10 reps    Standing hip ext/abd /marches/mini squats   1 HEP   Hip abd basil with ball on wall  H5 10 reps    Side stepping YTB  4 laps    Standing HR/TR  H5 2x10 reps HR eccentric    Therapeutic Activities (71501)       Airex EO/EC NBOS  H10 5 reps    SLS EO on floor  H10 3 reps    Sit-stand from chair 4# DB  H5 1x7  reps, 1x6 reps    Gait training with cane  5'     Side stepping and forward step-overs with HHA from PT   Step over hurdles        Biodex balance #11 Random control training  Neuromuscular Re-ed (59269)       Patient education             Gliders post/lateral   2x10 reps                   Manual Intervention (03675)       Prone PA      GISTM/STM       Lumbar Manip       SI Manip       Hip belt mobs       Hip LA distraction/lumbar distraction             Prone quad stretches           Modalities: deferred    Pt.  Education:  -pt educated on diagnosis, prognosis and expectations for rehab  -all pt questions were answered    Home Exercise Prorgam:  HEP instruction: Written HEP instructions provided and reviewed. Access Code: [email or web address]  URL: [email or web address]  Date: 05/18/2022  Prepared by: Don George     Exercises  Supine Piriformis Stretch with Foot on Ground - 2 x daily - 7 x weekly - 1 sets - 5 reps - 30 hold  Supine Hamstring Stretch - 2 x daily - 7 x weekly - 1 sets - 5 reps - 30 hold  Supine Quadriceps Stretch with Strap on Table - 2 x daily - 7 x weekly - 1 sets - 5 reps - 30 hold  Supine Lower Trunk Rotation - 2 x daily - 7 x weekly - 1-2 sets - 10 reps - 10 hold  Supine Transversus Abdominis Bracing - Hands on Stomach - 3 x daily - 7 x weekly - 10 reps - 10 hold  Bridge with Hip Abduction and Resistance - Ground Touches - 3 x daily - 7 x weekly - 10 reps - 5 hold     Access Code: [email or web address]  URL: [email or web address]  Date: 06/03/2022  Prepared by: Don George    Exercises  Standing March with Counter Support - 1 x daily - 7 x weekly - 2-3 sets - 10 reps - 2 hold  Standing Hip Abduction with Counter Support - 1 x daily - 7 x weekly - 2-3 sets - 10 reps - 2 hold  Standing Hip Extension with Counter Support - 1 x daily - 7 x weekly - 2-3 sets - 10 reps - 2 hold  Heel Toe Raises with Counter Support - 1 x daily - 7 x weekly - 2-3 sets - 10 reps  Mini Squat with Counter Support - 1 x daily - 7 x weekly - 3 sets - 10 reps    Therapeutic Exercise and NMR EXR  [x] (81274) Provided verbal/tactile cueing for activities related to strengthening, flexibility, endurance, ROM  for improvements in proximal hip and core control with self care, mobility, lifting and ambulation.   [x] (96788) Provided verbal/tactile cueing for activities related to improving balance, coordination, kinesthetic sense, posture, motor skill, proprioception  to assist with core control in self care, mobility, lifting, and ambulation.   [] (24672) Therapist is in constant attendance of 2 or more patients providing skilled therapy interventions, but not providing any significant amount of measurable one-on-one time to either patient, for improvements in LE, proximal hip, and core control in self care, mobility, lifting, ambulation and eccentric single leg control. Therapeutic Activities:    [x] (00440 or 85257) Provided verbal/tactile cueing for activities related to improving balance, coordination, kinesthetic sense, posture, motor skill, proprioception and motor activation to allow for proper function  with self care and ADLs  [] (15967) Provided training and instruction to the patient for proper core and proximal hip recruitment and positioning with ambulation re-education     Home Exercise Program:    [x] (45730) Reviewed/Progressed HEP activities related to strengthening, flexibility, endurance, ROM of core, proximal hip and LE for functional self-care, mobility, lifting and ambulation   [] (51436) Reviewed/Progressed HEP activities related to improving balance, coordination, kinesthetic sense, posture, motor skill, proprioception of core, proximal hip and LE for self care, mobility, lifting, and ambulation      Manual Treatments:  PROM / STM / Oscillations-Mobs:  G-I, II, III, IV (PA's, Inf., Post.)  [x] (94057) Provided manual therapy to mobilize proximal hip and LS spine soft tissue/joints for the purpose of modulating pain, promoting relaxation,  increasing ROM, reducing/eliminating soft tissue swelling/inflammation/restriction, improving soft tissue extensibility and allowing for proper ROM for normal function with self care, mobility, lifting and ambulation.        Charges:  Timed Code Treatment Minutes: 39'   Total Treatment Minutes: 39'       [] EVAL - LOW (96328)   [] EVAL - MOD (72428)  [] EVAL - HIGH (34536)  [] RE-EVAL (25917)  [x] WILTON(08096) x1    [] Ionto  [x] NMR (89093) x 1      [] Vaso  [] Manual (78134) x       [] Ultrasound  [x] TA x 1       [] Mech Traction (54473)  [] Aquatic Therapy x     [] ES (un) (56419):   [] Home Management Training x  [] ES(attended) (79148)   [] Dry Needling 1-2 muscles (65314):  [] Dry Needling 3+ muscles (557756  [] Group:      [] Other:       GOALS:  Patient stated goal: lessen stiffness in back/ increase strength  [x]? Progressing: []? Met: []? Not Met: []? Adjusted     Therapist goals for Patient:   Short Term Goals: To be achieved in: 2 weeks  1. Independent in HEP and progression per patient tolerance, in order to prevent re-injury. []? Progressing: [x]? Met: []? Not Met: []? Adjusted  2. Patient will have a decrease in pain to facilitate improvement in movement, function, and ADLs as indicated by Functional Deficits. [x]? Progressing: []? Met: []? Not Met: []? Adjusted     Long Term Goals: To be achieved in: 12 weeks  1. FOTO score of at least 66 to assist with reaching prior level of function. [x]? Progressing: []? Met: []? Not Met: []? Adjusted  2. Patient will demonstrate increased AROM to WNL, good LS mobility, good hip ROM to allow for proper joint functioning as indicated by patients Functional Deficits. []? Progressing: [x]? Met: []? Not Met: []? Adjusted  3. Patient will demonstrate an increase in Strength to good proximal hip and core activation to allow for proper functional mobility as indicated by patients Functional Deficits. [x]? Progressing: []? Met: []? Not Met: []? Adjusted  4. Patient will return to functional activities including walking without increased symptoms or restriction. [x]? Progressing: []? Met: []? Not Met: []? Adjusted  5. Patient to be able to perform sit-stand transfer with less stiffness in back(patient specific functional goal)    [x]? Progressing: []? Met: []? Not Met: []? Adjusted          Overall Progression Towards Functional goals/ Treatment Progress Update:  [x] Patient is progressing as expected towards functional goals listed. [] Progression is slowed due to complexities/Impairments listed.   [] Progression has been slowed due to co-morbidities. [] Plan just implemented, too soon to assess goals progression <30days   [] Goals require adjustment due to lack of progress  [] Patient is not progressing as expected and requires additional follow up with physician  [] Other    Persisting Functional Limitations/Impairments:  []Sitting [x]Standing   [x]Walking [x]Squatting/bending    []Stairs []ADL's    []Transfers []Reaching  []Housework []Job related tasks  []Driving []Sports/Recreation   []Sleeping []Other:    ASSESSMENT:  Patient presents with lumbar stiffness/discomfort, Reduce Lumbar ROM/flexibility, Decreased LE strength/balance and functional mobility skills. Since Initial eval patient reports 50% improvement in general with increased mobility and strength. Increased Lumbar AROM, LE flexibility noted and hip strength even though FOTO scores regressed. Patient required more rest breaks than usual this visit due to SOB. O2 sat was 100% after sit-stand reps this visit. Pt requires skilled intervention to restore ROM, strength, functional endurance and balance in order to perform ADLs without significant symptoms or limitations. Treatment/Activity Tolerance:  [x] Patient able to complete tx  [] Patient limited by fatique  [] Patient limited by pain  [] Patient limited by other medical complications  [] Other:     Prognosis: [x] Good [] Fair  [] Poor    Patient Requires Follow-up: [x] Yes  [] No    PLAN: See eval. PT 1-2x / week for 12 weeks. Progress as tolerated. [x] Continue per plan of care [] Alter current plan (see comments)  [] Plan of care initiated [] Hold pending MD visit [] Discharge    Electronically signed by: Dana Moran, [id number] OMT-C      Note: If patient does not return for scheduled/ recommended follow up visits, this note will serve as a discharge from care along with most recent update on progress.

## 2022-07-08 ENCOUNTER — HOSPITAL ENCOUNTER (OUTPATIENT)
Dept: PHYSICAL THERAPY | Age: 87
Setting detail: THERAPIES SERIES
Discharge: HOME | End: 2022-07-08
Payer: MEDICARE

## 2022-07-08 PROCEDURE — 97530 THERAPEUTIC ACTIVITIES: CPT | Performed by: PHYSICAL THERAPIST

## 2022-07-08 PROCEDURE — 97112 NEUROMUSCULAR REEDUCATION: CPT | Performed by: PHYSICAL THERAPIST

## 2022-07-08 PROCEDURE — 97110 THERAPEUTIC EXERCISES: CPT | Performed by: PHYSICAL THERAPIST

## 2022-07-08 NOTE — FLOWSHEET NOTE
[address] [place] [address]  Phone: [phone]   Fax: [phone]    Physical Therapy Treatment Note/ Progress Report:     Date:  2022    Patient Name:  Kyaw Bailey    :  1933  MRN: [id number]  Restrictions/Precautions:    Medical/Treatment Diagnosis Information:  · Diagnosis: M54.50, G89.29 (ICD-10-CM) - Chronic midline low back pain without sciatica  · Treatment Diagnosis: M54.50, G89.29 (ICD-10-CM) - Chronic midline low back pain without sciatica  Insurance/Certification information:  PT Insurance Information: [place]  Physician Information:    Quique Campos MD  Plan of care signed (Y/N): []  Yes [x]  No    Date of Patient follow up with Physician: 22     Progress Report: []  Yes  [x]  No     Date Range for reporting period:  Beginnin22  Ending:     Progress report due (10 Rx/or 30 days whichever is less): visit #10 or 22 (date)     Recertification due (POC duration/ or 90 days whichever is less): visit # or 8/10/22 (date)     Visit # Insurance Allowable Auth required? Date Range   10 BMN/Medicare []  Yes  [x]  No        Units approved Units used Date Range          Latex Allergy:  [x]NO      []YES  Preferred Language for Healthcare:   [x]English       []other:    Functional Scale:           Date assessed:  FOTO physical FS primary measure score = 57; risk adjusted = 55  22  FOTO= 44 22  Pain level:  0-2/10     SUBJECTIVE:  Reports noticing LE and ankles are getting stronger and numbness/tingling in feet have been abolished. Reports low back pain has been less stiff last few days. Overall reports 50% improvement and has been more active in general since starting therapy. Reports noticing that he feels more steady with walking from room to room in the house.     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  Depression      Treatment based classification:    [x] mobilization/manipulation   [x] stabilization   [] extension based   [x] flexion based   [] lateral shift   [] traction   [] unspecified Components:   [x] thoracolumbar   [] pelvic   [] SIJ   [] sacral   [x] hip         Comparable sign: Stiffness with sit-stand transfer    Exercises/Interventions:     Therapeutic Exercise (93792) Resistance / level Sets / Seconds Reps Notes / Cues   Piriformis stretches  HEP   LTR  HEP   Hip flex off EOB with strap Reviewed today Supine 90/90 HS stretches with strap  HEP   IB GS stretches  H30 5    TrA with marches  HEP   TrA marches in Table top  Max Verbal cues required   [place] with hip add   HEP   Bridges with pilates ring     SL hip abd  SL clams    Prone hip ext over EOB  H5 2x10 reps    Standing hip ext/abd /marches/mini squats   1 HEP   Hip abd basil with ball on wall     Side stepping YTB  4 laps    Standing HR/TR  H5 1x10 reps Unilateral    Therapeutic Activities (05879)       Airex Tandem stance  H10 2-3reps    SLS EO on floor  H10 3 reps    Sit-stand from chair 4# DB  H5 1x9   reps, 1x6 reps    BoSU toe tapping frw/later   H2 10 reps R/L each    Squatting cone  from 4\" step   x5 reps           Gait training with cane  5'     Side stepping and forward step-overs with HHA from PT   Step over hurdles        Biodex balance #11 Random control training  Neuromuscular Re-ed (28572)       Patient education             Gliders post/lateral   2                   Manual Intervention (33460)       Prone PA      GISTM/STM       Lumbar Manip       SI Manip       Hip belt mobs       Hip LA Treatment Progress Update:  [x] Patient is progressing as expected towards functional goals listed. [] Progression is slowed due to complexities/Impairments listed. [] Progression has been slowed due to co-morbidities. [] Plan just implemented, too soon to assess goals progression <30days   [] Goals require adjustment due to lack of progress  [] Patient is not progressing as expected and requires additional follow up with physician  [] Other    Persisting Functional Limitations/Impairments:  []Sitting [x]Standing   [x]Walking [x]Squatting/bending    []Stairs []ADL's    []Transfers []Reaching  []Housework []Job related tasks  []Driving []Sports/Recreation   []Sleeping []Other:    ASSESSMENT:  Patient presents with lumbar stiffness/discomfort, Reduce Lumbar ROM/flexibility, Decreased LE strength/balance and functional mobility skills. Since Initial eval patient reports 50% improvement in general with increased mobility and strength. Increased Lumbar AROM, LE flexibility noted and hip strength even though FOTO scores regressed. Patient had some difficulty with BOSU toe tapping and requires close monitoring/SBA . O2 sat was 100% during treatment this visit. Pt requires skilled intervention to restore ROM, strength, functional endurance and balance in order to perform ADLs without significant symptoms or limitations. Treatment/Activity Tolerance:  [x] Patient able to complete tx  [] Patient limited by fatique  [] Patient limited by pain  [] Patient limited by other medical complications  [] Other:     Prognosis: [x] Good [] Fair  [] Poor    Patient Requires Follow-up: [x] Yes  [] No    PLAN: See eval. PT 1-2x / week for 12 weeks. Progress as tolerated.    [x] Continue per plan of care [] Alter current plan (see comments)  [] Plan of care initiated [] Hold pending MD visit [] Discharge    Electronically signed by: Dana Collazo, [id number] OMT-C      Note: If patient does not return for scheduled/ recommended follow up visits, this note will serve as a discharge from care along with most recent update on progress.

## 2022-07-11 ENCOUNTER — HOSPITAL ENCOUNTER (OUTPATIENT)
Dept: PHYSICAL THERAPY | Age: 87
Setting detail: THERAPIES SERIES
Discharge: HOME | End: 2022-07-11
Payer: MEDICARE

## 2022-07-11 PROCEDURE — 97530 THERAPEUTIC ACTIVITIES: CPT | Performed by: PHYSICAL THERAPIST

## 2022-07-11 PROCEDURE — 97112 NEUROMUSCULAR REEDUCATION: CPT | Performed by: PHYSICAL THERAPIST

## 2022-07-11 PROCEDURE — 97110 THERAPEUTIC EXERCISES: CPT | Performed by: PHYSICAL THERAPIST

## 2022-07-11 NOTE — FLOWSHEET NOTE
[address] [facility]  Phone: [phone]   Fax: [phone]    Physical Therapy Treatment Note/ Progress Report:     Date:  2022    Patient Name:  Rach Mcdonnell    :  1933  MRN: [id number]  Restrictions/Precautions:    Medical/Treatment Diagnosis Information:  · Diagnosis: M54.50, G89.29 (ICD-10-CM) - Chronic midline low back pain without sciatica  · Treatment Diagnosis: M54.50, G89.29 (ICD-10-CM) - Chronic midline low back pain without sciatica  Insurance/Certification information:  PT Insurance Information: [place]  Physician Information:    Maru Harper MD  Plan of care signed (Y/N): []  Yes [x]  No    Date of Patient follow up with Physician: 22     Progress Report: []  Yes  [x]  No     Date Range for reporting period:  Beginnin22  Ending:     Progress report due (10 Rx/or 30 days whichever is less): visit #10 or  (date)     Recertification due (POC duration/ or 90 days whichever is less): visit # or 8/10/22 (date)     Visit # Insurance Allowable Auth required? Date Range   11 BMN/Medicare []  Yes  [x]  No        Units approved Units used Date Range          Latex Allergy:  [x]NO      []YES  Preferred Language for Healthcare:   [x]English       []other:    Functional Scale:           Date assessed:  FOTO physical FS primary measure score = 57; risk adjusted = 55  22  FOTO= 44 22  Pain level:  0-2/10     SUBJECTIVE:  Reports noticing LE and ankles are getting stronger and numbness/tingling in feet have been abolished. Reports low back pain has been less stiff last few days. Overall reports 60% improvement and has been more active in general since starting therapy. Reports noticing that he feels more steady with walking from room to room in the house. Reports daughter has noticed improvements with status as well.  Reports feeling that he is able to do more physical activity before needing a rest since Random control training  Neuromuscular Re-ed (86794)       Patient education             Gliders post/lateral   2x10 reps                   Manual Intervention ([id number])       Prone PA      GISTM/STM       Lumbar Manip       SI Manip       Hip belt mobs       Hip LA distraction/lumbar distraction             Prone quad stretches           Modalities: deferred    Pt. Education:  -pt educated on diagnosis, prognosis and expectations for rehab  -all pt questions were answered    Home Exercise Prorgam:  HEP instruction: Written HEP instructions provided and reviewed. Access Code: [email or web address]  URL: [email or web address]  Date: 05/18/2022  Prepared by: Christ Cogan     Exercises  Supine Piriformis Stretch with Foot on Ground - 2 x daily - 7 x weekly - 1 sets - 5 reps - 30 hold  Supine Hamstring Stretch - 2 x daily - 7 x weekly - 1 sets - 5 reps - 30 hold  Supine Quadriceps Stretch with Strap on Table - 2 x daily - 7 x weekly - 1 sets - 5 reps - 30 hold  Supine Lower Trunk Rotation - 2 x daily - 7 x weekly - 1-2 sets - 10 reps - 10 hold  Supine Transversus Abdominis Bracing - Hands on Stomach - 3 x daily - 7 x weekly - 10 reps - 10 hold  Bridge with Hip Abduction and Resistance - Ground Touches - 3 x daily - 7 x weekly - 10 reps - 5 hold     Access Code: [email or web address]  URL: [employer]/  Date: 06/03/2022  Prepared by: Yordy Cogan    Exercises  Standing March with Counter Support - 1 x daily - 7 x weekly - 2-3 sets - 10 reps - 2 hold  Standing Hip Abduction with Counter Support - 1 x daily - 7 x weekly - 2-3 sets - 10 reps - 2 hold  Standing Hip Extension with Counter Support - 1 x daily - 7 x weekly - 2-3 sets - 10 reps - 2 hold  Heel Toe Raises with Counter Support - 1 x daily - 7 x weekly - 2-3 sets - 10 reps  Mini Squat with Counter Support - 1 x daily - 7 x weekly - 3 sets - 10 reps    Therapeutic Exercise and NMR EXR  [x] (44980) Provided verbal/tactile cueing for activities related to strengthening, flexibility, endurance, ROM  for improvements in proximal hip and core control with self care, mobility, lifting and ambulation. [x] (65527) Provided verbal/tactile cueing for activities related to improving balance, coordination, kinesthetic sense, posture, motor skill, proprioception  to assist with core control in self care, mobility, lifting, and ambulation.   [] (39389) Therapist is in constant attendance of 2 or more patients providing skilled therapy interventions, but not providing any significant amount of measurable one-on-one time to either patient, for improvements in LE, proximal hip, and core control in self care, mobility, lifting, ambulation and eccentric single leg control.      Therapeutic Activities:    [x] (91501 or 81118) Provided verbal/tactile cueing for activities related to improving balance, coordination, kinesthetic sense, posture, motor skill, proprioception and motor activation to allow for proper function  with self care and ADLs  [] (76020) Provided training and instruction to the patient for proper core and proximal hip recruitment and positioning with ambulation re-education     Home Exercise Program:    [x] (30416) Reviewed/Progressed HEP activities related to strengthening, flexibility, endurance, ROM of core, proximal hip and LE for functional self-care, mobility, lifting and ambulation   [] (23585) Reviewed/Progressed HEP activities related to improving balance, coordination, kinesthetic sense, posture, motor skill, proprioception of core, proximal hip and LE for self care, mobility, lifting, and ambulation      Manual Treatments:  PROM / STM / Oscillations-Mobs:  G-I, II, III, IV (PA's, Inf., Post.)  [x] (77588) Provided manual therapy to mobilize proximal hip and LS spine soft tissue/joints for the purpose of modulating pain, promoting relaxation,  increasing ROM, reducing/eliminating soft tissue swelling/inflammation/restriction, improving soft tissue extensibility and allowing for proper ROM for normal function with self care, mobility, lifting and ambulation. Charges:  Timed Code Treatment Minutes: 39'   Total Treatment Minutes: 39'       [] EVAL - LOW (699 7061)   [] EVAL - MOD (50647)  [] EVAL - HIGH (88068)  [] RE-EVAL (45446)  [x] ET(01818) x1    [] Ionto  [x] NMR (32900) x 1      [] Vaso  [] Manual (75961) x       [] Ultrasound  [x] TA x 1       [] Mech Traction (83096)  [] Aquatic Therapy x     [] ES (un) (81819):   [] Home Management Training x  [] ES(attended) (27603)   [] Dry Needling 1-2 muscles (22066):  [] Dry Needling 3+ muscles (915056  [] Group:      [] Other:       GOALS:  Patient stated goal: lessen stiffness in back/ increase strength  [x]? Progressing: []? Met: []? Not Met: []? Adjusted     Therapist goals for Patient:   Short Term Goals: To be achieved in: 2 weeks  1. Independent in HEP and progression per patient tolerance, in order to prevent re-injury. []? Progressing: [x]? Met: []? Not Met: []? Adjusted  2. Patient will have a decrease in pain to facilitate improvement in movement, function, and ADLs as indicated by Functional Deficits. [x]? Progressing: []? Met: []? Not Met: []? Adjusted     Long Term Goals: To be achieved in: 12 weeks  1. FOTO score of at least 66 to assist with reaching prior level of function. [x]? Progressing: []? Met: []? Not Met: []? Adjusted  2. Patient will demonstrate increased AROM to WNL, good LS mobility, good hip ROM to allow for proper joint functioning as indicated by patients Functional Deficits. []? Progressing: [x]? Met: []? Not Met: []? Adjusted  3. Patient will demonstrate an increase in Strength to good proximal hip and core activation to allow for proper functional mobility as indicated by patients Functional Deficits. [x]? Progressing: []? Met: []? Not Met: []? Adjusted  4. Patient will return to functional activities including walking without increased symptoms or restriction. [x]?  Progressing: []? Met: []? Not Met: []? Adjusted  5. Patient to be able to perform sit-stand transfer with less stiffness in back(patient specific functional goal)    [x]? Progressing: []? Met: []? Not Met: []? Adjusted          Overall Progression Towards Functional goals/ Treatment Progress Update:  [x] Patient is progressing as expected towards functional goals listed. [] Progression is slowed due to complexities/Impairments listed. [] Progression has been slowed due to co-morbidities. [] Plan just implemented, too soon to assess goals progression <30days   [] Goals require adjustment due to lack of progress  [] Patient is not progressing as expected and requires additional follow up with physician  [] Other    Persisting Functional Limitations/Impairments:  []Sitting [x]Standing   [x]Walking [x]Squatting/bending    []Stairs []ADL's    []Transfers []Reaching  []Housework []Job related tasks  []Driving []Sports/Recreation   []Sleeping []Other:    ASSESSMENT:  Patient presents with lumbar stiffness/discomfort, Reduce Lumbar ROM/flexibility, Decreased LE strength/balance and functional mobility skills. Since Initial eval patient reports 60% improvement in general with increased mobility and strength. Increased Lumbar AROM, LE flexibility noted and hip strength even though FOTO scores regressed. Patient tolerated new exercises well with min assist from PT as a precaution. Pt requires skilled intervention to restore ROM, strength, functional endurance and balance in order to perform ADLs without significant symptoms or limitations. Treatment/Activity Tolerance:  [x] Patient able to complete tx  [] Patient limited by fatique  [] Patient limited by pain  [] Patient limited by other medical complications  [] Other:     Prognosis: [x] Good [] Fair  [] Poor    Patient Requires Follow-up: [x] Yes  [] No    PLAN: See eval. PT 1-2x / week for 12 weeks. Progress as tolerated.    [x] Continue per plan of care [] Alter current plan (see comments)  [] Plan of care initiated [] Hold pending MD visit [] Discharge    Electronically signed by: Dana Menendez, [id number] OMT-C      Note: If patient does not return for scheduled/ recommended follow up visits, this note will serve as a discharge from care along with most recent update on progress.

## 2022-07-13 ENCOUNTER — HOSPITAL ENCOUNTER (OUTPATIENT)
Dept: PHYSICAL THERAPY | Age: 87
Setting detail: THERAPIES SERIES
Discharge: HOME | End: 2022-07-13
Payer: MEDICARE

## 2022-07-13 PROCEDURE — 97530 THERAPEUTIC ACTIVITIES: CPT | Performed by: PHYSICAL THERAPIST

## 2022-07-13 PROCEDURE — 97140 MANUAL THERAPY 1/> REGIONS: CPT | Performed by: PHYSICAL THERAPIST

## 2022-07-13 PROCEDURE — 97112 NEUROMUSCULAR REEDUCATION: CPT | Performed by: PHYSICAL THERAPIST

## 2022-07-13 NOTE — FLOWSHEET NOTE
[address] [place] [address]  Phone: [phone]   Fax: [phone]    Physical Therapy Treatment Note/ Progress Report:     Date:  2022    Patient Name:  Skylar Moscoso    :  1933  MRN: [id number]  Restrictions/Precautions:    Medical/Treatment Diagnosis Information:  · Diagnosis: M54.50, G89.29 (ICD-10-CM) - Chronic midline low back pain without sciatica  · Treatment Diagnosis: M54.50, G89.29 (ICD-10-CM) - Chronic midline low back pain without sciatica  Insurance/Certification information:  PT Insurance Information: [place]  Physician Information:    Maryam Lewis MD  Plan of care signed (Y/N): []  Yes [x]  No    Date of Patient follow up with Physician: 22     Progress Report: N.V []  Yes  [x]  No     Date Range for reporting period:  Beginnin22  Ending:     Progress report due (10 Rx/or 30 days whichever is less): visit #10 or 4/34/10 (date)     Recertification due (POC duration/ or 90 days whichever is less): visit # or 8/10/22 (date)     Visit # Insurance Allowable Auth required? Date Range   12 BMN/Medicare []  Yes  [x]  No        Units approved Units used Date Range          Latex Allergy:  [x]NO      []YES  Preferred Language for Healthcare:   [x]English       []other:    Functional Scale:           Date assessed:  FOTO physical FS primary measure score = 57; risk adjusted = 55  22  FOTO= 44 22  Pain level:  0-2/10     SUBJECTIVE:  Reports noticing LE and ankles are getting stronger and numbness/tingling in feet have been abolished. Reports noticing the stiffness is less in general. Has been walking instead the house without using the cane since legs feel stronger. Overall reports 60% improvement and has been more active in general since starting therapy. Reports noticing that he feels more steady with walking from room to room in the house. Reports daughter has noticed improvements with status as well.  Reports with HHA from PT [facility] 4 laps 25' x2 Step over hurdles        Biodex balance #11 Random control training  Neuromuscular Re-ed (35637)       Patient education             Gliders post/lateral   2x10 reps                   Manual Intervention ([id number])       Prone PA      GISTM/STM       Lumbar Manip       SI Manip       Hip belt mobs       Hip LA distraction/lumbar distraction             Prone quad stretches           Modalities: deferred    Pt. Education:  -pt educated on diagnosis, prognosis and expectations for rehab  -all pt questions were answered    Home Exercise Prorgam:  HEP instruction: Written HEP instructions provided and reviewed. Access Code: [email or web address]  URL: [email or web address]  Date: 05/18/2022  Prepared by: Cleotis Verona     Exercises  Supine Piriformis Stretch with Foot on Ground - 2 x daily - 7 x weekly - 1 sets - 5 reps - 30 hold  Supine Hamstring Stretch - 2 x daily - 7 x weekly - 1 sets - 5 reps - 30 hold  Supine Quadriceps Stretch with Strap on Table - 2 x daily - 7 x weekly - 1 sets - 5 reps - 30 hold  Supine Lower Trunk Rotation - 2 x daily - 7 x weekly - 1-2 sets - 10 reps - 10 hold  Supine Transversus Abdominis Bracing - Hands on Stomach - 3 x daily - 7 x weekly - 10 reps - 10 hold  Bridge with Hip Abduction and Resistance - Ground Touches - 3 x daily - 7 x weekly - 10 reps - 5 hold     Access Code: [email or web address]  URL: "[employer]  Date: 06/03/2022  Prepared by: Cleotis Verona    Exercises  Standing March with Counter Support - 1 x daily - 7 x weekly - 2-3 sets - 10 reps - 2 hold  Standing Hip Abduction with Counter Support - 1 x daily - 7 x weekly - 2-3 sets - 10 reps - 2 hold  Standing Hip Extension with Counter Support - 1 x daily - 7 x weekly - 2-3 sets - 10 reps - 2 hold  Heel Toe Raises with Counter Support - 1 x daily - 7 x weekly - 2-3 sets - 10 reps  Mini Squat with Counter Support - 1 x daily - 7 x weekly - 3 sets - 10 reps    Therapeutic Exercise and NMR EXR  [x] (53978) Provided verbal/tactile cueing for activities related to strengthening, flexibility, endurance, ROM  for improvements in proximal hip and core control with self care, mobility, lifting and ambulation. [x] (20248) Provided verbal/tactile cueing for activities related to improving balance, coordination, kinesthetic sense, posture, motor skill, proprioception  to assist with core control in self care, mobility, lifting, and ambulation.   [] (48891) Therapist is in constant attendance of 2 or more patients providing skilled therapy interventions, but not providing any significant amount of measurable one-on-one time to either patient, for improvements in LE, proximal hip, and core control in self care, mobility, lifting, ambulation and eccentric single leg control.      Therapeutic Activities:    [x] (64328 or 54415) Provided verbal/tactile cueing for activities related to improving balance, coordination, kinesthetic sense, posture, motor skill, proprioception and motor activation to allow for proper function  with self care and ADLs  [] (57559) Provided training and instruction to the patient for proper core and proximal hip recruitment and positioning with ambulation re-education     Home Exercise Program:    [x] (75826) Reviewed/Progressed HEP activities related to strengthening, flexibility, endurance, ROM of core, proximal hip and LE for functional self-care, mobility, lifting and ambulation   [] (43452) Reviewed/Progressed HEP activities related to improving balance, coordination, kinesthetic sense, posture, motor skill, proprioception of core, proximal hip and LE for self care, mobility, lifting, and ambulation      Manual Treatments:  PROM / STM / Oscillations-Mobs:  G-I, II, III, IV (PA's, Inf., Post.)  [x] (76828) Provided manual therapy to mobilize proximal hip and LS spine soft tissue/joints for the purpose of modulating pain, promoting relaxation,  increasing ROM, reducing/eliminating soft tissue swelling/inflammation/restriction, improving soft tissue extensibility and allowing for proper ROM for normal function with self care, mobility, lifting and ambulation. Charges:  Timed Code Treatment Minutes: 39'   Total Treatment Minutes: 39'       [] EVAL - LOW (455 2981)   [] EVAL - MOD (87627)  [] EVAL - HIGH (54834)  [] RE-EVAL (30821)  [x] AR(23681) x1    [] Ionto  [x] NMR (96094) x 1      [] Vaso  [] Manual (02150) x       [] Ultrasound  [x] TA x 1       [] Mech Traction (83101)  [] Aquatic Therapy x     [] ES (un) (03234):   [] Home Management Training x  [] ES(attended) (37540)   [] Dry Needling 1-2 muscles (96354):  [] Dry Needling 3+ muscles (719373  [] Group:      [] Other:       GOALS:  Patient stated goal: lessen stiffness in back/ increase strength  [x]? Progressing: []? Met: []? Not Met: []? Adjusted     Therapist goals for Patient:   Short Term Goals: To be achieved in: 2 weeks  1. Independent in HEP and progression per patient tolerance, in order to prevent re-injury. []? Progressing: [x]? Met: []? Not Met: []? Adjusted  2. Patient will have a decrease in pain to facilitate improvement in movement, function, and ADLs as indicated by Functional Deficits. [x]? Progressing: []? Met: []? Not Met: []? Adjusted     Long Term Goals: To be achieved in: 12 weeks  1. FOTO score of at least 66 to assist with reaching prior level of function. [x]? Progressing: []? Met: []? Not Met: []? Adjusted  2. Patient will demonstrate increased AROM to WNL, good LS mobility, good hip ROM to allow for proper joint functioning as indicated by patients Functional Deficits. []? Progressing: [x]? Met: []? Not Met: []? Adjusted  3. Patient will demonstrate an increase in Strength to good proximal hip and core activation to allow for proper functional mobility as indicated by patients Functional Deficits. [x]? Progressing: []? Met: []? Not Met: []? Adjusted  4.  Patient will return to functional activities including walking without increased symptoms or restriction. [x]? Progressing: []? Met: []? Not Met: []? Adjusted  5. Patient to be able to perform sit-stand transfer with less stiffness in back(patient specific functional goal)    [x]? Progressing: []? Met: []? Not Met: []? Adjusted          Overall Progression Towards Functional goals/ Treatment Progress Update:  [x] Patient is progressing as expected towards functional goals listed. [] Progression is slowed due to complexities/Impairments listed. [] Progression has been slowed due to co-morbidities. [] Plan just implemented, too soon to assess goals progression <30days   [] Goals require adjustment due to lack of progress  [] Patient is not progressing as expected and requires additional follow up with physician  [] Other    Persisting Functional Limitations/Impairments:  []Sitting [x]Standing   [x]Walking [x]Squatting/bending    []Stairs []ADL's    []Transfers []Reaching  []Housework []Job related tasks  []Driving []Sports/Recreation   []Sleeping []Other:    ASSESSMENT:  Patient presents with lumbar stiffness/discomfort, Reduce Lumbar ROM/flexibility, Decreased LE strength/balance and functional mobility skills. Since Initial eval patient reports 60% improvement in general with increased mobility and strength. Increased Lumbar AROM, LE flexibility noted and hip strength even though FOTO scores regressed. Patient tolerated new exercises well with min assist from PT as a precaution. Pt requires skilled intervention to restore ROM, strength, functional endurance and balance in order to perform ADLs without significant symptoms or limitations. Treatment/Activity Tolerance:  [x] Patient able to complete tx  [] Patient limited by fatique  [] Patient limited by pain  [] Patient limited by other medical complications  [] Other:     Prognosis: [x] Good [] Fair  [] Poor    Patient Requires Follow-up: [x] Yes  [] No    PLAN: See eval. PT 1-2x / week for 12 weeks.   Progress as tolerated. Progress note N.V.  [x] Continue per plan of care [] Alter current plan (see comments)  [] Plan of care initiated [] Hold pending MD visit [] Discharge    Electronically signed by: Jaida Mosquera, [facility], [id number] OMT-C      Note: If patient does not return for scheduled/ recommended follow up visits, this note will serve as a discharge from care along with most recent update on progress.

## 2022-07-20 ENCOUNTER — HOSPITAL ENCOUNTER (OUTPATIENT)
Dept: PHYSICAL THERAPY | Age: 87
Setting detail: THERAPIES SERIES
Discharge: HOME | End: 2022-07-20
Payer: MEDICARE

## 2022-07-20 ENCOUNTER — HOSPITAL ENCOUNTER (OUTPATIENT)
Age: 87
Discharge: HOME | End: 2022-07-20
Payer: MEDICARE

## 2022-07-20 DIAGNOSIS — I25.5 ISCHEMIC CARDIOMYOPATHY: ICD-10-CM

## 2022-07-20 DIAGNOSIS — I50.22 CHRONIC SYSTOLIC HEART FAILURE (HCC): ICD-10-CM

## 2022-07-20 DIAGNOSIS — E87.5 HYPERKALEMIA: ICD-10-CM

## 2022-07-20 LAB
ANION GAP SERPL CALCULATED.3IONS-SCNC: 12 MMOL/L (ref 3–16)
BUN BLDV-MCNC: 24 MG/DL (ref 7–20)
CALCIUM SERPL-MCNC: 9 MG/DL (ref 8.3–10.6)
CHLORIDE BLD-SCNC: 101 MMOL/L (ref 99–110)
CO2: 26 MMOL/L (ref 21–32)
CREAT SERPL-MCNC: 1.1 MG/DL (ref 0.8–1.3)
GFR AFRICAN AMERICAN: >60
GFR NON-AFRICAN AMERICAN: >60
GLUCOSE BLD-MCNC: 171 MG/DL (ref 70–99)
POTASSIUM SERPL-SCNC: 4.2 MMOL/L (ref 3.5–5.1)
PRO-BNP: 3441 PG/ML (ref 0–449)
SODIUM BLD-SCNC: 139 MMOL/L (ref 136–145)

## 2022-07-20 PROCEDURE — 36415 COLL VENOUS BLD VENIPUNCTURE: CPT

## 2022-07-20 PROCEDURE — 80048 BASIC METABOLIC PNL TOTAL CA: CPT

## 2022-07-20 PROCEDURE — 97112 NEUROMUSCULAR REEDUCATION: CPT | Performed by: PHYSICAL THERAPIST

## 2022-07-20 PROCEDURE — 97530 THERAPEUTIC ACTIVITIES: CPT | Performed by: PHYSICAL THERAPIST

## 2022-07-20 PROCEDURE — 97110 THERAPEUTIC EXERCISES: CPT | Performed by: PHYSICAL THERAPIST

## 2022-07-20 PROCEDURE — 83880 ASSAY OF NATRIURETIC PEPTIDE: CPT

## 2022-07-20 NOTE — FLOWSHEET NOTE
[address] [place] [address]  Phone: [phone]   Fax: [phone]    Physical Therapy Treatment Note/ Progress Report:     Date:  2022    Patient Name:  Mitali Hilliard    :  1933  MRN: [id number]  Restrictions/Precautions:    Medical/Treatment Diagnosis Information:  Diagnosis: M54.50, G89.29 (ICD-10-CM) - Chronic midline low back pain without sciatica  Treatment Diagnosis: M54.50, G89.29 (ICD-10-CM) - Chronic midline low back pain without sciatica  Insurance/Certification information:  PT Insurance Information: [place]  Physician Information:    Cat Araujo MD  Plan of care signed (Y/N): []  Yes [x]  No    Date of Patient follow up with Physician: 22     Progress Report: N.V []  Yes  [x]  No     Date Range for reporting period:  Beginnin22  Ending:     Progress report due (10 Rx/or 30 days whichever is less): visit #10 or 3/90/06 (date)     Recertification due (POC duration/ or 90 days whichever is less): visit # or 8/10/22 (date)     Visit # Insurance Allowable Auth required? Date Range   13 BMN/Medicare []  Yes  [x]  No        Units approved Units used Date Range          Latex Allergy:  [x]NO      []YES  Preferred Language for Healthcare:   [x]English       []other:    Functional Scale:           Date assessed:  FOTO physical FS primary measure score = 57; risk adjusted = 55  22  FOTO= 44 22, FOTO; 57   22  Pain level:  0-2/10     SUBJECTIVE:  Reports noticing LE and ankles are getting stronger and numbness/tingling in feet have been abolished. Feels like L LE is stronger than right. Reports noticing the stiffness minimal to none currently. Has been walking instead the house without using the cane since legs feel stronger. Overall reports 75% improvement and has been more active in general since starting therapy. Reports noticing that he feels more steady with walking from room to room in the house.  Reports daughter has noticed improvements with status as well. Reports feeling that he is able to do more physical activity before needing a rest since initial visit. Also, reports mental status has improved since onset of therapy. Reports easier time with picking up newspaper in driveway, and pulling in the garbage cans. 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  Depression      Treatment based classification:    [x] mobilization/manipulation   [x] stabilization   [] extension based   [x] flexion based   [] lateral shift   [] traction   [] unspecified Components:   [x] thoracolumbar   [] pelvic   [] SIJ   [] sacral   [x] hip         Comparable sign: Stiffness with sit-stand transfer    Exercises/Interventions:     Therapeutic Exercise (57075) Resistance / level Sets / Seconds Reps Notes / Cues   Piriformis stretches  HEP   LTR  HEP   Hip flex off EOB with strap Reviewed today Supine 90/90 HS stretches with strap  HEP   IB GS stretches  H30 5    TrA with marches  HEP   TrA marches in Table top  H5 2x10 reps Max Verbal cues required   Bridges with hip add   HEP   Bridges with pilates ring  H5 2x10 reps    SL hip abd  H2 1x8, 1x7 reps Easily fatigued   SL clams    Prone hip ext over EOB  H5 2x10 reps    Standing hip ext/abd /marches/mini squats   1HEP   Hip abd basil with ball on wall     Side stepping    Standing HR/TR  Unilateral    Therapeutic Activities (17964)       Airex Tandem stance     SLS EO on floor H10 3 reps    Sit-stand from chair 5# DB  H5 1x10   reps, 1x6 reps    BoSU toe tapping frw/later   H2 2x10 reps R/L each    Squatting cone  from 4\" step      TRX backward and side-stepping      Fitter balance board M-L and A-P  Min assist from PT as a precaution. Gait training with cane  5'     Side stepping and forward step-overs with HHA from PT Lime TB 4 laps 25' x2 Step over hurdles       Biodex balance #11 Random control training  Neuromuscular Re-ed (79190)       Patient education             Gliders post/lateral   2x10 reps                   Manual Intervention ([phone])       Prone PA      GISTM/STM       Lumbar Manip       SI Manip       Hip belt mobs       Hip LA distraction/lumbar distraction             Prone quad stretches           Modalities: deferred    Pt. Education:  -pt educated on diagnosis, prognosis and expectations for rehab  -all pt questions were answered    Home Exercise Prorgam:  HEP instruction: Written HEP instructions provided and reviewed. Access Code: [email or web address]  URL: [email or web address]  Date: 05/18/2022  Prepared by: Minnie Gilbert     Exercises  Supine Piriformis Stretch with Foot on Ground - 2 x daily - 7 x weekly - 1 sets - 5 reps - 30 hold  Supine Hamstring Stretch - 2 x daily - 7 x weekly - 1 sets - 5 reps - 30 hold  Supine Quadriceps Stretch with Strap on Table - 2 x daily - 7 x weekly - 1 sets - 5 reps - 30 hold  Supine Lower Trunk Rotation - 2 x daily - 7 x weekly - 1-2 sets - 10 reps - 10 hold  Supine Transversus Abdominis Bracing - Hands on Stomach - 3 x daily - 7 x weekly - 10 reps - 10 hold  Bridge with Hip Abduction and Resistance - Ground Touches - 3 x daily - 7 x weekly - 10 reps - 5 hold     Access Code: [email or web address]  URL: [email or web address]  Date: 06/03/2022  Prepared by: Minnie Gilbert    Exercises  Standing March with Counter Support - 1 x daily - 7 x weekly - 2-3 sets - 10 reps - 2 hold  Standing Hip Abduction with Counter Support - 1 x daily - 7 x care, mobility, lifting, and ambulation      Manual Treatments:  PROM / STM / Oscillations-Mobs:  G-I, II, III, IV (PA's, Inf., Post.)  [x] (46544) Provided manual therapy to mobilize proximal hip and LS spine soft tissue/joints for the purpose of modulating pain, promoting relaxation,  increasing ROM, reducing/eliminating soft tissue swelling/inflammation/restriction, improving soft tissue extensibility and allowing for proper ROM for normal function with self care, mobility, lifting and ambulation. Charges:  Timed Code Treatment Minutes: 39'   Total Treatment Minutes: 39'       [] EVAL - LOW (89395)   [] EVAL - MOD (25531)  [] EVAL - HIGH (33058)  [] RE-EVAL (94216)  [x] GQ(60600) x1    [] Ionto  [x] NMR (07692) x 1      [] Vaso  [] Manual (14233) x       [] Ultrasound  [x] TA x 1       [] Mech Traction (77380)  [] Aquatic Therapy x     [] ES (un) (27227):   [] Home Management Training x  [] ES(attended) (45053)   [] Dry Needling 1-2 muscles (72563):  [] Dry Needling 3+ muscles (008809  [] Group:      [] Other:       GOALS:  Patient stated goal: lessen stiffness in back/ increase strength  [] Progressing: [x] Met: [] Not Met: [] Adjusted     Therapist goals for Patient:   Short Term Goals: To be achieved in: 2 weeks  1. Independent in HEP and progression per patient tolerance, in order to prevent re-injury. [] Progressing: [x] Met: [] Not Met: [] Adjusted  2. Patient will have a decrease in pain to facilitate improvement in movement, function, and ADLs as indicated by Functional Deficits. [] Progressing: [x] Met: [] Not Met: [] Adjusted     Long Term Goals: To be achieved in: 12 weeks  1. FOTO score of at least 66 to assist with reaching prior level of function. [x] Progressing: [] Met: [] Not Met: [] Adjusted  2. Patient will demonstrate increased AROM to WNL, good LS mobility, good hip ROM to allow for proper joint functioning as indicated by patients Functional Deficits.    [] Progressing: [x] Met: [] Not Met: [] Adjusted  3. Patient will demonstrate an increase in Strength to good proximal hip and core activation to allow for proper functional mobility as indicated by patients Functional Deficits. [x] Progressing: [] Met: [] Not Met: [] Adjusted  4. Patient will return to functional activities including walking without increased symptoms or restriction. [] Progressing: [x] Met: [] Not Met: [] Adjusted  5. Patient to be able to perform sit-stand transfer with less stiffness in back(patient specific functional goal)    [] Progressing: [x] Met: [] Not Met: [] Adjusted          Overall Progression Towards Functional goals/ Treatment Progress Update:  [x] Patient is progressing as expected towards functional goals listed. [] Progression is slowed due to complexities/Impairments listed. [] Progression has been slowed due to co-morbidities. [] Plan just implemented, too soon to assess goals progression <30days   [] Goals require adjustment due to lack of progress  [] Patient is not progressing as expected and requires additional follow up with physician  [] Other    Persisting Functional Limitations/Impairments:  []Sitting [x]Standing   [x]Walking [x]Squatting/bending    []Stairs []ADL's    []Transfers []Reaching  []Housework []Job related tasks  []Driving []Sports/Recreation   []Sleeping []Other:    ASSESSMENT:  Patient presents with lumbar stiffness/discomfort, Reduce Lumbar ROM/flexibility, Decreased LE strength/balance and functional mobility skills. Since Initial eval patient reports 75% improvement in general with increased mobility and strength. Increased Lumbar AROM, LE flexibility noted and hip strength. FOTO score improved from last progress note with functional mobility. Main deficits include hip strength/balance. Recommend 2x/week for 2-4 more weeks to progress strengthening exercises and HEP.    Pt requires skilled intervention to restore ROM, strength, functional endurance and balance in order to perform ADLs without significant symptoms or limitations. Treatment/Activity Tolerance:  [x] Patient able to complete tx  [] Patient limited by fatique  [] Patient limited by pain  [] Patient limited by other medical complications  [] Other:     Prognosis: [x] Good [] Fair  [] Poor    Patient Requires Follow-up: [x] Yes  [] No    PLAN: See eval. PT 1-2x / week for 12 weeks. Progress as tolerated. [x] Continue per plan of care [] Alter current plan (see comments)  [] Plan of care initiated [] Hold pending MD visit [] Discharge    Electronically signed by: Dana Mayes, [id number] OMT-C      Note: If patient does not return for scheduled/ recommended follow up visits, this note will serve as a discharge from care along with most recent update on progress.

## 2022-07-22 ENCOUNTER — HOSPITAL ENCOUNTER (OUTPATIENT)
Dept: PHYSICAL THERAPY | Age: 87
Setting detail: THERAPIES SERIES
Discharge: HOME | End: 2022-07-22
Payer: MEDICARE

## 2022-07-22 PROCEDURE — 97110 THERAPEUTIC EXERCISES: CPT

## 2022-07-22 PROCEDURE — 97530 THERAPEUTIC ACTIVITIES: CPT

## 2022-07-22 NOTE — FLOWSHEET NOTE
[address] [place] [address]  Phone: [phone]   Fax: [phone]    Physical Therapy Treatment Note/ Progress Report:     Date:  2022    Patient Name:  Manolo Sawant    :  1933  MRN: [id number]  Restrictions/Precautions:    Medical/Treatment Diagnosis Information:  Diagnosis: M54.50, G89.29 (ICD-10-CM) - Chronic midline low back pain without sciatica  Treatment Diagnosis: M54.50, G89.29 (ICD-10-CM) - Chronic midline low back pain without sciatica  Insurance/Certification information:  PT Insurance Information: [place]  Physician Information:    Prakash Hale MD  Plan of care signed (Y/N): []  Yes [x]  No    Date of Patient follow up with Physician: 22     Progress Report: N.V []  Yes  [x]  No     Date Range for reporting period:  Beginnin22  Ending:     Progress report due (10 Rx/or 30 days whichever is less): visit #10 or  (date)     Recertification due (POC duration/ or 90 days whichever is less): visit # or 8/10/22 (date)     Visit # Insurance Allowable Auth required? Date Range   14 BMN/Medicare []  Yes  [x]  No        Units approved Units used Date Range          Latex Allergy:  [x]NO      []YES  Preferred Language for Healthcare:   [x]English       []other:    Functional Scale:           Date assessed:  FOTO physical FS primary measure score = 57; risk adjusted = 55  22  FOTO= 44 22, FOTO; 57   22  Pain level:  0-2/10     SUBJECTIVE:  Reports his 8th great grand baby was born this past week. Pt states that he always uses his cane to walk. noticing LE and ankles are getting stronger and numbness/tingling in feet have been abolished. Feels like L LE is stronger than right. Reports noticing the stiffness minimal to none currently. Has been walking instead the house without using the cane since legs feel stronger.   Overall reports 75% improvement and has been more active in general since starting therapy. Reports noticing that he feels more steady with walking from room to room in the house. Reports daughter has noticed improvements with status as well. Reports feeling that he is able to do more physical activity before needing a rest since initial visit. Also, reports mental status has improved since onset of therapy. Reports easier time with picking up newspaper in driveway, and pulling in the garbage cans. 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  Depression      Treatment based classification:    [x] mobilization/manipulation   [x] stabilization   [] extension based   [x] flexion based   [] lateral shift   [] traction   [] unspecified Components:   [x] thoracolumbar   [] pelvic   [] SIJ   [] sacral   [x] hip         Comparable sign: Stiffness with sit-stand transfer    Exercises/Interventions:     Therapeutic Exercise (37214) Resistance / level Sets / Seconds Reps Notes / Cues   SciFit 5min  For warm up - new 7/22               IB GS stretches  H30 2x B    TrA with marches  HEP   TrA marches in Table top  H5 2x10 reps Max Verbal cues required   Loki Cho with hip add   HEP   SL hip abd  H2 1x8, 1x7 reps Easily fatigued   SL clams    Prone hip ext over EOB  H5 2x10 reps    Standing hip ext/abd /marches/mini squats   1HEP   Hip abd basil with ball on wall     Side stepping    Standing HR/TR  H5 1x10 reps Bilateral      Therapeutic Activities (49733)       Airex Tandem stance  H30sec 2reps With finger tips for balance; in //bars   SLS EO on floor  H10 3 reps With finger tips for balance; in //bars   Sit-stand from chair 5# DB  H5 1x7 reps,   1x5 reps Pt had to rest after 7 reps and then 5reps due to SOB   BoSU toe tapping frw/later   H2 2x10 reps R/L each    Squatting cone  from 4\" step      TRX backward and side-stepping      Fitter balance board M-L and A-P  Min assist from PT as a precaution. Gait training with cane  5'     Side stepping   Forward/retro amb Lime TB Up/down //bars 2x  With UE support as needed on // bars - only used UE for support when he was changing position/direction 7/22   Step over hurdles       Biodex balance #11 Random control training  Neuromuscular Re-ed (11427)       Patient education             Gliders post/lateral   1x10 reps R/L                   Manual Intervention ([phone])       Prone PA      GISTM/STM       Lumbar Manip       SI Manip       Hip belt mobs       Hip LA distraction/lumbar distraction             Prone quad stretches           Modalities: deferred    Pt. Education:  -pt educated on diagnosis, prognosis and expectations for rehab  -all pt questions were answered    Home Exercise Prorgam:  HEP instruction: Written HEP instructions provided and reviewed. Access Code: [email or web address]  URL: [email or web address]  Date: 05/18/2022  Prepared by: Kitty Patel     Exercises  Supine Piriformis Stretch with Foot on Ground - 2 x daily - 7 x weekly - 1 sets - 5 reps - 30 hold  Supine Hamstring Stretch - 2 x daily - 7 x weekly - 1 sets - 5 reps - 30 hold  Supine Quadriceps Stretch with Strap on Table - 2 x daily - 7 x weekly - 1 sets - 5 reps - 30 hold  Supine Lower Trunk Rotation - 2 x daily - 7 x weekly - 1-2 sets - 10 reps - 10 hold  Supine Transversus Abdominis Bracing - Hands on Stomach - 3 x daily - 7 x weekly - 10 reps - 10 hold  Bridge with Hip Abduction and Resistance - Ground Touches - 3 x daily - 7 x weekly - 10 reps - 5 hold     Access Code: [email or web address]  URL: [email or web address]  Date: 06/03/2022  Prepared by: Donavon Last    Exercises  Standing March with Counter Support - 1 x daily - 7 x weekly - 2-3 sets - 10 reps - 2 hold  Standing Hip Abduction with Counter Support - 1 x daily - 7 x weekly - 2-3 sets - 10 reps - 2 hold  Standing Hip Extension with Counter Support - 1 x daily - 7 x weekly - 2-3 sets - 10 reps - 2 hold  Heel Toe Raises with Counter Support - 1 x daily - 7 x weekly - 2-3 sets - 10 reps  Mini Squat with Counter Support - 1 x daily - 7 x weekly - 3 sets - 10 reps    Therapeutic Exercise and NMR EXR  [x] (89425) Provided verbal/tactile cueing for activities related to strengthening, flexibility, endurance, ROM  for improvements in proximal hip and core control with self care, mobility, lifting and ambulation. [x] (51811) Provided verbal/tactile cueing for activities related to improving balance, coordination, kinesthetic sense, posture, motor skill, proprioception  to assist with core control in self care, mobility, lifting, and ambulation.   [] (01118) Therapist is in constant attendance of 2 or more patients providing skilled therapy interventions, but not providing any significant amount of measurable one-on-one time to either patient, for improvements in LE, proximal hip, and core control in self care, mobility, lifting, ambulation and eccentric single leg control.      Therapeutic Activities:    [x] (45039 or 54312) Provided verbal/tactile cueing for activities related to improving balance, coordination, kinesthetic sense, posture, motor skill, proprioception and motor activation to allow for proper function  with self care and ADLs  [] (75081) Provided training and instruction to the patient for proper core and proximal hip recruitment and positioning with ambulation re-education     Home Exercise Program:    [x] (28081) Reviewed/Progressed HEP activities related to strengthening, flexibility, endurance, ROM of core, proximal hip and LE for functional self-care, mobility, lifting and ambulation   [] (43267) Reviewed/Progressed HEP activities related to improving balance, coordination, kinesthetic sense, posture, motor skill, proprioception of core, proximal hip and LE for self care, mobility, lifting, and ambulation      Manual Treatments:  PROM / STM / Oscillations-Mobs:  G-I, II, III, IV (PA's, Inf., Post.)  [x] (60866) Provided manual therapy to mobilize proximal hip and LS spine soft tissue/joints for the purpose of modulating pain, promoting relaxation,  increasing ROM, reducing/eliminating soft tissue swelling/inflammation/restriction, improving soft tissue extensibility and allowing for proper ROM for normal function with self care, mobility, lifting and ambulation. Charges:  Timed Code Treatment Minutes: 39'   Total Treatment Minutes: 39'       [] EVAL - LOW (55440)   [] EVAL - MOD (69776)  [] EVAL - HIGH (06461)  [] RE-EVAL (10799)  [x] OH(00103) x1    [] Ionto  [] NMR (63921) x 1      [] Vaso  [] Manual (56563) x       [] Ultrasound  [x] TA x 2       [] Mech Traction (69780)  [] Aquatic Therapy x     [] ES (un) (82187):   [] Home Management Training x  [] ES(attended) (14970)   [] Dry Needling 1-2 muscles (55213):  [] Dry Needling 3+ muscles (798509  [] Group:      [] Other:       GOALS:  Patient stated goal: lessen stiffness in back/ increase strength  [] Progressing: [x] Met: [] Not Met: [] Adjusted     Therapist goals for Patient:   Short Term Goals: To be achieved in: 2 weeks  1. Independent in HEP and progression per patient tolerance, in order to prevent re-injury. [] Progressing: [x] Met: [] Not Met: [] Adjusted  2. Patient will have a decrease in pain to facilitate improvement in movement, function, and ADLs as indicated by Functional Deficits. [] Progressing: [x] Met: [] Not Met: [] Adjusted     Long Term Goals: To be achieved in: 12 weeks  1.  FOTO score of at least 66 to assist with reaching prior level of function. [x] Progressing: [] Met: [] Not Met: [] Adjusted  2. Patient will demonstrate increased AROM to WNL, good LS mobility, good hip ROM to allow for proper joint functioning as indicated by patients Functional Deficits. [] Progressing: [x] Met: [] Not Met: [] Adjusted  3. Patient will demonstrate an increase in Strength to good proximal hip and core activation to allow for proper functional mobility as indicated by patients Functional Deficits. [x] Progressing: [] Met: [] Not Met: [] Adjusted  4. Patient will return to functional activities including walking without increased symptoms or restriction. [] Progressing: [x] Met: [] Not Met: [] Adjusted  5. Patient to be able to perform sit-stand transfer with less stiffness in back(patient specific functional goal)    [] Progressing: [x] Met: [] Not Met: [] Adjusted          Overall Progression Towards Functional goals/ Treatment Progress Update:  [x] Patient is progressing as expected towards functional goals listed. [] Progression is slowed due to complexities/Impairments listed. [] Progression has been slowed due to co-morbidities. [] Plan just implemented, too soon to assess goals progression <30days   [] Goals require adjustment due to lack of progress  [] Patient is not progressing as expected and requires additional follow up with physician  [] Other    Persisting Functional Limitations/Impairments:  []Sitting [x]Standing   [x]Walking [x]Squatting/bending    []Stairs []ADL's    []Transfers []Reaching  []Housework []Job related tasks  []Driving []Sports/Recreation   []Sleeping []Other:    ASSESSMENT:  Patient presents with significant SOB this date 7/22. Pt required rest breaks throughout PT session, and some reps were decreased this date due to increased SOB.  lumbar stiffness/discomfort, Reduce Lumbar ROM/flexibility, Decreased LE strength/balance and functional mobility skills.  Since Initial eval patient reports 75% improvement in general with increased mobility and strength last visit. Increased Lumbar AROM, LE flexibility noted and hip strength. FOTO score improved from last progress note with functional mobility. Main deficits include hip strength/balance. Recommend 2x/week for 2-4 more weeks to progress strengthening exercises and HEP. Pt requires skilled intervention to restore ROM, strength, functional endurance and balance in order to perform ADLs without significant symptoms or limitations. Treatment/Activity Tolerance:  [x] Patient able to complete tx  [x] Patient limited by fatique 7/22 - required several rest breaks this date  [] Patient limited by pain  [] Patient limited by other medical complications  [] Other:     Prognosis: [x] Good [] Fair  [] Poor    Patient Requires Follow-up: [x] Yes  [] No    PLAN: See eval. PT 1-2x / week for 12 weeks. Progress as tolerated. [x] Continue per plan of care [] Alter current plan (see comments)  [] Plan of care initiated [] Hold pending MD visit [] Discharge    Electronically signed by: Ximena Joy, PT, MPT [id number]  Note: If patient does not return for scheduled/ recommended follow up visits, this note will serve as a discharge from care along with most recent update on progress.

## 2022-07-26 ENCOUNTER — ANTI-COAG VISIT (OUTPATIENT)
Dept: PHARMACY | Age: 87
End: 2022-07-26
Payer: MEDICARE

## 2022-07-26 DIAGNOSIS — I48.0 PAF (PAROXYSMAL ATRIAL FIBRILLATION) (HCC): Primary | ICD-10-CM

## 2022-07-26 LAB — INTERNATIONAL NORMALIZATION RATIO, POC: 2.2

## 2022-07-26 PROCEDURE — 99211 OFF/OP EST MAY X REQ PHY/QHP: CPT

## 2022-07-26 PROCEDURE — 85610 PROTHROMBIN TIME: CPT

## 2022-07-26 NOTE — PROGRESS NOTES
Mr. Cholo Ortiz is a 80 y.o. y/o male with history of Afib   He presents today for anticoagulation monitoring and adjustment. Pertinent PMH: ICD-pacemaker. 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    Patient Reported Findings:  Yes     No  [x]   []       Patient verifies current dosing regimen as listed  confirmed dose  []   [x]       S/S bleeding/bruising/swelling/SOB- denies  []   [x]       Blood in urine or stool denies  []   [x]       Procedures scheduled in the future at this time -  no changes   []   [x]       Missed Dose- denies   []   [x]       Extra Dose- denies  [x]   []       Change in medications- receiving Procrit 40,000 unit injection once every two weeks until red blood cell count is consistently under control again---> started amiodarone 200mg qd on 8/20 --->2 tylenol in the mornings---> resume lisinopril and lasix--> d/c plavix, started asa --> increased procrit shot    []   [x]       Change in health/diet/appetite consistent greens -> has been eating less, appetite has been diminished --> no greens, no NVD---> living alone; erratic diet--> less salads and green recently, daughter and neighbor bring meals --> still eating vit k but unable to state how much --> no changes   []   [x]       Change in alcohol use- doesn't drink   []   [x]       Change in activity  []   [x]       Hospital admission -  []   [x]       Emergency department visit  [x]   []       Other complaints--> Patient has been very tired and not able to function well, getting epoetin alpha injection at hemotologist office --> having trouble with low RBC      Clinical Outcomes:  Yes     No  []   [x]       Major bleeding event  []   [x]       Thromboembolic event  Duration of warfarin Therapy: indefinite  INR Range:  2.0-3.0     Has been stressed with passing of wife. Rui Nicolas (daughter) is primary care now- [phone].      INR is 2.2 again today   Continue weekly dose of 5 mg on Sun and Thurs and 2.5 mg all other days Dr. Saravia

## 2022-07-27 ENCOUNTER — HOSPITAL ENCOUNTER (OUTPATIENT)
Dept: PHYSICAL THERAPY | Age: 87
Setting detail: THERAPIES SERIES
Discharge: HOME | End: 2022-07-27
Payer: MEDICARE

## 2022-07-27 ENCOUNTER — NURSE ONLY (OUTPATIENT)
Dept: CARDIOLOGY CLINIC | Age: 87
End: 2022-07-27
Payer: MEDICARE

## 2022-07-27 DIAGNOSIS — I48.0 PAF (PAROXYSMAL ATRIAL FIBRILLATION) (HCC): Primary | ICD-10-CM

## 2022-07-27 DIAGNOSIS — Z95.0 CARDIAC PACEMAKER IN SITU: ICD-10-CM

## 2022-07-27 PROCEDURE — 93294 REM INTERROG EVL PM/LDLS PM: CPT | Performed by: INTERNAL MEDICINE

## 2022-07-27 PROCEDURE — 97112 NEUROMUSCULAR REEDUCATION: CPT | Performed by: PHYSICAL THERAPIST

## 2022-07-27 PROCEDURE — 97110 THERAPEUTIC EXERCISES: CPT | Performed by: PHYSICAL THERAPIST

## 2022-07-27 PROCEDURE — 93296 REM INTERROG EVL PM/IDS: CPT | Performed by: INTERNAL MEDICINE

## 2022-07-27 PROCEDURE — 97530 THERAPEUTIC ACTIVITIES: CPT | Performed by: PHYSICAL THERAPIST

## 2022-07-27 NOTE — PROGRESS NOTES
We received remote transmission from patient's monitor at home. Transmission shows normal sensing and pacing function. Noted AF. Pt is on coumadin. EP physician will review. See interrogation under cardiology tab in the [address] [place] [address] [address] field for more details. End of 91-day monitoring period 7-27-22.

## 2022-07-27 NOTE — FLOWSHEET NOTE
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 Date Range   15 BMN/Medicare []  Yes  [x]  No        Units approved Units used Date Range          Latex Allergy:  [x]NO      []YES  Preferred Language for Healthcare:   [x]English       []other:    Functional Scale:           Date assessed:  FOTO physical FS primary measure score = 57; risk adjusted = 55  22  FOTO= 44 22, FOTO; 57   22  Pain level:  0-2/10     SUBJECTIVE:  Reports no issues from last visit. Seeing cardiologist tomorrow. Reports feeling good about trying PT on own after next week. Noticing LE and ankles are getting stronger and numbness/tingling in feet have been abolished. Feels like L LE is stronger than right. Reports noticing the stiffness minimal to none currently. Has been walking instead the house without using the cane since legs feel stronger.   Overall reports 75% improvement and has been more active in general stance  With finger tips for balance; in //bars   SLS EO on floor  H10 3 reps With finger tips for balance; in //bars   Sit-stand from chair 5# DB  H5 1x10 reps,   1x5 reps Pt had to rest after 10 reps and then 5reps due to SOB   BoSU toe tapping frw/later   H2 2x10 reps R/L each    Squatting cone  from 4\" step      TRX backward and side-stepping      Fitter balance board M-L and A-P  Min assist from PT as a precaution. Gait training with cane  5'     Side stepping   Forward/retro amb Lime TB Up/down //bars 2x  With UE support as needed on // bars - only used UE for support when he was changing position/direction 7/22   Step over hurdles       Biodex balance #11 Random control training  Neuromuscular Re-ed (88573)       Patient education             Gliders post/lateral with TB With yellow 1x10 reps R/L                   Manual Intervention ([phone])       Prone PA      GISTM/STM       Lumbar Manip       SI Manip       Hip belt mobs       Hip LA distraction/lumbar distraction             Prone quad stretches           Modalities: deferred    Pt. Education:  -pt educated on diagnosis, prognosis and expectations for rehab  -all pt questions were answered    Home Exercise Prorgam:  HEP instruction: Written HEP instructions provided and reviewed. Access Code: [email or web address]  URL: [email or web address]  Date: 05/18/2022  Prepared by: Jaida Mosquera     Exercises  Supine Piriformis Stretch with Foot on Ground - 2 x daily - 7 x weekly - 1 sets - 5 reps - 30 hold  Supine Hamstring Stretch - 2 x daily - 7 x weekly - 1 sets - 5 reps - 30 hold  Supine Quadriceps Stretch with Strap on Table - 2 x daily - 7 x weekly - 1 sets - 5 reps - 30 hold  Supine Lower Trunk Rotation - 2 x daily - 7 x weekly - 1-2 sets - 10 reps - 10 hold  Supine Transversus Abdominis Bracing - Hands on Stomach - 3 x daily - 7 x weekly - 10 reps - 10 hold  Bridge with Hip Abduction and Resistance - Ground Touches - 3 x daily - 7 x weekly - 10 reps - 5 hold     Access Code: [email or web address]  URL: [email or web address]  Date: 06/03/2022  Prepared by: Nadir Fitch    Exercises  Standing March with Counter Support - 1 x daily - 7 x weekly - 2-3 sets - 10 reps - 2 hold  Standing Hip Abduction with Counter Support - 1 x daily - 7 x weekly - 2-3 sets - 10 reps - 2 hold  Standing Hip Extension with Counter Support - 1 x daily - 7 x weekly - 2-3 sets - 10 reps - 2 hold  Heel Toe Raises with Counter Support - 1 x daily - 7 x weekly - 2-3 sets - 10 reps  Mini Squat with Counter Support - 1 x daily - 7 x weekly - 3 sets - 10 reps    Therapeutic Exercise and NMR EXR  [x] (68009) Provided verbal/tactile cueing for activities related to strengthening, flexibility, endurance, ROM  for improvements in proximal hip and core control with self care, mobility, lifting and ambulation. [x] (16467) Provided verbal/tactile cueing for activities related to improving balance, coordination, kinesthetic sense, posture, motor skill, proprioception  to assist with core control in self care, mobility, lifting, and ambulation.   [] (96316) Therapist is in constant attendance of 2 or more patients providing skilled therapy interventions, but not providing any significant amount of measurable one-on-one time to either patient, for improvements in LE, proximal hip, and core control in self care, mobility, lifting, ambulation and eccentric single leg control.      Therapeutic Activities:    [x] (15639 or 21187) Provided verbal/tactile cueing for activities related to improving balance, coordination, kinesthetic sense, posture, motor skill, proprioception and motor activation to allow for proper function  with self care and ADLs  [] (90822) Provided training and instruction to the patient for proper core and proximal hip recruitment and positioning with ambulation re-education     Home Exercise Program:    [x] (48691) Reviewed/Progressed HEP activities related to strengthening, flexibility, endurance, ROM of core, proximal hip and LE for functional self-care, mobility, lifting and ambulation   [] (60669) Reviewed/Progressed HEP activities related to improving balance, coordination, kinesthetic sense, posture, motor skill, proprioception of core, proximal hip and LE for self care, mobility, lifting, and ambulation      Manual Treatments:  PROM / STM / Oscillations-Mobs:  G-I, II, III, IV (PA's, Inf., Post.)  [x] (92040) Provided manual therapy to mobilize proximal hip and LS spine soft tissue/joints for the purpose of modulating pain, promoting relaxation,  increasing ROM, reducing/eliminating soft tissue swelling/inflammation/restriction, improving soft tissue extensibility and allowing for proper ROM for normal function with self care, mobility, lifting and ambulation. Charges:  Timed Code Treatment Minutes: 39'   Total Treatment Minutes: 39'       [] EVAL - LOW (84473)   [] EVAL - MOD (65375)  [] EVAL - HIGH (59052)  [] RE-EVAL (62155)  [x] EH(13429) x1    [] Ionto  [x] NMR (50343) x 1      [] Vaso  [] Manual (26156) x       [] Ultrasound  [x] TA x 1      [] Mech Traction (91659)  [] Aquatic Therapy x     [] ES (un) (39465):   [] Home Management Training x  [] ES(attended) (41463)   [] Dry Needling 1-2 muscles (38831):  [] Dry Needling 3+ muscles (460210  [] Group:      [] Other:       GOALS:  Patient stated goal: lessen stiffness in back/ increase strength  [] Progressing: [x] Met: [] Not Met: [] Adjusted     Therapist goals for Patient:   Short Term Goals: To be achieved in: 2 weeks  1. Independent in HEP and progression per patient tolerance, in order to prevent re-injury. [] Progressing: [x] Met: [] Not Met: [] Adjusted  2. Patient will have a decrease in pain to facilitate improvement in movement, function, and ADLs as indicated by Functional Deficits. [] Progressing: [x] Met: [] Not Met: [] Adjusted     Long Term Goals: To be achieved in: 12 weeks  1.  FOTO score of at least 66 to assist with reaching prior level of function. [x] Progressing: [] Met: [] Not Met: [] Adjusted  2. Patient will demonstrate increased AROM to WNL, good LS mobility, good hip ROM to allow for proper joint functioning as indicated by patients Functional Deficits. [] Progressing: [x] Met: [] Not Met: [] Adjusted  3. Patient will demonstrate an increase in Strength to good proximal hip and core activation to allow for proper functional mobility as indicated by patients Functional Deficits. [x] Progressing: [] Met: [] Not Met: [] Adjusted  4. Patient will return to functional activities including walking without increased symptoms or restriction. [] Progressing: [x] Met: [] Not Met: [] Adjusted  5. Patient to be able to perform sit-stand transfer with less stiffness in back(patient specific functional goal)    [] Progressing: [x] Met: [] Not Met: [] Adjusted          Overall Progression Towards Functional goals/ Treatment Progress Update:  [x] Patient is progressing as expected towards functional goals listed. [] Progression is slowed due to complexities/Impairments listed. [] Progression has been slowed due to co-morbidities. [] Plan just implemented, too soon to assess goals progression <30days   [] Goals require adjustment due to lack of progress  [] Patient is not progressing as expected and requires additional follow up with physician  [] Other    Persisting Functional Limitations/Impairments:  []Sitting [x]Standing   [x]Walking [x]Squatting/bending    []Stairs []ADL's    []Transfers []Reaching  []Housework []Job related tasks  []Driving []Sports/Recreation   []Sleeping []Other:    ASSESSMENT: Patient tolerated exercises well with usual rest breaks. Since Initial eval patient reports 75% improvement in general with increased mobility and strength last visit. Increased Lumbar AROM, LE flexibility noted and hip strength. FOTO score improved from last progress note with functional mobility.  Main deficits include hip strength/balance. Recommend 2x/week for 1-2more weeks to progress strengthening exercises and HEP. Pt requires skilled intervention to restore ROM, strength, functional endurance and balance in order to perform ADLs without significant symptoms or limitations. Treatment/Activity Tolerance:  [x] Patient able to complete tx  [x] Patient limited by fatique 7/22 - required several rest breaks this date  [] Patient limited by pain  [] Patient limited by other medical complications  [] Other:     Prognosis: [x] Good [] Fair  [] Poor    Patient Requires Follow-up: [x] Yes  [] No    PLAN: See eval. PT 1-2x / week for 12 weeks. Progress as tolerated. [x] Continue per plan of care [] Alter current plan (see comments)  [] Plan of care initiated [] Hold pending MD visit [] Discharge    Electronically signed by: [place], [id number]  Note: If patient does not return for scheduled/ recommended follow up visits, this note will serve as a discharge from care along with most recent update on progress.

## 2022-07-28 ENCOUNTER — OFFICE VISIT (OUTPATIENT)
Dept: CARDIOLOGY CLINIC | Age: 87
End: 2022-07-28
Payer: MEDICARE

## 2022-07-28 VITALS
BODY MASS INDEX: 25.1 KG/M2 | WEIGHT: 169.5 LBS | OXYGEN SATURATION: 98 % | HEIGHT: 69 IN | SYSTOLIC BLOOD PRESSURE: 120 MMHG | DIASTOLIC BLOOD PRESSURE: 62 MMHG | HEART RATE: 63 BPM

## 2022-07-28 DIAGNOSIS — I10 PRIMARY HYPERTENSION: ICD-10-CM

## 2022-07-28 DIAGNOSIS — I25.10 CORONARY ARTERY DISEASE INVOLVING NATIVE CORONARY ARTERY OF NATIVE HEART WITHOUT ANGINA PECTORIS: ICD-10-CM

## 2022-07-28 DIAGNOSIS — I48.0 PAF (PAROXYSMAL ATRIAL FIBRILLATION) (HCC): Primary | ICD-10-CM

## 2022-07-28 DIAGNOSIS — Z95.0 CARDIAC PACEMAKER: ICD-10-CM

## 2022-07-28 DIAGNOSIS — I47.20 VENTRICULAR TACHYCARDIA: ICD-10-CM

## 2022-07-28 DIAGNOSIS — I50.22 CHRONIC SYSTOLIC (CONGESTIVE) HEART FAILURE (HCC): ICD-10-CM

## 2022-07-28 DIAGNOSIS — E78.2 MIXED HYPERLIPIDEMIA: ICD-10-CM

## 2022-07-28 DIAGNOSIS — I25.5 ISCHEMIC CARDIOMYOPATHY: ICD-10-CM

## 2022-07-28 DIAGNOSIS — I47.20 VT (VENTRICULAR TACHYCARDIA): ICD-10-CM

## 2022-07-28 PROCEDURE — 1123F ACP DISCUSS/DSCN MKR DOCD: CPT | Performed by: INTERNAL MEDICINE

## 2022-07-28 PROCEDURE — G8427 DOCREV CUR MEDS BY ELIG CLIN: HCPCS | Performed by: INTERNAL MEDICINE

## 2022-07-28 PROCEDURE — G8417 CALC BMI ABV UP PARAM F/U: HCPCS | Performed by: INTERNAL MEDICINE

## 2022-07-28 PROCEDURE — 1036F TOBACCO NON-USER: CPT | Performed by: INTERNAL MEDICINE

## 2022-07-28 PROCEDURE — 99214 OFFICE O/P EST MOD 30 MIN: CPT | Performed by: INTERNAL MEDICINE

## 2022-07-28 PROCEDURE — 93000 ELECTROCARDIOGRAM COMPLETE: CPT | Performed by: INTERNAL MEDICINE

## 2022-07-28 RX ORDER — SOTALOL HYDROCHLORIDE 120 MG/1
TABLET ORAL
Qty: 180 TABLET | Refills: 1 | Status: ON HOLD | OUTPATIENT
Start: 2022-07-28 | End: 2022-10-03 | Stop reason: HOSPADM

## 2022-07-28 NOTE — PROGRESS NOTES
[address]  Cardiac Consult     Referring Provider:  Anel Choudhary MD     Chief Complaint   Patient presents with    Atrial Fibrillation    Coronary Artery Disease        History of Present Illness:   80 y.o.male formally seen by Dr. Cedric Benitez seen in f/u for afib, CAD, HTN, hyperlipidemia and pacemaker placement. He has been having increasing afib on Sotalol. He refused change to amio due to potential side effects. He has progressive LV dysfunction. Myoview with small area of ischemia. Afib ablation and possible biV upgrade was being considered. Cardiac cath performed 10/2021 with stenting of the PDA. He is feeling well. Denies chest pain, dyspnea palpitations, syncope or presyncope. Pacer interrogation 1/2022 showed 52% afib. Also NSVT. It is difficult to tell how much VT and how fast. Reviewed with Dr. Brie East who reviewed tracings. He can not tell if episodes wit rapid rate are aflutter or VT. ACE inhibitor use has been limited by increased creat and K+. He is doing well. \"Better than he has been for a long time\" per his daughter. No chest pian. No palpitations, syncope or presyncope. Past Medical History:   has a past medical history of Actinic keratosis, Actinic keratosis, Atrial fibrillation ([address] Bilateral carotid artery stenosis, CAD (coronary artery disease), Cellulitis, Diabetes mellitus ([address] DM (diabetes mellitus), type 2 with peripheral vascular complications (HCC)--s/p amputation great toe post osteomylitis , Glucose intolerance (malabsorption), Hyperlipidemia, Hypertension, Hypertrophy of prostate without urinary obstruction and other lower urinary tract symptoms (LUTS), Intermittent atrial fibrillation ([address] Kidney stone, Occult blood in stool, and Pacemaker. Surgical History:   has a past surgical history that includes Tonsillectomy and adenoidectomy; Appendectomy; Kidney stone surgery (1980); Coronary artery bypass graft (1996);  Cardiac catheterization (2003); pacemaker placement (2005); other surgical history (Right, 7/17/15); Abscess Drainage (7/20/15); Toe amputation (Right, 7.16.15); Colonoscopy (03/28/2018); and pr esophagogastroduodenoscopy transoral diagnostic (N/A, 11/21/2018). Social History:  Social History     Tobacco Use    Smoking status: Never    Smokeless tobacco: Never    Tobacco comments:     counseled on tobacco exposure avoidance   Substance Use Topics    Alcohol use: No     Alcohol/week: 0.0 standard drinks        Family History:  family history includes Diabetes in his mother; Stroke in his father. Allergies:  Patient has no known allergies. Home Medications:  Prior to Visit Medications    Medication Sig Taking?  Authorizing Provider   doxazosin (CARDURA) 2 MG tablet TAKE 1 TABLET BY MOUTH DAILY Yes Abbie Bartlett MD   aspirin 81 MG EC tablet TAKE 1 TABLET BY MOUTH DAILY Yes PRIYA Amezcua CNP   lisinopril (PRINIVIL;ZESTRIL) 5 MG tablet Take 1 tablet by mouth daily Yes Steven Harrison MD   furosemide (LASIX) 40 MG tablet Take 1 tablet by mouth every morning Yes Steven Harrison MD   triamcinolone (ARISTOCORT) 0.5 % ointment APPLY TOPICALLY TO THE AFFECTED AREA TWICE DAILY Yes Historical Provider, MD   atorvastatin (LIPITOR) 20 MG tablet Take 1 tablet by mouth daily Yes Yarelis Buck MD   warfarin (COUMADIN) 5 MG tablet TAKE ONE-HALF TABLET BY MOUTH ON MONDAY WEDNESDAY AND FRIDAY, AND 1 TABLET BY MOUTH ALL OTHER DAYS OF THE WEEK Yes PRIYA Grewal CNP   sotalol (BETAPACE) 120 MG tablet TAKE 1 TABLET BY MOUTH TWICE DAILY Yes Yareils Buck MD   metoprolol succinate (TOPROL XL) 50 MG extended release tablet TAKE 1 TABLET BY MOUTH TWICE DAILY Yes Yarelis Buck MD   vitamin B-1 (THIAMINE) 100 MG tablet Take 1,000 mg by mouth daily  Yes Historical Provider, MD   EPOETIN BIBIANA IJ Inject as directed every 21 days  Yes Historical Provider, MD   acetaminophen (TYLENOL) 325 MG tablet Take 650 mg by mouth every 6 hours as needed for Pain Yes Historical Provider, MD       [x] Medications and dosages reviewed. ROS:  [x]Full ROS obtained and negative except as mentioned in HPI      Physical Examination:    Vitals:    07/28/22 0920   BP: 120/62   Site: Left Upper Arm   Position: Sitting   Cuff Size: Large Adult   Pulse: 63   SpO2: 98%   Weight: 169 lb 8 oz (76.9 kg)   Height: 5' 9\" (1.753 m)        GENERAL: Well developed, well nourished, No acute distress  NEUROLOGICAL: Alert and oriented  PSYCH: Calm affect  SKIN: Warm and dry, No visible rash,   EYES: Pupils equal and round, Sclera non-icteric,   HENT:  External ears and nose unremarkable, mucus membranes moist  MUSCULOSKELETAL: Normal cephalic, neck supple  CAROTID: Normal upstroke, no bruits  CARDIAC: JVP normal, Normal PMI,  Regular rate and rhythm, normal S1S2, No murmur, rub, or gallop  RESPIRATORY: Normal respiratory effort, Clear to auscultation bilaterally  EXTREMITIES: No LE edema  GASTROINTESTINAL: normal bowel sounds, soft, non-tender, No hepatomegaly     ECHO 5/2021    Summary   Left ventricular cavity size is normal with normal left ventricular wall   thickness. Overall left ventricular systolic function appears mild-moderately reduced. Ejection fraction is visually estimated to be 35-40%. There is mild diffuse hypokinesis. Grade II diastolic dysfunction with elevated LV filling pressures. Normal right ventricular size. Right ventricular systolic function is mildly reduced. The left atrium is moderate-severely dilated. Mitral valve leaflets appear mildly thickened. Mild to moderate mitral regurgitation. Moderate aortic stenosis with a peak velocity of 2.5 m/s, a mean pressure   gradient of 15 mmHg and an DONAVAN of 1.19 cm^2. Aortic valve gradients may be underestimated due to low cardiac output. Mild aortic regurgitation. Mild to moderate tricuspid regurgitation with a PASP of 45 mmHg. Trivial pulmonic regurgitation present.      Myoview 9/2021  The left ventricular cavity size is moderately dilated. The right ventricle    is mildly dilated. There is a small perfusion defect of mild severity in the apex and apical    inferior segment. The fixed defect has associated wall motion abnormality,    consistent with scar. There is an additional small perfusion defect of mild    severity in the mid-anterior. There is corresponding wall motion    abnormality. The defect is consistent with ischemia. Sum stress score of 4. No visual TID. Calculated TID is 1. 13. Left ventricular ejection fraction is severely reduced at 35%. CARDIAC CATH 10/2021  Anatomy:   LM-20% distal  LAD-100% ostial, calcified  Cx-normal  OM1-100% ostial  OM2-20% proximal  RCA-20% proximal, 40% distal, calcified  RPDA-70 to 80% proximal, FFR of 0.76  LVEF-50%  LIMA-LAD: Widely patent  SVG-OM1: Widely patent  Aortic root: Not aneurysmal, no significant aortic insufficiency, 1 patent SVG visualized. PCI: RPDA 80% to 0% with a 3.25 mm x 18 mm Xience Mariela ALEJANDRA      Impression:  1. Severe multivessel CAD. 2.  Patent LIMA-LAD and SVG-OM1 grafts. 3.  Successful PCI with ALEJANDRA x1 to RPDA. 4.  Low normal LV systolic function. Plan:  1. Attempt triple therapy with enteric-coated aspirin 81 mg daily, Plavix 75 mg daily and warfarin to complete 30 days followed by Plavix and warfarin for an additional 5 months then transition to enteric-coated aspirin 81 mg daily and warfarin thereafter. 2.  Hydration. 3.  ACE inhibitor/ARB stopped preprocedure to optimize kidney function given renal insufficiency. Discussed with nephrology who recommends resuming ACE inhibitor/ARB 3 days post procedure. ECHO (4/2022)  Summary   -Mildly reduced global systolic function with an ejection fraction estimated   at 40-45%. -Global hypokinesis noted. -Severe left atrial enlargement noted. -Moderate right atrial enlargement.    -Moderate aortic stenosis with a peak velocity of 3.08m/s and a mean pressure gradient of 20mmHg. The aortic valve area is estimated at 1.14 cm^2   by continuity and .93 cm^2 to 1.25 cm^2 by planimetry. There is mild aortic   insufficiency.   -There is moderate mitral regurgitation.   -There is mild-to-moderate tricuspid regurgitation with a RVSP estimation of   46 mmHg.   -Trivial pulmonic regurgitation present.   -Grade II diastolic dysfunction with elevated LV filling pressures. Avg.   E/e'=15.6   -Pacer / ICD wire is visualized in the right heart. EKG:  NSR, normal QT    ASSESSMENT:      CAD:  Cardiac cath as above with stenting PDA  10/2021. Plavix stopped after 6 months. No ASA due to warfarin  CAB [facility]-no report  Cardiac [id number]-no report    HTN:  /62 (Site: Left Upper Arm, Position: Sitting, Cuff Size: Large Adult)   Pulse 63   Ht 5' 9\" (1.753 m)   Wt 169 lb 8 oz (76.9 kg)   SpO2 98%   BMI 25.03 kg/m²   Well controlled. Continue current therapy    Hyperlipidemia:  Controlled  LDL=57   Continue lipitor    Afib:  PAF. Asymptomatic  Refused amio. Continue coumadin  Follow    Pacemaker:  Stable  Follow q 3 months-Check planned next week    Anemia:  Followed by [facility]  ? MDS-HgB running around 8-9 per daughter. Not improving with meds    Cardiomyopathy/CHF:  Stable. Continue current therapy      VT:  VT seen on pacer.    Amount unclear  Discussed with Dr. Patricia Pickard repeat ECHO  If EF< 35% needs AICD, If Abnormal but >35% recommends EP study  ECHO with EF=40-45%-No VT on pacer check today  He does not want EP study due to his age      Plan:  Stable  Same meds  F/u 3-4 months    Thank you for allowing me to participate in the care of this individual.      Jennifer Tavares M.D., [facility] [place]

## 2022-07-29 ENCOUNTER — HOSPITAL ENCOUNTER (OUTPATIENT)
Dept: PHYSICAL THERAPY | Age: 87
Setting detail: THERAPIES SERIES
Discharge: HOME | End: 2022-07-29
Payer: MEDICARE

## 2022-07-29 PROCEDURE — 97110 THERAPEUTIC EXERCISES: CPT | Performed by: PHYSICAL THERAPIST

## 2022-07-29 PROCEDURE — 97530 THERAPEUTIC ACTIVITIES: CPT | Performed by: PHYSICAL THERAPIST

## 2022-07-29 PROCEDURE — 97112 NEUROMUSCULAR REEDUCATION: CPT | Performed by: PHYSICAL THERAPIST

## 2022-07-29 NOTE — FLOWSHEET NOTE
[address] [place] [address], [address] [place] [address],[address]  Phone: [phone]   Fax: [phone]    Physical Therapy Treatment Note/ Progress Report:     Date:  2022    Patient Name:  Goyo Weber    :  1933  MRN: [id number]  Restrictions/Precautions:    Medical/Treatment Diagnosis Information:  Diagnosis: M54.50, G89.29 (ICD-10-CM) - Chronic midline low back pain without sciatica  Treatment Diagnosis: M54.50, G89.29 (ICD-10-CM) - Chronic midline low back pain without sciatica  Insurance/Certification information:  PT Insurance Information: [place]  Physician Information:    Evangelista Beard MD  Plan of care signed (Y/N): []  Yes [x]  No    Date of Patient follow up with Physician: 22     Progress Report:  D/C next week . Assess Next week. []  Yes  [x]  No     Date Range for reporting period:  Beginnin22  Ending:     Progress report due (10 Rx/or 30 days whichever is less): visit #10 or  (date)     Recertification due (POC duration/ or 90 days whichever is less): visit # or 8/10/22 (date)     Visit # Insurance Allowable Auth required? Date Range   16 BMN/Medicare []  Yes  [x]  No        Units approved Units used Date Range          Latex Allergy:  [x]NO      []YES  Preferred Language for Healthcare:   [x]English       []other:    Functional Scale:           Date assessed:  FOTO physical FS primary measure score = 57; risk adjusted = 55  22  FOTO= 44 22, FOTO; 57   22  Pain level:  0-2/10     SUBJECTIVE:  Reports no issues from last visit. Appointment with cardiologist went well. Minimal issues with stiffness in general. Continues to be compliant with HEP and performing exercises 1x/day. Noticing LE and ankles are getting stronger and numbness/tingling in feet have been abolished. Feels like L LE is stronger than right. Reports noticing the stiffness minimal to none currently.  Has been walking instead the house without using the cane since legs feel stronger. Overall reports 75% improvement and has been more active in general since starting therapy. Reports noticing that he feels more steady with walking from room to room in the house. Reports daughter has noticed improvements with status as well. Reports feeling that he is able to do more physical activity before needing a rest since initial visit. Also, reports mental status has improved since onset of therapy. Reports easier time with picking up newspaper in driveway, and pulling in the garbage cans. 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  Depression      Treatment based classification:    [x] mobilization/manipulation   [x] stabilization   [] extension based   [x] flexion based   [] lateral shift   [] traction   [] unspecified Components:   [x] thoracolumbar   [] pelvic   [] SIJ   [] sacral   [x] hip         Comparable sign: Stiffness with sit-stand transfer    Exercises/Interventions:     Therapeutic Exercise (56043) Resistance / level Sets / Seconds Reps Notes / Cues   F            IB GS stretches  H30 2x B    TrA with marches  HEP   TrA marches in Table top  H5 2x10 reps Max Verbal cues required   Bridges with hip add   HEP   SL hip abd  H2 1x8, 1x7 reps Easily fatigued   SL clams    Prone hip ext over EOB  H5 2x10 reps    Standing hip ext/abd /marches/mini squats   1HEP   Hip abd basil with ball on wall  H5 10 reps    Side stepping YTB  4 laps    Standing HR unilateral H5 2x10 reps With UE support on table     Therapeutic Activities (84900)       Airex Tandem stance  With finger tips for balance; in //bars   SLS EO on floor  H10 3 reps With finger tips for balance; in //bars   Sit-stand from chair 5# DB  H5 1x10 reps,   1x3 reps Pt had to rest after 10 reps and then 5reps due to SOB   BoSU toe tapping frw/later   Squatting cone  from floor   x5 reps    TRX backward and side-stepping  Yellow TRX band  6 laps bckwd  3 laps side to side    Fitter balance board M-L and A-P  Min assist from PT as a precaution. Gait training with cane  5'     Side stepping   Forward/retro amb Step over hurdles       AndroJekex balance #11 Random control training  5' sing HR for support. Score 85%   Neuromuscular Re-ed (46448)       Patient education             Gliders post/lateral with TB With yellow 1x10 reps R/L            TRX squats with handles  2x10 reps     Manual Intervention (53434)       Prone PA      GISTM/STM       Lumbar Manip       SI Manip       Hip belt mobs       Hip LA distraction/lumbar distraction             Prone quad stretches           Modalities: deferred    Pt. Education:  -pt educated on diagnosis, prognosis and expectations for rehab  -all pt questions were answered    Home Exercise Prorgam:  HEP instruction: Written HEP instructions provided and reviewed. Access Code: [email or web address]  URL: [email or web address]  Date: 05/18/2022  Prepared by: Reyna Castillo     Exercises  Supine Piriformis Stretch with Foot on Ground - 2 x daily - 7 x weekly - 1 sets - 5 reps - 30 hold  Supine Hamstring Stretch - 2 x daily - 7 x weekly - 1 sets - 5 reps - 30 hold  Supine Quadriceps Stretch with Strap on Table - 2 x daily - 7 x weekly - 1 sets - 5 reps - 30 hold  Supine Lower Trunk Rotation - 2 x daily - 7 x weekly - 1-2 sets - 10 reps - 10 hold  Supine Transversus Abdominis Bracing - Hands on Stomach - 3 x daily - 7 x weekly - 10 reps - 10 hold  Bridge with Hip Abduction and Resistance - Ground Touches - 3 x daily - 7 x weekly - 10 reps - 5 hold     Access Code: [email or web address]  URL: [email or web address] | [email or web address]. [email or web address]/  Date: 06/03/2022  Prepared by: Kain Many    Exercises  Standing March with Counter Support - 1 x daily - 7 x weekly - 2-3 sets - 10 reps - 2 hold  Standing Hip Abduction with Counter Support - 1 x daily - 7 x weekly - 2-3 sets - 10 reps - 2 hold  Standing Hip Extension with Counter Support - 1 x daily - 7 x weekly - 2-3 sets - 10 reps - 2 hold  Heel Toe Raises with Counter Support - 1 x daily - 7 x weekly - 2-3 sets - 10 reps  Mini Squat with Counter Support - 1 x daily - 7 x weekly - 3 sets - 10 reps    Therapeutic Exercise and NMR EXR  [x] (46846) Provided verbal/tactile cueing for activities related to strengthening, flexibility, endurance, ROM  for improvements in proximal hip and core control with self care, mobility, lifting and ambulation. [x] (66053) Provided verbal/tactile cueing for activities related to improving balance, coordination, kinesthetic sense, posture, motor skill, proprioception  to assist with core control in self care, mobility, lifting, and ambulation.   [] (13756) Therapist is in constant attendance of 2 or more patients providing skilled therapy interventions, but not providing any significant amount of measurable one-on-one time to either patient, for improvements in LE, proximal hip, and core control in self care, mobility, lifting, ambulation and eccentric single leg control.      Therapeutic Activities:    [x] (55765 or 99649) Provided verbal/tactile cueing for activities related to improving balance, coordination, kinesthetic sense, posture, motor skill, proprioception and motor activation to allow for proper function  with self care and ADLs  [] (29904) Provided training and instruction to the patient for proper core and proximal hip recruitment and positioning with ambulation re-education     Home Exercise Program:    [x] (22267) Reviewed/Progressed HEP activities related to strengthening, flexibility, endurance, ROM of core, proximal hip and LE for functional self-care, mobility, lifting and ambulation   [] (62930) Reviewed/Progressed HEP activities related to improving balance, coordination, kinesthetic sense, posture, motor skill, proprioception of core, proximal hip and LE for self care, mobility, lifting, and ambulation      Manual Treatments:  PROM / STM / Oscillations-Mobs:  G-I, II, III, IV (PA's, Inf., Post.)  [x] (63909) Provided manual therapy to mobilize proximal hip and LS spine soft tissue/joints for the purpose of modulating pain, promoting relaxation,  increasing ROM, reducing/eliminating soft tissue swelling/inflammation/restriction, improving soft tissue extensibility and allowing for proper ROM for normal function with self care, mobility, lifting and ambulation. Charges:  Timed Code Treatment Minutes: 39'   Total Treatment Minutes: 39'       [] EVAL - LOW (20338)   [] EVAL - MOD (01324)  [] EVAL - HIGH (92178)  [] RE-EVAL (32595)  [x] CW(51260) x1    [] Ionto  [x] NMR (92281) x 1      [] Vaso  [] Manual (20785) x       [] Ultrasound  [x] TA x 1      [] Mech Traction (07446)  [] Aquatic Therapy x     [] ES (un) (94734):   [] Home Management Training x  [] ES(attended) (72488)   [] Dry Needling 1-2 muscles (22080):  [] Dry Needling 3+ muscles (076289  [] Group:      [] Other:       GOALS:  Patient stated goal: lessen stiffness in back/ increase strength  [] Progressing: [x] Met: [] Not Met: [] Adjusted     Therapist goals for Patient:   Short Term Goals: To be achieved in: 2 weeks  1. Independent in HEP and progression per patient tolerance, in order to prevent re-injury. [] Progressing: [x] Met: [] Not Met: [] Adjusted  2. Patient will have a decrease in pain to facilitate improvement in movement, function, and ADLs as indicated by Functional Deficits. [] Progressing: [x] Met: [] Not Met: [] Adjusted     Long Term Goals:  To be achieved in: 12 weeks  1. FOTO score of at least 66 to assist with reaching prior level of function. [x] Progressing: [] Met: [] Not Met: [] Adjusted  2. Patient will demonstrate increased AROM to WNL, good LS mobility, good hip ROM to allow for proper joint functioning as indicated by patients Functional Deficits. [] Progressing: [x] Met: [] Not Met: [] Adjusted  3. Patient will demonstrate an increase in Strength to good proximal hip and core activation to allow for proper functional mobility as indicated by patients Functional Deficits. [x] Progressing: [] Met: [] Not Met: [] Adjusted  4. Patient will return to functional activities including walking without increased symptoms or restriction. [] Progressing: [x] Met: [] Not Met: [] Adjusted  5. Patient to be able to perform sit-stand transfer with less stiffness in back(patient specific functional goal)    [] Progressing: [x] Met: [] Not Met: [] Adjusted          Overall Progression Towards Functional goals/ Treatment Progress Update:  [x] Patient is progressing as expected towards functional goals listed. [] Progression is slowed due to complexities/Impairments listed. [] Progression has been slowed due to co-morbidities. [] Plan just implemented, too soon to assess goals progression <30days   [] Goals require adjustment due to lack of progress  [] Patient is not progressing as expected and requires additional follow up with physician  [] Other    Persisting Functional Limitations/Impairments:  []Sitting [x]Standing   [x]Walking [x]Squatting/bending    []Stairs []ADL's    []Transfers []Reaching  []Housework []Job related tasks  []Driving []Sports/Recreation   []Sleeping []Other:    ASSESSMENT: Patient tolerated exercises well with usual rest breaks. Since Initial eval patient reports 75% improvement in general with increased mobility and strength last visit. Increased Lumbar AROM, LE flexibility noted and hip strength.  FOTO score improved from last progress note with functional mobility. Main deficits include hip strength/balance. Recommend 2x/week for 1-2more weeks to progress strengthening exercises and HEP. Pt requires skilled intervention to restore ROM, strength, functional endurance and balance in order to perform ADLs without significant symptoms or limitations. Treatment/Activity Tolerance:  [x] Patient able to complete tx  [x] Patient limited by fatique 7/22 - required several rest breaks this date  [] Patient limited by pain  [] Patient limited by other medical complications  [] Other:     Prognosis: [x] Good [] Fair  [] Poor    Patient Requires Follow-up: [x] Yes  [] No    PLAN: See eval. PT 1-2x / week for 12 weeks. Progress as tolerated. [x] Continue per plan of care [] Alter current plan (see comments)  [] Plan of care initiated [] Hold pending MD visit [] Discharge    Electronically signed by: [place], [id number]  Note: If patient does not return for scheduled/ recommended follow up visits, this note will serve as a discharge from care along with most recent update on progress.

## 2022-08-01 ENCOUNTER — HOSPITAL ENCOUNTER (OUTPATIENT)
Dept: PHYSICAL THERAPY | Age: 87
Setting detail: THERAPIES SERIES
Discharge: HOME | End: 2022-08-01
Payer: MEDICARE

## 2022-08-01 PROCEDURE — 97530 THERAPEUTIC ACTIVITIES: CPT | Performed by: PHYSICAL THERAPIST

## 2022-08-01 PROCEDURE — 97112 NEUROMUSCULAR REEDUCATION: CPT | Performed by: PHYSICAL THERAPIST

## 2022-08-01 PROCEDURE — 97110 THERAPEUTIC EXERCISES: CPT | Performed by: PHYSICAL THERAPIST

## 2022-08-01 NOTE — FLOWSHEET NOTE
[address] [place] [address]  Phone: [phone]   Fax: [phone]    Physical Therapy Treatment Note/ Progress Report:     Date:  2022    Patient Name:  Toshia Mason    :  1933  MRN: [id number]  Restrictions/Precautions:    Medical/Treatment Diagnosis Information:  Diagnosis: M54.50, G89.29 (ICD-10-CM) - Chronic midline low back pain without sciatica  Treatment Diagnosis: M54.50, G89.29 (ICD-10-CM) - Chronic midline low back pain without sciatica  Insurance/Certification information:  PT Insurance Information: [place]  Physician Information:    Elana Stock MD  Plan of care signed (Y/N): []  Yes [x]  No    Date of Patient follow up with Physician: 22     Progress Report:  D/C next week . Assess Next week. [x]  Yes  []  No     Date Range for reporting period:  Beginnin22  Endin2022    Progress report due (10 Rx/or 30 days whichever is less): visit #10 or 3/01/44 (date)     Recertification due (POC duration/ or 90 days whichever is less): visit # or 8/10/22 (date)     Visit # Insurance Allowable Auth required? Date Range   17 BMN/Medicare []  Yes  [x]  No        Units approved Units used Date Range          Latex Allergy:  [x]NO      []YES  Preferred Language for Healthcare:   [x]English       []other:    Functional Scale:           Date assessed:  FOTO physical FS primary measure score = 57; risk adjusted = 55  22  FOTO= 44 22, FOTO; 57   22  Pain level:  0-1/10     SUBJECTIVE:  Reports no issues from last visit. Doing well with HEP. Feels comfortable with performing exercises at home. Mental outlook is better overall and going to start playing golf with family member. Noticing LE and ankles are getting stronger and numbness/tingling in feet have been abolished. Feels like L LE is stronger than right. Reports noticing the stiffness minimal to none currently.  Has been walking instead the house without using the cane since legs feel stronger. Overall reports 80%% improvement and has been more active in general since starting therapy. Reports noticing that he feels more steady with walking from room to room in the house. Reports daughter has noticed improvements with status as well. Reports feeling that he is able to do more physical activity before needing a rest since initial visit. Also, reports mental status has improved since onset of therapy. Reports easier time with picking up newspaper in driveway, and pulling in the garbage cans. OBJECTIVE: See progress report. Reviewed and performed some of his HEP. 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  Depression      Treatment based classification:    [x] mobilization/manipulation   [x] stabilization   [] extension based   [x] flexion based   [] lateral shift   [] traction   [] unspecified Components:   [x] thoracolumbar   [] pelvic   [] SIJ   [] sacral   [x] hip         Comparable sign: Stiffness with sit-stand transfer    Exercises/Interventions:     Therapeutic Exercise (57237) Resistance / level Sets / Seconds Reps Notes / Cues   F            IB GS stretches  H30 2x B    TrA with marches  HEP   TrA marches in Table top  H5 2x10 reps Max Verbal cues required   [employer] with hip add   HEP   SL hip abd  H2 1x8, 1x7 reps Easily fatigued   SL clams    Prone hip ext over EOB  H5 2x10 reps    Standing hip Transversus Abdominis Bracing - Hands on Stomach - 3 x daily - 7 x weekly - 10 reps - 10 hold  Bridge with Hip Abduction and Resistance - Ground Touches - 3 x daily - 7 x weekly - 10 reps - 5 hold     Access Code: [email or web address]  URL: [email or web address]  Date: 06/03/2022  Prepared by: Marilyn Cheng    Exercises  Standing March with Counter Support - 1 x daily - 7 x weekly - 2-3 sets - 10 reps - 2 hold  Standing Hip Abduction with Counter Support - 1 x daily - 7 x weekly - 2-3 sets - 10 reps - 2 hold  Standing Hip Extension with Counter Support - 1 x daily - 7 x weekly - 2-3 sets - 10 reps - 2 hold  Heel Toe Raises with Counter Support - 1 x daily - 7 x weekly - 2-3 sets - 10 reps  Mini Squat with Counter Support - 1 x daily - 7 x weekly - 3 sets - 10 reps    Therapeutic Exercise and NMR EXR  [x] (94989) Provided verbal/tactile cueing for activities related to strengthening, flexibility, endurance, ROM  for improvements in proximal hip and core control with self care, mobility, lifting and ambulation. [x] (75992) Provided verbal/tactile cueing for activities related to improving balance, coordination, kinesthetic sense, posture, motor skill, proprioception  to assist with core control in self care, mobility, lifting, and ambulation.   [] (43742) Therapist is in constant attendance of 2 or more patients providing skilled therapy interventions, but not providing any significant amount of measurable one-on-one time to either patient, for improvements in LE, proximal hip, and core control in self care, mobility, lifting, ambulation and eccentric single leg control.      Therapeutic Activities:    [x] (71178 or 38686) Provided verbal/tactile cueing for activities related to improving balance, coordination, kinesthetic sense, posture, motor skill, proprioception and motor activation to allow for proper function  with self care and ADLs  [] (33156) Provided training and instruction to the patient for proper core and proximal hip recruitment and positioning with ambulation re-education     Home Exercise Program:    [x] (72029) Reviewed/Progressed HEP activities related to strengthening, flexibility, endurance, ROM of core, proximal hip and LE for functional self-care, mobility, lifting and ambulation   [] (15073) Reviewed/Progressed HEP activities related to improving balance, coordination, kinesthetic sense, posture, motor skill, proprioception of core, proximal hip and LE for self care, mobility, lifting, and ambulation      Manual Treatments:  PROM / STM / Oscillations-Mobs:  G-I, II, III, IV (PA's, Inf., Post.)  [x] (24117) Provided manual therapy to mobilize proximal hip and LS spine soft tissue/joints for the purpose of modulating pain, promoting relaxation,  increasing ROM, reducing/eliminating soft tissue swelling/inflammation/restriction, improving soft tissue extensibility and allowing for proper ROM for normal function with self care, mobility, lifting and ambulation. Charges:  Timed Code Treatment Minutes: 39'   Total Treatment Minutes: 39'       [] EVAL - LOW (14283)   [] EVAL - MOD (44361)  [] EVAL - HIGH (22246)  [] RE-EVAL (55018)  [x] WH(55079) x1    [] Ionto  [x] NMR (75300) x 1      [] Vaso  [] Manual (68910) x       [] Ultrasound  [x] TA x 1      [] Mech Traction (81737)  [] Aquatic Therapy x     [] ES (un) (14662):   [] Home Management Training x  [] ES(attended) (81320)   [] Dry Needling 1-2 muscles (27484):  [] Dry Needling 3+ muscles (219366  [] Group:      [] Other:       GOALS:  Patient stated goal: lessen stiffness in back/ increase strength  [] Progressing: [x] Met: [] Not Met: [] Adjusted     Therapist goals for Patient:   Short Term Goals: To be achieved in: 2 weeks  1. Independent in HEP and progression per patient tolerance, in order to prevent re-injury. [] Progressing: [x] Met: [] Not Met: [] Adjusted  2.  Patient will have a decrease in pain to facilitate improvement in movement, function, and ADLs as indicated by Functional Deficits. [] Progressing: [x] Met: [] Not Met: [] Adjusted     Long Term Goals: To be achieved in: 12 weeks  1. FOTO score of at least 66 to assist with reaching prior level of function. [] Progressing: [x] Met: [] Not Met: [] Adjusted  2. Patient will demonstrate increased AROM to WNL, good LS mobility, good hip ROM to allow for proper joint functioning as indicated by patients Functional Deficits. [] Progressing: [x] Met: [] Not Met: [] Adjusted  3. Patient will demonstrate an increase in Strength to good proximal hip and core activation to allow for proper functional mobility as indicated by patients Functional Deficits. [] Progressing: [x] Met: [] Not Met: [] Adjusted  4. Patient will return to functional activities including walking without increased symptoms or restriction. [] Progressing: [x] Met: [] Not Met: [] Adjusted  5. Patient to be able to perform sit-stand transfer with less stiffness in back(patient specific functional goal)    [] Progressing: [x] Met: [] Not Met: [] Adjusted          Overall Progression Towards Functional goals/ Treatment Progress Update:  [x] Patient is progressing as expected towards functional goals listed. [] Progression is slowed due to complexities/Impairments listed. [] Progression has been slowed due to co-morbidities. [] Plan just implemented, too soon to assess goals progression <30days   [] Goals require adjustment due to lack of progress  [] Patient is not progressing as expected and requires additional follow up with physician  [] Other    Persisting Functional Limitations/Impairments:  []Sitting [x]Standing   [x]Walking [x]Squatting/bending    []Stairs []ADL's    []Transfers []Reaching  []Housework []Job related tasks  []Driving []Sports/Recreation   []Sleeping []Other:    ASSESSMENT: Patient has achieved all goals. Patient to continue with HEP at home for trial basis.      Treatment/Activity Tolerance:  [x] Patient able to complete tx  [] Patient limited by fatique   [] Patient limited by pain  [] Patient limited by other medical complications  [] Other:     Prognosis: [x] Good [] Fair  [] Poor    Patient Requires Follow-up: [] Yes  [x] No    PLAN: Patient to continue with HEP. D/C PT  [] Continue per plan of care [] Alter current plan (see comments)  [] Plan of care initiated [] Hold pending MD visit [x] Discharge    Electronically signed by: [place], [id number]  Note: If patient does not return for scheduled/ recommended follow up visits, this note will serve as a discharge from care along with most recent update on progress.

## 2022-08-04 ENCOUNTER — OFFICE VISIT (OUTPATIENT)
Dept: FAMILY MEDICINE CLINIC | Age: 87
End: 2022-08-04
Payer: MEDICARE

## 2022-08-04 VITALS
SYSTOLIC BLOOD PRESSURE: 120 MMHG | OXYGEN SATURATION: 99 % | RESPIRATION RATE: 18 BRPM | BODY MASS INDEX: 25.09 KG/M2 | WEIGHT: 169.4 LBS | HEIGHT: 69 IN | HEART RATE: 64 BPM | DIASTOLIC BLOOD PRESSURE: 78 MMHG

## 2022-08-04 DIAGNOSIS — Z00.00 MEDICARE ANNUAL WELLNESS VISIT, SUBSEQUENT: Primary | ICD-10-CM

## 2022-08-04 PROCEDURE — 1123F ACP DISCUSS/DSCN MKR DOCD: CPT | Performed by: FAMILY MEDICINE

## 2022-08-04 PROCEDURE — G0439 PPPS, SUBSEQ VISIT: HCPCS | Performed by: FAMILY MEDICINE

## 2022-08-04 ASSESSMENT — PATIENT HEALTH QUESTIONNAIRE - PHQ9
SUM OF ALL RESPONSES TO PHQ QUESTIONS 1-9: 2
SUM OF ALL RESPONSES TO PHQ QUESTIONS 1-9: 2
2. FEELING DOWN, DEPRESSED OR HOPELESS: 1
SUM OF ALL RESPONSES TO PHQ9 QUESTIONS 1 & 2: 2
1. LITTLE INTEREST OR PLEASURE IN DOING THINGS: 1
SUM OF ALL RESPONSES TO PHQ QUESTIONS 1-9: 2
SUM OF ALL RESPONSES TO PHQ QUESTIONS 1-9: 2

## 2022-08-04 ASSESSMENT — LIFESTYLE VARIABLES
HOW OFTEN DO YOU HAVE A DRINK CONTAINING ALCOHOL: NEVER
HOW MANY STANDARD DRINKS CONTAINING ALCOHOL DO YOU HAVE ON A TYPICAL DAY: PATIENT DOES NOT DRINK

## 2022-08-04 NOTE — PROGRESS NOTES
How Severe Is Your Hair Loss?: mild Medicare Annual Wellness Visit    Name: Krystin Pena Date: 2022   MRN: [id number] Sex: Male   Age: 80 y.o. Ethnicity: Non- / Non    : 1933 Race: White (non-)      Jose Ramon Davies is here for Medicare AWV      Here with daughter, Renetta Hauser. She has seen cardiologist for afib/cad, pacemaker check- good report! On coumadin and ALEX is therapeutic. Also on asa. No abnormal bleeding. Gets epo for anemia related to renal dx. Sees Dr Magui Akers. Last renal function test:   Lab Results   Component Value Date/Time     2022 02:05 PM    K 4.2 2022 02:05 PM    K 4.7 2020 02:10 PM    BUN 24 2022 02:05 PM    CREATININE 1.1 2022 02:05 PM     Estimated Creatinine Clearance: 46 mL/min (based on SCr of 1.1 mg/dL). Sees Dr Megha Meadows- on lisinopril, lasix, toprol for bp. Just had 6 wks of PT for weakness, stiffness, back ache- feels he is more stable, more stamina, more confidence in being active. Still uses cane. But is more active about the house. His back is less sore/stiff. No pain meds. Has been on cardura for nocturia. He is still getting up numerous times. He has not been restricting fluids in the evening. Lab Results   Component Value Date    PSA 2.82 2020    PSA 2.77 2019    PSA 2.83 2018      Has not had shingles vaccine. Screenings for behavioral, psychosocial and functional/safety risks, and cognitive dysfunction are all negative except as indicated below. These results, as well as other patient data from the [address] form, are documented in Flowsheets linked to this Encounter. No Known Allergies      Prior to Visit Medications    Medication Sig Taking?  Authorizing Provider   sotalol (BETAPACE) 120 MG tablet TAKE 1 TABLET BY MOUTH TWICE DAILY Yes Pamela Shrestha MD   doxazosin (CARDURA) 2 MG tablet TAKE 1 TABLET BY MOUTH DAILY Yes Kate Bartlett MD   aspirin 81 MG EC tablet TAKE 1 TABLET BY MOUTH DAILY Yes PRIYA Cardona CNP   lisinopril (PRINIVIL;ZESTRIL) 5 MG tablet Take 1 tablet by mouth daily Yes Perla Li MD   furosemide (LASIX) 40 MG tablet Take 1 tablet by mouth every morning Yes Perla Li MD   atorvastatin (LIPITOR) 20 MG tablet Take 1 tablet by mouth daily Yes Quan Hernandez MD   warfarin (COUMADIN) 5 MG tablet TAKE ONE-HALF TABLET BY MOUTH ON MONDAY WEDNESDAY AND FRIDAY, AND 1 TABLET BY MOUTH ALL OTHER DAYS OF THE WEEK Yes PRIYA Lange CNP   metoprolol succinate (TOPROL XL) 50 MG extended release tablet TAKE 1 TABLET BY MOUTH TWICE DAILY Yes Quan Hernandez MD   vitamin B-1 (THIAMINE) 100 MG tablet Take 1,000 mg by mouth daily  Yes Historical Provider, MD   EPOETIN BIBIANA IJ Inject as directed every 21 days  Yes Historical Provider, MD   acetaminophen (TYLENOL) 325 MG tablet Take 650 mg by mouth every 6 hours as needed for Pain Yes Historical Provider, MD   triamcinolone (ARISTOCORT) 0.5 % ointment APPLY TOPICALLY TO THE AFFECTED AREA TWICE DAILY  Patient not taking: Reported on 8/4/2022  Historical Provider, MD         Past Medical History:   Diagnosis Date    Actinic keratosis     Actinic keratosis     Atrial fibrillation ([address]     Bilateral carotid artery stenosis     CAD (coronary artery disease)     Cellulitis 7/15/2015    right foot    Diabetes mellitus ([address]     DM (diabetes mellitus), type 2 with peripheral vascular complications (HCC)--s/p amputation great toe post osteomylitis  9/11/2015    Glucose intolerance (malabsorption)     Hyperlipidemia     Hypertension     Hypertrophy of prostate without urinary obstruction and other lower urinary tract symptoms (LUTS)     Intermittent atrial fibrillation ([address]     Kidney stone     Occult blood in stool     Hx of    Pacemaker     Permanent       Past Surgical History:   Procedure Laterality Date    ABSCESS DRAINAGE  7/20/15    right foot    APPENDECTOMY      CARDIAC CATHETERIZATION  2003    Left Additional History: Significant increase since birth of 1.5 year old baby\\nNoticed increased facial hair growth\\nKeloid surgery 10 years ago steroid injection caused her period to stop for 6 months COLONOSCOPY  03/28/2018    dr Noé Edge    x3    [address] Right 7/17/15    right fifth toe I and D    PACEMAKER PLACEMENT  2005    OR ESOPHAGOGASTRODUODENOSCOPY TRANSORAL DIAGNOSTIC N/A 11/21/2018    EGD DIAGNOSTIC ONLY performed by Lexis Ortez MD at [address] [facility] Right 7.16.15    right pinkey toe     TONSILLECTOMY AND ADENOIDECTOMY           Family History   Problem Relation Age of Onset    Stroke Father     Diabetes Mother        CareTeam: (Including outside providers/suppliers regularly involved in providing care):   Patient Care Team:  Evangelista Beard MD as PCP - General (Family Medicine)  Evangelista Beard MD as PCP - [facility] Empaneled Provider    Wt Readings from Last 3 Encounters:   08/04/22 169 lb 6.4 oz (76.8 kg)   07/28/22 169 lb 8 oz (76.9 kg)   06/06/22 167 lb 3.2 oz (75.8 kg)     Vitals:    08/04/22 0958   BP: 120/78   Site: Right Upper Arm   Position: Sitting   Cuff Size: Medium Adult   Pulse: 64   Resp: 18   SpO2: 99%   Weight: 169 lb 6.4 oz (76.8 kg)   Height: 5' 9\" (1.753 m)     Body mass index is 25.02 kg/m². Based upon direct observation of the patient, evaluation of cognition reveals remote memory intact, recent memory impaired. His children look after important items in his life. He is aware of his forgetfulness. He does crossword puzzles and seeks to keep his brain stimulated. Son helps with legal/financial.  Daughter helps with meds, other son helps with yard work. Cuong Mayo says he 'sharp as a tack' most the time. Lives active life. Physical Exam     Patient's complete Health Risk Assessment and screening values have been reviewed and are found in Flowsheets. The following problems were reviewed today and where indicated follow up appointments were made and/or referrals ordered.     Positive Risk Factor Screenings with Interventions:     Fall Risk:  Do you feel unsteady or are you worried about falling? : (!) yes  2 or more falls in past year?: no  Fall with injury in past year?: no   Fall Risk Interventions: This is improving with PT.  PT is now concluded. He has HEP that he is motivated to continue. Goal is to get back on the golf course. General Health and ACP:  General  In general, how would you say your health is?: Fair  In the past 7 days, have you experienced any of the following: New or Increased Pain, New or Increased Fatigue, Loneliness, Social Isolation, Stress or Anger?: (!) Yes  Select all that apply: (!) Loneliness  Do you get the social and emotional support that you need?: Yes  Do you have a Living Will?: Yes    Advance Directives       Power of  Living Will ACP-Advance Directive ACP-Power of     Not on File Filed on 11/18/14 Filed Not on File        General Health Risk Interventions:  Jasmine Max does not feel lonely. He does not know why he checked the box for that. Health Habits/Nutrition:  Physical Activity: Insufficiently Active    Days of Exercise per Week: 2 days    Minutes of Exercise per Session: 50 min     Have you lost any weight without trying in the past 3 months?: No  Body mass index: (!) 25.01  Have you seen the dentist within the past year?: (!) No  Health Habits/Nutrition Interventions:  Dental exam overdue:  patient encouraged to make appointment with his/her dentist    Hearing/Vision:  Do you or your family notice any trouble with your hearing that hasn't been managed with hearing aids?: (!) Yes  Do you have difficulty driving, watching TV, or doing any of your daily activities because of your eyesight?: No  Have you had an eye exam within the past year?: Yes  No results found.   Hearing/Vision Interventions:  Hearing concerns:  getting hearing aids checked next month     ADLs:  In the past 7 days, did you need help from others to perform any of the following everyday activities: Eating, dressing, grooming, bathing, toileting, or walking/balance?: (!) Yes  Select all that apply: (!) Walking/Balance  In the past 7 days, did you need help from others to take care of any of the following: Laundry, housekeeping, banking/finances, shopping, telephone use, food preparation, transportation, or taking medications?: (!) Yes  Select all that apply: (!) Housekeeping  ADL Interventions:  Gets help from kids for ADL's    Personalized Preventive Plan:   Current Health Maintenance Status:  Immunization History   Administered Date(s) Administered    COVID-19, PFIZER GRAY top, DO NOT Dilute, (age 15 y+), IM, 30 mcg/0.3 mL 07/16/2022    COVID-19, PFIZER PURPLE top, DILUTE for use, (age 15 y+), 30mcg/0.3mL 01/20/2021, 02/10/2021, 11/03/2021    Influenza Vaccine, unspecified formulation 10/22/2015, 09/13/2016    Influenza Virus Vaccine 10/04/2010, 11/02/2011, 11/25/2013    Influenza, High Dose (Fluzone 65 yrs and older) 09/12/2014, 09/18/2017, 10/12/2018    Influenza, Quadv, adjuvanted, 65 yrs +, IM, PF (Fluad) 10/01/2020    Influenza, Triv, inactivated, subunit, adjuvanted, IM (Fluad 65 yrs and older) 09/24/2019    Pneumococcal Conjugate 13-valent ([id number]) 12/16/2014    Pneumococcal Polysaccharide ([id number]) 10/02/2008    Tdap (Boostrix, Adacel) 10/04/2010, 01/11/2016    Zoster Live (Zostavax) 02/16/2012        Health Maintenance   Topic Date Due    Shingles vaccine (2 of 3) 04/12/2012    Annual Wellness Visit (AWV)  05/06/2020    Flu vaccine (1) 09/01/2022    Lipids  02/17/2023    Depression Screen  05/03/2023    DTaP/Tdap/Td vaccine (3 - Td or Tdap) 01/11/2026    Pneumococcal 65+ years Vaccine  Completed    COVID-19 Vaccine  Completed    Hepatitis A vaccine  Aged Out    Hib vaccine  Aged Out    Meningococcal (ACWY) vaccine  Aged Out     Recommendations for [place] Due: see orders and patient instructions/AVS.    Recommended screening schedule for the next 5-10 years is provided to the patient in written form: see Patient Instructions/AVS.    Assessment/Plan:    1. Medicare annual wellness visit, subsequent  - Reviewed medical and social history together. Discussed wide range of preventive recommendations for Medicare population, including fall prevention, exercise, recommendations for healthy produce-based diet, vaccines and routine screening tests, addressing all care gaps. Vaccines recommended:  Shingrix. Follow up: 6 mo for general check up. An  Birdpostignature was used to authenticate this note.     --Donn Palmer MD on 8/4/2022  at 10:39 AM

## 2022-08-04 NOTE — PATIENT INSTRUCTIONS
Personalized Preventive Plan for Mitali Hilliard - 8/4/2022  Medicare offers a range of preventive health benefits. Some of the tests and screenings are paid in full while other may be subject to a deductible, co-insurance, and/or copay. Some of these benefits include a comprehensive review of your medical history including lifestyle, illnesses that may run in your family, and various assessments and screenings as appropriate. After reviewing your medical record and screening and assessments performed today your provider may have ordered immunizations, labs, imaging, and/or referrals for you. A list of these orders (if applicable) as well as your Preventive Care list are included within your After Visit Summary for your review. Other Preventive Recommendations:    A preventive eye exam performed by an eye specialist is recommended every 1-2 years to screen for glaucoma; cataracts, macular degeneration, and other eye disorders. A preventive dental visit is recommended every 6 months. Try to get at least 150 minutes of exercise per week or 10,000 steps per day on a pedometer . Order or download the FREE \"Exercise & Physical Activity: Your Everyday Guide\" from [employer]. Call 0-120.640.8306 or search [employer] online. You need 9416-5794 mg of calcium and 6911-9382 IU of vitamin D per day. It is possible to meet your calcium requirement with diet alone, but a vitamin D supplement is usually necessary to meet this goal.  When exposed to the sun, use a sunscreen that protects against both UVA and UVB radiation with an SPF of 30 or greater. Reapply every 2 to 3 hours or after sweating, drying off with a towel, or swimming. Always wear a seat belt when traveling in a car. Always wear a helmet when riding a bicycle or motorcycle.

## 2022-08-05 ENCOUNTER — HOSPITAL ENCOUNTER (OUTPATIENT)
Dept: PHYSICAL THERAPY | Age: 87
Setting detail: THERAPIES SERIES
End: 2022-08-05
Payer: MEDICARE

## 2022-08-08 ENCOUNTER — APPOINTMENT (OUTPATIENT)
Dept: PHYSICAL THERAPY | Age: 87
End: 2022-08-08
Payer: MEDICARE

## 2022-08-12 ENCOUNTER — APPOINTMENT (OUTPATIENT)
Dept: PHYSICAL THERAPY | Age: 87
End: 2022-08-12
Payer: MEDICARE

## 2022-08-23 ENCOUNTER — ANTI-COAG VISIT (OUTPATIENT)
Dept: PHARMACY | Age: 87
End: 2022-08-23
Payer: MEDICARE

## 2022-08-23 DIAGNOSIS — I48.0 PAF (PAROXYSMAL ATRIAL FIBRILLATION) (HCC): Primary | ICD-10-CM

## 2022-08-23 LAB — INTERNATIONAL NORMALIZATION RATIO, POC: 3.1

## 2022-08-23 PROCEDURE — 85610 PROTHROMBIN TIME: CPT

## 2022-08-23 PROCEDURE — 99211 OFF/OP EST MAY X REQ PHY/QHP: CPT

## 2022-08-23 NOTE — PROGRESS NOTES
Mr. Sudarshan Palafox is a 80 y.o. y/o male with history of Afib   He presents today for anticoagulation monitoring and adjustment. Pertinent PMH: ICD-pacemaker. 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    Patient Reported Findings:  Yes     No  [x]   []       Patient verifies current dosing regimen as listed  confirmed dose  []   [x]       S/S bleeding/bruising/swelling/SOB- denies  []   [x]       Blood in urine or stool denies  []   [x]       Procedures scheduled in the future at this time -  no changes   []   [x]       Missed Dose- denies   []   [x]       Extra Dose- denies  [x]   []       Change in medications- receiving Procrit 40,000 unit injection once every two weeks until red blood cell count is consistently under control again---> started amiodarone 200mg qd on 8/20 --->2 tylenol in the mornings---> resume lisinopril and lasix--> d/c plavix, started asa --> increased procrit shot    []   [x]       Change in health/diet/appetite consistent greens -> has been eating less, appetite has been diminished --> no greens, no NVD---> living alone; erratic diet--> less salads and green recently, daughter and neighbor bring meals --> still eating vit k but unable to state how much --> no changes   []   [x]       Change in alcohol use- doesn't drink   []   [x]       Change in activity  []   [x]       Hospital admission -  []   [x]       Emergency department visit  [x]   []       Other complaints--> Patient has been very tired and not able to function well, getting epoetin alpha injection at hemotologist office --> having trouble with low RBC-->getting labs drawn through the cancer society on Thurs      Clinical Outcomes:  Yes     No  []   [x]       Major bleeding event  []   [x]       Thromboembolic event  Duration of warfarin Therapy: indefinite  INR Range:  2.0-3.0     Has been stressed with passing of wife. Roland Austin (daughter) is primary care now- [phone].      INR is 3.1   Continue weekly dose of 5 mg on Sun and Thurs and 2.5 mg all other days of the week. Encouraged to maintain a consistency of vegetables/salads. Not sure why his level is elevated.   No clear reason  Recheck INR in 4 weeks, 9/20    **consent form signed 11/1/2021    Referring cardiologist will be Dr. Sd Singer  INR (no units)   Date Value   10/26/2021 1.20 (H)   10/14/2021 1.40 (H)   07/08/2021 1.40 (H)   12/08/2020 2.30 (H)     INR,(POC) (no units)   Date Value   07/26/2022 2.2   07/06/2022 2.2   06/15/2022 1.8   05/18/2022 2.1       For Pharmacy Admin Tracking Only    Total # of Interventions Recommended: 0  Total # of Interventions Accepted: 0  Time Spent (min): 15

## 2022-08-30 RX ORDER — DOXAZOSIN 2 MG/1
2 TABLET ORAL DAILY
Qty: 90 TABLET | Refills: 1 | Status: SHIPPED | OUTPATIENT
Start: 2022-08-30

## 2022-09-20 ENCOUNTER — HOSPITAL ENCOUNTER (OUTPATIENT)
Age: 87
Discharge: HOME | DRG: 871 | End: 2022-09-20
Payer: MEDICARE

## 2022-09-20 ENCOUNTER — ANTI-COAG VISIT (OUTPATIENT)
Dept: PHARMACY | Age: 87
End: 2022-09-20
Payer: MEDICARE

## 2022-09-20 DIAGNOSIS — I50.22 CHRONIC SYSTOLIC (CONGESTIVE) HEART FAILURE (HCC): ICD-10-CM

## 2022-09-20 DIAGNOSIS — R80.9 POSITIVE FOR MACROALBUMINURIA: ICD-10-CM

## 2022-09-20 DIAGNOSIS — I48.0 PAF (PAROXYSMAL ATRIAL FIBRILLATION) (HCC): Primary | ICD-10-CM

## 2022-09-20 DIAGNOSIS — N18.31 STAGE 3A CHRONIC KIDNEY DISEASE (HCC): ICD-10-CM

## 2022-09-20 LAB
ANION GAP SERPL CALCULATED.3IONS-SCNC: 16 MMOL/L (ref 3–16)
BUN BLDV-MCNC: 33 MG/DL (ref 7–20)
CALCIUM SERPL-MCNC: 9.2 MG/DL (ref 8.3–10.6)
CHLORIDE BLD-SCNC: 102 MMOL/L (ref 99–110)
CO2: 26 MMOL/L (ref 21–32)
CREAT SERPL-MCNC: 1.4 MG/DL (ref 0.8–1.3)
GFR AFRICAN AMERICAN: 58
GFR NON-AFRICAN AMERICAN: 48
GLUCOSE BLD-MCNC: 219 MG/DL (ref 70–99)
INTERNATIONAL NORMALIZATION RATIO, POC: 2.1
POTASSIUM SERPL-SCNC: 4.1 MMOL/L (ref 3.5–5.1)
SODIUM BLD-SCNC: 144 MMOL/L (ref 136–145)

## 2022-09-20 PROCEDURE — 99211 OFF/OP EST MAY X REQ PHY/QHP: CPT

## 2022-09-20 PROCEDURE — 80048 BASIC METABOLIC PNL TOTAL CA: CPT

## 2022-09-20 PROCEDURE — 85610 PROTHROMBIN TIME: CPT

## 2022-09-20 PROCEDURE — 36415 COLL VENOUS BLD VENIPUNCTURE: CPT

## 2022-09-20 NOTE — PROGRESS NOTES
Mr. Goyo Weber is a 80 y.o. y/o male with history of Afib   He presents today for anticoagulation monitoring and adjustment. Pertinent PMH: ICD-pacemaker. 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    Patient Reported Findings:  Yes     No  [x]   []       Patient verifies current dosing regimen as listed  confirmed dose  []   [x]       S/S bleeding/bruising/swelling/SOB- denies  []   [x]       Blood in urine or stool denies  []   [x]       Procedures scheduled in the future at this time -  no changes   []   [x]       Missed Dose- denies   []   [x]       Extra Dose- denies  []   [x]       Change in medications- receiving Procrit 40,000 unit injection once every two weeks until red blood cell count is consistently under control again---> started amiodarone 200mg qd on 8/20 --->2 tylenol in the mornings---> resume lisinopril and lasix--> d/c plavix, started asa --> increased procrit shot --> no changes    []   [x]       Change in health/diet/appetite consistent greens -> has been eating less, appetite has been diminished --> no greens, no NVD---> living alone; erratic diet--> less salads and green recently, daughter and neighbor bring meals --> still eating vit k but unable to state how much --> no changes   []   [x]       Change in alcohol use- doesn't drink   []   [x]       Change in activity  []   [x]       Hospital admission -  []   [x]       Emergency department visit  [x]   []       Other complaints--> Patient has been very tired and not able to function well, getting epoetin alpha injection at hemotologist office --> having trouble with low RBC-->getting labs drawn through the cancer society on Thurs      Clinical Outcomes:  Yes     No  []   [x]       Major bleeding event  []   [x]       Thromboembolic event  Duration of warfarin Therapy: indefinite  INR Range:  2.0-3.0     Has been stressed with passing of wife. Eugenie Pozo (daughter) is primary care now- [phone].      INR is 2.1 today Continue weekly dose of 5 mg on Sun and Thurs and 2.5 mg all other days of the week. Encouraged to maintain a consistency of vegetables/salads.    Recheck INR in 4 weeks, 10/18    **consent form signed 11/1/2021    Referring cardiologist will be Dr. Luz Arroyo  INR (no units)   Date Value   10/26/2021 1.20 (H)   10/14/2021 1.40 (H)   07/08/2021 1.40 (H)   12/08/2020 2.30 (H)     INR,(POC) (no units)   Date Value   08/23/2022 3.1   07/26/2022 2.2   07/06/2022 2.2   06/15/2022 1.8       For Pharmacy Admin Tracking Only    Total # of Interventions Recommended: 0  Total # of Interventions Accepted: 0  Time Spent (min): 15

## 2022-09-22 ENCOUNTER — APPOINTMENT (OUTPATIENT)
Dept: GENERAL RADIOLOGY | Age: 87
DRG: 871 | End: 2022-09-22
Payer: MEDICARE

## 2022-09-22 ENCOUNTER — APPOINTMENT (OUTPATIENT)
Dept: CT IMAGING | Age: 87
DRG: 871 | End: 2022-09-22
Payer: MEDICARE

## 2022-09-22 ENCOUNTER — HOSPITAL ENCOUNTER (INPATIENT)
Age: 87
LOS: 11 days | Discharge: NURSING, REHAB OR LONG-TERM CARE | DRG: 871 | End: 2022-10-03
Attending: EMERGENCY MEDICINE | Admitting: INTERNAL MEDICINE
Payer: MEDICARE

## 2022-09-22 DIAGNOSIS — R09.02 HYPOXIA: ICD-10-CM

## 2022-09-22 DIAGNOSIS — J18.9 PNEUMONIA OF BOTH LUNGS DUE TO INFECTIOUS ORGANISM, UNSPECIFIED PART OF LUNG: Primary | ICD-10-CM

## 2022-09-22 DIAGNOSIS — N17.9 ACUTE RENAL FAILURE SUPERIMPOSED ON CHRONIC KIDNEY DISEASE, UNSPECIFIED CKD STAGE, UNSPECIFIED ACUTE RENAL FAILURE TYPE (HCC): ICD-10-CM

## 2022-09-22 DIAGNOSIS — A41.9 SEVERE SEPSIS (HCC): ICD-10-CM

## 2022-09-22 DIAGNOSIS — N18.9 ACUTE RENAL FAILURE SUPERIMPOSED ON CHRONIC KIDNEY DISEASE, UNSPECIFIED CKD STAGE, UNSPECIFIED ACUTE RENAL FAILURE TYPE (HCC): ICD-10-CM

## 2022-09-22 DIAGNOSIS — R65.20 SEVERE SEPSIS (HCC): ICD-10-CM
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
## 2022-09-22 PROBLEM — J96.01 ACUTE RESPIRATORY FAILURE WITH HYPOXIA (HCC): Status: ACTIVE | Noted: 2022-09-22

## 2022-09-22 LAB
A/G RATIO: 1.5 (ref 1.1–2.2)
ALBUMIN SERPL-MCNC: 4.2 G/DL (ref 3.4–5)
ALP BLD-CCNC: 67 U/L (ref 40–129)
ALT SERPL-CCNC: 36 U/L (ref 10–40)
ANION GAP SERPL CALCULATED.3IONS-SCNC: 13 MMOL/L (ref 3–16)
APTT: 35.5 SEC (ref 23–34.3)
AST SERPL-CCNC: 65 U/L (ref 15–37)
BASOPHILS ABSOLUTE: 0 K/UL (ref 0–0.2)
BASOPHILS RELATIVE PERCENT: 0.3 %
BILIRUB SERPL-MCNC: 1 MG/DL (ref 0–1)
BUN BLDV-MCNC: 47 MG/DL (ref 7–20)
CALCIUM SERPL-MCNC: 9.1 MG/DL (ref 8.3–10.6)
CHLORIDE BLD-SCNC: 100 MMOL/L (ref 99–110)
CO2: 27 MMOL/L (ref 21–32)
CREAT SERPL-MCNC: 1.6 MG/DL (ref 0.8–1.3)
EOSINOPHILS ABSOLUTE: 0.3 K/UL (ref 0–0.6)
EOSINOPHILS RELATIVE PERCENT: 1.8 %
GFR AFRICAN AMERICAN: 50
GFR NON-AFRICAN AMERICAN: 41
GLUCOSE BLD-MCNC: 178 MG/DL (ref 70–99)
HCT VFR BLD CALC: 26.7 % (ref 40.5–52.5)
HEMOGLOBIN: 9.2 G/DL (ref 13.5–17.5)
INR BLD: 1.89 (ref 0.87–1.14)
LACTIC ACID, SEPSIS: 2 MMOL/L (ref 0.4–1.9)
LACTIC ACID, SEPSIS: 2.3 MMOL/L (ref 0.4–1.9)
LYMPHOCYTES ABSOLUTE: 0.8 K/UL (ref 1–5.1)
LYMPHOCYTES RELATIVE PERCENT: 5.5 %
MCH RBC QN AUTO: 32.5 PG (ref 26–34)
MCHC RBC AUTO-ENTMCNC: 34.4 G/DL (ref 31–36)
MCV RBC AUTO: 94.6 FL (ref 80–100)
MONOCYTES ABSOLUTE: 0.8 K/UL (ref 0–1.3)
MONOCYTES RELATIVE PERCENT: 5.4 %
NEUTROPHILS ABSOLUTE: 12.4 K/UL (ref 1.7–7.7)
NEUTROPHILS RELATIVE PERCENT: 87 %
PDW BLD-RTO: 20 % (ref 12.4–15.4)
PLATELET # BLD: 226 K/UL (ref 135–450)
PMV BLD AUTO: 9.8 FL (ref 5–10.5)
POTASSIUM REFLEX MAGNESIUM: 4.1 MMOL/L (ref 3.5–5.1)
PRO-BNP: 3936 PG/ML (ref 0–449)
PROTHROMBIN TIME: 21.8 SEC (ref 11.7–14.5)
RBC # BLD: 2.82 M/UL (ref 4.2–5.9)
SODIUM BLD-SCNC: 140 MMOL/L (ref 136–145)
TOTAL PROTEIN: 7 G/DL (ref 6.4–8.2)
TROPONIN: 0.03 NG/ML
WBC # BLD: 14.3 K/UL (ref 4–11)

## 2022-09-22 PROCEDURE — 90471 IMMUNIZATION ADMIN: CPT | Performed by: EMERGENCY MEDICINE

## 2022-09-22 PROCEDURE — 83880 ASSAY OF NATRIURETIC PEPTIDE: CPT

## 2022-09-22 PROCEDURE — 80053 COMPREHEN METABOLIC PANEL: CPT

## 2022-09-22 PROCEDURE — 96365 THER/PROPH/DIAG IV INF INIT: CPT

## 2022-09-22 PROCEDURE — 93005 ELECTROCARDIOGRAM TRACING: CPT | Performed by: EMERGENCY MEDICINE

## 2022-09-22 PROCEDURE — 6370000000 HC RX 637 (ALT 250 FOR IP): Performed by: EMERGENCY MEDICINE

## 2022-09-22 PROCEDURE — 96375 TX/PRO/DX INJ NEW DRUG ADDON: CPT

## 2022-09-22 PROCEDURE — 84484 ASSAY OF TROPONIN QUANT: CPT

## 2022-09-22 PROCEDURE — 2580000003 HC RX 258: Performed by: EMERGENCY MEDICINE

## 2022-09-22 PROCEDURE — 83605 ASSAY OF LACTIC ACID: CPT

## 2022-09-22 PROCEDURE — 72125 CT NECK SPINE W/O DYE: CPT

## 2022-09-22 PROCEDURE — 90714 TD VACC NO PRESV 7 YRS+ IM: CPT | Performed by: EMERGENCY MEDICINE

## 2022-09-22 PROCEDURE — 87040 BLOOD CULTURE FOR BACTERIA: CPT

## 2022-09-22 PROCEDURE — 85730 THROMBOPLASTIN TIME PARTIAL: CPT

## 2022-09-22 PROCEDURE — 85610 PROTHROMBIN TIME: CPT

## 2022-09-22 PROCEDURE — 96361 HYDRATE IV INFUSION ADD-ON: CPT

## 2022-09-22 PROCEDURE — 6360000002 HC RX W HCPCS: Performed by: EMERGENCY MEDICINE

## 2022-09-22 PROCEDURE — 96376 TX/PRO/DX INJ SAME DRUG ADON: CPT

## 2022-09-22 PROCEDURE — 99285 EMERGENCY DEPT VISIT HI MDM: CPT

## 2022-09-22 PROCEDURE — 70450 CT HEAD/BRAIN W/O DYE: CPT

## 2022-09-22 PROCEDURE — 71045 X-RAY EXAM CHEST 1 VIEW: CPT

## 2022-09-22 PROCEDURE — 85025 COMPLETE CBC W/AUTO DIFF WBC: CPT

## 2022-09-22 PROCEDURE — 71250 CT THORAX DX C-: CPT

## 2022-09-22 PROCEDURE — 36415 COLL VENOUS BLD VENIPUNCTURE: CPT

## 2022-09-22 PROCEDURE — 2060000000 HC ICU INTERMEDIATE R&B

## 2022-09-22 RX ORDER — DOXAZOSIN MESYLATE 1 MG/1
2 TABLET ORAL DAILY
Status: DISCONTINUED | OUTPATIENT
Start: 2022-09-23 | End: 2022-09-26

## 2022-09-22 RX ORDER — 0.9 % SODIUM CHLORIDE 0.9 %
500 INTRAVENOUS SOLUTION INTRAVENOUS ONCE
Status: COMPLETED | OUTPATIENT
Start: 2022-09-22 | End: 2022-09-22

## 2022-09-22 RX ORDER — AZITHROMYCIN 250 MG/1
500 TABLET, FILM COATED ORAL EVERY 24 HOURS
Status: COMPLETED | OUTPATIENT
Start: 2022-09-23 | End: 2022-09-25

## 2022-09-22 RX ORDER — SODIUM CHLORIDE 0.9 % (FLUSH) 0.9 %
10 SYRINGE (ML) INJECTION PRN
Status: DISCONTINUED | OUTPATIENT
Start: 2022-09-22 | End: 2022-10-03 | Stop reason: HOSPADM

## 2022-09-22 RX ORDER — SODIUM CHLORIDE 0.9 % (FLUSH) 0.9 %
5-40 SYRINGE (ML) INJECTION EVERY 12 HOURS SCHEDULED
Status: DISCONTINUED | OUTPATIENT
Start: 2022-09-23 | End: 2022-10-03 | Stop reason: HOSPADM

## 2022-09-22 RX ORDER — SOTALOL HYDROCHLORIDE 80 MG/1
120 TABLET ORAL 2 TIMES DAILY
Status: DISCONTINUED | OUTPATIENT
Start: 2022-09-23 | End: 2022-09-27

## 2022-09-22 RX ORDER — METHOCARBAMOL 500 MG/1
750 TABLET, FILM COATED ORAL ONCE
Status: COMPLETED | OUTPATIENT
Start: 2022-09-22 | End: 2022-09-22

## 2022-09-22 RX ORDER — ASPIRIN 81 MG/1
1 TABLET ORAL DAILY
Status: CANCELLED | OUTPATIENT
Start: 2022-09-23

## 2022-09-22 RX ORDER — ASPIRIN 81 MG/1
81 TABLET ORAL DAILY
Status: DISCONTINUED | OUTPATIENT
Start: 2022-09-23 | End: 2022-10-03 | Stop reason: HOSPADM

## 2022-09-22 RX ORDER — SODIUM CHLORIDE 9 MG/ML
INJECTION, SOLUTION INTRAVENOUS PRN
Status: DISCONTINUED | OUTPATIENT
Start: 2022-09-22 | End: 2022-10-03 | Stop reason: HOSPADM

## 2022-09-22 RX ORDER — DOXAZOSIN 2 MG/1
1 TABLET ORAL DAILY
Status: CANCELLED | OUTPATIENT
Start: 2022-09-23

## 2022-09-22 RX ORDER — SENNA PLUS 8.6 MG/1
1 TABLET ORAL DAILY PRN
Status: DISCONTINUED | OUTPATIENT
Start: 2022-09-22 | End: 2022-10-03 | Stop reason: HOSPADM

## 2022-09-22 RX ORDER — METHOCARBAMOL 500 MG/1
750 TABLET, FILM COATED ORAL 3 TIMES DAILY PRN
Status: DISCONTINUED | OUTPATIENT
Start: 2022-09-22 | End: 2022-10-03 | Stop reason: HOSPADM

## 2022-09-22 RX ORDER — ALBUTEROL SULFATE 2.5 MG/3ML
2.5 SOLUTION RESPIRATORY (INHALATION)
Status: DISCONTINUED | OUTPATIENT
Start: 2022-09-22 | End: 2022-10-03 | Stop reason: HOSPADM

## 2022-09-22 RX ORDER — IPRATROPIUM BROMIDE AND ALBUTEROL SULFATE 2.5; .5 MG/3ML; MG/3ML
1 SOLUTION RESPIRATORY (INHALATION)
Status: DISCONTINUED | OUTPATIENT
Start: 2022-09-23 | End: 2022-09-23

## 2022-09-22 RX ORDER — ACETAMINOPHEN 325 MG/1
650 TABLET ORAL EVERY 6 HOURS PRN
Status: DISCONTINUED | OUTPATIENT
Start: 2022-09-22 | End: 2022-09-25

## 2022-09-22 RX ORDER — FUROSEMIDE 40 MG/1
40 TABLET ORAL DAILY
Status: ON HOLD | COMMUNITY
End: 2022-10-03 | Stop reason: HOSPADM

## 2022-09-22 RX ORDER — ATORVASTATIN CALCIUM 20 MG/1
20 TABLET, FILM COATED ORAL DAILY
Status: DISCONTINUED | OUTPATIENT
Start: 2022-09-23 | End: 2022-10-03 | Stop reason: HOSPADM

## 2022-09-22 RX ORDER — FUROSEMIDE 40 MG/1
40 TABLET ORAL EVERY MORNING
Status: CANCELLED | OUTPATIENT
Start: 2022-09-23

## 2022-09-22 RX ORDER — LACTOBACILLUS RHAMNOSUS GG 10B CELL
1 CAPSULE ORAL 2 TIMES DAILY WITH MEALS
Status: DISCONTINUED | OUTPATIENT
Start: 2022-09-23 | End: 2022-10-03 | Stop reason: HOSPADM

## 2022-09-22 RX ORDER — GAUZE BANDAGE 2" X 2"
1000 BANDAGE TOPICAL DAILY
Status: CANCELLED | OUTPATIENT
Start: 2022-09-23

## 2022-09-22 RX ORDER — MORPHINE SULFATE 4 MG/ML
4 INJECTION, SOLUTION INTRAMUSCULAR; INTRAVENOUS
Status: DISCONTINUED | OUTPATIENT
Start: 2022-09-22 | End: 2022-09-22 | Stop reason: HOSPADM

## 2022-09-22 RX ADMIN — DEXTROSE MONOHYDRATE 500 MG: 50 INJECTION, SOLUTION INTRAVENOUS at 19:40

## 2022-09-22 RX ADMIN — CLOSTRIDIUM TETANI TOXOID ANTIGEN (FORMALDEHYDE INACTIVATED) AND CORYNEBACTERIUM DIPHTHERIAE TOXOID ANTIGEN (FORMALDEHYDE INACTIVATED) 0.5 ML: 5; 2 INJECTION, SUSPENSION INTRAMUSCULAR at 16:47

## 2022-09-22 RX ADMIN — SODIUM CHLORIDE 500 ML: 9 INJECTION, SOLUTION INTRAVENOUS at 16:52

## 2022-09-22 RX ADMIN — MORPHINE SULFATE 4 MG: 4 INJECTION, SOLUTION INTRAMUSCULAR; INTRAVENOUS at 17:19

## 2022-09-22 RX ADMIN — METHOCARBAMOL 750 MG: 500 TABLET ORAL at 22:21

## 2022-09-22 RX ADMIN — Medication 1000 MG: at 19:37

## 2022-09-22 RX ADMIN — MORPHINE SULFATE 4 MG: 4 INJECTION, SOLUTION INTRAMUSCULAR; INTRAVENOUS at 19:38

## 2022-09-22 ASSESSMENT — PAIN SCALES - GENERAL
PAINLEVEL_OUTOF10: 4
PAINLEVEL_OUTOF10: 7
PAINLEVEL_OUTOF10: 3

## 2022-09-22 ASSESSMENT — PAIN DESCRIPTION - ORIENTATION
ORIENTATION: RIGHT;LEFT
ORIENTATION: RIGHT;LEFT;LOWER
ORIENTATION: RIGHT;LEFT

## 2022-09-22 ASSESSMENT — PAIN DESCRIPTION - DESCRIPTORS
DESCRIPTORS: ACHING
DESCRIPTORS: DISCOMFORT

## 2022-09-22 ASSESSMENT — PAIN DESCRIPTION - LOCATION
LOCATION: LEG;PELVIS
LOCATION: LEG;PELVIS
LOCATION: LEG

## 2022-09-22 ASSESSMENT — PAIN SCALES - WONG BAKER: WONGBAKER_NUMERICALRESPONSE: 0
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
## 2022-09-23 ENCOUNTER — APPOINTMENT (OUTPATIENT)
Dept: GENERAL RADIOLOGY | Age: 87
DRG: 871 | End: 2022-09-23
Payer: MEDICARE

## 2022-09-23 PROBLEM — I50.23 ACUTE ON CHRONIC SYSTOLIC CONGESTIVE HEART FAILURE (HCC): Status: ACTIVE | Noted: 2022-06-06

## 2022-09-23 PROBLEM — J18.9 PNEUMONIA OF BOTH LUNGS DUE TO INFECTIOUS ORGANISM: Status: ACTIVE | Noted: 2022-09-23

## 2022-09-23 LAB
ANION GAP SERPL CALCULATED.3IONS-SCNC: 13 MMOL/L (ref 3–16)
BASOPHILS ABSOLUTE: 0 K/UL (ref 0–0.2)
BASOPHILS RELATIVE PERCENT: 0.4 %
BUN BLDV-MCNC: 44 MG/DL (ref 7–20)
CALCIUM SERPL-MCNC: 8.6 MG/DL (ref 8.3–10.6)
CHLORIDE BLD-SCNC: 104 MMOL/L (ref 99–110)
CO2: 22 MMOL/L (ref 21–32)
CREAT SERPL-MCNC: 1.3 MG/DL (ref 0.8–1.3)
EKG ATRIAL RATE: 93 BPM
EKG DIAGNOSIS: NORMAL
EKG Q-T INTERVAL: 448 MS
EKG QRS DURATION: 92 MS
EKG QTC CALCULATION (BAZETT): 536 MS
EKG R AXIS: -25 DEGREES
EKG T AXIS: 175 DEGREES
EKG VENTRICULAR RATE: 86 BPM
EOSINOPHILS ABSOLUTE: 0 K/UL (ref 0–0.6)
EOSINOPHILS RELATIVE PERCENT: 0.4 %
FERRITIN: 819.3 NG/ML (ref 30–400)
FOLATE: 11.03 NG/ML (ref 4.78–24.2)
GFR AFRICAN AMERICAN: >60
GFR NON-AFRICAN AMERICAN: 52
GLUCOSE BLD-MCNC: 166 MG/DL (ref 70–99)
HCT VFR BLD CALC: 22 % (ref 40.5–52.5)
HCT VFR BLD CALC: 22.8 % (ref 40.5–52.5)
HEMOGLOBIN: 7.5 G/DL (ref 13.5–17.5)
HEMOGLOBIN: 7.8 G/DL (ref 13.5–17.5)
INR BLD: 2.15 (ref 0.87–1.14)
IRON SATURATION: 20 % (ref 20–50)
IRON: 31 UG/DL (ref 59–158)
LYMPHOCYTES ABSOLUTE: 0.9 K/UL (ref 1–5.1)
LYMPHOCYTES RELATIVE PERCENT: 9.6 %
MCH RBC QN AUTO: 33.1 PG (ref 26–34)
MCHC RBC AUTO-ENTMCNC: 34.3 G/DL (ref 31–36)
MCV RBC AUTO: 96.4 FL (ref 80–100)
MONOCYTES ABSOLUTE: 1 K/UL (ref 0–1.3)
MONOCYTES RELATIVE PERCENT: 10 %
NEUTROPHILS ABSOLUTE: 7.6 K/UL (ref 1.7–7.7)
NEUTROPHILS RELATIVE PERCENT: 79.6 %
PDW BLD-RTO: 19.7 % (ref 12.4–15.4)
PLATELET # BLD: 189 K/UL (ref 135–450)
PMV BLD AUTO: 9.8 FL (ref 5–10.5)
POTASSIUM REFLEX MAGNESIUM: 4.3 MMOL/L (ref 3.5–5.1)
PROCALCITONIN: 0.93 NG/ML (ref 0–0.15)
PROTHROMBIN TIME: 24.1 SEC (ref 11.7–14.5)
RBC # BLD: 2.36 M/UL (ref 4.2–5.9)
REPORT: NORMAL
RESPIRATORY PANEL PCR: NORMAL
SODIUM BLD-SCNC: 139 MMOL/L (ref 136–145)
TOTAL IRON BINDING CAPACITY: 152 UG/DL (ref 260–445)
TROPONIN: 0.03 NG/ML
TROPONIN: 0.03 NG/ML
TROPONIN: 0.04 NG/ML
TROPONIN: 0.04 NG/ML
TROPONIN: 0.05 NG/ML
VITAMIN B-12: 347 PG/ML (ref 211–911)
WBC # BLD: 9.5 K/UL (ref 4–11)

## 2022-09-23 PROCEDURE — 0202U NFCT DS 22 TRGT SARS-COV-2: CPT

## 2022-09-23 PROCEDURE — 83540 ASSAY OF IRON: CPT

## 2022-09-23 PROCEDURE — 6360000002 HC RX W HCPCS: Performed by: PHYSICIAN ASSISTANT

## 2022-09-23 PROCEDURE — 85014 HEMATOCRIT: CPT

## 2022-09-23 PROCEDURE — 97530 THERAPEUTIC ACTIVITIES: CPT

## 2022-09-23 PROCEDURE — 6370000000 HC RX 637 (ALT 250 FOR IP): Performed by: PHYSICIAN ASSISTANT

## 2022-09-23 PROCEDURE — 87449 NOS EACH ORGANISM AG IA: CPT

## 2022-09-23 PROCEDURE — 2580000003 HC RX 258: Performed by: PHYSICIAN ASSISTANT

## 2022-09-23 PROCEDURE — 82607 VITAMIN B-12: CPT

## 2022-09-23 PROCEDURE — 97116 GAIT TRAINING THERAPY: CPT

## 2022-09-23 PROCEDURE — 6370000000 HC RX 637 (ALT 250 FOR IP): Performed by: INTERNAL MEDICINE

## 2022-09-23 PROCEDURE — 94150 VITAL CAPACITY TEST: CPT

## 2022-09-23 PROCEDURE — 83550 IRON BINDING TEST: CPT

## 2022-09-23 PROCEDURE — 36415 COLL VENOUS BLD VENIPUNCTURE: CPT

## 2022-09-23 PROCEDURE — 85610 PROTHROMBIN TIME: CPT

## 2022-09-23 PROCEDURE — 99223 1ST HOSP IP/OBS HIGH 75: CPT | Performed by: INTERNAL MEDICINE

## 2022-09-23 PROCEDURE — 93010 ELECTROCARDIOGRAM REPORT: CPT | Performed by: INTERNAL MEDICINE

## 2022-09-23 PROCEDURE — 82728 ASSAY OF FERRITIN: CPT

## 2022-09-23 PROCEDURE — 80048 BASIC METABOLIC PNL TOTAL CA: CPT

## 2022-09-23 PROCEDURE — 84145 PROCALCITONIN (PCT): CPT

## 2022-09-23 PROCEDURE — 82746 ASSAY OF FOLIC ACID SERUM: CPT

## 2022-09-23 PROCEDURE — 97162 PT EVAL MOD COMPLEX 30 MIN: CPT

## 2022-09-23 PROCEDURE — 2060000000 HC ICU INTERMEDIATE R&B

## 2022-09-23 PROCEDURE — 85025 COMPLETE CBC W/AUTO DIFF WBC: CPT

## 2022-09-23 PROCEDURE — 6360000002 HC RX W HCPCS: Performed by: INTERNAL MEDICINE

## 2022-09-23 PROCEDURE — 85018 HEMOGLOBIN: CPT

## 2022-09-23 PROCEDURE — 94640 AIRWAY INHALATION TREATMENT: CPT

## 2022-09-23 PROCEDURE — 84484 ASSAY OF TROPONIN QUANT: CPT

## 2022-09-23 PROCEDURE — 73590 X-RAY EXAM OF LOWER LEG: CPT

## 2022-09-23 PROCEDURE — 72100 X-RAY EXAM L-S SPINE 2/3 VWS: CPT

## 2022-09-23 RX ORDER — FUROSEMIDE 10 MG/ML
40 INJECTION INTRAMUSCULAR; INTRAVENOUS DAILY
Status: DISCONTINUED | OUTPATIENT
Start: 2022-09-23 | End: 2022-09-25

## 2022-09-23 RX ORDER — IPRATROPIUM BROMIDE AND ALBUTEROL SULFATE 2.5; .5 MG/3ML; MG/3ML
1 SOLUTION RESPIRATORY (INHALATION) 2 TIMES DAILY
Status: DISCONTINUED | OUTPATIENT
Start: 2022-09-23 | End: 2022-09-26

## 2022-09-23 RX ORDER — WARFARIN SODIUM 5 MG/1
5 TABLET ORAL
Status: DISCONTINUED | OUTPATIENT
Start: 2022-09-25 | End: 2022-09-25

## 2022-09-23 RX ORDER — FUROSEMIDE 40 MG/1
40 TABLET ORAL DAILY
Status: DISCONTINUED | OUTPATIENT
Start: 2022-09-23 | End: 2022-09-23

## 2022-09-23 RX ORDER — LIDOCAINE 4 G/G
1 PATCH TOPICAL DAILY
Status: DISCONTINUED | OUTPATIENT
Start: 2022-09-23 | End: 2022-09-25

## 2022-09-23 RX ORDER — WARFARIN SODIUM 2.5 MG/1
2.5 TABLET ORAL
Status: DISCONTINUED | OUTPATIENT
Start: 2022-09-23 | End: 2022-09-25

## 2022-09-23 RX ADMIN — Medication 1 CAPSULE: at 17:27

## 2022-09-23 RX ADMIN — METHOCARBAMOL TABLETS 750 MG: 500 TABLET, COATED ORAL at 01:06

## 2022-09-23 RX ADMIN — DOXAZOSIN 2 MG: 1 TABLET ORAL at 09:07

## 2022-09-23 RX ADMIN — SENNOSIDES 8.6 MG: 8.6 TABLET, COATED ORAL at 17:31

## 2022-09-23 RX ADMIN — METHOCARBAMOL TABLETS 750 MG: 500 TABLET, COATED ORAL at 17:28

## 2022-09-23 RX ADMIN — SOTALOL HYDROCHLORIDE 120 MG: 80 TABLET ORAL at 01:07

## 2022-09-23 RX ADMIN — Medication 1000 MG: at 20:05

## 2022-09-23 RX ADMIN — ATORVASTATIN CALCIUM 20 MG: 20 TABLET, FILM COATED ORAL at 09:07

## 2022-09-23 RX ADMIN — WARFARIN SODIUM 2.5 MG: 2.5 TABLET ORAL at 17:27

## 2022-09-23 RX ADMIN — FUROSEMIDE 40 MG: 10 INJECTION, SOLUTION INTRAMUSCULAR; INTRAVENOUS at 13:49

## 2022-09-23 RX ADMIN — Medication 10 ML: at 09:07

## 2022-09-23 RX ADMIN — AZITHROMYCIN MONOHYDRATE 500 MG: 250 TABLET ORAL at 20:05

## 2022-09-23 RX ADMIN — IPRATROPIUM BROMIDE AND ALBUTEROL SULFATE 1 AMPULE: 2.5; .5 SOLUTION RESPIRATORY (INHALATION) at 20:45

## 2022-09-23 RX ADMIN — SOTALOL HYDROCHLORIDE 120 MG: 80 TABLET ORAL at 21:19

## 2022-09-23 RX ADMIN — ASPIRIN 81 MG: 81 TABLET, COATED ORAL at 09:07

## 2022-09-23 RX ADMIN — FUROSEMIDE 40 MG: 40 TABLET ORAL at 10:27

## 2022-09-23 RX ADMIN — ACETAMINOPHEN 650 MG: 325 TABLET ORAL at 17:27

## 2022-09-23 RX ADMIN — SOTALOL HYDROCHLORIDE 120 MG: 80 TABLET ORAL at 09:07

## 2022-09-23 RX ADMIN — Medication 10 ML: at 20:05

## 2022-09-23 RX ADMIN — Medication 1 CAPSULE: at 09:07

## 2022-09-23 ASSESSMENT — PAIN SCALES - GENERAL
PAINLEVEL_OUTOF10: 9
PAINLEVEL_OUTOF10: 5

## 2022-09-23 ASSESSMENT — PAIN DESCRIPTION - LOCATION
LOCATION: BACK;LEG
LOCATION: BACK
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
## 2022-09-23 NOTE — RT PROTOCOL NOTE
RT Nebulizer Bronchodilator Protocol Note    There is a bronchodilator order in the chart from a provider indicating to follow the RT Bronchodilator Protocol and there is an Initiate RT Bronchodilator Protocol order as well (see protocol at bottom of note). CXR Findings:  XR CHEST PORTABLE    Result Date: 9/22/2022  1. A suboptimal inspiration limits the study. 2.  Some vague opacity at the left lung base is likely related to the poor level of inspiration and bronchovascular crowding. Mild pneumonitis in that area cannot be completely excluded. The findings from the last RT Protocol Assessment were as follows:  Smoking: None or smoker <15 pack years  Respiratory Pattern: Dyspnea on exertion or RR 21-25 bpm  Breath Sounds: Slightly diminished and/or crackles  Cough: Strong, spontaneous, non-productive  Indication for Bronchodilator Therapy: Decreased or absent breath sounds  Bronchodilator Assessment Score: 4    Aerosolized bronchodilator medication orders have been revised according to the RT Nebulizer Bronchodilator Protocol below. Respiratory Therapist to perform RT Therapy Protocol Assessment initially then follow the protocol. Repeat RT Therapy Protocol Assessment PRN for score 0-3 or on second treatment, BID, and PRN for scores above 3. No Indications - adjust the frequency to every 6 hours PRN wheezing or bronchospasm, if no treatments needed after 48 hours then discontinue using Per Protocol order mode. If indication present, adjust the RT bronchodilator orders based on the Bronchodilator Assessment Score as indicated below. If a patient is on this medication at home then do not decrease Frequency below that used at home. 0-3 - enter or revise RT bronchodilator order(s) to equivalent RT Bronchodilator order with Frequency of every 4 hours PRN for wheezing or increased work of breathing using Per Protocol order mode.        4-6 - enter or revise RT Bronchodilator order(s) to two equivalent RT bronchodilator orders with one order with BID Frequency and one order with Frequency of every 4 hours PRN wheezing or increased work of breathing using Per Protocol order mode. 7-10 - enter or revise RT Bronchodilator order(s) to two equivalent RT bronchodilator orders with one order with TID Frequency and one order with Frequency of every 4 hours PRN wheezing or increased work of breathing using Per Protocol order mode. 11-13 - enter or revise RT Bronchodilator order(s) to one equivalent RT bronchodilator order with QID Frequency and an Albuterol order with Frequency of every 4 hours PRN wheezing or increased work of breathing using Per Protocol order mode. Greater than 13 - enter or revise RT Bronchodilator order(s) to one equivalent RT bronchodilator order with every 4 hours Frequency and an Albuterol order with Frequency of every 2 hours PRN wheezing or increased work of breathing using Per Protocol order mode. RT to enter RT Home Evaluation for COPD & MDI Assessment order using Per Protocol order mode.     Electronically signed by Vania Villela RCP on 9/23/2022 at 12:12 AM

## 2022-09-23 NOTE — CARE COORDINATION
Discharge Planning Assessment    RN/SW discharge planner met with patient/ (and family member) to discuss reason for admission, current living situation, and potential needs at the time of discharge    Demographics/Insurance verified Yes/No Yes    Current type of dwelling: House, ranch with no steps to enter    Patient from ECF/SW confirmed with: N/A    Living arrangements: Lives alone    Level of function/Support: Independent, patient still cooks, does laudry and performs all ADL's, daughter does help run errands    PCP: Dr. Lucie Liz MD    Last Visit to PCP: Last month    DME: [employer]ciki Kasper    Active with any community resources/agencies/skilled home care: none    Medication compliance issues:None    Financial issues that could impact healthcare: None        Tentative discharge plan:  PT/OT pending, plan TBD      Discussed with patient and/or family that on the day of discharge home tentative time of discharge will be between 10 AM and noon.     Transportation at the time of discharge:  Family

## 2022-09-23 NOTE — ED NOTES
Pt placed on 2L NC at this time due to increased drowsiness after morphine.       Surjit Ross RN  09/22/22 [id number]

## 2022-09-23 NOTE — PROGRESS NOTES
4 Eyes Skin Assessment     NAME:  Thad Farris  YOB: 1933  MEDICAL RECORD NUMBER:  [id number]    The patient is being assess for  Admission    I agree that 2 RN's have performed a thorough Head to Toe Skin Assessment on the patient. ALL assessment sites listed below have been assessed. Areas assessed by both nurses:    Head, Face, Ears, Shoulders, Back, Chest, Arms, Elbows, Hands, Sacrum. Buttock, Coccyx, Ischium, and Legs. Feet and Heels        Does the Patient have a Wound?  No noted wound(s)       [place] Prevention initiated:  No   Wound Care Orders initiated:  No    Pressure Injury (Stage 3,4, Unstageable, DTI, NWPT, and Complex wounds) if present place referral/consult order under [de-identified] No    New and Established Ostomies if present place consult order under : No      Nurse 1 eSignature: Electronically signed by Mohsen Mason RN on 9/23/22 at 5:32 AM EDT    **SHARE this note so that the co-signing nurse is able to place an eSignature**    Nurse 2 eSignature: {Esignature:[id number]}
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
## 2022-09-24 ENCOUNTER — APPOINTMENT (OUTPATIENT)
Dept: CT IMAGING | Age: 87
DRG: 871 | End: 2022-09-24
Payer: MEDICARE

## 2022-09-24 ENCOUNTER — APPOINTMENT (OUTPATIENT)
Dept: GENERAL RADIOLOGY | Age: 87
DRG: 871 | End: 2022-09-24
Payer: MEDICARE

## 2022-09-24 PROBLEM — N17.9 AKI (ACUTE KIDNEY INJURY) (HCC): Status: ACTIVE | Noted: 2022-09-24

## 2022-09-24 PROBLEM — S32.810A CLOSED PELVIC RING FRACTURE (HCC): Status: ACTIVE | Noted: 2022-09-24

## 2022-09-24 LAB
A/G RATIO: 1.6 (ref 1.1–2.2)
ABO/RH: NORMAL
ALBUMIN SERPL-MCNC: 3.6 G/DL (ref 3.4–5)
ALP BLD-CCNC: 61 U/L (ref 40–129)
ALT SERPL-CCNC: 29 U/L (ref 10–40)
ANION GAP SERPL CALCULATED.3IONS-SCNC: 14 MMOL/L (ref 3–16)
ANTIBODY SCREEN: NORMAL
AST SERPL-CCNC: 54 U/L (ref 15–37)
BACTERIA: ABNORMAL /HPF
BASOPHILS ABSOLUTE: 0 K/UL (ref 0–0.2)
BASOPHILS RELATIVE PERCENT: 0.3 %
BILIRUB SERPL-MCNC: 1.4 MG/DL (ref 0–1)
BILIRUBIN URINE: NEGATIVE
BLOOD BANK DISPENSE STATUS: NORMAL
BLOOD BANK PRODUCT CODE: NORMAL
BLOOD, URINE: NEGATIVE
BPU ID: NORMAL
BUN BLDV-MCNC: 55 MG/DL (ref 7–20)
CALCIUM SERPL-MCNC: 8.8 MG/DL (ref 8.3–10.6)
CHLORIDE BLD-SCNC: 103 MMOL/L (ref 99–110)
CLARITY: CLEAR
CO2: 23 MMOL/L (ref 21–32)
COLOR: YELLOW
CREAT SERPL-MCNC: 1.9 MG/DL (ref 0.8–1.3)
DESCRIPTION BLOOD BANK: NORMAL
EOSINOPHILS ABSOLUTE: 0.1 K/UL (ref 0–0.6)
EOSINOPHILS RELATIVE PERCENT: 0.6 %
EPITHELIAL CELLS, UA: 2 /HPF (ref 0–5)
GFR AFRICAN AMERICAN: 41
GFR NON-AFRICAN AMERICAN: 34
GLUCOSE BLD-MCNC: 156 MG/DL (ref 70–99)
GLUCOSE URINE: NEGATIVE MG/DL
HCT VFR BLD CALC: 20.9 % (ref 40.5–52.5)
HCT VFR BLD CALC: 25.7 % (ref 40.5–52.5)
HEMOGLOBIN: 7.2 G/DL (ref 13.5–17.5)
HEMOGLOBIN: 8.7 G/DL (ref 13.5–17.5)
HYALINE CASTS: 19 /LPF (ref 0–8)
INR BLD: 2 (ref 0.87–1.14)
KETONES, URINE: NEGATIVE MG/DL
L. PNEUMOPHILA SEROGP 1 UR AG: NORMAL
LEUKOCYTE ESTERASE, URINE: NEGATIVE
LYMPHOCYTES ABSOLUTE: 0.8 K/UL (ref 1–5.1)
LYMPHOCYTES RELATIVE PERCENT: 8.3 %
MAGNESIUM: 1.9 MG/DL (ref 1.8–2.4)
MCH RBC QN AUTO: 32.4 PG (ref 26–34)
MCHC RBC AUTO-ENTMCNC: 34.5 G/DL (ref 31–36)
MCV RBC AUTO: 93.9 FL (ref 80–100)
MICROSCOPIC EXAMINATION: YES
MONOCYTES ABSOLUTE: 0.9 K/UL (ref 0–1.3)
MONOCYTES RELATIVE PERCENT: 8.8 %
NEUTROPHILS ABSOLUTE: 8.1 K/UL (ref 1.7–7.7)
NEUTROPHILS RELATIVE PERCENT: 82 %
NITRITE, URINE: NEGATIVE
PDW BLD-RTO: 20.1 % (ref 12.4–15.4)
PH UA: 5 (ref 5–8)
PHOSPHORUS: 5 MG/DL (ref 2.5–4.9)
PLATELET # BLD: 187 K/UL (ref 135–450)
PMV BLD AUTO: 9.7 FL (ref 5–10.5)
POTASSIUM SERPL-SCNC: 4.3 MMOL/L (ref 3.5–5.1)
PRO-BNP: 2988 PG/ML (ref 0–449)
PROTEIN UA: 30 MG/DL
PROTHROMBIN TIME: 22.7 SEC (ref 11.7–14.5)
RBC # BLD: 2.23 M/UL (ref 4.2–5.9)
RBC UA: 0 /HPF (ref 0–4)
SODIUM BLD-SCNC: 140 MMOL/L (ref 136–145)
SPECIFIC GRAVITY UA: 1.01 (ref 1–1.03)
STREP PNEUMONIAE ANTIGEN, URINE: NORMAL
TOTAL PROTEIN: 5.9 G/DL (ref 6.4–8.2)
TROPONIN: 0.05 NG/ML
URINE REFLEX TO CULTURE: ABNORMAL
URINE TYPE: ABNORMAL
UROBILINOGEN, URINE: 0.2 E.U./DL
WBC # BLD: 9.8 K/UL (ref 4–11)
WBC UA: 2 /HPF (ref 0–5)

## 2022-09-24 PROCEDURE — 86901 BLOOD TYPING SEROLOGIC RH(D): CPT

## 2022-09-24 PROCEDURE — 73030 X-RAY EXAM OF SHOULDER: CPT

## 2022-09-24 PROCEDURE — 80053 COMPREHEN METABOLIC PANEL: CPT

## 2022-09-24 PROCEDURE — 85610 PROTHROMBIN TIME: CPT

## 2022-09-24 PROCEDURE — 6370000000 HC RX 637 (ALT 250 FOR IP): Performed by: INTERNAL MEDICINE

## 2022-09-24 PROCEDURE — 86900 BLOOD TYPING SEROLOGIC ABO: CPT

## 2022-09-24 PROCEDURE — 83735 ASSAY OF MAGNESIUM: CPT

## 2022-09-24 PROCEDURE — 2060000000 HC ICU INTERMEDIATE R&B

## 2022-09-24 PROCEDURE — 83880 ASSAY OF NATRIURETIC PEPTIDE: CPT

## 2022-09-24 PROCEDURE — 6370000000 HC RX 637 (ALT 250 FOR IP): Performed by: PHYSICIAN ASSISTANT

## 2022-09-24 PROCEDURE — 2580000003 HC RX 258: Performed by: PHYSICIAN ASSISTANT

## 2022-09-24 PROCEDURE — 6360000002 HC RX W HCPCS: Performed by: PHYSICIAN ASSISTANT

## 2022-09-24 PROCEDURE — 86850 RBC ANTIBODY SCREEN: CPT

## 2022-09-24 PROCEDURE — 70450 CT HEAD/BRAIN W/O DYE: CPT

## 2022-09-24 PROCEDURE — P9016 RBC LEUKOCYTES REDUCED: HCPCS

## 2022-09-24 PROCEDURE — 2700000000 HC OXYGEN THERAPY PER DAY

## 2022-09-24 PROCEDURE — 99231 SBSQ HOSP IP/OBS SF/LOW 25: CPT | Performed by: ORTHOPAEDIC SURGERY

## 2022-09-24 PROCEDURE — 84100 ASSAY OF PHOSPHORUS: CPT

## 2022-09-24 PROCEDURE — 99232 SBSQ HOSP IP/OBS MODERATE 35: CPT | Performed by: NURSE PRACTITIONER

## 2022-09-24 PROCEDURE — 86923 COMPATIBILITY TEST ELECTRIC: CPT

## 2022-09-24 PROCEDURE — 74176 CT ABD & PELVIS W/O CONTRAST: CPT

## 2022-09-24 PROCEDURE — 85018 HEMOGLOBIN: CPT

## 2022-09-24 PROCEDURE — 27197 CLSD TX PELVIC RING FX: CPT | Performed by: ORTHOPAEDIC SURGERY

## 2022-09-24 PROCEDURE — 94640 AIRWAY INHALATION TREATMENT: CPT

## 2022-09-24 PROCEDURE — 93005 ELECTROCARDIOGRAM TRACING: CPT | Performed by: INTERNAL MEDICINE

## 2022-09-24 PROCEDURE — 36430 TRANSFUSION BLD/BLD COMPNT: CPT

## 2022-09-24 PROCEDURE — 36415 COLL VENOUS BLD VENIPUNCTURE: CPT

## 2022-09-24 PROCEDURE — 85014 HEMATOCRIT: CPT

## 2022-09-24 PROCEDURE — 81001 URINALYSIS AUTO W/SCOPE: CPT

## 2022-09-24 PROCEDURE — 94761 N-INVAS EAR/PLS OXIMETRY MLT: CPT

## 2022-09-24 PROCEDURE — 85025 COMPLETE CBC W/AUTO DIFF WBC: CPT

## 2022-09-24 RX ORDER — SODIUM CHLORIDE 9 MG/ML
INJECTION, SOLUTION INTRAVENOUS PRN
Status: DISCONTINUED | OUTPATIENT
Start: 2022-09-24 | End: 2022-10-03 | Stop reason: HOSPADM

## 2022-09-24 RX ORDER — TRAMADOL HYDROCHLORIDE 50 MG/1
25 TABLET ORAL EVERY 6 HOURS PRN
Status: DISCONTINUED | OUTPATIENT
Start: 2022-09-24 | End: 2022-09-25

## 2022-09-24 RX ADMIN — SOTALOL HYDROCHLORIDE 120 MG: 80 TABLET ORAL at 08:56

## 2022-09-24 RX ADMIN — IPRATROPIUM BROMIDE AND ALBUTEROL SULFATE 1 AMPULE: 2.5; .5 SOLUTION RESPIRATORY (INHALATION) at 09:25

## 2022-09-24 RX ADMIN — Medication 10 ML: at 08:59

## 2022-09-24 RX ADMIN — METHOCARBAMOL TABLETS 750 MG: 500 TABLET, COATED ORAL at 08:56

## 2022-09-24 RX ADMIN — AZITHROMYCIN MONOHYDRATE 500 MG: 250 TABLET ORAL at 20:19

## 2022-09-24 RX ADMIN — SOTALOL HYDROCHLORIDE 120 MG: 80 TABLET ORAL at 20:19

## 2022-09-24 RX ADMIN — ACETAMINOPHEN 650 MG: 325 TABLET ORAL at 08:56

## 2022-09-24 RX ADMIN — Medication 10 ML: at 20:20

## 2022-09-24 RX ADMIN — IPRATROPIUM BROMIDE AND ALBUTEROL SULFATE 1 AMPULE: 2.5; .5 SOLUTION RESPIRATORY (INHALATION) at 20:50

## 2022-09-24 RX ADMIN — Medication 1 CAPSULE: at 18:03

## 2022-09-24 RX ADMIN — TRAMADOL HYDROCHLORIDE 25 MG: 50 TABLET ORAL at 15:23

## 2022-09-24 RX ADMIN — Medication 1 CAPSULE: at 08:56

## 2022-09-24 RX ADMIN — METHOCARBAMOL TABLETS 750 MG: 500 TABLET, COATED ORAL at 18:03

## 2022-09-24 RX ADMIN — Medication 1000 MG: at 20:20

## 2022-09-24 RX ADMIN — ATORVASTATIN CALCIUM 20 MG: 20 TABLET, FILM COATED ORAL at 08:56

## 2022-09-24 RX ADMIN — ACETAMINOPHEN 650 MG: 325 TABLET ORAL at 18:05

## 2022-09-24 ASSESSMENT — PAIN DESCRIPTION - LOCATION
LOCATION: RIB CAGE
LOCATION: RIB CAGE;ARM
LOCATION: BACK
LOCATION: RIB CAGE

## 2022-09-24 ASSESSMENT — PAIN DESCRIPTION - ORIENTATION
ORIENTATION: LEFT

## 2022-09-24 ASSESSMENT — PAIN SCALES - GENERAL
PAINLEVEL_OUTOF10: 5
PAINLEVEL_OUTOF10: 9
PAINLEVEL_OUTOF10: 5
PAINLEVEL_OUTOF10: 5

## 2022-09-24 ASSESSMENT — PAIN DESCRIPTION - DESCRIPTORS
DESCRIPTORS: ACHING;CRAMPING;JABBING
DESCRIPTORS: ACHING;CRAMPING
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
## 2022-09-24 NOTE — PROGRESS NOTES
Updated patient and daughter on current plan of care. Blood transfusion started. This RN in the room for first 15 mins. Pt left comfortable and in no obvious distress at this time.

## 2022-09-24 NOTE — PROGRESS NOTES
Pt family hit call bell and stated patient was having chest pain. Stat EKG ordered and patient assessed at the bedside. Upon assessment patient stated he had left chest, shoulder and rib pain and it felt like \"bone pain\". Pt stated it hurt when palpated and hurt worse when coughing or moving. Hospitalist notified, no EKG changes observed and patient remains on tele monitor. X-ray of left shoulder and scapula ordered. Pt given tramadol for pain (see in MAR) and put on 2 liters of oxygen for comfort due to coughing and pain. Pt informed to call out if pain medication does not diminish the pain for reassessment.
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
## 2022-09-24 NOTE — CONSULTS
Hematology Consult    See dictation    A/P;  Anemia. I follow this patient. He gets Procrit every 3 weeks for anemia due to chronic kidney disease. He likely has a component of myelodysplasia as well. It is not uncommon with elderly patients that have myelodysplasia that have stable hemoglobins for a long time to have an acute drop when there is some sort of stress. The CT scan did not show any signs of hematoma. I  will guaiac his stools. I will check for hemolysis. His hemoglobin earlier this week was 9.9 in my office. If he does not rebound over the next month, he may need a repeat bone marrow biopsy. Thank you for the consultation. We will follow closely.     Chelo Rebollar MD

## 2022-09-24 NOTE — CONSENT
Informed Consent for Blood Component Transfusion Note    I have discussed with the patient the rationale for blood component transfusion; its benefits in treating or preventing fatigue, organ damage, or death; and its risk which includes mild transfusion reactions, rare risk of blood borne infection, or more serious but rare reactions. I have discussed the alternatives to transfusion, including the risk and consequences of not receiving transfusion. The patient had an opportunity to ask questions and had agreed to proceed with transfusion of blood components.     Electronically signed by Bisi Barton MD on 9/24/22 at 9:57 AM EDT

## 2022-09-24 NOTE — CONSULTS
as  directed, metoprolol 50 mg daily. SOCIAL HISTORY:  He does not drink or smoke. He is retired. FAMILY HISTORY:  Noncontributory. REVIEW OF SYSTEMS:  He denies any recent fever, chills, sweats,  shortness of breath, chest pain, headaches, dysphagia, odynophagia,  diarrhea, constipation, hemoptysis, hematemesis, change in  vision/hearing/smell/taste, weakness, neuropathy, skin rashes,  productive cough, urinary or bowel prolapse or incontinence, petechiae,  purpura, skin rashes, pruritus, hallucinations, nasal congestion or  drainage, depression, anxiety, suicidal ideations, melena, or  hematochezia. He has mild amount of fatigue and mild amount of dyspnea  on exertion. His 10-system review of systems is otherwise negative. PHYSICAL EXAMINATION:  VITAL SIGNS:  He is afebrile with normal vital signs. GENERAL:  He is in no acute distress. HEENT:  His pupils are round and reactive to light and accommodation. Extraocular muscles are intact. NECK:  He has no jugular venous distention. No thyromegaly. His  oropharynx is clear. He has no carotid bruits. He has no palpable  lymphadenopathy. HEART:  Regular rate and rhythm. LUNGS:  Clear to auscultation bilaterally. ABDOMEN:  Nondistended, nontender, bowel sounds x4. No  hepatosplenomegaly. EXTREMITIES:  He has no peripheral clubbing, cyanosis, or edema. NEUROLOGIC:  Exam is stable. LABORATORY DATA:  His white blood cell count is 9.8, hemoglobin 7.2, and  platelets of 003. ASSESSMENT AND PLAN:  History of chronic anemia. I follow this patient. He gets Procrit every three weeks for anemia due to chronic kidney  disease. He likely has a component of myelodysplasia with it as well. It is not uncommon with elderly patients that have myelodysplasia that  have stable hemoglobins for a long time to have an acute drop when there  is some sort of stress. The CT scan did not show any signs of hematoma. I will guaiac his stools.   I will check for hemolysis. His hemoglobin  earlier this week was 9.9 in my office. If he does not rebound over the  next month or so, he may need a repeat bone marrow biopsy t0 rule out  progressive myelodysplasia. Thank you for the consultation. We will follow closely. Jasiel Del Angel MD    D: 09/24/2022 12:43:27       T: 09/24/2022 13:46:13     [email or web address]  Job#: [id number]     Doc#: [id number]    CC:   Tony Valdovinos,  Gm Gallego MD

## 2022-09-24 NOTE — PLAN OF CARE
Pt remains free from falls & injuries; pt has requested Communion on Sunday & bedside prayer - spiritual consult ordered. Pt alert & orientated x4; VSS; standard safety precautions in place. Pt denies SOB. Addendum @ 8930:  Pt did sit in chair with legs elevated for 3 hours and then returned to bed; pt remains a contact guard assist and uses cane to stand & pivot. Pt will benefit from walker with gait belt for longer distances. Pt alert & orientated x4; uses call light appropriately. Pt does have pain in left elbow, unrated, satisfied with pillow support. Problem: Discharge Planning  Goal: Discharge to home or other facility with appropriate resources  9/24/2022 0052 by Chelo Muñiz RN  Outcome: Progressing  9/23/2022 1407 by Brianne Mccollum RN  Outcome: Progressing     Problem: Skin/Tissue Integrity  Goal: Absence of new skin breakdown  Description: 1. Monitor for areas of redness and/or skin breakdown  2. Assess vascular access sites hourly  3. Every 4-6 hours minimum:  Change oxygen saturation probe site  4. Every 4-6 hours:  If on nasal continuous positive airway pressure, respiratory therapy assess nares and determine need for appliance change or resting period.   9/24/2022 0052 by Chelo Muñiz RN  Outcome: Progressing  9/23/2022 1407 by Brianne Mccollum RN  Outcome: Progressing     Problem: Safety - Adult  Goal: Free from fall injury  9/24/2022 0052 by Chelo Muñiz RN  Outcome: Progressing  9/23/2022 1407 by Brianne Mccollum RN  Outcome: Progressing     Problem: ABCDS Injury Assessment  Goal: Absence of physical injury  9/24/2022 0052 by Chelo Muñiz RN  Outcome: Progressing  9/23/2022 1407 by Brianne Mccollum RN  Outcome: Progressing     Problem: Neurosensory - Adult  Goal: Achieves maximal functionality and self care  Outcome: Progressing     Problem: Respiratory - Adult  Goal: Achieves optimal ventilation and oxygenation  Outcome: Progressing     Problem: Cardiovascular - Adult  Goal: Maintains optimal cardiac output and hemodynamic stability  Outcome: Progressing     Problem: Skin/Tissue Integrity - Adult  Goal: Skin integrity remains intact  Outcome: Progressing  Goal: Oral mucous membranes remain intact  Outcome: Progressing     Problem: Musculoskeletal - Adult  Goal: Return mobility to safest level of function  Outcome: Progressing     Problem: Genitourinary - Adult  Goal: Absence of urinary retention  Outcome: Progressing     Problem: Metabolic/Fluid and Electrolytes - Adult  Goal: Electrolytes maintained within normal limits  Outcome: Progressing  Goal: Hemodynamic stability and optimal renal function maintained  Outcome: Progressing     Problem: Hematologic - Adult  Goal: Maintains hematologic stability  Outcome: Progressing

## 2022-09-24 NOTE — PROGRESS NOTES
Pharmacy to Dose Warfarin    Pharmacy consulted to dose warfarin for Afib. INR Goal: 2-3    INR today: 2    Assessment/Plan:  - INR therapeutic today  - Possible concomitant drug-drug interactions include: azithromycin  - Will continue warfarin at home dose tonight, should receive 2.5 mg      Pharmacy will continue to follow.     Sandra Merino, PharmD, BCPS  Clinical Pharmacist  [id number]

## 2022-09-25 ENCOUNTER — APPOINTMENT (OUTPATIENT)
Dept: GENERAL RADIOLOGY | Age: 87
DRG: 871 | End: 2022-09-25
Payer: MEDICARE

## 2022-09-25 LAB
A/G RATIO: 1.2 (ref 1.1–2.2)
ALBUMIN SERPL-MCNC: 3.4 G/DL (ref 3.4–5)
ALP BLD-CCNC: 59 U/L (ref 40–129)
ALT SERPL-CCNC: 24 U/L (ref 10–40)
ANION GAP SERPL CALCULATED.3IONS-SCNC: 10 MMOL/L (ref 3–16)
AST SERPL-CCNC: 36 U/L (ref 15–37)
BASOPHILS ABSOLUTE: 0 K/UL (ref 0–0.2)
BASOPHILS RELATIVE PERCENT: 0.4 %
BILIRUB SERPL-MCNC: 1 MG/DL (ref 0–1)
BUN BLDV-MCNC: 52 MG/DL (ref 7–20)
CALCIUM SERPL-MCNC: 8.9 MG/DL (ref 8.3–10.6)
CHLORIDE BLD-SCNC: 104 MMOL/L (ref 99–110)
CO2: 24 MMOL/L (ref 21–32)
CREAT SERPL-MCNC: 1.3 MG/DL (ref 0.8–1.3)
EOSINOPHILS ABSOLUTE: 0.1 K/UL (ref 0–0.6)
EOSINOPHILS RELATIVE PERCENT: 0.8 %
GFR AFRICAN AMERICAN: >60
GFR NON-AFRICAN AMERICAN: 52
GLUCOSE BLD-MCNC: 140 MG/DL (ref 70–99)
HAPTOGLOBIN: 103 MG/DL (ref 30–200)
HCT VFR BLD CALC: 23.8 % (ref 40.5–52.5)
HEMOGLOBIN: 8.1 G/DL (ref 13.5–17.5)
INR BLD: 2.01 (ref 0.87–1.14)
LYMPHOCYTES ABSOLUTE: 0.9 K/UL (ref 1–5.1)
LYMPHOCYTES RELATIVE PERCENT: 9.8 %
MAGNESIUM: 2.2 MG/DL (ref 1.8–2.4)
MCH RBC QN AUTO: 31.4 PG (ref 26–34)
MCHC RBC AUTO-ENTMCNC: 34.2 G/DL (ref 31–36)
MCV RBC AUTO: 91.9 FL (ref 80–100)
MONOCYTES ABSOLUTE: 0.8 K/UL (ref 0–1.3)
MONOCYTES RELATIVE PERCENT: 8.4 %
NEUTROPHILS ABSOLUTE: 7.6 K/UL (ref 1.7–7.7)
NEUTROPHILS RELATIVE PERCENT: 80.6 %
PDW BLD-RTO: 20.2 % (ref 12.4–15.4)
PHOSPHORUS: 4 MG/DL (ref 2.5–4.9)
PLATELET # BLD: 217 K/UL (ref 135–450)
PMV BLD AUTO: 9.4 FL (ref 5–10.5)
POTASSIUM SERPL-SCNC: 3.9 MMOL/L (ref 3.5–5.1)
PROTHROMBIN TIME: 22.8 SEC (ref 11.7–14.5)
RBC # BLD: 2.59 M/UL (ref 4.2–5.9)
SODIUM BLD-SCNC: 138 MMOL/L (ref 136–145)
TOTAL PROTEIN: 6.3 G/DL (ref 6.4–8.2)
VITAMIN D 25-HYDROXY: 40.6 NG/ML
WBC # BLD: 9.5 K/UL (ref 4–11)

## 2022-09-25 PROCEDURE — 6370000000 HC RX 637 (ALT 250 FOR IP): Performed by: PHYSICIAN ASSISTANT

## 2022-09-25 PROCEDURE — 2700000000 HC OXYGEN THERAPY PER DAY

## 2022-09-25 PROCEDURE — 94640 AIRWAY INHALATION TREATMENT: CPT

## 2022-09-25 PROCEDURE — 2060000000 HC ICU INTERMEDIATE R&B

## 2022-09-25 PROCEDURE — 6370000000 HC RX 637 (ALT 250 FOR IP): Performed by: INTERNAL MEDICINE

## 2022-09-25 PROCEDURE — 99232 SBSQ HOSP IP/OBS MODERATE 35: CPT | Performed by: NURSE PRACTITIONER

## 2022-09-25 PROCEDURE — 2580000003 HC RX 258: Performed by: PHYSICIAN ASSISTANT

## 2022-09-25 PROCEDURE — 83010 ASSAY OF HAPTOGLOBIN QUANT: CPT

## 2022-09-25 PROCEDURE — 94761 N-INVAS EAR/PLS OXIMETRY MLT: CPT

## 2022-09-25 PROCEDURE — 80053 COMPREHEN METABOLIC PANEL: CPT

## 2022-09-25 PROCEDURE — 85025 COMPLETE CBC W/AUTO DIFF WBC: CPT

## 2022-09-25 PROCEDURE — 82306 VITAMIN D 25 HYDROXY: CPT

## 2022-09-25 PROCEDURE — 94150 VITAL CAPACITY TEST: CPT

## 2022-09-25 PROCEDURE — 36415 COLL VENOUS BLD VENIPUNCTURE: CPT

## 2022-09-25 PROCEDURE — 99231 SBSQ HOSP IP/OBS SF/LOW 25: CPT | Performed by: OTOLARYNGOLOGY

## 2022-09-25 PROCEDURE — 73070 X-RAY EXAM OF ELBOW: CPT

## 2022-09-25 PROCEDURE — 6360000002 HC RX W HCPCS: Performed by: PHYSICIAN ASSISTANT

## 2022-09-25 PROCEDURE — 85610 PROTHROMBIN TIME: CPT

## 2022-09-25 PROCEDURE — 84100 ASSAY OF PHOSPHORUS: CPT

## 2022-09-25 PROCEDURE — 83735 ASSAY OF MAGNESIUM: CPT

## 2022-09-25 RX ORDER — ACETAMINOPHEN 325 MG/1
650 TABLET ORAL EVERY 6 HOURS
Status: DISCONTINUED | OUTPATIENT
Start: 2022-09-25 | End: 2022-10-03 | Stop reason: HOSPADM

## 2022-09-25 RX ORDER — TRAMADOL HYDROCHLORIDE 50 MG/1
50 TABLET ORAL EVERY 6 HOURS PRN
Status: DISCONTINUED | OUTPATIENT
Start: 2022-09-25 | End: 2022-10-03 | Stop reason: HOSPADM

## 2022-09-25 RX ORDER — FUROSEMIDE 20 MG/1
20 TABLET ORAL DAILY
Status: DISCONTINUED | OUTPATIENT
Start: 2022-09-25 | End: 2022-09-26

## 2022-09-25 RX ORDER — POLYETHYLENE GLYCOL 3350 17 G/17G
17 POWDER, FOR SOLUTION ORAL DAILY
Status: DISCONTINUED | OUTPATIENT
Start: 2022-09-25 | End: 2022-10-03 | Stop reason: HOSPADM

## 2022-09-25 RX ORDER — LIDOCAINE 4 G/G
2 PATCH TOPICAL DAILY
Status: DISCONTINUED | OUTPATIENT
Start: 2022-09-25 | End: 2022-10-03 | Stop reason: HOSPADM

## 2022-09-25 RX ADMIN — SOTALOL HYDROCHLORIDE 120 MG: 80 TABLET ORAL at 09:13

## 2022-09-25 RX ADMIN — Medication 10 ML: at 11:01

## 2022-09-25 RX ADMIN — Medication 1000 MG: at 20:55

## 2022-09-25 RX ADMIN — FUROSEMIDE 20 MG: 20 TABLET ORAL at 14:22

## 2022-09-25 RX ADMIN — ATORVASTATIN CALCIUM 20 MG: 20 TABLET, FILM COATED ORAL at 09:15

## 2022-09-25 RX ADMIN — ACETAMINOPHEN 650 MG: 325 TABLET ORAL at 12:09

## 2022-09-25 RX ADMIN — Medication 1 CAPSULE: at 09:14

## 2022-09-25 RX ADMIN — SOTALOL HYDROCHLORIDE 120 MG: 80 TABLET ORAL at 20:53

## 2022-09-25 RX ADMIN — IPRATROPIUM BROMIDE AND ALBUTEROL SULFATE 1 AMPULE: 2.5; .5 SOLUTION RESPIRATORY (INHALATION) at 09:51

## 2022-09-25 RX ADMIN — METHOCARBAMOL TABLETS 750 MG: 500 TABLET, COATED ORAL at 03:11

## 2022-09-25 RX ADMIN — METHOCARBAMOL TABLETS 750 MG: 500 TABLET, COATED ORAL at 12:08

## 2022-09-25 RX ADMIN — AZITHROMYCIN MONOHYDRATE 500 MG: 250 TABLET ORAL at 20:54

## 2022-09-25 RX ADMIN — Medication 1 CAPSULE: at 17:01

## 2022-09-25 RX ADMIN — ASPIRIN 81 MG: 81 TABLET, COATED ORAL at 11:01

## 2022-09-25 RX ADMIN — POLYETHYLENE GLYCOL 3350 17 G: 17 POWDER, FOR SOLUTION ORAL at 14:10

## 2022-09-25 RX ADMIN — TRAMADOL HYDROCHLORIDE 25 MG: 50 TABLET ORAL at 07:17

## 2022-09-25 RX ADMIN — IPRATROPIUM BROMIDE AND ALBUTEROL SULFATE 1 AMPULE: 2.5; .5 SOLUTION RESPIRATORY (INHALATION) at 19:33

## 2022-09-25 RX ADMIN — Medication 10 ML: at 20:55

## 2022-09-25 ASSESSMENT — PAIN DESCRIPTION - ORIENTATION
ORIENTATION: LEFT
ORIENTATION: LEFT

## 2022-09-25 ASSESSMENT — PAIN SCALES - GENERAL
PAINLEVEL_OUTOF10: 6
PAINLEVEL_OUTOF10: 4
PAINLEVEL_OUTOF10: 10

## 2022-09-25 ASSESSMENT — PAIN DESCRIPTION - LOCATION
LOCATION: SHOULDER;RIB CAGE
LOCATION: RIB CAGE;SHOULDER
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
## 2022-09-25 NOTE — PLAN OF CARE
Problem: Discharge Planning  Goal: Discharge to home or other facility with appropriate resources  Outcome: Progressing     Problem: Skin/Tissue Integrity  Goal: Absence of new skin breakdown  Description: 1. Monitor for areas of redness and/or skin breakdown  2. Assess vascular access sites hourly  3. Every 4-6 hours minimum:  Change oxygen saturation probe site  4. Every 4-6 hours:  If on nasal continuous positive airway pressure, respiratory therapy assess nares and determine need for appliance change or resting period.   Outcome: Progressing     Problem: Safety - Adult  Goal: Free from fall injury  Outcome: Progressing     Problem: ABCDS Injury Assessment  Goal: Absence of physical injury  Outcome: Progressing     Problem: Neurosensory - Adult  Goal: Achieves maximal functionality and self care  Outcome: Progressing     Problem: Skin/Tissue Integrity - Adult  Goal: Skin integrity remains intact  Outcome: Progressing  Goal: Oral mucous membranes remain intact  Outcome: Progressing     Problem: Pain  Goal: Verbalizes/displays adequate comfort level or baseline comfort level  Outcome: Progressing     Problem: Hematologic - Adult  Goal: Maintains hematologic stability  Outcome: Progressing     Problem: Metabolic/Fluid and Electrolytes - Adult  Goal: Electrolytes maintained within normal limits  Outcome: Progressing  Goal: Hemodynamic stability and optimal renal function maintained  Outcome: Progressing

## 2022-09-25 NOTE — PROGRESS NOTES
Physician Progress Note      PATIENT:               John Norman  CSN #:                  [id number]  :                       1933  ADMIT DATE:       2022 3:26 PM  [address] [place] DATE:  RESPONDING  PROVIDER #:        Diana Calderón MD          QUERY TEXT:    Pt admitted with Pneumonia. Noted documentation of Sepsis on  ED note. If possible, please document in progress notes and discharge summary:      The medical record reflects the following:  Risk Factors: Pneumonia  Clinical Indicators: ED note documented \"Severe Sepsis. \"  WBC 14.3, , RR   31, Lactic Sepsis 2.3, Procalcitonin 0.93, Pneumonia  Treatment: droplet isolation, IV antibiotics, cultures  Options provided:  -- Sepsis confirmed present on admission  -- Sepsis ruled out  -- Other - I will add my own diagnosis  -- Disagree - Not applicable / Not valid  -- Disagree - Clinically unable to determine / Unknown  -- Refer to Clinical Documentation Reviewer    PROVIDER RESPONSE TEXT:    The diagnosis of Sepsis was confirmed as present on admission.     Query created by: Shahzad Portillo on 2022 11:36 AM      Electronically signed by:  Diana Calderón MD 2022 5:44 PM

## 2022-09-25 NOTE — PROGRESS NOTES
Hematology Oncology Daily Progress Note    Admit Date: 9/22/2022  Hospital day several    Subjective:     Patient has complaints of stable weakness--denies sob/cp. Medication side effects: none    Scheduled Meds:   furosemide  20 mg Oral Daily    acetaminophen  650 mg Oral Q6H    lidocaine  2 patch TransDERmal Daily    polyethylene glycol  17 g Oral Daily    ipratropium-albuterol  1 ampule Inhalation BID    atorvastatin  20 mg Oral Daily    sotalol  120 mg Oral BID    sodium chloride flush  5-40 mL IntraVENous 2 times per day    cefTRIAXone (ROCEPHIN) IV  1,000 mg IntraVENous Q24H    And    azithromycin  500 mg Oral Q24H    lactobacillus  1 capsule Oral BID WC    [Held by provider] doxazosin  2 mg Oral Daily    aspirin  81 mg Oral Daily     Continuous Infusions:   sodium chloride      sodium chloride       PRN Meds:traMADol, sodium chloride, iopamidol, sodium chloride flush, sodium chloride, albuterol, methocarbamol, senna    Review of Systems  Pertinent items are noted in HPI. REVIEW OF SYSTEMS:         Constitutional: Denies fever, sweats, weight loss     Eyes: No visual changes or diplopia. No scleral icterus. ENT: No Headaches, hearing loss or vertigo. No mouth sores or sore throat. Cardiovascular: No chest pain, dyspnea on exertion, palpitations or loss of consciousness. Respiratory: No cough or wheezing, no sputum production. No hemoptysis. .    Gastrointestinal: No abdominal pain, appetite loss, blood in stools. No change in bowel habits. Genitourinary: No dysuria, trouble voiding, or hematuria. Musculoskeletal:  Generalized weakness. No joint complaints. Integumentary: No rash or pruritis. Neurological: No headache, diplopia. No change in gait, balance, or coordination. No paresthesias. Endocrine: No temperature intolerance. No excessive thirst, fluid intake, or urination. Hematologic/Lymphatic: No abnormal bruising or ecchymoses, blood clots or swollen lymph nodes.   Allergic/Immunologic: No nasal congestion or hives. Objective:     Patient Vitals for the past 8 hrs:   BP Temp Temp src Pulse Resp SpO2   09/25/22 1145 (!) 149/71 97.5 °F (36.4 °C) Oral 82 17 96 %   09/25/22 0954 -- -- -- 90 18 95 %   09/25/22 0714 119/74 98 °F (36.7 °C) Oral 90 20 97 %     I/O last 3 completed shifts: In: 927.9 [P.O.:600; Blood:327.9]  Out: 1950 [Urine:1950]  No intake/output data recorded.     BP (!) 149/71   Pulse 82   Temp 97.5 °F (36.4 °C) (Oral)   Resp 17   Ht 5' 9.5\" (1.765 m)   Wt 163 lb 1.6 oz (74 kg)   SpO2 96%   BMI 23.74 kg/m²     General Appearance:    Alert, cooperative, no distress, appears stated age   Head:    Normocephalic, without obvious abnormality, atraumatic   Eyes:    PERRL, conjunctiva/corneas clear, EOM's intact, fundi     benign, both eyes        Ears:    Normal TM's and external ear canals, both ears   Nose:   Nares normal, septum midline, mucosa normal, no drainage    or sinus tenderness   Throat:   Lips, mucosa, and tongue normal; teeth and gums normal   Neck:   Supple, symmetrical, trachea midline, no adenopathy;        thyroid:  No enlargement/tenderness/nodules; no carotid    bruit or JVD   Back:     Symmetric, no curvature, ROM normal, no CVA tenderness   Lungs:     Clear to auscultation bilaterally, respirations unlabored   Chest wall:    No tenderness or deformity   Heart:    Regular rate and rhythm, S1 and S2 normal, no murmur, rub   or gallop   Abdomen:     Soft, non-tender, bowel sounds active all four quadrants,     no masses, no organomegaly           Extremities:   Extremities normal, atraumatic, no cyanosis or edema   Pulses:   2+ and symmetric all extremities   Skin:   Skin color, texture, turgor normal, no rashes or lesions   Lymph nodes:   Cervical, supraclavicular, and axillary nodes normal   Neurologic:   Stable     Data ReviewCBC:   Lab Results   Component Value Date/Time    WBC 9.5 09/25/2022 05:57 AM    RBC 2.59 09/25/2022 05:57 AM       Assessment: Principal Problem:    Acute respiratory failure with hypoxia (HCC)  Active Problems:    Acute on chronic systolic congestive heart failure (HCC)    Pneumonia of both lungs due to infectious organism    REYNA (acute kidney injury) ([facility] [address]    Closed pelvic ring fracture (HCC)    Paroxysmal atrial fibrillation ([address]  Resolved Problems:    * No resolved hospital problems. *      Plan:     Anemia. I follow this patient. He gets Procrit every 3 weeks for anemia due to chronic kidney disease. He likely has a component of myelodysplasia as well. It is not uncommon with elderly patients that have myelodysplasia that have stable hemoglobins for a long time to have an acute drop when there is some sort of stress. The CT scan did not show any signs of hematoma. I  will guaiac his stools. I will check for hemolysis. His hemoglobin earlier this week was 9.9 in my office. If he does not rebound over the next month, he may need a repeat bone marrow biopsy.         Electronically signed by Randall Connor MD on 9/25/2022 at 1:55 PM
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
## 2022-09-25 NOTE — CONSULTS
Song      [facility] Record Number:  [id number]  Primary Care Physician:  Delia Thompson MD  Date of Consultation: 9/25/2022    Chief Complaint: sinusitis on CT    HISTORY OF PRESENT ILLNESS  Yasmeen Whiteside is a(n) 80 y.o. male admitted with pneumonia found to have incidental chronic sinusitis on head CT. Patient has no sinonasal complaints other than some nasal obstruction. Denies post nasal drainage, rhinorrhea, sinus pressure or pain, change in sense of smell. Seen previously on outpatient basis by my partner. No other ENT complaints. Patient Active Problem List   Diagnosis    CAD (coronary artery disease) CABG x3 1996, stenting PDA 10/2021    Cardiac pacemaker    Benign prostatic hyperplasia with nocturia    Gout-uric acid >7.5--advised proph tx    Hypercholesteremia    Carotid bruit(bilat-nl duplex u/s 10/10)    Vitamin D deficiency-(advised 1000IU/day)    Actinic keratoses    Elevated PSA-(was 4.2 10/10-repeat 6 mo)(was 5.12 1/11-then 4.4 2/12)-sees dr Holly Obando for this    S/P colonoscopy-4/07-neg per pt,  3/18 repeat colonoscopy polyp-repeat 5 yr    Hearing loss, conductive, bilateral-seeing ent-dr quinn for hearing aides    Encounter for monitoring sotalol therapy    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 Kaye Rasmussen op 8/15--started otc iron bid, off now  - work up Dr. Bill Galindo bone marrow. 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 dr Idalia Delgado (done for blood in stool)    Elevated ferritin  - work up Dr. Bill Galindo  genetic screen hemachromatosis negative.  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 Localized edema    Ischemic cardiomyopathy; EF 40-45% 4/22    S/P amputation of lesser toe, right (HCC)    Ventricular tachycardia (HCC)    Nonrheumatic aortic valve stenosis- moderate by echo 4/22    Chronic renal disease, stage III ([facility] [address] [[id number]]    Acute on chronic systolic congestive heart failure ([facility]    Mixed hyperlipidemia    Acute respiratory failure with hypoxia (HCC)    Pneumonia of both lungs due to infectious organism    REYNA (acute kidney injury) ([facility]    Closed pelvic ring fracture [facility])     Past Surgical History:   Procedure Laterality Date    ABSCESS DRAINAGE  7/20/15    right foot    APPENDECTOMY      CARDIAC CATHETERIZATION  2003    Left    COLONOSCOPY  03/28/2018    dr Jim Matos    x3    [address]    OTHER SURGICAL HISTORY Right 7/17/15    right fifth toe I and D    PACEMAKER PLACEMENT  2005    AL ESOPHAGOGASTRODUODENOSCOPY TRANSORAL DIAGNOSTIC N/A 11/21/2018    EGD DIAGNOSTIC ONLY performed by Jaylin Real MD at [address] [facility] Right 7.16.15    right pinkey toe     TONSILLECTOMY AND ADENOIDECTOMY       Family History   Problem Relation Age of Onset    Stroke Father     Diabetes Mother      Social History     Socioeconomic History    Marital status:      Spouse name: Mikey Jones     Number of children: 3    Years of education: Not on file    Highest education level: Not on file   Occupational History    Occupation: retired--[employer]   Tobacco Use    Smoking status: Never    Smokeless tobacco: Never    Tobacco comments:     counseled on tobacco exposure avoidance   Vaping Use    Vaping Use: Never used   Substance and Sexual Activity    Alcohol use: No     Alcohol/week: 0.0 standard drinks    Drug use: No    Sexual activity: Not on file   Other Topics Concern    Not on file   Social History Narrative    Not on file     Social Determinants of Health     Financial Resource Strain: Not on file   Food Insecurity: Not on file Transportation Needs: Not on file   Physical Activity: Insufficiently Active    Days of Exercise per Week: 2 days    Minutes of Exercise per Session: 50 min   Stress: Not on file   Social Connections: Not on file   Intimate Partner Violence: Not on file   Housing Stability: Not on file       DRUG/FOOD ALLERGIES: Patient has no known allergies. CURRENT MEDICATIONS  Prior to Admission medications    Medication Sig Start Date End Date Taking?  Authorizing Provider   furosemide (LASIX) 40 MG tablet Take 40 mg by mouth daily   Yes Historical Provider, MD   doxazosin (CARDURA) 2 MG tablet TAKE 1 TABLET BY MOUTH DAILY 8/30/22   Vertis Olszewski, MD   sotalol (BETAPACE) 120 MG tablet TAKE 1 TABLET BY MOUTH TWICE DAILY 7/28/22   Lawyer Abner MD   aspirin 81 MG EC tablet TAKE 1 TABLET BY MOUTH DAILY 5/27/22   PRIYA Leach CNP   lisinopril (PRINIVIL;ZESTRIL) 5 MG tablet Take 1 tablet by mouth daily 5/17/22   Leo Ramos MD   atorvastatin (LIPITOR) 20 MG tablet Take 1 tablet by mouth daily 4/26/22   Lawyer Abner MD   warfarin (COUMADIN) 5 MG tablet TAKE ONE-HALF TABLET BY MOUTH ON MONDAY WEDNESDAY AND FRIDAY, AND 1 TABLET BY MOUTH ALL OTHER DAYS OF THE WEEK 3/28/22   PRIYA Melara CNP   metoprolol succinate (TOPROL XL) 50 MG extended release tablet TAKE 1 TABLET BY MOUTH TWICE DAILY 2/23/22   Lawyer Abner MD   vitamin B-1 (THIAMINE) 100 MG tablet Take 1,000 mg by mouth daily     Historical Provider, MD   EPOETIN BIBIANA IJ Inject as directed every 21 days     Historical Provider, MD   acetaminophen (TYLENOL) 325 MG tablet Take 650 mg by mouth every 6 hours as needed for Pain    Historical Provider, MD       REVIEW OF SYSTEMS  The following systems were reviewed and revealed the following in addition to any already discussed in the HPI:    CONSTITUTIONAL: denies weight loss, no fever, no night sweats, no chills  EYES: no vision changes, no blurry vision  EARS: no changes in hearing, no otalgia  NOSE: no epistaxis, no rhinorrhea  RESPIRATORY: no difficulty breathing, no shortness of breath  CV: no chest pain, no palpitations  HEME: No coagulation disorder, no bleeding disorder  NEURO: no TIA or stroke-like symptoms  SKIN: No new rashes in the head and neck, no recent skin cancers  MOUTH: No new ulcers, no recent teeth infections  GASTROINTESTINAL: No diarrhea, stomach pain  PSYCH: No anxiety, no depression      PHYSICAL EXAM  BP (!) 149/71   Pulse 82   Temp 97.5 °F (36.4 °C) (Oral)   Resp 17   Ht 5' 9.5\" (1.765 m)   Wt 163 lb 1.6 oz (74 kg)   SpO2 96%   BMI 23.74 kg/m²     GENERAL: No Acute Distress, Alert and Oriented, no hoarseness  EYES: EOMI, Anti-icteric  NOSE: No epistaxis, nasal mucosa within normal limits, no purulent drainage  EARS: Normal external canal appearance, EAC patent bilaterally  FACE: 1/6 House-Brackmann Scale, symmetric, sensation equal bilaterally  ORAL CAVITY: No masses or lesions palpated, uvula is midline, moist mucous membranes, no post nasal pharyngeal drainage. NECK: Normal range of motion, no thyromegaly, trachea is midline, no lymphadenopathy, no neck masses, no crepitus  CHEST: Normal respiratory effort, no retractions, breathing comfortably  SKIN: No rashes, normal appearing skin, no evidence of skin lesions/tumors    RADIOLOGY  Summary of findings:  CT head reveals chronic pansinusitis, no air fluid levels indicating active infection. PROCEDURE      ASSESSMENT/PLAN  Marce Coello is a very pleasant 80 y.o. male admitted with pneumonia, subsequently found to have incidental chronic sinusitis, allergic rhinitis.   - previously seen by my partner as outpatient for chronic sinusitis  - pt asymptomatic except for some nasal obstruction  - no active infection on CT or exam  - sinusitis likely from chronic allergic rhinitis  - add flonase 2 sprays each nostril daily  - add nasal saline 2 sprays each nostril prn dryness  - follow up with [facility] as outpatient. Medical Decision Making:   The following items were considered in medical decision making:  Independent review of images  Review / order clinical lab tests  Review / order radiology tests  Decision to obtain old records
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
## 2022-09-25 NOTE — PROGRESS NOTES
Incentive Spirometry education and demonstration completed by Respiratory Therapy Yes      Response to education: Fair     Teaching Time: 5 minutes    Minimum Predicted Vital Capacity - 730 mL. Patient's Actual Vital Capacity - 500 mL.  Turning over to Nursing for routine follow-up No.    Comments:     Electronically signed by Mitchell Gillespie RCP on 9/25/2022 at 9:56 AM

## 2022-09-25 NOTE — PROGRESS NOTES
Pharmacy to Dose Warfarin    Pharmacy consulted to dose warfarin for Afib. INR Goal: 2-3    INR today: 2.01    Assessment/Plan:  - INR therapeutic on home dose of warfarin. Will continue today. - Possible concomitant drug-drug interactions include: azithromycin     Pharmacy will continue to follow.     Tone Rico, PharmD, [facility]  Clinical Pharmacist  [id number]

## 2022-09-26 LAB
A/G RATIO: 1 (ref 1.1–2.2)
ALBUMIN SERPL-MCNC: 3.3 G/DL (ref 3.4–5)
ALP BLD-CCNC: 63 U/L (ref 40–129)
ALT SERPL-CCNC: 21 U/L (ref 10–40)
ANION GAP SERPL CALCULATED.3IONS-SCNC: 13 MMOL/L (ref 3–16)
AST SERPL-CCNC: 25 U/L (ref 15–37)
BASOPHILS ABSOLUTE: 0 K/UL (ref 0–0.2)
BASOPHILS RELATIVE PERCENT: 0.4 %
BILIRUB SERPL-MCNC: 1 MG/DL (ref 0–1)
BLOOD CULTURE, ROUTINE: NORMAL
BUN BLDV-MCNC: 43 MG/DL (ref 7–20)
CALCIUM SERPL-MCNC: 9.2 MG/DL (ref 8.3–10.6)
CHLORIDE BLD-SCNC: 104 MMOL/L (ref 99–110)
CO2: 24 MMOL/L (ref 21–32)
CREAT SERPL-MCNC: 1.2 MG/DL (ref 0.8–1.3)
CULTURE, BLOOD 2: NORMAL
EOSINOPHILS ABSOLUTE: 0 K/UL (ref 0–0.6)
EOSINOPHILS RELATIVE PERCENT: 0.3 %
GFR AFRICAN AMERICAN: >60
GFR NON-AFRICAN AMERICAN: 57
GLUCOSE BLD-MCNC: 165 MG/DL (ref 70–99)
HCT VFR BLD CALC: 23.4 % (ref 40.5–52.5)
HEMOGLOBIN: 8.1 G/DL (ref 13.5–17.5)
INR BLD: 1.57 (ref 0.87–1.14)
LYMPHOCYTES ABSOLUTE: 1.1 K/UL (ref 1–5.1)
LYMPHOCYTES RELATIVE PERCENT: 9.6 %
MAGNESIUM: 2.2 MG/DL (ref 1.8–2.4)
MCH RBC QN AUTO: 32.1 PG (ref 26–34)
MCHC RBC AUTO-ENTMCNC: 34.8 G/DL (ref 31–36)
MCV RBC AUTO: 92.3 FL (ref 80–100)
MONOCYTES ABSOLUTE: 1.3 K/UL (ref 0–1.3)
MONOCYTES RELATIVE PERCENT: 11.4 %
NEUTROPHILS ABSOLUTE: 8.7 K/UL (ref 1.7–7.7)
NEUTROPHILS RELATIVE PERCENT: 78.3 %
PDW BLD-RTO: 19.8 % (ref 12.4–15.4)
PHOSPHORUS: 3.6 MG/DL (ref 2.5–4.9)
PLATELET # BLD: 271 K/UL (ref 135–450)
PMV BLD AUTO: 9.6 FL (ref 5–10.5)
POTASSIUM SERPL-SCNC: 4.2 MMOL/L (ref 3.5–5.1)
PROTHROMBIN TIME: 18.8 SEC (ref 11.7–14.5)
RBC # BLD: 2.54 M/UL (ref 4.2–5.9)
SODIUM BLD-SCNC: 141 MMOL/L (ref 136–145)
TOTAL PROTEIN: 6.6 G/DL (ref 6.4–8.2)
WBC # BLD: 11.1 K/UL (ref 4–11)

## 2022-09-26 PROCEDURE — 2580000003 HC RX 258: Performed by: PHYSICIAN ASSISTANT

## 2022-09-26 PROCEDURE — 99233 SBSQ HOSP IP/OBS HIGH 50: CPT | Performed by: NURSE PRACTITIONER

## 2022-09-26 PROCEDURE — 94761 N-INVAS EAR/PLS OXIMETRY MLT: CPT

## 2022-09-26 PROCEDURE — 84100 ASSAY OF PHOSPHORUS: CPT

## 2022-09-26 PROCEDURE — 2060000000 HC ICU INTERMEDIATE R&B

## 2022-09-26 PROCEDURE — 80053 COMPREHEN METABOLIC PANEL: CPT

## 2022-09-26 PROCEDURE — 94640 AIRWAY INHALATION TREATMENT: CPT

## 2022-09-26 PROCEDURE — 6370000000 HC RX 637 (ALT 250 FOR IP): Performed by: PHYSICIAN ASSISTANT

## 2022-09-26 PROCEDURE — 97535 SELF CARE MNGMENT TRAINING: CPT

## 2022-09-26 PROCEDURE — 36415 COLL VENOUS BLD VENIPUNCTURE: CPT

## 2022-09-26 PROCEDURE — 6370000000 HC RX 637 (ALT 250 FOR IP): Performed by: INTERNAL MEDICINE

## 2022-09-26 PROCEDURE — 85610 PROTHROMBIN TIME: CPT

## 2022-09-26 PROCEDURE — 6360000002 HC RX W HCPCS: Performed by: PHYSICIAN ASSISTANT

## 2022-09-26 PROCEDURE — 6370000000 HC RX 637 (ALT 250 FOR IP): Performed by: NURSE PRACTITIONER

## 2022-09-26 PROCEDURE — 85025 COMPLETE CBC W/AUTO DIFF WBC: CPT

## 2022-09-26 PROCEDURE — 97166 OT EVAL MOD COMPLEX 45 MIN: CPT

## 2022-09-26 PROCEDURE — 97530 THERAPEUTIC ACTIVITIES: CPT

## 2022-09-26 PROCEDURE — 83735 ASSAY OF MAGNESIUM: CPT

## 2022-09-26 PROCEDURE — 2700000000 HC OXYGEN THERAPY PER DAY

## 2022-09-26 RX ORDER — PANTOPRAZOLE SODIUM 40 MG/1
40 TABLET, DELAYED RELEASE ORAL
Status: DISCONTINUED | OUTPATIENT
Start: 2022-09-26 | End: 2022-10-03 | Stop reason: HOSPADM

## 2022-09-26 RX ORDER — IPRATROPIUM BROMIDE AND ALBUTEROL SULFATE 2.5; .5 MG/3ML; MG/3ML
1 SOLUTION RESPIRATORY (INHALATION) EVERY 4 HOURS PRN
Status: DISCONTINUED | OUTPATIENT
Start: 2022-09-26 | End: 2022-10-03 | Stop reason: HOSPADM

## 2022-09-26 RX ORDER — SODIUM PHOSPHATE, DIBASIC AND SODIUM PHOSPHATE, MONOBASIC 7; 19 G/133ML; G/133ML
1 ENEMA RECTAL
Status: COMPLETED | OUTPATIENT
Start: 2022-09-26 | End: 2022-09-27

## 2022-09-26 RX ORDER — FUROSEMIDE 40 MG/1
40 TABLET ORAL DAILY
Status: DISCONTINUED | OUTPATIENT
Start: 2022-09-27 | End: 2022-09-30

## 2022-09-26 RX ORDER — METOPROLOL SUCCINATE 25 MG/1
25 TABLET, EXTENDED RELEASE ORAL 2 TIMES DAILY
Status: DISCONTINUED | OUTPATIENT
Start: 2022-09-26 | End: 2022-09-29

## 2022-09-26 RX ADMIN — Medication 10 ML: at 09:45

## 2022-09-26 RX ADMIN — ATORVASTATIN CALCIUM 20 MG: 20 TABLET, FILM COATED ORAL at 09:34

## 2022-09-26 RX ADMIN — ACETAMINOPHEN 650 MG: 325 TABLET ORAL at 18:23

## 2022-09-26 RX ADMIN — SOTALOL HYDROCHLORIDE 120 MG: 80 TABLET ORAL at 09:33

## 2022-09-26 RX ADMIN — METOPROLOL SUCCINATE 25 MG: 25 TABLET, EXTENDED RELEASE ORAL at 12:39

## 2022-09-26 RX ADMIN — ACETAMINOPHEN 650 MG: 325 TABLET ORAL at 12:39

## 2022-09-26 RX ADMIN — Medication 1000 MG: at 20:44

## 2022-09-26 RX ADMIN — Medication 1 CAPSULE: at 18:23

## 2022-09-26 RX ADMIN — ASPIRIN 81 MG: 81 TABLET, COATED ORAL at 09:34

## 2022-09-26 RX ADMIN — METHOCARBAMOL TABLETS 750 MG: 500 TABLET, COATED ORAL at 12:38

## 2022-09-26 RX ADMIN — METOPROLOL SUCCINATE 25 MG: 25 TABLET, EXTENDED RELEASE ORAL at 20:43

## 2022-09-26 RX ADMIN — Medication 1 CAPSULE: at 09:34

## 2022-09-26 RX ADMIN — TRAMADOL HYDROCHLORIDE 50 MG: 50 TABLET ORAL at 09:45

## 2022-09-26 RX ADMIN — PANTOPRAZOLE SODIUM 40 MG: 40 TABLET, DELAYED RELEASE ORAL at 12:39

## 2022-09-26 RX ADMIN — Medication 10 ML: at 20:44

## 2022-09-26 RX ADMIN — FUROSEMIDE 20 MG: 20 TABLET ORAL at 09:34

## 2022-09-26 RX ADMIN — SOTALOL HYDROCHLORIDE 120 MG: 80 TABLET ORAL at 20:43

## 2022-09-26 RX ADMIN — POLYETHYLENE GLYCOL 3350 17 G: 17 POWDER, FOR SOLUTION ORAL at 09:33

## 2022-09-26 RX ADMIN — IPRATROPIUM BROMIDE AND ALBUTEROL SULFATE 1 AMPULE: 2.5; .5 SOLUTION RESPIRATORY (INHALATION) at 20:14

## 2022-09-26 RX ADMIN — IPRATROPIUM BROMIDE AND ALBUTEROL SULFATE 1 AMPULE: 2.5; .5 SOLUTION RESPIRATORY (INHALATION) at 07:45

## 2022-09-26 ASSESSMENT — PAIN SCALES - GENERAL
PAINLEVEL_OUTOF10: 7
PAINLEVEL_OUTOF10: 3
PAINLEVEL_OUTOF10: 4

## 2022-09-26 ASSESSMENT — PAIN DESCRIPTION - LOCATION
LOCATION: SHOULDER

## 2022-09-26 ASSESSMENT — PAIN SCALES - WONG BAKER: WONGBAKER_NUMERICALRESPONSE: 0

## 2022-09-26 ASSESSMENT — PAIN DESCRIPTION - DESCRIPTORS: DESCRIPTORS: HEAVINESS;NAGGING

## 2022-09-26 ASSESSMENT — PAIN DESCRIPTION - ORIENTATION
ORIENTATION: LEFT
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
## 2022-09-26 NOTE — PROGRESS NOTES
Oncology and Hematology Care   Progress Note      9/26/2022 2:36 PM        Name: Lilly Griffith . Admitted: 9/22/2022    SUBJECTIVE:  Patient feeling tired, resting in bed. Worked with PT/OT today. Planning to dc to rehab facility. Reviewed interval ancillary notes    Current Medications  [START ON 9/27/2022] furosemide (LASIX) tablet 40 mg, Daily  pantoprazole (PROTONIX) tablet 40 mg, QAM AC  metoprolol succinate (TOPROL XL) extended release tablet 25 mg, BID  acetaminophen (TYLENOL) tablet 650 mg, Q6H  traMADol (ULTRAM) tablet 50 mg, Q6H PRN  lidocaine 4 % external patch 2 patch, Daily  polyethylene glycol (GLYCOLAX) packet 17 g, Daily  0.9 % sodium chloride infusion, PRN  ipratropium-albuterol (DUONEB) nebulizer solution 1 ampule, BID  iopamidol (ISOVUE-370) 76 % injection 75 mL, ONCE PRN  atorvastatin (LIPITOR) tablet 20 mg, Daily  sotalol (BETAPACE) tablet 120 mg, BID  sodium chloride flush 0.9 % injection 5-40 mL, 2 times per day  sodium chloride flush 0.9 % injection 10 mL, PRN  0.9 % sodium chloride infusion, PRN  albuterol (PROVENTIL) nebulizer solution 2.5 mg, Q2H PRN  cefTRIAXone (ROCEPHIN) 1000 mg in sterile water 10 mL IV syringe, Q24H  methocarbamol (ROBAXIN) tablet 750 mg, TID PRN  senna (SENOKOT) tablet 8.6 mg, Daily PRN  lactobacillus (CULTURELLE) capsule 1 capsule, BID WC  aspirin EC tablet 81 mg, Daily        Objective:  BP (!) 109/54   Pulse 50   Temp 97.2 °F (36.2 °C) (Oral)   Resp 16   Ht 5' 9.5\" (1.765 m)   Wt 161 lb 12.8 oz (73.4 kg)   SpO2 96%   BMI 23.55 kg/m²     Intake/Output Summary (Last 24 hours) at 9/26/2022 1436  Last data filed at 9/26/2022 0743  Gross per 24 hour   Intake --   Output 850 ml   Net -850 ml      Wt Readings from Last 3 Encounters:   09/26/22 161 lb 12.8 oz (73.4 kg)   09/12/22 169 lb 9.6 oz (76.9 kg)   08/04/22 169 lb 6.4 oz (76.8 kg)       General appearance:  Appears comfortable  Eyes: Sclera clear. Pupils equal.  ENT: Moist oral mucosa. Trachea midline, no adenopathy. Cardiovascular: Regular rhythm, normal S1, S2. No murmur. No edema in lower extremities  Respiratory: Not using accessory muscles. Good inspiratory effort. Clear to auscultation bilaterally, no wheeze or crackles. GI: Abdomen soft, no tenderness, not distended  Musculoskeletal: No cyanosis in digits, neck supple  Neurology: CN 2-12 grossly intact. No speech or motor deficits  Psych: Normal affect. Alert and oriented in time, place and person  Skin: Warm, dry, normal turgor    Labs and Tests:  CBC:   Recent Labs     09/24/22  0553 09/24/22  1503 09/25/22  0557 09/26/22  0537   WBC 9.8  --  9.5 11.1*   HGB 7.2* 8.7* 8.1* 8.1*     --  217 271     BMP:    Recent Labs     09/24/22  0553 09/25/22  0558 09/26/22  0537    138 141   K 4.3 3.9 4.2    104 104   CO2 23 24 24   BUN 55* 52* 43*   CREATININE 1.9* 1.3 1.2   GLUCOSE 156* 140* 165*     Hepatic:   Recent Labs     09/24/22  0553 09/25/22  0558 09/26/22  0537   AST 54* 36 25   ALT 29 24 21   BILITOT 1.4* 1.0 1.0   ALKPHOS 61 59 63       ASSESSMENT AND PLAN    Principal Problem:    Acute respiratory failure with hypoxia (HCC)  Active Problems:    Acute on chronic systolic congestive heart failure (HCC)    Pneumonia of both lungs due to infectious organism    REYNA (acute kidney injury) (HCC)    Closed pelvic ring fracture (HCC)    Paroxysmal atrial fibrillation (HCC)    Chronic pansinusitis  Resolved Problems:    * No resolved hospital problems. *      1. Anemia:  -He likely has a component of myelodysplasia as well.   -He gets Procrit 60,000 units every 2 weeks for anemia due to chronic kidney disease, last given 9/22/2022. -CT scan did not show any signs of hematoma. -Guaiac stool is pending.   -No evidence of hemolytic anemia. -May need repeat BMBx in a month if hgb does not improve. This will be done as an outpatient by Dr. Angeli Roa.  -Hgb is stable at 8.1 today.       Francia Cortes, CNP  Oncology Hematology [facility]  [phone]    Patient was seen and examined. Agree with above. Hemoglobin is 8.1. Continue current care.   Okay to Ioana Romero / transfer from hematology perspective    Chato Villar MD

## 2022-09-26 NOTE — CARE COORDINATION
The Plan for Transition of Care is related to the following treatment goals: SNF    The Patient and/or patient representative Jeremiah Trujillo was provided with a choice of provider and agrees   with the discharge plan. [x] Yes [] No    Freedom of choice list was provided with basic dialogue that supports the patient's individualized plan of care/goals, treatment preferences and shares the quality data associated with the providers. [x] Yes [] No     Referrals were initiated to [facility] and [facility]. Per family request, asked the Head Nurse to see Stephanie Fitch in patient's room.
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
## 2022-09-27 PROBLEM — I50.43 ACUTE ON CHRONIC COMBINED SYSTOLIC (CONGESTIVE) AND DIASTOLIC (CONGESTIVE) HEART FAILURE (HCC): Status: ACTIVE | Noted: 2022-09-27

## 2022-09-27 PROBLEM — I48.19 PERSISTENT ATRIAL FIBRILLATION (HCC): Status: ACTIVE | Noted: 2022-09-27

## 2022-09-27 LAB
A/G RATIO: 1.1 (ref 1.1–2.2)
ALBUMIN SERPL-MCNC: 3.4 G/DL (ref 3.4–5)
ALP BLD-CCNC: 72 U/L (ref 40–129)
ALT SERPL-CCNC: 23 U/L (ref 10–40)
ANION GAP SERPL CALCULATED.3IONS-SCNC: 11 MMOL/L (ref 3–16)
AST SERPL-CCNC: 28 U/L (ref 15–37)
BASOPHILS ABSOLUTE: 0.1 K/UL (ref 0–0.2)
BASOPHILS RELATIVE PERCENT: 0.8 %
BILIRUB SERPL-MCNC: 0.9 MG/DL (ref 0–1)
BUN BLDV-MCNC: 51 MG/DL (ref 7–20)
CALCIUM SERPL-MCNC: 9.2 MG/DL (ref 8.3–10.6)
CHLORIDE BLD-SCNC: 103 MMOL/L (ref 99–110)
CO2: 24 MMOL/L (ref 21–32)
CREAT SERPL-MCNC: 1.6 MG/DL (ref 0.8–1.3)
EKG ATRIAL RATE: 103 BPM
EKG ATRIAL RATE: 234 BPM
EKG DIAGNOSIS: NORMAL
EKG DIAGNOSIS: NORMAL
EKG Q-T INTERVAL: 424 MS
EKG Q-T INTERVAL: 478 MS
EKG QRS DURATION: 118 MS
EKG QRS DURATION: 122 MS
EKG QTC CALCULATION (BAZETT): 555 MS
EKG QTC CALCULATION (BAZETT): 561 MS
EKG R AXIS: -36 DEGREES
EKG R AXIS: -39 DEGREES
EKG T AXIS: 72 DEGREES
EKG T AXIS: 88 DEGREES
EKG VENTRICULAR RATE: 103 BPM
EKG VENTRICULAR RATE: 83 BPM
EOSINOPHILS ABSOLUTE: 0.2 K/UL (ref 0–0.6)
EOSINOPHILS RELATIVE PERCENT: 1.7 %
GFR AFRICAN AMERICAN: 50
GFR NON-AFRICAN AMERICAN: 41
GLUCOSE BLD-MCNC: 166 MG/DL (ref 70–99)
HCT VFR BLD CALC: 24.9 % (ref 40.5–52.5)
HEMOGLOBIN: 8.3 G/DL (ref 13.5–17.5)
INR BLD: 1.5 (ref 0.87–1.14)
LYMPHOCYTES ABSOLUTE: 1.1 K/UL (ref 1–5.1)
LYMPHOCYTES RELATIVE PERCENT: 9.1 %
MCH RBC QN AUTO: 31.2 PG (ref 26–34)
MCHC RBC AUTO-ENTMCNC: 33.5 G/DL (ref 31–36)
MCV RBC AUTO: 93.2 FL (ref 80–100)
MONOCYTES ABSOLUTE: 1.1 K/UL (ref 0–1.3)
MONOCYTES RELATIVE PERCENT: 9.8 %
NEUTROPHILS ABSOLUTE: 9.2 K/UL (ref 1.7–7.7)
NEUTROPHILS RELATIVE PERCENT: 78.6 %
PDW BLD-RTO: 20 % (ref 12.4–15.4)
PLATELET # BLD: 303 K/UL (ref 135–450)
PMV BLD AUTO: 9.4 FL (ref 5–10.5)
POTASSIUM SERPL-SCNC: 4.2 MMOL/L (ref 3.5–5.1)
PROCALCITONIN: 0.24 NG/ML (ref 0–0.15)
PROTHROMBIN TIME: 18 SEC (ref 11.7–14.5)
RBC # BLD: 2.67 M/UL (ref 4.2–5.9)
SODIUM BLD-SCNC: 138 MMOL/L (ref 136–145)
TOTAL PROTEIN: 6.5 G/DL (ref 6.4–8.2)
WBC # BLD: 11.6 K/UL (ref 4–11)

## 2022-09-27 PROCEDURE — 6370000000 HC RX 637 (ALT 250 FOR IP): Performed by: PHYSICIAN ASSISTANT

## 2022-09-27 PROCEDURE — 6370000000 HC RX 637 (ALT 250 FOR IP): Performed by: INTERNAL MEDICINE

## 2022-09-27 PROCEDURE — 97530 THERAPEUTIC ACTIVITIES: CPT

## 2022-09-27 PROCEDURE — 6370000000 HC RX 637 (ALT 250 FOR IP): Performed by: NURSE PRACTITIONER

## 2022-09-27 PROCEDURE — 2500000003 HC RX 250 WO HCPCS: Performed by: INTERNAL MEDICINE

## 2022-09-27 PROCEDURE — 99233 SBSQ HOSP IP/OBS HIGH 50: CPT | Performed by: INTERNAL MEDICINE

## 2022-09-27 PROCEDURE — 80053 COMPREHEN METABOLIC PANEL: CPT

## 2022-09-27 PROCEDURE — 2060000000 HC ICU INTERMEDIATE R&B

## 2022-09-27 PROCEDURE — 85610 PROTHROMBIN TIME: CPT

## 2022-09-27 PROCEDURE — 93010 ELECTROCARDIOGRAM REPORT: CPT | Performed by: INTERNAL MEDICINE

## 2022-09-27 PROCEDURE — 6360000002 HC RX W HCPCS: Performed by: PHYSICIAN ASSISTANT

## 2022-09-27 PROCEDURE — 36415 COLL VENOUS BLD VENIPUNCTURE: CPT

## 2022-09-27 PROCEDURE — 2580000003 HC RX 258: Performed by: PHYSICIAN ASSISTANT

## 2022-09-27 PROCEDURE — 97535 SELF CARE MNGMENT TRAINING: CPT

## 2022-09-27 PROCEDURE — 85025 COMPLETE CBC W/AUTO DIFF WBC: CPT

## 2022-09-27 PROCEDURE — 84145 PROCALCITONIN (PCT): CPT

## 2022-09-27 PROCEDURE — 93005 ELECTROCARDIOGRAM TRACING: CPT | Performed by: INTERNAL MEDICINE

## 2022-09-27 RX ORDER — METOPROLOL TARTRATE 5 MG/5ML
2.5 INJECTION INTRAVENOUS ONCE
Status: COMPLETED | OUTPATIENT
Start: 2022-09-27 | End: 2022-09-27

## 2022-09-27 RX ORDER — METOPROLOL TARTRATE 5 MG/5ML
2.5 INJECTION INTRAVENOUS EVERY 6 HOURS
Status: DISCONTINUED | OUTPATIENT
Start: 2022-09-27 | End: 2022-09-27

## 2022-09-27 RX ADMIN — PANTOPRAZOLE SODIUM 40 MG: 40 TABLET, DELAYED RELEASE ORAL at 08:37

## 2022-09-27 RX ADMIN — ACETAMINOPHEN 650 MG: 325 TABLET ORAL at 11:37

## 2022-09-27 RX ADMIN — SOTALOL HYDROCHLORIDE 120 MG: 80 TABLET ORAL at 08:38

## 2022-09-27 RX ADMIN — Medication 1000 MG: at 22:24

## 2022-09-27 RX ADMIN — Medication 10 ML: at 22:24

## 2022-09-27 RX ADMIN — TRAMADOL HYDROCHLORIDE 50 MG: 50 TABLET ORAL at 08:36

## 2022-09-27 RX ADMIN — ACETAMINOPHEN 650 MG: 325 TABLET ORAL at 17:27

## 2022-09-27 RX ADMIN — ACETAMINOPHEN 650 MG: 325 TABLET ORAL at 01:36

## 2022-09-27 RX ADMIN — ASPIRIN 81 MG: 81 TABLET, COATED ORAL at 11:38

## 2022-09-27 RX ADMIN — Medication 1 CAPSULE: at 17:27

## 2022-09-27 RX ADMIN — POLYETHYLENE GLYCOL 3350 17 G: 17 POWDER, FOR SOLUTION ORAL at 11:39

## 2022-09-27 RX ADMIN — SODIUM PHOSPHATE, DIBASIC AND SODIUM PHOSPHATE, MONOBASIC 1 ENEMA: 7; 19 ENEMA RECTAL at 08:40

## 2022-09-27 RX ADMIN — ACETAMINOPHEN 650 MG: 325 TABLET ORAL at 22:23

## 2022-09-27 RX ADMIN — ATORVASTATIN CALCIUM 20 MG: 20 TABLET, FILM COATED ORAL at 08:36

## 2022-09-27 RX ADMIN — METOPROLOL SUCCINATE 25 MG: 25 TABLET, EXTENDED RELEASE ORAL at 22:23

## 2022-09-27 RX ADMIN — DICLOFENAC SODIUM 2 G: 10 GEL TOPICAL at 22:26

## 2022-09-27 RX ADMIN — METOPROLOL SUCCINATE 25 MG: 25 TABLET, EXTENDED RELEASE ORAL at 08:36

## 2022-09-27 RX ADMIN — DICLOFENAC SODIUM 2 G: 10 GEL TOPICAL at 17:28

## 2022-09-27 RX ADMIN — Medication 10 ML: at 11:40

## 2022-09-27 RX ADMIN — METOPROLOL TARTRATE 2.5 MG: 5 INJECTION INTRAVENOUS at 11:39

## 2022-09-27 RX ADMIN — Medication 1 CAPSULE: at 08:37

## 2022-09-27 ASSESSMENT — PAIN SCALES - WONG BAKER
WONGBAKER_NUMERICALRESPONSE: 0
WONGBAKER_NUMERICALRESPONSE: 10
WONGBAKER_NUMERICALRESPONSE: 0

## 2022-09-27 ASSESSMENT — PAIN SCALES - GENERAL
PAINLEVEL_OUTOF10: 0
PAINLEVEL_OUTOF10: 5
PAINLEVEL_OUTOF10: 10
PAINLEVEL_OUTOF10: 3
PAINLEVEL_OUTOF10: 2

## 2022-09-27 ASSESSMENT — PAIN DESCRIPTION - LOCATION
LOCATION: CHEST;SHOULDER
LOCATION: SHOULDER

## 2022-09-27 ASSESSMENT — PAIN DESCRIPTION - ORIENTATION
ORIENTATION: LEFT

## 2022-09-27 ASSESSMENT — PAIN DESCRIPTION - DESCRIPTORS
DESCRIPTORS: ACHING;DISCOMFORT

## 2022-09-27 NOTE — RT PROTOCOL NOTE
RT Inhaler-Nebulizer Bronchodilator Protocol Note    There is a bronchodilator order in the chart from a provider indicating to follow the RT Bronchodilator Protocol and there is an Initiate RT Inhaler-Nebulizer Bronchodilator Protocol order as well (see protocol at bottom of note). CXR Findings:  No results found. The findings from the last RT Protocol Assessment were as follows:   History Pulmonary Disease: None or smoker <15 pack years  Respiratory Pattern: Regular pattern and RR 12-20 bpm  Breath Sounds: Slightly diminished and/or crackles  Cough: Strong, spontaneous, non-productive  Indication for Bronchodilator Therapy: Decreased or absent breath sounds  Bronchodilator Assessment Score: 2    Aerosolized bronchodilator medication orders have been revised according to the RT Inhaler-Nebulizer Bronchodilator Protocol below. Respiratory Therapist to perform RT Therapy Protocol Assessment initially then follow the protocol. Repeat RT Therapy Protocol Assessment PRN for score 0-3 or on second treatment, BID, and PRN for scores above 3. No Indications - adjust the frequency to every 6 hours PRN wheezing or bronchospasm, if no treatments needed after 48 hours then discontinue using Per Protocol order mode. If indication present, adjust the RT bronchodilator orders based on the Bronchodilator Assessment Score as indicated below. Use Inhaler orders unless patient has one or more of the following: on home nebulizer, not able to hold breath for 10 seconds, is not alert and oriented, cannot activate and use MDI correctly, or respiratory rate 25 breaths per minute or more, then use the equivalent nebulizer order(s) with same Frequency and PRN reasons based on the score. If a patient is on this medication at home then do not decrease Frequency below that used at home.     0-3 - enter or revise RT bronchodilator order(s) to equivalent RT Bronchodilator order with Frequency of every 4 hours PRN for wheezing or increased work of breathing using Per Protocol order mode. 4-6 - enter or revise RT Bronchodilator order(s) to two equivalent RT bronchodilator orders with one order with BID Frequency and one order with Frequency of every 4 hours PRN wheezing or increased work of breathing using Per Protocol order mode. 7-10 - enter or revise RT Bronchodilator order(s) to two equivalent RT bronchodilator orders with one order with TID Frequency and one order with Frequency of every 4 hours PRN wheezing or increased work of breathing using Per Protocol order mode. 11-13 - enter or revise RT Bronchodilator order(s) to one equivalent RT bronchodilator order with QID Frequency and an Albuterol order with Frequency of every 4 hours PRN wheezing or increased work of breathing using Per Protocol order mode. Greater than 13 - enter or revise RT Bronchodilator order(s) to one equivalent RT bronchodilator order with every 4 hours Frequency and an Albuterol order with Frequency of every 2 hours PRN wheezing or increased work of breathing using Per Protocol order mode. RT to enter RT Home Evaluation for COPD & MDI Assessment order using Per Protocol order mode.     Electronically signed by Marc Foley RCP on 9/26/2022 at 8:18 PM

## 2022-09-27 NOTE — CARE COORDINATION
Notified patient's family that The Admission coordinator at [facility] Alex CUNNINGHAM Son [phone] called to report no beds are available and none anticipated.
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
## 2022-09-27 NOTE — PROGRESS NOTES
Heart rate irregular. Possible atrial Fib. STAT EKG ordered. Dr. Saad Vitale notified and will be at bedside soon.
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
## 2022-09-27 NOTE — PROGRESS NOTES
Report received from day shift RN. Patient resting comfortably in bed, watching television. No signs of discomfort or distress. VSS. Patient denies pain. Bed is in lowest position, wheels locked, 2/2 side rails up, bed alarm is activated. Bedside table and call light within reach. White board updated. Will continue to monitor patient. Nicole Le RN    11:35 PM  Patient resting in bed quietly. No needs at this time. Nicole Le RN    5:54 AM  No new events overnight. VSS.  Patient has been NPO for EGD this am. Nicole Le RN

## 2022-09-27 NOTE — PLAN OF CARE
Problem: Discharge Planning  Goal: Discharge to home or other facility with appropriate resources  Outcome: Progressing     Problem: Skin/Tissue Integrity  Goal: Absence of new skin breakdown  Description: 1. Monitor for areas of redness and/or skin breakdown  2. Assess vascular access sites hourly  3. Every 4-6 hours minimum:  Change oxygen saturation probe site  4. Every 4-6 hours:  If on nasal continuous positive airway pressure, respiratory therapy assess nares and determine need for appliance change or resting period.   Outcome: Progressing     Problem: Safety - Adult  Goal: Free from fall injury  Outcome: Progressing     Problem: ABCDS Injury Assessment  Goal: Absence of physical injury  Outcome: Progressing     Problem: Neurosensory - Adult  Goal: Achieves maximal functionality and self care  Outcome: Progressing     Problem: Respiratory - Adult  Goal: Achieves optimal ventilation and oxygenation  Outcome: Progressing     Problem: Cardiovascular - Adult  Goal: Maintains optimal cardiac output and hemodynamic stability  Outcome: Progressing     Problem: Skin/Tissue Integrity - Adult  Goal: Skin integrity remains intact  Outcome: Progressing  Goal: Oral mucous membranes remain intact  Outcome: Progressing     Problem: Musculoskeletal - Adult  Goal: Return mobility to safest level of function  Outcome: Progressing     Problem: Genitourinary - Adult  Goal: Absence of urinary retention  Outcome: Progressing     Problem: Metabolic/Fluid and Electrolytes - Adult  Goal: Electrolytes maintained within normal limits  Outcome: Progressing  Goal: Hemodynamic stability and optimal renal function maintained  Outcome: Progressing     Problem: Hematologic - Adult  Goal: Maintains hematologic stability  Outcome: Progressing     Problem: Pain  Goal: Verbalizes/displays adequate comfort level or baseline comfort level  Outcome: Progressing
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
## 2022-09-28 ENCOUNTER — ANESTHESIA (OUTPATIENT)
Dept: ENDOSCOPY | Age: 87
DRG: 871 | End: 2022-09-28
Payer: MEDICARE

## 2022-09-28 ENCOUNTER — ANESTHESIA EVENT (OUTPATIENT)
Dept: ENDOSCOPY | Age: 87
DRG: 871 | End: 2022-09-28
Payer: MEDICARE

## 2022-09-28 LAB
ANION GAP SERPL CALCULATED.3IONS-SCNC: 12 MMOL/L (ref 3–16)
BASOPHILS ABSOLUTE: 0.1 K/UL (ref 0–0.2)
BASOPHILS RELATIVE PERCENT: 0.6 %
BUN BLDV-MCNC: 60 MG/DL (ref 7–20)
CALCIUM SERPL-MCNC: 9.7 MG/DL (ref 8.3–10.6)
CHLORIDE BLD-SCNC: 102 MMOL/L (ref 99–110)
CO2: 22 MMOL/L (ref 21–32)
CREAT SERPL-MCNC: 1.6 MG/DL (ref 0.8–1.3)
EOSINOPHILS ABSOLUTE: 0.3 K/UL (ref 0–0.6)
EOSINOPHILS RELATIVE PERCENT: 2.4 %
GFR AFRICAN AMERICAN: 50
GFR NON-AFRICAN AMERICAN: 41
GLUCOSE BLD-MCNC: 181 MG/DL (ref 70–99)
HCT VFR BLD CALC: 27.7 % (ref 40.5–52.5)
HEMOGLOBIN: 9.3 G/DL (ref 13.5–17.5)
INR BLD: 1.21 (ref 0.87–1.14)
LYMPHOCYTES ABSOLUTE: 1.2 K/UL (ref 1–5.1)
LYMPHOCYTES RELATIVE PERCENT: 9.7 %
MAGNESIUM: 2.5 MG/DL (ref 1.8–2.4)
MCH RBC QN AUTO: 31.3 PG (ref 26–34)
MCHC RBC AUTO-ENTMCNC: 33.8 G/DL (ref 31–36)
MCV RBC AUTO: 92.7 FL (ref 80–100)
MONOCYTES ABSOLUTE: 1.2 K/UL (ref 0–1.3)
MONOCYTES RELATIVE PERCENT: 9.4 %
NEUTROPHILS ABSOLUTE: 9.6 K/UL (ref 1.7–7.7)
NEUTROPHILS RELATIVE PERCENT: 77.9 %
PDW BLD-RTO: 19.9 % (ref 12.4–15.4)
PLATELET # BLD: 456 K/UL (ref 135–450)
PMV BLD AUTO: 9.1 FL (ref 5–10.5)
POTASSIUM REFLEX MAGNESIUM: 4.3 MMOL/L (ref 3.5–5.1)
PROTHROMBIN TIME: 15.3 SEC (ref 11.7–14.5)
RBC # BLD: 2.98 M/UL (ref 4.2–5.9)
REASON FOR REJECTION: NORMAL
REJECTED TEST: NORMAL
SODIUM BLD-SCNC: 136 MMOL/L (ref 136–145)
WBC # BLD: 12.4 K/UL (ref 4–11)

## 2022-09-28 PROCEDURE — 85610 PROTHROMBIN TIME: CPT

## 2022-09-28 PROCEDURE — 6370000000 HC RX 637 (ALT 250 FOR IP): Performed by: NURSE PRACTITIONER

## 2022-09-28 PROCEDURE — 0DJ08ZZ INSPECTION OF UPPER INTESTINAL TRACT, VIA NATURAL OR ARTIFICIAL OPENING ENDOSCOPIC: ICD-10-PCS | Performed by: INTERNAL MEDICINE

## 2022-09-28 PROCEDURE — 97530 THERAPEUTIC ACTIVITIES: CPT

## 2022-09-28 PROCEDURE — 80048 BASIC METABOLIC PNL TOTAL CA: CPT

## 2022-09-28 PROCEDURE — 6360000002 HC RX W HCPCS: Performed by: PHYSICIAN ASSISTANT

## 2022-09-28 PROCEDURE — 2060000000 HC ICU INTERMEDIATE R&B

## 2022-09-28 PROCEDURE — 2580000003 HC RX 258: Performed by: PHYSICIAN ASSISTANT

## 2022-09-28 PROCEDURE — 83735 ASSAY OF MAGNESIUM: CPT

## 2022-09-28 PROCEDURE — 36415 COLL VENOUS BLD VENIPUNCTURE: CPT

## 2022-09-28 PROCEDURE — 6370000000 HC RX 637 (ALT 250 FOR IP): Performed by: INTERNAL MEDICINE

## 2022-09-28 PROCEDURE — 97116 GAIT TRAINING THERAPY: CPT

## 2022-09-28 PROCEDURE — 97110 THERAPEUTIC EXERCISES: CPT

## 2022-09-28 PROCEDURE — 6370000000 HC RX 637 (ALT 250 FOR IP): Performed by: PHYSICIAN ASSISTANT

## 2022-09-28 PROCEDURE — 85025 COMPLETE CBC W/AUTO DIFF WBC: CPT

## 2022-09-28 PROCEDURE — 97535 SELF CARE MNGMENT TRAINING: CPT

## 2022-09-28 RX ORDER — LANOLIN ALCOHOL/MO/W.PET/CERES
3 CREAM (GRAM) TOPICAL NIGHTLY PRN
Status: DISCONTINUED | OUTPATIENT
Start: 2022-09-28 | End: 2022-10-03 | Stop reason: HOSPADM

## 2022-09-28 RX ORDER — WARFARIN SODIUM 2.5 MG/1
2.5 TABLET ORAL
Status: COMPLETED | OUTPATIENT
Start: 2022-09-28 | End: 2022-09-28

## 2022-09-28 RX ADMIN — METOPROLOL SUCCINATE 25 MG: 25 TABLET, EXTENDED RELEASE ORAL at 09:40

## 2022-09-28 RX ADMIN — DICLOFENAC SODIUM 2 G: 10 GEL TOPICAL at 09:40

## 2022-09-28 RX ADMIN — POLYETHYLENE GLYCOL 3350 17 G: 17 POWDER, FOR SOLUTION ORAL at 16:29

## 2022-09-28 RX ADMIN — ASPIRIN 81 MG: 81 TABLET, COATED ORAL at 16:30

## 2022-09-28 RX ADMIN — Medication 10 ML: at 16:31

## 2022-09-28 RX ADMIN — WARFARIN SODIUM 2.5 MG: 2.5 TABLET ORAL at 20:33

## 2022-09-28 RX ADMIN — ATORVASTATIN CALCIUM 20 MG: 20 TABLET, FILM COATED ORAL at 16:30

## 2022-09-28 RX ADMIN — Medication 1000 MG: at 20:33

## 2022-09-28 RX ADMIN — ACETAMINOPHEN 650 MG: 325 TABLET ORAL at 16:30

## 2022-09-28 RX ADMIN — Medication 1 CAPSULE: at 09:40

## 2022-09-28 RX ADMIN — Medication 1 CAPSULE: at 16:30

## 2022-09-28 RX ADMIN — Medication 10 ML: at 20:33

## 2022-09-28 RX ADMIN — PANTOPRAZOLE SODIUM 40 MG: 40 TABLET, DELAYED RELEASE ORAL at 06:09

## 2022-09-28 RX ADMIN — METOPROLOL SUCCINATE 25 MG: 25 TABLET, EXTENDED RELEASE ORAL at 20:33

## 2022-09-28 RX ADMIN — ACETAMINOPHEN 650 MG: 325 TABLET ORAL at 06:09

## 2022-09-28 RX ADMIN — DICLOFENAC SODIUM 2 G: 10 GEL TOPICAL at 20:33

## 2022-09-28 ASSESSMENT — PAIN SCALES - WONG BAKER
WONGBAKER_NUMERICALRESPONSE: 0

## 2022-09-28 ASSESSMENT — PAIN SCALES - GENERAL
PAINLEVEL_OUTOF10: 0
PAINLEVEL_OUTOF10: 0
PAINLEVEL_OUTOF10: 2
PAINLEVEL_OUTOF10: 2
PAINLEVEL_OUTOF10: 0
PAINLEVEL_OUTOF10: 3
PAINLEVEL_OUTOF10: 2

## 2022-09-28 ASSESSMENT — PAIN DESCRIPTION - DESCRIPTORS
DESCRIPTORS: ACHING

## 2022-09-28 ASSESSMENT — PAIN DESCRIPTION - LOCATION
LOCATION: SHOULDER;ARM
LOCATION: SHOULDER;ARM
LOCATION: ELBOW

## 2022-09-28 ASSESSMENT — PAIN DESCRIPTION - ORIENTATION
ORIENTATION: LEFT

## 2022-09-28 NOTE — PROGRESS NOTES
Pt unable to get EGD related to rapid irregular rhythm. Pt return to the floor in atrial fib/ atrial tach with and average heart rate of 120. Perfect serve sent to cardiology. Perfect serve sent to attending. Dr. Mati Lafleur checked mag level. Resulted at 2.50     1640 the pt converted back to sinus rhythm. [address] Dr. Pankaj Fuentes placed and order for Amiodarone gtt. 1728 call placed to cardiology on-call about starting gtt and notification that pt has converted back to sinus rhythm. 5 Dr. Krista Cat responded and does not want to start amiodarone gtt at this time but it may be started overnight if pt converts back in Afib RVR.        Will continue to monitor
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
## 2022-09-28 NOTE — PLAN OF CARE
Pt understands care & treatment plan; pt alert & orientated x4; uses call light appropriately. Pt is satisfied with pain relief from scheduled tylenol, voltaren gel, and heating pad. Pt denies SOB;  pt remains on room air with oxygen saturation 90% or greater. Pt has been NPO since 0000 with the exception of morning tylenol & protonix with sips of water. ROM done within pt's pain tolerance. Pt has not had bowel movement to obtain stool stample. VSS; standard safety precautions remain in place. Problem: Discharge Planning  Goal: Discharge to home or other facility with appropriate resources  Outcome: Progressing     Problem: Skin/Tissue Integrity  Goal: Absence of new skin breakdown  Description: 1. Monitor for areas of redness and/or skin breakdown  2. Assess vascular access sites hourly  3. Every 4-6 hours minimum:  Change oxygen saturation probe site  4. Every 4-6 hours:  If on nasal continuous positive airway pressure, respiratory therapy assess nares and determine need for appliance change or resting period.   Outcome: Progressing     Problem: Safety - Adult  Goal: Free from fall injury  Outcome: Progressing     Problem: ABCDS Injury Assessment  Goal: Absence of physical injury  Outcome: Progressing     Problem: Neurosensory - Adult  Goal: Achieves maximal functionality and self care  Outcome: Progressing     Problem: Respiratory - Adult  Goal: Achieves optimal ventilation and oxygenation  Outcome: Progressing     Problem: Cardiovascular - Adult  Goal: Maintains optimal cardiac output and hemodynamic stability  Outcome: Progressing     Problem: Skin/Tissue Integrity - Adult  Goal: Skin integrity remains intact  Outcome: Progressing  Goal: Oral mucous membranes remain intact  Outcome: Progressing     Problem: Musculoskeletal - Adult  Goal: Return mobility to safest level of function  Outcome: Progressing     Problem: Genitourinary - Adult  Goal: Absence of urinary retention  Outcome: Progressing     Problem: Metabolic/Fluid and Electrolytes - Adult  Goal: Electrolytes maintained within normal limits  Outcome: Progressing  Goal: Hemodynamic stability and optimal renal function maintained  Outcome: Progressing     Problem: Hematologic - Adult  Goal: Maintains hematologic stability  Outcome: Progressing     Problem: Pain  Goal: Verbalizes/displays adequate comfort level or baseline comfort level  Outcome: Progressing     Problem: Chronic Conditions and Co-morbidities  Goal: Patient's chronic conditions and co-morbidity symptoms are monitored and maintained or improved  Outcome: Progressing

## 2022-09-28 NOTE — PROGRESS NOTES
Pt arrived to [facility] accompanied with Ena-JOSHUA and transporter. Pt is oriented X3  Monitor shows [facility] 108-131. Pt denies chest pain nor SOB. SaO2 90-92% Room Air.

## 2022-09-28 NOTE — DISCHARGE INSTR - DIET
Good nutrition is important when healing from an illness, injury, or surgery. Follow any nutrition recommendations given to you during your hospital stay. If you were given an oral nutrition supplement while in the hospital, continue to take this supplement at home. You can take it with meals, in-between meals, and/or before bedtime. These supplements can be purchased at most local grocery stores, pharmacies, and chain super-stores. If you have any questions about your diet or nutrition, call the hospital and ask for the dietitian. For nutrition questions after discharge please call the Registered Dietitian at [phone]. Heart Failure Nutrition Therapy  This diet will help you feel better and support your heart by reducing symptoms of fluid retention, shortness of breath and swelling. You should focus on:  Limiting sodium in your diet by reading labels and limiting foods high in sodium. Limit your daily sodium intake to 2,000 mg per day. Select foods with 140 mg of sodium or less per serving. Foods with more than 300 mg of sodium per serving may not fit into a reduced-sodium meal plan. Check serving sizes. If you eat more than 1 serving, you will get more sodium than the amount listed. Limiting fluid in your diet. Ask your doctor how much fluid you can have per day  Remember foods that are liquid at room temperature such as popsicles, soup, ice cream and Jell-O are fluids. Checking your weight to make sure you're not retaining too much fluid. Weigh yourself every morning. If you gain 3 or more pounds in one day or 5 pounds within 1 week, call your doctor. Foods to choose and avoid:  Avoid processed foods. Eat more fresh foods. Fresh and frozen fruits and vegetables are good choices. Choose fresh meats. Avoid processed meats such as das, sausage and hot dogs. Do not add salt to your food while cooking or at the table.   Try dry or fresh herbs, pepper, lemon juice, or a sodium-free seasoning blend such as Mrs. Dash to add flavor to food. Do not use a salt substitute. Use caution at [employer] foods are high in sodium. Ask for your food to be cooked without salt and request sauces and dressing to come on the side. 

## 2022-09-28 NOTE — PLAN OF CARE
Patient was off the floor earlier when I was going to round  Per RN patient now in [facility]  I ordered amiodarone bolus and drip

## 2022-09-28 NOTE — ANESTHESIA PRE PROCEDURE
Department of Anesthesiology  Preprocedure Note       Name:  nOdina Botello   Age:  80 y.o.  :  1933                                          MRN:  [id number]         Date:  2022      Surgeon: Bora Fritz):  Gay Main MD    Procedure: Procedure(s):  EGD DIAGNOSTIC ONLY    Medications prior to admission:   Prior to Admission medications    Medication Sig Start Date End Date Taking? Authorizing Provider   furosemide (LASIX) 40 MG tablet Take 40 mg by mouth daily    Historical Provider, MD   doxazosin (CARDURA) 2 MG tablet TAKE 1 TABLET BY MOUTH DAILY 22   [employer] MD Mateus   sotalol (BETAPACE) 120 MG tablet TAKE 1 TABLET BY MOUTH TWICE DAILY 22   Brittany Olvera MD   aspirin 81 MG EC tablet TAKE 1 TABLET BY MOUTH DAILY 22   PRIYA Giron CNP   lisinopril (PRINIVIL;ZESTRIL) 5 MG tablet Take 1 tablet by mouth daily 22   Thania Fajardo MD   atorvastatin (LIPITOR) 20 MG tablet Take 1 tablet by mouth daily 22   Brittany Olvera MD   warfarin (COUMADIN) 5 MG tablet TAKE ONE-HALF TABLET BY MOUTH ON  AND FRIDAY, AND 1 TABLET BY MOUTH ALL OTHER DAYS OF THE WEEK 3/28/22   PRIYA Denise CNP   metoprolol succinate (TOPROL XL) 50 MG extended release tablet TAKE 1 TABLET BY MOUTH TWICE DAILY 22   Brittany Olvera MD   vitamin B-1 (THIAMINE) 100 MG tablet Take 1,000 mg by mouth daily     Historical Provider, MD   EPOETIN BIBIANA IJ Inject as directed every 21 days     Historical Provider, MD   acetaminophen (TYLENOL) 325 MG tablet Take 650 mg by mouth every 6 hours as needed for Pain    Historical Provider, MD       Current medications:    No current facility-administered medications for this visit. No current outpatient medications on file.      Facility-Administered Medications Ordered in Other Visits   Medication Dose Route Frequency Provider Last Rate Last Admin    warfarin placeholder: dosing by pharmacy   Other [address] [place] [address] Melissa Wood MD        diclofenac sodium (VOLTAREN) 1 % gel 2 g  2 g Topical BID Graeme Marie MD   2 g at 09/28/22 0940    [Held by provider] furosemide (LASIX) tablet 40 mg  40 mg Oral Daily Thania Fajardo MD        pantoprazole (PROTONIX) tablet 40 mg  40 mg Oral QAM AC Micheal Blount PA-C   40 mg at 09/28/22 8765    metoprolol succinate (TOPROL XL) extended release tablet 25 mg  25 mg Oral BID PRIYA Arriaga - CNP   25 mg at 09/28/22 0940    ipratropium-albuterol (DUONEB) nebulizer solution 1 ampule  1 ampule Inhalation Q4H PRN Elisha Hawley MD        acetaminophen (TYLENOL) tablet 650 mg  650 mg Oral Q6H Elisha Hawley MD   650 mg at 09/28/22 0609    traMADol (ULTRAM) tablet 50 mg  50 mg Oral Q6H PRN Elisha Hawley MD   50 mg at 09/27/22 0836    lidocaine 4 % external patch 2 patch  2 patch TransDERmal Daily Elisha Hawley MD   2 patch at 09/28/22 0940    polyethylene glycol (GLYCOLAX) packet 17 g  17 g Oral Daily Elisha Hawley MD   17 g at 09/27/22 1139    0.9 % sodium chloride infusion   IntraVENous PRN Joie Amaral MD        iopamidol (ISOVUE-370) 76 % injection 75 mL  75 mL IntraVENous ONCE PRN Doris Pond MD        atorvastatin (LIPITOR) tablet 20 mg  20 mg Oral Daily Marybel Jimenez PA-C   20 mg at 09/27/22 0836    sodium chloride flush 0.9 % injection 5-40 mL  5-40 mL IntraVENous 2 times per day Marybel Jimenez PA-C   10 mL at 09/27/22 2224    sodium chloride flush 0.9 % injection 10 mL  10 mL IntraVENous PRN Marybel Jimenez PA-C        0.9 % sodium chloride infusion   IntraVENous PRN Marybel Jimenez PA-C        albuterol (PROVENTIL) nebulizer solution 2.5 mg  2.5 mg Nebulization Q2H PRN Marybel Jimenez PA-C        cefTRIAXone (ROCEPHIN) 1000 mg in sterile water 10 mL IV syringe  1,000 mg IntraVENous Q24H Marybel Jimenez PA-C   1,000 mg at 09/27/22 2224    methocarbamol (ROBAXIN) tablet 750 mg  750 mg Oral TID PRN Marybel Jimenez PA-C   750 mg at 09/26/22 1238    senna (SENOKOT) tablet 8.6 mg  1 tablet Oral Daily PRN Elle Robin PA-C   8.6 mg at 09/23/22 1731    lactobacillus (CULTURELLE) capsule 1 capsule  1 capsule Oral BID WC Elle Robin PA-C   1 capsule at 09/28/22 0940    aspirin EC tablet 81 mg  81 mg Oral Daily Elle Robin PA-C   81 mg at 09/27/22 1138       Allergies:  No Known Allergies    Problem List:    Patient Active Problem List   Diagnosis Code    CAD (coronary artery disease) CABG x3 1996, stenting PDA 10/2021 I25.10    Cardiac pacemaker Z95.0    Benign prostatic hyperplasia with nocturia N40.1, R35.1    Gout-uric acid >7.5--advised proph tx M10.9    Hypercholesteremia E78.00    Carotid bruit(bilat-nl duplex u/s 10/10) R09.89    Vitamin D deficiency-(advised 1000IU/day) E55.9    Actinic keratoses L57.0    Elevated PSA-(was 4.2 10/10-repeat 6 mo)(was 5.12 1/11-then 4.4 2/12)-sees dr Serafina Lundborg for this R97.20    S/P colonoscopy-4/07-neg per pt,  3/18 repeat colonoscopy polyp-repeat 5 yr Z98.890    Hearing loss, conductive, bilateral-seeing ent-dr quinn for hearing aides H90.0    Encounter for monitoring sotalol therapy Z51.81, Z79.899    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 Elvira Cobb  R80.9    Anemia--post op 8/15--started otc iron bid, off now  - work up Dr. Sabiha Pollack bone marrow. possible MDS 2019 D64.9    Paroxysmal atrial fibrillation (HCC) I48.0    DM (diabetes mellitus), type 2 with peripheral vascular complications (HCC)--s/p amputation great toe post osteomylitis   diet controlled  E11.51    Hypertension I10    CKD stage 3 due to type 2 diabetes mellitus ([facility] [address] E11.22, N18.30    Hyperkalemia E87.5    H/O esophagogastroduodenoscopy  11/18  prominent vessesls vs varices. dr Dawna Izaguirre (done for blood in stool) Z98.890    Elevated ferritin  - work up Dr. Sabiha Pollack  genetic screen hemachromatosis negative.  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 R79.89    Localized edema R60.0    Ischemic cardiomyopathy; EF 40-45% 4/22 I25.5    Chronic pansinusitis J32.4    S/P amputation of lesser toe, right ([facility]) Z89.421    Ventricular tachycardia ([facility]) I47.2    Nonrheumatic aortic valve stenosis- moderate by echo 4/22 I35.0    Chronic renal disease, stage III ([facility]) [819807] N18.30    Acute on chronic systolic congestive heart failure ([facility]) I50.23    Mixed hyperlipidemia E78.2    Acute respiratory failure with hypoxia ([facility]) J96.01    Pneumonia of both lungs due to infectious organism J18.9    REYNA (acute kidney injury) ([facility] [address] N17.9    Closed pelvic ring fracture ([facility]) S32.810A    Persistent atrial fibrillation ([facility]) I48.19    Acute on chronic combined systolic (congestive) and diastolic (congestive) heart failure ([facility]) I50.43       Past Medical History:        Diagnosis Date    Actinic keratosis     Actinic keratosis     Atrial fibrillation ([facility])     Bilateral carotid artery stenosis     CAD (coronary artery disease)     Cellulitis 7/15/2015    right foot    Diabetes mellitus ([facility] [address]     DM (diabetes mellitus), type 2 with peripheral vascular complications ([facility])--s/p amputation great toe post osteomylitis  9/11/2015    Glucose intolerance (malabsorption)     Hyperlipidemia     Hypertension     Hypertrophy of prostate without urinary obstruction and other lower urinary tract symptoms (LUTS)     Intermittent atrial fibrillation ([facility])     Kidney stone     Occult blood in stool     Hx of    Pacemaker     Permanent       Past Surgical History:        Procedure Laterality Date    ABSCESS DRAINAGE  7/20/15    right foot    APPENDECTOMY      CARDIAC CATHETERIZATION  2003    Left    COLONOSCOPY  03/28/2018    dr Marcie Chu    x3   [facility]    OTHER SURGICAL HISTORY Right 7/17/15    right fifth toe I and D    PACEMAKER PLACEMENT  2005    NC ESOPHAGOGASTRODUODENOSCOPY TRANSORAL DIAGNOSTIC N/A 11/21/2018    EGD DIAGNOSTIC ONLY performed by Alessandra Reyes MD at [facility] ENDOSCOPY    TOE AMPUTATION Right 7.16.15    right pinkey toe     TONSILLECTOMY AND ADENOIDECTOMY         Social History:    Social History     Tobacco Use    Smoking status: Never    Smokeless tobacco: Never    Tobacco comments:     counseled on tobacco exposure avoidance   Substance Use Topics    Alcohol use: No     Alcohol/week: 0.0 standard drinks                                Counseling given: Not Answered  Tobacco comments: counseled on tobacco exposure avoidance      Vital Signs (Current): There were no vitals filed for this visit.                                            BP Readings from Last 3 Encounters:   09/28/22 130/62   09/12/22 (!) 144/80   08/04/22 120/78       NPO Status:                                                                                 BMI:   Wt Readings from Last 3 Encounters:   09/28/22 167 lb 8 oz (76 kg)   09/12/22 169 lb 9.6 oz (76.9 kg)   08/04/22 169 lb 6.4 oz (76.8 kg)     There is no height or weight on file to calculate BMI.    CBC:   Lab Results   Component Value Date/Time    WBC 12.4 09/28/2022 09:15 AM    RBC 2.98 09/28/2022 09:15 AM    HGB 9.3 09/28/2022 09:15 AM    HCT 27.7 09/28/2022 09:15 AM    MCV 92.7 09/28/2022 09:15 AM    RDW 19.9 09/28/2022 09:15 AM     09/28/2022 09:15 AM       CMP:   Lab Results   Component Value Date/Time     09/28/2022 09:15 AM    K 4.3 09/28/2022 09:15 AM     09/28/2022 09:15 AM    CO2 22 09/28/2022 09:15 AM    BUN 60 09/28/2022 09:15 AM    CREATININE 1.6 09/28/2022 09:15 AM    GFRAA 50 09/28/2022 09:15 AM    GFRAA >60 06/05/2013 08:30 AM    AGRATIO 1.1 09/27/2022 05:33 AM    LABGLOM 41 09/28/2022 09:15 AM    GLUCOSE 181 09/28/2022 09:15 AM    PROT 6.5 09/27/2022 05:33 AM    PROT 7.0 02/18/2013 11:40 AM    CALCIUM 9.7 09/28/2022 09:15 AM    BILITOT 0.9 09/27/2022 05:33 AM    ALKPHOS 72 09/27/2022 05:33 AM    AST 28 09/27/2022 05:33 AM    ALT 23 09/27/2022 05:33 AM       POC Tests: No results for input(s): POCGLU, Alexander Bulla, POCCL, POCBUN, POCHEMO, POCHCT in the last 72 hours. Coags:   Lab Results   Component Value Date/Time    PROTIME 15.3 09/28/2022 09:15 AM    PROTIME 13.8 11/21/2018 12:00 AM    INR 1.21 09/28/2022 09:15 AM    APTT 35.5 09/22/2022 04:37 PM       HCG (If Applicable): No results found for: PREGTESTUR, PREGSERUM, HCG, HCGQUANT     ABGs: No results found for: PHART, PO2ART, LPZ0EBA, GWQ7EPG, BEART, B9OPAYQR     Type & Screen (If Applicable):  No results found for: LABABO, LABRH    Drug/Infectious Status (If Applicable):  No results found for: HIV, HEPCAB    COVID-19 Screening (If Applicable):   Lab Results   Component Value Date/Time    COVID19 Not Detected 12/05/2020 04:51 PM           Anesthesia Evaluation  Patient summary reviewed and Nursing notes reviewed  Airway: Mallampati: II  TM distance: >3 FB   Neck ROM: full  Mouth opening: > = 3 FB   Dental:          Pulmonary:                              Cardiovascular:    (+) hypertension:, pacemaker:, CAD:, CHF:,         Rhythm: regular  Rate: normal                    Neuro/Psych:               GI/Hepatic/Renal:             Endo/Other:    (+) Diabetes, . Abdominal:             Vascular: Other Findings:       Conclusions      Summary   Left ventricular cavity size is normal with normal left ventricular wall   thickness. Overall left ventricular systolic function appears mild-moderately reduced. Ejection fraction is visually estimated to be 35-40%. There is mild diffuse hypokinesis. Grade II diastolic dysfunction with elevated LV filling pressures. Normal right ventricular size. Right ventricular systolic function is mildly reduced. The left atrium is moderate-severely dilated. Mitral valve leaflets appear mildly thickened. Mild to moderate mitral regurgitation. Moderate aortic stenosis with a peak velocity of 2.5 m/s, a mean pressure   gradient of 15 mmHg and an DONAVAN of 1.19 cm^2.    Aortic valve gradients may be underestimated due to low cardiac output. Mild aortic regurgitation. Mild to moderate tricuspid regurgitation with a PASP of 45 mmHg. Trivial pulmonic regurgitation present. Signature      ------------------------------------------------------------------   Electronically signed by Elvis Manley MD, [facility] (Interpreting   physician) on 05/13/2021 at 04:27 PM   ------------------------------------------------------------------         Anesthesia Plan      MAC     ASA 3       Induction: intravenous. Anesthetic plan and risks discussed with patient. Plan discussed with CRNA.                     Dakotah Torre MD   9/28/2022

## 2022-09-28 NOTE — CONSULTS
Clinical Pharmacy Note: Pharmacy to Dose Warfarin    Pharmacy consulted by Dr. Madhu De Santiago to dose warfarin. Henna Gill is a 80 y.o. male  is receiving warfarin for indication: afib. INR Goal Range: 2.0-3.0  Prior to admission warfarin dosing regimen: 5 mg Sun, Th,  -2.5 mg all other days  INR today:   Lab Results   Component Value Date/Time    INR 1.21 09/28/2022 09:15 AM       Assessment/Plan:  INR is subtherapeutic on current inpatient dosing regimen. Possible concomitant drug-drug interactions include: amiodarone   Based on today's assessment, dose warfarin 2.5 mg once. Daily INR is ordered. Pharmacy will continue to monitor and make adjustments to regimen as necessary. Pt in afib preventing his EGD, called to resume warfarin, per Dr. Melissa lópez.     Thank you for the consult,     Albert Veliz, [facility]
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
## 2022-09-28 NOTE — PROGRESS NOTES
Nutrition Note    RECOMMENDATIONS  PO Diet: Resume diet per MD. Recommend 2 gm Na cardiac restricted diet. Other: Please document % po intake of meals    NUTRITION ASSESSMENT   Pt triggered for LOS assessment. NPO today for possible EGD, was on 2 gm Na diet with minimal documented intakes ranging from 1-100%, no documented intakes since 9/24. Upon visiting, daughter in room reported appetite/intake was less than optimal beginning of admission but improving, ate a good lunch and dinner yesterday. No appetite or po intake issues prior to current admission. Wt hx in EMR indicates stable wt. Daughter reported pt needs to watch sodium intake once back home. Will place CHF diet information in discharge instructions and continue to monitor for adequate po intake. Nutrition Related Findings: -2.7L. Lytes WNL. LBM 9/27, GI WDL. No edema noted. Wounds: None  Nutrition Education:  Education initiated (CHF info placed in discharge instructions)   Nutrition Goals: PO intake 50% or greater, by next RD assessment     MALNUTRITION ASSESSMENT   Malnutrition Status: No malnutrition    NUTRITION DIAGNOSIS   Inadequate oral intake related to acute injury/trauma as evidenced by NPO or clear liquid status due to medical condition    CURRENT NUTRITION THERAPIES  Diet NPO Exceptions are: Sips of Water with Meds     PO Intake: Unable to assess (minimal variable documented intakes)   PO Supplement Intake:None Ordered    ANTHROPOMETRICS  Current Height: 5' 9.5\" (176.5 cm)  Current Weight: 167 lb 8 oz (76 kg)    Admission weight: 162 lb (73.5 kg) (bed wt)  Ideal Body Weight (IBW): 163 lbs  (74 kg)        BMI: 24.3    COMPARATIVE STANDARDS  Energy (kcal):  5822-3474     Protein (g):         Fluid (mL/day):  1900    The patient will be monitored per nutrition standards of care. Consult dietitian if additional nutrition interventions are needed prior to RD reassessment.      Adriana Dent, MS, RD, LD    Contact: [phone]

## 2022-09-28 NOTE — PROGRESS NOTES
RN referral for patient support and request for communion. Patient is [place] and has been receiving communion during hospital stay. Visited with patient today re: request for [address] [facility] [address]. Patient received Sacrament from [facility], yesterday. Provided prayer and support/prayer shawl and spiritual care visit with patient and family present. Patient tearful as he recalled stories about his wife who  18 months ago.

## 2022-09-28 NOTE — PROGRESS NOTES
Dr Lisa Salas here at bedside. Aware pt pt's heart rhythm and rate and SaO2. Discussed with Dr Maynor Chery concerning pt's Vitals. Procedure to be cancelled today.

## 2022-09-28 NOTE — PROCEDURES
Very pleasant 26-year-old male who came down to endoscopy today for us to do an EGD  Has been having some problems with anemia    And when he got here all day his heart rate has been under control but then he started being evaluated by her anesthesiologist and was found to have a heart rate of in the 130s he would then would come down to 98 and then bounced back up to 120s and then come down and he has atrial fibrillation    Discussion with anesthesia    Discussion with his family we have decided that the patient can go back up to his medical ríos at this time    To go ahead and abort the procedure    And we will have the patient just continue to be watched until may be as an outpatient and then evaluate his esophagus and stomach as an outpatient      Is very kind referral today    Maverick White MD   [facility]

## 2022-09-29 LAB
ANION GAP SERPL CALCULATED.3IONS-SCNC: 12 MMOL/L (ref 3–16)
BASOPHILS ABSOLUTE: 0.1 K/UL (ref 0–0.2)
BASOPHILS RELATIVE PERCENT: 0.6 %
BUN BLDV-MCNC: 54 MG/DL (ref 7–20)
CALCIUM SERPL-MCNC: 9.1 MG/DL (ref 8.3–10.6)
CHLORIDE BLD-SCNC: 107 MMOL/L (ref 99–110)
CO2: 24 MMOL/L (ref 21–32)
CREAT SERPL-MCNC: 1.3 MG/DL (ref 0.8–1.3)
EOSINOPHILS ABSOLUTE: 0.2 K/UL (ref 0–0.6)
EOSINOPHILS RELATIVE PERCENT: 2.6 %
GFR AFRICAN AMERICAN: >60
GFR NON-AFRICAN AMERICAN: 52
GLUCOSE BLD-MCNC: 162 MG/DL (ref 70–99)
HCT VFR BLD CALC: 21.9 % (ref 40.5–52.5)
HCT VFR BLD CALC: 24.2 % (ref 40.5–52.5)
HEMOGLOBIN: 7.4 G/DL (ref 13.5–17.5)
HEMOGLOBIN: 8.2 G/DL (ref 13.5–17.5)
INR BLD: 1.26 (ref 0.87–1.14)
LYMPHOCYTES ABSOLUTE: 1.1 K/UL (ref 1–5.1)
LYMPHOCYTES RELATIVE PERCENT: 11.6 %
MCH RBC QN AUTO: 31.4 PG (ref 26–34)
MCHC RBC AUTO-ENTMCNC: 33.8 G/DL (ref 31–36)
MCV RBC AUTO: 93.1 FL (ref 80–100)
MONOCYTES ABSOLUTE: 0.8 K/UL (ref 0–1.3)
MONOCYTES RELATIVE PERCENT: 8.9 %
NEUTROPHILS ABSOLUTE: 7 K/UL (ref 1.7–7.7)
NEUTROPHILS RELATIVE PERCENT: 76.3 %
OCCULT BLOOD DIAGNOSTIC: NORMAL
PDW BLD-RTO: 19.6 % (ref 12.4–15.4)
PLATELET # BLD: 382 K/UL (ref 135–450)
PMV BLD AUTO: 8.8 FL (ref 5–10.5)
POTASSIUM REFLEX MAGNESIUM: 4.2 MMOL/L (ref 3.5–5.1)
PROTHROMBIN TIME: 15.8 SEC (ref 11.7–14.5)
RBC # BLD: 2.35 M/UL (ref 4.2–5.9)
SODIUM BLD-SCNC: 143 MMOL/L (ref 136–145)
TSH REFLEX: 1.67 UIU/ML (ref 0.27–4.2)
WBC # BLD: 9.2 K/UL (ref 4–11)

## 2022-09-29 PROCEDURE — 6370000000 HC RX 637 (ALT 250 FOR IP): Performed by: INTERNAL MEDICINE

## 2022-09-29 PROCEDURE — 2060000000 HC ICU INTERMEDIATE R&B

## 2022-09-29 PROCEDURE — 84443 ASSAY THYROID STIM HORMONE: CPT

## 2022-09-29 PROCEDURE — 99233 SBSQ HOSP IP/OBS HIGH 50: CPT | Performed by: NURSE PRACTITIONER

## 2022-09-29 PROCEDURE — 6370000000 HC RX 637 (ALT 250 FOR IP): Performed by: PHYSICIAN ASSISTANT

## 2022-09-29 PROCEDURE — 85014 HEMATOCRIT: CPT

## 2022-09-29 PROCEDURE — 36415 COLL VENOUS BLD VENIPUNCTURE: CPT

## 2022-09-29 PROCEDURE — 99223 1ST HOSP IP/OBS HIGH 75: CPT | Performed by: INTERNAL MEDICINE

## 2022-09-29 PROCEDURE — 82270 OCCULT BLOOD FECES: CPT

## 2022-09-29 PROCEDURE — 2580000003 HC RX 258: Performed by: PHYSICIAN ASSISTANT

## 2022-09-29 PROCEDURE — 6370000000 HC RX 637 (ALT 250 FOR IP): Performed by: NURSE PRACTITIONER

## 2022-09-29 PROCEDURE — 80048 BASIC METABOLIC PNL TOTAL CA: CPT

## 2022-09-29 PROCEDURE — 85018 HEMOGLOBIN: CPT

## 2022-09-29 PROCEDURE — 85025 COMPLETE CBC W/AUTO DIFF WBC: CPT

## 2022-09-29 PROCEDURE — 85610 PROTHROMBIN TIME: CPT

## 2022-09-29 RX ORDER — WARFARIN SODIUM 2.5 MG/1
2.5 TABLET ORAL
Status: DISCONTINUED | OUTPATIENT
Start: 2022-09-30 | End: 2022-10-01 | Stop reason: ALTCHOICE

## 2022-09-29 RX ORDER — METOPROLOL SUCCINATE 50 MG/1
50 TABLET, EXTENDED RELEASE ORAL 2 TIMES DAILY
Status: DISCONTINUED | OUTPATIENT
Start: 2022-09-29 | End: 2022-10-03

## 2022-09-29 RX ORDER — METOPROLOL SUCCINATE 25 MG/1
25 TABLET, EXTENDED RELEASE ORAL ONCE
Status: COMPLETED | OUTPATIENT
Start: 2022-09-29 | End: 2022-09-29

## 2022-09-29 RX ORDER — WARFARIN SODIUM 5 MG/1
5 TABLET ORAL
Status: DISCONTINUED | OUTPATIENT
Start: 2022-09-29 | End: 2022-10-01 | Stop reason: ALTCHOICE

## 2022-09-29 RX ORDER — AMIODARONE HYDROCHLORIDE 200 MG/1
200 TABLET ORAL 2 TIMES DAILY
Status: DISCONTINUED | OUTPATIENT
Start: 2022-09-29 | End: 2022-10-03 | Stop reason: HOSPADM

## 2022-09-29 RX ADMIN — ACETAMINOPHEN 650 MG: 325 TABLET ORAL at 05:33

## 2022-09-29 RX ADMIN — Medication 10 ML: at 08:31

## 2022-09-29 RX ADMIN — POLYETHYLENE GLYCOL 3350 17 G: 17 POWDER, FOR SOLUTION ORAL at 08:32

## 2022-09-29 RX ADMIN — DICLOFENAC SODIUM 2 G: 10 GEL TOPICAL at 08:35

## 2022-09-29 RX ADMIN — AMIODARONE HYDROCHLORIDE 200 MG: 200 TABLET ORAL at 20:44

## 2022-09-29 RX ADMIN — PANTOPRAZOLE SODIUM 40 MG: 40 TABLET, DELAYED RELEASE ORAL at 05:33

## 2022-09-29 RX ADMIN — WARFARIN SODIUM 5 MG: 5 TABLET ORAL at 16:53

## 2022-09-29 RX ADMIN — ASPIRIN 81 MG: 81 TABLET, COATED ORAL at 08:29

## 2022-09-29 RX ADMIN — Medication 10 ML: at 20:43

## 2022-09-29 RX ADMIN — MELATONIN TAB 3 MG 3 MG: 3 TAB at 20:43

## 2022-09-29 RX ADMIN — ACETAMINOPHEN 650 MG: 325 TABLET ORAL at 00:31

## 2022-09-29 RX ADMIN — METOPROLOL SUCCINATE 25 MG: 25 TABLET, EXTENDED RELEASE ORAL at 08:29

## 2022-09-29 RX ADMIN — ATORVASTATIN CALCIUM 20 MG: 20 TABLET, FILM COATED ORAL at 08:29

## 2022-09-29 RX ADMIN — MELATONIN TAB 3 MG 3 MG: 3 TAB at 00:31

## 2022-09-29 RX ADMIN — Medication 1 CAPSULE: at 08:29

## 2022-09-29 RX ADMIN — METOPROLOL SUCCINATE 50 MG: 50 TABLET, EXTENDED RELEASE ORAL at 20:44

## 2022-09-29 RX ADMIN — ACETAMINOPHEN 650 MG: 325 TABLET ORAL at 11:25

## 2022-09-29 RX ADMIN — ACETAMINOPHEN 650 MG: 325 TABLET ORAL at 16:53

## 2022-09-29 RX ADMIN — Medication 1 CAPSULE: at 16:53

## 2022-09-29 RX ADMIN — METOPROLOL SUCCINATE 25 MG: 25 TABLET, EXTENDED RELEASE ORAL at 12:09

## 2022-09-29 RX ADMIN — DICLOFENAC SODIUM 2 G: 10 GEL TOPICAL at 20:47

## 2022-09-29 ASSESSMENT — PAIN DESCRIPTION - ORIENTATION
ORIENTATION: LEFT
ORIENTATION: LEFT

## 2022-09-29 ASSESSMENT — PAIN SCALES - GENERAL
PAINLEVEL_OUTOF10: 1
PAINLEVEL_OUTOF10: 1
PAINLEVEL_OUTOF10: 3
PAINLEVEL_OUTOF10: 2

## 2022-09-29 ASSESSMENT — PAIN DESCRIPTION - DESCRIPTORS
DESCRIPTORS: DISCOMFORT
DESCRIPTORS: ACHING

## 2022-09-29 ASSESSMENT — PAIN DESCRIPTION - LOCATION
LOCATION: ARM
LOCATION: ELBOW
LOCATION: SHOULDER
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
## 2022-09-29 NOTE — CARE COORDINATION
Discharge Planning. The SW spoke the admission coordinator at [facility] who stated they have declined the patient due to no bed availability. The SW called and left massage for [facility] and [address] [place] [address] admissions to follow up on referrals. The SW is waiting on a returning call at this time. Electronically signed by JACQUELINE Barbosa on 9/29/2022 at 11:31 AM       Update:    [facility]-No beds available . The SW sent additional referrals to:     [address] [place] [address] for  a response     [facility]-Accepted- Family discussing first before moving forward.      [facility] for a response       Electronically signed by JACQUELINE Barbosa on 9/29/2022 at 3:06 PM

## 2022-09-29 NOTE — PLAN OF CARE
Pt alert & orientated x4; remains free from falls & injuries; uses call light appropriately. Pt understands treatment plan. Heart rate did sustain in 130's for approximately 3 minutes and then converted to afib then sinus rhythm with HR less than 90 beats per minute. Pt showed no s/s of distress. VSS; standard safety precautions in place. Pt was able to sleep after melatonin. Pt remains on room air, denies SOB. Problem: Discharge Planning  Goal: Discharge to home or other facility with appropriate resources  Outcome: Progressing     Problem: Skin/Tissue Integrity  Goal: Absence of new skin breakdown  Description: 1. Monitor for areas of redness and/or skin breakdown  2. Assess vascular access sites hourly  3. Every 4-6 hours minimum:  Change oxygen saturation probe site  4. Every 4-6 hours:  If on nasal continuous positive airway pressure, respiratory therapy assess nares and determine need for appliance change or resting period.   Outcome: Progressing     Problem: Safety - Adult  Goal: Free from fall injury  Outcome: Progressing     Problem: ABCDS Injury Assessment  Goal: Absence of physical injury  Outcome: Progressing     Problem: Neurosensory - Adult  Goal: Achieves maximal functionality and self care  Outcome: Progressing     Problem: Respiratory - Adult  Goal: Achieves optimal ventilation and oxygenation  Outcome: Progressing     Problem: Cardiovascular - Adult  Goal: Maintains optimal cardiac output and hemodynamic stability  Outcome: Progressing     Problem: Skin/Tissue Integrity - Adult  Goal: Skin integrity remains intact  Outcome: Progressing  Goal: Oral mucous membranes remain intact  Outcome: Progressing     Problem: Musculoskeletal - Adult  Goal: Return mobility to safest level of function  Outcome: Progressing     Problem: Genitourinary - Adult  Goal: Absence of urinary retention  Outcome: Progressing     Problem: Metabolic/Fluid and Electrolytes - Adult  Goal: Electrolytes maintained within normal limits  Outcome: Progressing  Goal: Hemodynamic stability and optimal renal function maintained  Outcome: Progressing     Problem: Hematologic - Adult  Goal: Maintains hematologic stability  Outcome: Progressing     Problem: Pain  Goal: Verbalizes/displays adequate comfort level or baseline comfort level  Outcome: Progressing     Problem: Chronic Conditions and Co-morbidities  Goal: Patient's chronic conditions and co-morbidity symptoms are monitored and maintained or improved  Outcome: Progressing     Problem: Nutrition Deficit:  Goal: Optimize nutritional status  Outcome: Progressing
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
## 2022-09-29 NOTE — PROGRESS NOTES
Pharmacy to Dose Warfarin    Pharmacy consulted to dose warfarin for afib. INR Goal: 2-3    INR today: 1.26    Assessment/Plan:  - INR subtherapeutic after being resumed last night. - Will continue warfarin at home dose of 5 mg Sun/Thurs and 2.5 mg all other days.   - Possible concomitant drug-drug interactions include: amiodarone - discontinued. Pharmacy will continue to follow.     Kalina VillalobosD, [facility]
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
## 2022-09-30 ENCOUNTER — APPOINTMENT (OUTPATIENT)
Dept: GENERAL RADIOLOGY | Age: 87
DRG: 871 | End: 2022-09-30
Payer: MEDICARE

## 2022-09-30 LAB
ALBUMIN SERPL-MCNC: 3.4 G/DL (ref 3.4–5)
ANION GAP SERPL CALCULATED.3IONS-SCNC: 10 MMOL/L (ref 3–16)
ANION GAP SERPL CALCULATED.3IONS-SCNC: 12 MMOL/L (ref 3–16)
BASOPHILS ABSOLUTE: 0.1 K/UL (ref 0–0.2)
BASOPHILS ABSOLUTE: 0.1 K/UL (ref 0–0.2)
BASOPHILS RELATIVE PERCENT: 0.6 %
BASOPHILS RELATIVE PERCENT: 0.6 %
BUN BLDV-MCNC: 44 MG/DL (ref 7–20)
BUN BLDV-MCNC: 48 MG/DL (ref 7–20)
CALCIUM SERPL-MCNC: 8.9 MG/DL (ref 8.3–10.6)
CALCIUM SERPL-MCNC: 9.1 MG/DL (ref 8.3–10.6)
CHLORIDE BLD-SCNC: 109 MMOL/L (ref 99–110)
CHLORIDE BLD-SCNC: 109 MMOL/L (ref 99–110)
CO2: 22 MMOL/L (ref 21–32)
CO2: 23 MMOL/L (ref 21–32)
CREAT SERPL-MCNC: 1.1 MG/DL (ref 0.8–1.3)
CREAT SERPL-MCNC: 1.3 MG/DL (ref 0.8–1.3)
EKG ATRIAL RATE: 276 BPM
EKG DIAGNOSIS: NORMAL
EKG Q-T INTERVAL: 366 MS
EKG QRS DURATION: 84 MS
EKG QTC CALCULATION (BAZETT): 554 MS
EKG R AXIS: 18 DEGREES
EKG T AXIS: 74 DEGREES
EKG VENTRICULAR RATE: 138 BPM
EOSINOPHILS ABSOLUTE: 0.3 K/UL (ref 0–0.6)
EOSINOPHILS ABSOLUTE: 0.4 K/UL (ref 0–0.6)
EOSINOPHILS RELATIVE PERCENT: 3 %
EOSINOPHILS RELATIVE PERCENT: 3.7 %
GFR AFRICAN AMERICAN: >60
GFR AFRICAN AMERICAN: >60
GFR NON-AFRICAN AMERICAN: 52
GFR NON-AFRICAN AMERICAN: >60
GLUCOSE BLD-MCNC: 167 MG/DL (ref 70–99)
GLUCOSE BLD-MCNC: 168 MG/DL (ref 70–99)
GLUCOSE BLD-MCNC: 223 MG/DL (ref 70–99)
GLUCOSE BLD-MCNC: 224 MG/DL (ref 70–99)
GLUCOSE BLD-MCNC: 226 MG/DL (ref 70–99)
GLUCOSE BLD-MCNC: 355 MG/DL (ref 70–99)
HCT VFR BLD CALC: 23 % (ref 40.5–52.5)
HCT VFR BLD CALC: 23.4 % (ref 40.5–52.5)
HEMOGLOBIN: 8 G/DL (ref 13.5–17.5)
HEMOGLOBIN: 8 G/DL (ref 13.5–17.5)
INR BLD: 1.19 (ref 0.87–1.14)
LYMPHOCYTES ABSOLUTE: 1 K/UL (ref 1–5.1)
LYMPHOCYTES ABSOLUTE: 1.2 K/UL (ref 1–5.1)
LYMPHOCYTES RELATIVE PERCENT: 12.3 %
LYMPHOCYTES RELATIVE PERCENT: 9.5 %
MCH RBC QN AUTO: 31.8 PG (ref 26–34)
MCH RBC QN AUTO: 32 PG (ref 26–34)
MCHC RBC AUTO-ENTMCNC: 34.1 G/DL (ref 31–36)
MCHC RBC AUTO-ENTMCNC: 34.6 G/DL (ref 31–36)
MCV RBC AUTO: 92.1 FL (ref 80–100)
MCV RBC AUTO: 93.7 FL (ref 80–100)
MONOCYTES ABSOLUTE: 0.9 K/UL (ref 0–1.3)
MONOCYTES ABSOLUTE: 0.9 K/UL (ref 0–1.3)
MONOCYTES RELATIVE PERCENT: 8.5 %
MONOCYTES RELATIVE PERCENT: 9.4 %
NEUTROPHILS ABSOLUTE: 7.2 K/UL (ref 1.7–7.7)
NEUTROPHILS ABSOLUTE: 8.3 K/UL (ref 1.7–7.7)
NEUTROPHILS RELATIVE PERCENT: 74 %
NEUTROPHILS RELATIVE PERCENT: 78.4 %
PDW BLD-RTO: 19.4 % (ref 12.4–15.4)
PDW BLD-RTO: 19.5 % (ref 12.4–15.4)
PERFORMED ON: ABNORMAL
PHOSPHORUS: 3.9 MG/DL (ref 2.5–4.9)
PLATELET # BLD: 406 K/UL (ref 135–450)
PLATELET # BLD: 440 K/UL (ref 135–450)
PMV BLD AUTO: 8.4 FL (ref 5–10.5)
PMV BLD AUTO: 8.5 FL (ref 5–10.5)
POTASSIUM REFLEX MAGNESIUM: 4.4 MMOL/L (ref 3.5–5.1)
POTASSIUM SERPL-SCNC: 4.5 MMOL/L (ref 3.5–5.1)
PROTHROMBIN TIME: 15.1 SEC (ref 11.7–14.5)
RBC # BLD: 2.49 M/UL (ref 4.2–5.9)
RBC # BLD: 2.5 M/UL (ref 4.2–5.9)
SODIUM BLD-SCNC: 142 MMOL/L (ref 136–145)
SODIUM BLD-SCNC: 143 MMOL/L (ref 136–145)
WBC # BLD: 10.6 K/UL (ref 4–11)
WBC # BLD: 9.8 K/UL (ref 4–11)

## 2022-09-30 PROCEDURE — 6370000000 HC RX 637 (ALT 250 FOR IP): Performed by: NURSE PRACTITIONER

## 2022-09-30 PROCEDURE — 71045 X-RAY EXAM CHEST 1 VIEW: CPT

## 2022-09-30 PROCEDURE — 99233 SBSQ HOSP IP/OBS HIGH 50: CPT | Performed by: NURSE PRACTITIONER

## 2022-09-30 PROCEDURE — 6370000000 HC RX 637 (ALT 250 FOR IP): Performed by: INTERNAL MEDICINE

## 2022-09-30 PROCEDURE — 6370000000 HC RX 637 (ALT 250 FOR IP): Performed by: PHYSICIAN ASSISTANT

## 2022-09-30 PROCEDURE — 85025 COMPLETE CBC W/AUTO DIFF WBC: CPT

## 2022-09-30 PROCEDURE — 36415 COLL VENOUS BLD VENIPUNCTURE: CPT

## 2022-09-30 PROCEDURE — 2580000003 HC RX 258: Performed by: PHYSICIAN ASSISTANT

## 2022-09-30 PROCEDURE — 97535 SELF CARE MNGMENT TRAINING: CPT

## 2022-09-30 PROCEDURE — 6360000002 HC RX W HCPCS: Performed by: STUDENT IN AN ORGANIZED HEALTH CARE EDUCATION/TRAINING PROGRAM

## 2022-09-30 PROCEDURE — 93005 ELECTROCARDIOGRAM TRACING: CPT | Performed by: INTERNAL MEDICINE

## 2022-09-30 PROCEDURE — 80069 RENAL FUNCTION PANEL: CPT

## 2022-09-30 PROCEDURE — 51798 US URINE CAPACITY MEASURE: CPT

## 2022-09-30 PROCEDURE — 85610 PROTHROMBIN TIME: CPT

## 2022-09-30 PROCEDURE — 2060000000 HC ICU INTERMEDIATE R&B

## 2022-09-30 PROCEDURE — 93010 ELECTROCARDIOGRAM REPORT: CPT | Performed by: INTERNAL MEDICINE

## 2022-09-30 RX ORDER — FLUTICASONE PROPIONATE 50 MCG
2 SPRAY, SUSPENSION (ML) NASAL DAILY
Status: DISCONTINUED | OUTPATIENT
Start: 2022-09-30 | End: 2022-10-03 | Stop reason: HOSPADM

## 2022-09-30 RX ORDER — SODIUM CHLORIDE, SODIUM LACTATE, POTASSIUM CHLORIDE, CALCIUM CHLORIDE 600; 310; 30; 20 MG/100ML; MG/100ML; MG/100ML; MG/100ML
500 INJECTION, SOLUTION INTRAVENOUS ONCE
Status: DISCONTINUED | OUTPATIENT
Start: 2022-09-30 | End: 2022-10-03 | Stop reason: HOSPADM

## 2022-09-30 RX ORDER — FUROSEMIDE 20 MG/1
20 TABLET ORAL DAILY
Status: DISCONTINUED | OUTPATIENT
Start: 2022-09-30 | End: 2022-10-03 | Stop reason: HOSPADM

## 2022-09-30 RX ORDER — MAGNESIUM SULFATE IN WATER 40 MG/ML
2000 INJECTION, SOLUTION INTRAVENOUS ONCE
Status: COMPLETED | OUTPATIENT
Start: 2022-09-30 | End: 2022-09-30

## 2022-09-30 RX ADMIN — AMIODARONE HYDROCHLORIDE 200 MG: 200 TABLET ORAL at 20:36

## 2022-09-30 RX ADMIN — METOPROLOL SUCCINATE 50 MG: 50 TABLET, EXTENDED RELEASE ORAL at 20:35

## 2022-09-30 RX ADMIN — MAGNESIUM SULFATE HEPTAHYDRATE 2000 MG: 40 INJECTION, SOLUTION INTRAVENOUS at 01:48

## 2022-09-30 RX ADMIN — Medication 1 CAPSULE: at 17:51

## 2022-09-30 RX ADMIN — ASPIRIN 81 MG: 81 TABLET, COATED ORAL at 10:03

## 2022-09-30 RX ADMIN — SODIUM CHLORIDE, SODIUM LACTATE, POTASSIUM CHLORIDE, CALCIUM CHLORIDE 500 ML: 600; 310; 30; 20 INJECTION, SOLUTION INTRAVENOUS at 00:45

## 2022-09-30 RX ADMIN — ACETAMINOPHEN 650 MG: 325 TABLET ORAL at 06:52

## 2022-09-30 RX ADMIN — SODIUM CHLORIDE: 9 INJECTION, SOLUTION INTRAVENOUS at 01:47

## 2022-09-30 RX ADMIN — ACETAMINOPHEN 650 MG: 325 TABLET ORAL at 11:55

## 2022-09-30 RX ADMIN — Medication 10 ML: at 20:36

## 2022-09-30 RX ADMIN — PANTOPRAZOLE SODIUM 40 MG: 40 TABLET, DELAYED RELEASE ORAL at 06:52

## 2022-09-30 RX ADMIN — Medication 1 CAPSULE: at 11:55

## 2022-09-30 RX ADMIN — METOPROLOL SUCCINATE 50 MG: 50 TABLET, EXTENDED RELEASE ORAL at 10:03

## 2022-09-30 RX ADMIN — ACETAMINOPHEN 650 MG: 325 TABLET ORAL at 17:51

## 2022-09-30 RX ADMIN — FUROSEMIDE 20 MG: 20 TABLET ORAL at 11:55

## 2022-09-30 RX ADMIN — FLUTICASONE PROPIONATE 2 SPRAY: 50 SPRAY, METERED NASAL at 11:55

## 2022-09-30 RX ADMIN — WARFARIN SODIUM 2.5 MG: 2.5 TABLET ORAL at 17:56

## 2022-09-30 RX ADMIN — Medication 10 ML: at 11:59

## 2022-09-30 RX ADMIN — AMIODARONE HYDROCHLORIDE 200 MG: 200 TABLET ORAL at 10:03

## 2022-09-30 RX ADMIN — ACETAMINOPHEN 650 MG: 325 TABLET ORAL at 00:32

## 2022-09-30 RX ADMIN — ATORVASTATIN CALCIUM 20 MG: 20 TABLET, FILM COATED ORAL at 10:04

## 2022-09-30 RX ADMIN — DICLOFENAC SODIUM 2 G: 10 GEL TOPICAL at 10:11

## 2022-09-30 RX ADMIN — DICLOFENAC SODIUM 2 G: 10 GEL TOPICAL at 20:36

## 2022-09-30 RX ADMIN — POLYETHYLENE GLYCOL 3350 17 G: 17 POWDER, FOR SOLUTION ORAL at 10:04

## 2022-09-30 ASSESSMENT — PAIN SCALES - GENERAL
PAINLEVEL_OUTOF10: 3
PAINLEVEL_OUTOF10: 0

## 2022-09-30 ASSESSMENT — PAIN DESCRIPTION - LOCATION: LOCATION: ABDOMEN
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
## 2022-09-30 NOTE — PROGRESS NOTES
Pharmacy to Dose Warfarin    Pharmacy consulted to dose warfarin for afib. INR Goal: 2-3    INR today: 1.19    Assessment/Plan:  - INR subtherapeutic after 2 doses  - Possible concomitant drug-drug interactions include:amiodarone  - Continue home dose at this time. Home dose will need decreased prior to discharge due to interaction. Pharmacy will continue to follow.     Mora Collins, PharmD, [facility]

## 2022-09-30 NOTE — SIGNIFICANT EVENT
SIGNIFICANT EVENT:  RAPID RESPONSE    Name:  Deforest Leventhal    /Age/Sex: 1933  (80 y.o. male)  MRN & CSN:  [id number] & [id number]    SUBJECTIVE:     RAPID RESPONSE was called for Deforest Leventhal in regards to shortness of breath and tachycardia. Patient is a 80 y.o. male admitted at [address] [place] [address] for Acute respiratory failure with hypoxia ([facility] [address] Patient seen and examined in [id number]. Pt was lying in bed and found to have tachycardia and was short of breath with hypoxia. 98% on 2L O2 via NC. Stated his breathing was better on exam when I saw him. Stated he felt dry and thirsty. No other acute complaints. Was seen by EP today to start on amio for aflutter. OBJECTIVE:     VITALS  Vitals:    22 1630 22 1900 22 2100 22 0015   BP: 126/72 125/74  126/84   Pulse: 74 (!) 113  (!) 136   Resp: 18 18  20   Temp: 97.6 °F (36.4 °C) 97.5 °F (36.4 °C)  97.5 °F (36.4 °C)   TempSrc: Oral Oral  Oral   SpO2: 96% 95%  93%   Weight:   164 lb 8 oz (74.6 kg)    Height:                     PHYSICAL EXAM   GEN Awake male, no apparent distress. EYES PERRLA. No scleral erythema or discharge. HENT Mucous membranes are moist. Pharynx without exudates or thrush. NECK Supple, no apparent thyromegaly or masses. RESP CTAB. Symmetric chest movement. CARDIO/VASC S1/S2. Tachycardia. Regular rhythm. No JVD. Pulses equal & b/l. No peripheral edema. GI Soft. No TTP in all quadrants. BS +. Rectal exam deferred.  No costovertebral tenderness. HEME/LYMPH No bruising, lymphadenopathy, or hepatosplenomagly. MSK No gross joint deformities. SKIN Normal coloration, warm, & dry. NEURO CNs grossly intact, normal speech, no lateralizing weakness. PSYCH Awake, alert, oriented x 4. Affect appropriate. ASSESSMENT/PLAN:    1.  Shortness of breath and tachycardia  -EKG shows aflutter with Qtc 554  -mag 2 g  -500 cc bolus  -cxr  -labs    Teryl Annie, DO
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
## 2022-09-30 NOTE — PLAN OF CARE
Problem: Discharge Planning  Goal: Discharge to home or other facility with appropriate resources  Outcome: Progressing     Problem: Skin/Tissue Integrity  Goal: Absence of new skin breakdown  Description: 1. Monitor for areas of redness and/or skin breakdown  2. Assess vascular access sites hourly  3. Every 4-6 hours minimum:  Change oxygen saturation probe site  4. Every 4-6 hours:  If on nasal continuous positive airway pressure, respiratory therapy assess nares and determine need for appliance change or resting period.   Outcome: Progressing     Problem: Safety - Adult  Goal: Free from fall injury  Outcome: Progressing     Problem: ABCDS Injury Assessment  Goal: Absence of physical injury  Outcome: Progressing     Problem: Neurosensory - Adult  Goal: Achieves maximal functionality and self care  Outcome: Progressing     Problem: Respiratory - Adult  Goal: Achieves optimal ventilation and oxygenation  Outcome: Progressing     Problem: Cardiovascular - Adult  Goal: Maintains optimal cardiac output and hemodynamic stability  Outcome: Progressing     Problem: Skin/Tissue Integrity - Adult  Goal: Oral mucous membranes remain intact  Outcome: Progressing     Problem: Musculoskeletal - Adult  Goal: Return mobility to safest level of function  Outcome: Progressing     Problem: Genitourinary - Adult  Goal: Absence of urinary retention  Outcome: Progressing     Problem: Metabolic/Fluid and Electrolytes - Adult  Goal: Electrolytes maintained within normal limits  Outcome: Progressing     Problem: Hematologic - Adult  Goal: Maintains hematologic stability  Outcome: Progressing

## 2022-09-30 NOTE — PROGRESS NOTES
09/29/22 2357   RT Protocol   History Pulmonary Disease 0   Respiratory pattern 0   Breath sounds 2   Cough 0   Indications for Bronchodilator Therapy None   Bronchodilator Assessment Score 2
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
## 2022-09-30 NOTE — RT PROTOCOL NOTE
RT Inhaler-Nebulizer Bronchodilator Protocol Note    There is a bronchodilator order in the chart from a provider indicating to follow the RT Bronchodilator Protocol and there is an Initiate RT Inhaler-Nebulizer Bronchodilator Protocol order as well (see protocol at bottom of note). CXR Findings:  No results found. The findings from the last RT Protocol Assessment were as follows:   History Pulmonary Disease: None or smoker <15 pack years  Respiratory Pattern: Regular pattern and RR 12-20 bpm  Breath Sounds: Slightly diminished and/or crackles  Cough: Strong, spontaneous, non-productive  Indication for Bronchodilator Therapy: None  Bronchodilator Assessment Score: 2    Aerosolized bronchodilator medication orders have been revised according to the RT Inhaler-Nebulizer Bronchodilator Protocol below. Respiratory Therapist to perform RT Therapy Protocol Assessment initially then follow the protocol. Repeat RT Therapy Protocol Assessment PRN for score 0-3 or on second treatment, BID, and PRN for scores above 3. No Indications - adjust the frequency to every 6 hours PRN wheezing or bronchospasm, if no treatments needed after 48 hours then discontinue using Per Protocol order mode. If indication present, adjust the RT bronchodilator orders based on the Bronchodilator Assessment Score as indicated below. Use Inhaler orders unless patient has one or more of the following: on home nebulizer, not able to hold breath for 10 seconds, is not alert and oriented, cannot activate and use MDI correctly, or respiratory rate 25 breaths per minute or more, then use the equivalent nebulizer order(s) with same Frequency and PRN reasons based on the score. If a patient is on this medication at home then do not decrease Frequency below that used at home.     0-3 - enter or revise RT bronchodilator order(s) to equivalent RT Bronchodilator order with Frequency of every 4 hours PRN for wheezing or increased work of breathing using Per Protocol order mode. 4-6 - enter or revise RT Bronchodilator order(s) to two equivalent RT bronchodilator orders with one order with BID Frequency and one order with Frequency of every 4 hours PRN wheezing or increased work of breathing using Per Protocol order mode. 7-10 - enter or revise RT Bronchodilator order(s) to two equivalent RT bronchodilator orders with one order with TID Frequency and one order with Frequency of every 4 hours PRN wheezing or increased work of breathing using Per Protocol order mode. 11-13 - enter or revise RT Bronchodilator order(s) to one equivalent RT bronchodilator order with QID Frequency and an Albuterol order with Frequency of every 4 hours PRN wheezing or increased work of breathing using Per Protocol order mode. Greater than 13 - enter or revise RT Bronchodilator order(s) to one equivalent RT bronchodilator order with every 4 hours Frequency and an Albuterol order with Frequency of every 2 hours PRN wheezing or increased work of breathing using Per Protocol order mode. RT to enter RT Home Evaluation for COPD & MDI Assessment order using Per Protocol order mode.     Electronically signed by Cal Huizar RCP on 9/29/2022 at 11:56 PM

## 2022-09-30 NOTE — PROGRESS NOTES
Rapid Response Quick Summary    Room: [id number]/3367-01    Assessment of concern / patient:  shortness of breath and tachycardia    Physician involved:  Dr. Diana Mcneal    Interventions:  500 ml bolus LR, 12 lead EKG, Magnesium replacement, CXR, labs    Disposition:  Remains in current unit and room.

## 2022-09-30 NOTE — CARE COORDINATION
Discharge Planning. The patient and patient's family decided they would like to move [facility] with [facility] skilled nursing facility at discharge. Once the patient is medically ready the patient can go to [facility]. No pre-cert is needed.       Electronically signed by JACQUELINE Sanches on 9/30/2022 at 2:33 PM
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
## 2022-10-01 PROBLEM — I95.2 HYPOTENSION DUE TO DRUGS: Status: ACTIVE | Noted: 2022-10-01

## 2022-10-01 PROBLEM — I50.42 CHRONIC COMBINED SYSTOLIC AND DIASTOLIC CONGESTIVE HEART FAILURE (HCC): Status: ACTIVE | Noted: 2022-09-27

## 2022-10-01 LAB
ANION GAP SERPL CALCULATED.3IONS-SCNC: 9 MMOL/L (ref 3–16)
BASOPHILS ABSOLUTE: 0.1 K/UL (ref 0–0.2)
BASOPHILS RELATIVE PERCENT: 1.3 %
BUN BLDV-MCNC: 35 MG/DL (ref 7–20)
CALCIUM SERPL-MCNC: 8.8 MG/DL (ref 8.3–10.6)
CHLORIDE BLD-SCNC: 110 MMOL/L (ref 99–110)
CO2: 24 MMOL/L (ref 21–32)
CREAT SERPL-MCNC: 1 MG/DL (ref 0.8–1.3)
EOSINOPHILS ABSOLUTE: 0.4 K/UL (ref 0–0.6)
EOSINOPHILS RELATIVE PERCENT: 3.5 %
GFR AFRICAN AMERICAN: >60
GFR NON-AFRICAN AMERICAN: >60
GLUCOSE BLD-MCNC: 159 MG/DL (ref 70–99)
GLUCOSE BLD-MCNC: 175 MG/DL (ref 70–99)
GLUCOSE BLD-MCNC: 178 MG/DL (ref 70–99)
GLUCOSE BLD-MCNC: 207 MG/DL (ref 70–99)
HCT VFR BLD CALC: 22.4 % (ref 40.5–52.5)
HEMOGLOBIN: 7.7 G/DL (ref 13.5–17.5)
INR BLD: 1.34 (ref 0.87–1.14)
LYMPHOCYTES ABSOLUTE: 1.2 K/UL (ref 1–5.1)
LYMPHOCYTES RELATIVE PERCENT: 11.6 %
MCH RBC QN AUTO: 32 PG (ref 26–34)
MCHC RBC AUTO-ENTMCNC: 34.4 G/DL (ref 31–36)
MCV RBC AUTO: 93.1 FL (ref 80–100)
MONOCYTES ABSOLUTE: 0.9 K/UL (ref 0–1.3)
MONOCYTES RELATIVE PERCENT: 8.8 %
NEUTROPHILS ABSOLUTE: 7.6 K/UL (ref 1.7–7.7)
NEUTROPHILS RELATIVE PERCENT: 74.8 %
PDW BLD-RTO: 19.4 % (ref 12.4–15.4)
PERFORMED ON: ABNORMAL
PLATELET # BLD: 422 K/UL (ref 135–450)
PMV BLD AUTO: 8.8 FL (ref 5–10.5)
POTASSIUM SERPL-SCNC: 4.7 MMOL/L (ref 3.5–5.1)
PROTHROMBIN TIME: 16.5 SEC (ref 11.7–14.5)
RBC # BLD: 2.4 M/UL (ref 4.2–5.9)
SODIUM BLD-SCNC: 143 MMOL/L (ref 136–145)
WBC # BLD: 10.2 K/UL (ref 4–11)

## 2022-10-01 PROCEDURE — 2060000000 HC ICU INTERMEDIATE R&B

## 2022-10-01 PROCEDURE — 6370000000 HC RX 637 (ALT 250 FOR IP): Performed by: NURSE PRACTITIONER

## 2022-10-01 PROCEDURE — 6370000000 HC RX 637 (ALT 250 FOR IP): Performed by: INTERNAL MEDICINE

## 2022-10-01 PROCEDURE — 2580000003 HC RX 258: Performed by: PHYSICIAN ASSISTANT

## 2022-10-01 PROCEDURE — 94640 AIRWAY INHALATION TREATMENT: CPT

## 2022-10-01 PROCEDURE — 6370000000 HC RX 637 (ALT 250 FOR IP): Performed by: PHYSICIAN ASSISTANT

## 2022-10-01 PROCEDURE — 80048 BASIC METABOLIC PNL TOTAL CA: CPT

## 2022-10-01 PROCEDURE — 85025 COMPLETE CBC W/AUTO DIFF WBC: CPT

## 2022-10-01 PROCEDURE — 99233 SBSQ HOSP IP/OBS HIGH 50: CPT | Performed by: NURSE PRACTITIONER

## 2022-10-01 PROCEDURE — 85610 PROTHROMBIN TIME: CPT

## 2022-10-01 PROCEDURE — 36415 COLL VENOUS BLD VENIPUNCTURE: CPT

## 2022-10-01 RX ORDER — WARFARIN SODIUM 2.5 MG/1
2.5 TABLET ORAL DAILY
Status: DISCONTINUED | OUTPATIENT
Start: 2022-10-02 | End: 2022-10-03 | Stop reason: HOSPADM

## 2022-10-01 RX ORDER — WARFARIN SODIUM 2.5 MG/1
2.5 TABLET ORAL DAILY
Status: DISCONTINUED | OUTPATIENT
Start: 2022-10-01 | End: 2022-10-01

## 2022-10-01 RX ORDER — WARFARIN SODIUM 5 MG/1
5 TABLET ORAL
Status: COMPLETED | OUTPATIENT
Start: 2022-10-01 | End: 2022-10-01

## 2022-10-01 RX ADMIN — ACETAMINOPHEN 650 MG: 325 TABLET ORAL at 00:11

## 2022-10-01 RX ADMIN — METOPROLOL SUCCINATE 50 MG: 50 TABLET, EXTENDED RELEASE ORAL at 20:42

## 2022-10-01 RX ADMIN — IPRATROPIUM BROMIDE AND ALBUTEROL SULFATE 1 AMPULE: .5; 2.5 SOLUTION RESPIRATORY (INHALATION) at 23:03

## 2022-10-01 RX ADMIN — MELATONIN TAB 3 MG 3 MG: 3 TAB at 22:46

## 2022-10-01 RX ADMIN — AMIODARONE HYDROCHLORIDE 200 MG: 200 TABLET ORAL at 20:42

## 2022-10-01 RX ADMIN — ATORVASTATIN CALCIUM 20 MG: 20 TABLET, FILM COATED ORAL at 09:41

## 2022-10-01 RX ADMIN — Medication 1 CAPSULE: at 09:41

## 2022-10-01 RX ADMIN — WARFARIN SODIUM 5 MG: 5 TABLET ORAL at 17:41

## 2022-10-01 RX ADMIN — FUROSEMIDE 20 MG: 20 TABLET ORAL at 09:41

## 2022-10-01 RX ADMIN — DICLOFENAC SODIUM 2 G: 10 GEL TOPICAL at 20:42

## 2022-10-01 RX ADMIN — METOPROLOL SUCCINATE 50 MG: 50 TABLET, EXTENDED RELEASE ORAL at 09:41

## 2022-10-01 RX ADMIN — MELATONIN TAB 3 MG 3 MG: 3 TAB at 02:15

## 2022-10-01 RX ADMIN — FLUTICASONE PROPIONATE 2 SPRAY: 50 SPRAY, METERED NASAL at 09:41

## 2022-10-01 RX ADMIN — ACETAMINOPHEN 650 MG: 325 TABLET ORAL at 05:52

## 2022-10-01 RX ADMIN — Medication 10 ML: at 20:42

## 2022-10-01 RX ADMIN — DICLOFENAC SODIUM 2 G: 10 GEL TOPICAL at 09:42

## 2022-10-01 RX ADMIN — ACETAMINOPHEN 650 MG: 325 TABLET ORAL at 17:42

## 2022-10-01 RX ADMIN — ACETAMINOPHEN 650 MG: 325 TABLET ORAL at 13:02

## 2022-10-01 RX ADMIN — PANTOPRAZOLE SODIUM 40 MG: 40 TABLET, DELAYED RELEASE ORAL at 05:52

## 2022-10-01 RX ADMIN — AMIODARONE HYDROCHLORIDE 200 MG: 200 TABLET ORAL at 09:40

## 2022-10-01 RX ADMIN — Medication 1 CAPSULE: at 17:41

## 2022-10-01 RX ADMIN — Medication 10 ML: at 09:41

## 2022-10-01 RX ADMIN — ASPIRIN 81 MG: 81 TABLET, COATED ORAL at 09:40

## 2022-10-01 ASSESSMENT — PAIN SCALES - GENERAL
PAINLEVEL_OUTOF10: 0
PAINLEVEL_OUTOF10: 3
PAINLEVEL_OUTOF10: 3
PAINLEVEL_OUTOF10: 0
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
## 2022-10-01 NOTE — PROGRESS NOTES
[facility]  DAILY PROGRESS NOTE    Admit Date: 9/22/2022       Chief Complaint: SOB     Interval History:   Patient seen and examined and notes reviewed. Patient is being followed for PAF. Pt is sitting in the chair and feeling better today, he denies CP, palpitations or SOB.  HR is still elevated but improved    In: -   Out: 250    Wt Readings from Last 2 Encounters:   10/01/22 164 lb 4.8 oz (74.5 kg)   09/12/22 169 lb 9.6 oz (76.9 kg)         Data:   Scheduled Meds:   Scheduled Meds:   fluticasone  2 spray Each Nostril Daily    furosemide  20 mg Oral Daily    metoprolol succinate  50 mg Oral BID    warfarin  5 mg Oral Once per day on Sun Thu    And    warfarin  2.5 mg Oral Once per day on Mon Tue Wed Fri Sat    amiodarone  200 mg Oral BID    diclofenac sodium  2 g Topical BID    pantoprazole  40 mg Oral QAM AC    acetaminophen  650 mg Oral Q6H    lidocaine  2 patch TransDERmal Daily    polyethylene glycol  17 g Oral Daily    atorvastatin  20 mg Oral Daily    sodium chloride flush  5-40 mL IntraVENous 2 times per day    lactobacillus  1 capsule Oral BID WC    aspirin  81 mg Oral Daily     Continuous Infusions:   lactated ringers Stopped (09/30/22 0126)    sodium chloride      sodium chloride Stopped (09/30/22 0148)     PRN Meds:.sodium chloride, melatonin, ipratropium-albuterol, traMADol, sodium chloride, iopamidol, sodium chloride flush, sodium chloride, albuterol, methocarbamol, senna  Continuous Infusions:   lactated ringers Stopped (09/30/22 0126)    sodium chloride      sodium chloride Stopped (09/30/22 0148)       Intake/Output Summary (Last 24 hours) at 10/1/2022 0836  Last data filed at 10/1/2022 0551  Gross per 24 hour   Intake --   Output 250 ml   Net -250 ml     Lab Data:  CBC:   Lab Results   Component Value Date/Time    WBC 9.8 09/30/2022 07:34 AM    HGB 8.0 09/30/2022 07:34 AM     09/30/2022 07:34 AM     BMP:  Lab Results   Component Value Date/Time     09/30/2022 07:34 AM    K 4.4 09/30/2022 07:34 AM     09/30/2022 07:34 AM    CO2 23 09/30/2022 07:34 AM    BUN 44 09/30/2022 07:34 AM    CREATININE 1.1 09/30/2022 07:34 AM    GLUCOSE 167 09/30/2022 07:34 AM     INR:   Lab Results   Component Value Date/Time    INR 1.34 10/01/2022 05:29 AM    INR 1.19 09/30/2022 07:34 AM    INR 1.26 09/29/2022 07:24 AM        CARDIAC LABS  ENZYMES:No results for input(s): CKMB, CKMBINDEX, TROPONINI in the last 72 hours. Invalid input(s): CKTOTAL;3  FASTING LIPID PANEL:  Lab Results   Component Value Date/Time    HDL 42 02/17/2022 02:05 PM    HDL 36 05/07/2012 11:00 AM    LDLCALC 57 02/17/2022 02:05 PM    TRIG 137 02/17/2022 02:05 PM    TSH 3.14 06/27/2016 10:04 AM     LIVER PROFILE:  Lab Results   Component Value Date/Time    AST 28 09/27/2022 05:33 AM    AST 25 09/26/2022 05:37 AM    ALT 23 09/27/2022 05:33 AM    ALT 21 09/26/2022 05:37 AM     BNP:   Lab Results   Component Value Date/Time    PROBNP 2,988 09/24/2022 05:53 AM    PROBNP 3,936 09/22/2022 04:37 PM    PROBNP 3,441 07/20/2022 02:05 PM     Iron Studies:    Lab Results   Component Value Date/Time    FERRITIN 819.3 09/23/2022 02:29 PM    FERRITIN 932.9 12/05/2020 02:10 PM    FERRITIN 611.8 08/19/2019 11:24 AM     Iron Deficiency Anemia:  No IV Iron Therapy:  No  2017 ACC/AHA HF Guidelines:   intravenous iron replacement in patients with New York Heart Association (NYHA) class II and III HF and iron deficiency(ferritin <100 ng/ml or 100-300 ng/ml if transferrin saturation <20%), to improve functional status and QoL. 1. WEIGHT: Admit Weight: 162 lb (73.5 kg)      Today  Weight: 164 lb 4.8 oz (74.5 kg)   2. I/O   Intake/Output Summary (Last 24 hours) at 10/1/2022 0836  Last data filed at 10/1/2022 0551  Gross per 24 hour   Intake --   Output 250 ml   Net -250 ml       Cardiac Testing:   ECHO:  4/26/22  Summary   -Mildly reduced global systolic function with an ejection fraction estimated   at 40-45%. -Global hypokinesis noted.    -Severe left atrial enlargement noted. -Moderate right atrial enlargement. -Moderate aortic stenosis with a peak velocity of 3.08m/s and a mean   pressure gradient of 20mmHg. The aortic valve area is estimated at 1.14 cm^2   by continuity and .93 cm^2 to 1.25 cm^2 by planimetry. There is mild aortic   insufficiency.   -There is moderate mitral regurgitation.   -There is mild-to-moderate tricuspid regurgitation with a RVSP estimation of   46 mmHg.   -Trivial pulmonic regurgitation present.   -Grade II diastolic dysfunction with elevated LV filling pressures. Avg.   E/e'=15.6   -Pacer / ICD wire is visualized in the right heart     Cath: 10/26/21  Anatomy:   LM-20% distal  LAD-100% ostial, calcified  Cx-normal  OM1-100% ostial  OM2-20% proximal  RCA-20% proximal, 40% distal, calcified  RPDA-70 to 80% proximal, FFR of 0.76  LVEF-50%  LIMA-LAD: Widely patent  SVG-OM1: Widely patent  Aortic root: Not aneurysmal, no significant aortic insufficiency, 1 patent SVG visualized. PCI: RPDA 80% to 0% with a 3.25 mm x 18 mm Xience Mariela ALEJANDRA      Impression:  1. Severe multivessel CAD. 2.  Patent LIMA-LAD and SVG-OM1 grafts. 3.  Successful PCI with ALEJANDRA x1 to RPDA. 4.  Low normal LV systolic function.       Telemetry: AF    Review of Systems - General ROS: positive for  - fatigue  Hematological and Lymphatic ROS: negative  Respiratory ROS: no cough, shortness of breath, or wheezing  Cardiovascular ROS: no chest pain or dyspnea on exertion  Gastrointestinal ROS: no abdominal pain, change in bowel habits, or black or bloody stools  Musculoskeletal ROS: negative  Neurological ROS: no TIA or stroke symptoms     Objective:   Vitals: /82   Pulse (!) 104   Temp 97.6 °F (36.4 °C) (Oral)   Resp 16   Ht 5' 9.5\" (1.765 m)   Wt 164 lb 4.8 oz (74.5 kg)   SpO2 97%   BMI 23.92 kg/m²     Physical Exam:  General Appearance:  Non-obese/Well Nourished  Respiratory:  Resp Auscultation: Normal breath sounds without dullness  Cardiovascular: Auscultation: tachy, irregular rate and rhythm, normal S1S2,+murmur  Palpation: Normal    JVD: none  Pedal Pulses: 2+ and equal   Abdomen:  Soft, NT, ND, + bs  Extremities:  No Cyanosis or Clubbing  Extremities: negative  Neurological/Psychiatric:  Oriented to time, place, and person  Non-anxious    Patient Active Problem List:     CAD (coronary artery disease) CABG x3 1996, stenting PDA 10/2021     Cardiac pacemaker     Benign prostatic hyperplasia with nocturia     Gout-uric acid >7.5--advised proph tx     Hypercholesteremia     Carotid bruit(bilat-nl duplex u/s 10/10)     Vitamin D deficiency-(advised 1000IU/day)     Actinic keratoses     Elevated PSA-(was 4.2 10/10-repeat 6 mo)(was 5.12 1/11-then 4.4 2/12)-sees dr Davie Henson for this     S/P colonoscopy-4/07-neg per pt,  3/18 repeat colonoscopy polyp-repeat 5 yr     Hearing loss, conductive, bilateral-seeing ent-dr quinn for hearing aides     Encounter for monitoring sotalol therapy     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 Arely Landis op 8/15--started otc iron bid, off now  - work up Dr. Devora Ornelas bone marrow. possible MDS 2019     Paroxysmal atrial fibrillation (HCC)     DM (diabetes mellitus), type 2 with peripheral vascular complications (HCC)--s/p amputation great toe post osteomylitis   diet controlled      Hypertension     CKD stage 3 due to type 2 diabetes mellitus ([address]     Hyperkalemia     H/O esophagogastroduodenoscopy  11/18  prominent vessesls vs varices. dr Yunior Ray (done for blood in stool)     Elevated ferritin  - work up Dr. Devora Ornelas  genetic screen hemachromatosis negative.  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     Localized edema     Ischemic cardiomyopathy; EF 40-45% 4/22     Chronic pansinusitis     S/P amputation of lesser toe, right (HCC)     Ventricular tachycardia     Nonrheumatic aortic valve stenosis- moderate by echo 4/22     Chronic renal disease, stage III ([address] [530119]     Acute on chronic systolic congestive heart failure (HCC)     Mixed hyperlipidemia     Acute respiratory failure with hypoxia (HCC)     Pneumonia of both lungs due to infectious organism     REYNA (acute kidney injury) ([facility]     Closed pelvic ring fracture (HCC)     Persistent atrial fibrillation (HCC)     Acute on chronic combined systolic (congestive) and diastolic (congestive) heart failure (HCC)        Assessment/Plan:     CHF- compensated, on po lasix  AF- rate improving on BB increase and Amio, on AC  ICM- on BB only for REYNA and hypotension, restart if tolerated  Anemia- s/p PRBC, GI consult      The patient was seen for >25 minutes. I reviewed interval history, physical exam, review of data including labs, imaging, development and implementation of treatment plan and coordination of complex care.  More than 50% of the time was devoted to counseling the patient on their diagnoses/treatments, as well as coordination of care with the other care teams    Keira Alexander, APRN - CNP, ACNP, AGPCNP  Heart Failure  [facility]       Core Measures:   Discharge instructions:   LVEF documented:   ACEI for LV dysfunction:   Smoking Cessation:

## 2022-10-01 NOTE — PROGRESS NOTES
Pharmacy to Dose Warfarin    Pharmacy consulted to dose warfarin for afib. INR Goal: 2-3    INR today: 1.34    Assessment/Plan:  - INR continues to be subtherapeutic but trending up  - Expect to see higher INR tomorrow from 5 mg dose on Thursday  -Will dose 5 mg warfarin today then reduce regimen  - Due to interaction with amiodarone will adjust to 2.5 mg daily    Pharmacy will continue to follow.     Wilson Willis, PharmD  PGY-1 Pharmacy Resident  [id number]

## 2022-10-02 LAB
ABO/RH: NORMAL
ANION GAP SERPL CALCULATED.3IONS-SCNC: 11 MMOL/L (ref 3–16)
ANTIBODY SCREEN: NORMAL
BASOPHILS ABSOLUTE: 0 K/UL (ref 0–0.2)
BASOPHILS RELATIVE PERCENT: 0.5 %
BLOOD BANK DISPENSE STATUS: NORMAL
BLOOD BANK PRODUCT CODE: NORMAL
BPU ID: NORMAL
BUN BLDV-MCNC: 39 MG/DL (ref 7–20)
CALCIUM SERPL-MCNC: 9 MG/DL (ref 8.3–10.6)
CHLORIDE BLD-SCNC: 107 MMOL/L (ref 99–110)
CO2: 22 MMOL/L (ref 21–32)
CREAT SERPL-MCNC: 1.2 MG/DL (ref 0.8–1.3)
DESCRIPTION BLOOD BANK: NORMAL
EOSINOPHILS ABSOLUTE: 0.3 K/UL (ref 0–0.6)
EOSINOPHILS RELATIVE PERCENT: 3.8 %
GFR AFRICAN AMERICAN: >60
GFR NON-AFRICAN AMERICAN: 57
GLUCOSE BLD-MCNC: 154 MG/DL (ref 70–99)
HCT VFR BLD CALC: 22.9 % (ref 40.5–52.5)
HEMOGLOBIN: 7.6 G/DL (ref 13.5–17.5)
INR BLD: 1.5 (ref 0.87–1.14)
LYMPHOCYTES ABSOLUTE: 1.4 K/UL (ref 1–5.1)
LYMPHOCYTES RELATIVE PERCENT: 14.9 %
MCH RBC QN AUTO: 31.4 PG (ref 26–34)
MCHC RBC AUTO-ENTMCNC: 33.3 G/DL (ref 31–36)
MCV RBC AUTO: 94.3 FL (ref 80–100)
MONOCYTES ABSOLUTE: 0.7 K/UL (ref 0–1.3)
MONOCYTES RELATIVE PERCENT: 7.9 %
NEUTROPHILS ABSOLUTE: 6.7 K/UL (ref 1.7–7.7)
NEUTROPHILS RELATIVE PERCENT: 72.9 %
PDW BLD-RTO: 19.4 % (ref 12.4–15.4)
PLATELET # BLD: 394 K/UL (ref 135–450)
PMV BLD AUTO: 8.2 FL (ref 5–10.5)
POTASSIUM SERPL-SCNC: 4.8 MMOL/L (ref 3.5–5.1)
PROTHROMBIN TIME: 18 SEC (ref 11.7–14.5)
RBC # BLD: 2.42 M/UL (ref 4.2–5.9)
SODIUM BLD-SCNC: 140 MMOL/L (ref 136–145)
WBC # BLD: 9.1 K/UL (ref 4–11)

## 2022-10-02 PROCEDURE — 36430 TRANSFUSION BLD/BLD COMPNT: CPT

## 2022-10-02 PROCEDURE — 99232 SBSQ HOSP IP/OBS MODERATE 35: CPT | Performed by: NURSE PRACTITIONER

## 2022-10-02 PROCEDURE — 6370000000 HC RX 637 (ALT 250 FOR IP): Performed by: INTERNAL MEDICINE

## 2022-10-02 PROCEDURE — 6370000000 HC RX 637 (ALT 250 FOR IP): Performed by: NURSE PRACTITIONER

## 2022-10-02 PROCEDURE — 94760 N-INVAS EAR/PLS OXIMETRY 1: CPT

## 2022-10-02 PROCEDURE — 86923 COMPATIBILITY TEST ELECTRIC: CPT

## 2022-10-02 PROCEDURE — 36415 COLL VENOUS BLD VENIPUNCTURE: CPT

## 2022-10-02 PROCEDURE — 6370000000 HC RX 637 (ALT 250 FOR IP): Performed by: PHYSICIAN ASSISTANT

## 2022-10-02 PROCEDURE — 86900 BLOOD TYPING SEROLOGIC ABO: CPT

## 2022-10-02 PROCEDURE — 85610 PROTHROMBIN TIME: CPT

## 2022-10-02 PROCEDURE — 85025 COMPLETE CBC W/AUTO DIFF WBC: CPT

## 2022-10-02 PROCEDURE — 2580000003 HC RX 258: Performed by: PHYSICIAN ASSISTANT

## 2022-10-02 PROCEDURE — 2580000003 HC RX 258: Performed by: INTERNAL MEDICINE

## 2022-10-02 PROCEDURE — 86901 BLOOD TYPING SEROLOGIC RH(D): CPT

## 2022-10-02 PROCEDURE — 80048 BASIC METABOLIC PNL TOTAL CA: CPT

## 2022-10-02 PROCEDURE — P9016 RBC LEUKOCYTES REDUCED: HCPCS

## 2022-10-02 PROCEDURE — 86850 RBC ANTIBODY SCREEN: CPT

## 2022-10-02 PROCEDURE — 2060000000 HC ICU INTERMEDIATE R&B

## 2022-10-02 RX ORDER — SODIUM CHLORIDE 9 MG/ML
INJECTION, SOLUTION INTRAVENOUS PRN
Status: COMPLETED | OUTPATIENT
Start: 2022-10-02 | End: 2022-10-02

## 2022-10-02 RX ADMIN — ACETAMINOPHEN 650 MG: 325 TABLET ORAL at 06:24

## 2022-10-02 RX ADMIN — ACETAMINOPHEN 650 MG: 325 TABLET ORAL at 18:29

## 2022-10-02 RX ADMIN — DICLOFENAC SODIUM 2 G: 10 GEL TOPICAL at 11:08

## 2022-10-02 RX ADMIN — ASPIRIN 81 MG: 81 TABLET, COATED ORAL at 11:07

## 2022-10-02 RX ADMIN — AMIODARONE HYDROCHLORIDE 200 MG: 200 TABLET ORAL at 20:41

## 2022-10-02 RX ADMIN — DICLOFENAC SODIUM 2 G: 10 GEL TOPICAL at 20:41

## 2022-10-02 RX ADMIN — METOPROLOL SUCCINATE 50 MG: 50 TABLET, EXTENDED RELEASE ORAL at 11:07

## 2022-10-02 RX ADMIN — PANTOPRAZOLE SODIUM 40 MG: 40 TABLET, DELAYED RELEASE ORAL at 06:24

## 2022-10-02 RX ADMIN — POLYETHYLENE GLYCOL 3350 17 G: 17 POWDER, FOR SOLUTION ORAL at 11:08

## 2022-10-02 RX ADMIN — ACETAMINOPHEN 650 MG: 325 TABLET ORAL at 23:34

## 2022-10-02 RX ADMIN — METOPROLOL SUCCINATE 50 MG: 50 TABLET, EXTENDED RELEASE ORAL at 20:41

## 2022-10-02 RX ADMIN — FLUTICASONE PROPIONATE 2 SPRAY: 50 SPRAY, METERED NASAL at 11:07

## 2022-10-02 RX ADMIN — AMIODARONE HYDROCHLORIDE 200 MG: 200 TABLET ORAL at 11:05

## 2022-10-02 RX ADMIN — ACETAMINOPHEN 650 MG: 325 TABLET ORAL at 00:12

## 2022-10-02 RX ADMIN — Medication 10 ML: at 11:08

## 2022-10-02 RX ADMIN — SODIUM CHLORIDE: 9 INJECTION, SOLUTION INTRAVENOUS at 19:32

## 2022-10-02 RX ADMIN — Medication 1 CAPSULE: at 18:29

## 2022-10-02 RX ADMIN — ATORVASTATIN CALCIUM 20 MG: 20 TABLET, FILM COATED ORAL at 11:07

## 2022-10-02 RX ADMIN — WARFARIN SODIUM 2.5 MG: 2.5 TABLET ORAL at 18:29

## 2022-10-02 RX ADMIN — Medication 1 CAPSULE: at 11:07

## 2022-10-02 RX ADMIN — FUROSEMIDE 20 MG: 20 TABLET ORAL at 11:07

## 2022-10-02 RX ADMIN — Medication 10 ML: at 20:41

## 2022-10-02 RX ADMIN — ACETAMINOPHEN 650 MG: 325 TABLET ORAL at 11:06

## 2022-10-02 ASSESSMENT — PAIN SCALES - GENERAL
PAINLEVEL_OUTOF10: 3
PAINLEVEL_OUTOF10: 0
PAINLEVEL_OUTOF10: 0
PAINLEVEL_OUTOF10: 1
PAINLEVEL_OUTOF10: 0

## 2022-10-02 ASSESSMENT — PAIN DESCRIPTION - ORIENTATION: ORIENTATION: LEFT

## 2022-10-02 ASSESSMENT — PAIN DESCRIPTION - LOCATION: LOCATION: SHOULDER

## 2022-10-02 NOTE — PROGRESS NOTES
[facility]  DAILY PROGRESS NOTE    Admit Date: 9/22/2022       Chief Complaint: SOB     Interval History:   Patient seen and examined and notes reviewed. Patient is being followed for PAF. Pt is sitting in the chair and feeling well, he denies CP, palpitations or SOB. HR improved today and overnight    No intake/output data recorded.    Wt Readings from Last 2 Encounters:   10/01/22 164 lb 4.8 oz (74.5 kg)   09/12/22 169 lb 9.6 oz (76.9 kg)         Data:   Scheduled Meds:   Scheduled Meds:   warfarin  2.5 mg Oral Daily    fluticasone  2 spray Each Nostril Daily    furosemide  20 mg Oral Daily    metoprolol succinate  50 mg Oral BID    amiodarone  200 mg Oral BID    diclofenac sodium  2 g Topical BID    pantoprazole  40 mg Oral QAM AC    acetaminophen  650 mg Oral Q6H    lidocaine  2 patch TransDERmal Daily    polyethylene glycol  17 g Oral Daily    atorvastatin  20 mg Oral Daily    sodium chloride flush  5-40 mL IntraVENous 2 times per day    lactobacillus  1 capsule Oral BID WC    aspirin  81 mg Oral Daily     Continuous Infusions:   lactated ringers Stopped (09/30/22 0126)    sodium chloride      sodium chloride Stopped (09/30/22 0148)     PRN Meds:.sodium chloride, melatonin, ipratropium-albuterol, traMADol, sodium chloride, iopamidol, sodium chloride flush, sodium chloride, albuterol, methocarbamol, senna  Continuous Infusions:   lactated ringers Stopped (09/30/22 0126)    sodium chloride      sodium chloride Stopped (09/30/22 0148)     No intake or output data in the 24 hours ending 10/02/22 0808    Lab Data:  CBC:   Lab Results   Component Value Date/Time    WBC 9.1 10/02/2022 05:12 AM    HGB 7.6 10/02/2022 05:12 AM     10/02/2022 05:12 AM     BMP:  Lab Results   Component Value Date/Time     10/02/2022 05:12 AM    K 4.8 10/02/2022 05:12 AM    K 4.4 09/30/2022 07:34 AM     10/02/2022 05:12 AM    CO2 22 10/02/2022 05:12 AM    BUN 39 10/02/2022 05:12 AM    CREATININE 1.2 10/02/2022 05:12 AM    GLUCOSE 154 10/02/2022 05:12 AM     INR:   Lab Results   Component Value Date/Time    INR 1.50 10/02/2022 05:12 AM    INR 1.34 10/01/2022 05:29 AM    INR 1.19 09/30/2022 07:34 AM        CARDIAC LABS  ENZYMES:No results for input(s): CKMB, CKMBINDEX, TROPONINI in the last 72 hours. Invalid input(s): CKTOTAL;3  FASTING LIPID PANEL:  Lab Results   Component Value Date/Time    HDL 42 02/17/2022 02:05 PM    HDL 36 05/07/2012 11:00 AM    LDLCALC 57 02/17/2022 02:05 PM    TRIG 137 02/17/2022 02:05 PM    TSH 3.14 06/27/2016 10:04 AM     LIVER PROFILE:  Lab Results   Component Value Date/Time    AST 28 09/27/2022 05:33 AM    AST 25 09/26/2022 05:37 AM    ALT 23 09/27/2022 05:33 AM    ALT 21 09/26/2022 05:37 AM     BNP:   Lab Results   Component Value Date/Time    PROBNP 2,988 09/24/2022 05:53 AM    PROBNP 3,936 09/22/2022 04:37 PM    PROBNP 3,441 07/20/2022 02:05 PM     Iron Studies:    Lab Results   Component Value Date/Time    FERRITIN 819.3 09/23/2022 02:29 PM    FERRITIN 932.9 12/05/2020 02:10 PM    FERRITIN 611.8 08/19/2019 11:24 AM     Iron Deficiency Anemia:  No IV Iron Therapy:  No  2017 ACC/AHA HF Guidelines:   intravenous iron replacement in patients with New York Heart Association (NYHA) class II and III HF and iron deficiency(ferritin <100 ng/ml or 100-300 ng/ml if transferrin saturation <20%), to improve functional status and QoL. 1. WEIGHT: Admit Weight: 162 lb (73.5 kg)      Today  Weight: 164 lb 4.8 oz (74.5 kg)   2. I/O No intake or output data in the 24 hours ending 10/02/22 0808      Cardiac Testing:   ECHO:  4/26/22  Summary   -Mildly reduced global systolic function with an ejection fraction estimated   at 40-45%. -Global hypokinesis noted. -Severe left atrial enlargement noted. -Moderate right atrial enlargement. -Moderate aortic stenosis with a peak velocity of 3.08m/s and a mean   pressure gradient of 20mmHg.  The aortic valve area is estimated at 1.14 cm^2   by continuity and .93 cm^2 to 1.25 cm^2 by planimetry. There is mild aortic   insufficiency.   -There is moderate mitral regurgitation.   -There is mild-to-moderate tricuspid regurgitation with a RVSP estimation of   46 mmHg.   -Trivial pulmonic regurgitation present.   -Grade II diastolic dysfunction with elevated LV filling pressures. Avg.   E/e'=15.6   -Pacer / ICD wire is visualized in the right heart     Cath: 10/26/21  Anatomy:   LM-20% distal  LAD-100% ostial, calcified  Cx-normal  OM1-100% ostial  OM2-20% proximal  RCA-20% proximal, 40% distal, calcified  RPDA-70 to 80% proximal, FFR of 0.76  LVEF-50%  LIMA-LAD: Widely patent  SVG-OM1: Widely patent  Aortic root: Not aneurysmal, no significant aortic insufficiency, 1 patent SVG visualized. PCI: RPDA 80% to 0% with a 3.25 mm x 18 mm Xience Mariela ALEJANDRA      Impression:  1. Severe multivessel CAD. 2.  Patent LIMA-LAD and SVG-OM1 grafts. 3.  Successful PCI with ALEJANDRA x1 to RPDA. 4.  Low normal LV systolic function. Telemetry: AF    Review of Systems - General ROS:negative  Hematological and Lymphatic ROS: negative  Respiratory ROS: no cough, shortness of breath, or wheezing  Cardiovascular ROS: no chest pain or dyspnea on exertion  Gastrointestinal ROS: no abdominal pain, change in bowel habits, or black or bloody stools  Musculoskeletal ROS: negative  Neurological ROS: no TIA or stroke symptoms     Objective:   Vitals: /85   Pulse 98   Temp 97.2 °F (36.2 °C) (Oral)   Resp 16   Ht 5' 9.5\" (1.765 m)   Wt 164 lb 4.8 oz (74.5 kg)   SpO2 94%   BMI 23.92 kg/m²     Physical Exam:  General Appearance:  Non-obese/Well Nourished  Respiratory:  Resp Auscultation: Normal breath sounds without dullness  Cardiovascular:   Auscultation: tachy, irregular rate and rhythm, normal S1S2,+murmur  Palpation: Normal    JVD: none  Pedal Pulses: 2+ and equal   Abdomen:  Soft, NT, ND, + bs  Extremities:  No Cyanosis or Clubbing  Extremities: negative  Neurological/Psychiatric:  Oriented to time, place, and person  Non-anxious    Patient Active Problem List:     CAD (coronary artery disease) CABG x3 1996, stenting PDA 10/2021     Cardiac pacemaker     Benign prostatic hyperplasia with nocturia     Gout-uric acid >7.5--advised proph tx     Hypercholesteremia     Carotid bruit(bilat-nl duplex u/s 10/10)     Vitamin D deficiency-(advised 1000IU/day)     Actinic keratoses     Elevated PSA-(was 4.2 10/10-repeat 6 mo)(was 5.12 1/11-then 4.4 2/12)-sees dr Yaa Kim for this     S/P colonoscopy-4/07-neg per pt,  3/18 repeat colonoscopy polyp-repeat 5 yr     Hearing loss, conductive, bilateral-seeing ent-dr quinn for hearing aides     Encounter for monitoring sotalol therapy     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 Herlinda Torres op 8/15--started otc iron bid, off now  - work up Dr. Pauly Garcia bone marrow. possible MDS 2019     Paroxysmal atrial fibrillation (HCC)     DM (diabetes mellitus), type 2 with peripheral vascular complications (HCC)--s/p amputation great toe post osteomylitis   diet controlled      Hypertension     CKD stage 3 due to type 2 diabetes mellitus ([address]     Hyperkalemia     H/O esophagogastroduodenoscopy  11/18  prominent vessesls vs varices. dr Shiv Singh (done for blood in stool)     Elevated ferritin  - work up Dr. Pauly Garcia  genetic screen hemachromatosis negative.  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     Localized edema     Ischemic cardiomyopathy; EF 40-45% 4/22     Chronic pansinusitis     S/P amputation of lesser toe, right (HCC)     Ventricular tachycardia     Nonrheumatic aortic valve stenosis- moderate by echo 4/22     Chronic renal disease, stage III ([address] [319044]     Acute on chronic systolic congestive heart failure (HCC)     Mixed hyperlipidemia     Acute respiratory failure with hypoxia ([address]     Pneumonia of both lungs due to infectious organism     REYNA (acute kidney injury) ([address]     Closed pelvic ring fracture (HCC)     Persistent atrial fibrillation (HCC)     Acute on chronic combined systolic (congestive) and diastolic (congestive) heart failure (HCC)        Assessment/Plan:     CHF- compensated, on po lasix  AF- rate better, continue BB and Amio 200 mg bid for two weeks followed by 200 mg/day.  continue AC  ICM- on BB only for REYNA and hypotension, restart if tolerated  Anemia- s/p PRBC    Pt ok for discharge today from cardiac standpoint      PRIYA LERMA - CNP, ACNP, AGPCNP  Heart Failure  [facility] [address] [place] [address]       Core Measures:   Discharge instructions:   LVEF documented:   ACEI for LV dysfunction:   Smoking Cessation:
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
## 2022-10-02 NOTE — PROGRESS NOTES
Pharmacy to Dose Warfarin    Pharmacy consulted to dose warfarin for afib. INR Goal: 2-3    INR today: 1.50    Assessment/Plan:  - INR trending up, will expect to be therapeutic in next couple of days  - Will dose 2.5 mg today  - Due to interaction with amiodarone adjust to 2.5 mg daily    Pharmacy will continue to follow.     Bronwyn Lopez, PharmD  PGY-1 Pharmacy Resident  [id number]

## 2022-10-03 VITALS
BODY MASS INDEX: 19.27 KG/M2 | HEIGHT: 70 IN | OXYGEN SATURATION: 95 % | DIASTOLIC BLOOD PRESSURE: 68 MMHG | TEMPERATURE: 97.5 F | SYSTOLIC BLOOD PRESSURE: 107 MMHG | WEIGHT: 134.6 LBS | HEART RATE: 138 BPM | RESPIRATION RATE: 16 BRPM

## 2022-10-03 LAB
ANION GAP SERPL CALCULATED.3IONS-SCNC: 12 MMOL/L (ref 3–16)
BASOPHILS ABSOLUTE: 0 K/UL (ref 0–0.2)
BASOPHILS RELATIVE PERCENT: 0.6 %
BUN BLDV-MCNC: 35 MG/DL (ref 7–20)
CALCIUM SERPL-MCNC: 8.3 MG/DL (ref 8.3–10.6)
CHLORIDE BLD-SCNC: 107 MMOL/L (ref 99–110)
CO2: 23 MMOL/L (ref 21–32)
CREAT SERPL-MCNC: 1.3 MG/DL (ref 0.8–1.3)
EOSINOPHILS ABSOLUTE: 0.4 K/UL (ref 0–0.6)
EOSINOPHILS RELATIVE PERCENT: 5 %
GFR AFRICAN AMERICAN: >60
GFR NON-AFRICAN AMERICAN: 52
GLUCOSE BLD-MCNC: 126 MG/DL (ref 70–99)
HCT VFR BLD CALC: 23.2 % (ref 40.5–52.5)
HCT VFR BLD CALC: 27.8 % (ref 40.5–52.5)
HEMOGLOBIN: 7.9 G/DL (ref 13.5–17.5)
HEMOGLOBIN: 9.3 G/DL (ref 13.5–17.5)
INR BLD: 1.6 (ref 0.87–1.14)
LYMPHOCYTES ABSOLUTE: 1.2 K/UL (ref 1–5.1)
LYMPHOCYTES RELATIVE PERCENT: 16.1 %
MCH RBC QN AUTO: 30.8 PG (ref 26–34)
MCHC RBC AUTO-ENTMCNC: 34.2 G/DL (ref 31–36)
MCV RBC AUTO: 90.2 FL (ref 80–100)
MONOCYTES ABSOLUTE: 0.6 K/UL (ref 0–1.3)
MONOCYTES RELATIVE PERCENT: 8.4 %
NEUTROPHILS ABSOLUTE: 5.4 K/UL (ref 1.7–7.7)
NEUTROPHILS RELATIVE PERCENT: 69.9 %
PDW BLD-RTO: 19.1 % (ref 12.4–15.4)
PLATELET # BLD: 369 K/UL (ref 135–450)
PMV BLD AUTO: 8.3 FL (ref 5–10.5)
POTASSIUM SERPL-SCNC: 4.8 MMOL/L (ref 3.5–5.1)
PROTHROMBIN TIME: 19 SEC (ref 11.7–14.5)
RBC # BLD: 2.57 M/UL (ref 4.2–5.9)
SODIUM BLD-SCNC: 142 MMOL/L (ref 136–145)
WBC # BLD: 7.7 K/UL (ref 4–11)

## 2022-10-03 PROCEDURE — 36415 COLL VENOUS BLD VENIPUNCTURE: CPT

## 2022-10-03 PROCEDURE — 85025 COMPLETE CBC W/AUTO DIFF WBC: CPT

## 2022-10-03 PROCEDURE — 97110 THERAPEUTIC EXERCISES: CPT

## 2022-10-03 PROCEDURE — 6370000000 HC RX 637 (ALT 250 FOR IP): Performed by: INTERNAL MEDICINE

## 2022-10-03 PROCEDURE — 6370000000 HC RX 637 (ALT 250 FOR IP): Performed by: PHYSICIAN ASSISTANT

## 2022-10-03 PROCEDURE — 2580000003 HC RX 258: Performed by: PHYSICIAN ASSISTANT

## 2022-10-03 PROCEDURE — 85014 HEMATOCRIT: CPT

## 2022-10-03 PROCEDURE — 6370000000 HC RX 637 (ALT 250 FOR IP): Performed by: NURSE PRACTITIONER

## 2022-10-03 PROCEDURE — 85610 PROTHROMBIN TIME: CPT

## 2022-10-03 PROCEDURE — 97116 GAIT TRAINING THERAPY: CPT

## 2022-10-03 PROCEDURE — 85018 HEMOGLOBIN: CPT

## 2022-10-03 PROCEDURE — 99233 SBSQ HOSP IP/OBS HIGH 50: CPT | Performed by: NURSE PRACTITIONER

## 2022-10-03 PROCEDURE — 80048 BASIC METABOLIC PNL TOTAL CA: CPT

## 2022-10-03 RX ORDER — POLYETHYLENE GLYCOL 3350 17 G/17G
17 POWDER, FOR SOLUTION ORAL DAILY PRN
Qty: 30 EACH | Refills: 0
Start: 2022-10-03 | End: 2022-11-02

## 2022-10-03 RX ORDER — METHOCARBAMOL 750 MG/1
750 TABLET, FILM COATED ORAL 3 TIMES DAILY PRN
Qty: 15 TABLET | Refills: 0
Start: 2022-10-03 | End: 2022-10-13

## 2022-10-03 RX ORDER — PANTOPRAZOLE SODIUM 40 MG/1
40 TABLET, DELAYED RELEASE ORAL DAILY
Qty: 30 TABLET | Refills: 0
Start: 2022-10-03 | End: 2022-10-24

## 2022-10-03 RX ORDER — LANOLIN ALCOHOL/MO/W.PET/CERES
3 CREAM (GRAM) TOPICAL NIGHTLY PRN
Qty: 15 TABLET | Refills: 0
Start: 2022-10-03 | End: 2022-11-28

## 2022-10-03 RX ORDER — ALBUTEROL SULFATE 2.5 MG/3ML
2.5 SOLUTION RESPIRATORY (INHALATION)
Qty: 120 EACH | Refills: 1
Start: 2022-10-03 | End: 2022-10-24

## 2022-10-03 RX ORDER — FUROSEMIDE 20 MG/1
20 TABLET ORAL DAILY
Qty: 60 TABLET | Refills: 0
Start: 2022-10-04

## 2022-10-03 RX ORDER — LIDOCAINE 4 G/G
2 PATCH TOPICAL DAILY
Qty: 7 PATCH | Refills: 0
Start: 2022-10-04 | End: 2022-11-28

## 2022-10-03 RX ORDER — METOPROLOL SUCCINATE 50 MG/1
50 TABLET, EXTENDED RELEASE ORAL 2 TIMES DAILY
Qty: 30 TABLET | Refills: 0
Start: 2022-10-03 | End: 2022-10-03 | Stop reason: SDUPTHER

## 2022-10-03 RX ORDER — METOPROLOL SUCCINATE 25 MG/1
25 TABLET, EXTENDED RELEASE ORAL ONCE
Status: COMPLETED | OUTPATIENT
Start: 2022-10-03 | End: 2022-10-03

## 2022-10-03 RX ORDER — FLUTICASONE PROPIONATE 50 MCG
2 SPRAY, SUSPENSION (ML) NASAL DAILY
Qty: 16 G | Refills: 0
Start: 2022-10-04

## 2022-10-03 RX ORDER — METOPROLOL SUCCINATE 50 MG/1
75 TABLET, EXTENDED RELEASE ORAL 2 TIMES DAILY
Qty: 90 TABLET | Refills: 1
Start: 2022-10-03 | End: 2022-11-18

## 2022-10-03 RX ORDER — AMIODARONE HYDROCHLORIDE 200 MG/1
TABLET ORAL
Qty: 30 TABLET | Refills: 0
Start: 2022-10-04 | End: 2022-11-28

## 2022-10-03 RX ORDER — METOPROLOL SUCCINATE 50 MG/1
75 TABLET, EXTENDED RELEASE ORAL 2 TIMES DAILY
Qty: 90 TABLET | Refills: 0 | Status: SHIPPED | OUTPATIENT
Start: 2022-10-03 | End: 2022-10-03 | Stop reason: SDUPTHER

## 2022-10-03 RX ORDER — IPRATROPIUM BROMIDE AND ALBUTEROL SULFATE 2.5; .5 MG/3ML; MG/3ML
3 SOLUTION RESPIRATORY (INHALATION) EVERY 4 HOURS PRN
Qty: 360 ML | Refills: 1
Start: 2022-10-03 | End: 2022-10-24

## 2022-10-03 RX ADMIN — ACETAMINOPHEN 650 MG: 325 TABLET ORAL at 12:03

## 2022-10-03 RX ADMIN — PANTOPRAZOLE SODIUM 40 MG: 40 TABLET, DELAYED RELEASE ORAL at 06:28

## 2022-10-03 RX ADMIN — ASPIRIN 81 MG: 81 TABLET, COATED ORAL at 09:43

## 2022-10-03 RX ADMIN — FUROSEMIDE 20 MG: 20 TABLET ORAL at 09:43

## 2022-10-03 RX ADMIN — METOPROLOL SUCCINATE 50 MG: 50 TABLET, EXTENDED RELEASE ORAL at 09:43

## 2022-10-03 RX ADMIN — Medication 10 ML: at 09:47

## 2022-10-03 RX ADMIN — Medication 1 CAPSULE: at 09:43

## 2022-10-03 RX ADMIN — ATORVASTATIN CALCIUM 20 MG: 20 TABLET, FILM COATED ORAL at 09:43

## 2022-10-03 RX ADMIN — DICLOFENAC SODIUM 2 G: 10 GEL TOPICAL at 09:46

## 2022-10-03 RX ADMIN — AMIODARONE HYDROCHLORIDE 200 MG: 200 TABLET ORAL at 09:43

## 2022-10-03 RX ADMIN — FLUTICASONE PROPIONATE 2 SPRAY: 50 SPRAY, METERED NASAL at 09:45

## 2022-10-03 RX ADMIN — METOPROLOL SUCCINATE 25 MG: 25 TABLET, EXTENDED RELEASE ORAL at 12:03

## 2022-10-03 RX ADMIN — ACETAMINOPHEN 650 MG: 325 TABLET ORAL at 06:27

## 2022-10-03 ASSESSMENT — PAIN SCALES - GENERAL
PAINLEVEL_OUTOF10: 0
PAINLEVEL_OUTOF10: 0

## 2022-10-03 ASSESSMENT — ENCOUNTER SYMPTOMS
GASTROINTESTINAL NEGATIVE: 1
SHORTNESS OF BREATH: 0

## 2022-10-03 NOTE — PROGRESS NOTES
10/03/22 1156   Encounter Summary   Encounter Overview/Reason  Advance Care Planning   Service Provided For: Patient and family together   Referral/Consult From: Nurse   Support System Family members; Children   Last Encounter  10/03/22  (HPOA completed. See ACP note for details. GB 10/3)   Complexity of Encounter Low   Begin Time 1100   End Time  1157   Total Time Calculated 57 min   Advance Care Planning   Type ACP conversation; Completed AD/ACP document(s)     Electronically signed by James Broussard on 10/3/2022 at 11:58 AM    Advance Care Planning     Advance Care Planning Inpatient Note  Spiritual Care Department    Today's Date: 10/3/2022  Unit: [facility] [place] [facility]    Received request from IDT Member. Upon review of chart and communication with care team, patient's decision making abilities are not in question. . Patient, Healthcare Decision Maker, and Child/Children was/were present in the room during visit. Goals of ACP Conversation:  Discuss advance care planning documents    Health Care Decision Makers:       Primary Decision Maker: Aster Christine - Child - [phone]    Secondary Decision Maker: Yolanda Manning - Child - [phone]    Secondary Decision Maker: Bria Avila Child - [phone]  Summary:  Verified Documents  [facility]  Documented Next of Kin, per patient report    Advance Care Planning Documents (Patient Wishes):  Healthcare Power of /Advance Directive Appointment of Health Care Agent     Assessment:   initiated this visit to assess pt's AD needs. Pt and pt's daughter were present at this time and engaged easily with  throughout this visit. Pt stated that he wanted to complete a HPOA at this time.  provided pt with a copy of the documents and assisted him in completing them. Pt was able to state his wishes clearly at this time.  Pt stated that he has previously completed a Living Will.  encouraged family to provide the pt's PCP with a copy post discharge. No other immediate needs were expressed. Should any further needs arise, please contact spiritual care services. Interventions:  Requested patient/family to submit existing document for our records: Living Will/Advance Directive  Provided education on documents for clarity and greater understanding  Discussed and provided education on state decision maker hierarchy  Assisted in the completion of documents according to patient's wishes at this time  Encouraged ongoing ACP conversation with future decision makers and loved ones    Care Preferences Communicated:   No    Outcomes/Plan:  ACP Discussion: Completed  New advance directive completed. Returned original document(s) to patient, as well as copies for distribution to appointed agents  Copy of advance directive given to staff to scan into medical record.   Teach Back Method used to verify the patient's and/or Healthcare Decision Maker's understanding of key information in the advance directive documents    Electronically signed by Matthew Montez on 10/3/2022 at 11:58 AM

## 2022-10-03 NOTE — PROGRESS NOTES
Patient HR up to 130s this morning after working therapy. Metoprolol dose increased to 75 mg Daily per cardiology NP. HR eventually down to 70s-90s resting after medication given. Patient ok for discharge per Bessie Conroy NP.     [id number]  Patient stood to get dressed for dishcharge. HR up to 130s immediately on standing. Patient asymptomatic. Denies shortness of breath or palpitations. VSS. Spoke to Silvia at Cardiology office. Per Silvia, after speaking to NP, it is ok for patient to continue with discharge today since he is asymptomatic with HR increase and has 1 week f/u scheduled in cardiology office. Will update patient and family and proceed with discharge.
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
## 2022-10-03 NOTE — DISCHARGE INSTRUCTIONS
Follow up with your PCP within 7 days of discharge. Follow up with cardiology as instructed   Follow up with nephrology as instructed   Follow-up with gastroenterology as instructed  Follow-up with ENT as instructed  Follow-up with orthopedic surgery was  Take all your medications as prescribed.
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
## 2022-10-03 NOTE — CARE COORDINATION
Patient discharged (10-3-22 to [facility], [address]  Report: [phone]  Fax: [phone]  Via daughter's car after [address]. Patient/Family aware of and agreeable to the discharge plan. HENS submitted,  [address] faxed.   All discharge needs met per case management

## 2022-10-03 NOTE — PLAN OF CARE
Problem: Discharge Planning  Goal: Discharge to home or other facility with appropriate resources  Outcome: Progressing     Problem: Skin/Tissue Integrity  Goal: Absence of new skin breakdown  Description: 1. Monitor for areas of redness and/or skin breakdown  2. Assess vascular access sites hourly  3. Every 4-6 hours minimum:  Change oxygen saturation probe site  4. Every 4-6 hours:  If on nasal continuous positive airway pressure, respiratory therapy assess nares and determine need for appliance change or resting period.   Outcome: Progressing     Problem: Safety - Adult  Goal: Free from fall injury  Outcome: Progressing     Problem: ABCDS Injury Assessment  Goal: Absence of physical injury  Outcome: Progressing     Problem: Neurosensory - Adult  Goal: Achieves maximal functionality and self care  Outcome: Progressing     Problem: Respiratory - Adult  Goal: Achieves optimal ventilation and oxygenation  Outcome: Progressing     Problem: Cardiovascular - Adult  Goal: Maintains optimal cardiac output and hemodynamic stability  Outcome: Progressing     Problem: Skin/Tissue Integrity - Adult  Goal: Skin integrity remains intact  Outcome: Progressing  Goal: Oral mucous membranes remain intact  Outcome: Progressing     Problem: Musculoskeletal - Adult  Goal: Return mobility to safest level of function  Outcome: Progressing     Problem: Genitourinary - Adult  Goal: Absence of urinary retention  Outcome: Progressing     Problem: Metabolic/Fluid and Electrolytes - Adult  Goal: Electrolytes maintained within normal limits  Outcome: Progressing  Goal: Hemodynamic stability and optimal renal function maintained  Outcome: Progressing     Problem: Hematologic - Adult  Goal: Maintains hematologic stability  Outcome: Progressing     Problem: Pain  Goal: Verbalizes/displays adequate comfort level or baseline comfort level  Outcome: Progressing     Problem: Chronic Conditions and Co-morbidities  Goal: Patient's chronic conditions and co-morbidity symptoms are monitored and maintained or improved  Outcome: Progressing     Problem: Nutrition Deficit:  Goal: Optimize nutritional status  Outcome: Progressing

## 2022-10-03 NOTE — PROGRESS NOTES
Pharmacy to Dose Warfarin    Pharmacy consulted to dose warfarin for afib. INR Goal: 2-3    INR today: 1.6    Assessment/Plan:  - INR subtherapeutic but trending up appropriately. - Continue warfarin at reduced dose of 2.5 mg daily.  - Possible concomitant drug-drug interactions include: amiodarone. Pharmacy will continue to follow.     Shiv Chacon, PharmD, [facility]
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
## 2022-10-03 NOTE — RT PROTOCOL NOTE
RT Inhaler-Nebulizer Bronchodilator Protocol Note    There is a bronchodilator order in the chart from a provider indicating to follow the RT Bronchodilator Protocol and there is an Initiate RT Inhaler-Nebulizer Bronchodilator Protocol order as well (see protocol at bottom of note). CXR Findings:  No results found. The findings from the last RT Protocol Assessment were as follows:   History Pulmonary Disease: None or smoker <15 pack years  Respiratory Pattern: Regular pattern and RR 12-20 bpm  Breath Sounds: Slightly diminished and/or crackles  Cough: Strong, spontaneous, non-productive  Indication for Bronchodilator Therapy: None  Bronchodilator Assessment Score: 2    Aerosolized bronchodilator medication orders have been revised according to the RT Inhaler-Nebulizer Bronchodilator Protocol below. Respiratory Therapist to perform RT Therapy Protocol Assessment initially then follow the protocol. Repeat RT Therapy Protocol Assessment PRN for score 0-3 or on second treatment, BID, and PRN for scores above 3. No Indications - adjust the frequency to every 6 hours PRN wheezing or bronchospasm, if no treatments needed after 48 hours then discontinue using Per Protocol order mode. If indication present, adjust the RT bronchodilator orders based on the Bronchodilator Assessment Score as indicated below. Use Inhaler orders unless patient has one or more of the following: on home nebulizer, not able to hold breath for 10 seconds, is not alert and oriented, cannot activate and use MDI correctly, or respiratory rate 25 breaths per minute or more, then use the equivalent nebulizer order(s) with same Frequency and PRN reasons based on the score. If a patient is on this medication at home then do not decrease Frequency below that used at home.     0-3 - enter or revise RT bronchodilator order(s) to equivalent RT Bronchodilator order with Frequency of every 4 hours PRN for wheezing or increased work of breathing using Per Protocol order mode. 4-6 - enter or revise RT Bronchodilator order(s) to two equivalent RT bronchodilator orders with one order with BID Frequency and one order with Frequency of every 4 hours PRN wheezing or increased work of breathing using Per Protocol order mode. 7-10 - enter or revise RT Bronchodilator order(s) to two equivalent RT bronchodilator orders with one order with TID Frequency and one order with Frequency of every 4 hours PRN wheezing or increased work of breathing using Per Protocol order mode. 11-13 - enter or revise RT Bronchodilator order(s) to one equivalent RT bronchodilator order with QID Frequency and an Albuterol order with Frequency of every 4 hours PRN wheezing or increased work of breathing using Per Protocol order mode. Greater than 13 - enter or revise RT Bronchodilator order(s) to one equivalent RT bronchodilator order with every 4 hours Frequency and an Albuterol order with Frequency of every 2 hours PRN wheezing or increased work of breathing using Per Protocol order mode. RT to enter RT Home Evaluation for COPD & MDI Assessment order using Per Protocol order mode.     Electronically signed by Thuan Scherer RCP on 10/3/2022 at 1:43 AM

## 2022-10-03 NOTE — DISCHARGE INSTR - COC
Continuity of Care Form    Patient Name: Vanna Bates   :  1933  MRN:  [id number]    Admit date:  2022  Discharge date:  10/2/22    Code Status Order: Limited   Advance Directives:     Admitting Physician:  Lacey Rea MD  PCP: David Chapman MD    Discharging Nurse:  Livan Bae [facility] Unit/Room#: 1QH-0725/9796-85  Discharging Unit Phone Number: [phone]    Emergency Contact:   Extended Emergency Contact Information  Primary Emergency Contact: [address]   [address] Phone: [phone]  Mobile Phone: [phone]  Relation: Child  Secondary Emergency Contact: Anusha Hill)   [address] Phone: [phone]  Mobile Phone: [phone]  Relation: Child    Past Surgical History:  Past Surgical History:   Procedure Laterality Date    ABSCESS DRAINAGE  7/20/15    right foot    APPENDECTOMY      CARDIAC CATHETERIZATION      Left    COLONOSCOPY  2018    dr Cathi Noaln    x3    [id number] [facility] HISTORY Right 7/17/15    right fifth toe I and D    PACEMAKER PLACEMENT      ID ESOPHAGOGASTRODUODENOSCOPY TRANSORAL DIAGNOSTIC N/A 2018    EGD DIAGNOSTIC ONLY performed by Sharon Akhtar MD at [address] [facility] Right 7.16.15    right pinkey toe     TONSILLECTOMY AND ADENOIDECTOMY         Immunization History:   Immunization History   Administered Date(s) Administered    COVID-19, PFIZER GRAY top, DO NOT Dilute, (age 15 y+), IM, 30 mcg/0.3 mL 2022    COVID-19, PFIZER PURPLE top, DILUTE for use, (age 15 y+), 30mcg/0.3mL 2021, 02/10/2021, 2021    Influenza Vaccine, unspecified formulation 10/22/2015, 2016    Influenza Virus Vaccine 10/04/2010, 2011, 2013    Influenza, FLUAD, (age 72 y+), Adjuvanted, 0.5mL 10/01/2020    Influenza, High Dose (Fluzone 65 yrs and older) 2014, 2017, 10/12/2018    Influenza, Triv, inactivated, subunit, adjuvanted, IM (Fluad 65 yrs and older) 09/24/2019    Pneumococcal Conjugate 13-valent ([id number]) 12/16/2014    Pneumococcal Polysaccharide ([id number]) 10/02/2008    Td (Adult), 5 Lf Tetanus Toxoid, Pf (Tenivac, Decavac) 09/22/2022    Tdap (Boostrix, Adacel) 10/04/2010, 01/11/2016    Zoster Live (Zostavax) 02/16/2012       Active Problems:  Patient Active Problem List   Diagnosis Code    CAD (coronary artery disease) CABG x3 1996, stenting PDA 10/2021 I25.10    Cardiac pacemaker Z95.0    Benign prostatic hyperplasia with nocturia N40.1, R35.1    Gout-uric acid >7.5--advised proph tx M10.9    Hypercholesteremia E78.00    Carotid bruit(bilat-nl duplex u/s 10/10) R09.89    Vitamin D deficiency-(advised 1000IU/day) E55.9    Actinic keratoses L57.0    Elevated PSA-(was 4.2 10/10-repeat 6 mo)(was 5.12 1/11-then 4.4 2/12)-sees dr Rain Zabala for this R97.20    S/P colonoscopy-4/07-neg per pt,  3/18 repeat colonoscopy polyp-repeat 5 yr Z98.890    Hearing loss, conductive, bilateral-seeing ent-dr quinn for hearing aides H90.0    Encounter for monitoring sotalol therapy Z51.81, Z79.899    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 Brigette Gómez  R80.9    Anemia--post op 8/15--started otc iron bid, off now  - work up Dr. Fredis Casper bone marrow. possible MDS 2019 D64.9    Paroxysmal atrial fibrillation (HCC) I48.0    DM (diabetes mellitus), type 2 with peripheral vascular complications (HCC)--s/p amputation great toe post osteomylitis   diet controlled  E11.51    Hypertension I10    CKD stage 3 due to type 2 diabetes mellitus (HCC) E11.22, N18.30    Hyperkalemia E87.5    H/O esophagogastroduodenoscopy  11/18  prominent vessesls vs varices. dr Alverna Hair (done for blood in stool) Z98.890    Elevated ferritin  - work up Dr. Mcneal Begin  genetic screen hemachromatosis negative.  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 R79.89    Localized edema R60.0    Ischemic cardiomyopathy; EF 40-45% 4/22 I25.5    Chronic pansinusitis J32.4    S/P amputation of lesser toe, right ([facility]) Z89.421    Ventricular tachycardia I47.20    Nonrheumatic aortic valve stenosis- moderate by echo 4/22 I35.0    Chronic renal disease, stage III [facility]) [[id number]] N18.30    Acute on chronic systolic congestive heart failure (HCC) I50.23    Mixed hyperlipidemia E78.2    Acute respiratory failure with hypoxia (HCC) J96.01    Pneumonia of both lungs due to infectious organism J18.9    REYNA (acute kidney injury) ([facility] N17.9    Closed pelvic ring fracture (HCC) S32.810A    Persistent atrial fibrillation (HCC) I48.19    Chronic combined systolic and diastolic congestive heart failure ([facility]) I50.42    Hypotension due to drugs I95.2       Isolation/Infection:   Isolation            No Isolation          Patient Infection Status       Infection Onset Added Last Indicated Last Indicated By Review Planned Expiration Resolved Resolved By    None active    Resolved    COVID-19 (Rule Out) 09/22/22 09/22/22 09/23/22 Respiratory Panel, Molecular, with COVID-19 (Restricted: peds pts or suitable admitted adults) (Ordered)   09/23/22 Rule-Out Test Resulted    COVID-19 (Rule Out) 12/05/20 12/05/20 12/05/20 COVID-19 (Ordered)   12/06/20 Rule-Out Test Resulted            Nurse Assessment:  Last Vital Signs: BP (!) 155/67   Pulse 73   Temp 97.6 °F (36.4 °C) (Oral)   Resp 16   Ht 5' 9.5\" (1.765 m)   Wt 134 lb 9.6 oz (61.1 kg)   SpO2 95%   BMI 19.59 kg/m²     Last documented pain score (0-10 scale): Pain Level: 0  Last Weight:   Wt Readings from Last 1 Encounters:   10/03/22 134 lb 9.6 oz (61.1 kg)     Mental Status:  oriented and alert    IV Access:  - None    Nursing Mobility/ADLs:  Walking   Assisted  Transfer  Assisted  Bathing  Assisted  Dressing  Assisted  Toileting  Assisted  Feeding  Independent after set up  Med Admin  Assisted  Med Delivery   whole    Wound Care Documentation and Therapy:        Elimination:  Continence:    Bowel: Yes  Bladder: Yes  Urinary Catheter: None   Colostomy/Ileostomy/Ileal Conduit: No       Date of Last BM: 10/3/22    Intake/Output Summary (Last 24 hours) at 10/3/2022 1154  Last data filed at 10/3/2022 1027  Gross per 24 hour   Intake 759.5 ml   Output 525 ml   Net 234.5 ml     I/O last 3 completed shifts: In: 519.5 [P.O.:240; I.V.:54.5; Blood:225]  Out: 525 [Urine:525]    Safety Concerns:     None    Impairments/Disabilities:      Hearing- hard of hearing     Nutrition Therapy:  Current Nutrition Therapy:   - Oral Diet:  General    Routes of Feeding: Oral  Liquids: No Restrictions  Daily Fluid Restriction: no  Last Modified Barium Swallow with Video (Video Swallowing Test): not done    Treatments at the Time of Hospital Discharge:   Respiratory Treatments: none  Oxygen Therapy:  is not on home oxygen therapy.   Ventilator:    - No ventilator support    Rehab Therapies: Physical Therapy and Occupational Therapy  Weight Bearing Status/Restrictions: No weight bearing restrictions  Other Medical Equipment (for information only, NOT a DME order):  walker  Other Treatments: none    Patient's personal belongings (please select all that are sent with patient):  Hearing Aides bilateral    RN SIGNATURE:  Electronically signed by Corrinne Prosper, RN on 10/3/22 at 11:32 AM EDT    CASE MANAGEMENT/SOCIAL WORK SECTION    Inpatient Status Date: 9/22/22    Readmission Risk Assessment Score:  Readmission Risk              Risk of Unplanned Readmission:  24           Discharging to Facility/ [facility], [address] [facility]  Report: [phone]  Fax: [phone]     Dialysis Facility (if applicable)     / signature: Electronically signed by ILDA Pan on 10/3/22 at 11:53 AM EDT    PHYSICIAN SECTION    Prognosis: Fair    Condition at Discharge: Stable    Rehab Potential (if transferring to Rehab): Good    Recommended Labs or Other Treatments After Discharge:  INR tomorrow and adjust coumadin dose Physician Certification: I certify the above information and transfer of Betzaida Chapman  is necessary for the continuing treatment of the diagnosis listed and that he requires formerly [facility] for less 30 days.      Update Admission H&P: No change in H&P    PHYSICIAN SIGNATURE:  Electronically signed by Jackie Epperson MD on 10/3/22 at 7:50 AM EDT
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
## 2022-10-03 NOTE — PROGRESS NOTES
Discharge orders in place. Patient discharging to [facility] via patients daughter for transportation. Report called to Yasmin Ugalde @ [facility] @ 1500. IV's removed and tele discontinued prior to d/c. Discharge reviewed with patients daughter. Patient taken down for discharge by PCA with all belongings.

## 2022-10-06 ENCOUNTER — CLINICAL DOCUMENTATION ONLY (OUTPATIENT)
Facility: CLINIC | Age: 87
End: 2022-10-06

## 2022-10-07 ENCOUNTER — TELEPHONE (OUTPATIENT)
Dept: FAMILY MEDICINE CLINIC | Age: 87
End: 2022-10-07

## 2022-10-11 ENCOUNTER — OFFICE VISIT (OUTPATIENT)
Dept: CARDIOLOGY CLINIC | Age: 87
End: 2022-10-11
Payer: MEDICARE

## 2022-10-11 ENCOUNTER — NURSE ONLY (OUTPATIENT)
Dept: CARDIOLOGY CLINIC | Age: 87
End: 2022-10-11
Payer: MEDICARE

## 2022-10-11 VITALS
HEART RATE: 88 BPM | BODY MASS INDEX: 24.54 KG/M2 | DIASTOLIC BLOOD PRESSURE: 62 MMHG | SYSTOLIC BLOOD PRESSURE: 124 MMHG | OXYGEN SATURATION: 97 % | WEIGHT: 168.6 LBS

## 2022-10-11 DIAGNOSIS — I10 PRIMARY HYPERTENSION: ICD-10-CM

## 2022-10-11 DIAGNOSIS — I48.0 PAF (PAROXYSMAL ATRIAL FIBRILLATION) (HCC): Primary | ICD-10-CM

## 2022-10-11 DIAGNOSIS — I49.5 SSS (SICK SINUS SYNDROME) (HCC): ICD-10-CM

## 2022-10-11 DIAGNOSIS — Z95.0 CARDIAC PACEMAKER: ICD-10-CM

## 2022-10-11 DIAGNOSIS — I25.10 CORONARY ARTERY DISEASE INVOLVING NATIVE CORONARY ARTERY OF NATIVE HEART WITHOUT ANGINA PECTORIS: ICD-10-CM

## 2022-10-11 DIAGNOSIS — I48.0 PAROXYSMAL ATRIAL FIBRILLATION (HCC): Primary | ICD-10-CM

## 2022-10-11 PROCEDURE — G8427 DOCREV CUR MEDS BY ELIG CLIN: HCPCS | Performed by: NURSE PRACTITIONER

## 2022-10-11 PROCEDURE — 99214 OFFICE O/P EST MOD 30 MIN: CPT | Performed by: NURSE PRACTITIONER

## 2022-10-11 PROCEDURE — 1123F ACP DISCUSS/DSCN MKR DOCD: CPT | Performed by: NURSE PRACTITIONER

## 2022-10-11 PROCEDURE — 1111F DSCHRG MED/CURRENT MED MERGE: CPT | Performed by: NURSE PRACTITIONER

## 2022-10-11 PROCEDURE — 93280 PM DEVICE PROGR EVAL DUAL: CPT | Performed by: INTERNAL MEDICINE

## 2022-10-11 PROCEDURE — 1036F TOBACCO NON-USER: CPT | Performed by: NURSE PRACTITIONER

## 2022-10-11 PROCEDURE — G8484 FLU IMMUNIZE NO ADMIN: HCPCS | Performed by: NURSE PRACTITIONER

## 2022-10-11 PROCEDURE — G8420 CALC BMI NORM PARAMETERS: HCPCS | Performed by: NURSE PRACTITIONER

## 2022-10-11 NOTE — PATIENT INSTRUCTIONS
Keep your legs elevated    Wear your support socks ; will help with leg swelling and won't hurt your kidneys    Keep appt with Dr. Antione Irizarry 11/28
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
## 2022-10-12 NOTE — PROGRESS NOTES
Patient comes in for programming evaluation for his pacemaker. All sensing and pacing parameters are within normal range. Battery life 1.4 years  AP 31%   23%. AT/AF with a 49% burden. Currently in AF today. 17 HVR episodes noted-AF with RVR. Last noted on 10/4/2022. Patient remains on warfarin, amiodarone and metoprolol. No changes made this Session. Please see interrogation for more detail. Patient will see NPTS today and follow up in 3 months in office or remotely.

## 2022-10-13 ENCOUNTER — TELEPHONE (OUTPATIENT)
Dept: FAMILY MEDICINE CLINIC | Age: 87
End: 2022-10-13

## 2022-10-13 NOTE — TELEPHONE ENCOUNTER
----- Message from Merly Arreguin sent at 10/13/2022  8:44 AM EDT -----  Subject: Message to Provider    QUESTIONS  Information for Provider? [facility] from [facility] is calling to see if   the PCP will follow the patient for home health care. He is going to be   discharging from [facility] on 10/14 . Please call [phone]  ---------------------------------------------------------------------------  --------------  Danyelle Pat INFO  [phone]; OK to leave message on voicemail  ---------------------------------------------------------------------------  --------------  SCRIPT ANSWERS  Relationship to Patient? Third Party  Third Party Type? Nursing Home? Representative Name?  [facility]

## 2022-10-14 ENCOUNTER — TELEPHONE (OUTPATIENT)
Dept: PHARMACY | Age: 87
End: 2022-10-14

## 2022-10-14 NOTE — TELEPHONE ENCOUNTER
Called Ousmane Elder. Discharged from [facility] rehab this morning. Not sure what dose he has been getting, but she states the discharge instructions say to take 3.5mg daily as of today. Thinks:  10/6- 10/11 was getting 5mg daily   10/11- held dose then decreased to 3.5mg     Will need to call [facility] at [phone] to confirm this. Per Ousmane Elder, dcd today from room 120 and RN was No. She also said that [facility] is coming Sunday [facility]) and she wanted to ask them about checking the INR.     Romulo Fuentes, PharmD, [facility]

## 2022-10-14 NOTE — TELEPHONE ENCOUNTER
Londonia called back and he was taking 5mg daily and dose was held 10/10, 10/11, 10/12. Resumed 10/13 at 3.5mg daily.      Kal Ornelas, PharmD, [facility]

## 2022-10-14 NOTE — TELEPHONE ENCOUNTER
Called Yasmeen. States took 3.5mg 10/13 but their records do not go back beyond that. Asked her to check if she can find records of his dose 10/11 and 10/12 and call me back.     Michael Anderson, KalinaD, [facility]
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
## 2022-10-18 ENCOUNTER — ANTI-COAG VISIT (OUTPATIENT)
Dept: PHARMACY | Age: 87
End: 2022-10-18
Payer: MEDICARE

## 2022-10-18 ENCOUNTER — HOSPITAL ENCOUNTER (OUTPATIENT)
Age: 87
Discharge: HOME | End: 2022-10-18
Payer: MEDICARE

## 2022-10-18 ENCOUNTER — TELEPHONE (OUTPATIENT)
Dept: FAMILY MEDICINE CLINIC | Age: 87
End: 2022-10-18

## 2022-10-18 DIAGNOSIS — N17.9 AKI (ACUTE KIDNEY INJURY) (HCC): ICD-10-CM

## 2022-10-18 DIAGNOSIS — I50.22 CHRONIC SYSTOLIC (CONGESTIVE) HEART FAILURE (HCC): ICD-10-CM

## 2022-10-18 DIAGNOSIS — I48.0 PAROXYSMAL ATRIAL FIBRILLATION (HCC): Primary | ICD-10-CM

## 2022-10-18 DIAGNOSIS — R60.0 LOCALIZED EDEMA: ICD-10-CM

## 2022-10-18 DIAGNOSIS — N18.31 STAGE 3A CHRONIC KIDNEY DISEASE (HCC): ICD-10-CM

## 2022-10-18 LAB
ANION GAP SERPL CALCULATED.3IONS-SCNC: 17 MMOL/L (ref 3–16)
BUN BLDV-MCNC: 16 MG/DL (ref 7–20)
CALCIUM SERPL-MCNC: 8.7 MG/DL (ref 8.3–10.6)
CHLORIDE BLD-SCNC: 102 MMOL/L (ref 99–110)
CO2: 25 MMOL/L (ref 21–32)
CREAT SERPL-MCNC: 1.4 MG/DL (ref 0.8–1.3)
GFR SERPL CREATININE-BSD FRML MDRD: 48 ML/MIN/{1.73_M2}
GLUCOSE BLD-MCNC: 118 MG/DL (ref 70–99)
INTERNATIONAL NORMALIZATION RATIO, POC: 3.7
POTASSIUM SERPL-SCNC: 3.9 MMOL/L (ref 3.5–5.1)
SODIUM BLD-SCNC: 144 MMOL/L (ref 136–145)

## 2022-10-18 PROCEDURE — 36415 COLL VENOUS BLD VENIPUNCTURE: CPT

## 2022-10-18 PROCEDURE — 80048 BASIC METABOLIC PNL TOTAL CA: CPT

## 2022-10-18 PROCEDURE — 99212 OFFICE O/P EST SF 10 MIN: CPT

## 2022-10-18 PROCEDURE — 85610 PROTHROMBIN TIME: CPT
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
## 2022-10-24 ENCOUNTER — OFFICE VISIT (OUTPATIENT)
Dept: FAMILY MEDICINE CLINIC | Age: 87
End: 2022-10-24
Payer: MEDICARE

## 2022-10-24 VITALS
HEART RATE: 54 BPM | BODY MASS INDEX: 23.87 KG/M2 | OXYGEN SATURATION: 96 % | RESPIRATION RATE: 12 BRPM | DIASTOLIC BLOOD PRESSURE: 68 MMHG | SYSTOLIC BLOOD PRESSURE: 110 MMHG | WEIGHT: 164 LBS

## 2022-10-24 DIAGNOSIS — I50.42 CHRONIC COMBINED SYSTOLIC AND DIASTOLIC CONGESTIVE HEART FAILURE (HCC): ICD-10-CM

## 2022-10-24 DIAGNOSIS — I10 PRIMARY HYPERTENSION: ICD-10-CM

## 2022-10-24 DIAGNOSIS — I48.19 PERSISTENT ATRIAL FIBRILLATION (HCC): ICD-10-CM

## 2022-10-24 DIAGNOSIS — I25.10 CORONARY ARTERY DISEASE INVOLVING NATIVE CORONARY ARTERY OF NATIVE HEART WITHOUT ANGINA PECTORIS: ICD-10-CM

## 2022-10-24 DIAGNOSIS — E11.22 CKD STAGE 3 DUE TO TYPE 2 DIABETES MELLITUS (HCC): ICD-10-CM

## 2022-10-24 DIAGNOSIS — N18.30 CKD STAGE 3 DUE TO TYPE 2 DIABETES MELLITUS (HCC): ICD-10-CM

## 2022-10-24 DIAGNOSIS — S32.810D CLOSED PELVIC RING FRACTURE WITH ROUTINE HEALING, SUBSEQUENT ENCOUNTER: ICD-10-CM

## 2022-10-24 DIAGNOSIS — Z09 HOSPITAL DISCHARGE FOLLOW-UP: Primary | ICD-10-CM

## 2022-10-24 PROBLEM — I95.2 HYPOTENSION DUE TO DRUGS: Status: RESOLVED | Noted: 2022-10-01 | Resolved: 2022-10-24

## 2022-10-24 PROBLEM — I47.20 VENTRICULAR TACHYCARDIA (HCC): Status: RESOLVED | Noted: 2022-02-23 | Resolved: 2022-10-24

## 2022-10-24 PROBLEM — J96.01 ACUTE RESPIRATORY FAILURE WITH HYPOXIA (HCC): Status: RESOLVED | Noted: 2022-09-22 | Resolved: 2022-10-24

## 2022-10-24 PROBLEM — N17.9 AKI (ACUTE KIDNEY INJURY) (HCC): Status: RESOLVED | Noted: 2022-09-24 | Resolved: 2022-10-24

## 2022-10-24 PROBLEM — J18.9 PNEUMONIA OF BOTH LUNGS DUE TO INFECTIOUS ORGANISM: Status: RESOLVED | Noted: 2022-09-23 | Resolved: 2022-10-24

## 2022-10-24 PROBLEM — I50.23 ACUTE ON CHRONIC SYSTOLIC CONGESTIVE HEART FAILURE (HCC): Status: RESOLVED | Noted: 2022-06-06 | Resolved: 2022-10-24

## 2022-10-24 PROCEDURE — 99495 TRANSJ CARE MGMT MOD F2F 14D: CPT | Performed by: FAMILY MEDICINE

## 2022-10-24 PROCEDURE — 1111F DSCHRG MED/CURRENT MED MERGE: CPT | Performed by: FAMILY MEDICINE

## 2022-10-24 SDOH — ECONOMIC STABILITY: FOOD INSECURITY: WITHIN THE PAST 12 MONTHS, YOU WORRIED THAT YOUR FOOD WOULD RUN OUT BEFORE YOU GOT MONEY TO BUY MORE.: NEVER TRUE

## 2022-10-24 SDOH — ECONOMIC STABILITY: FOOD INSECURITY: WITHIN THE PAST 12 MONTHS, THE FOOD YOU BOUGHT JUST DIDN'T LAST AND YOU DIDN'T HAVE MONEY TO GET MORE.: NEVER TRUE

## 2022-10-24 ASSESSMENT — SOCIAL DETERMINANTS OF HEALTH (SDOH): HOW HARD IS IT FOR YOU TO PAY FOR THE VERY BASICS LIKE FOOD, HOUSING, MEDICAL CARE, AND HEATING?: NOT HARD AT ALL

## 2022-10-24 NOTE — Clinical Note
Hi!  Would Jose Madera be a god candidate for an SGLT-2? His sugars are normal, but he still has lung rales, mild leg edema. OK to decide after you see him in follow up. He is stable currently. Thanks!

## 2022-10-24 NOTE — LETTER
[address] [facility] [address] [facility]  Phone: [phone]  Fax: [phone]    Chrissy Copeland MD         October 24, 2022     Patient: Verena Parsons   YOB: 1933   Date of Visit: 10/24/2022       To Whom It May Concern: It is my medical opinion that Elke Gallardo requires a disability parking placard for the following reasons:  He cannot walk without assistance from another person or the use of an assistance device (cane, crutch, prosthetic device, wheelchair, etc.). Duration of need: 5 years    If you have any questions or concerns, please don't hesitate to call.     Sincerely,        Chrissy Copeland MD
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
## 2022-10-25 ENCOUNTER — ANTI-COAG VISIT (OUTPATIENT)
Dept: PHARMACY | Age: 87
End: 2022-10-25
Payer: MEDICARE

## 2022-10-25 DIAGNOSIS — I48.19 PERSISTENT ATRIAL FIBRILLATION (HCC): Primary | ICD-10-CM

## 2022-10-25 LAB — INTERNATIONAL NORMALIZATION RATIO, POC: 4.6

## 2022-10-25 PROCEDURE — 85610 PROTHROMBIN TIME: CPT

## 2022-10-25 PROCEDURE — 99212 OFFICE O/P EST SF 10 MIN: CPT

## 2022-10-25 RX ORDER — LISINOPRIL 5 MG/1
5 TABLET ORAL DAILY
Qty: 90 TABLET | Refills: 1 | COMMUNITY
Start: 2022-10-25

## 2022-10-25 NOTE — TELEPHONE ENCOUNTER
Warfarin prescription phoned into [facility] [address] [facility] under Dr. Krista Cat  Warfarin 2.5 mg tabs  Take 1.25 mg on mon, Wed and Fri and 2.5 mg all other days of the week  90 days   2 refills
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
## 2022-10-26 ENCOUNTER — NURSE ONLY (OUTPATIENT)
Dept: CARDIOLOGY CLINIC | Age: 87
End: 2022-10-26
Payer: MEDICARE

## 2022-10-26 DIAGNOSIS — Z95.0 CARDIAC PACEMAKER IN SITU: ICD-10-CM

## 2022-10-26 PROCEDURE — 93296 REM INTERROG EVL PM/IDS: CPT | Performed by: INTERNAL MEDICINE

## 2022-10-26 PROCEDURE — 93294 REM INTERROG EVL PM/LDLS PM: CPT | Performed by: INTERNAL MEDICINE

## 2022-10-26 NOTE — PROGRESS NOTES
We received remote transmission from patient's monitor at home. Transmission shows normal sensing and pacing function. Noted AF. Pt is on coumadin. EP physician will review. See interrogation under cardiology tab in the [address] [place] [address] [address] field for more details. AP-18%  -32%    End of 91-day monitoring period 10-26-22.

## 2022-11-01 ENCOUNTER — ANTI-COAG VISIT (OUTPATIENT)
Dept: PHARMACY | Age: 87
End: 2022-11-01
Payer: MEDICARE

## 2022-11-01 DIAGNOSIS — I48.19 PERSISTENT ATRIAL FIBRILLATION (HCC): Primary | ICD-10-CM

## 2022-11-01 LAB — INTERNATIONAL NORMALIZATION RATIO, POC: 3.1

## 2022-11-01 PROCEDURE — 85610 PROTHROMBIN TIME: CPT

## 2022-11-01 PROCEDURE — 99212 OFFICE O/P EST SF 10 MIN: CPT
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
## 2022-11-07 ENCOUNTER — TELEPHONE (OUTPATIENT)
Dept: FAMILY MEDICINE CLINIC | Age: 87
End: 2022-11-07

## 2022-11-08 ENCOUNTER — ANTI-COAG VISIT (OUTPATIENT)
Dept: PHARMACY | Age: 87
End: 2022-11-08
Payer: MEDICARE

## 2022-11-08 DIAGNOSIS — I48.19 PERSISTENT ATRIAL FIBRILLATION (HCC): Primary | ICD-10-CM

## 2022-11-08 LAB — INTERNATIONAL NORMALIZATION RATIO, POC: 1.4

## 2022-11-08 PROCEDURE — 85610 PROTHROMBIN TIME: CPT

## 2022-11-08 PROCEDURE — 99211 OFF/OP EST MAY X REQ PHY/QHP: CPT
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
## 2022-11-15 ENCOUNTER — ANTI-COAG VISIT (OUTPATIENT)
Dept: PHARMACY | Age: 87
End: 2022-11-15
Payer: MEDICARE

## 2022-11-15 DIAGNOSIS — I48.19 PERSISTENT ATRIAL FIBRILLATION (HCC): Primary | ICD-10-CM

## 2022-11-15 LAB — INTERNATIONAL NORMALIZATION RATIO, POC: 1.3

## 2022-11-15 PROCEDURE — 85610 PROTHROMBIN TIME: CPT

## 2022-11-15 PROCEDURE — 99211 OFF/OP EST MAY X REQ PHY/QHP: CPT
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
## 2022-11-18 RX ORDER — METOPROLOL SUCCINATE 50 MG/1
75 TABLET, EXTENDED RELEASE ORAL 2 TIMES DAILY
Qty: 270 TABLET | Refills: 1 | Status: SHIPPED | OUTPATIENT
Start: 2022-11-18

## 2022-11-22 ENCOUNTER — ANTI-COAG VISIT (OUTPATIENT)
Dept: PHARMACY | Age: 87
End: 2022-11-22
Payer: MEDICARE

## 2022-11-22 DIAGNOSIS — I48.19 PERSISTENT ATRIAL FIBRILLATION (HCC): Primary | ICD-10-CM

## 2022-11-22 LAB — INTERNATIONAL NORMALIZATION RATIO, POC: 1.7

## 2022-11-22 PROCEDURE — 99211 OFF/OP EST MAY X REQ PHY/QHP: CPT

## 2022-11-22 PROCEDURE — 85610 PROTHROMBIN TIME: CPT
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
## 2022-11-28 ENCOUNTER — HOSPITAL ENCOUNTER (OUTPATIENT)
Dept: GENERAL RADIOLOGY | Age: 87
Discharge: HOME | End: 2022-11-28
Payer: MEDICARE

## 2022-11-28 ENCOUNTER — HOSPITAL ENCOUNTER (OUTPATIENT)
Age: 87
Discharge: HOME | End: 2022-11-28
Payer: MEDICARE

## 2022-11-28 ENCOUNTER — NURSE ONLY (OUTPATIENT)
Dept: CARDIOLOGY CLINIC | Age: 87
End: 2022-11-28
Payer: MEDICARE

## 2022-11-28 ENCOUNTER — OFFICE VISIT (OUTPATIENT)
Dept: CARDIOLOGY CLINIC | Age: 87
End: 2022-11-28
Payer: MEDICARE

## 2022-11-28 VITALS
DIASTOLIC BLOOD PRESSURE: 60 MMHG | HEART RATE: 70 BPM | SYSTOLIC BLOOD PRESSURE: 116 MMHG | WEIGHT: 166.6 LBS | BODY MASS INDEX: 23.85 KG/M2 | OXYGEN SATURATION: 99 % | HEIGHT: 70 IN

## 2022-11-28 DIAGNOSIS — I25.10 CORONARY ARTERY DISEASE INVOLVING NATIVE CORONARY ARTERY OF NATIVE HEART WITHOUT ANGINA PECTORIS: Primary | ICD-10-CM

## 2022-11-28 DIAGNOSIS — I50.42 CHRONIC COMBINED SYSTOLIC AND DIASTOLIC CONGESTIVE HEART FAILURE (HCC): ICD-10-CM

## 2022-11-28 DIAGNOSIS — I35.0 NONRHEUMATIC AORTIC VALVE STENOSIS: ICD-10-CM

## 2022-11-28 DIAGNOSIS — R05.1 ACUTE COUGH: ICD-10-CM

## 2022-11-28 DIAGNOSIS — I25.5 ISCHEMIC CARDIOMYOPATHY: ICD-10-CM

## 2022-11-28 DIAGNOSIS — I10 PRIMARY HYPERTENSION: ICD-10-CM

## 2022-11-28 DIAGNOSIS — D64.9 ANEMIA, UNSPECIFIED TYPE: ICD-10-CM

## 2022-11-28 DIAGNOSIS — Z95.0 CARDIAC PACEMAKER: ICD-10-CM

## 2022-11-28 DIAGNOSIS — I48.19 PERSISTENT ATRIAL FIBRILLATION (HCC): ICD-10-CM

## 2022-11-28 DIAGNOSIS — I48.19 PERSISTENT ATRIAL FIBRILLATION (HCC): Primary | ICD-10-CM

## 2022-11-28 DIAGNOSIS — E78.2 MIXED HYPERLIPIDEMIA: ICD-10-CM

## 2022-11-28 LAB
ANION GAP SERPL CALCULATED.3IONS-SCNC: 13 MMOL/L (ref 3–16)
BUN BLDV-MCNC: 31 MG/DL (ref 7–20)
CALCIUM SERPL-MCNC: 9.4 MG/DL (ref 8.3–10.6)
CHLORIDE BLD-SCNC: 104 MMOL/L (ref 99–110)
CO2: 28 MMOL/L (ref 21–32)
CREAT SERPL-MCNC: 1.5 MG/DL (ref 0.8–1.3)
GFR SERPL CREATININE-BSD FRML MDRD: 44 ML/MIN/{1.73_M2}
GLUCOSE BLD-MCNC: 173 MG/DL (ref 70–99)
HCT VFR BLD CALC: 29.8 % (ref 40.5–52.5)
HEMOGLOBIN: 9.9 G/DL (ref 13.5–17.5)
MCH RBC QN AUTO: 32.4 PG (ref 26–34)
MCHC RBC AUTO-ENTMCNC: 33.2 G/DL (ref 31–36)
MCV RBC AUTO: 97.6 FL (ref 80–100)
PDW BLD-RTO: 23 % (ref 12.4–15.4)
PLATELET # BLD: 266 K/UL (ref 135–450)
PMV BLD AUTO: 9.7 FL (ref 5–10.5)
POTASSIUM SERPL-SCNC: 4.8 MMOL/L (ref 3.5–5.1)
PRO-BNP: 7715 PG/ML (ref 0–449)
RBC # BLD: 3.06 M/UL (ref 4.2–5.9)
SODIUM BLD-SCNC: 145 MMOL/L (ref 136–145)
TSH REFLEX: 3.49 UIU/ML (ref 0.27–4.2)
WBC # BLD: 6.7 K/UL (ref 4–11)

## 2022-11-28 PROCEDURE — 83880 ASSAY OF NATRIURETIC PEPTIDE: CPT

## 2022-11-28 PROCEDURE — 71046 X-RAY EXAM CHEST 2 VIEWS: CPT

## 2022-11-28 PROCEDURE — G8427 DOCREV CUR MEDS BY ELIG CLIN: HCPCS | Performed by: INTERNAL MEDICINE

## 2022-11-28 PROCEDURE — 80048 BASIC METABOLIC PNL TOTAL CA: CPT

## 2022-11-28 PROCEDURE — 84443 ASSAY THYROID STIM HORMONE: CPT

## 2022-11-28 PROCEDURE — 1036F TOBACCO NON-USER: CPT | Performed by: INTERNAL MEDICINE

## 2022-11-28 PROCEDURE — G8420 CALC BMI NORM PARAMETERS: HCPCS | Performed by: INTERNAL MEDICINE

## 2022-11-28 PROCEDURE — 80151 DRUG ASSAY AMIODARONE: CPT

## 2022-11-28 PROCEDURE — G8484 FLU IMMUNIZE NO ADMIN: HCPCS | Performed by: INTERNAL MEDICINE

## 2022-11-28 PROCEDURE — 85027 COMPLETE CBC AUTOMATED: CPT

## 2022-11-28 PROCEDURE — 99214 OFFICE O/P EST MOD 30 MIN: CPT | Performed by: INTERNAL MEDICINE

## 2022-11-28 PROCEDURE — 93280 PM DEVICE PROGR EVAL DUAL: CPT | Performed by: INTERNAL MEDICINE

## 2022-11-28 PROCEDURE — 1123F ACP DISCUSS/DSCN MKR DOCD: CPT | Performed by: INTERNAL MEDICINE

## 2022-11-28 PROCEDURE — 36415 COLL VENOUS BLD VENIPUNCTURE: CPT
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
## 2022-11-28 NOTE — PROGRESS NOTES
Patient comes in for programming evaluation for his pacemaker. All sensing and pacing parameters are within normal range. Battery life 1.3 years  AP 11%   59%. AT/AF with a 78% burden. Currently in AF today. Patient remains on warfarin, amiodarone and metoprolol. No changes made this Session. Please see interrogation for more detail. Patient will see Dr. Anuj Mann today and follow up in 3 months in office or remotely.

## 2022-11-29 ENCOUNTER — TELEPHONE (OUTPATIENT)
Dept: CARDIOLOGY CLINIC | Age: 87
End: 2022-11-29

## 2022-11-29 NOTE — TELEPHONE ENCOUNTER
----- Message from Melissa Cheng MD sent at 11/29/2022 12:22 PM EST -----  Labs suggest there may be some extra fluid, but also kidney function slightly worse. Take lasix 2 times for one day. Limit sodium. F/u as planned with chem and BNP prior.     1 [facility]

## 2022-12-03 LAB
AMIODARONE LEVEL: 0.7 UG/ML (ref 0.5–2)
DES-AMIOD: 0.5 UG/ML

## 2022-12-05 ENCOUNTER — OFFICE VISIT (OUTPATIENT)
Dept: ENT CLINIC | Age: 87
End: 2022-12-05
Payer: MEDICARE

## 2022-12-05 VITALS
SYSTOLIC BLOOD PRESSURE: 135 MMHG | HEART RATE: 76 BPM | WEIGHT: 162 LBS | BODY MASS INDEX: 23.58 KG/M2 | DIASTOLIC BLOOD PRESSURE: 75 MMHG

## 2022-12-05 DIAGNOSIS — J34.3 HYPERTROPHY OF BOTH INFERIOR NASAL TURBINATES: ICD-10-CM

## 2022-12-05 DIAGNOSIS — J34.89 NASAL OBSTRUCTION: ICD-10-CM

## 2022-12-05 DIAGNOSIS — J31.0 CHRONIC RHINITIS: ICD-10-CM

## 2022-12-05 DIAGNOSIS — J32.0 CHRONIC MAXILLARY SINUSITIS: Primary | ICD-10-CM

## 2022-12-05 DIAGNOSIS — J32.4 CHRONIC PANSINUSITIS: ICD-10-CM

## 2022-12-05 PROCEDURE — G8420 CALC BMI NORM PARAMETERS: HCPCS | Performed by: STUDENT IN AN ORGANIZED HEALTH CARE EDUCATION/TRAINING PROGRAM

## 2022-12-05 PROCEDURE — 31231 NASAL ENDOSCOPY DX: CPT | Performed by: STUDENT IN AN ORGANIZED HEALTH CARE EDUCATION/TRAINING PROGRAM

## 2022-12-05 PROCEDURE — G8428 CUR MEDS NOT DOCUMENT: HCPCS | Performed by: STUDENT IN AN ORGANIZED HEALTH CARE EDUCATION/TRAINING PROGRAM

## 2022-12-05 PROCEDURE — 99214 OFFICE O/P EST MOD 30 MIN: CPT | Performed by: STUDENT IN AN ORGANIZED HEALTH CARE EDUCATION/TRAINING PROGRAM

## 2022-12-05 PROCEDURE — 1123F ACP DISCUSS/DSCN MKR DOCD: CPT | Performed by: STUDENT IN AN ORGANIZED HEALTH CARE EDUCATION/TRAINING PROGRAM

## 2022-12-05 PROCEDURE — G8484 FLU IMMUNIZE NO ADMIN: HCPCS | Performed by: STUDENT IN AN ORGANIZED HEALTH CARE EDUCATION/TRAINING PROGRAM

## 2022-12-05 PROCEDURE — 1036F TOBACCO NON-USER: CPT | Performed by: STUDENT IN AN ORGANIZED HEALTH CARE EDUCATION/TRAINING PROGRAM

## 2022-12-05 RX ORDER — AMOXICILLIN AND CLAVULANATE POTASSIUM 875; 125 MG/1; MG/1
1 TABLET, FILM COATED ORAL 2 TIMES DAILY
Qty: 28 TABLET | Refills: 0 | Status: SHIPPED | OUTPATIENT
Start: 2022-12-05 | End: 2022-12-19

## 2022-12-05 RX ORDER — IPRATROPIUM BROMIDE 42 UG/1
2 SPRAY, METERED NASAL 4 TIMES DAILY
Qty: 15 ML | Refills: 3 | Status: SHIPPED | OUTPATIENT
Start: 2022-12-05
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
## 2022-12-06 ENCOUNTER — OFFICE VISIT (OUTPATIENT)
Dept: FAMILY MEDICINE CLINIC | Age: 87
End: 2022-12-06
Payer: MEDICARE

## 2022-12-06 ENCOUNTER — ANTI-COAG VISIT (OUTPATIENT)
Dept: PHARMACY | Age: 87
End: 2022-12-06
Payer: MEDICARE

## 2022-12-06 VITALS
WEIGHT: 163 LBS | HEART RATE: 70 BPM | BODY MASS INDEX: 23.73 KG/M2 | OXYGEN SATURATION: 94 % | DIASTOLIC BLOOD PRESSURE: 62 MMHG | RESPIRATION RATE: 12 BRPM | SYSTOLIC BLOOD PRESSURE: 138 MMHG

## 2022-12-06 DIAGNOSIS — I48.19 PERSISTENT ATRIAL FIBRILLATION (HCC): Primary | ICD-10-CM

## 2022-12-06 DIAGNOSIS — L20.89 FLEXURAL ATOPIC DERMATITIS: Primary | ICD-10-CM

## 2022-12-06 LAB — INTERNATIONAL NORMALIZATION RATIO, POC: 1.7

## 2022-12-06 PROCEDURE — 85610 PROTHROMBIN TIME: CPT

## 2022-12-06 PROCEDURE — G8484 FLU IMMUNIZE NO ADMIN: HCPCS | Performed by: FAMILY MEDICINE

## 2022-12-06 PROCEDURE — 99212 OFFICE O/P EST SF 10 MIN: CPT

## 2022-12-06 PROCEDURE — 1036F TOBACCO NON-USER: CPT | Performed by: FAMILY MEDICINE

## 2022-12-06 PROCEDURE — G8427 DOCREV CUR MEDS BY ELIG CLIN: HCPCS | Performed by: FAMILY MEDICINE

## 2022-12-06 PROCEDURE — 1123F ACP DISCUSS/DSCN MKR DOCD: CPT | Performed by: FAMILY MEDICINE

## 2022-12-06 PROCEDURE — G8420 CALC BMI NORM PARAMETERS: HCPCS | Performed by: FAMILY MEDICINE

## 2022-12-06 PROCEDURE — 99213 OFFICE O/P EST LOW 20 MIN: CPT | Performed by: FAMILY MEDICINE

## 2022-12-06 RX ORDER — TRIAMCINOLONE ACETONIDE 1 MG/G
CREAM TOPICAL
Qty: 30 G | Refills: 2 | Status: SHIPPED | OUTPATIENT
Start: 2022-12-06
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
## 2022-12-06 NOTE — PATIENT INSTRUCTIONS
-dry skin care and basic itch care: bathing with short (5-10 min) baths/showers in lukewarm water, with a mild soap such as unscented Dove (to be used ONLY in the armpits, groin, feet, etc), and no washcloths, sponges or loofahs (use just bare hands to bathe), patting dry, and applying a moisturizing cream within 3 minutes. Use emollients or moisturizing creams at least twice each day and preferably more if able. Recommended OTC moisturizers, especially thicker ones, since they will replace more natural oils and usually work better to control dry skin.

## 2022-12-12 ENCOUNTER — TELEPHONE (OUTPATIENT)
Dept: CARDIOLOGY CLINIC | Age: 87
End: 2022-12-12

## 2022-12-12 NOTE — TELEPHONE ENCOUNTER
----- Message from Lionel Champion MD sent at 12/8/2022 11:20 AM EST -----  Amio level OK. F/u as scheduled.     1 [facility]

## 2022-12-20 ENCOUNTER — ANTI-COAG VISIT (OUTPATIENT)
Dept: PHARMACY | Age: 87
End: 2022-12-20
Payer: MEDICARE

## 2022-12-20 DIAGNOSIS — I48.19 PERSISTENT ATRIAL FIBRILLATION (HCC): Primary | ICD-10-CM

## 2022-12-20 LAB — INTERNATIONAL NORMALIZATION RATIO, POC: 2.2

## 2022-12-20 PROCEDURE — 99212 OFFICE O/P EST SF 10 MIN: CPT

## 2022-12-20 PROCEDURE — 85610 PROTHROMBIN TIME: CPT
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
## 2022-12-21 ENCOUNTER — HOSPITAL ENCOUNTER (OUTPATIENT)
Dept: CT IMAGING | Age: 87
Discharge: HOME | End: 2022-12-21
Payer: MEDICARE

## 2022-12-21 DIAGNOSIS — J32.0 CHRONIC MAXILLARY SINUSITIS: ICD-10-CM

## 2022-12-21 PROCEDURE — 70486 CT MAXILLOFACIAL W/O DYE: CPT

## 2022-12-27 ENCOUNTER — TELEPHONE (OUTPATIENT)
Dept: PHARMACY | Age: 87
End: 2022-12-27

## 2022-12-27 ENCOUNTER — OFFICE VISIT (OUTPATIENT)
Dept: ENT CLINIC | Age: 87
End: 2022-12-27
Payer: MEDICARE

## 2022-12-27 ENCOUNTER — TELEPHONE (OUTPATIENT)
Dept: CARDIOLOGY CLINIC | Age: 87
End: 2022-12-27

## 2022-12-27 VITALS
DIASTOLIC BLOOD PRESSURE: 58 MMHG | RESPIRATION RATE: 16 BRPM | HEART RATE: 87 BPM | WEIGHT: 163 LBS | BODY MASS INDEX: 23.34 KG/M2 | OXYGEN SATURATION: 94 % | HEIGHT: 70 IN | SYSTOLIC BLOOD PRESSURE: 145 MMHG

## 2022-12-27 DIAGNOSIS — J31.0 CHRONIC RHINITIS: ICD-10-CM

## 2022-12-27 DIAGNOSIS — R04.0 EPISTAXIS: ICD-10-CM

## 2022-12-27 DIAGNOSIS — J34.89 NASAL OBSTRUCTION: ICD-10-CM

## 2022-12-27 DIAGNOSIS — J34.3 HYPERTROPHY OF BOTH INFERIOR NASAL TURBINATES: ICD-10-CM

## 2022-12-27 DIAGNOSIS — J32.0 CHRONIC MAXILLARY SINUSITIS: Primary | ICD-10-CM

## 2022-12-27 PROCEDURE — G8427 DOCREV CUR MEDS BY ELIG CLIN: HCPCS | Performed by: STUDENT IN AN ORGANIZED HEALTH CARE EDUCATION/TRAINING PROGRAM

## 2022-12-27 PROCEDURE — G8420 CALC BMI NORM PARAMETERS: HCPCS | Performed by: STUDENT IN AN ORGANIZED HEALTH CARE EDUCATION/TRAINING PROGRAM

## 2022-12-27 PROCEDURE — 1123F ACP DISCUSS/DSCN MKR DOCD: CPT | Performed by: STUDENT IN AN ORGANIZED HEALTH CARE EDUCATION/TRAINING PROGRAM

## 2022-12-27 PROCEDURE — 99213 OFFICE O/P EST LOW 20 MIN: CPT | Performed by: STUDENT IN AN ORGANIZED HEALTH CARE EDUCATION/TRAINING PROGRAM

## 2022-12-27 PROCEDURE — G8484 FLU IMMUNIZE NO ADMIN: HCPCS | Performed by: STUDENT IN AN ORGANIZED HEALTH CARE EDUCATION/TRAINING PROGRAM

## 2022-12-27 PROCEDURE — 1036F TOBACCO NON-USER: CPT | Performed by: STUDENT IN AN ORGANIZED HEALTH CARE EDUCATION/TRAINING PROGRAM

## 2022-12-27 NOTE — TELEPHONE ENCOUNTER
CARDIAC CLEARANCE     What type of procedure are you having? Two teeth extraction  Which physician is performing your procedure? Dr. Buck Gomez  When is your procedure scheduled for?  1/20/23  Where are you having this procedure? Dr Gloria Slaughter on [facility]  Are you taking Blood Thinners? If so what? (Name/dose/frequesncy)  Coumadin    Does the surgeon want you to stop your blood thinner? If so for how long?   They want our recommendation     Phone Number and Contact Name for Physicians office:  [phone]  Fax number to send information:    [phone]
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
## 2022-12-27 NOTE — TELEPHONE ENCOUNTER
Daughter, Rusty Mesa, called and said patient has a dental procedure 1/20 and they want to know how many days to hold. Explained he will need to ask the dentist and his referring cardiologist Issac Urrutia) and then let us know what is decided. Daughter to call and then let us know at apt 1/4.     Kal Ornelas, KalinaD, [facility]

## 2023-01-01 ENCOUNTER — CLINICAL DOCUMENTATION ONLY (OUTPATIENT)
Facility: CLINIC | Age: 88
End: 2023-01-01

## 2023-01-04 ENCOUNTER — ANTI-COAG VISIT (OUTPATIENT)
Dept: PHARMACY | Age: 88
End: 2023-01-04
Payer: MEDICARE

## 2023-01-04 DIAGNOSIS — I48.19 PERSISTENT ATRIAL FIBRILLATION (HCC): Primary | ICD-10-CM

## 2023-01-04 LAB — INTERNATIONAL NORMALIZATION RATIO, POC: 2.2

## 2023-01-04 PROCEDURE — 85610 PROTHROMBIN TIME: CPT

## 2023-01-04 PROCEDURE — 99211 OFF/OP EST MAY X REQ PHY/QHP: CPT
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
## 2023-01-11 RX ORDER — AMIODARONE HYDROCHLORIDE 200 MG/1
200 TABLET ORAL DAILY
Qty: 90 TABLET | Refills: 0 | Status: SHIPPED | OUTPATIENT
Start: 2023-01-11

## 2023-01-11 NOTE — TELEPHONE ENCOUNTER
Medication Refill    Medication needing refilled:  amiodarone     Dosage of the medication: 200mg    How are you taking this medication (QD, BID, TID, QID, PRN):  Take 1 tablet by mouth daily    30 or 90 day supply called in: 90    When will you run out of your medication:    Which Pharmacy are we sending the medication to?:    [facility] [address] [place] [address] 51 [phone] - F [phone]

## 2023-01-11 NOTE — TELEPHONE ENCOUNTER
Last OV: 11/28/22  Last labs: 11/28/22  Appt scheduled :  1/17/23    Last note:      Afib:  PAF. Asymptomatic  Continue coumadin. Followed through coumadin clinic  Now on Amio. Pacer checked and shows PAF, but likely in aflutter since 11/11. Rate controlled and does not want ablation and seems asymptomatic  Check labs with amio level.  If low consider increasing to see if we cam maintain sinus, o/w rate control

## 2023-01-17 ENCOUNTER — HOSPITAL ENCOUNTER (OUTPATIENT)
Dept: NON INVASIVE DIAGNOSTICS | Age: 88
Discharge: HOME | End: 2023-01-17
Payer: MEDICARE

## 2023-01-17 ENCOUNTER — HOSPITAL ENCOUNTER (OUTPATIENT)
Age: 88
Discharge: HOME | End: 2023-01-17
Payer: MEDICARE

## 2023-01-17 ENCOUNTER — OFFICE VISIT (OUTPATIENT)
Dept: CARDIOLOGY CLINIC | Age: 88
End: 2023-01-17

## 2023-01-17 VITALS
OXYGEN SATURATION: 97 % | WEIGHT: 160 LBS | BODY MASS INDEX: 22.9 KG/M2 | HEART RATE: 75 BPM | SYSTOLIC BLOOD PRESSURE: 124 MMHG | HEIGHT: 70 IN | DIASTOLIC BLOOD PRESSURE: 62 MMHG

## 2023-01-17 DIAGNOSIS — I50.42 CHRONIC COMBINED SYSTOLIC AND DIASTOLIC CONGESTIVE HEART FAILURE (HCC): ICD-10-CM

## 2023-01-17 DIAGNOSIS — I10 PRIMARY HYPERTENSION: ICD-10-CM

## 2023-01-17 DIAGNOSIS — I25.10 CORONARY ARTERY DISEASE INVOLVING NATIVE CORONARY ARTERY OF NATIVE HEART WITHOUT ANGINA PECTORIS: ICD-10-CM

## 2023-01-17 DIAGNOSIS — I25.5 ISCHEMIC CARDIOMYOPATHY: ICD-10-CM

## 2023-01-17 DIAGNOSIS — N18.31 STAGE 3A CHRONIC KIDNEY DISEASE (HCC): ICD-10-CM

## 2023-01-17 DIAGNOSIS — I48.19 PERSISTENT ATRIAL FIBRILLATION (HCC): ICD-10-CM

## 2023-01-17 DIAGNOSIS — I35.0 NONRHEUMATIC AORTIC VALVE STENOSIS: ICD-10-CM

## 2023-01-17 DIAGNOSIS — I25.10 CORONARY ARTERY DISEASE INVOLVING NATIVE CORONARY ARTERY OF NATIVE HEART WITHOUT ANGINA PECTORIS: Primary | ICD-10-CM

## 2023-01-17 DIAGNOSIS — E78.00 HYPERCHOLESTEREMIA: ICD-10-CM

## 2023-01-17 LAB
ALBUMIN SERPL-MCNC: 4.2 G/DL (ref 3.4–5)
ALP BLD-CCNC: 82 U/L (ref 40–129)
ALT SERPL-CCNC: 14 U/L (ref 10–40)
ANION GAP SERPL CALCULATED.3IONS-SCNC: 14 MMOL/L (ref 3–16)
AST SERPL-CCNC: 24 U/L (ref 15–37)
BILIRUB SERPL-MCNC: 1.1 MG/DL (ref 0–1)
BILIRUBIN DIRECT: 0.3 MG/DL (ref 0–0.3)
BILIRUBIN, INDIRECT: 0.8 MG/DL (ref 0–1)
BUN BLDV-MCNC: 35 MG/DL (ref 7–20)
CALCIUM SERPL-MCNC: 9.4 MG/DL (ref 8.3–10.6)
CHLORIDE BLD-SCNC: 104 MMOL/L (ref 99–110)
CHOLESTEROL, TOTAL: 128 MG/DL (ref 0–199)
CO2: 26 MMOL/L (ref 21–32)
CREAT SERPL-MCNC: 1.4 MG/DL (ref 0.8–1.3)
CREATININE URINE: 31 MG/DL (ref 39–259)
GFR SERPL CREATININE-BSD FRML MDRD: 48 ML/MIN/{1.73_M2}
GLUCOSE BLD-MCNC: 151 MG/DL (ref 70–99)
HCT VFR BLD CALC: 26.5 % (ref 40.5–52.5)
HDLC SERPL-MCNC: 47 MG/DL (ref 40–60)
HEMOGLOBIN: 9 G/DL (ref 13.5–17.5)
LDL CHOLESTEROL CALCULATED: 60 MG/DL
LV EF: 33 %
LVEF MODALITY: NORMAL
MCH RBC QN AUTO: 33.1 PG (ref 26–34)
MCHC RBC AUTO-ENTMCNC: 34.1 G/DL (ref 31–36)
MCV RBC AUTO: 97.3 FL (ref 80–100)
MICROALBUMIN UR-MCNC: 23.3 MG/DL
MICROALBUMIN/CREAT UR-RTO: 751.6 MG/G (ref 0–30)
PDW BLD-RTO: 23.1 % (ref 12.4–15.4)
PLATELET # BLD: 197 K/UL (ref 135–450)
PMV BLD AUTO: 10 FL (ref 5–10.5)
POTASSIUM SERPL-SCNC: 4.7 MMOL/L (ref 3.5–5.1)
RBC # BLD: 2.73 M/UL (ref 4.2–5.9)
SODIUM BLD-SCNC: 144 MMOL/L (ref 136–145)
TOTAL PROTEIN: 7.3 G/DL (ref 6.4–8.2)
TRIGL SERPL-MCNC: 103 MG/DL (ref 0–150)
VLDLC SERPL CALC-MCNC: 21 MG/DL
WBC # BLD: 5.7 K/UL (ref 4–11)

## 2023-01-17 PROCEDURE — 80076 HEPATIC FUNCTION PANEL: CPT

## 2023-01-17 PROCEDURE — 36415 COLL VENOUS BLD VENIPUNCTURE: CPT

## 2023-01-17 PROCEDURE — 80048 BASIC METABOLIC PNL TOTAL CA: CPT

## 2023-01-17 PROCEDURE — 85027 COMPLETE CBC AUTOMATED: CPT

## 2023-01-17 PROCEDURE — 82570 ASSAY OF URINE CREATININE: CPT

## 2023-01-17 PROCEDURE — 93306 TTE W/DOPPLER COMPLETE: CPT

## 2023-01-17 PROCEDURE — 82043 UR ALBUMIN QUANTITATIVE: CPT

## 2023-01-17 PROCEDURE — 80061 LIPID PANEL: CPT

## 2023-01-17 NOTE — TELEPHONE ENCOUNTER
Last OV: 11/28/2022  Jadon  Last Labs:bmp ([facility])  01/16/2023  Next OV: 01/17/2023  Jadon  Last Refill: 04/26/2022   Jadon
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
## 2023-01-17 NOTE — PATIENT INSTRUCTIONS
Complete labs as you leave today  Follow up with Dr. Romulo Marie to discuss defibrillator   Follow up with Dr. Virgilio Brady in 3 months

## 2023-01-18 ENCOUNTER — ANTI-COAG VISIT (OUTPATIENT)
Dept: PHARMACY | Age: 88
End: 2023-01-18
Payer: MEDICARE

## 2023-01-18 DIAGNOSIS — I48.19 PERSISTENT ATRIAL FIBRILLATION (HCC): Primary | ICD-10-CM

## 2023-01-18 LAB — INTERNATIONAL NORMALIZATION RATIO, POC: 2.1

## 2023-01-18 PROCEDURE — 85610 PROTHROMBIN TIME: CPT

## 2023-01-18 PROCEDURE — 99211 OFF/OP EST MAY X REQ PHY/QHP: CPT

## 2023-01-18 RX ORDER — ATORVASTATIN CALCIUM 20 MG/1
20 TABLET, FILM COATED ORAL DAILY
Qty: 90 TABLET | Refills: 4 | Status: SHIPPED | OUTPATIENT
Start: 2023-01-18
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
## 2023-01-19 ENCOUNTER — HOSPITAL ENCOUNTER (OUTPATIENT)
Age: 88
Discharge: HOME | End: 2023-01-19
Payer: MEDICARE

## 2023-01-19 ENCOUNTER — OFFICE VISIT (OUTPATIENT)
Dept: FAMILY MEDICINE CLINIC | Age: 88
End: 2023-01-19

## 2023-01-19 ENCOUNTER — HOSPITAL ENCOUNTER (OUTPATIENT)
Dept: GENERAL RADIOLOGY | Age: 88
Discharge: HOME | End: 2023-01-19
Payer: MEDICARE

## 2023-01-19 VITALS
DIASTOLIC BLOOD PRESSURE: 64 MMHG | RESPIRATION RATE: 14 BRPM | HEART RATE: 102 BPM | WEIGHT: 159 LBS | BODY MASS INDEX: 23.14 KG/M2 | OXYGEN SATURATION: 96 % | SYSTOLIC BLOOD PRESSURE: 112 MMHG

## 2023-01-19 DIAGNOSIS — G25.0 BENIGN ESSENTIAL TREMOR: ICD-10-CM

## 2023-01-19 DIAGNOSIS — M19.041 ARTHRITIS OF FINGER OF RIGHT HAND: ICD-10-CM

## 2023-01-19 DIAGNOSIS — M19.041 ARTHRITIS OF FINGER OF RIGHT HAND: Primary | ICD-10-CM

## 2023-01-19 PROCEDURE — 73140 X-RAY EXAM OF FINGER(S): CPT

## 2023-01-19 RX ORDER — PRIMIDONE 50 MG/1
25 TABLET ORAL NIGHTLY
Qty: 45 TABLET | Refills: 1 | Status: SHIPPED | OUTPATIENT
Start: 2023-01-19

## 2023-01-19 ASSESSMENT — PATIENT HEALTH QUESTIONNAIRE - PHQ9
SUM OF ALL RESPONSES TO PHQ9 QUESTIONS 1 & 2: 0
1. LITTLE INTEREST OR PLEASURE IN DOING THINGS: 0
2. FEELING DOWN, DEPRESSED OR HOPELESS: 0
SUM OF ALL RESPONSES TO PHQ QUESTIONS 1-9: 0

## 2023-01-19 NOTE — PATIENT INSTRUCTIONS
Mysoline for tremor. Start 1/2 tablet at bedtime. Call for dose change if it does not help your tremor.

## 2023-01-19 NOTE — PROGRESS NOTES
2023    Blood pressure 112/64, pulse (!) 102, resp. rate 14, weight 159 lb (72.1 kg), SpO2 96 %. Minnie Rojo (:  1933) is a 80 y.o. male, here for evaluation of the following medical concerns:    Chief Complaint   Patient presents with    Tremors     Right arm is \"shaky\". Causing difficulty with eating    Other     Middle finger on Rt hand has been swollen for 1 month, not painful     Here with daughter: his RH3D has been stiff and 'will not bend'  It is not sore, painful  No injury recalled  Onset about a month ago. It has bony enlargement that is also new. He has hx gout, but not recent flares. Had toe amputation due to severe gout. He notes increased tremor in hands also. R>L worse when he goes to do something, like eat soup and drinking from cup or writing. No meds for gout or tremor. Last renal function test:   Lab Results   Component Value Date/Time     2023 01:41 PM    K 4.7 2023 01:41 PM    K 4.4 2022 07:34 AM    BUN 35 2023 01:41 PM    CREATININE 1.4 2023 01:41 PM     Estimated Creatinine Clearance: 36 mL/min (A) (based on SCr of 1.4 mg/dL (H)). Patient Active Problem List   Diagnosis    CAD (coronary artery disease) CABG x3 , stenting PDA 10/2021    Cardiac pacemaker    Benign prostatic hyperplasia with nocturia    Gout-uric acid >7.5--advised proph tx    Hypercholesteremia    Carotid bruit(bilat-nl duplex u/s 10/10)    Vitamin D deficiency-(advised 1000IU/day)    Actinic keratoses    Elevated PSA-(was 4.2 10/10-repeat 6 mo)(was 5.12 -then 4.4 )-sees dr Krystin Talley for this    S/P colonoscopy--neg per pt,  3/18 repeat colonoscopy polyp-repeat 5 yr    Hearing loss, conductive, bilateral-seeing ent-dr quinn for hearing aides    Encounter for monitoring sotalol therapy    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 Jeremi Scales op 8/15--started otc iron bid, off now  - work up Dr. Neeraj Barr bone marrow. possible MDS 2019    DM (diabetes mellitus), type 2 with peripheral vascular complications (HCC)--s/p amputation great toe post osteomylitis   diet controlled     Hypertension    CKD stage 3 due to type 2 diabetes mellitus ([facility] [address]    Hyperkalemia    H/O esophagogastroduodenoscopy  11/18  prominent vessesls vs varices. dr Kristie Hodges (done for blood in stool)    Elevated ferritin  - work up Dr. Liat Avina  genetic screen hemachromatosis negative. 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    Localized edema    Ischemic cardiomyopathy; EF 40-45% 4/22    Chronic pansinusitis    S/P amputation of lesser toe, right (HCC)    Nonrheumatic aortic valve stenosis- moderate by echo 4/22    Mixed hyperlipidemia    Closed pelvic ring fracture (HCC)    Persistent atrial fibrillation (HCC)    Chronic combined systolic and diastolic congestive heart failure ([facility] [address]        Body mass index is 23.14 kg/m². Wt Readings from Last 3 Encounters:   01/19/23 159 lb (72.1 kg)   01/17/23 160 lb (72.6 kg)   01/16/23 157 lb 12.8 oz (71.6 kg)       BP Readings from Last 3 Encounters:   01/19/23 112/64   01/17/23 124/62   01/16/23 (!) 164/84       No Known Allergies    Prior to Visit Medications    Medication Sig Taking? Authorizing Provider   atorvastatin (LIPITOR) 20 MG tablet TAKE 1 TABLET BY MOUTH DAILY Yes Dhara Hernández MD   furosemide (LASIX) 40 MG tablet Take 1 tablet by mouth daily Yes Silviano Patel MD   amiodarone (CORDARONE) 200 MG tablet Take 1 tablet by mouth daily Yes Dhara Hernández MD   lisinopril (PRINIVIL;ZESTRIL) 5 MG tablet Take 1 tablet by mouth daily Yes Silviano Patel MD   triamcinolone (KENALOG) 0.1 % cream Apply topically 2-3 times daily as needed for itching.  Yes Vinnie Barros MD   ipratropium (ATROVENT) 0.06 % nasal spray 2 sprays by Each Nostril route 4 times daily Yes Elin Vanegas MD   metoprolol succinate (TOPROL XL) 50 MG extended release tablet Take 1.5 tablets by mouth in the morning and at bedtime Yes Lan Arellano, PRIYA - CNP ferrous sulfate (IRON 325) 325 (65 Fe) MG tablet Take 325 mg by mouth daily (with breakfast) Yes Historical Provider, MD   fluticasone (FLONASE) 50 MCG/ACT nasal spray 2 sprays by Each Nostril route daily Yes Kathy Piña MD   sodium chloride (OCEAN, BABY AYR) 0.65 % nasal spray 2 sprays by Nasal route as needed for Congestion Yes Kathy Piña MD   doxazosin (CARDURA) 2 MG tablet TAKE 1 TABLET BY MOUTH DAILY Yes Real Hughes MD   aspirin 81 MG EC tablet TAKE 1 TABLET BY MOUTH DAILY Yes PRIYA Smiley CNP   warfarin (COUMADIN) 5 MG tablet TAKE ONE-HALF TABLET BY MOUTH ON MONDAY WEDNESDAY AND FRIDAY, AND 1 TABLET BY MOUTH ALL OTHER DAYS OF THE WEEK Yes PRIYA Newton CNP   vitamin B-1 (THIAMINE) 100 MG tablet Take 1,000 mg by mouth daily  Yes Historical Provider, MD   EPOETIN BIBIANA IJ Inject as directed every 21 days  Yes Historical Provider, MD   acetaminophen (TYLENOL) 325 MG tablet Take 650 mg by mouth every 6 hours as needed for Pain Yes Historical Provider, MD        Social History     Tobacco Use    Smoking status: Never    Smokeless tobacco: Never    Tobacco comments:     counseled on tobacco exposure avoidance   Vaping Use    Vaping Use: Never used   Substance Use Topics    Alcohol use: No     Alcohol/week: 0.0 standard drinks    Drug use: No       Review of Systems    Physical Exam  Constitutional:       General: He is not in acute distress. Appearance: Normal appearance. He is not ill-appearing. HENT:      Head: Normocephalic and atraumatic. Pulmonary:      Effort: Pulmonary effort is normal.   Musculoskeletal:         General: Normal range of motion. Cervical back: Normal range of motion. Comments: RH3D PIP with significant bony enlargement and satellite nodules; limited joint ROM. Not red, swollen, warm or tender. Skin:     Findings: No rash. Neurological:      General: No focal deficit present.       Mental Status: He is alert and oriented to person, place, and time. Mental status is at baseline. Comments: Bilateral intention hand tremor. No rest tremor. Psychiatric:         Mood and Affect: Mood normal.         Behavior: Behavior normal.         Thought Content: Thought content normal.         Judgment: Judgment normal.       ASSESSMENT/PLAN:    1. Arthritis of finger of right hand  - appears to be chronic gouty arthritis, but he does not recall any prior flares in this joint. Check XR. Could refer to ortho, but I am not sure they could do anything, nor would if he is not currently in pain and his function is minimally impacted by the loss of digit flexion. - XR FINGER RIGHT (MIN 2 VIEWS); Future    2. Benign essential tremor  - discussed benign nature of this. He can elect to observe only. He wishes to attempt suppressive therapy. Since he is already on a beta blocker for cardiac reasons, will opt for mysoline. Start 25 mg qhs and can increase to BID if needed. We discussed the risks/benefits/alternatives and side effects to be aware of. Routine follow up planned 2/10/23    An  Gatherignature was used to authenticate this note.     --Kusum Gr MD on 1/19/2023 at 11:56 AM

## 2023-01-22 DIAGNOSIS — M86.9 OSTEOMYELITIS OF FINGER OF RIGHT HAND (HCC): Primary | ICD-10-CM

## 2023-01-23 ENCOUNTER — OFFICE VISIT (OUTPATIENT)
Dept: ORTHOPEDIC SURGERY | Age: 88
End: 2023-01-23
Payer: MEDICARE

## 2023-01-23 ENCOUNTER — TELEPHONE (OUTPATIENT)
Dept: ORTHOPEDIC SURGERY | Age: 88
End: 2023-01-23

## 2023-01-23 VITALS — RESPIRATION RATE: 16 BRPM | HEIGHT: 70 IN | WEIGHT: 159 LBS | BODY MASS INDEX: 22.76 KG/M2

## 2023-01-23 DIAGNOSIS — M1A.0410 CHRONIC GOUT OF RIGHT HAND, UNSPECIFIED CAUSE: Primary | ICD-10-CM

## 2023-01-23 PROCEDURE — G8484 FLU IMMUNIZE NO ADMIN: HCPCS | Performed by: ORTHOPAEDIC SURGERY

## 2023-01-23 PROCEDURE — 99204 OFFICE O/P NEW MOD 45 MIN: CPT | Performed by: ORTHOPAEDIC SURGERY

## 2023-01-23 PROCEDURE — G8427 DOCREV CUR MEDS BY ELIG CLIN: HCPCS | Performed by: ORTHOPAEDIC SURGERY

## 2023-01-23 PROCEDURE — G8420 CALC BMI NORM PARAMETERS: HCPCS | Performed by: ORTHOPAEDIC SURGERY

## 2023-01-23 NOTE — Clinical Note
Dear  Alexandra Angela MD,  Thank you very much for your referral or Mr. Yeimi Iyer to me for evaluation and treatment of his Hand & Wrist condition. I appreciate your confidence in me and thank you for allowing me the opportunity to care for your patients. If I can be of any further assistance to you on this or any other patient, please do not hesitate to contact me. Sincerely,  Meena Loyd.  Liliane Amanda MD

## 2023-01-23 NOTE — TELEPHONE ENCOUNTER
Appointment Request     Patient requesting earlier appointment: Yes  Appointment offered to patient:   Patient Contact Number: [phone]      PT'S DAUGHTER CALLED. PRIMARY REFERRED PT BECAUSE XRAY SHOWED POSSIBLE INFECTION IN RT MIDDLE FINGER JOINT AND WANTS PT TO BE SEEN THIS WEEK. THEY PREFER [facility]. CAN JAN LAY SEE THE PT THIS WEEK AT [place]? PLEASE CALL THE DAUGHTER EILEEN AT THE ABOVE PHONE # BECAUSE THE PT CAN'T HEAR VERY WELL.

## 2023-01-24 NOTE — PROGRESS NOTES
Mr. Cholo Ortiz is a 80 y.o. right handed man  who is seen today in Hand Surgical Consultation at the request of Ernesto Villanueva MD.    He is seen today regarding a 4 month(s) history of right Middle Finger stiffness, swelling, and enlargement without history previous of injury. He was seen for this concern by his primary care physician; previous treatment has included conservative measures. He reports moderate stiffness, swelling, enlargement, and no pain or functional limitations  located in the Right Middle Finger PIP Joint, no tenderness of the remaining hand, wrist, or elbow. He  notes today, no neurologic symptoms in the hand. Symptoms show no change over time. I have today reviewed with Cholo Ortiz the clinically relevant, past medical history, medications, allergies,  family history, social history, and Review Of Systems & I have documented any details relevant to today's presenting complaints in my history above. Mr. Cady Santos's self-reported past medical history, medications, allergies,  family history, social history, and Review Of Systems have been scanned into the chart under the \"Media\" tab. Physical Exam:  Mr. Penny Samaniego most recent vitals:  Vitals  Resp: 16  Height: 5' 9.5\" (176.5 cm)  Weight: 159 lb (72.1 kg)    He is well nourished, oriented to person, place & time. He demonstrates appropriate mood and affect as well as normal gait and station. Skin: Normal in appearance, Normal Color, and Free of Lesions Bilaterally   Digital range of motion is limited by Osteoarthritis diffusely but markedly limited in the  Middle Finger PIP Joint on the Right, normal on the Left  Wrist range of motion is without significant limitation bilaterally. There is no evidence of gross joint instability bilaterally. Sensation is subjectively present in the Whole Hand bilaterally. Vascular examination reveals normal and good capillary refill bilaterally.   Swelling is moderate in the Middle Finger PIP Joint on the Right, normal on the Left  No significant pain is elicited with palpation of the Middle Finger PIP Joint on the Right, normal on the Left  There is a 10 degree angular deformity and no clinical evidence of  mal-rotation of the symptomatic digit. Other digits are not similarly effected bilaterally. The base of the hand & wrist are not tender to palpation bilaterally  Muscular strength is clinically appropriate bilaterally. Radiographic Evaluation:  Radiographs, taken From another [de-identified] Office outside of my practice were Personally Reviewed & Interpreted by myself today (2 views of the right hand). They demonstrate no evidence of an acute fracture. 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 There is not evidence of other injury or bony fracture. Impression:  Mr. 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 Cholo Ortiz  , and presents requesting further treatment. Plan:    I have had a thorough discussion with Mr. Cholo Ortiz regarding the treatment options available for his initially presenting Middle Finger Gout, which is causing him significant symptoms and difficulty. I have outlined for Mr. Cholo Ortiz the risk, benefits and consequences of the various treatment modalities, including a reasonable expectation for the long term success of each. We have discussed the possibility that further, more aggressive treatment may be required for his current presenting condition.   Based upon our current discussion and a reasonable understating of the options available to him, Mr. Cholo Ortiz has selected to proceed with a conservative plan of treatment consisting of: the use of protective splints as needed, activity modification, and the judicious use of over-the-counter anti-inflammatory medications if allowed by his  primary care physician. Instructions were given regarding splint use and wear as well as suggestions for use of the other modalities were discussed. We also discussed that appropriate Medical Management by his  Primary Care Physician is the mainstay of treatment for Gout. I have clearly explained to him that the above outlined treatment plan should not be expected to 'cure' his medical condition, but we are rather treating the symptoms with which he presents. He has understood that in order to achieve more durable relief of his symptoms and to prevent future worsening or further damage, that appropriate Medical Management would be required. Mr. Desma Jeans  voiced an appropriate understanding of our discussion, the options available to him, and of the expectations of his selected  treatment. I have referred Mr. Desma Jeans back to Annabelle Worrell MD for further evaluation and treatment of his systemic medical condition as is medically appropriate. As he is minimally asymptomatic and not functionally limited by this condition, There is no indication for more aggressive intervention such as injection or surgical treatment of his  condition at this time. I have asked Mr. Desma Jeans  to feel free to contact me or schedule a follow-up appointment at any time that he feels the need for any further evaluation or treatment for his upper extremitiy condition. If he feels that he continues to be feeling and functioning well, he may choose not to seek any further follow-up or treatment at his discretion. I will remain available to continue his care at any time in the future.

## 2023-01-24 NOTE — PATIENT INSTRUCTIONS
Thank you for choosing [facility]) Physicians for your Hand and Upper Extremity needs. If we can be of any further assistance to you, please do not hesitate to contact us.     Office Phone Number:  [phone]  or  [phone]

## 2023-01-25 NOTE — PROGRESS NOTES
Merlin shows normal functioning pacemaker. POST INJECTION EVALUATION, no signs of new infection, tear, RD, VF, EOM, CNS, Vascular or other problems or side effect from previous injection(s).

## 2023-01-30 ENCOUNTER — ANTI-COAG VISIT (OUTPATIENT)
Dept: PHARMACY | Age: 88
End: 2023-01-30
Payer: MEDICARE

## 2023-01-30 DIAGNOSIS — I48.19 PERSISTENT ATRIAL FIBRILLATION (HCC): Primary | ICD-10-CM

## 2023-01-30 LAB — INTERNATIONAL NORMALIZATION RATIO, POC: 1.8

## 2023-01-30 PROCEDURE — 99211 OFF/OP EST MAY X REQ PHY/QHP: CPT

## 2023-01-30 PROCEDURE — 85610 PROTHROMBIN TIME: CPT
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
## 2023-01-31 ENCOUNTER — NURSE ONLY (OUTPATIENT)
Dept: CARDIOLOGY CLINIC | Age: 88
End: 2023-01-31
Payer: MEDICARE

## 2023-01-31 DIAGNOSIS — Z95.0 CARDIAC PACEMAKER IN SITU: ICD-10-CM

## 2023-01-31 PROCEDURE — 93296 REM INTERROG EVL PM/IDS: CPT | Performed by: INTERNAL MEDICINE

## 2023-01-31 PROCEDURE — 93294 REM INTERROG EVL PM/LDLS PM: CPT | Performed by: INTERNAL MEDICINE

## 2023-02-01 NOTE — PROGRESS NOTES
We received remote transmission from patient's monitor at home. Transmission shows normal sensing and pacing function. Noted AF. Pt is on coumadin. EP physician will review. See interrogation under cardiology tab in the [address] [place] [address] [address] field for more details. AP-3.5%  -84%    End of 91-day monitoring period 1-31-23.
actual

## 2023-02-07 ENCOUNTER — OFFICE VISIT (OUTPATIENT)
Dept: ENT CLINIC | Age: 88
End: 2023-02-07
Payer: MEDICARE

## 2023-02-07 VITALS
DIASTOLIC BLOOD PRESSURE: 58 MMHG | RESPIRATION RATE: 16 BRPM | SYSTOLIC BLOOD PRESSURE: 121 MMHG | OXYGEN SATURATION: 93 % | HEART RATE: 103 BPM

## 2023-02-07 DIAGNOSIS — J34.3 HYPERTROPHY OF BOTH INFERIOR NASAL TURBINATES: ICD-10-CM

## 2023-02-07 DIAGNOSIS — J32.0 CHRONIC MAXILLARY SINUSITIS: ICD-10-CM

## 2023-02-07 DIAGNOSIS — J34.89 NASAL OBSTRUCTION: ICD-10-CM

## 2023-02-07 DIAGNOSIS — R04.0 EPISTAXIS: Primary | ICD-10-CM

## 2023-02-07 PROCEDURE — 1036F TOBACCO NON-USER: CPT | Performed by: STUDENT IN AN ORGANIZED HEALTH CARE EDUCATION/TRAINING PROGRAM

## 2023-02-07 PROCEDURE — 1123F ACP DISCUSS/DSCN MKR DOCD: CPT | Performed by: STUDENT IN AN ORGANIZED HEALTH CARE EDUCATION/TRAINING PROGRAM

## 2023-02-07 PROCEDURE — 99213 OFFICE O/P EST LOW 20 MIN: CPT | Performed by: STUDENT IN AN ORGANIZED HEALTH CARE EDUCATION/TRAINING PROGRAM

## 2023-02-07 PROCEDURE — 31231 NASAL ENDOSCOPY DX: CPT | Performed by: STUDENT IN AN ORGANIZED HEALTH CARE EDUCATION/TRAINING PROGRAM

## 2023-02-07 PROCEDURE — G8484 FLU IMMUNIZE NO ADMIN: HCPCS | Performed by: STUDENT IN AN ORGANIZED HEALTH CARE EDUCATION/TRAINING PROGRAM

## 2023-02-07 PROCEDURE — G8420 CALC BMI NORM PARAMETERS: HCPCS | Performed by: STUDENT IN AN ORGANIZED HEALTH CARE EDUCATION/TRAINING PROGRAM

## 2023-02-07 PROCEDURE — G8427 DOCREV CUR MEDS BY ELIG CLIN: HCPCS | Performed by: STUDENT IN AN ORGANIZED HEALTH CARE EDUCATION/TRAINING PROGRAM
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
## 2023-02-10 ENCOUNTER — TELEPHONE (OUTPATIENT)
Dept: PHARMACY | Age: 88
End: 2023-02-10

## 2023-02-17 ENCOUNTER — ANTI-COAG VISIT (OUTPATIENT)
Dept: PHARMACY | Age: 88
End: 2023-02-17
Payer: MEDICARE

## 2023-02-17 DIAGNOSIS — I48.19 PERSISTENT ATRIAL FIBRILLATION (HCC): Primary | ICD-10-CM

## 2023-02-17 PROBLEM — I48.91 ATRIAL FIBRILLATION (HCC): Status: ACTIVE | Noted: 2022-09-27

## 2023-02-17 LAB — INTERNATIONAL NORMALIZATION RATIO, POC: 2.3

## 2023-02-17 PROCEDURE — 85610 PROTHROMBIN TIME: CPT

## 2023-02-17 PROCEDURE — 99211 OFF/OP EST MAY X REQ PHY/QHP: CPT
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
## 2023-02-22 NOTE — PROGRESS NOTES
Patient comes in for programming evaluation for his pacemaker. [facility]  All sensing and pacing parameters are within normal range. Battery life 1.2 years  AP 13%   70%. AT/AF with a 84% burden. Last episode noted on 2/20/2023. Patient remains on warfarin, amiodarone and metoprolol. No changes made this Session. Please see interrogation for more detail. Patient will see Dr. Arelis Avalos today and follow up in 3 months in office or remotely.

## 2023-02-23 ENCOUNTER — OFFICE VISIT (OUTPATIENT)
Dept: CARDIOLOGY CLINIC | Age: 88
End: 2023-02-23
Payer: MEDICARE

## 2023-02-23 ENCOUNTER — NURSE ONLY (OUTPATIENT)
Dept: CARDIOLOGY CLINIC | Age: 88
End: 2023-02-23

## 2023-02-23 VITALS
SYSTOLIC BLOOD PRESSURE: 132 MMHG | HEIGHT: 70 IN | OXYGEN SATURATION: 98 % | DIASTOLIC BLOOD PRESSURE: 64 MMHG | HEART RATE: 62 BPM | WEIGHT: 158.4 LBS | BODY MASS INDEX: 22.68 KG/M2

## 2023-02-23 DIAGNOSIS — I10 PRIMARY HYPERTENSION: ICD-10-CM

## 2023-02-23 DIAGNOSIS — Z95.0 CARDIAC PACEMAKER: ICD-10-CM

## 2023-02-23 DIAGNOSIS — I47.20 VT (VENTRICULAR TACHYCARDIA): ICD-10-CM

## 2023-02-23 DIAGNOSIS — I25.5 ISCHEMIC CARDIOMYOPATHY: Primary | ICD-10-CM

## 2023-02-23 DIAGNOSIS — I25.10 CORONARY ARTERY DISEASE INVOLVING NATIVE CORONARY ARTERY OF NATIVE HEART WITHOUT ANGINA PECTORIS: ICD-10-CM

## 2023-02-23 DIAGNOSIS — I48.11 LONGSTANDING PERSISTENT ATRIAL FIBRILLATION (HCC): Primary | ICD-10-CM

## 2023-02-23 DIAGNOSIS — I48.0 PAF (PAROXYSMAL ATRIAL FIBRILLATION) (HCC): ICD-10-CM

## 2023-02-23 DIAGNOSIS — I25.5 ISCHEMIC CARDIOMYOPATHY: ICD-10-CM

## 2023-02-23 PROCEDURE — 1123F ACP DISCUSS/DSCN MKR DOCD: CPT | Performed by: INTERNAL MEDICINE

## 2023-02-23 PROCEDURE — G8427 DOCREV CUR MEDS BY ELIG CLIN: HCPCS | Performed by: INTERNAL MEDICINE

## 2023-02-23 PROCEDURE — 1036F TOBACCO NON-USER: CPT | Performed by: INTERNAL MEDICINE

## 2023-02-23 PROCEDURE — G8484 FLU IMMUNIZE NO ADMIN: HCPCS | Performed by: INTERNAL MEDICINE

## 2023-02-23 PROCEDURE — 99214 OFFICE O/P EST MOD 30 MIN: CPT | Performed by: INTERNAL MEDICINE

## 2023-02-23 PROCEDURE — G8420 CALC BMI NORM PARAMETERS: HCPCS | Performed by: INTERNAL MEDICINE

## 2023-02-23 NOTE — PATIENT INSTRUCTIONS
Chuyita will call you to schedule     You are scheduled for a  generator change with Upgrade to Bi V  ICD     Our  will call you to discuss a date for you procedure. The Cath Lab will call you a week before your procedure. The night before your procedure you will need to scrub with Hibiclens wash. The day of your procedure you will need to check in at the registration desk, which is in the main lobby at [address] [place] [address] will need to fast for at least 8 hours prior to your procedure. You will need  to hold coumadin for the night before the procedure. You may take all other medications with a sip of water the morning of your procedure. Please have a responsible adult to drive you home upon discharge. The discharging unit will be giving you discharge instructions. If you have any questions regarding your procedure itself or your medications, please call [phone] and ask to talk to an EP nurse. You will be seen in the office in 1 week for a wound check and then 3 months following implantation.

## 2023-02-23 NOTE — LETTER
[address]  EP Procedure Sheet    2/23/23  Jam Adams  11/12/1933  EP Procedures  [] Pacemaker implant (sing [] EP Study   [] ICD implant (single/dual) [] Atrial flutter ablation (LIVAN Y/N)   [x] Biv implant ICD - upgrade from PPM [] Tilt Table   [] Biv implant PPM [] Atrial fibrillation ablation (LIVAN Yes)   [] Generator Change (PPM/ICD/BiV) [] SVT ablation   [] Lead revision (RV/LA/RA) (<1 month) [] PVC ablation     [] Lead extraction +/- upgrade (BiV/PPM/ICD) [] VT Ischemic/ non-ischemic   [] Loop implant/ removal [] VT RVOT   [] Cardioversion [] VT Left sided   [] LIVAN [] AVN ablation   Equipment  [x] [facility]  [] [employer] Mapping System   [] [place] [] [address]   [] [employer] [] CryoAblation   [] Biotronik [] Laser Lead Extraction   EP Procedures Scheduling Request  # hours Requested  [x]1 []2 []2-4 [] 4-6 Scheduled  Date:   Specific Day  Completed    Anesthesia []yes [x]no F/u Date:   CT surgery backup []yes []no     Overnight stay      Performing MD []RMM [x]MXA   []MKW [] CMV First vs repeat   []1st [] 2nd [] 3rd   Pre-Procedure Labs / Imaging  [] PT/INR [] Type & cross   [] CBC [] Units PRBC   [] BMP/Mg [] Units FFP   [] Venogram [] Cardiac CTA for Pulmonary vein mapping     RN INITIALS: DW     Patient Instructions  Do not eat or drink after midnight the night prior to procedure  Dx:cardiomyopathy ICD-10 code: *ICM i25.5   Medication Instructions: Hold []Xarelto []Eliquis [x]Coumadin []pradaxa  The night before the procedure.

## 2023-03-08 ENCOUNTER — TELEPHONE (OUTPATIENT)
Dept: CARDIOLOGY CLINIC | Age: 88
End: 2023-03-08

## 2023-03-08 NOTE — TELEPHONE ENCOUNTER
Pt child Ankit Ferreira is asking for call back to schedule pt for pacemaker with [employer]. Please advise.

## 2023-03-10 ENCOUNTER — ANTI-COAG VISIT (OUTPATIENT)
Dept: PHARMACY | Age: 88
End: 2023-03-10
Payer: MEDICARE

## 2023-03-10 DIAGNOSIS — I48.11 LONGSTANDING PERSISTENT ATRIAL FIBRILLATION (HCC): Primary | ICD-10-CM

## 2023-03-10 LAB — INTERNATIONAL NORMALIZATION RATIO, POC: 1.8

## 2023-03-10 PROCEDURE — 99212 OFFICE O/P EST SF 10 MIN: CPT

## 2023-03-10 PROCEDURE — 85610 PROTHROMBIN TIME: CPT
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
## 2023-03-16 ENCOUNTER — TELEPHONE (OUTPATIENT)
Dept: FAMILY MEDICINE CLINIC | Age: 88
End: 2023-03-16

## 2023-03-27 ENCOUNTER — ANTI-COAG VISIT (OUTPATIENT)
Dept: PHARMACY | Age: 88
End: 2023-03-27
Payer: MEDICARE

## 2023-03-27 DIAGNOSIS — I48.11 LONGSTANDING PERSISTENT ATRIAL FIBRILLATION (HCC): Primary | ICD-10-CM

## 2023-03-27 LAB — INTERNATIONAL NORMALIZATION RATIO, POC: 1.5

## 2023-03-27 PROCEDURE — 85610 PROTHROMBIN TIME: CPT

## 2023-03-27 PROCEDURE — 99211 OFF/OP EST MAY X REQ PHY/QHP: CPT

## 2023-03-27 RX ORDER — DOXAZOSIN 2 MG/1
2 TABLET ORAL DAILY
Qty: 90 TABLET | Refills: 1 | Status: SHIPPED | OUTPATIENT
Start: 2023-03-27

## 2023-03-27 NOTE — PROGRESS NOTES
Tracking Only    Intervention Detail: Dose Adjustment: 1, reason: Therapy Optimization  Total # of Interventions Recommended: 1  Total # of Interventions Accepted: 1  Time Spent (min): 15

## 2023-03-31 RX ORDER — AMIODARONE HYDROCHLORIDE 200 MG/1
200 TABLET ORAL DAILY
Qty: 90 TABLET | Refills: 0 | Status: SHIPPED | OUTPATIENT
Start: 2023-03-31

## 2023-03-31 NOTE — TELEPHONE ENCOUNTER
Received refill request for Amiodarone from [facility].     Last ov:2023 MXA    Last EK2023    Last Refill:2023     Next appointment:2023 ALFONSO

## 2023-04-06 ENCOUNTER — TELEPHONE (OUTPATIENT)
Dept: PHARMACY | Age: 88
End: 2023-04-06

## 2023-04-24 ENCOUNTER — ANTI-COAG VISIT (OUTPATIENT)
Dept: PHARMACY | Age: 88
End: 2023-04-24
Payer: MEDICARE

## 2023-04-24 ENCOUNTER — OFFICE VISIT (OUTPATIENT)
Dept: CARDIOLOGY CLINIC | Age: 88
End: 2023-04-24
Payer: MEDICARE

## 2023-04-24 ENCOUNTER — HOSPITAL ENCOUNTER (OUTPATIENT)
Age: 88
Discharge: HOME | End: 2023-04-24
Payer: MEDICARE

## 2023-04-24 VITALS
BODY MASS INDEX: 23.25 KG/M2 | DIASTOLIC BLOOD PRESSURE: 60 MMHG | OXYGEN SATURATION: 97 % | WEIGHT: 157 LBS | HEART RATE: 70 BPM | SYSTOLIC BLOOD PRESSURE: 124 MMHG | HEIGHT: 69 IN

## 2023-04-24 DIAGNOSIS — I48.11 LONGSTANDING PERSISTENT ATRIAL FIBRILLATION (HCC): Primary | ICD-10-CM

## 2023-04-24 DIAGNOSIS — Z95.0 CARDIAC PACEMAKER: ICD-10-CM

## 2023-04-24 DIAGNOSIS — E78.2 MIXED HYPERLIPIDEMIA: ICD-10-CM

## 2023-04-24 DIAGNOSIS — I48.11 LONGSTANDING PERSISTENT ATRIAL FIBRILLATION (HCC): ICD-10-CM

## 2023-04-24 DIAGNOSIS — I10 PRIMARY HYPERTENSION: ICD-10-CM

## 2023-04-24 DIAGNOSIS — I25.10 CORONARY ARTERY DISEASE INVOLVING NATIVE CORONARY ARTERY OF NATIVE HEART WITHOUT ANGINA PECTORIS: Primary | ICD-10-CM

## 2023-04-24 DIAGNOSIS — I25.5 ISCHEMIC CARDIOMYOPATHY: ICD-10-CM

## 2023-04-24 DIAGNOSIS — I50.42 CHRONIC COMBINED SYSTOLIC AND DIASTOLIC CONGESTIVE HEART FAILURE (HCC): ICD-10-CM

## 2023-04-24 DIAGNOSIS — D64.9 ANEMIA, UNSPECIFIED TYPE: ICD-10-CM

## 2023-04-24 LAB
ANION GAP SERPL CALCULATED.3IONS-SCNC: 11 MMOL/L (ref 3–16)
BUN SERPL-MCNC: 25 MG/DL (ref 7–20)
CALCIUM SERPL-MCNC: 8.9 MG/DL (ref 8.3–10.6)
CHLORIDE SERPL-SCNC: 106 MMOL/L (ref 99–110)
CO2 SERPL-SCNC: 28 MMOL/L (ref 21–32)
CREAT SERPL-MCNC: 1.2 MG/DL (ref 0.8–1.3)
GFR SERPLBLD CREATININE-BSD FMLA CKD-EPI: 58 ML/MIN/{1.73_M2}
GLUCOSE SERPL-MCNC: 148 MG/DL (ref 70–99)
INTERNATIONAL NORMALIZATION RATIO, POC: 4.1
POTASSIUM SERPL-SCNC: 4.3 MMOL/L (ref 3.5–5.1)
SODIUM SERPL-SCNC: 145 MMOL/L (ref 136–145)

## 2023-04-24 PROCEDURE — 36415 COLL VENOUS BLD VENIPUNCTURE: CPT

## 2023-04-24 PROCEDURE — 99214 OFFICE O/P EST MOD 30 MIN: CPT | Performed by: INTERNAL MEDICINE

## 2023-04-24 PROCEDURE — G8427 DOCREV CUR MEDS BY ELIG CLIN: HCPCS | Performed by: INTERNAL MEDICINE

## 2023-04-24 PROCEDURE — 1036F TOBACCO NON-USER: CPT | Performed by: INTERNAL MEDICINE

## 2023-04-24 PROCEDURE — 85610 PROTHROMBIN TIME: CPT

## 2023-04-24 PROCEDURE — 99212 OFFICE O/P EST SF 10 MIN: CPT

## 2023-04-24 PROCEDURE — G8420 CALC BMI NORM PARAMETERS: HCPCS | Performed by: INTERNAL MEDICINE

## 2023-04-24 PROCEDURE — 1123F ACP DISCUSS/DSCN MKR DOCD: CPT | Performed by: INTERNAL MEDICINE

## 2023-04-24 PROCEDURE — 80048 BASIC METABOLIC PNL TOTAL CA: CPT

## 2023-04-24 NOTE — PATIENT INSTRUCTIONS
Labs today  No med changes right now  Follow up in 3 months    Dr Gutierrez Solano will try to call Dr Marleen Walker on May 3rd

## 2023-04-24 NOTE — PROGRESS NOTES
External ears and nose unremarkable, mucus membranes moist  MUSCULOSKELETAL: Normal cephalic, neck supple  CAROTID: Normal upstroke, no bruits  CARDIAC: JVP normal, Normal PMI,  Regular rate and rhythm, normal S1S2, No murmur, rub, or gallop  RESPIRATORY: Normal respiratory effort, basilar crackles bilaterally  EXTREMITIES: 1+ LE edema  GASTROINTESTINAL: normal bowel sounds, soft, non-tender, No hepatomegaly     ECHO 5/2021    Summary   Left ventricular cavity size is normal with normal left ventricular wall   thickness. Overall left ventricular systolic function appears mild-moderately reduced. Ejection fraction is visually estimated to be 35-40%. There is mild diffuse hypokinesis. Grade II diastolic dysfunction with elevated LV filling pressures. Normal right ventricular size. Right ventricular systolic function is mildly reduced. The left atrium is moderate-severely dilated. Mitral valve leaflets appear mildly thickened. Mild to moderate mitral regurgitation. Moderate aortic stenosis with a peak velocity of 2.5 m/s, a mean pressure   gradient of 15 mmHg and an DONAVAN of 1.19 cm^2. Aortic valve gradients may be underestimated due to low cardiac output. Mild aortic regurgitation. Mild to moderate tricuspid regurgitation with a PASP of 45 mmHg. Trivial pulmonic regurgitation present. Myoview 9/2021  The left ventricular cavity size is moderately dilated. The right ventricle    is mildly dilated. There is a small perfusion defect of mild severity in the apex and apical    inferior segment. The fixed defect has associated wall motion abnormality,    consistent with scar. There is an additional small perfusion defect of mild    severity in the mid-anterior. There is corresponding wall motion    abnormality. The defect is consistent with ischemia. Sum stress score of 4. No visual TID. Calculated TID is 1. 13. Left ventricular ejection fraction is severely reduced at 35%.

## 2023-04-24 NOTE — PROGRESS NOTES
Mr. Jam Adams is a 80 y.o. y/o male with history of Afib   He presents today for anticoagulation monitoring and adjustment. Pertinent PMH: ICD-pacemaker. 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    Patient Reported Findings:  Yes     No  [x]   []       Patient verifies current dosing regimen as listed  confirmed dose --> has been taking 3.5 mg since d/c from facility---> confirms    []   [x]       S/S bleeding/bruising/swelling/SOB- denies  []   [x]       Blood in urine or stool denies  [x]   []       Procedures scheduled in the future at this time -  no changes --> remove pacemaker May 5, dental procedure June 26 --> was instructed to hold warfarin night prior and night of procedure for pacemaker replacement 5/5  []   [x]       Missed Dose- unsure, pill box may have been filled incorrectly.---> denies   []   [x]       Extra Dose- denies  [x]   []       Change in medications- receiving Procrit 40,000 unit injection once every two weeks until red blood cell count is consistently under control again---> started amiodarone 200mg qd on 8/20 --->2 tylenol in the mornings---> increased procrit shot --> inc lasix. Metoprolol adjusted. On amiodarone 200 mg bid x 2 weeks then lowered to 200 mg qd on 10/15.  Started iron and vit b1 --> resumed lisinopril---> no changes, still on 200mg amiodarone daily--->  primidone started about 10 days ago --> d/c primidone and lisinopril    [x]   []       Change in health/diet/appetite consistent greens -> has been eating less, appetite has been diminished --> no greens, no NVD---> living alone; erratic diet--> less salads and green recently, daughter and neighbor bring meals --> still eating vit k but unable to state how much --> meals on wheels, not many greens besides the offered green beans and peas---> erratic --> likes to snack, but lost a bit of weight --> weight pretty consistent now, 1 salad in past week --> no changes, no NVD --> appetite diminished, has lost a few

## 2023-04-25 ENCOUNTER — TELEPHONE (OUTPATIENT)
Dept: CARDIOLOGY CLINIC | Age: 88
End: 2023-04-25

## 2023-04-25 NOTE — TELEPHONE ENCOUNTER
----- Message from Laurence Maravilla RN sent at 4/25/2023  5:00 PM EDT -----  Per [facility]- Labs good and kidney function improved. F/u as planned.

## 2023-05-05 ENCOUNTER — APPOINTMENT (OUTPATIENT)
Dept: GENERAL RADIOLOGY | Age: 88
End: 2023-05-05
Attending: INTERNAL MEDICINE
Payer: MEDICARE

## 2023-05-05 ENCOUNTER — HOSPITAL ENCOUNTER (OUTPATIENT)
Dept: CARDIAC CATH/INVASIVE PROCEDURES | Age: 88
Discharge: HOME | End: 2023-05-05
Attending: INTERNAL MEDICINE | Admitting: INTERNAL MEDICINE
Payer: MEDICARE

## 2023-05-05 VITALS
BODY MASS INDEX: 23.85 KG/M2 | HEIGHT: 69 IN | RESPIRATION RATE: 16 BRPM | WEIGHT: 161 LBS | HEART RATE: 63 BPM | SYSTOLIC BLOOD PRESSURE: 145 MMHG | DIASTOLIC BLOOD PRESSURE: 65 MMHG

## 2023-05-05 LAB
ANION GAP SERPL CALCULATED.3IONS-SCNC: 10 MMOL/L (ref 3–16)
APTT BLD: 46.4 SEC (ref 22.7–35.9)
BUN SERPL-MCNC: 31 MG/DL (ref 7–20)
CALCIUM SERPL-MCNC: 9.2 MG/DL (ref 8.3–10.6)
CHLORIDE SERPL-SCNC: 102 MMOL/L (ref 99–110)
CO2 SERPL-SCNC: 30 MMOL/L (ref 21–32)
CREAT SERPL-MCNC: 1.4 MG/DL (ref 0.8–1.3)
DEPRECATED RDW RBC AUTO: 24.5 % (ref 12.4–15.4)
EKG ATRIAL RATE: 63 BPM
EKG DIAGNOSIS: NORMAL
EKG P AXIS: 55 DEGREES
EKG P-R INTERVAL: 214 MS
EKG Q-T INTERVAL: 482 MS
EKG QRS DURATION: 112 MS
EKG QTC CALCULATION (BAZETT): 493 MS
EKG R AXIS: -32 DEGREES
EKG T AXIS: 49 DEGREES
EKG VENTRICULAR RATE: 63 BPM
GFR SERPLBLD CREATININE-BSD FMLA CKD-EPI: 48 ML/MIN/{1.73_M2}
GLUCOSE SERPL-MCNC: 165 MG/DL (ref 70–99)
HCT VFR BLD AUTO: 24.3 % (ref 40.5–52.5)
HGB BLD-MCNC: 8.3 G/DL (ref 13.5–17.5)
INR PPP: 1.96 (ref 0.84–1.16)
MCH RBC QN AUTO: 33.9 PG (ref 26–34)
MCHC RBC AUTO-ENTMCNC: 34.1 G/DL (ref 31–36)
MCV RBC AUTO: 99.5 FL (ref 80–100)
PLATELET # BLD AUTO: 252 K/UL (ref 135–450)
PMV BLD AUTO: 8.9 FL (ref 5–10.5)
POTASSIUM SERPL-SCNC: 4.7 MMOL/L (ref 3.5–5.1)
PROTHROMBIN TIME: 22.2 SEC (ref 11.5–14.8)
RBC # BLD AUTO: 2.45 M/UL (ref 4.2–5.9)
SODIUM SERPL-SCNC: 142 MMOL/L (ref 136–145)
WBC # BLD AUTO: 4.8 K/UL (ref 4–11)

## 2023-05-05 PROCEDURE — 6360000002 HC RX W HCPCS

## 2023-05-05 PROCEDURE — 99153 MOD SED SAME PHYS/QHP EA: CPT

## 2023-05-05 PROCEDURE — 75820 VEIN X-RAY ARM/LEG: CPT

## 2023-05-05 PROCEDURE — 2580000003 HC RX 258

## 2023-05-05 PROCEDURE — 2500000003 HC RX 250 WO HCPCS

## 2023-05-05 PROCEDURE — 99152 MOD SED SAME PHYS/QHP 5/>YRS: CPT | Performed by: INTERNAL MEDICINE

## 2023-05-05 PROCEDURE — 80048 BASIC METABOLIC PNL TOTAL CA: CPT

## 2023-05-05 PROCEDURE — 75820 VEIN X-RAY ARM/LEG: CPT | Performed by: INTERNAL MEDICINE

## 2023-05-05 PROCEDURE — 85027 COMPLETE CBC AUTOMATED: CPT

## 2023-05-05 PROCEDURE — 36415 COLL VENOUS BLD VENIPUNCTURE: CPT

## 2023-05-05 PROCEDURE — C1894 INTRO/SHEATH, NON-LASER: HCPCS

## 2023-05-05 PROCEDURE — 71045 X-RAY EXAM CHEST 1 VIEW: CPT

## 2023-05-05 PROCEDURE — 99152 MOD SED SAME PHYS/QHP 5/>YRS: CPT

## 2023-05-05 PROCEDURE — 36005 INJECTION EXT VENOGRAPHY: CPT

## 2023-05-05 PROCEDURE — 85730 THROMBOPLASTIN TIME PARTIAL: CPT

## 2023-05-05 PROCEDURE — C1889 IMPLANT/INSERT DEVICE, NOC: HCPCS

## 2023-05-05 PROCEDURE — 85610 PROTHROMBIN TIME: CPT

## 2023-05-05 PROCEDURE — 6360000004 HC RX CONTRAST MEDICATION: Performed by: INTERNAL MEDICINE

## 2023-05-05 PROCEDURE — 93005 ELECTROCARDIOGRAM TRACING: CPT

## 2023-05-05 RX ORDER — SODIUM CHLORIDE 0.9 % (FLUSH) 0.9 %
5-40 SYRINGE (ML) INJECTION EVERY 12 HOURS SCHEDULED
Status: DISCONTINUED | OUTPATIENT
Start: 2023-05-05 | End: 2023-05-08 | Stop reason: HOSPADM

## 2023-05-05 RX ORDER — SODIUM CHLORIDE 0.9 % (FLUSH) 0.9 %
5-40 SYRINGE (ML) INJECTION PRN
Status: DISCONTINUED | OUTPATIENT
Start: 2023-05-05 | End: 2023-05-08 | Stop reason: HOSPADM

## 2023-05-05 RX ORDER — SODIUM CHLORIDE 9 MG/ML
INJECTION, SOLUTION INTRAVENOUS PRN
Status: DISCONTINUED | OUTPATIENT
Start: 2023-05-05 | End: 2023-05-08 | Stop reason: HOSPADM

## 2023-05-05 RX ADMIN — IOPAMIDOL 5 ML: 755 INJECTION, SOLUTION INTRAVENOUS at 15:10

## 2023-05-05 RX ADMIN — IOPAMIDOL 10 ML: 755 INJECTION, SOLUTION INTRAVENOUS at 14:25

## 2023-05-08 ENCOUNTER — TELEPHONE (OUTPATIENT)
Dept: PHARMACY | Age: 88
End: 2023-05-08

## 2023-05-09 RX ORDER — METOPROLOL SUCCINATE 50 MG/1
TABLET, EXTENDED RELEASE ORAL
Qty: 270 TABLET | Refills: 1 | Status: SHIPPED | OUTPATIENT
Start: 2023-05-09

## 2023-05-10 ENCOUNTER — NURSE ONLY (OUTPATIENT)
Dept: CARDIOLOGY CLINIC | Age: 88
End: 2023-05-10

## 2023-05-10 DIAGNOSIS — Z95.0 CARDIAC PACEMAKER IN SITU: ICD-10-CM

## 2023-05-10 NOTE — PROGRESS NOTES
We received remote transmission from patient's monitor at home. Transmission shows normal sensing and pacing function. Noted AF. Pt is on coumadin. EP physician will review. See interrogation under cardiology tab in the [address] [place] [address] [address] field for more details. AP-76%  -12%    End of 91-day monitoring period 5-10-23.

## 2023-05-11 ENCOUNTER — OFFICE VISIT (OUTPATIENT)
Dept: FAMILY MEDICINE CLINIC | Age: 88
End: 2023-05-11

## 2023-05-11 VITALS
DIASTOLIC BLOOD PRESSURE: 60 MMHG | SYSTOLIC BLOOD PRESSURE: 99 MMHG | OXYGEN SATURATION: 99 % | BODY MASS INDEX: 22.81 KG/M2 | HEART RATE: 99 BPM | HEIGHT: 69 IN | RESPIRATION RATE: 16 BRPM | WEIGHT: 154 LBS

## 2023-05-11 DIAGNOSIS — R53.82 CHRONIC FATIGUE: ICD-10-CM

## 2023-05-11 DIAGNOSIS — E11.51 DM (DIABETES MELLITUS), TYPE 2 WITH PERIPHERAL VASCULAR COMPLICATIONS (HCC): Primary | ICD-10-CM

## 2023-05-11 DIAGNOSIS — N18.30 CKD STAGE 3 DUE TO TYPE 2 DIABETES MELLITUS (HCC): ICD-10-CM

## 2023-05-11 DIAGNOSIS — I50.42 CHRONIC COMBINED SYSTOLIC AND DIASTOLIC CONGESTIVE HEART FAILURE (HCC): ICD-10-CM

## 2023-05-11 DIAGNOSIS — E11.22 CKD STAGE 3 DUE TO TYPE 2 DIABETES MELLITUS (HCC): ICD-10-CM

## 2023-05-11 DIAGNOSIS — L89.151 PRESSURE INJURY OF SACRAL REGION, STAGE 1: ICD-10-CM

## 2023-05-11 PROBLEM — Z89.421 S/P AMPUTATION OF LESSER TOE, RIGHT (HCC): Status: RESOLVED | Noted: 2021-04-21 | Resolved: 2023-05-11

## 2023-05-11 NOTE — PROGRESS NOTES
Behavior: Behavior normal.         Thought Content: Thought content normal.         Judgment: Judgment normal.       ASSESSMENT/PLAN:    1. DM (diabetes mellitus), type 2 with peripheral vascular complications (HCC)--s/p amputation great toe post osteomylitis   diet controlled   - a1c now in normal range- no meds. Working to increase calories to prevent further weight loss and for ulcer healing. Not due for A1c today    2. Chronic fatigue  - likely related to CHF, but could still be malnutrition. Daughter is encouraging high calorie foods and snacks, like cheese and peanut butter. He does not like milk. 3. Chronic combined systolic and diastolic congestive heart failure (HCC)  - cautiously starting SGLT-2, Jardiance 10 mg. Could reduce preload and may need to reduce lasix dose. Alessia Avendaño and daughter will be on the lookout for signs of orthostatic hypotension. discussed that the goal of this treatment is to improve his energy and function more than any other goal.  Recheck in 6 wks. Will notify cardiology of this. 4. CKD stage 3 due to type 2 diabetes mellitus ([facility] [address]  - renal fxn has remained stable in 3a range since stopping lisinopril. Adding sglt-2 today. Recheck 6 wks. 5. Pressure injury of sacral region, stage 1  - skin is closed now, still some redness upper sacral area. Continue calmoseptine daily and efforts to off-load central sacrum (pillows, shifting weight frequently). discussed that increasing food intake helps healing also. Return in about 6 weeks (around 6/22/2023) for follow up diabetes. An  [employer]od[employer] was used to authenticate this note.     --Heather Hicks MD on 5/11/2023 at 12:14 PM

## 2023-05-12 ENCOUNTER — TELEPHONE (OUTPATIENT)
Dept: FAMILY MEDICINE CLINIC | Age: 88
End: 2023-05-12

## 2023-05-12 ENCOUNTER — TELEPHONE (OUTPATIENT)
Dept: ADMINISTRATIVE | Age: 88
End: 2023-05-12

## 2023-05-15 ENCOUNTER — ANTI-COAG VISIT (OUTPATIENT)
Dept: PHARMACY | Age: 88
End: 2023-05-15
Payer: MEDICARE

## 2023-05-15 ENCOUNTER — NURSE ONLY (OUTPATIENT)
Dept: CARDIOLOGY CLINIC | Age: 88
End: 2023-05-15
Payer: MEDICARE

## 2023-05-15 DIAGNOSIS — Z95.0 CARDIAC PACEMAKER: ICD-10-CM

## 2023-05-15 DIAGNOSIS — I25.5 ISCHEMIC CARDIOMYOPATHY: ICD-10-CM

## 2023-05-15 DIAGNOSIS — I48.11 LONGSTANDING PERSISTENT ATRIAL FIBRILLATION (HCC): Primary | ICD-10-CM

## 2023-05-15 LAB — INTERNATIONAL NORMALIZATION RATIO, POC: 1.9

## 2023-05-15 PROCEDURE — 85610 PROTHROMBIN TIME: CPT

## 2023-05-15 PROCEDURE — 93280 PM DEVICE PROGR EVAL DUAL: CPT | Performed by: INTERNAL MEDICINE

## 2023-05-15 PROCEDURE — 99211 OFF/OP EST MAY X REQ PHY/QHP: CPT

## 2023-05-15 NOTE — PROGRESS NOTES
Mr. Lilly Griffith is a 80 y.o. y/o male with history of Afib   He presents today for anticoagulation monitoring and adjustment. Pertinent PMH: ICD-pacemaker. 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    Patient Reported Findings:  Yes     No  [x]   []       Patient verifies current dosing regimen as listed  confirmed dose --> has been taking 3.5 mg since d/c from facility---> confirms    []   [x]       S/S bleeding/bruising/swelling/SOB- denies  []   [x]       Blood in urine or stool denies  [x]   []       Procedures scheduled in the future at this time -  no changes --> remove pacemaker May 5, dental procedure June 26 --> was instructed to hold warfarin night prior and night of procedure for pacemaker replacement 5/5 --> had pacemaker replaced on 5/5, held warfarin as instructed. INR was 1.96  []   [x]       Missed Dose- unsure, pill box may have been filled incorrectly.---> denies   []   [x]       Extra Dose- denies  [x]   []       Change in medications- receiving Procrit 40,000 unit injection once every two weeks until red blood cell count is consistently under control again---> started amiodarone 200mg qd on 8/20 --->2 tylenol in the mornings---> increased procrit shot --> inc lasix. Metoprolol adjusted. On amiodarone 200 mg bid x 2 weeks then lowered to 200 mg qd on 10/15. Started iron and vit b1 --> resumed lisinopril---> no changes, still on 200mg amiodarone daily--->  primidone started about 10 days ago --> d/c primidone and lisinopril --> will be starting jardiance.  Might start benadryl    []   [x]       Change in health/diet/appetite consistent greens -> has been eating less, appetite has been diminished --> no greens, no NVD---> living alone; erratic diet--> less salads and green recently, daughter and neighbor bring meals --> still eating vit k but unable to state how much --> meals on wheels, not many greens besides the offered green beans and peas---> erratic --> likes to snack, but lost a bit

## 2023-05-22 ENCOUNTER — TELEPHONE (OUTPATIENT)
Dept: FAMILY MEDICINE CLINIC | Age: 88
End: 2023-05-22

## 2023-06-03 ENCOUNTER — HOSPITAL ENCOUNTER (INPATIENT)
Age: 88
LOS: 4 days | Discharge: HOME | DRG: 291 | End: 2023-06-07
Attending: EMERGENCY MEDICINE | Admitting: INTERNAL MEDICINE
Payer: MEDICARE

## 2023-06-03 ENCOUNTER — APPOINTMENT (OUTPATIENT)
Dept: GENERAL RADIOLOGY | Age: 88
DRG: 291 | End: 2023-06-03
Payer: MEDICARE

## 2023-06-03 DIAGNOSIS — I50.9 ACUTE ON CHRONIC CONGESTIVE HEART FAILURE, UNSPECIFIED HEART FAILURE TYPE (HCC): Primary | ICD-10-CM

## 2023-06-03 DIAGNOSIS — N18.9 CHRONIC RENAL IMPAIRMENT, UNSPECIFIED CKD STAGE: ICD-10-CM

## 2023-06-03 PROBLEM — I50.43 CHF (CONGESTIVE HEART FAILURE), NYHA CLASS I, ACUTE ON CHRONIC, COMBINED (HCC): Status: ACTIVE | Noted: 2023-06-03

## 2023-06-03 LAB
ALBUMIN SERPL-MCNC: 3.6 G/DL (ref 3.4–5)
ALBUMIN/GLOB SERPL: 1.2 {RATIO} (ref 1.1–2.2)
ALP SERPL-CCNC: 124 U/L (ref 40–129)
ALT SERPL-CCNC: 15 U/L (ref 10–40)
ANION GAP SERPL CALCULATED.3IONS-SCNC: 12 MMOL/L (ref 3–16)
AST SERPL-CCNC: 22 U/L (ref 15–37)
BASOPHILS # BLD: 0 K/UL (ref 0–0.2)
BASOPHILS NFR BLD: 0.7 %
BILIRUB SERPL-MCNC: 1.1 MG/DL (ref 0–1)
BILIRUB UR QL STRIP.AUTO: NEGATIVE
BUN SERPL-MCNC: 34 MG/DL (ref 7–20)
CALCIUM SERPL-MCNC: 9 MG/DL (ref 8.3–10.6)
CHLORIDE SERPL-SCNC: 106 MMOL/L (ref 99–110)
CLARITY UR: CLEAR
CO2 SERPL-SCNC: 26 MMOL/L (ref 21–32)
COLOR UR: YELLOW
CREAT SERPL-MCNC: 1.6 MG/DL (ref 0.8–1.3)
DEPRECATED RDW RBC AUTO: 22.6 % (ref 12.4–15.4)
EOSINOPHIL # BLD: 0.1 K/UL (ref 0–0.6)
EOSINOPHIL NFR BLD: 2.6 %
GFR SERPLBLD CREATININE-BSD FMLA CKD-EPI: 41 ML/MIN/{1.73_M2}
GLUCOSE BLD-MCNC: 122 MG/DL (ref 70–99)
GLUCOSE BLD-MCNC: 259 MG/DL (ref 70–99)
GLUCOSE SERPL-MCNC: 174 MG/DL (ref 70–99)
GLUCOSE UR STRIP.AUTO-MCNC: NEGATIVE MG/DL
HCT VFR BLD AUTO: 24.7 % (ref 40.5–52.5)
HGB BLD-MCNC: 8.2 G/DL (ref 13.5–17.5)
HGB UR QL STRIP.AUTO: NEGATIVE
INR PPP: 2.02 (ref 0.84–1.16)
KETONES UR STRIP.AUTO-MCNC: NEGATIVE MG/DL
LEUKOCYTE ESTERASE UR QL STRIP.AUTO: NEGATIVE
LYMPHOCYTES # BLD: 0.6 K/UL (ref 1–5.1)
LYMPHOCYTES NFR BLD: 12.8 %
MCH RBC QN AUTO: 33.5 PG (ref 26–34)
MCHC RBC AUTO-ENTMCNC: 33.2 G/DL (ref 31–36)
MCV RBC AUTO: 100.8 FL (ref 80–100)
MONOCYTES # BLD: 0.4 K/UL (ref 0–1.3)
MONOCYTES NFR BLD: 8.8 %
NEUTROPHILS # BLD: 3.6 K/UL (ref 1.7–7.7)
NEUTROPHILS NFR BLD: 75.1 %
NITRITE UR QL STRIP.AUTO: NEGATIVE
NT-PROBNP SERPL-MCNC: ABNORMAL PG/ML (ref 0–449)
PERFORMED ON: ABNORMAL
PERFORMED ON: ABNORMAL
PH UR STRIP.AUTO: 5 [PH] (ref 5–8)
PLATELET # BLD AUTO: 209 K/UL (ref 135–450)
PMV BLD AUTO: 9.3 FL (ref 5–10.5)
POTASSIUM SERPL-SCNC: 4.7 MMOL/L (ref 3.5–5.1)
PROCALCITONIN SERPL IA-MCNC: 0.18 NG/ML (ref 0–0.15)
PROT SERPL-MCNC: 6.7 G/DL (ref 6.4–8.2)
PROT UR STRIP.AUTO-MCNC: NEGATIVE MG/DL
PROTHROMBIN TIME: 22.8 SEC (ref 11.5–14.8)
RBC # BLD AUTO: 2.45 M/UL (ref 4.2–5.9)
SARS-COV-2 RDRP RESP QL NAA+PROBE: NOT DETECTED
SODIUM SERPL-SCNC: 144 MMOL/L (ref 136–145)
SP GR UR STRIP.AUTO: <=1.005 (ref 1–1.03)
TROPONIN, HIGH SENSITIVITY: 68 NG/L (ref 0–22)
TROPONIN, HIGH SENSITIVITY: 71 NG/L (ref 0–22)
TROPONIN, HIGH SENSITIVITY: 75 NG/L (ref 0–22)
UA COMPLETE W REFLEX CULTURE PNL UR: NORMAL
UA DIPSTICK W REFLEX MICRO PNL UR: NORMAL
URN SPEC COLLECT METH UR: NORMAL
UROBILINOGEN UR STRIP-ACNC: 0.2 E.U./DL
WBC # BLD AUTO: 4.7 K/UL (ref 4–11)

## 2023-06-03 PROCEDURE — 83550 IRON BINDING TEST: CPT

## 2023-06-03 PROCEDURE — 84484 ASSAY OF TROPONIN QUANT: CPT

## 2023-06-03 PROCEDURE — 94150 VITAL CAPACITY TEST: CPT

## 2023-06-03 PROCEDURE — 99285 EMERGENCY DEPT VISIT HI MDM: CPT

## 2023-06-03 PROCEDURE — 81003 URINALYSIS AUTO W/O SCOPE: CPT

## 2023-06-03 PROCEDURE — 2580000003 HC RX 258: Performed by: INTERNAL MEDICINE

## 2023-06-03 PROCEDURE — 84145 PROCALCITONIN (PCT): CPT

## 2023-06-03 PROCEDURE — 80053 COMPREHEN METABOLIC PANEL: CPT

## 2023-06-03 PROCEDURE — 85610 PROTHROMBIN TIME: CPT

## 2023-06-03 PROCEDURE — 87635 SARS-COV-2 COVID-19 AMP PRB: CPT

## 2023-06-03 PROCEDURE — 6370000000 HC RX 637 (ALT 250 FOR IP): Performed by: INTERNAL MEDICINE

## 2023-06-03 PROCEDURE — 6360000002 HC RX W HCPCS: Performed by: EMERGENCY MEDICINE

## 2023-06-03 PROCEDURE — 83880 ASSAY OF NATRIURETIC PEPTIDE: CPT

## 2023-06-03 PROCEDURE — 1200000000 HC SEMI PRIVATE

## 2023-06-03 PROCEDURE — 83540 ASSAY OF IRON: CPT

## 2023-06-03 PROCEDURE — 85025 COMPLETE CBC W/AUTO DIFF WBC: CPT

## 2023-06-03 PROCEDURE — 83036 HEMOGLOBIN GLYCOSYLATED A1C: CPT

## 2023-06-03 PROCEDURE — 36415 COLL VENOUS BLD VENIPUNCTURE: CPT

## 2023-06-03 PROCEDURE — 6360000002 HC RX W HCPCS: Performed by: INTERNAL MEDICINE

## 2023-06-03 PROCEDURE — 30233N1 TRANSFUSION OF NONAUTOLOGOUS RED BLOOD CELLS INTO PERIPHERAL VEIN, PERCUTANEOUS APPROACH: ICD-10-PCS | Performed by: INTERNAL MEDICINE

## 2023-06-03 PROCEDURE — 93005 ELECTROCARDIOGRAM TRACING: CPT | Performed by: EMERGENCY MEDICINE

## 2023-06-03 PROCEDURE — 71046 X-RAY EXAM CHEST 2 VIEWS: CPT

## 2023-06-03 RX ORDER — IPRATROPIUM BROMIDE 42 UG/1
2 SPRAY, METERED NASAL 4 TIMES DAILY
Status: DISCONTINUED | OUTPATIENT
Start: 2023-06-03 | End: 2023-06-07 | Stop reason: HOSPADM

## 2023-06-03 RX ORDER — ACETAMINOPHEN 325 MG/1
650 TABLET ORAL EVERY 6 HOURS PRN
Status: DISCONTINUED | OUTPATIENT
Start: 2023-06-03 | End: 2023-06-07 | Stop reason: HOSPADM

## 2023-06-03 RX ORDER — FUROSEMIDE 10 MG/ML
40 INJECTION INTRAMUSCULAR; INTRAVENOUS ONCE
Status: COMPLETED | OUTPATIENT
Start: 2023-06-03 | End: 2023-06-03

## 2023-06-03 RX ORDER — FUROSEMIDE 10 MG/ML
40 INJECTION INTRAMUSCULAR; INTRAVENOUS 2 TIMES DAILY
Status: DISCONTINUED | OUTPATIENT
Start: 2023-06-03 | End: 2023-06-06

## 2023-06-03 RX ORDER — FERROUS SULFATE 325(65) MG
325 TABLET ORAL
Status: DISCONTINUED | OUTPATIENT
Start: 2023-06-04 | End: 2023-06-07 | Stop reason: HOSPADM

## 2023-06-03 RX ORDER — POTASSIUM CHLORIDE 7.45 MG/ML
10 INJECTION INTRAVENOUS PRN
Status: DISCONTINUED | OUTPATIENT
Start: 2023-06-03 | End: 2023-06-07 | Stop reason: HOSPADM

## 2023-06-03 RX ORDER — FLUTICASONE PROPIONATE 50 MCG
2 SPRAY, SUSPENSION (ML) NASAL DAILY
Status: DISCONTINUED | OUTPATIENT
Start: 2023-06-03 | End: 2023-06-07 | Stop reason: HOSPADM

## 2023-06-03 RX ORDER — SODIUM CHLORIDE 0.9 % (FLUSH) 0.9 %
5-40 SYRINGE (ML) INJECTION EVERY 12 HOURS SCHEDULED
Status: DISCONTINUED | OUTPATIENT
Start: 2023-06-03 | End: 2023-06-07 | Stop reason: HOSPADM

## 2023-06-03 RX ORDER — ONDANSETRON 2 MG/ML
4 INJECTION INTRAMUSCULAR; INTRAVENOUS EVERY 6 HOURS PRN
Status: DISCONTINUED | OUTPATIENT
Start: 2023-06-03 | End: 2023-06-07 | Stop reason: HOSPADM

## 2023-06-03 RX ORDER — POLYETHYLENE GLYCOL 3350 17 G/17G
17 POWDER, FOR SOLUTION ORAL DAILY PRN
Status: DISCONTINUED | OUTPATIENT
Start: 2023-06-03 | End: 2023-06-05

## 2023-06-03 RX ORDER — INSULIN LISPRO 100 [IU]/ML
0-8 INJECTION, SOLUTION INTRAVENOUS; SUBCUTANEOUS
Status: DISCONTINUED | OUTPATIENT
Start: 2023-06-03 | End: 2023-06-07 | Stop reason: HOSPADM

## 2023-06-03 RX ORDER — MAGNESIUM SULFATE IN WATER 40 MG/ML
2000 INJECTION, SOLUTION INTRAVENOUS PRN
Status: DISCONTINUED | OUTPATIENT
Start: 2023-06-03 | End: 2023-06-07 | Stop reason: HOSPADM

## 2023-06-03 RX ORDER — GAUZE BANDAGE 2" X 2"
1000 BANDAGE TOPICAL DAILY
Status: DISCONTINUED | OUTPATIENT
Start: 2023-06-03 | End: 2023-06-05

## 2023-06-03 RX ORDER — ONDANSETRON 4 MG/1
4 TABLET, ORALLY DISINTEGRATING ORAL EVERY 8 HOURS PRN
Status: DISCONTINUED | OUTPATIENT
Start: 2023-06-03 | End: 2023-06-07 | Stop reason: HOSPADM

## 2023-06-03 RX ORDER — WARFARIN SODIUM 2.5 MG/1
1.25 TABLET ORAL
Status: DISCONTINUED | OUTPATIENT
Start: 2023-06-07 | End: 2023-06-07

## 2023-06-03 RX ORDER — WARFARIN SODIUM 2.5 MG/1
2.5 TABLET ORAL
Status: DISCONTINUED | OUTPATIENT
Start: 2023-06-03 | End: 2023-06-07

## 2023-06-03 RX ORDER — POTASSIUM CHLORIDE 20 MEQ/1
40 TABLET, EXTENDED RELEASE ORAL PRN
Status: DISCONTINUED | OUTPATIENT
Start: 2023-06-03 | End: 2023-06-07 | Stop reason: HOSPADM

## 2023-06-03 RX ORDER — ATORVASTATIN CALCIUM 20 MG/1
20 TABLET, FILM COATED ORAL DAILY
Status: DISCONTINUED | OUTPATIENT
Start: 2023-06-03 | End: 2023-06-07 | Stop reason: HOSPADM

## 2023-06-03 RX ORDER — SODIUM CHLORIDE 0.9 % (FLUSH) 0.9 %
5-40 SYRINGE (ML) INJECTION PRN
Status: DISCONTINUED | OUTPATIENT
Start: 2023-06-03 | End: 2023-06-07 | Stop reason: HOSPADM

## 2023-06-03 RX ORDER — WARFARIN SODIUM 5 MG/1
5 TABLET ORAL DAILY
Status: DISCONTINUED | OUTPATIENT
Start: 2023-06-03 | End: 2023-06-03 | Stop reason: SDUPTHER

## 2023-06-03 RX ORDER — DOXAZOSIN MESYLATE 1 MG/1
2 TABLET ORAL DAILY
Status: DISCONTINUED | OUTPATIENT
Start: 2023-06-03 | End: 2023-06-07 | Stop reason: HOSPADM

## 2023-06-03 RX ORDER — DEXTROSE MONOHYDRATE 100 MG/ML
INJECTION, SOLUTION INTRAVENOUS CONTINUOUS PRN
Status: DISCONTINUED | OUTPATIENT
Start: 2023-06-03 | End: 2023-06-07 | Stop reason: HOSPADM

## 2023-06-03 RX ORDER — ACETAMINOPHEN 650 MG/1
650 SUPPOSITORY RECTAL EVERY 6 HOURS PRN
Status: DISCONTINUED | OUTPATIENT
Start: 2023-06-03 | End: 2023-06-07 | Stop reason: HOSPADM

## 2023-06-03 RX ORDER — INSULIN LISPRO 100 [IU]/ML
0-4 INJECTION, SOLUTION INTRAVENOUS; SUBCUTANEOUS NIGHTLY
Status: DISCONTINUED | OUTPATIENT
Start: 2023-06-03 | End: 2023-06-07 | Stop reason: HOSPADM

## 2023-06-03 RX ORDER — AMIODARONE HYDROCHLORIDE 200 MG/1
200 TABLET ORAL DAILY
Status: DISCONTINUED | OUTPATIENT
Start: 2023-06-03 | End: 2023-06-07 | Stop reason: HOSPADM

## 2023-06-03 RX ORDER — LISINOPRIL 10 MG/1
5 TABLET ORAL DAILY
Status: DISCONTINUED | OUTPATIENT
Start: 2023-06-03 | End: 2023-06-03

## 2023-06-03 RX ORDER — SODIUM CHLORIDE 9 MG/ML
INJECTION, SOLUTION INTRAVENOUS PRN
Status: DISCONTINUED | OUTPATIENT
Start: 2023-06-03 | End: 2023-06-07 | Stop reason: HOSPADM

## 2023-06-03 RX ADMIN — FUROSEMIDE 40 MG: 10 INJECTION, SOLUTION INTRAMUSCULAR; INTRAVENOUS at 14:43

## 2023-06-03 RX ADMIN — METOPROLOL SUCCINATE 75 MG: 50 TABLET, EXTENDED RELEASE ORAL at 20:27

## 2023-06-03 RX ADMIN — FUROSEMIDE 40 MG: 10 INJECTION, SOLUTION INTRAMUSCULAR; INTRAVENOUS at 17:54

## 2023-06-03 RX ADMIN — WARFARIN SODIUM 2.5 MG: 2.5 TABLET ORAL at 17:54

## 2023-06-03 RX ADMIN — SODIUM CHLORIDE, PRESERVATIVE FREE 10 ML: 5 INJECTION INTRAVENOUS at 20:28

## 2023-06-03 RX ADMIN — DOXAZOSIN 2 MG: 1 TABLET ORAL at 20:27

## 2023-06-03 ASSESSMENT — PAIN SCALES - GENERAL
PAINLEVEL_OUTOF10: 0
PAINLEVEL_OUTOF10: 0

## 2023-06-03 NOTE — PROGRESS NOTES
Incentive Spirometry education and demonstration completed by Respiratory Therapy Yes      Response to education: Very Good     Teaching Time: 5 minutes    Minimum Predicted Vital Capacity - 730   mL. Patient's Actual Vital Capacity - 400 mL. Turning over to Nursing for routine follow-up No.    Comments: .     Electronically signed by Елена Arora RCP on 6/3/2023 at 5:57 PM

## 2023-06-03 NOTE — CONSULTS
Clinical Pharmacy Note: Pharmacy to Dose Warfarin    Pharmacy consulted by Dr. Guillermo De Luna to dose warfarin. Nery Goddard is a 80 y.o. male  is receiving warfarin for indication: a-fib. INR Goal Range: 2.0-3.0  Prior to admission warfarin dosing regimen: 1.25 mg on Wed and 2.5 mg all other days of the week    INR today:   Lab Results   Component Value Date/Time    INR 2.02 06/03/2023 12:33 PM       Assessment/Plan:  INR is therapeutic on prior to admission dosing regimen. Possible concomitant drug-drug interactions include: APAP, amiodarone   Based on today's assessment, dose warfarin at home dose. Daily INR is ordered. Pharmacy will continue to monitor and make adjustments to regimen as necessary. Thank you for the consult,     Lewis Moses, PharmD.   PGY-1 Pharmacy Resident  [facility]  [id number]  06/03/23 2:46 PM

## 2023-06-03 NOTE — ED NOTES
Pt report given to floor RN, states no questions or concerns at this time.  Pt transported to floor via wheelchair by floor RN     Max Alfaro RN  06/03/23 [id number]

## 2023-06-03 NOTE — H&P
V2.0  History and Physical      Name:  Tomy Umaña /Age/Sex: 1933  (80 y.o. male)   MRN & CSN:  [id number] & [id number] Encounter Date/Time: 6/3/2023 2:16 PM EDT   Location:  [facility] PCP: Wilma Fong MD       Hospital Day: 1    Assessment and Plan:   Tomy Umaña is a 80 y.o. male with a Significant PMH as below who presented to ED with c/o SOB    Acute on Chronic combined systolic/diastolic CHF  SOB 2/2 above  Hypoxia likely due to decompensated CHF  REYNA on CKD stage IIIb  Chronic Afib on Coumadin, s/p PPM now for consideration for BiV AICD  Demand ischemia/NSTEMI 2 secondary decompensated CHF  Anemia of Chronic disease  HTN  CAD  HL  B/L PUNEET  T2DM with Peripheral Vascular Complication, s/p [place] Amputation 2/2 OM  BPH with LUTS    Plan:  Admit inpatient to PCU on telemetry  Evaluate for evidence of any infective etiology/dietary noncompliance or Ischemic etiology for this decompensation . Heart Failure pathway/protocol initiated  Please limit your salt intake to less than 2 gm in a day. ACE Wrap B/L LE upto thigh & Elevate your legs above the level of heart at rest.  Daily weight by Nursing  Strict I/O  IV Lasix and keep a close eye on Creatine level as well as serum electrolytes  Will use NIPPV ( BIPAP)  Nephrology will be consulted for monitoring renal function  Serial Troponins  Serial EKG  Repeat Echo  C/w rest of Home meds but hold PO diuretics  Labs in AM---CBC, BMP, nt-pro BNP  Repeat CXR in AM  Cardiology [address] consulted for Coumadin and INR                        Comment: Please note this report has been produced using speech recognition software and may contain errors related to that system including errors in grammar, punctuation, and spelling, as well as words and phrases that may be inappropriate. If there are any questions or concerns please feel free to contact the dictating provider for clarification.      Disposition:   Current Living

## 2023-06-03 NOTE — ED PROVIDER NOTES
EMERGENCY DEPARTMENT PROVIDER NOTE    Patient Identification  Pt Name: Gregary Gosselin  MRN: [id number]  [address] 11/12/1933  Date of evaluation: 6/3/2023  Provider: Shane Mcelroy DO  PCP: Justin Wilder MD    Chief Complaint  Other (Patient states he is here today because his daughter heard crackles in his lungs and is concerned for pneumonia. Patient states he is baseline short of breath. Patient denies coughing or fevers at home. Patient denies CP. Patient states he has been constipated for the past 6 days so his family has been giving him enemas, however he was able have a bowel movement on his own this morning. )      HPI  (History provided by patient and daughter)  This is a 80 y.o. male with pertinent past medical history of CAD, CHF, anemia, hypertension, CKD who was brought in by family for shortness of breath. Patient reports his breathing feels fairly typical for him today, however he has noticed increased shortness of breath with exertion lately. His daughter who is a nurse listen to his lungs and thought she heard crackles in the right lower lung, was concerned for pneumonia and brought the patient to the emergency department. Patient denies any fevers, chills, chest pain or productive cough. On further history obtained from the daughter, patient has had some increased leg swelling, daughter states he typically does not have pitting in his lower extremities. I have reviewed the following nursing documentation:  Allergies: Patient has no known allergies.     Past medical history:   Past Medical History:   Diagnosis Date    Actinic keratosis     Actinic keratosis     Atrial fibrillation ([address])     Bilateral carotid artery stenosis     CAD (coronary artery disease)     Cellulitis 7/15/2015    right foot    Diabetes mellitus (HCC)     DM (diabetes mellitus), type 2 with peripheral vascular complications ([facility])--s/p amputation great toe post osteomylitis  9/11/2015    Glucose

## 2023-06-04 PROBLEM — I50.21 ACUTE SYSTOLIC CONGESTIVE HEART FAILURE (HCC): Status: ACTIVE | Noted: 2022-06-06

## 2023-06-04 LAB
ABO + RH BLD: NORMAL
ALBUMIN SERPL-MCNC: 3.4 G/DL (ref 3.4–5)
ALBUMIN/GLOB SERPL: 1.1 {RATIO} (ref 1.1–2.2)
ALP SERPL-CCNC: 113 U/L (ref 40–129)
ALT SERPL-CCNC: 12 U/L (ref 10–40)
ANION GAP SERPL CALCULATED.3IONS-SCNC: 12 MMOL/L (ref 3–16)
AST SERPL-CCNC: 23 U/L (ref 15–37)
BASOPHILS # BLD: 0.1 K/UL (ref 0–0.2)
BASOPHILS NFR BLD: 1.1 %
BILIRUB SERPL-MCNC: 1.3 MG/DL (ref 0–1)
BLD GP AB SCN SERPL QL: NORMAL
BLOOD BANK DISPENSE STATUS: NORMAL
BLOOD BANK PRODUCT CODE: NORMAL
BPU ID: NORMAL
BUN SERPL-MCNC: 31 MG/DL (ref 7–20)
CALCIUM SERPL-MCNC: 8.9 MG/DL (ref 8.3–10.6)
CHLORIDE SERPL-SCNC: 104 MMOL/L (ref 99–110)
CO2 SERPL-SCNC: 25 MMOL/L (ref 21–32)
CREAT SERPL-MCNC: 1.4 MG/DL (ref 0.8–1.3)
DEPRECATED RDW RBC AUTO: 22.6 % (ref 12.4–15.4)
DESCRIPTION BLOOD BANK: NORMAL
EOSINOPHIL # BLD: 0.2 K/UL (ref 0–0.6)
EOSINOPHIL NFR BLD: 2.8 %
EST. AVERAGE GLUCOSE BLD GHB EST-MCNC: 85.3 MG/DL
FOLATE SERPL-MCNC: 13.03 NG/ML (ref 4.78–24.2)
GFR SERPLBLD CREATININE-BSD FMLA CKD-EPI: 48 ML/MIN/{1.73_M2}
GLUCOSE BLD-MCNC: 149 MG/DL (ref 70–99)
GLUCOSE BLD-MCNC: 169 MG/DL (ref 70–99)
GLUCOSE BLD-MCNC: 188 MG/DL (ref 70–99)
GLUCOSE BLD-MCNC: 198 MG/DL (ref 70–99)
GLUCOSE SERPL-MCNC: 125 MG/DL (ref 70–99)
HBA1C MFR BLD: 4.6 %
HCT VFR BLD AUTO: 24.3 % (ref 40.5–52.5)
HCT VFR BLD AUTO: 29.3 % (ref 40.5–52.5)
HGB BLD-MCNC: 8 G/DL (ref 13.5–17.5)
HGB BLD-MCNC: 9.9 G/DL (ref 13.5–17.5)
INR PPP: 2.18 (ref 0.84–1.16)
IRON SATN MFR SERPL: 32 % (ref 20–50)
IRON SERPL-MCNC: 59 UG/DL (ref 59–158)
LYMPHOCYTES # BLD: 0.5 K/UL (ref 1–5.1)
LYMPHOCYTES NFR BLD: 8.6 %
MAGNESIUM SERPL-MCNC: 2.1 MG/DL (ref 1.8–2.4)
MCH RBC QN AUTO: 33.6 PG (ref 26–34)
MCHC RBC AUTO-ENTMCNC: 33.1 G/DL (ref 31–36)
MCV RBC AUTO: 101.5 FL (ref 80–100)
MONOCYTES # BLD: 0.6 K/UL (ref 0–1.3)
MONOCYTES NFR BLD: 9.4 %
NEUTROPHILS # BLD: 4.7 K/UL (ref 1.7–7.7)
NEUTROPHILS NFR BLD: 78.1 %
PERFORMED ON: ABNORMAL
PLATELET # BLD AUTO: 191 K/UL (ref 135–450)
PMV BLD AUTO: 9.2 FL (ref 5–10.5)
POTASSIUM SERPL-SCNC: 4.3 MMOL/L (ref 3.5–5.1)
PROT SERPL-MCNC: 6.5 G/DL (ref 6.4–8.2)
PROTHROMBIN TIME: 24.1 SEC (ref 11.5–14.8)
RBC # BLD AUTO: 2.4 M/UL (ref 4.2–5.9)
SODIUM SERPL-SCNC: 141 MMOL/L (ref 136–145)
TIBC SERPL-MCNC: 187 UG/DL (ref 260–445)
VIT B12 SERPL-MCNC: 540 PG/ML (ref 211–911)
WBC # BLD AUTO: 6.1 K/UL (ref 4–11)

## 2023-06-04 PROCEDURE — 6360000002 HC RX W HCPCS: Performed by: INTERNAL MEDICINE

## 2023-06-04 PROCEDURE — 83735 ASSAY OF MAGNESIUM: CPT

## 2023-06-04 PROCEDURE — 2580000003 HC RX 258: Performed by: INTERNAL MEDICINE

## 2023-06-04 PROCEDURE — 86901 BLOOD TYPING SEROLOGIC RH(D): CPT

## 2023-06-04 PROCEDURE — 85025 COMPLETE CBC W/AUTO DIFF WBC: CPT

## 2023-06-04 PROCEDURE — 36415 COLL VENOUS BLD VENIPUNCTURE: CPT

## 2023-06-04 PROCEDURE — 6370000000 HC RX 637 (ALT 250 FOR IP): Performed by: INTERNAL MEDICINE

## 2023-06-04 PROCEDURE — 80061 LIPID PANEL: CPT

## 2023-06-04 PROCEDURE — 36430 TRANSFUSION BLD/BLD COMPNT: CPT

## 2023-06-04 PROCEDURE — 2700000000 HC OXYGEN THERAPY PER DAY

## 2023-06-04 PROCEDURE — 80053 COMPREHEN METABOLIC PANEL: CPT

## 2023-06-04 PROCEDURE — 1200000000 HC SEMI PRIVATE

## 2023-06-04 PROCEDURE — 85014 HEMATOCRIT: CPT

## 2023-06-04 PROCEDURE — 86900 BLOOD TYPING SEROLOGIC ABO: CPT

## 2023-06-04 PROCEDURE — 86850 RBC ANTIBODY SCREEN: CPT

## 2023-06-04 PROCEDURE — 99223 1ST HOSP IP/OBS HIGH 75: CPT | Performed by: INTERNAL MEDICINE

## 2023-06-04 PROCEDURE — 82746 ASSAY OF FOLIC ACID SERUM: CPT

## 2023-06-04 PROCEDURE — 85018 HEMOGLOBIN: CPT

## 2023-06-04 PROCEDURE — P9016 RBC LEUKOCYTES REDUCED: HCPCS

## 2023-06-04 PROCEDURE — 82607 VITAMIN B-12: CPT

## 2023-06-04 PROCEDURE — 85610 PROTHROMBIN TIME: CPT

## 2023-06-04 PROCEDURE — 86923 COMPATIBILITY TEST ELECTRIC: CPT

## 2023-06-04 RX ORDER — SODIUM CHLORIDE 9 MG/ML
INJECTION, SOLUTION INTRAVENOUS PRN
Status: DISCONTINUED | OUTPATIENT
Start: 2023-06-04 | End: 2023-06-07 | Stop reason: HOSPADM

## 2023-06-04 RX ADMIN — METOPROLOL SUCCINATE 75 MG: 50 TABLET, EXTENDED RELEASE ORAL at 20:21

## 2023-06-04 RX ADMIN — METOPROLOL SUCCINATE 75 MG: 50 TABLET, EXTENDED RELEASE ORAL at 08:23

## 2023-06-04 RX ADMIN — IPRATROPIUM BROMIDE 2 SPRAY: 42 SPRAY NASAL at 20:20

## 2023-06-04 RX ADMIN — AMIODARONE HYDROCHLORIDE 200 MG: 200 TABLET ORAL at 08:24

## 2023-06-04 RX ADMIN — ATORVASTATIN CALCIUM 20 MG: 20 TABLET, FILM COATED ORAL at 08:24

## 2023-06-04 RX ADMIN — WARFARIN SODIUM 2.5 MG: 2.5 TABLET ORAL at 18:33

## 2023-06-04 RX ADMIN — FLUTICASONE PROPIONATE 2 SPRAY: 50 SPRAY, METERED NASAL at 08:23

## 2023-06-04 RX ADMIN — DOXAZOSIN 2 MG: 1 TABLET ORAL at 08:24

## 2023-06-04 RX ADMIN — THIAMINE HCL TAB 100 MG 1000 MG: 100 TAB at 08:24

## 2023-06-04 RX ADMIN — FUROSEMIDE 40 MG: 10 INJECTION, SOLUTION INTRAMUSCULAR; INTRAVENOUS at 18:37

## 2023-06-04 RX ADMIN — FERROUS SULFATE TAB 325 MG (65 MG ELEMENTAL FE) 325 MG: 325 (65 FE) TAB at 08:24

## 2023-06-04 RX ADMIN — FUROSEMIDE 40 MG: 10 INJECTION, SOLUTION INTRAMUSCULAR; INTRAVENOUS at 08:23

## 2023-06-04 RX ADMIN — SODIUM CHLORIDE, PRESERVATIVE FREE 10 ML: 5 INJECTION INTRAVENOUS at 20:22

## 2023-06-04 RX ADMIN — SODIUM CHLORIDE, PRESERVATIVE FREE 10 ML: 5 INJECTION INTRAVENOUS at 08:25

## 2023-06-04 ASSESSMENT — PAIN SCALES - GENERAL
PAINLEVEL_OUTOF10: 0

## 2023-06-04 NOTE — PLAN OF CARE
Problem: Discharge Planning  Goal: Discharge to home or other facility with appropriate resources  Outcome: Progressing  Flowsheets (Taken 6/4/2023 0425 by Marco Antonio Yao RN)  Discharge to home or other facility with appropriate resources: Identify barriers to discharge with patient and caregiver     Problem: Pain  Goal: Verbalizes/displays adequate comfort level or baseline comfort level  Outcome: Progressing     Problem: Skin/Tissue Integrity  Goal: Absence of new skin breakdown  Description: 1. Monitor for areas of redness and/or skin breakdown  2. Assess vascular access sites hourly  3. Every 4-6 hours minimum:  Change oxygen saturation probe site  4. Every 4-6 hours:  If on nasal continuous positive airway pressure, respiratory therapy assess nares and determine need for appliance change or resting period.   Outcome: Progressing     Problem: Safety - Adult  Goal: Free from fall injury  Outcome: Progressing     Problem: ABCDS Injury Assessment  Goal: Absence of physical injury  Outcome: Progressing     Problem: Chronic Conditions and Co-morbidities  Goal: Patient's chronic conditions and co-morbidity symptoms are monitored and maintained or improved  Outcome: Progressing  Flowsheets (Taken 6/4/2023 0425 by Marco Antonio Yao RN)  Care Plan - Patient's Chronic Conditions and Co-Morbidity Symptoms are Monitored and Maintained or Improved: Monitor and assess patient's chronic conditions and comorbid symptoms for stability, deterioration, or improvement

## 2023-06-04 NOTE — CONSULTS
[facility]   Nephrology progress Note  [phone]   [facility]. [place]         Reason for Consult:  REYNA    HISTORY OF PRESENT ILLNESS:      The patient is a 80 y.o. male with significant past medical history of CHD, afib, anemia, hypertension, CKD stage IIIb, follows with Dr. Trisha Florez, baseline Cr low to mid 1 range who presents with shortness of breath and hypoxia. Noted to have CHF exacerbation and worsening anemia and started on IV diuresis. This morning, upon my evaluation, patient reports feeling better than yesterday and leg swelling has improved. Remains on nasal canula.      Past Medical History:        Diagnosis Date    Actinic keratosis     Actinic keratosis     Atrial fibrillation (HCC)     Bilateral carotid artery stenosis     CAD (coronary artery disease)     Cellulitis 7/15/2015    right foot    Diabetes mellitus (HCC)     DM (diabetes mellitus), type 2 with peripheral vascular complications (HCC)--s/p amputation great toe post osteomylitis  9/11/2015    Glucose intolerance (malabsorption)     Hyperlipidemia     Hypertension     Hypertrophy of prostate without urinary obstruction and other lower urinary tract symptoms (LUTS)     Intermittent atrial fibrillation (HCC)     Kidney stone     Occult blood in stool     Hx of    Pacemaker     Permanent       Past Surgical History:        Procedure Laterality Date    ABSCESS DRAINAGE  7/20/15    right foot    APPENDECTOMY      CARDIAC CATHETERIZATION  2003    Left    COLONOSCOPY  03/28/2018    dr Barbara Soto    x3    [address]    OTHER SURGICAL HISTORY Right 7/17/15    right fifth toe I and D    PACEMAKER PLACEMENT  2005    TX ESOPHAGOGASTRODUODENOSCOPY TRANSORAL DIAGNOSTIC N/A 11/21/2018    EGD DIAGNOSTIC ONLY performed by Eric Harding MD at [address] [facility] Right 7.16.15    right pinkey toe     TONSILLECTOMY AND ADENOIDECTOMY         Current Medications:    No

## 2023-06-04 NOTE — CONSENT
Informed Consent for Blood Component Transfusion Note    I have discussed with the patient the rationale for blood component transfusion; its benefits in treating or preventing fatigue, organ damage, or death; and its risk which includes mild transfusion reactions, rare risk of blood borne infection, or more serious but rare reactions. I have discussed the alternatives to transfusion, including the risk and consequences of not receiving transfusion. The patient had an opportunity to ask questions and had agreed to proceed with transfusion of blood components.     Electronically signed by Kris Corbett MD on 6/4/23 at 11:59 AM EDT

## 2023-06-04 NOTE — CONSULTS
[facility]                     [address], [address]                                  CONSULTATION    PATIENT NAME: Shelley Bence                 :        1933  MED REC NO:   [id number]                          ROOM:       5902  ACCOUNT NO:   [de-identified]                           ADMIT DATE: 2023  PROVIDER:     Zaid Hawley MD    CONSULT DATE:  2023    CONSULTATION:  Increasing shortness of breath. HISTORY OF PRESENT ILLNESS:  This is a pleasant 80year-old gentleman. 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 He is  not responding to frequent doses of Epogen and he is not known to be  iron deficient. He has history of hypertension, chronic renal  insufficiency. He has been much more short of breath. His family  members noticed that he has considerable increase in his pulmonary  crackles, so he was brought to the emergency room. The patient states  that since he has been here is while he is at bedrest, he does feel  better. PAST MEDICAL HISTORY:  Notable for chronic atrial fibrillation. Currently, he is in a dual-chamber paced rhythm. Bilateral carotid  stenosis, coronary artery disease, diabetes mellitus, hyperlipidemia,  hypertension, status post pacemaker with inability to place a third lead  for biventricular pacing. PAST SURGICAL HISTORY:  Coronary bypass graft surgery many years ago,  nephrolithiasis with kidney stone surgery, toe amputation, appendectomy. HOME MEDICATIONS:  Amiodarone, atorvastatin, Cardura, Jardiance, Epogen,  ferrous sulfate, Flonase, Lasix 40 mg daily, Toprol XL 75 mg b.i.d., he  is on Coumadin with therapeutic INRs. SOCIAL HISTORY:  He has never been a cigarette user. Lives with his  daughter. Does not smoke cigarettes or drink alcohol.     FAMILY HISTORY:  Negative for premature coronary

## 2023-06-04 NOTE — PROGRESS NOTES
Pharmacy to Dose Warfarin    Pharmacy consulted to dose warfarin for Afib. INR Goal: 2-3    INR today: 2.18    Assessment/Plan:  - INR therapeutic today  - Will continue home dose of 2.5 mg tonight    Pharmacy will continue to follow.     Tereso Little, PharmD, [facility]  Clinical Pharmacist  [id number]

## 2023-06-04 NOTE — PLAN OF CARE
Problem: Skin/Tissue Integrity  Goal: Absence of new skin breakdown  Description: 1. Monitor for areas of redness and/or skin breakdown  2. Assess vascular access sites hourly  3. Every 4-6 hours minimum:  Change oxygen saturation probe site  4. Every 4-6 hours:  If on nasal continuous positive airway pressure, respiratory therapy assess nares and determine need for appliance change or resting period.   Outcome: Progressing     Problem: Safety - Adult  Goal: Free from fall injury  Outcome: Progressing     Problem: ABCDS Injury Assessment  Goal: Absence of physical injury  Outcome: Progressing     Problem: Discharge Planning  Goal: Discharge to home or other facility with appropriate resources  Outcome: Progressing  Flowsheets (Taken 6/3/2023 1932)  Discharge to home or other facility with appropriate resources:   Identify barriers to discharge with patient and caregiver   Arrange for needed discharge resources and transportation as appropriate

## 2023-06-04 NOTE — CARE COORDINATION
Case Management Assessment  Initial Evaluation    Date/Time of Evaluation: 6/4/2023 3:03 PM  Assessment Completed by: Flor Stallworth    If patient is discharged prior to next notation, then this note serves as note for discharge by case management. Patient Name: Forest Closs                   YOB: 1933  Diagnosis: CHF (congestive heart failure), NYHA class I, acute on chronic, combined ([address]) [I50.43]  Chronic renal impairment, unspecified CKD stage [N18.9]  Acute on chronic congestive heart failure, unspecified heart failure type ([address]) [I50.9]                   Date / Time: 6/3/2023 12:01 PM    Patient Admission Status: Inpatient   Readmission Risk (Low < 19, Mod (19-27), High > 27): Readmission Risk Score: 20.7    Current PCP: Ivonne Rodríguez MD  PCP verified by CM? Yes    Chart Reviewed: Yes      History Provided by: Patient, Child/Family  Patient Orientation: Alert and Oriented, Place, Person    Patient Cognition: Alert    Hospitalization in the last 30 days (Readmission):  No    If yes, Readmission Assessment in  Navigator will be completed.     Advance Directives:      Code Status: Full Code   Patient's Primary Decision Maker is:      Primary Decision Maker: Daylin Cruz - Child - [phone]    Secondary Decision Maker: Crescencio Nelson - Child - [phone]    Secondary Decision Maker: Paige Loyd - Child - [phone]    Discharge Planning:    Patient lives with: Alone Type of Home: Independent Living  Primary Care Giver: Self  Patient Support Systems include: Children, Family Members, Emergency Call System   Current Financial resources: Medicare  Current community resources: None  Current services prior to admission: None (has cane, rolling walker, remote pacemaker, bp cuff, so02 mete, transport chair,)            Current DME:              Type of Home Care services:  None    ADLS  Prior functional level: Assistance with the following:, Housework,

## 2023-06-04 NOTE — PROGRESS NOTES
RN monitored pt for 15 minutes. No reported or witnessed transfusion reaction. RN will continue to monitor.

## 2023-06-04 NOTE — PROGRESS NOTES
[address] [facility]    6/4/2023 9:15 AM        Name: Janie Caraballo . Admitted: 6/3/2023  Primary Care Provider: Bart Burden MD (Tel: [phone])        Chief complaint: 81 yo male presented with shortness of breath and decreased exercise tolerance. Admitted with CHF exacerbation. Subjective:  Presently resting in bed. Reports he is feeling somewhat better. Good response to IV Lasix. Denies shortness of breath at rest, denies chest pain, abdominal pain, nausea.     Reviewed interval ancillary notes    Current Medications  sodium chloride flush 0.9 % injection 5-40 mL, 2 times per day  sodium chloride flush 0.9 % injection 5-40 mL, PRN  0.9 % sodium chloride infusion, PRN  ondansetron (ZOFRAN-ODT) disintegrating tablet 4 mg, Q8H PRN   Or  ondansetron (ZOFRAN) injection 4 mg, Q6H PRN  polyethylene glycol (GLYCOLAX) packet 17 g, Daily PRN  acetaminophen (TYLENOL) tablet 650 mg, Q6H PRN   Or  acetaminophen (TYLENOL) suppository 650 mg, Q6H PRN  potassium chloride (KLOR-CON M) extended release tablet 40 mEq, PRN   Or  potassium bicarb-citric acid (EFFER-K) effervescent tablet 40 mEq, PRN   Or  potassium chloride 10 mEq/100 mL IVPB (Peripheral Line), PRN  magnesium sulfate 2000 mg in 50 mL IVPB premix, PRN  furosemide (LASIX) injection 40 mg, BID  perflutren lipid microspheres (DEFINITY) injection 1.5 mL, ONCE PRN  acetaminophen (TYLENOL) tablet 650 mg, Q6H PRN  amiodarone (CORDARONE) tablet 200 mg, Daily  atorvastatin (LIPITOR) tablet 20 mg, Daily  doxazosin (CARDURA) tablet 2 mg, Daily  fluticasone (FLONASE) 50 MCG/ACT nasal spray 2 spray, Daily  ferrous sulfate (IRON 325) tablet 325 mg, Daily with breakfast  ipratropium (ATROVENT) 0.06 % nasal spray 2 spray, 4x Daily  metoprolol succinate (TOPROL XL) extended release tablet 75 mg, BID  thiamine mononitrate tablet 1,000 mg, Daily  glucose-vitamin

## 2023-06-05 LAB
ANION GAP SERPL CALCULATED.3IONS-SCNC: 9 MMOL/L (ref 3–16)
BUN SERPL-MCNC: 32 MG/DL (ref 7–20)
CALCIUM SERPL-MCNC: 8.9 MG/DL (ref 8.3–10.6)
CHLORIDE SERPL-SCNC: 101 MMOL/L (ref 99–110)
CHOLEST SERPL-MCNC: 84 MG/DL (ref 0–199)
CO2 SERPL-SCNC: 30 MMOL/L (ref 21–32)
CREAT SERPL-MCNC: 1.3 MG/DL (ref 0.8–1.3)
EKG ATRIAL RATE: 61 BPM
EKG DIAGNOSIS: NORMAL
EKG P AXIS: -3 DEGREES
EKG P-R INTERVAL: 190 MS
EKG Q-T INTERVAL: 500 MS
EKG QRS DURATION: 130 MS
EKG QTC CALCULATION (BAZETT): 503 MS
EKG R AXIS: -42 DEGREES
EKG T AXIS: 36 DEGREES
EKG VENTRICULAR RATE: 61 BPM
GFR SERPLBLD CREATININE-BSD FMLA CKD-EPI: 52 ML/MIN/{1.73_M2}
GLUCOSE BLD-MCNC: 121 MG/DL (ref 70–99)
GLUCOSE BLD-MCNC: 132 MG/DL (ref 70–99)
GLUCOSE BLD-MCNC: 140 MG/DL (ref 70–99)
GLUCOSE BLD-MCNC: 170 MG/DL (ref 70–99)
GLUCOSE SERPL-MCNC: 114 MG/DL (ref 70–99)
HCT VFR BLD AUTO: 26 % (ref 40.5–52.5)
HDLC SERPL-MCNC: 37 MG/DL (ref 40–60)
HGB BLD-MCNC: 8.8 G/DL (ref 13.5–17.5)
INR PPP: 2.05 (ref 0.84–1.16)
LDLC SERPL CALC-MCNC: 33 MG/DL
MAGNESIUM SERPL-MCNC: 2 MG/DL (ref 1.8–2.4)
PERFORMED ON: ABNORMAL
POTASSIUM SERPL-SCNC: 3.7 MMOL/L (ref 3.5–5.1)
PROTHROMBIN TIME: 23 SEC (ref 11.5–14.8)
SODIUM SERPL-SCNC: 140 MMOL/L (ref 136–145)
TRIGL SERPL-MCNC: 70 MG/DL (ref 0–150)
VLDLC SERPL CALC-MCNC: 14 MG/DL

## 2023-06-05 PROCEDURE — 80048 BASIC METABOLIC PNL TOTAL CA: CPT

## 2023-06-05 PROCEDURE — 82728 ASSAY OF FERRITIN: CPT

## 2023-06-05 PROCEDURE — 6360000002 HC RX W HCPCS: Performed by: INTERNAL MEDICINE

## 2023-06-05 PROCEDURE — 2580000003 HC RX 258: Performed by: INTERNAL MEDICINE

## 2023-06-05 PROCEDURE — 83735 ASSAY OF MAGNESIUM: CPT

## 2023-06-05 PROCEDURE — 97535 SELF CARE MNGMENT TRAINING: CPT

## 2023-06-05 PROCEDURE — 2700000000 HC OXYGEN THERAPY PER DAY

## 2023-06-05 PROCEDURE — 1200000000 HC SEMI PRIVATE

## 2023-06-05 PROCEDURE — 85610 PROTHROMBIN TIME: CPT

## 2023-06-05 PROCEDURE — 6370000000 HC RX 637 (ALT 250 FOR IP): Performed by: NURSE PRACTITIONER

## 2023-06-05 PROCEDURE — 93010 ELECTROCARDIOGRAM REPORT: CPT | Performed by: INTERNAL MEDICINE

## 2023-06-05 PROCEDURE — 36415 COLL VENOUS BLD VENIPUNCTURE: CPT

## 2023-06-05 PROCEDURE — 97116 GAIT TRAINING THERAPY: CPT

## 2023-06-05 PROCEDURE — 99233 SBSQ HOSP IP/OBS HIGH 50: CPT | Performed by: CLINICAL NURSE SPECIALIST

## 2023-06-05 PROCEDURE — 97165 OT EVAL LOW COMPLEX 30 MIN: CPT

## 2023-06-05 PROCEDURE — 85018 HEMOGLOBIN: CPT

## 2023-06-05 PROCEDURE — 94760 N-INVAS EAR/PLS OXIMETRY 1: CPT

## 2023-06-05 PROCEDURE — 97110 THERAPEUTIC EXERCISES: CPT

## 2023-06-05 PROCEDURE — 85014 HEMATOCRIT: CPT

## 2023-06-05 PROCEDURE — 97161 PT EVAL LOW COMPLEX 20 MIN: CPT

## 2023-06-05 PROCEDURE — 97530 THERAPEUTIC ACTIVITIES: CPT

## 2023-06-05 PROCEDURE — 6370000000 HC RX 637 (ALT 250 FOR IP): Performed by: INTERNAL MEDICINE

## 2023-06-05 RX ORDER — SENNA AND DOCUSATE SODIUM 50; 8.6 MG/1; MG/1
2 TABLET, FILM COATED ORAL DAILY
Status: DISCONTINUED | OUTPATIENT
Start: 2023-06-05 | End: 2023-06-07 | Stop reason: HOSPADM

## 2023-06-05 RX ORDER — LANOLIN ALCOHOL/MO/W.PET/CERES
100 CREAM (GRAM) TOPICAL DAILY
Status: DISCONTINUED | OUTPATIENT
Start: 2023-06-05 | End: 2023-06-07 | Stop reason: HOSPADM

## 2023-06-05 RX ORDER — POLYETHYLENE GLYCOL 3350 17 G/17G
17 POWDER, FOR SOLUTION ORAL DAILY
Status: DISCONTINUED | OUTPATIENT
Start: 2023-06-06 | End: 2023-06-07 | Stop reason: HOSPADM

## 2023-06-05 RX ADMIN — FERROUS SULFATE TAB 325 MG (65 MG ELEMENTAL FE) 325 MG: 325 (65 FE) TAB at 10:00

## 2023-06-05 RX ADMIN — FLUTICASONE PROPIONATE 2 SPRAY: 50 SPRAY, METERED NASAL at 10:08

## 2023-06-05 RX ADMIN — IPRATROPIUM BROMIDE 2 SPRAY: 42 SPRAY NASAL at 10:09

## 2023-06-05 RX ADMIN — POLYETHYLENE GLYCOL 3350 17 G: 17 POWDER, FOR SOLUTION ORAL at 10:00

## 2023-06-05 RX ADMIN — FUROSEMIDE 40 MG: 10 INJECTION, SOLUTION INTRAMUSCULAR; INTRAVENOUS at 16:22

## 2023-06-05 RX ADMIN — WARFARIN SODIUM 2.5 MG: 2.5 TABLET ORAL at 16:22

## 2023-06-05 RX ADMIN — METOPROLOL SUCCINATE 75 MG: 50 TABLET, EXTENDED RELEASE ORAL at 21:08

## 2023-06-05 RX ADMIN — SODIUM CHLORIDE, PRESERVATIVE FREE 10 ML: 5 INJECTION INTRAVENOUS at 10:10

## 2023-06-05 RX ADMIN — METOPROLOL SUCCINATE 75 MG: 50 TABLET, EXTENDED RELEASE ORAL at 10:00

## 2023-06-05 RX ADMIN — FUROSEMIDE 40 MG: 10 INJECTION, SOLUTION INTRAMUSCULAR; INTRAVENOUS at 10:03

## 2023-06-05 RX ADMIN — DOXAZOSIN 2 MG: 1 TABLET ORAL at 10:00

## 2023-06-05 RX ADMIN — Medication 100 MG: at 11:28

## 2023-06-05 RX ADMIN — IPRATROPIUM BROMIDE 2 SPRAY: 42 SPRAY NASAL at 16:24

## 2023-06-05 RX ADMIN — ATORVASTATIN CALCIUM 20 MG: 20 TABLET, FILM COATED ORAL at 10:00

## 2023-06-05 RX ADMIN — AMIODARONE HYDROCHLORIDE 200 MG: 200 TABLET ORAL at 10:00

## 2023-06-05 RX ADMIN — STANDARDIZED SENNA CONCENTRATE AND DOCUSATE SODIUM 2 TABLET: 8.6; 5 TABLET ORAL at 11:51

## 2023-06-05 RX ADMIN — EPOETIN ALFA-EPBX 10000 UNITS: 10000 INJECTION, SOLUTION INTRAVENOUS; SUBCUTANEOUS at 11:52

## 2023-06-05 RX ADMIN — IPRATROPIUM BROMIDE 2 SPRAY: 42 SPRAY NASAL at 21:07

## 2023-06-05 RX ADMIN — SODIUM CHLORIDE, PRESERVATIVE FREE 10 ML: 5 INJECTION INTRAVENOUS at 21:07

## 2023-06-05 ASSESSMENT — PAIN SCALES - GENERAL
PAINLEVEL_OUTOF10: 0

## 2023-06-05 NOTE — PROGRESS NOTES
[facility] Department   Phone: [phone]    Physical Therapy    [x] Initial Evaluation            [] Daily Treatment Note         [] Discharge Summary      Patient: Lolly Grove   : 1933   MRN: [id number]   Date of Service:  2023  Admitting Diagnosis: CHF (congestive heart failure), NYHA class I, acute on chronic, combined [facility])  Current Admission Summary:  80 y.o. male with significant past medical history of chronic A-fib on Coumadin, pacemaker, CAD, hypertension, hyperlipidemia, carotid artery stenosis who was brought in by the daughter after noticing dyspnea on exertion as well as decreased exercise tolerance since Thursday. Daughter is an RN and noticed/auscultated and found bibasilar Rales hence concerned about CHF and brought him to the ED for further evaluation. Usually patient became dyspneic from walking from his home to outside to get the mails and newspaper but since Thursday noticed that from the couch to the bedside commode he was huffing and puffing and his oxygen saturation went down from 94 to 88% with minimal exertion     He did notice increased bilateral lower extremity edema but denies any fever or chills or chest pain nausea vomiting or diarrhea or dysuria hematuria or melena or hemoptysis    Past Medical History:  has a past medical history of Actinic keratosis, Actinic keratosis, Atrial fibrillation ([address]), Bilateral carotid artery stenosis, CAD (coronary artery disease), Cellulitis, Diabetes mellitus ([address]), DM (diabetes mellitus), type 2 with peripheral vascular complications (HCC)--s/p amputation great toe post osteomylitis , Glucose intolerance (malabsorption), Hyperlipidemia, Hypertension, Hypertrophy of prostate without urinary obstruction and other lower urinary tract symptoms (LUTS), Intermittent atrial fibrillation ([address]), Kidney stone, Occult blood in stool, and Pacemaker.   Past Surgical History:  has a past surgical Assessment: Pt is a 81 y/o. Male who demonstrates decreased strength, decreased endurance and decreased balance tolerance. Pt would benefit from HHPT to improve these deficits, increase his functional abilities, and decrease his falls risk at home to maintain his independence. Pt has great family support from his daughter and other family members who are continuously helping him around his home as needed. Safety Interventions: patient left in chair, call light within reach, patient at risk for falls, nurse notified, family/caregiver present, and per RN - chair alarm in place but not activated at the time of PT/OT leaving due to caregiver/daughter present in room     Plan  Frequency: 3-5 x/per week  Current Treatment Recommendations: strengthening, balance training, gait training, endurance training, and patient/caregiver education    Goals  Patient Goals:  To get back home   Short Term Goals:  Time Frame: Until discharge  Patient will complete bed mobility at supervision   Patient will complete transfers at supervision   Patient will ambulate 200 ft with use of rolling walker at stand by assistance      Therapy Session Time      Individual Group Co-treatment   Time In     0842   Time Out     0927   Minutes     45     Timed Code Treatment Minutes:  30 Minutes  Total Treatment Minutes:  45       Electronically Signed By: REINALDO Anderson SPT  This evaluation/treatment was observed and supervised by Ana Jain, [address] [place] [address]

## 2023-06-05 NOTE — PROGRESS NOTES
Nutrition Note    RECOMMENDATIONS  PO Diet: DOLORES, 2000 ml fluid restriction  ONS: Glucerna TID, strawberry  Consider appetite stimulant d/t prolonged poor appetite. NUTRITION ASSESSMENT   Pt is nutritionally compromised AEB report of poor appetite & wt loss upon admission. Pt states appetite has been poor for a few months. Family states UBW was in 160's about a year ago. Per EMR, wt was 164 lb 9 months ago, indicating 11% loss. Some of wt loss may be d/t fluids as pt has CHF. Family understands pt has CHF & hx DM but has no desire for education & implementing any restrictive dietary guidelines as pt is 79 yo & needs encouragement to eat. Pt agrees to try Glucerna supplements to promote adequate po intake. Will monitor for acceptance & improvement in po intake. Nutrition Related Findings: Glucose 114; LBM 6/3; trace edema BLE  Wounds: Pressure Injury (coccyx)  Nutrition Education:  Education declined   Nutrition Goals: PO intake 50% or greater     MALNUTRITION ASSESSMENT   Chronic Illness  Malnutrition Status: At risk for malnutrition (Comment)    NUTRITION DIAGNOSIS   Inadequate oral intake related to inadequate protein-energy intake as evidenced by poor intake prior to admission, weight loss  Increased nutrient needs related to increase demand for energy/nutrients as evidenced by wounds      CURRENT NUTRITION THERAPIES  ADULT DIET; Regular; No Added Salt (3-4 gm); 2000 ml     PO Intake: % (x 2 meals)   PO Supplement Intake:None Ordered    ANTHROPOMETRICS  Current Height: 5' 10\" (177.8 cm)  Current Weight - Scale: 146 lb 14.4 oz (66.6 kg)    Ideal Body Weight (IBW): 166 lbs  (75 kg)    Usual Bodyweight 164 lb (74.4 kg) (160's per family)       BMI: 20.9      COMPARATIVE STANDARDS  Energy (kcal):  1675- 2010     Protein (g):  80- 134g       Fluid (mL/day):  2000 ml/day per MD    The patient will be monitored per nutrition standards of care.  Consult dietitian if additional nutrition interventions

## 2023-06-05 NOTE — PLAN OF CARE
Problem: Pain  Goal: Verbalizes/displays adequate comfort level or baseline comfort level  6/5/2023 0457 by Pankaj Coburn RN  Outcome: Progressing     Problem: Skin/Tissue Integrity  Goal: Absence of new skin breakdown  Description: 1. Monitor for areas of redness and/or skin breakdown  2. Assess vascular access sites hourly  3. Every 4-6 hours minimum:  Change oxygen saturation probe site  4. Every 4-6 hours:  If on nasal continuous positive airway pressure, respiratory therapy assess nares and determine need for appliance change or resting period.   6/5/2023 0457 by Pankaj Coburn RN  Outcome: Progressing     Problem: Safety - Adult  Goal: Free from fall injury  6/5/2023 0457 by Pankaj Coburn RN  Outcome: Progressing     Problem: ABCDS Injury Assessment  Goal: Absence of physical injury  6/5/2023 0457 by Pankaj Coburn RN  Outcome: Progressing

## 2023-06-05 NOTE — PLAN OF CARE
Problem: Discharge Planning  Goal: Discharge to home or other facility with appropriate resources  Outcome: Progressing     Problem: Pain  Goal: Verbalizes/displays adequate comfort level or baseline comfort level  6/5/2023 1132 by Meli Velazquez RN  Outcome: Progressing  6/5/2023 0457 by Dennise Castillo RN  Outcome: Progressing     Problem: Skin/Tissue Integrity  Goal: Absence of new skin breakdown  Description: 1. Monitor for areas of redness and/or skin breakdown  2. Assess vascular access sites hourly  3. Every 4-6 hours minimum:  Change oxygen saturation probe site  4. Every 4-6 hours:  If on nasal continuous positive airway pressure, respiratory therapy assess nares and determine need for appliance change or resting period. 6/5/2023 1132 by Meli Velazquez RN  Outcome: Progressing  6/5/2023 0457 by Dennise Castillo RN  Outcome: Progressing     Problem: Safety - Adult  Goal: Free from fall injury  6/5/2023 1132 by Meli Velazquez RN  Outcome: Progressing  6/5/2023 0457 by Dennise Castillo RN  Outcome: Progressing     Problem: ABCDS Injury Assessment  Goal: Absence of physical injury  6/5/2023 1132 by Meli Velazquez RN  Outcome: Progressing  6/5/2023 0457 by Dennise Castillo RN  Outcome: Progressing     Problem: Chronic Conditions and Co-morbidities  Goal: Patient's chronic conditions and co-morbidity symptoms are monitored and maintained or improved  Outcome: Progressing       Continue with diuresis. Pt receiving IVP lasix 40mg BID with good results. Pt worked with PT/OT and walked in the hallway. Trying to wean oxygen. Pt currently on 2L O2 and is room air at baseline. Pt will receive dose of retacrit today. Pt to have echo completed today.

## 2023-06-05 NOTE — PROGRESS NOTES
[facility]    6/5/2023 10:40 AM        Name: Ivis Saucedo . Admitted: 6/3/2023  Primary Care Provider: Lyudmila Abdi MD (Tel: [phone])        Chief complaint: 79 yo male presented with shortness of breath and decreased exercise tolerance. Admitted with CHF exacerbation. Subjective:  Up in bedside chair, daughter Susie Alcantara visiting. Patient states he feels much better. Weight down 4 lbs with IV Lasix. O2 being weaned. Denies chest pain, no shortness of breath at rest, no cough.      Reviewed interval ancillary notes    Current Medications  epoetin nader-epbx (RETACRIT) injection 10,000 Units, Once per day on Mon Wed Fri  0.9 % sodium chloride infusion, PRN  sodium chloride flush 0.9 % injection 5-40 mL, 2 times per day  sodium chloride flush 0.9 % injection 5-40 mL, PRN  0.9 % sodium chloride infusion, PRN  ondansetron (ZOFRAN-ODT) disintegrating tablet 4 mg, Q8H PRN   Or  ondansetron (ZOFRAN) injection 4 mg, Q6H PRN  polyethylene glycol (GLYCOLAX) packet 17 g, Daily PRN  acetaminophen (TYLENOL) tablet 650 mg, Q6H PRN   Or  acetaminophen (TYLENOL) suppository 650 mg, Q6H PRN  potassium chloride (KLOR-CON M) extended release tablet 40 mEq, PRN   Or  potassium bicarb-citric acid (EFFER-K) effervescent tablet 40 mEq, PRN   Or  potassium chloride 10 mEq/100 mL IVPB (Peripheral Line), PRN  magnesium sulfate 2000 mg in 50 mL IVPB premix, PRN  furosemide (LASIX) injection 40 mg, BID  perflutren lipid microspheres (DEFINITY) injection 1.5 mL, ONCE PRN  acetaminophen (TYLENOL) tablet 650 mg, Q6H PRN  amiodarone (CORDARONE) tablet 200 mg, Daily  atorvastatin (LIPITOR) tablet 20 mg, Daily  doxazosin (CARDURA) tablet 2 mg, Daily  fluticasone (FLONASE) 50 MCG/ACT nasal spray 2 spray, Daily  ferrous sulfate (IRON 325) tablet 325 mg, Daily with breakfast  ipratropium (ATROVENT) 0.06 % nasal spray 2 held for diuresis  - Appreciate cardiology recs    CAD / elevated troponin  - Hx CABG (1996)  - [facility] 10/2021 showed severe MVD, patent LIMA-LAD and SVG-OM1 grafts, successful ALEJANDRA to RPDA  - HS troponin 75->68->71 in setting CHF exacerbation and CKD. Likely supply/demand mismatch. - Cardiology evaluated, no further testing planned  - Continue statin, beta blocker. No asa due to warfarin     PAF  - Atrial paced rhythm  - Continue amiodarone, warfarin, INR 2.18  - Continue telemetry    CKD stage III  - Creatinine 1.6 on presentation, creatinine ranging 1.4-1.9 recently  - Improved with diuresis, creatinine 1.3 today  - Nephrology consult pending    DM2  - A1c 4.6 (4/2023)  - Takes Jardiance at home, held on admission  - BG values reviewed, 114-121    HTN  - Controlled  - Continue doxazosin, metoprolol    Anemia  - Hgb 8.2 on presentation, which appears baseline  - Received 1 unit PRBC 6/4 per cardiology recs  - Iron levels lower limits normal  - Continue po iron supplement    Disposition: Patient lives alone. PT/OT consults pending. Anticipate likely ready for Dc in next 24 hours. Diet: ADULT DIET; Regular;  No Added Salt (3-4 gm); 2000 ml  ADULT ORAL NUTRITION SUPPLEMENT; Breakfast, Lunch, Dinner; Diabetic Oral Supplement  Code:Full Code  DVT PPX: warfarin (INR 2.05)      PRIYA Berrios - CNP   6/5/2023 10:40 AM

## 2023-06-05 NOTE — PROGRESS NOTES
Pharmacy to Dose Warfarin    Pharmacy consulted to dose warfarin for Afib. INR Goal: 2-3    INR today: 2.05    Assessment/Plan:  - INR therapeutic today  - Continue warfarin home dose, 2.5 mg tonight    Pharmacy will continue to follow.     Chu Tristan, PharmD, BCPS  Clinical Pharmacist  [id number]

## 2023-06-05 NOTE — PROGRESS NOTES
Continuous pulse oximeter placed on pt this morning to try to wean pt's oxygen more effectively. Pt's oxygen saturation % on 1L O2. Removed oxygen and had pt on room air. Pt's oxygen saturation decreased to 89% and was maintaining there. Placed 1L O2 back onto pt. Pt now satting 97% on 1L.

## 2023-06-05 NOTE — PROGRESS NOTES
[facility]   Phone: [phone]    Occupational Therapy    [x] Initial Evaluation            [] Daily Treatment Note         [] Discharge Summary      Patient: Tomy Umaña   : 1933   MRN: [id number]   Date of Service:  2023    Admitting Diagnosis:  CHF (congestive heart failure), NYHA class I, acute on chronic, combined [facility])  Current Admission Summary: 80 y.o. male with significant past medical history of chronic A-fib on Coumadin, pacemaker, CAD, hypertension, hyperlipidemia, carotid artery stenosis who was brought in by the daughter after noticing dyspnea on exertion as well as decreased exercise tolerance since Thursday. Daughter is an RN and noticed/auscultated and found bibasilar Rales hence concerned about CHF and brought him to the ED for further evaluation. Usually patient became dyspneic from walking from his home to outside to get the mails and newspaper but since Thursday noticed that from the couch to the bedside commode he was huffing and puffing and his oxygen saturation went down from 94 to 88% with minimal exertion     He did notice increased bilateral lower extremity edema but denies any fever or chills or chest pain nausea vomiting or diarrhea or dysuria hematuria or melena or hemoptysis  Past Medical History:  has a past medical history of Actinic keratosis, Actinic keratosis, Atrial fibrillation ([address]), Bilateral carotid artery stenosis, CAD (coronary artery disease), Cellulitis, Diabetes mellitus ([address]), DM (diabetes mellitus), type 2 with peripheral vascular complications (HCC)--s/p amputation great toe post osteomylitis , Glucose intolerance (malabsorption), Hyperlipidemia, Hypertension, Hypertrophy of prostate without urinary obstruction and other lower urinary tract symptoms (LUTS), Intermittent atrial fibrillation ([address]), Kidney stone, Occult blood in stool, and Pacemaker.   Past Surgical History:  has a past leaving alarm activated while daughter present in room. Chair alarm placed for activation when daughter leaves    Plan  Frequency: 1-2 x/per week  Current Treatment Recommendations: strengthening, balance training, functional mobility training, transfer training, endurance training, patient/caregiver education, ADL/self-care training, and IADL training    Goals  Patient Goals: To return home   Short Term Goals:  Time Frame: Upon discharge  Patient will complete lower body ADL at modified independent   Patient will complete toileting at Independent   Patient will complete functional transfers at [facility] independent   Patient will complete functional mobility at modified independent   Patient will increase functional standing balance to 12-15 minutes mod I for improved ADL completion  Patient will complete bed mobility at Independent      Above goals reviewed on 6/5/2023. All goals are ongoing at this time unless indicated above.      Therapy Session Time     Individual Group Co-treatment   Time In    4940   Time Out    0927   Minutes    45        Timed Code Treatment Minutes:   30   Total Treatment Minutes:  45       Electronically Signed By: MARCELLUS Chan/JENNIFER [id number]

## 2023-06-06 LAB
ANION GAP SERPL CALCULATED.3IONS-SCNC: 9 MMOL/L (ref 3–16)
BUN SERPL-MCNC: 43 MG/DL (ref 7–20)
CALCIUM SERPL-MCNC: 9.1 MG/DL (ref 8.3–10.6)
CHLORIDE SERPL-SCNC: 102 MMOL/L (ref 99–110)
CO2 SERPL-SCNC: 32 MMOL/L (ref 21–32)
CREAT SERPL-MCNC: 2.1 MG/DL (ref 0.8–1.3)
FERRITIN SERPL IA-MCNC: 523.5 NG/ML (ref 30–400)
GFR SERPLBLD CREATININE-BSD FMLA CKD-EPI: 29 ML/MIN/{1.73_M2}
GLUCOSE BLD-MCNC: 140 MG/DL (ref 70–99)
GLUCOSE BLD-MCNC: 158 MG/DL (ref 70–99)
GLUCOSE BLD-MCNC: 219 MG/DL (ref 70–99)
GLUCOSE SERPL-MCNC: 133 MG/DL (ref 70–99)
INR PPP: 2.17 (ref 0.84–1.16)
MAGNESIUM SERPL-MCNC: 2.1 MG/DL (ref 1.8–2.4)
NT-PROBNP SERPL-MCNC: ABNORMAL PG/ML (ref 0–449)
PERFORMED ON: ABNORMAL
POTASSIUM SERPL-SCNC: 4.3 MMOL/L (ref 3.5–5.1)
PROTHROMBIN TIME: 24.1 SEC (ref 11.5–14.8)
SODIUM SERPL-SCNC: 143 MMOL/L (ref 136–145)

## 2023-06-06 PROCEDURE — 36415 COLL VENOUS BLD VENIPUNCTURE: CPT

## 2023-06-06 PROCEDURE — 80048 BASIC METABOLIC PNL TOTAL CA: CPT

## 2023-06-06 PROCEDURE — 51798 US URINE CAPACITY MEASURE: CPT

## 2023-06-06 PROCEDURE — 83880 ASSAY OF NATRIURETIC PEPTIDE: CPT

## 2023-06-06 PROCEDURE — 6370000000 HC RX 637 (ALT 250 FOR IP): Performed by: INTERNAL MEDICINE

## 2023-06-06 PROCEDURE — 83735 ASSAY OF MAGNESIUM: CPT

## 2023-06-06 PROCEDURE — 99232 SBSQ HOSP IP/OBS MODERATE 35: CPT | Performed by: CLINICAL NURSE SPECIALIST

## 2023-06-06 PROCEDURE — 6370000000 HC RX 637 (ALT 250 FOR IP): Performed by: NURSE PRACTITIONER

## 2023-06-06 PROCEDURE — 2700000000 HC OXYGEN THERAPY PER DAY

## 2023-06-06 PROCEDURE — 85610 PROTHROMBIN TIME: CPT

## 2023-06-06 PROCEDURE — 2580000003 HC RX 258: Performed by: INTERNAL MEDICINE

## 2023-06-06 PROCEDURE — 1200000000 HC SEMI PRIVATE

## 2023-06-06 RX ADMIN — FLUTICASONE PROPIONATE 2 SPRAY: 50 SPRAY, METERED NASAL at 08:31

## 2023-06-06 RX ADMIN — IPRATROPIUM BROMIDE 2 SPRAY: 42 SPRAY NASAL at 21:46

## 2023-06-06 RX ADMIN — IPRATROPIUM BROMIDE 2 SPRAY: 42 SPRAY NASAL at 14:20

## 2023-06-06 RX ADMIN — DOXAZOSIN 2 MG: 1 TABLET ORAL at 08:32

## 2023-06-06 RX ADMIN — POLYETHYLENE GLYCOL 3350 17 G: 17 POWDER, FOR SOLUTION ORAL at 08:32

## 2023-06-06 RX ADMIN — SODIUM CHLORIDE, PRESERVATIVE FREE 10 ML: 5 INJECTION INTRAVENOUS at 21:46

## 2023-06-06 RX ADMIN — SODIUM CHLORIDE, PRESERVATIVE FREE 10 ML: 5 INJECTION INTRAVENOUS at 08:35

## 2023-06-06 RX ADMIN — IPRATROPIUM BROMIDE 2 SPRAY: 42 SPRAY NASAL at 08:31

## 2023-06-06 RX ADMIN — METOPROLOL SUCCINATE 75 MG: 50 TABLET, EXTENDED RELEASE ORAL at 21:46

## 2023-06-06 RX ADMIN — WARFARIN SODIUM 2.5 MG: 2.5 TABLET ORAL at 17:08

## 2023-06-06 RX ADMIN — AMIODARONE HYDROCHLORIDE 200 MG: 200 TABLET ORAL at 08:32

## 2023-06-06 RX ADMIN — FERROUS SULFATE TAB 325 MG (65 MG ELEMENTAL FE) 325 MG: 325 (65 FE) TAB at 08:32

## 2023-06-06 RX ADMIN — Medication 100 MG: at 08:32

## 2023-06-06 RX ADMIN — IPRATROPIUM BROMIDE 2 SPRAY: 42 SPRAY NASAL at 17:09

## 2023-06-06 RX ADMIN — ATORVASTATIN CALCIUM 20 MG: 20 TABLET, FILM COATED ORAL at 08:32

## 2023-06-06 RX ADMIN — METOPROLOL SUCCINATE 75 MG: 50 TABLET, EXTENDED RELEASE ORAL at 08:32

## 2023-06-06 RX ADMIN — STANDARDIZED SENNA CONCENTRATE AND DOCUSATE SODIUM 2 TABLET: 8.6; 5 TABLET ORAL at 08:32

## 2023-06-06 ASSESSMENT — ENCOUNTER SYMPTOMS
COUGH: 1
ABDOMINAL DISTENTION: 1
EYES NEGATIVE: 1
ALLERGIC/IMMUNOLOGIC NEGATIVE: 1
SHORTNESS OF BREATH: 1
TROUBLE SWALLOWING: 0

## 2023-06-06 ASSESSMENT — PAIN SCALES - GENERAL: PAINLEVEL_OUTOF10: 0

## 2023-06-06 NOTE — PROGRESS NOTES
Pharmacy to Dose Warfarin    Pharmacy consulted to dose warfarin for afib. INR Goal: 2-3    INR today: 2.17    Assessment/Plan:  - Therapeutic.  - Continue home regimen with 2.5mg today. Pharmacy will continue to follow.     Ayala Wang, Lulu, BCCCP

## 2023-06-06 NOTE — PROGRESS NOTES
Patient resting in bed. Patient up x2 to bathroom. No c/o pain or discomfort at this time. Call light within reach and patient denies needs.

## 2023-06-06 NOTE — PLAN OF CARE
Problem: Discharge Planning  Goal: Discharge to home or other facility with appropriate resources  6/5/2023 2054 by Moon Torres RN  Outcome: Progressing  6/5/2023 1132 by Tex Garcia RN  Outcome: Progressing     Problem: Pain  Goal: Verbalizes/displays adequate comfort level or baseline comfort level  6/5/2023 2054 by Moon Torres RN  Outcome: Progressing  6/5/2023 1132 by Tex Garcia RN  Outcome: Progressing     Problem: Skin/Tissue Integrity  Goal: Absence of new skin breakdown  Description: 1. Monitor for areas of redness and/or skin breakdown  2. Assess vascular access sites hourly  3. Every 4-6 hours minimum:  Change oxygen saturation probe site  4. Every 4-6 hours:  If on nasal continuous positive airway pressure, respiratory therapy assess nares and determine need for appliance change or resting period.   6/5/2023 2054 by Moon Torres RN  Outcome: Progressing  6/5/2023 1132 by Tex Garcia RN  Outcome: Progressing     Problem: Safety - Adult  Goal: Free from fall injury  6/5/2023 2054 by Moon Torres RN  Outcome: Progressing  6/5/2023 1132 by Tex Garcia RN  Outcome: Progressing     Problem: ABCDS Injury Assessment  Goal: Absence of physical injury  6/5/2023 2054 by Moon Torres RN  Outcome: Progressing  6/5/2023 1132 by Tex Garcia RN  Outcome: Progressing     Problem: Chronic Conditions and Co-morbidities  Goal: Patient's chronic conditions and co-morbidity symptoms are monitored and maintained or improved  6/5/2023 2054 by Moon Torres RN  Outcome: Progressing  6/5/2023 1132 by Tex Garcia RN  Outcome: Progressing

## 2023-06-06 NOTE — CONSULTS
PALLIATIVE MEDICINE CONSULTATION     Patient name:Josep Jimenez Billing   WILL:[id number]    :1933  Room/Bed:Q5Q-5841/5902-01   LOS: 3 days         Date of consult:2023    Consult Information  Palliative Medicine Consult performed by: PRIYA Eugene CNP, CNP    Inpatient consult to Palliative Care  Consult performed by: PRIYA Eugene CNP  Consult ordered by: PRIYA Olvera CNP  Reason for consult: GOC and code status             ASSESSMENT/RECOMMENDATIONS     80 y.o. male with Edema and debility with acute on chronic CHF      Symptom Management:  Acute on Chronic CHF- pt with increased edema on admission now Cr elevated following diuresis    Goals of Care- talked to pt and daughter Veronique at bedside educated pt on CHF and the nature of the disease pt states that he has had increased issues controlling water gain and is more disabled and its hard for him to get out. His goal is to stay home not return to the hospital and focus on quality of life and comfort pt lost his wife 2 years ago and has been ok with dying since then. We talked about code status pt reports that he is DNR at baseline his wishes align with [facility]. Pt has a daughter in law that works for Hospice and recommendation by family was made to see if he would qualify for Hospice support at home. At family request referral faxed to [facility] to discuss Advanced cardiac program.     Patient/Family Goals of Care :    talked to pt and daughter Veronique at bedside educated pt on CHF and the nature of the disease pt states that he has had increased issues controlling water gain and is more disabled and its hard for him to get out. His goal is to stay home not return to the hospital and focus on quality of life and comfort pt lost his wife 2 years ago and has been ok with dying since then. We talked about code status pt reports that he is DNR at baseline his wishes align with [facility].  Pt has a daughter in law that works for Hospice and recommendation by family was made to see if he would qualify for Hospice support at home. At family request referral faxed to [facility] to discuss Advanced cardiac program.     Disposition/Discharge Plan:   pending    Advance Directives: The patient has the following advanced directives on file:  [address] [facility] Will ACP-Advance Directive ACP-Power of     Not on File Filed on 10/04/22 Filed Not on File            The patient has appointed the following active healthcare agents:    Primary Decision Maker: Abigail Jovon - Child - [phone]    Secondary Decision Maker: Don Bowen Manning - Child - [phone]    Secondary Decision Maker: Regino Lima - [phone]    The Patient has the following current code status:    Code Status: DNR-CC      Interactive exchange regarding medications,tests and procedures with: patient, floor RN, Ann-Marie POWERS  Thank you for allowing us to participate in the care of this patient. HISTORY     CC: edema  HPI: The patient is a 80 y.o. male with history of CAD with CABG, HF, chronic anemia (form of myelodysplastic syndrome), hypertension, chronic renal insufficiency, chronic AF, pacemaker, DM, hyperlipidemia He is admitted with increased shortness of breath. Palliative Medicine SymptomScreening/ROS:    Review of Systems   Constitutional:  Positive for activity change and unexpected weight change. HENT:  Negative for trouble swallowing. Eyes: Negative. Respiratory:  Positive for cough and shortness of breath. Cardiovascular:  Positive for leg swelling. Gastrointestinal:  Positive for abdominal distention. Endocrine: Negative. Genitourinary: Negative. Musculoskeletal:  Positive for arthralgias. Skin:  Positive for pallor. Allergic/Immunologic: Negative. Neurological:  Positive for weakness. Psychiatric/Behavioral:  Negative for confusion.             Palliative

## 2023-06-06 NOTE — DISCHARGE INSTR - COC
Continuity of Care Form    Patient Name: Hannah Taylor   :  1933  MRN:  [id number]    Admit date:  6/3/2023  Discharge date:  23    Code Status Order: Full Code   Advance Directives:     Admitting Physician:  Ashley Viera MD  PCP: Stuart Kelley MD    Discharging Nurse: [facility] Unit/Room#: M9C-0284/6254-03  Discharging Unit Phone Number: [phone]    Emergency Contact:   Extended Emergency Contact Information  Primary Emergency Contact: [address] [place] [address] of [facility] Phone: [phone]  Mobile Phone: [phone]  Relation: Child  Secondary Emergency Contact: Shavon BerryShailesh Golden Stagger of [facility] Phone: [phone]  Mobile Phone: [phone]  Relation: Child    Past Surgical History:  Past Surgical History:   Procedure Laterality Date    ABSCESS DRAINAGE  7/20/15    right foot    APPENDECTOMY      CARDIAC CATHETERIZATION      Left    COLONOSCOPY  2018    dr Cathy Mcdaniel    x3    [address] [employer] HISTORY Right 7/17/15    right fifth toe I and D    PACEMAKER PLACEMENT      MT ESOPHAGOGASTRODUODENOSCOPY TRANSORAL DIAGNOSTIC N/A 2018    EGD DIAGNOSTIC ONLY performed by Amrik Encinas MD at [address] [facility] Right 7.16.15    right pinkey toe     TONSILLECTOMY AND ADENOIDECTOMY         Immunization History:   Immunization History   Administered Date(s) Administered    COVID-19, PFIZER Bivalent, DO NOT Dilute, (age 12y+), IM, 30 mcg/0.3 mL 05/15/2023    COVID-19, PFIZER PURPLE top, DILUTE for use, (age 15 y+), 30mcg/0.3mL 2021, 02/10/2021    Influenza Vaccine, unspecified formulation 10/22/2015, 2016    Influenza Virus Vaccine 10/04/2010, 2011, 2013    Influenza, FLUAD, (age 72 y+), Adjuvanted, 0.5mL 10/01/2020    Influenza, High Dose (Fluzone 65 yrs and older) 2014, 2017, 10/12/2018

## 2023-06-06 NOTE — PLAN OF CARE
Problem: Discharge Planning  Goal: Discharge to home or other facility with appropriate resources  Outcome: Progressing     Problem: Pain  Goal: Verbalizes/displays adequate comfort level or baseline comfort level  Outcome: Progressing     Problem: Skin/Tissue Integrity  Goal: Absence of new skin breakdown  Description: 1. Monitor for areas of redness and/or skin breakdown  2. Assess vascular access sites hourly  3. Every 4-6 hours minimum:  Change oxygen saturation probe site  4. Every 4-6 hours:  If on nasal continuous positive airway pressure, respiratory therapy assess nares and determine need for appliance change or resting period. Outcome: Progressing     Problem: Safety - Adult  Goal: Free from fall injury  Outcome: Progressing     Problem: ABCDS Injury Assessment  Goal: Absence of physical injury  Outcome: Progressing     Problem: Chronic Conditions and Co-morbidities  Goal: Patient's chronic conditions and co-morbidity symptoms are monitored and maintained or improved  Outcome: Progressing     Weaned pt to room air this morning. Pt walked 200 feet around unit. Tolerated well. Pt's creatinine increased from 1.3 yesterday to 2.1 this morning. IV lasix on hold d/t elevated creatinine. Echo ordered for today.

## 2023-06-06 NOTE — PROGRESS NOTES
[address] Referral    Met with family and patient. Reviewed [facility] services and philosophy of care. Family and patient are interested in ACCP, but ultimately feel like they are not quite ready for hospice services. Patient wants to continue with all of his current doctors and patient and family are in agreement that palliacare may be a better fit at this time. Bedside RN updated and request for [facility] consult was made. Will continue to follow while patient is inpatient.     Juan M Lim, [facility] [address] [facility]  [phone]  [phone]

## 2023-06-07 ENCOUNTER — TELEPHONE (OUTPATIENT)
Dept: PHARMACY | Age: 88
End: 2023-06-07

## 2023-06-07 ENCOUNTER — TELEPHONE (OUTPATIENT)
Dept: FAMILY MEDICINE CLINIC | Age: 88
End: 2023-06-07

## 2023-06-07 VITALS
TEMPERATURE: 97.3 F | OXYGEN SATURATION: 93 % | HEIGHT: 70 IN | HEART RATE: 60 BPM | RESPIRATION RATE: 16 BRPM | BODY MASS INDEX: 21.36 KG/M2 | WEIGHT: 149.2 LBS | DIASTOLIC BLOOD PRESSURE: 61 MMHG | SYSTOLIC BLOOD PRESSURE: 125 MMHG

## 2023-06-07 LAB
ANION GAP SERPL CALCULATED.3IONS-SCNC: 8 MMOL/L (ref 3–16)
BUN SERPL-MCNC: 49 MG/DL (ref 7–20)
CALCIUM SERPL-MCNC: 9 MG/DL (ref 8.3–10.6)
CHLORIDE SERPL-SCNC: 99 MMOL/L (ref 99–110)
CO2 SERPL-SCNC: 30 MMOL/L (ref 21–32)
CREAT SERPL-MCNC: 1.6 MG/DL (ref 0.8–1.3)
GFR SERPLBLD CREATININE-BSD FMLA CKD-EPI: 41 ML/MIN/{1.73_M2}
GLUCOSE BLD-MCNC: 168 MG/DL (ref 70–99)
GLUCOSE BLD-MCNC: 169 MG/DL (ref 70–99)
GLUCOSE SERPL-MCNC: 138 MG/DL (ref 70–99)
INR PPP: 1.89 (ref 0.84–1.16)
LV EF: 33 %
LVEF MODALITY: NORMAL
PERFORMED ON: ABNORMAL
PERFORMED ON: ABNORMAL
POTASSIUM SERPL-SCNC: 4.1 MMOL/L (ref 3.5–5.1)
PROTHROMBIN TIME: 21.6 SEC (ref 11.5–14.8)
SODIUM SERPL-SCNC: 137 MMOL/L (ref 136–145)

## 2023-06-07 PROCEDURE — 94761 N-INVAS EAR/PLS OXIMETRY MLT: CPT

## 2023-06-07 PROCEDURE — 36415 COLL VENOUS BLD VENIPUNCTURE: CPT

## 2023-06-07 PROCEDURE — 80048 BASIC METABOLIC PNL TOTAL CA: CPT

## 2023-06-07 PROCEDURE — 93306 TTE W/DOPPLER COMPLETE: CPT

## 2023-06-07 PROCEDURE — 2700000000 HC OXYGEN THERAPY PER DAY

## 2023-06-07 PROCEDURE — 6370000000 HC RX 637 (ALT 250 FOR IP): Performed by: NURSE PRACTITIONER

## 2023-06-07 PROCEDURE — 97535 SELF CARE MNGMENT TRAINING: CPT

## 2023-06-07 PROCEDURE — 6370000000 HC RX 637 (ALT 250 FOR IP): Performed by: INTERNAL MEDICINE

## 2023-06-07 PROCEDURE — 6370000000 HC RX 637 (ALT 250 FOR IP): Performed by: CLINICAL NURSE SPECIALIST

## 2023-06-07 PROCEDURE — 99232 SBSQ HOSP IP/OBS MODERATE 35: CPT | Performed by: CLINICAL NURSE SPECIALIST

## 2023-06-07 PROCEDURE — 2580000003 HC RX 258: Performed by: INTERNAL MEDICINE

## 2023-06-07 PROCEDURE — 85610 PROTHROMBIN TIME: CPT

## 2023-06-07 RX ORDER — SENNA AND DOCUSATE SODIUM 50; 8.6 MG/1; MG/1
1 TABLET, FILM COATED ORAL DAILY PRN
Qty: 30 TABLET | Refills: 0 | Status: SHIPPED | OUTPATIENT
Start: 2023-06-07

## 2023-06-07 RX ORDER — WARFARIN SODIUM 2.5 MG/1
1.25 TABLET ORAL
Status: DISCONTINUED | OUTPATIENT
Start: 2023-06-14 | End: 2023-06-07 | Stop reason: HOSPADM

## 2023-06-07 RX ORDER — POLYETHYLENE GLYCOL 3350 17 G/17G
17 POWDER, FOR SOLUTION ORAL DAILY
Qty: 527 G | Refills: 0 | COMMUNITY
Start: 2023-06-08 | End: 2023-07-08

## 2023-06-07 RX ORDER — WARFARIN SODIUM 2.5 MG/1
2.5 TABLET ORAL
Status: DISCONTINUED | OUTPATIENT
Start: 2023-06-08 | End: 2023-06-07 | Stop reason: HOSPADM

## 2023-06-07 RX ORDER — FUROSEMIDE 20 MG/1
20 TABLET ORAL DAILY
Qty: 30 TABLET | Refills: 0 | Status: SHIPPED | OUTPATIENT
Start: 2023-06-08 | End: 2023-06-29 | Stop reason: ALTCHOICE

## 2023-06-07 RX ORDER — FUROSEMIDE 20 MG/1
20 TABLET ORAL DAILY
Status: DISCONTINUED | OUTPATIENT
Start: 2023-06-07 | End: 2023-06-07 | Stop reason: HOSPADM

## 2023-06-07 RX ADMIN — Medication 100 MG: at 09:02

## 2023-06-07 RX ADMIN — FERROUS SULFATE TAB 325 MG (65 MG ELEMENTAL FE) 325 MG: 325 (65 FE) TAB at 09:02

## 2023-06-07 RX ADMIN — IPRATROPIUM BROMIDE 2 SPRAY: 42 SPRAY NASAL at 09:04

## 2023-06-07 RX ADMIN — EMPAGLIFLOZIN 10 MG: 10 TABLET, FILM COATED ORAL at 10:21

## 2023-06-07 RX ADMIN — FLUTICASONE PROPIONATE 2 SPRAY: 50 SPRAY, METERED NASAL at 09:04

## 2023-06-07 RX ADMIN — SODIUM CHLORIDE, PRESERVATIVE FREE 10 ML: 5 INJECTION INTRAVENOUS at 09:05

## 2023-06-07 RX ADMIN — FUROSEMIDE 20 MG: 20 TABLET ORAL at 10:21

## 2023-06-07 RX ADMIN — METOPROLOL SUCCINATE 75 MG: 50 TABLET, EXTENDED RELEASE ORAL at 09:02

## 2023-06-07 RX ADMIN — DOXAZOSIN 2 MG: 1 TABLET ORAL at 09:02

## 2023-06-07 RX ADMIN — ATORVASTATIN CALCIUM 20 MG: 20 TABLET, FILM COATED ORAL at 09:02

## 2023-06-07 RX ADMIN — STANDARDIZED SENNA CONCENTRATE AND DOCUSATE SODIUM 2 TABLET: 8.6; 5 TABLET ORAL at 09:02

## 2023-06-07 RX ADMIN — IPRATROPIUM BROMIDE 2 SPRAY: 42 SPRAY NASAL at 11:50

## 2023-06-07 RX ADMIN — POLYETHYLENE GLYCOL 3350 17 G: 17 POWDER, FOR SOLUTION ORAL at 09:05

## 2023-06-07 RX ADMIN — AMIODARONE HYDROCHLORIDE 200 MG: 200 TABLET ORAL at 09:02

## 2023-06-07 ASSESSMENT — PAIN SCALES - GENERAL
PAINLEVEL_OUTOF10: 0
PAINLEVEL_OUTOF10: 0

## 2023-06-07 NOTE — PLAN OF CARE
Problem: Discharge Planning  Goal: Discharge to home or other facility with appropriate resources  6/6/2023 2249 by Marcus Mclaughlin RN  Outcome: Progressing     Problem: Pain  Goal: Verbalizes/displays adequate comfort level or baseline comfort level  6/6/2023 2249 by Marcus Mclaughlin RN  Outcome: Progressing     Problem: Skin/Tissue Integrity  Goal: Absence of new skin breakdown  Description: 1. Monitor for areas of redness and/or skin breakdown  2. Assess vascular access sites hourly  3. Every 4-6 hours minimum:  Change oxygen saturation probe site  4. Every 4-6 hours:  If on nasal continuous positive airway pressure, respiratory therapy assess nares and determine need for appliance change or resting period. 6/6/2023 2249 by Marcus Mclaughlin RN  Outcome: Progressing     Problem: Safety - Adult  Goal: Free from fall injury  6/6/2023 2249 by Marcus Mclaughlin RN  Outcome: Progressing  Note: Fall precautions in place, bed alarm on, nonskid foot wear applied, bed in lowest position, and call light within reach. Will continue to monitor. Problem: ABCDS Injury Assessment  Goal: Absence of physical injury    Problem: Chronic Conditions and Co-morbidities  Goal: Patient's chronic conditions and co-morbidity symptoms are monitored and maintained or improved  6/6/2023 2249 by Marcus Mclaughlin RN  Outcome: Progressing  Flowsheets (Taken 6/6/2023 2249)  Care Plan - Patient's Chronic Conditions and Co-Morbidity Symptoms are Monitored and Maintained or Improved:   Monitor and assess patient's chronic conditions and comorbid symptoms for stability, deterioration, or improvement   Collaborate with multidisciplinary team to address chronic and comorbid conditions and prevent exacerbation or deterioration   Update acute care plan with appropriate goals if chronic or comorbid symptoms are exacerbated and prevent overall improvement and discharge  Note: Nephrology and cardiology following. I&O. Daily weight. Blood sugar check ac/hs.  Sliding scale insulin per [facility]. Will continue to monitor.

## 2023-06-07 NOTE — PROGRESS NOTES
Pharmacy to Dose Warfarin    Pharmacy consulted to dose warfarin for afib. INR Goal: 2-3    INR today: 1.89    Assessment/Plan:  - INR subtherapeutic today  - Boost with 3 mg warfarin tonight then resume home regimen. Pharmacy will continue to follow.     Ashli Morrow, PharmD  PGY-1 Pharmacy Resident  [id number]

## 2023-06-07 NOTE — PROGRESS NOTES
[facility] order obtained from Dr. Katarzyna Sanchez and entered for when patient discharges home per patient and family request.    Dayne Alfaro RN  [facility] #: [phone]  Cell: [phone]

## 2023-06-07 NOTE — PROGRESS NOTES
Incentive Spirometry education and demonstration completed by Respiratory Therapy Yes      Response to education: Very Good     Teaching Time: 5 minutes    Minimum Predicted Vital Capacity - 730 mL. Patient's Actual Vital Capacity - 1000 mL. Turning over to Nursing for routine follow-up Yes.       Electronically signed by Nathaly Stratton RCP on 6/7/2023 at 9:09 AM

## 2023-06-07 NOTE — PROGRESS NOTES
Equipment: grab bars in shower, grab bars around toilet, built in shower seat  Toilet Height: elevated height  Home Equipment: rolling walker, rollator - 4 wheeled walker, single point cane, alert button, tri-cane and wheelchair  Transfer Assistance: modified independent with use of rolling walker  Ambulation Assistance:modified independent with use of rolling walker  ADL Assistance: independent with all ADL's around the home  IADL Assistance: requires assistance with all homemaking tasks; daughter preps meals and pt warms up alone at home; daughter and daughter-in-law helps with all/most IADLs around home such as cleaning, and cooking. Active :        [x] Yes                 [] No; stays within a close distance to home to [employer], [employer], etc.  Hand Dominance: [] Left                 [x] Right  Current Employment: retired. Occupation: tax; [employer]  Hobbies: crossword puzzles and makes his own dictionary  Recent Falls: No falls within the last 6 months      Subjective  General: Patient supine in bed on therapy arrival, very pleasant and agreeable to OT tx. Pt's daughter and THOMAS present. Soon after session initiated, pt taken JOSELITO for ECHO. After pt's return, OT returned as pt needed to get dressed for discharge. Pt supine in bed with daughter present upon arrival and at end of session. Pain: 0/10  Pain Interventions: not applicable        Activities of Daily Living  Basic Activities of Daily Living  Upper Extremity Dressing: Independent don pullover polo shirt  Lower Extremity Dressing: supervision don/doff boxer shorts, don long pants, including belt, don/doff socks, don sandals. Comment: Pt declined need for grooming, toileting, reporting that he completed all of these activities earlier. Declined need for bathing as well. Instrumental Activities of Daily Living  No IADL completed on this date.     Functional Mobility  Bed Mobility  Supine to Sit: modified independent  Comments:   Transfers  Sit to stand transfer:stand by assistance, ~3 times from EOB  Stand to sit transfer: stand by assistance, ~3 times to EOB  Comments:  Functional Mobility:  Sitting Balance: Independent. Standing Balance: supervision, stand by assistance. Other Therapeutic Interventions    Functional Outcomes  AM-PAC Inpatient Daily Activity Raw Score: 20    Cognition  WFL  Orientation:    alert and oriented x 4  Command Following:   [facility]     Education  Barriers To Learning: hearing  Patient Education: patient educated on goals, OT role and benefits, plan of care, discharge recommendations  Learning Assessment:  patient verbalizes and demonstrates understanding    Assessment  Activity Tolerance: Patient tolerated well  Impairments Requiring Therapeutic Intervention: decreased functional mobility, decreased ADL status, decreased endurance, decreased balance, decreased IADL, decreased posture  Prognosis: good  Clinical Assessment: Patient presents with the above deficits, which are below baseline, and would benefit from continued skilled OT to address. Safety Interventions: call light within reach, nurse notified, family/caregiver present, and Pt seated on EOB, preparing for discharge    Plan  Frequency: 1-2 x/per week  Current Treatment Recommendations: strengthening, balance training, functional mobility training, transfer training, endurance training, patient/caregiver education, ADL/self-care training, and IADL training    Goals  Patient Goals:  To return home   Short Term Goals:  Time Frame: Upon discharge  Patient will complete lower body ADL at modified independent   Patient will complete toileting at Independent   Patient will complete functional transfers at modified independent   Patient will complete functional mobility at modified independent   Patient will increase functional standing balance to 12-15 minutes mod I for improved ADL completion  Patient will complete bed mobility at Independent      Above goals reviewed on

## 2023-06-07 NOTE — CARE COORDINATION
06/07/23 1405   IMM Letter   IMM Letter given to Patient/Family/Significant other/Guardian/POA/by: Second IMM givento patient and daughter( POA) by CM   IMM Letter date given: 06/07/23   IMM Letter time given: 9836     Patient and daughter in agreement with not having the four hours Notice.

## 2023-06-07 NOTE — CARE COORDINATION
Discharge Planning Note Re: [id number] [facility] [place] noted consult for discharge planning. Chart reviewed. Noted recommendations for home health care. CM met with patient and daughter . Introduced self and explained role of CM and discharge planning. Patient is agreeable to home health on dc. [place] of choice list was provided with basic dialogue that supports the patient's individualized plan of care/goals, treatment preferences and shares the quality data associated with the providers. [x] Yes [] No.  Patient and daughter have reviewed the provided list and made selections. Referral made to     [address] [place] [address] Road order for  [x]RN [x]PT  [x]OT  []HHA  []SW  []  SLP    Referral was accepted , will call patient within 24-48 hours to schedule the visits.

## 2023-06-07 NOTE — TELEPHONE ENCOUNTER
Harsha Briceño w/ [facility] called stating pt was d/c'd from [facility] today, the earliest she can check is INR is on 6/13, wanted to know if [facility] was ok with checking on that date. LVM to let aHrsha Briceño know I s/w Jonah who ok'd INR to be checked on Tuesday.

## 2023-06-07 NOTE — DISCHARGE SUMMARY
cleared for discharge    Assessment and Plan:  Acute on chronic combined s/dCHF  - EF 30-35%, grade II diastolic dysfunction (echo 1/2023)  - Feel better, has been diuresed and down 4 L  - Lasix held x 1 day with improved cr. Nephrology and cardiology recommend lasix 20 mg daily at discharge  - Remains on RA, lungs diminished  - Continue I&O, daily weights  - Continue metoprolol, no ACE/ARB/ARNI secondary to hyperkalemia. Jardiance held for diuresis- resumed today per cards. neprho  - Echo pending, family upset not completed and patient wants to go home. Cards OK with patient discharging after echo completed and prior to being read. CAD / elevated troponin  - Hx CABG (1996)  - [facility] 10/2021 showed severe MVD, patent LIMA-LAD and SVG-OM1 grafts, successful ALEJANDRA to RPDA  - HS troponin 75->68->71 in setting CHF exacerbation and CKD. Likely supply/demand mismatch. - Cardiology evaluated, no further testing planned  - Continue statin, beta blocker. No asa due to warfarin      PAF  - Atrial paced rhythm  - Continue amiodarone, warfarin     CKD stage III  - Creatinine 1.6 on presentation, creatinine ranging 1.4-1.9 recently (REYNA yesterday cr 2.1-->1.6 today   - Bladder scan 6/6 was acceptably and not consistent with retention  - Appreciate nephrology recs- OK with discharge with OP follow up     DM2  - A1c 4.6 (4/2023)  - Takes Jardiance at home   - BG values reviewed, 133-170- stable     HTN  - Controlled. Continue current medications, ACE? ARB held hyperkalemia     Anemia  - Hgb 8.2 on presentation, which appears baseline; Hgb 8.8 today  - Received 1 unit PRBC 6/4 per cardiology recs  - Iron levels lower limits normal  - Continue po iron supplement  - CBC as OP in 1 week    Discussed with patient and family regarding echo, they are upset it is not completed already as it was discontinued per palliative care. Long discussion regarding indication with family. Discussed delay of echo with Dr. Danelle Kohler.   Discussed case tylenol (acetamenophen) in them     amiodarone 200 MG tablet  Commonly known as: CORDARONE  TAKE 1 TABLET BY MOUTH DAILY  Notes to patient: Use: antiarrhythmic   Side effects: nausea, ,fatigue, constipation, headache. atorvastatin 20 MG tablet  Commonly known as: LIPITOR  TAKE 1 TABLET BY MOUTH DAILY  Notes to patient: Use: lower bad cholesterol  Side effects: headache, muscle pain, constipation, diarrhea     Calmoseptine 0.44-20.6 % Oint ointment  Generic drug: menthol-zinc oxide  Apply twice daily to sacral area. Max 30 ml per day. doxazosin 2 MG tablet  Commonly known as: CARDURA  TAKE 1 TABLET BY MOUTH DAILY  Notes to patient: Use: for BPH or high blood pressure  Side effect: dizziness, headache      EPOETIN BIBIANA IJ  Notes to patient: Use: treatment of anemia due to chronic kidney disease     ferrous sulfate 325 (65 Fe) MG tablet  Commonly known as: IRON 325  Notes to patient: Use: iron supplement  Side effects: dark stools     fluticasone 50 MCG/ACT nasal spray  Commonly known as: FLONASE  2 sprays by Each Nostril route daily  Notes to patient: Fluticasone is corticosteroid that prevents the release of substances in the body that cause inflammation.  Flonase nasal spray is used to treat nasal congestion, sneezing, runny nose, and itchy or watery eyes caused by seasonal or year-round allergies  Side effects: cough, headache, bloody mucous or unexplained nose bleeds     ipratropium 0.06 % nasal spray  Commonly known as: ATROVENT  2 sprays by Each Nostril route 4 times daily  Notes to patient: Use: opens airways  Side effects: wheezing, headache & dry mouth     metoprolol succinate 50 MG extended release tablet  Commonly known as: TOPROL XL  TAKE 1 AND 1/2 TABLETS BY MOUTH IN THE MORNING AND AT BEDTIME  Notes to patient: Use: to treat weak heart or high blood pressure  Side effects: feeling dizzy, tired, or weak     sodium chloride 0.65 % nasal spray  Commonly known as: OCEAN, BABY AYR  2 sprays by Nasal

## 2023-06-07 NOTE — PROGRESS NOTES
Data- discharge order received, pt verbalized agreement to discharge, needs for [facility] with [facility] DEACONESS for PT/OT and/or new Durable Medical Equipment, ROMAN reviewed and signed by MD, to be completed by RN. Action- AVS prepared, discharge instructions prepared and given to pt and daughter, medication information packet given r/t NEW or CHANGED prescriptions, pt verbalized understanding further self-review. D/C instruction summary: Diet- regular-low salt, Activity- up as tolerated with walker, follow up with Primary Care Physician Kaylie Downs -[phone] appointment moved up to June 12, immunizations reviewed, medications prescriptions to be filled at pharmacy of choice. Inpatient surgical procedural reviewed: Echo. . Contact information provided to above agencies used. Response- Case Management/ reported faxing completed ROMAN and AVS to needed HHC/DME services stated above. Pt belongings gathered, IV removed, pt dressed. Disposition is home with HHC/DME as stated above, transported with belongings, taken to lobby via w/c with daughter and staff, no complications. 1. WEIGHT: Admit Weight - Scale: 150 lb (68 kg) (06/03/23 1205)        Today  Weight - Scale: 149 lb 3.2 oz (67.7 kg) (06/07/23 0902)       2.  O2 SAT.: SpO2: 93 % (06/07/23 1145)

## 2023-06-07 NOTE — PROGRESS NOTES
[address]   Daily Progress Note      Admit Date:  6/3/2023    HPI:    Mr. Nazario Bowen is an 80year old male with history of CAD with CABG, HF, chronic anemia (form of myelodysplastic syndrome), hypertension, chronic renal insufficiency, chronic AF, pacemaker, DM, hyperlipidemia     He is admitted with increased shortness of breath. Bnp 32K cxr with trace left pleural effusion. Not iron deficient. He follows with hematologist at [facility] and has not responded to epogen. He received a unit of blood and feels much better      Subjective:  Patient is being seen for acute on chronic . There were no acute overnight cardiac events. He is sitting up in the chair on room air today and feels great. His daughter is at his side. No shortness of breath, chest pain or edema. He wants to go home with palliative/home care. They want the echo done. Weight by me is 149  Creat 1.3-2.1-1.6 and -3.8 L out weight [phone] bnp 32K-34K    Objective:   /71   Pulse 64   Temp 97.8 °F (36.6 °C) (Temporal)   Resp 16   Ht 5' 10\" (1.778 m)   Wt 154 lb (69.9 kg)   SpO2 92%   BMI 22.10 kg/m²     Intake/Output Summary (Last 24 hours) at 6/7/2023 0856  Last data filed at 6/7/2023 0803  Gross per 24 hour   Intake 480 ml   Output 725 ml   Net -245 ml          Physical Exam:  General:  Awake, alert, oriented in NAD  Skin:  Warm and dry. No unusual bruising or rash  Neck:  Supple. No JVD or carotid bruit appreciated  Chest:  Normal effort.   Rales bilaterally in the bases  Cardiovascular:  RRR, S1/S2, +murmur  Abdomen:  Soft, nontender, +bowel sounds  Extremities:  No edema  Neurological: No focal deficits  Psychological: Normal mood and affect      Medications:    polyethylene glycol  17 g Oral Daily    sennosides-docusate sodium  2 tablet Oral Daily    thiamine  100 mg Oral Daily    sodium chloride flush  5-40 mL IntraVENous 2 times per day    amiodarone  200 mg Oral Daily    atorvastatin  20 mg Oral

## 2023-06-07 NOTE — PROGRESS NOTES
[facility]   Nephrology Progress Note  [address]   [facility] [employer]. [employer]         Reason for Consult:  REYNA    HISTORY OF PRESENT ILLNESS:      The patient is a 80 y.o. male with significant past medical history of CHD, afib, anemia, hypertension, CKD stage IIIb, follows with Dr. Kimberly Avila, baseline Cr low to mid 1 range who presents with shortness of breath and hypoxia. Noted to have CHF exacerbation and worsening anemia and started on IV diuresis. This morning, upon my evaluation, patient reports feeling better than yesterday and leg swelling has improved. Remains on nasal canula. Subjective:  -pt seen and examined  -PMSHx and meds reviewed  -family at bedside    No acute events ON  BP stable  Off oxygen  Lasix held due to REYNA  Cr improved      ROS: neg chest pain/SOB better      PHYSICAL EXAM:    Vitals:    /71   Pulse 64   Temp 97.8 °F (36.6 °C) (Temporal)   Resp 16   Ht 5' 10\" (1.778 m)   Wt 154 lb (69.9 kg)   SpO2 92%   BMI 22.10 kg/m²   I/O last 3 completed shifts: In: 5 [P.O.:720]  Out: 1250 [Urine:1250]  I/O this shift:  In: 240 [P.O.:240]  Out: 300 [Urine:300]    Physical Exam:  Gen:  alert, oriented x 3  PERRL , EOM +pallor +, No icterus  CV: R, +S1/S2, RRR, + murmur  Lungs:B/ L air entry, coarse crackles  Abd: soft, bowel sounds + , NT  Ext: No edema, no cyanosis  Skin: Warm.   No rash  Neuro: nonfocal.      DATA:    CBC:   Lab Results   Component Value Date/Time    WBC 6.1 06/04/2023 04:24 AM    RBC 2.40 06/04/2023 04:24 AM    HGB 8.8 06/05/2023 04:34 AM    HCT 26.0 06/05/2023 04:34 AM    .5 06/04/2023 04:24 AM    MCH 33.6 06/04/2023 04:24 AM    MCHC 33.1 06/04/2023 04:24 AM    RDW 22.6 06/04/2023 04:24 AM     06/04/2023 04:24 AM    MPV 9.2 06/04/2023 04:24 AM     BMP:    Lab Results   Component Value Date/Time     06/07/2023 06:11 AM    K 4.1 06/07/2023 06:11 AM    K 4.7 06/03/2023 12:33 PM    CL 99 06/07/2023 06:11 AM    CO2 30 2.1<--1.6 today  -lasix held, given improvement OK to resume lasix 20mg PO     CKD stage 3b- follows with Dr. Ailyn Wilson, baseline varies as low as 1.1-as high as 1.6  -presumed HTN NS/senescence/type 2 CRS  -Cr 1.6<--2.1 -lasix held 6/6/23  Plan:  -OK to resume lasix 20mg daily and Jardiance 10mg daily  -will need close follow up   -follow up with Dr. Retia Nyhan within 2 weeks    CHF exacerbation - Has diuresed well with IV lasix.   -lasix held yesterday due to REYNA  -OK to resume lasix and SLGT2I  -cr back to baseline-start torsemide as outlined above      Anemia - acute on chronic. Perhaps symptomatic. PRBC transfusion planned.   -s/p PRBC  -hgb improved from 8.0 to 9.9, now back down to 8.8  -started retacrit    Hypertension. BP controlled. Continue current meds.   -avoid hyotension      Thank you for allowing me to participate in the care of this patient. I will continue to follow along. Please contact via [employer] if any questions or concerns.        Michelle Arthur MD  6/7/2023

## 2023-06-07 NOTE — PROGRESS NOTES
Occupational Therapy  Cris López      Initiated OT treatment with pt. Transport arrived to take pt to echo. Will attempt again later as schedule allows. Thank you. 6 min spent with pt.  Kevin Perdomo., OTR/L, [id number]

## 2023-06-07 NOTE — TELEPHONE ENCOUNTER
Femi Score from [employer] pt is being discharged home today. Would like to get verbal for SN, PT, and OT.      Trbraden Score can be reached at [phone]

## 2023-06-07 NOTE — PLAN OF CARE
Problem: Discharge Planning  Goal: Discharge to home or other facility with appropriate resources  6/7/2023 1006 by Yadira Carter RN  Outcome: Progressing  6/6/2023 2249 by Jace Odonnell RN  Outcome: Progressing     Problem: Pain  Goal: Verbalizes/displays adequate comfort level or baseline comfort level  6/7/2023 1006 by Yadira Carter RN  Outcome: Progressing  6/6/2023 2249 by Jace Odonnell RN  Outcome: Progressing     Problem: Skin/Tissue Integrity  Goal: Absence of new skin breakdown  Description: 1. Monitor for areas of redness and/or skin breakdown  2. Assess vascular access sites hourly  3. Every 4-6 hours minimum:  Change oxygen saturation probe site  4. Every 4-6 hours:  If on nasal continuous positive airway pressure, respiratory therapy assess nares and determine need for appliance change or resting period. 6/7/2023 1006 by Yadira Carter RN  Outcome: Progressing  6/6/2023 2249 by Jace Odonnell RN  Outcome: Progressing     Problem: Safety - Adult  Goal: Free from fall injury  6/7/2023 1006 by Yadira Carter RN  Outcome: Progressing  6/6/2023 2249 by Jace Odonnell RN  Outcome: Progressing  Note: Fall precautions in place, bed alarm on, nonskid foot wear applied, bed in lowest position, and call light within reach. Will continue to monitor.             Problem: ABCDS Injury Assessment  Goal: Absence of physical injury  Outcome: Progressing     Problem: Chronic Conditions and Co-morbidities  Goal: Patient's chronic conditions and co-morbidity symptoms are monitored and maintained or improved  6/7/2023 1006 by Yadira Carter RN  Outcome: Progressing  6/6/2023 2249 by Jace Odonnell RN  Outcome: Progressing  Flowsheets (Taken 6/6/2023 2249)  Care Plan - Patient's Chronic Conditions and Co-Morbidity Symptoms are Monitored and Maintained or Improved:   Monitor and assess patient's chronic conditions and comorbid symptoms for stability, deterioration, or improvement   Collaborate with multidisciplinary team to address chronic and comorbid conditions and prevent exacerbation or deterioration   Update acute care plan with appropriate goals if chronic or comorbid symptoms are exacerbated and prevent overall improvement and discharge  Note: Nephrology and cardiology following. I&O. Daily weight. Echo reordered for today. PO Lasix and Jardiance to restart today.

## 2023-06-07 NOTE — CARE COORDINATION
[facility] home care referral. Spoke with pt 's daughter Isha Blancas and re: home care plan of care/services. Agreeable. Demographic's verified. Will follow for home care.

## 2023-06-08 ENCOUNTER — TELEPHONE (OUTPATIENT)
Dept: OTHER | Age: 88
End: 2023-06-08

## 2023-06-08 NOTE — TELEPHONE ENCOUNTER
Daughter called to ask what to give patient until [facility] apt on Tuesday, gave him regular warfarin dose last night. Pt was admitted 6/3-6/7 for CHF. Declined hospice, opted to discharge with palliative care. Have [facility] but they cannot go until Tuesday. Added Jardiance on dc, adjusted lasix,     6/3-6/6 got 2.5mg warfarin daily     6/3- INR 2.02  6/4- INR 2.18  6/5- INR 2.05  6/6- INR 2.17  6/7- INR 1.89    Will have patient take 2.5mg daily until Tuesday when [facility] can check INR. Daughter states they may actually check INR on Sunday, explained we won't be there so will have to be called into doctor. Daughter is going on an [employer] cruise and asked me to update brother Frankie Felton. Called Frankie Felton and M.     Delroy Brown, PharmD, [facility] Tracking Only    Intervention Detail: Dose Adjustment: 1, reason: Therapy Optimization  Total # of Interventions Recommended: 1  Total # of Interventions Accepted: 1  Time Spent (min): 15

## 2023-06-08 NOTE — TELEPHONE ENCOUNTER
[address] [facility] [address] FAILURE PROGRAM  TELEPHONE ENCOUNTER FORM    Merari Pisano 11/12/1933    Attempted to call patient for HF follow-up. No answer at this time.       Next MD/ Clinic appointment: 6/12 with PCP and 6/13 with Jean Marie GILMAN, JOSHUA 6/8/2023 12:54 PM

## 2023-06-09 ENCOUNTER — TELEPHONE (OUTPATIENT)
Dept: OTHER | Age: 88
End: 2023-06-09

## 2023-06-09 NOTE — TELEPHONE ENCOUNTER
Two more attempts to reach patient unsuccessful.  Patient has follow up on Monday with PCP and Tuesday with HF NP

## 2023-06-09 NOTE — PROGRESS NOTES
Physician Progress Note      PATIENT:               Segundo Delgado  CSN #:                  [id number]  :                       1933  ADMIT DATE:       6/3/2023 12:01 PM  DISCH DATE:  RESPONDING  PROVIDER #:        Kvng Dorantes CNP          QUERY TEXT:    Patient admitted with Acute on chronic systolic heart failure. Documented in   H&P: Demand ischemia/NSTEMI 2 secondary decompensated CHF. If possible, please   document in the progress notes and discharge summary whether: The medical record reflects the following:  Risk Factors: Decompensated CHF  Clinical Indicators: Troponin 75, Per H&P: Demand ischemia/NSTEMI 2 secondary   decompensated CHF; per D/C Summary: troponin 75->68->71 in setting CHF   exacerbation and CKD. Likely supply/demand mismatch. Treatment: Cardiology consult  Options provided:  -- NSTEMI Type 2 ruled in  -- NSTEMI Type 2 ruled out after studies  -- Other - I will add my own diagnosis  -- Disagree - Not applicable / Not valid  -- Disagree - Clinically unable to determine / Unknown  -- Refer to Clinical Documentation Reviewer    PROVIDER RESPONSE TEXT:    NSTEMI Type 2 ruled out after study.     Query created by: Darshan Parisi on 2023 6:40 AM      Electronically signed by:  Kvng Dorantes CNP 2023 7:17 AM

## 2023-06-12 ENCOUNTER — TELEPHONE (OUTPATIENT)
Dept: FAMILY MEDICINE CLINIC | Age: 88
End: 2023-06-12

## 2023-06-12 DIAGNOSIS — D63.1 ANEMIA DUE TO STAGE 3B CHRONIC KIDNEY DISEASE (HCC): ICD-10-CM

## 2023-06-12 DIAGNOSIS — I48.11 LONGSTANDING PERSISTENT ATRIAL FIBRILLATION (HCC): ICD-10-CM

## 2023-06-12 DIAGNOSIS — N18.30 CKD STAGE 3 DUE TO TYPE 2 DIABETES MELLITUS (HCC): ICD-10-CM

## 2023-06-12 DIAGNOSIS — N18.32 ANEMIA DUE TO STAGE 3B CHRONIC KIDNEY DISEASE (HCC): ICD-10-CM

## 2023-06-12 DIAGNOSIS — E11.22 CKD STAGE 3 DUE TO TYPE 2 DIABETES MELLITUS (HCC): ICD-10-CM

## 2023-06-12 LAB
ANION GAP SERPL CALCULATED.3IONS-SCNC: 13 MMOL/L (ref 3–16)
BUN SERPL-MCNC: 37 MG/DL (ref 7–20)
CALCIUM SERPL-MCNC: 8.7 MG/DL (ref 8.3–10.6)
CHLORIDE SERPL-SCNC: 104 MMOL/L (ref 99–110)
CO2 SERPL-SCNC: 26 MMOL/L (ref 21–32)
CREAT SERPL-MCNC: 1.6 MG/DL (ref 0.8–1.3)
DEPRECATED RDW RBC AUTO: 21.4 % (ref 12.4–15.4)
GFR SERPLBLD CREATININE-BSD FMLA CKD-EPI: 41 ML/MIN/{1.73_M2}
GLUCOSE SERPL-MCNC: 171 MG/DL (ref 70–99)
HCT VFR BLD AUTO: 26.8 % (ref 40.5–52.5)
HGB BLD-MCNC: 8.9 G/DL (ref 13.5–17.5)
INR PPP: 2.65 (ref 0.84–1.16)
MCH RBC QN AUTO: 33 PG (ref 26–34)
MCHC RBC AUTO-ENTMCNC: 33.2 G/DL (ref 31–36)
MCV RBC AUTO: 99.4 FL (ref 80–100)
PLATELET # BLD AUTO: 246 K/UL (ref 135–450)
PLATELET BLD QL SMEAR: ADEQUATE
PMV BLD AUTO: 9.7 FL (ref 5–10.5)
POTASSIUM SERPL-SCNC: 4.5 MMOL/L (ref 3.5–5.1)
PROTHROMBIN TIME: 28.1 SEC (ref 11.5–14.8)
RBC # BLD AUTO: 2.69 M/UL (ref 4.2–5.9)
SLIDE REVIEW: ABNORMAL
SODIUM SERPL-SCNC: 143 MMOL/L (ref 136–145)
WBC # BLD AUTO: 5.4 K/UL (ref 4–11)

## 2023-06-12 NOTE — TELEPHONE ENCOUNTER
Mechelle Harman from [address] [place] [address] a verbal on skilled nursing, PT and OT  And needs a verbal for the stage 2 pressure ulcer on buttock

## 2023-06-12 NOTE — TELEPHONE ENCOUNTER
Yes, please, for all 3 disciplines and for treatment of wound. He has an appt with me today. Thanks.

## 2023-06-12 NOTE — TELEPHONE ENCOUNTER
Lvm for Frankey Buggy w verbal Patient tolerated pelvic exam well with complaint of mild pain in the right side.

## 2023-06-13 DIAGNOSIS — I50.42 CHRONIC COMBINED SYSTOLIC AND DIASTOLIC CONGESTIVE HEART FAILURE (HCC): ICD-10-CM

## 2023-06-13 LAB — NT-PROBNP SERPL-MCNC: ABNORMAL PG/ML (ref 0–449)

## 2023-06-16 PROBLEM — D63.1 ANEMIA IN CHRONIC RENAL DISEASE: Status: ACTIVE | Noted: 2023-06-16

## 2023-06-16 PROBLEM — N18.9 ANEMIA IN CHRONIC RENAL DISEASE: Status: ACTIVE | Noted: 2023-06-16

## 2023-06-19 ENCOUNTER — TELEPHONE (OUTPATIENT)
Dept: CARDIOLOGY CLINIC | Age: 88
End: 2023-06-19

## 2023-06-19 NOTE — TELEPHONE ENCOUNTER
CARDIAC CLEARANCE     What type of procedure are you having? 2- extractions    Which physician is performing your procedure? Dr Monster Hazel DDS  When is your procedure scheduled for?  6/26  Where are you having this procedure? [address] [place] [address]  Are you taking Blood Thinners? Warfarin   If so what? (Name/dose/frequesncy)  Take 1/2 tablet M, W and F taking 1 tablet other days   Does the surgeon want you to stop your blood thinner? Yes  If so for how long? 2- days   Phone Number and Contact Name for Physicians office:  [phone]  Fax number to send information:   They did not have fax number     Daughter Dejon Osorio would also like call back

## 2023-06-20 ENCOUNTER — TELEPHONE (OUTPATIENT)
Dept: PHARMACY | Age: 88
End: 2023-06-20

## 2023-06-20 ENCOUNTER — HOSPITAL ENCOUNTER (OUTPATIENT)
Age: 88
Setting detail: SPECIMEN
Discharge: HOME | End: 2023-06-20
Payer: MEDICARE

## 2023-06-20 ENCOUNTER — ANTI-COAG VISIT (OUTPATIENT)
Dept: PHARMACY | Age: 88
End: 2023-06-20

## 2023-06-20 ENCOUNTER — TELEPHONE (OUTPATIENT)
Dept: CARDIOLOGY CLINIC | Age: 88
End: 2023-06-20

## 2023-06-20 DIAGNOSIS — I48.20 CHRONIC ATRIAL FIBRILLATION (HCC): Primary | ICD-10-CM

## 2023-06-20 LAB
ANION GAP SERPL CALCULATED.3IONS-SCNC: 16 MMOL/L (ref 3–16)
BASOPHILS # BLD: 0 K/UL (ref 0–0.2)
BASOPHILS NFR BLD: 1 %
BUN SERPL-MCNC: 28 MG/DL (ref 7–20)
CALCIUM SERPL-MCNC: 8.7 MG/DL (ref 8.3–10.6)
CHLORIDE SERPL-SCNC: 103 MMOL/L (ref 99–110)
CO2 SERPL-SCNC: 23 MMOL/L (ref 21–32)
CREAT SERPL-MCNC: 1.6 MG/DL (ref 0.8–1.3)
DEPRECATED RDW RBC AUTO: 23 % (ref 12.4–15.4)
EOSINOPHIL # BLD: 0.2 K/UL (ref 0–0.6)
EOSINOPHIL NFR BLD: 3 %
GFR SERPLBLD CREATININE-BSD FMLA CKD-EPI: 41 ML/MIN/{1.73_M2}
GLUCOSE SERPL-MCNC: 153 MG/DL (ref 70–99)
HCT VFR BLD AUTO: 26.8 % (ref 40.5–52.5)
HGB BLD-MCNC: 8.5 G/DL (ref 13.5–17.5)
INR BLD: 5.3
LYMPHOCYTES # BLD: 0.7 K/UL (ref 1–5.1)
LYMPHOCYTES NFR BLD: 12.6 %
MCH RBC QN AUTO: 32.1 PG (ref 26–34)
MCHC RBC AUTO-ENTMCNC: 31.8 G/DL (ref 31–36)
MCV RBC AUTO: 101 FL (ref 80–100)
MONOCYTES # BLD: 0.4 K/UL (ref 0–1.3)
MONOCYTES NFR BLD: 8.4 %
NEUTROPHILS # BLD: 3.9 K/UL (ref 1.7–7.7)
NEUTROPHILS NFR BLD: 75 %
PLATELET # BLD AUTO: 217 K/UL (ref 135–450)
PMV BLD AUTO: 9.7 FL (ref 5–10.5)
POTASSIUM SERPL-SCNC: 4 MMOL/L (ref 3.5–5.1)
RBC # BLD AUTO: 2.65 M/UL (ref 4.2–5.9)
SODIUM SERPL-SCNC: 142 MMOL/L (ref 136–145)
WBC # BLD AUTO: 5.2 K/UL (ref 4–11)

## 2023-06-20 PROCEDURE — 80048 BASIC METABOLIC PNL TOTAL CA: CPT

## 2023-06-20 PROCEDURE — 85025 COMPLETE CBC W/AUTO DIFF WBC: CPT

## 2023-06-20 PROCEDURE — 36415 COLL VENOUS BLD VENIPUNCTURE: CPT

## 2023-06-20 NOTE — TELEPHONE ENCOUNTER
Daughter states pt has had a 5 lb weight gain since Thurs 6/15/23. Pt was given an extra 20 mg lasix on Sunday 6/18/23. Pt's legs are very swollen. Please call to advise.

## 2023-06-20 NOTE — TELEPHONE ENCOUNTER
Daughter called to say they had not heard back from the [facility] on patient dosing and she is getting ready to leave and wanted to set up patient pill box. Read to daughter notes from [address] on dosing from today's INR result. Instructed for patient to hold dose for next 3 days then take 1.25 mg on Fri and Mon and 2.5 mg on Sat and Sun. Recheck INR in 1 week, 6/27 as they are seeing pt weekly, unable to go more often. Pt is scheduled to have dental work on Mon 5/26. Is awaiting cardiac clearance from cardiology. Explained daughter will need to call clinic if pt cleared to have dental work despite supratherapeutic INR for unknown etiology and how many days pt cleared to hold warfarin. Daughter agree to call back, once she hears from cardio and understands that dental work may need to be rescheduled due to high INR.

## 2023-06-20 NOTE — PROGRESS NOTES
PT 63.8 / INR 5.3 Patient took 2.5mg warfarin on Wednesday and 1.25mg all other days. Please call [facility] [phone] with warfarin dosing and order for next INR check. Returned call to [facility]. She states that patient took 1.25 mg on Wed and 2.5 mg all other days of the week. No changes in diet or meds. Instructed for patient to hold dose for next 3 days then take 1.25 mg on Fri and Mon and 2.5 mg on Sat and Sun. Recheck INR in 1 week, 6/27 as they are seeing pt weekly, unable to go more often. Pt is scheduled to have dental work on Mon 5/26. Is awaiting cardiac clearance from cardiology. Explained daughter will need to call clinic if pt cleared to have dental work despite supratherapeutic INR for unknown etiology and how many days pt cleared to hold warfarin.            For Pharmacy Admin Tracking Only    Intervention Detail: Dose Adjustment: 1, reason: Therapy Optimization  Total # of Interventions Recommended: 1  Total # of Interventions Accepted: 1  Time Spent (min): 15

## 2023-06-20 NOTE — TELEPHONE ENCOUNTER
Spoke to Michelle and relayed below message per MMK, however Michelle states that nephrology increased his lasix to 40 mg daily last Thursday and he has been taking it this way since then. And on this past Sunday, he took 40 mg in AM and 20 mg that evening. Sunday's weight = 155 lb  Monday's weight = 154.6  Today's (Tuesday's weight) = 155    Michelle has already spoke to coumadin clinic. Daylin's other concern is that he is to have 2 teeth pulled on 6/26 so she is not sure how to manage the coumadin, in preparation for that procedure. Michelle states he gets a weekly BNP, BMP, CBC and INR. These labs were drawn today and will be drawn again next Tuesday.

## 2023-06-20 NOTE — TELEPHONE ENCOUNTER
Moderate to severe swelling- 2 + pitting edema, SOB unchanged, HHN says lungs clear. O2 96    Pt is have 2 teeth extracted this coming Monday, needs to know when to stop Coumadin. His INR was 5.2, waiting from the coumadin clinic for dosing.

## 2023-06-21 NOTE — TELEPHONE ENCOUNTER
Spoke to Ger and relayed below message per [facility]. Also, instructed per MMK recommendation that pt should take 40 mg lasix twice today and twice tomorrow, then resume as he has been taking the next day. Doris Archer understanding. She also states that Josep's oral surgery is already rescheduled for Friday Aug 11. She will call if she has any further questions and schedule with NP if necessary.

## 2023-06-22 ENCOUNTER — CLINICAL DOCUMENTATION (OUTPATIENT)
Dept: ONCOLOGY | Age: 88
End: 2023-06-22

## 2023-06-22 NOTE — PROGRESS NOTES
Spoke with patient's daughter to set up Labs/epogen Appts. Pt's daughter reporting Patient receives at [facility] at [address] removed at this time.

## 2023-06-27 ENCOUNTER — ANTI-COAG VISIT (OUTPATIENT)
Dept: PHARMACY | Age: 88
End: 2023-06-27

## 2023-06-27 ENCOUNTER — HOSPITAL ENCOUNTER (OUTPATIENT)
Age: 88
Setting detail: SPECIMEN
Discharge: HOME | End: 2023-06-27

## 2023-06-27 DIAGNOSIS — I48.20 CHRONIC ATRIAL FIBRILLATION (HCC): Primary | ICD-10-CM

## 2023-06-27 LAB
BASOPHILS # BLD: 0.1 K/UL (ref 0–0.2)
BASOPHILS NFR BLD: 1.1 %
DEPRECATED RDW RBC AUTO: 24.4 % (ref 12.4–15.4)
EOSINOPHIL # BLD: 0.2 K/UL (ref 0–0.6)
EOSINOPHIL NFR BLD: 4.1 %
HCT VFR BLD AUTO: 29.2 % (ref 40.5–52.5)
HGB BLD-MCNC: 9.1 G/DL (ref 13.5–17.5)
INR BLD: 2
LYMPHOCYTES # BLD: 0.7 K/UL (ref 1–5.1)
LYMPHOCYTES NFR BLD: 12.8 %
MCH RBC QN AUTO: 32.5 PG (ref 26–34)
MCHC RBC AUTO-ENTMCNC: 31.3 G/DL (ref 31–36)
MCV RBC AUTO: 103.7 FL (ref 80–100)
MONOCYTES # BLD: 0.6 K/UL (ref 0–1.3)
MONOCYTES NFR BLD: 10.4 %
NEUTROPHILS # BLD: 3.9 K/UL (ref 1.7–7.7)
NEUTROPHILS NFR BLD: 71.6 %
NT-PROBNP SERPL-MCNC: ABNORMAL PG/ML (ref 0–449)
PLATELET # BLD AUTO: 213 K/UL (ref 135–450)
PMV BLD AUTO: 9.8 FL (ref 5–10.5)
RBC # BLD AUTO: 2.81 M/UL (ref 4.2–5.9)
WBC # BLD AUTO: 5.4 K/UL (ref 4–11)

## 2023-06-29 ENCOUNTER — OFFICE VISIT (OUTPATIENT)
Dept: CARDIOLOGY CLINIC | Age: 88
End: 2023-06-29
Payer: MEDICARE

## 2023-06-29 VITALS
SYSTOLIC BLOOD PRESSURE: 118 MMHG | BODY MASS INDEX: 22.9 KG/M2 | DIASTOLIC BLOOD PRESSURE: 74 MMHG | OXYGEN SATURATION: 98 % | HEART RATE: 64 BPM | HEIGHT: 70 IN | WEIGHT: 160 LBS

## 2023-06-29 DIAGNOSIS — D64.9 ANEMIA, UNSPECIFIED TYPE: ICD-10-CM

## 2023-06-29 DIAGNOSIS — I50.42 CHRONIC COMBINED SYSTOLIC AND DIASTOLIC CONGESTIVE HEART FAILURE (HCC): ICD-10-CM

## 2023-06-29 DIAGNOSIS — I50.42 CHRONIC COMBINED SYSTOLIC AND DIASTOLIC CONGESTIVE HEART FAILURE (HCC): Primary | ICD-10-CM

## 2023-06-29 DIAGNOSIS — I48.20 CHRONIC ATRIAL FIBRILLATION (HCC): ICD-10-CM

## 2023-06-29 DIAGNOSIS — N18.30 CHRONIC RENAL IMPAIRMENT, STAGE 3 (MODERATE), UNSPECIFIED WHETHER STAGE 3A OR 3B CKD (HCC): ICD-10-CM

## 2023-06-29 PROCEDURE — 99214 OFFICE O/P EST MOD 30 MIN: CPT | Performed by: CLINICAL NURSE SPECIALIST

## 2023-06-29 PROCEDURE — G8427 DOCREV CUR MEDS BY ELIG CLIN: HCPCS | Performed by: CLINICAL NURSE SPECIALIST

## 2023-06-29 PROCEDURE — 1111F DSCHRG MED/CURRENT MED MERGE: CPT | Performed by: CLINICAL NURSE SPECIALIST

## 2023-06-29 PROCEDURE — G8420 CALC BMI NORM PARAMETERS: HCPCS | Performed by: CLINICAL NURSE SPECIALIST

## 2023-06-29 PROCEDURE — 1036F TOBACCO NON-USER: CPT | Performed by: CLINICAL NURSE SPECIALIST

## 2023-06-29 PROCEDURE — 1123F ACP DISCUSS/DSCN MKR DOCD: CPT | Performed by: CLINICAL NURSE SPECIALIST

## 2023-06-29 RX ORDER — TORSEMIDE 20 MG/1
20 TABLET ORAL 2 TIMES DAILY
Qty: 60 TABLET | Refills: 1 | Status: SHIPPED | OUTPATIENT
Start: 2023-06-29

## 2023-06-29 RX ORDER — TORSEMIDE 20 MG/1
20 TABLET ORAL 2 TIMES DAILY
Qty: 180 TABLET | OUTPATIENT
Start: 2023-06-29

## 2023-06-30 LAB
ANION GAP SERPL CALCULATED.3IONS-SCNC: 17 MMOL/L (ref 7–16)
BUN SERPL-MCNC: 36 MG/DL (ref 6–23)
CALCIUM SERPL-MCNC: 8.7 MG/DL (ref 8.6–10.2)
CHLORIDE SERPL-SCNC: 100 MMOL/L (ref 98–107)
CO2 SERPL-SCNC: 24 MMOL/L (ref 22–29)
CREAT SERPL-MCNC: 1.9 MG/DL (ref 0.7–1.2)
GLUCOSE SERPL-MCNC: 156 MG/DL (ref 74–99)
POTASSIUM SERPL-SCNC: 4 MMOL/L (ref 3.5–5)
SODIUM SERPL-SCNC: 141 MMOL/L (ref 132–146)

## 2023-07-03 ENCOUNTER — ANTI-COAG VISIT (OUTPATIENT)
Dept: PHARMACY | Age: 88
End: 2023-07-03

## 2023-07-03 DIAGNOSIS — I48.20 CHRONIC ATRIAL FIBRILLATION (HCC): Primary | ICD-10-CM

## 2023-07-03 LAB — INR BLD: 1.7

## 2023-07-03 NOTE — PROGRESS NOTES
Mike Batres from [facility] called in results for patient. INR 1.7 an PT 29.7 today   Pt took 2.5 mg on Thurs and 1.25 mg all other days of the week. No missed doses. No changes in medications. Advised for patient to take 2.5 mg on Mon, Thurs, and Sat and 1.25 mg all other days of the week. Recheck INR in 1 week, 7/10.        For Pharmacy Admin Tracking Only    Intervention Detail: Dose Adjustment: 1, reason: Therapy Optimization  Total # of Interventions Recommended: 1  Total # of Interventions Accepted: 1  Time Spent (min): 10

## 2023-07-06 ENCOUNTER — TELEPHONE (OUTPATIENT)
Dept: CARDIOLOGY CLINIC | Age: 88
End: 2023-07-06

## 2023-07-06 DIAGNOSIS — I25.5 ISCHEMIC CARDIOMYOPATHY: ICD-10-CM

## 2023-07-06 DIAGNOSIS — I10 PRIMARY HYPERTENSION: Primary | ICD-10-CM

## 2023-07-06 DIAGNOSIS — R06.02 SOB (SHORTNESS OF BREATH): ICD-10-CM

## 2023-07-06 NOTE — TELEPHONE ENCOUNTER
Last OV NPRG 06/29/2023    Instructions      Return already scheduled with Dr Olivier Padilla.  Stop the lasix and start torsemide 20 mg twice a day  Wear support stockings  Weekly blood work  Agree with appt to see Dr Drew Alas  Keep July 27 th with Dr Olivier Padilla      Can Lab orders be placed for a BNP and a CBC?   Please advise

## 2023-07-06 NOTE — TELEPHONE ENCOUNTER
Pt daughter called asking if they labs can get faxed to [facility], so that the pt can continue the weekly draws at home?  Pt would like to still get labs done today 7/6 at [address] advise thank you     Z:[phone]

## 2023-07-06 NOTE — TELEPHONE ENCOUNTER
Spoke to patient's daughter she would like our office to fax over lab orders to home care to draw next week.  Lab orders placed and faxed to [facility]

## 2023-07-10 ENCOUNTER — HOSPITAL ENCOUNTER (OUTPATIENT)
Age: 88
Setting detail: SPECIMEN
Discharge: HOME | End: 2023-07-10
Payer: MEDICARE

## 2023-07-10 ENCOUNTER — ANTI-COAG VISIT (OUTPATIENT)
Dept: PHARMACY | Age: 88
End: 2023-07-10

## 2023-07-10 DIAGNOSIS — I48.20 CHRONIC ATRIAL FIBRILLATION (HCC): Primary | ICD-10-CM

## 2023-07-10 LAB
ANION GAP SERPL CALCULATED.3IONS-SCNC: 17 MMOL/L (ref 3–16)
BASOPHILS # BLD: 0 K/UL (ref 0–0.2)
BASOPHILS NFR BLD: 0.8 %
BUN SERPL-MCNC: 32 MG/DL (ref 7–20)
CALCIUM SERPL-MCNC: 9.2 MG/DL (ref 8.3–10.6)
CHLORIDE SERPL-SCNC: 99 MMOL/L (ref 99–110)
CO2 SERPL-SCNC: 29 MMOL/L (ref 21–32)
CREAT SERPL-MCNC: 1.9 MG/DL (ref 0.8–1.3)
DEPRECATED RDW RBC AUTO: 22.9 % (ref 12.4–15.4)
EOSINOPHIL # BLD: 0.2 K/UL (ref 0–0.6)
EOSINOPHIL NFR BLD: 3.1 %
GFR SERPLBLD CREATININE-BSD FMLA CKD-EPI: 33 ML/MIN/{1.73_M2}
GLUCOSE SERPL-MCNC: 157 MG/DL (ref 70–99)
HCT VFR BLD AUTO: 27.3 % (ref 40.5–52.5)
HGB BLD-MCNC: 8.8 G/DL (ref 13.5–17.5)
INR BLD: 1.6
LYMPHOCYTES # BLD: 0.6 K/UL (ref 1–5.1)
LYMPHOCYTES NFR BLD: 11.4 %
MCH RBC QN AUTO: 32.6 PG (ref 26–34)
MCHC RBC AUTO-ENTMCNC: 32.4 G/DL (ref 31–36)
MCV RBC AUTO: 100.6 FL (ref 80–100)
MONOCYTES # BLD: 0.5 K/UL (ref 0–1.3)
MONOCYTES NFR BLD: 8.8 %
NEUTROPHILS # BLD: 4.1 K/UL (ref 1.7–7.7)
NEUTROPHILS NFR BLD: 75.9 %
NT-PROBNP SERPL-MCNC: ABNORMAL PG/ML (ref 0–449)
PLATELET # BLD AUTO: 239 K/UL (ref 135–450)
PMV BLD AUTO: 9.2 FL (ref 5–10.5)
POTASSIUM SERPL-SCNC: 3.3 MMOL/L (ref 3.5–5.1)
RBC # BLD AUTO: 2.71 M/UL (ref 4.2–5.9)
SODIUM SERPL-SCNC: 145 MMOL/L (ref 136–145)
WBC # BLD AUTO: 5.4 K/UL (ref 4–11)

## 2023-07-10 PROCEDURE — 80048 BASIC METABOLIC PNL TOTAL CA: CPT

## 2023-07-10 PROCEDURE — 85025 COMPLETE CBC W/AUTO DIFF WBC: CPT

## 2023-07-10 PROCEDURE — 83880 ASSAY OF NATRIURETIC PEPTIDE: CPT

## 2023-07-10 PROCEDURE — 36415 COLL VENOUS BLD VENIPUNCTURE: CPT

## 2023-07-10 NOTE — PROGRESS NOTES
PT 19.4 / INR 1.6 Patient taking 2.5mg on Mon/Thu/Sat and 1.25mg all other days. Please call [facility] [phone] with warfarin dosing and order for next INR check. Returned call to Madiha DUENAS and [facility]. Instructed for patient to take 1.25 mg on Wed and Sat and 2.5 mg all other days of the week. Recheck INR in 1 week, 7/17.  Any questions or concerns return call to clinic       For Pharmacy Admin Tracking Only    Intervention Detail: Dose Adjustment: 1, reason: Therapy Optimization  Total # of Interventions Recommended: 1  Total # of Interventions Accepted: 1  Time Spent (min): 10

## 2023-07-10 NOTE — PROGRESS NOTES
cough, Pleural eff   CHF Meds   Antiarrhythmic, BB, STATIN   TTE 8/20 5/21 4/22 6/23 40%  40%  45%  35% AS MG 15, Mild-mod MR  AS MG 15, mild AI, mild-mod MR, TR  AS MG 20, PV 3.08, mild AI, mod MR, mild-mod TR  AS MG 30, PV 3.8, sev MR, sev TR 85mmHg   Plan     Patient would like to think about options  Dobutamine echo to assess AS severity if interested TAVR  Although, seems to be fairly poor candidate for TAVR due to age, myeloprolif disorder with anemia, ckd, debility, dentition    *CAD/CM/CHF   Date EF Results   Sx   Stable, as detailed above   Hx 1996  CABG   [facility] 2003  10/21  No report  PCI of RPDA, grafts patent   Plan   Continue aggressive medical treatment at doses above   *CKD  Status Plan   Stage 3 Managed by nephro   *Chronic Anemia  Status Plan   Myelodysplastic syndrome Per hematology   *AF/NSVT/SSS  Status Plan   s/p PPM 2005, gen change 2015, upgrade to BiV Per EP   *HTN  Status Advice Plan   Controlled Diet/salt/xcise/wt counseling Continue meds at doses above   *CHOL  LDL Advice Plan   33, 6/23 Diet/salt/xcise/wt counseling Continue HI/MT statin   *FOLLOWUP  Pending patient deliberation

## 2023-07-11 ENCOUNTER — PATIENT MESSAGE (OUTPATIENT)
Dept: CARDIOLOGY CLINIC | Age: 88
End: 2023-07-11

## 2023-07-13 ENCOUNTER — OFFICE VISIT (OUTPATIENT)
Dept: CARDIOLOGY CLINIC | Age: 88
End: 2023-07-13
Payer: MEDICARE

## 2023-07-13 VITALS
DIASTOLIC BLOOD PRESSURE: 62 MMHG | BODY MASS INDEX: 22.13 KG/M2 | OXYGEN SATURATION: 92 % | SYSTOLIC BLOOD PRESSURE: 108 MMHG | WEIGHT: 154.6 LBS | HEIGHT: 70 IN | HEART RATE: 65 BPM

## 2023-07-13 DIAGNOSIS — I25.83 CORONARY ARTERY DISEASE DUE TO LIPID RICH PLAQUE: ICD-10-CM

## 2023-07-13 DIAGNOSIS — I25.5 ISCHEMIC CARDIOMYOPATHY: ICD-10-CM

## 2023-07-13 DIAGNOSIS — I25.10 CORONARY ARTERY DISEASE DUE TO LIPID RICH PLAQUE: ICD-10-CM

## 2023-07-13 DIAGNOSIS — I10 ESSENTIAL HYPERTENSION: ICD-10-CM

## 2023-07-13 DIAGNOSIS — E78.00 HYPERCHOLESTEROLEMIA: ICD-10-CM

## 2023-07-13 DIAGNOSIS — N18.30 CHRONIC RENAL IMPAIRMENT, STAGE 3 (MODERATE), UNSPECIFIED WHETHER STAGE 3A OR 3B CKD (HCC): ICD-10-CM

## 2023-07-13 DIAGNOSIS — I35.0 AORTIC VALVE STENOSIS, NONRHEUMATIC: Primary | ICD-10-CM

## 2023-07-13 DIAGNOSIS — I48.0 PAF (PAROXYSMAL ATRIAL FIBRILLATION) (HCC): ICD-10-CM

## 2023-07-13 DIAGNOSIS — D64.9 ANEMIA, UNSPECIFIED TYPE: ICD-10-CM

## 2023-07-13 PROCEDURE — 1123F ACP DISCUSS/DSCN MKR DOCD: CPT | Performed by: INTERNAL MEDICINE

## 2023-07-13 PROCEDURE — 1036F TOBACCO NON-USER: CPT | Performed by: INTERNAL MEDICINE

## 2023-07-13 PROCEDURE — G8427 DOCREV CUR MEDS BY ELIG CLIN: HCPCS | Performed by: INTERNAL MEDICINE

## 2023-07-13 PROCEDURE — 99214 OFFICE O/P EST MOD 30 MIN: CPT | Performed by: INTERNAL MEDICINE

## 2023-07-13 PROCEDURE — G8420 CALC BMI NORM PARAMETERS: HCPCS | Performed by: INTERNAL MEDICINE

## 2023-07-14 RX ORDER — AMIODARONE HYDROCHLORIDE 200 MG/1
200 TABLET ORAL DAILY
Qty: 90 TABLET | Refills: 1 | Status: SHIPPED | OUTPATIENT
Start: 2023-07-14

## 2023-07-14 NOTE — TELEPHONE ENCOUNTER
Received refill request for Amiodarone from [address] [place] [address].     Last ov:2023 DCE    Last EK2023    Last Refill:2023    Next appointment:2023 ALFONSO

## 2023-07-17 ENCOUNTER — HOSPITAL ENCOUNTER (OUTPATIENT)
Age: 88
Setting detail: SPECIMEN
Discharge: HOME | End: 2023-07-17
Payer: MEDICARE

## 2023-07-17 ENCOUNTER — ANTI-COAG VISIT (OUTPATIENT)
Dept: PHARMACY | Age: 88
End: 2023-07-17

## 2023-07-17 DIAGNOSIS — I48.20 CHRONIC ATRIAL FIBRILLATION (HCC): Primary | ICD-10-CM

## 2023-07-17 LAB
ANION GAP SERPL CALCULATED.3IONS-SCNC: 18 MMOL/L (ref 3–16)
BASOPHILS # BLD: 0 K/UL (ref 0–0.2)
BASOPHILS NFR BLD: 0.8 %
BUN SERPL-MCNC: 35 MG/DL (ref 7–20)
CALCIUM SERPL-MCNC: 9.2 MG/DL (ref 8.3–10.6)
CHLORIDE SERPL-SCNC: 99 MMOL/L (ref 99–110)
CO2 SERPL-SCNC: 28 MMOL/L (ref 21–32)
CREAT SERPL-MCNC: 1.8 MG/DL (ref 0.8–1.3)
DEPRECATED RDW RBC AUTO: 23 % (ref 12.4–15.4)
EOSINOPHIL # BLD: 0.2 K/UL (ref 0–0.6)
EOSINOPHIL NFR BLD: 3.4 %
GFR SERPLBLD CREATININE-BSD FMLA CKD-EPI: 35 ML/MIN/{1.73_M2}
GLUCOSE SERPL-MCNC: 120 MG/DL (ref 70–99)
HCT VFR BLD AUTO: 26.9 % (ref 40.5–52.5)
HGB BLD-MCNC: 8.5 G/DL (ref 13.5–17.5)
INR BLD: 1.6
LYMPHOCYTES # BLD: 0.9 K/UL (ref 1–5.1)
LYMPHOCYTES NFR BLD: 17.3 %
MCH RBC QN AUTO: 31.7 PG (ref 26–34)
MCHC RBC AUTO-ENTMCNC: 31.7 G/DL (ref 31–36)
MCV RBC AUTO: 100 FL (ref 80–100)
MONOCYTES # BLD: 0.6 K/UL (ref 0–1.3)
MONOCYTES NFR BLD: 10.9 %
NEUTROPHILS # BLD: 3.4 K/UL (ref 1.7–7.7)
NEUTROPHILS NFR BLD: 67.6 %
NT-PROBNP SERPL-MCNC: ABNORMAL PG/ML (ref 0–449)
PLATELET # BLD AUTO: 233 K/UL (ref 135–450)
PMV BLD AUTO: 9.1 FL (ref 5–10.5)
POTASSIUM SERPL-SCNC: 3.1 MMOL/L (ref 3.5–5.1)
RBC # BLD AUTO: 2.69 M/UL (ref 4.2–5.9)
SODIUM SERPL-SCNC: 145 MMOL/L (ref 136–145)
WBC # BLD AUTO: 5.1 K/UL (ref 4–11)

## 2023-07-17 PROCEDURE — 36415 COLL VENOUS BLD VENIPUNCTURE: CPT

## 2023-07-17 PROCEDURE — 85025 COMPLETE CBC W/AUTO DIFF WBC: CPT

## 2023-07-17 PROCEDURE — 83880 ASSAY OF NATRIURETIC PEPTIDE: CPT

## 2023-07-17 PROCEDURE — 80048 BASIC METABOLIC PNL TOTAL CA: CPT

## 2023-07-17 NOTE — PROGRESS NOTES
PT 19.2 / INR 1.6 Patient took 1.25mg warfarin on Wed/Sat and 2.5mg all other days. Please call [facility] [phone] with warfarin dosing and order for next INR check. Pt denies missed doses, changes in meds or changes in diet. Returned call to [facility]. Instructed for patient to take 2.5 mg daily.  Recheck INR in 1 week, 7/24    For Pharmacy Admin Tracking Only    Intervention Detail: Dose Adjustment: 1, reason: Therapy Optimization  Total # of Interventions Recommended: 1  Total # of Interventions Accepted: 1  Time Spent (min): 10

## 2023-07-20 RX ORDER — SPIRONOLACTONE 25 MG/1
TABLET ORAL
Qty: 24 TABLET | Refills: 0 | Status: SHIPPED | OUTPATIENT
Start: 2023-07-20

## 2023-07-20 RX ORDER — SPIRONOLACTONE 25 MG/1
TABLET ORAL
Qty: 25 TABLET | OUTPATIENT
Start: 2023-07-20

## 2023-07-24 ENCOUNTER — OFFICE VISIT (OUTPATIENT)
Dept: FAMILY MEDICINE CLINIC | Age: 88
End: 2023-07-24

## 2023-07-24 VITALS
RESPIRATION RATE: 18 BRPM | DIASTOLIC BLOOD PRESSURE: 58 MMHG | OXYGEN SATURATION: 95 % | BODY MASS INDEX: 21.85 KG/M2 | TEMPERATURE: 97.8 F | SYSTOLIC BLOOD PRESSURE: 110 MMHG | WEIGHT: 152.6 LBS | HEART RATE: 72 BPM | HEIGHT: 70 IN

## 2023-07-24 DIAGNOSIS — R63.4 WEIGHT LOSS: ICD-10-CM

## 2023-07-24 DIAGNOSIS — F32.1 CURRENT MODERATE EPISODE OF MAJOR DEPRESSIVE DISORDER WITHOUT PRIOR EPISODE (HCC): Primary | ICD-10-CM

## 2023-07-24 DIAGNOSIS — F51.04 PSYCHOPHYSIOLOGICAL INSOMNIA: ICD-10-CM

## 2023-07-24 RX ORDER — MIRTAZAPINE 7.5 MG/1
7.5 TABLET, FILM COATED ORAL NIGHTLY
Qty: 30 TABLET | Refills: 3 | Status: SHIPPED | OUTPATIENT
Start: 2023-07-24

## 2023-07-24 NOTE — PROGRESS NOTES
ASSESSMENT/PLAN:    1. Current moderate episode of major depressive disorder without prior episode ([address])  - start Remeron 7.5 to help with mood, sleep and appetite stimulation. We discussed the risks/benefits/alternatives and side effects to be aware of.     2. Psychophysiological insomnia  - Remeron should help with this. 3. Weight loss  - discussed ways to augment calories; Remeron may help increase appetite both from antihistamine action as well as correcting depression (the likely ultimate cause of his decreased appetite). Defer eval for occult malignancy as he has had pan-CT scanning in past 12 months and he is very deconditioned. Return in about 3 months (around 10/24/2023) for follow up depression. An  electronicsignature was used to authenticate this note.     --Eleni Taylor MD on 7/24/2023 at 2:54 PM

## 2023-07-25 ENCOUNTER — HOSPITAL ENCOUNTER (OUTPATIENT)
Age: 88
Setting detail: SPECIMEN
Discharge: HOME | End: 2023-07-25
Payer: MEDICARE

## 2023-07-25 ENCOUNTER — ANTI-COAG VISIT (OUTPATIENT)
Dept: PHARMACY | Age: 88
End: 2023-07-25

## 2023-07-25 DIAGNOSIS — I48.20 CHRONIC ATRIAL FIBRILLATION (HCC): Primary | ICD-10-CM

## 2023-07-25 LAB
ANION GAP SERPL CALCULATED.3IONS-SCNC: 13 MMOL/L (ref 3–16)
BASOPHILS # BLD: 0 K/UL (ref 0–0.2)
BASOPHILS NFR BLD: 0.8 %
BUN SERPL-MCNC: 39 MG/DL (ref 7–20)
CALCIUM SERPL-MCNC: 8.9 MG/DL (ref 8.3–10.6)
CHLORIDE SERPL-SCNC: 97 MMOL/L (ref 99–110)
CO2 SERPL-SCNC: 30 MMOL/L (ref 21–32)
CREAT SERPL-MCNC: 1.8 MG/DL (ref 0.8–1.3)
DEPRECATED RDW RBC AUTO: 23 % (ref 12.4–15.4)
EOSINOPHIL # BLD: 0.1 K/UL (ref 0–0.6)
EOSINOPHIL NFR BLD: 2.1 %
GFR SERPLBLD CREATININE-BSD FMLA CKD-EPI: 35 ML/MIN/{1.73_M2}
GLUCOSE SERPL-MCNC: 145 MG/DL (ref 70–99)
HCT VFR BLD AUTO: 24.8 % (ref 40.5–52.5)
HGB BLD-MCNC: 8.2 G/DL (ref 13.5–17.5)
INR BLD: 2.5
LYMPHOCYTES # BLD: 0.9 K/UL (ref 1–5.1)
LYMPHOCYTES NFR BLD: 14.2 %
MCH RBC QN AUTO: 32.2 PG (ref 26–34)
MCHC RBC AUTO-ENTMCNC: 33 G/DL (ref 31–36)
MCV RBC AUTO: 97.5 FL (ref 80–100)
MONOCYTES # BLD: 0.6 K/UL (ref 0–1.3)
MONOCYTES NFR BLD: 9.3 %
NEUTROPHILS # BLD: 4.4 K/UL (ref 1.7–7.7)
NEUTROPHILS NFR BLD: 73.6 %
NT-PROBNP SERPL-MCNC: ABNORMAL PG/ML (ref 0–449)
PLATELET # BLD AUTO: 316 K/UL (ref 135–450)
PMV BLD AUTO: 8.5 FL (ref 5–10.5)
POTASSIUM SERPL-SCNC: 3.6 MMOL/L (ref 3.5–5.1)
RBC # BLD AUTO: 2.54 M/UL (ref 4.2–5.9)
SODIUM SERPL-SCNC: 140 MMOL/L (ref 136–145)
WBC # BLD AUTO: 6 K/UL (ref 4–11)

## 2023-07-25 PROCEDURE — 36415 COLL VENOUS BLD VENIPUNCTURE: CPT

## 2023-07-25 PROCEDURE — 80048 BASIC METABOLIC PNL TOTAL CA: CPT

## 2023-07-25 PROCEDURE — 83880 ASSAY OF NATRIURETIC PEPTIDE: CPT

## 2023-07-25 PROCEDURE — 85025 COMPLETE CBC W/AUTO DIFF WBC: CPT

## 2023-07-25 RX ORDER — EMPAGLIFLOZIN 10 MG/1
TABLET, FILM COATED ORAL
Qty: 30 TABLET | OUTPATIENT
Start: 2023-07-25

## 2023-07-25 RX ORDER — WARFARIN SODIUM 2.5 MG/1
TABLET ORAL
Qty: 72 TABLET | Refills: 3 | Status: SHIPPED | OUTPATIENT
Start: 2023-07-25 | End: 2023-10-23

## 2023-07-25 NOTE — PROGRESS NOTES
PT 30.2 / INR 2.5 Patient took 2.5mg warfarin daily. Please call [facility] [phone] with warfarin dosing and order for next INR check. Please call-in warfarin refill to [facility] [address] [place] [address], [address] [place] [address]   Patient's family would like to p/u with other medications. Returned call to [facility]. Take 1.25 mg on Wed and Sat and 2.5 mg all other days of the week. Recheck INR in 1 week, 8/1    E-scribed warfarin 2.5 mg tablets to [facility].       For Pharmacy Admin Tracking Only    Intervention Detail: Dose Adjustment: 1, reason: Therapy Optimization and Refill(s) Provided  Total # of Interventions Recommended: 2  Total # of Interventions Accepted: 2  Time Spent (min): 15

## 2023-07-26 ENCOUNTER — HOSPITAL ENCOUNTER (OUTPATIENT)
Age: 88
Setting detail: SPECIMEN
Discharge: HOME | End: 2023-07-26

## 2023-07-26 RX ORDER — EPOETIN ALFA-EPBX 10000 [IU]/ML
10000 INJECTION, SOLUTION INTRAVENOUS; SUBCUTANEOUS
Qty: 6.6 ML | COMMUNITY
Start: 2023-07-26

## 2023-07-26 RX ORDER — ACETAMINOPHEN 325 MG/1
650 TABLET ORAL 2 TIMES DAILY
Qty: 120 TABLET | Status: SHIPPED | COMMUNITY
Start: 2023-07-26

## 2023-07-27 ENCOUNTER — OFFICE VISIT (OUTPATIENT)
Dept: CARDIOLOGY CLINIC | Age: 88
End: 2023-07-27
Payer: MEDICARE

## 2023-07-27 VITALS
HEIGHT: 70 IN | DIASTOLIC BLOOD PRESSURE: 64 MMHG | HEART RATE: 62 BPM | BODY MASS INDEX: 21.4 KG/M2 | OXYGEN SATURATION: 94 % | WEIGHT: 149.5 LBS | SYSTOLIC BLOOD PRESSURE: 122 MMHG

## 2023-07-27 DIAGNOSIS — E78.00 HYPERCHOLESTEROLEMIA: ICD-10-CM

## 2023-07-27 DIAGNOSIS — I25.83 CORONARY ARTERY DISEASE DUE TO LIPID RICH PLAQUE: ICD-10-CM

## 2023-07-27 DIAGNOSIS — I48.0 PAROXYSMAL ATRIAL FIBRILLATION (HCC): ICD-10-CM

## 2023-07-27 DIAGNOSIS — I25.10 CORONARY ARTERY DISEASE DUE TO LIPID RICH PLAQUE: ICD-10-CM

## 2023-07-27 DIAGNOSIS — I27.20 PULMONARY HYPERTENSION (HCC): ICD-10-CM

## 2023-07-27 DIAGNOSIS — R06.02 SOB (SHORTNESS OF BREATH): ICD-10-CM

## 2023-07-27 DIAGNOSIS — I50.22 CHRONIC SYSTOLIC HEART FAILURE (HCC): ICD-10-CM

## 2023-07-27 DIAGNOSIS — I35.0 AORTIC VALVE STENOSIS, NONRHEUMATIC: Primary | ICD-10-CM

## 2023-07-27 DIAGNOSIS — I10 ESSENTIAL HYPERTENSION: ICD-10-CM

## 2023-07-27 DIAGNOSIS — I25.5 ISCHEMIC CARDIOMYOPATHY: ICD-10-CM

## 2023-07-27 DIAGNOSIS — N18.30 CHRONIC RENAL IMPAIRMENT, STAGE 3 (MODERATE), UNSPECIFIED WHETHER STAGE 3A OR 3B CKD (HCC): ICD-10-CM

## 2023-07-27 PROCEDURE — 1123F ACP DISCUSS/DSCN MKR DOCD: CPT | Performed by: INTERNAL MEDICINE

## 2023-07-27 PROCEDURE — G8420 CALC BMI NORM PARAMETERS: HCPCS | Performed by: INTERNAL MEDICINE

## 2023-07-27 PROCEDURE — 99214 OFFICE O/P EST MOD 30 MIN: CPT | Performed by: INTERNAL MEDICINE

## 2023-07-27 PROCEDURE — G8427 DOCREV CUR MEDS BY ELIG CLIN: HCPCS | Performed by: INTERNAL MEDICINE

## 2023-07-27 PROCEDURE — 1036F TOBACCO NON-USER: CPT | Performed by: INTERNAL MEDICINE

## 2023-07-27 RX ORDER — SPIRONOLACTONE 25 MG/1
25 TABLET ORAL
Qty: 36 TABLET | Refills: 3 | Status: SHIPPED | OUTPATIENT
Start: 2023-07-28

## 2023-07-27 NOTE — PATIENT INSTRUCTIONS
Increase Spironolactone to 25mg on every Monday, Wednesday and Friday. Continue with labs every 2 weeks. Already ordered   Discussed DNR status. Follow up with Kathie in 2 weeks. See Dr. German Hendrickson in 3 months.

## 2023-07-28 ENCOUNTER — TELEPHONE (OUTPATIENT)
Dept: CARDIOLOGY CLINIC | Age: 88
End: 2023-07-28

## 2023-07-28 NOTE — TELEPHONE ENCOUNTER
Spoke to daughter. She is aware to hold coumadin for 2 days with her dad's dental procedure each visit. She was thankful for the call.

## 2023-07-31 ENCOUNTER — TELEPHONE (OUTPATIENT)
Dept: FAMILY MEDICINE CLINIC | Age: 88
End: 2023-07-31

## 2023-07-31 ENCOUNTER — HOSPITAL ENCOUNTER (OUTPATIENT)
Age: 88
Setting detail: SPECIMEN
Discharge: HOME | End: 2023-07-31
Payer: MEDICARE

## 2023-07-31 ENCOUNTER — ANTI-COAG VISIT (OUTPATIENT)
Dept: PHARMACY | Age: 88
End: 2023-07-31

## 2023-07-31 DIAGNOSIS — I48.20 CHRONIC ATRIAL FIBRILLATION (HCC): Primary | ICD-10-CM

## 2023-07-31 LAB
ANION GAP SERPL CALCULATED.3IONS-SCNC: 11 MMOL/L (ref 3–16)
BASOPHILS # BLD: 0.1 K/UL (ref 0–0.2)
BASOPHILS NFR BLD: 1.1 %
BUN SERPL-MCNC: 34 MG/DL (ref 7–20)
CALCIUM SERPL-MCNC: 9.1 MG/DL (ref 8.3–10.6)
CHLORIDE SERPL-SCNC: 102 MMOL/L (ref 99–110)
CO2 SERPL-SCNC: 28 MMOL/L (ref 21–32)
CREAT SERPL-MCNC: 2 MG/DL (ref 0.8–1.3)
DEPRECATED RDW RBC AUTO: 23.8 % (ref 12.4–15.4)
EOSINOPHIL # BLD: 0.2 K/UL (ref 0–0.6)
EOSINOPHIL NFR BLD: 3 %
GFR SERPLBLD CREATININE-BSD FMLA CKD-EPI: 31 ML/MIN/{1.73_M2}
GLUCOSE SERPL-MCNC: 125 MG/DL (ref 70–99)
HCT VFR BLD AUTO: 23.8 % (ref 40.5–52.5)
HGB BLD-MCNC: 8 G/DL (ref 13.5–17.5)
INR BLD: 2.1
LYMPHOCYTES # BLD: 1 K/UL (ref 1–5.1)
LYMPHOCYTES NFR BLD: 19.3 %
MCH RBC QN AUTO: 32.2 PG (ref 26–34)
MCHC RBC AUTO-ENTMCNC: 33.4 G/DL (ref 31–36)
MCV RBC AUTO: 96.3 FL (ref 80–100)
MONOCYTES # BLD: 0.6 K/UL (ref 0–1.3)
MONOCYTES NFR BLD: 10.9 %
NEUTROPHILS # BLD: 3.4 K/UL (ref 1.7–7.7)
NEUTROPHILS NFR BLD: 65.7 %
NT-PROBNP SERPL-MCNC: ABNORMAL PG/ML (ref 0–449)
PLATELET # BLD AUTO: 253 K/UL (ref 135–450)
PMV BLD AUTO: 8.9 FL (ref 5–10.5)
POTASSIUM SERPL-SCNC: 4 MMOL/L (ref 3.5–5.1)
RBC # BLD AUTO: 2.47 M/UL (ref 4.2–5.9)
SODIUM SERPL-SCNC: 141 MMOL/L (ref 136–145)
WBC # BLD AUTO: 5.2 K/UL (ref 4–11)

## 2023-07-31 PROCEDURE — 83880 ASSAY OF NATRIURETIC PEPTIDE: CPT

## 2023-07-31 PROCEDURE — 85025 COMPLETE CBC W/AUTO DIFF WBC: CPT

## 2023-07-31 PROCEDURE — 36415 COLL VENOUS BLD VENIPUNCTURE: CPT

## 2023-07-31 PROCEDURE — 80048 BASIC METABOLIC PNL TOTAL CA: CPT

## 2023-07-31 RX ORDER — ESCITALOPRAM OXALATE 5 MG/1
5 TABLET ORAL DAILY
Qty: 90 TABLET | Refills: 1 | Status: SHIPPED | OUTPATIENT
Start: 2023-07-31

## 2023-07-31 NOTE — TELEPHONE ENCOUNTER
Reviewed palliative care note. Had hallucinations on Remeron, so stopped. OK with using Lexapro 5 mg and ordered this to his  Home	[place]. Please let Jean Pierre's family and the palliative care NP know. Thank you!

## 2023-07-31 NOTE — TELEPHONE ENCOUNTER
[facility] calling in, there Nurse Practitioner saw him today. They are recommending lexapro 5mg daily and would like to know if Dr. Emanuel Sahni would manage rx or need them to. Stated that the note from today can be in care everywhere because they have epic.     [address] can be reached at [phone]

## 2023-07-31 NOTE — PROGRESS NOTES
Francisca Johnson w/ [facility] called w INR results of 2.1, pt takes 1.25 mgs of warfarin on W/Sat and 2.5 mgs all other days. Some changes he'll s/w you about when you call him back. [address] [facility] call to [facility]. Pt missed dose yesterday. Pt is having teeth pulled this week and next week so was told to hold warfarin 8/1 and 8/2 and 8/8 and 8/9. Instructed for patient to take 2.5 mg daily when resume warfarin after procedures. Recheck INR in 2 weeks, after procedures on 8/14.         For Pharmacy Admin Tracking Only    Intervention Detail: Dose Adjustment: 1, reason: Therapy Optimization  Total # of Interventions Recommended: 1  Total # of Interventions Accepted: 1  Time Spent (min): 15

## 2023-08-04 ENCOUNTER — HOSPITAL ENCOUNTER (OUTPATIENT)
Dept: INFUSION THERAPY | Age: 88
Setting detail: INFUSION SERIES
Discharge: HOME | End: 2023-08-04
Payer: MEDICARE

## 2023-08-04 ENCOUNTER — TELEPHONE (OUTPATIENT)
Dept: PHARMACY | Age: 88
End: 2023-08-04

## 2023-08-04 DIAGNOSIS — N18.30 CHRONIC RENAL IMPAIRMENT, STAGE 3 (MODERATE), UNSPECIFIED WHETHER STAGE 3A OR 3B CKD (HCC): Primary | ICD-10-CM

## 2023-08-04 LAB
ABO + RH BLD: NORMAL
BLD GP AB SCN SERPL QL: NORMAL
BLOOD BANK DISPENSE STATUS: NORMAL
BLOOD BANK PRODUCT CODE: NORMAL
BPU ID: NORMAL
DESCRIPTION BLOOD BANK: NORMAL

## 2023-08-04 PROCEDURE — 86923 COMPATIBILITY TEST ELECTRIC: CPT

## 2023-08-04 PROCEDURE — 86901 BLOOD TYPING SEROLOGIC RH(D): CPT

## 2023-08-04 PROCEDURE — 86850 RBC ANTIBODY SCREEN: CPT

## 2023-08-04 PROCEDURE — 86900 BLOOD TYPING SEROLOGIC ABO: CPT

## 2023-08-04 PROCEDURE — 36415 COLL VENOUS BLD VENIPUNCTURE: CPT

## 2023-08-04 PROCEDURE — P9016 RBC LEUKOCYTES REDUCED: HCPCS

## 2023-08-04 RX ORDER — SODIUM CHLORIDE 0.9 % (FLUSH) 0.9 %
5-40 SYRINGE (ML) INJECTION PRN
Status: CANCELLED | OUTPATIENT
Start: 2023-08-04

## 2023-08-04 RX ORDER — DIPHENHYDRAMINE HCL 25 MG
25 TABLET ORAL ONCE
Status: CANCELLED | OUTPATIENT
Start: 2023-08-04 | End: 2023-08-04

## 2023-08-04 RX ORDER — DIPHENHYDRAMINE HYDROCHLORIDE 50 MG/ML
50 INJECTION INTRAMUSCULAR; INTRAVENOUS
OUTPATIENT
Start: 2023-08-04

## 2023-08-04 RX ORDER — SODIUM CHLORIDE 9 MG/ML
INJECTION, SOLUTION INTRAVENOUS CONTINUOUS
Status: CANCELLED | OUTPATIENT
Start: 2023-08-07

## 2023-08-04 RX ORDER — ACETAMINOPHEN 325 MG/1
650 TABLET ORAL ONCE
Status: CANCELLED | OUTPATIENT
Start: 2023-08-07 | End: 2023-08-07

## 2023-08-04 RX ORDER — EPINEPHRINE 1 MG/ML
0.3 INJECTION, SOLUTION, CONCENTRATE INTRAVENOUS PRN
Status: CANCELLED | OUTPATIENT
Start: 2023-08-07

## 2023-08-04 RX ORDER — SODIUM CHLORIDE 9 MG/ML
INJECTION, SOLUTION INTRAVENOUS CONTINUOUS
OUTPATIENT
Start: 2023-08-04

## 2023-08-04 RX ORDER — DIPHENHYDRAMINE HCL 25 MG
25 TABLET ORAL ONCE
Status: CANCELLED | OUTPATIENT
Start: 2023-08-07 | End: 2023-08-07

## 2023-08-04 RX ORDER — FUROSEMIDE 10 MG/ML
20 INJECTION INTRAMUSCULAR; INTRAVENOUS ONCE
Status: CANCELLED | OUTPATIENT
Start: 2023-08-04 | End: 2023-08-04

## 2023-08-04 RX ORDER — SODIUM CHLORIDE 9 MG/ML
20 INJECTION, SOLUTION INTRAVENOUS CONTINUOUS
Status: CANCELLED | OUTPATIENT
Start: 2023-08-07

## 2023-08-04 RX ORDER — ACETAMINOPHEN 325 MG/1
650 TABLET ORAL
Status: CANCELLED | OUTPATIENT
Start: 2023-08-07

## 2023-08-04 RX ORDER — ONDANSETRON 2 MG/ML
8 INJECTION INTRAMUSCULAR; INTRAVENOUS
OUTPATIENT
Start: 2023-08-04

## 2023-08-04 RX ORDER — SODIUM CHLORIDE 0.9 % (FLUSH) 0.9 %
5-40 SYRINGE (ML) INJECTION PRN
Status: CANCELLED | OUTPATIENT
Start: 2023-08-07

## 2023-08-04 RX ORDER — ALBUTEROL SULFATE 90 UG/1
4 AEROSOL, METERED RESPIRATORY (INHALATION) PRN
OUTPATIENT
Start: 2023-08-04

## 2023-08-04 RX ORDER — ACETAMINOPHEN 325 MG/1
650 TABLET ORAL
OUTPATIENT
Start: 2023-08-04

## 2023-08-04 RX ORDER — SODIUM CHLORIDE 9 MG/ML
20 INJECTION, SOLUTION INTRAVENOUS CONTINUOUS
Status: CANCELLED | OUTPATIENT
Start: 2023-08-04

## 2023-08-04 RX ORDER — SODIUM CHLORIDE 9 MG/ML
25 INJECTION, SOLUTION INTRAVENOUS PRN
OUTPATIENT
Start: 2023-08-04

## 2023-08-04 RX ORDER — ACETAMINOPHEN 325 MG/1
650 TABLET ORAL ONCE
Status: CANCELLED | OUTPATIENT
Start: 2023-08-04 | End: 2023-08-04

## 2023-08-04 RX ORDER — ALBUTEROL SULFATE 90 UG/1
4 AEROSOL, METERED RESPIRATORY (INHALATION) PRN
Status: CANCELLED | OUTPATIENT
Start: 2023-08-07

## 2023-08-04 RX ORDER — SODIUM CHLORIDE 9 MG/ML
25 INJECTION, SOLUTION INTRAVENOUS PRN
Status: CANCELLED | OUTPATIENT
Start: 2023-08-07

## 2023-08-04 RX ORDER — DIPHENHYDRAMINE HYDROCHLORIDE 50 MG/ML
50 INJECTION INTRAMUSCULAR; INTRAVENOUS
Status: CANCELLED | OUTPATIENT
Start: 2023-08-07

## 2023-08-04 RX ORDER — EPINEPHRINE 1 MG/ML
0.3 INJECTION, SOLUTION, CONCENTRATE INTRAVENOUS PRN
OUTPATIENT
Start: 2023-08-04

## 2023-08-04 RX ORDER — FUROSEMIDE 10 MG/ML
20 INJECTION INTRAMUSCULAR; INTRAVENOUS ONCE
Status: CANCELLED | OUTPATIENT
Start: 2023-08-07 | End: 2023-08-07

## 2023-08-04 RX ORDER — ONDANSETRON 2 MG/ML
8 INJECTION INTRAMUSCULAR; INTRAVENOUS
Status: CANCELLED | OUTPATIENT
Start: 2023-08-07

## 2023-08-04 NOTE — TELEPHONE ENCOUNTER
----- Message from Jennifer Barr sent at 8/4/2023  1:58 PM EDT -----  Regarding: Plese call  Contact: Estefania-daughter  Please call patient's daughter Tamie Fernandez.  [phone]. Patient was to have 2 teeth pulled yesterday and 2 next week however, the dentist pulled all 4 at once and she wants to know what to do with patient's warfarin.

## 2023-08-04 NOTE — TELEPHONE ENCOUNTER
Returned call to [employer]. Pt had all 4 teeth pulled yesterday rather than splitting procedures. Was given amoxicillin 500 mg after dental work. Hemoglobin is 7.6 so will be getting a unit of blood on Mon. Was also started on lexapro 5 mg. Advised for patient to take 2.5 mg daily. Asked for [facility] to check INR on  rather than  d/t changes and not holding warfarin next week. Will assess INR then.        For Pharmacy Admin Tracking Only    Intervention Detail: Adherence Monitorin  Total # of Interventions Recommended: 1  Total # of Interventions Accepted: 1  Time Spent (min): 10

## 2023-08-07 ENCOUNTER — HOSPITAL ENCOUNTER (OUTPATIENT)
Dept: INFUSION THERAPY | Age: 88
Setting detail: INFUSION SERIES
Discharge: HOME | End: 2023-08-07
Payer: MEDICARE

## 2023-08-07 ENCOUNTER — TELEPHONE (OUTPATIENT)
Dept: FAMILY MEDICINE CLINIC | Age: 88
End: 2023-08-07

## 2023-08-07 VITALS
RESPIRATION RATE: 17 BRPM | OXYGEN SATURATION: 95 % | SYSTOLIC BLOOD PRESSURE: 114 MMHG | HEART RATE: 68 BPM | TEMPERATURE: 98.2 F | DIASTOLIC BLOOD PRESSURE: 68 MMHG

## 2023-08-07 DIAGNOSIS — N18.30 CHRONIC RENAL IMPAIRMENT, STAGE 3 (MODERATE), UNSPECIFIED WHETHER STAGE 3A OR 3B CKD (HCC): Primary | ICD-10-CM

## 2023-08-07 LAB
BLOOD BANK DISPENSE STATUS: NORMAL
BLOOD BANK PRODUCT CODE: NORMAL
BPU ID: NORMAL
DESCRIPTION BLOOD BANK: NORMAL

## 2023-08-07 PROCEDURE — 36430 TRANSFUSION BLD/BLD COMPNT: CPT

## 2023-08-07 PROCEDURE — 6360000002 HC RX W HCPCS: Performed by: INTERNAL MEDICINE

## 2023-08-07 PROCEDURE — 6370000000 HC RX 637 (ALT 250 FOR IP): Performed by: INTERNAL MEDICINE

## 2023-08-07 PROCEDURE — 96374 THER/PROPH/DIAG INJ IV PUSH: CPT

## 2023-08-07 PROCEDURE — P9016 RBC LEUKOCYTES REDUCED: HCPCS

## 2023-08-07 PROCEDURE — 2580000003 HC RX 258: Performed by: INTERNAL MEDICINE

## 2023-08-07 RX ORDER — ACETAMINOPHEN 325 MG/1
650 TABLET ORAL
OUTPATIENT
Start: 2023-08-07

## 2023-08-07 RX ORDER — BUSPIRONE HYDROCHLORIDE 5 MG/1
5 TABLET ORAL 2 TIMES DAILY
Qty: 60 TABLET | Refills: 1 | Status: SHIPPED | OUTPATIENT
Start: 2023-08-07

## 2023-08-07 RX ORDER — FUROSEMIDE 10 MG/ML
20 INJECTION INTRAMUSCULAR; INTRAVENOUS ONCE
Status: COMPLETED | OUTPATIENT
Start: 2023-08-07 | End: 2023-08-07

## 2023-08-07 RX ORDER — AMOXICILLIN 500 MG/1
500 CAPSULE ORAL 3 TIMES DAILY
COMMUNITY

## 2023-08-07 RX ORDER — SODIUM CHLORIDE 9 MG/ML
20 INJECTION, SOLUTION INTRAVENOUS CONTINUOUS
Status: DISCONTINUED | OUTPATIENT
Start: 2023-08-07 | End: 2023-08-08 | Stop reason: HOSPADM

## 2023-08-07 RX ORDER — DIPHENHYDRAMINE HYDROCHLORIDE 50 MG/ML
50 INJECTION INTRAMUSCULAR; INTRAVENOUS
OUTPATIENT
Start: 2023-08-07

## 2023-08-07 RX ORDER — ALBUTEROL SULFATE 90 UG/1
4 AEROSOL, METERED RESPIRATORY (INHALATION) PRN
OUTPATIENT
Start: 2023-08-07

## 2023-08-07 RX ORDER — EPINEPHRINE 1 MG/ML
0.3 INJECTION, SOLUTION, CONCENTRATE INTRAVENOUS PRN
OUTPATIENT
Start: 2023-08-07

## 2023-08-07 RX ORDER — SODIUM CHLORIDE 0.9 % (FLUSH) 0.9 %
5-40 SYRINGE (ML) INJECTION PRN
Status: CANCELLED | OUTPATIENT
Start: 2023-08-07

## 2023-08-07 RX ORDER — DIPHENHYDRAMINE HCL 25 MG
25 TABLET ORAL ONCE
Status: COMPLETED | OUTPATIENT
Start: 2023-08-07 | End: 2023-08-07

## 2023-08-07 RX ORDER — DIPHENHYDRAMINE HCL 25 MG
25 TABLET ORAL ONCE
Status: CANCELLED | OUTPATIENT
Start: 2023-08-07 | End: 2023-08-07

## 2023-08-07 RX ORDER — ACETAMINOPHEN 325 MG/1
650 TABLET ORAL ONCE
Status: CANCELLED | OUTPATIENT
Start: 2023-08-07 | End: 2023-08-07

## 2023-08-07 RX ORDER — SODIUM CHLORIDE 9 MG/ML
INJECTION, SOLUTION INTRAVENOUS CONTINUOUS
OUTPATIENT
Start: 2023-08-07

## 2023-08-07 RX ORDER — SODIUM CHLORIDE 9 MG/ML
INJECTION, SOLUTION INTRAVENOUS
Status: DISPENSED
Start: 2023-08-07 | End: 2023-08-07

## 2023-08-07 RX ORDER — SODIUM CHLORIDE 0.9 % (FLUSH) 0.9 %
5-40 SYRINGE (ML) INJECTION PRN
Status: DISCONTINUED | OUTPATIENT
Start: 2023-08-07 | End: 2023-08-08 | Stop reason: HOSPADM

## 2023-08-07 RX ORDER — SODIUM CHLORIDE 9 MG/ML
20 INJECTION, SOLUTION INTRAVENOUS CONTINUOUS
Status: CANCELLED | OUTPATIENT
Start: 2023-08-07

## 2023-08-07 RX ORDER — SODIUM CHLORIDE 9 MG/ML
25 INJECTION, SOLUTION INTRAVENOUS PRN
OUTPATIENT
Start: 2023-08-07

## 2023-08-07 RX ORDER — ACETAMINOPHEN 325 MG/1
650 TABLET ORAL ONCE
Status: COMPLETED | OUTPATIENT
Start: 2023-08-07 | End: 2023-08-07

## 2023-08-07 RX ORDER — ONDANSETRON 2 MG/ML
8 INJECTION INTRAMUSCULAR; INTRAVENOUS
OUTPATIENT
Start: 2023-08-07

## 2023-08-07 RX ORDER — FUROSEMIDE 10 MG/ML
20 INJECTION INTRAMUSCULAR; INTRAVENOUS ONCE
Status: CANCELLED | OUTPATIENT
Start: 2023-08-07 | End: 2023-08-07

## 2023-08-07 RX ADMIN — FUROSEMIDE 20 MG: 10 INJECTION, SOLUTION INTRAMUSCULAR; INTRAVENOUS at 11:43

## 2023-08-07 RX ADMIN — SODIUM CHLORIDE, PRESERVATIVE FREE 10 ML: 5 INJECTION INTRAVENOUS at 11:43

## 2023-08-07 RX ADMIN — DIPHENHYDRAMINE HCL 25 MG: 25 TABLET ORAL at 08:47

## 2023-08-07 NOTE — TELEPHONE ENCOUNTER
This is a rare side effect of SSRI meds. If he is still acting depressed and they want to consider alternative therapy, we can do counseling (refer to a therapist) or possibly the medicine Buspar (which helps serotonin levels in a different way). Thanks.

## 2023-08-07 NOTE — TELEPHONE ENCOUNTER
DOP is calling in wanting to let Dr. Margy Eckert know that her father was having hallucinations over the weekend so she stopped giving him Hellen Route    Is there anything else she needs to do?     Please Advise

## 2023-08-07 NOTE — PROGRESS NOTES
[facility]    Blood Transfusion    NAME:  Marisol Keita  YOB: 1933  MEDICAL RECORD NUMBER:  [id number]  DATE:  8/7/2023     Patient arrived to [address] [place] [address]   [x] per wheelchair   [] ambulatory         Consent Obtained: Yes  Is this the patient's first Transfusion? No    Did the patient experience any adverse reactions to previous Transfusion? No  Patient Active Problem List   Diagnosis    CAD (coronary artery disease) CABG x3 1996, stenting PDA 10/2021    Cardiac pacemaker    Benign prostatic hyperplasia with nocturia    Gout-uric acid >7.5--advised proph tx    Hypercholesteremia    Carotid bruit(bilat-nl duplex u/s 10/10)    Vitamin D deficiency-(advised 1000IU/day)    Actinic keratoses    Elevated PSA-(was 4.2 10/10-repeat 6 mo)(was 5.12 1/11-then 4.4 2/12)-sees dr Arline Quiñones for this    S/P colonoscopy-4/07-neg per pt,  3/18 repeat colonoscopy polyp-repeat 5 yr    Hearing loss, conductive, bilateral-seeing ent-dr quinn for hearing aides    Encounter for monitoring sotalol therapy    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 Conchita Arana     Chronic anemia    DM (diabetes mellitus), type 2 with peripheral vascular complications (HCC)--s/p amputation great toe post osteomylitis   diet controlled     Hypertension    Chronic renal impairment, stage 3 (moderate) (HCC)    Hyperkalemia    H/O esophagogastroduodenoscopy  11/18  prominent vessesls vs varices. dr Park Burden (done for blood in stool)    Elevated ferritin  - work up Dr. Luli Tinoco  genetic screen hemachromatosis negative.  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    Localized edema    Ischemic cardiomyopathy; EF 40-45% 4/22    Chronic pansinusitis    Nonrheumatic aortic valve stenosis- moderate by echo 4/22    Acute on chronic systolic heart failure (HCC)    Mixed hyperlipidemia    Closed pelvic ring fracture (HCC)    Atrial fibrillation (HCC)    Chronic combined systolic and diastolic congestive heart failure ([address])

## 2023-08-07 NOTE — TELEPHONE ENCOUNTER
Very well. Start using the Buspar at 5 mg once daily in the morning. Sent to [employer]. If he does OK after 10-14 days, may add a 2nd dose in the evening. (This is still a very low dose of Buspar).

## 2023-08-08 ENCOUNTER — TELEPHONE (OUTPATIENT)
Dept: FAMILY MEDICINE CLINIC | Age: 88
End: 2023-08-08

## 2023-08-08 ENCOUNTER — ANTI-COAG VISIT (OUTPATIENT)
Dept: PHARMACY | Age: 88
End: 2023-08-08

## 2023-08-08 ENCOUNTER — HOSPITAL ENCOUNTER (OUTPATIENT)
Age: 88
Setting detail: SPECIMEN
Discharge: HOME | End: 2023-08-08
Payer: MEDICARE

## 2023-08-08 DIAGNOSIS — I48.20 CHRONIC ATRIAL FIBRILLATION (HCC): Primary | ICD-10-CM

## 2023-08-08 LAB
ANION GAP SERPL CALCULATED.3IONS-SCNC: 12 MMOL/L (ref 3–16)
BASOPHILS # BLD: 0.1 K/UL (ref 0–0.2)
BASOPHILS NFR BLD: 1.2 %
BUN SERPL-MCNC: 31 MG/DL (ref 7–20)
CALCIUM SERPL-MCNC: 9.2 MG/DL (ref 8.3–10.6)
CHLORIDE SERPL-SCNC: 99 MMOL/L (ref 99–110)
CO2 SERPL-SCNC: 32 MMOL/L (ref 21–32)
CREAT SERPL-MCNC: 1.9 MG/DL (ref 0.8–1.3)
DEPRECATED RDW RBC AUTO: 23.8 % (ref 12.4–15.4)
EOSINOPHIL # BLD: 0.1 K/UL (ref 0–0.6)
EOSINOPHIL NFR BLD: 2 %
GFR SERPLBLD CREATININE-BSD FMLA CKD-EPI: 33 ML/MIN/{1.73_M2}
GLUCOSE SERPL-MCNC: 206 MG/DL (ref 70–99)
HCT VFR BLD AUTO: 31 % (ref 40.5–52.5)
HGB BLD-MCNC: 10.3 G/DL (ref 13.5–17.5)
INR BLD: 1.9
LYMPHOCYTES # BLD: 0.7 K/UL (ref 1–5.1)
LYMPHOCYTES NFR BLD: 12.9 %
MCH RBC QN AUTO: 32.3 PG (ref 26–34)
MCHC RBC AUTO-ENTMCNC: 33.4 G/DL (ref 31–36)
MCV RBC AUTO: 96.6 FL (ref 80–100)
MONOCYTES # BLD: 0.6 K/UL (ref 0–1.3)
MONOCYTES NFR BLD: 10 %
NEUTROPHILS # BLD: 4.2 K/UL (ref 1.7–7.7)
NEUTROPHILS NFR BLD: 73.9 %
NT-PROBNP SERPL-MCNC: ABNORMAL PG/ML (ref 0–449)
PLATELET # BLD AUTO: 294 K/UL (ref 135–450)
PMV BLD AUTO: 8.3 FL (ref 5–10.5)
POTASSIUM SERPL-SCNC: 3.8 MMOL/L (ref 3.5–5.1)
RBC # BLD AUTO: 3.2 M/UL (ref 4.2–5.9)
SODIUM SERPL-SCNC: 143 MMOL/L (ref 136–145)
WBC # BLD AUTO: 5.7 K/UL (ref 4–11)

## 2023-08-08 PROCEDURE — 83880 ASSAY OF NATRIURETIC PEPTIDE: CPT

## 2023-08-08 PROCEDURE — 36415 COLL VENOUS BLD VENIPUNCTURE: CPT

## 2023-08-08 PROCEDURE — 85025 COMPLETE CBC W/AUTO DIFF WBC: CPT

## 2023-08-08 PROCEDURE — 80048 BASIC METABOLIC PNL TOTAL CA: CPT

## 2023-08-08 NOTE — TELEPHONE ENCOUNTER
Neri Shoemaker from [employer] [facility] [employer] called wanting to see if Dr. Cynthia Pollack can follow this patient's home care orders for SN & PT    Neri Shoemaker can be reached at [phone]    Please Advise

## 2023-08-08 NOTE — PROGRESS NOTES
PT 1.9 / INR 22.4 Patient took 2.5mg warfarin daily. Patient is on his last 5 day course of Amoxicillin 500mg x3 per day and the lexapro has been discontinued, due to hallucinations. Please call Dhiraj Norma [phone]  with warfarin dosing and order for next INR check. Returned call to [facility]. Instructed for patient to take 1.25 mg on Wed and Sat and 2.5 mg all other days of the week.  Recheck INR in 1 week, 8/14        For Pharmacy Admin Tracking Only    Intervention Detail: Dose Adjustment: 1, reason: Therapy Optimization  Total # of Interventions Recommended: 1  Total # of Interventions Accepted: 1  Time Spent (min): 15

## 2023-08-09 NOTE — TELEPHONE ENCOUNTER
Called and left  for Parris Griffiths that  would follow patient for order. Left office phone/fax number.

## 2023-08-10 ENCOUNTER — OFFICE VISIT (OUTPATIENT)
Dept: CARDIOLOGY CLINIC | Age: 88
End: 2023-08-10
Payer: MEDICARE

## 2023-08-10 VITALS
DIASTOLIC BLOOD PRESSURE: 52 MMHG | OXYGEN SATURATION: 94 % | HEART RATE: 63 BPM | HEIGHT: 70 IN | BODY MASS INDEX: 20.47 KG/M2 | SYSTOLIC BLOOD PRESSURE: 100 MMHG | WEIGHT: 143 LBS

## 2023-08-10 DIAGNOSIS — N18.30 CHRONIC RENAL IMPAIRMENT, STAGE 3 (MODERATE), UNSPECIFIED WHETHER STAGE 3A OR 3B CKD (HCC): ICD-10-CM

## 2023-08-10 DIAGNOSIS — D64.9 ANEMIA, UNSPECIFIED TYPE: ICD-10-CM

## 2023-08-10 DIAGNOSIS — I50.42 CHRONIC COMBINED SYSTOLIC AND DIASTOLIC CONGESTIVE HEART FAILURE (HCC): Primary | ICD-10-CM

## 2023-08-10 DIAGNOSIS — I48.20 CHRONIC ATRIAL FIBRILLATION (HCC): ICD-10-CM

## 2023-08-10 DIAGNOSIS — I35.0 SEVERE AORTIC STENOSIS: ICD-10-CM

## 2023-08-10 PROCEDURE — G8420 CALC BMI NORM PARAMETERS: HCPCS | Performed by: CLINICAL NURSE SPECIALIST

## 2023-08-10 PROCEDURE — G8427 DOCREV CUR MEDS BY ELIG CLIN: HCPCS | Performed by: CLINICAL NURSE SPECIALIST

## 2023-08-10 PROCEDURE — 99214 OFFICE O/P EST MOD 30 MIN: CPT | Performed by: CLINICAL NURSE SPECIALIST

## 2023-08-10 PROCEDURE — 1123F ACP DISCUSS/DSCN MKR DOCD: CPT | Performed by: CLINICAL NURSE SPECIALIST

## 2023-08-10 PROCEDURE — 1036F TOBACCO NON-USER: CPT | Performed by: CLINICAL NURSE SPECIALIST

## 2023-08-10 RX ORDER — TORSEMIDE 20 MG/1
40 TABLET ORAL 2 TIMES DAILY
Qty: 60 TABLET | Refills: 1 | Status: SHIPPED
Start: 2023-08-10 | End: 2023-08-10 | Stop reason: DRUGHIGH

## 2023-08-10 RX ORDER — METOPROLOL SUCCINATE 50 MG/1
50 TABLET, EXTENDED RELEASE ORAL 2 TIMES DAILY
Qty: 270 TABLET | Refills: 0
Start: 2023-08-10

## 2023-08-10 RX ORDER — TORSEMIDE 20 MG/1
40 TABLET ORAL DAILY
Qty: 60 TABLET | Refills: 1 | Status: SHIPPED
Start: 2023-08-10

## 2023-08-10 NOTE — PATIENT INSTRUCTIONS
Take torsemide 40 mg in am  Cut the metoprolol 50 mg twice a day to see if this helps lesson his hallucination  Blood work once a month  Will order couple dose of IV venofer  RTO in 2-3 weeks

## 2023-08-10 NOTE — PROGRESS NOTES
[facility]  Progress Note    Primary Care Doctor:  Chaya Grace MD    Chief Complaint   Patient presents with    Fatigue    Follow-up    Shortness of Breath    Congestive Heart Failure        History of Present Illness:  80 y.o. male with history of CAD with CABG, HF, chronic anemia (form of myelodysplastic syndrome), hypertension, chronic renal insufficiency, chronic AF, pacemaker, DM, hyperlipidemia   6/3-7/2023 for shortness of breath, Bnp 32K, cxr with trace left pleural effusion. Not iron deficient. He follows with hematologist at [facility] and has not responded to epogen. He received a unit of blood. Echo with severe aortic stenosis and LVEF of 30-35%. I had the pleasure of seeing Alyssa Vargas in follow up for severe aortic stenosis and systolic heart failure. He is in a wheel chair and with his daughter, Jose Hall. He had a few teeth recently extracted and a blood transfusion as his Hgb was down to 7.6 and now 10. 3.  he is taking oral iron which is causing constipation. He complains of \"hallucinations\" on several different sleeping pills which have been stopped. He just wants to have more energy. He is not weighing himself as he knows that he is loosing weight. [phone]    Past Medical History:   has a past medical history of Actinic keratosis, Actinic keratosis, Atrial fibrillation ([address]), Bilateral carotid artery stenosis, CAD (coronary artery disease), Cellulitis, Diabetes mellitus ([address]), DM (diabetes mellitus), type 2 with peripheral vascular complications (HCC)--s/p amputation great toe post osteomylitis , Glucose intolerance (malabsorption), Hyperlipidemia, Hypertension, Hypertrophy of prostate without urinary obstruction and other lower urinary tract symptoms (LUTS), Intermittent atrial fibrillation ([address]), Kidney stone, Occult blood in stool, and Pacemaker. Surgical History:   has a past surgical history that includes Tonsillectomy and adenoidectomy;  Appendectomy;

## 2023-08-11 DIAGNOSIS — D50.9 IRON DEFICIENCY ANEMIA, UNSPECIFIED IRON DEFICIENCY ANEMIA TYPE: ICD-10-CM

## 2023-08-11 DIAGNOSIS — D64.9 ANEMIA, UNSPECIFIED TYPE: ICD-10-CM

## 2023-08-11 RX ORDER — SODIUM CHLORIDE 0.9 % (FLUSH) 0.9 %
5-40 SYRINGE (ML) INJECTION ONCE
OUTPATIENT
Start: 2023-08-11 | End: 2023-08-11

## 2023-08-11 RX ORDER — SODIUM CHLORIDE 9 MG/ML
5-250 INJECTION, SOLUTION INTRAVENOUS ONCE
OUTPATIENT
Start: 2023-08-11 | End: 2023-08-11

## 2023-08-14 ENCOUNTER — ANTI-COAG VISIT (OUTPATIENT)
Dept: PHARMACY | Age: 88
End: 2023-08-14

## 2023-08-14 DIAGNOSIS — I48.20 CHRONIC ATRIAL FIBRILLATION (HCC): Primary | ICD-10-CM

## 2023-08-14 LAB — INR BLD: 1.9

## 2023-08-14 PROCEDURE — 93296 REM INTERROG EVL PM/IDS: CPT | Performed by: INTERNAL MEDICINE

## 2023-08-14 PROCEDURE — 93294 REM INTERROG EVL PM/LDLS PM: CPT | Performed by: INTERNAL MEDICINE

## 2023-08-14 NOTE — PROGRESS NOTES
PT 22.8 / INR 1.9 Patient took 1.25mg warfarin on Wed/Sat and 2.5mg all other days. Please all Madiha DUENAS [phone] with warfarin dosing and order for next INR check. Returned call to Madiha DUENAS and LVM. Instructed for patient to take 1.25 mg on Wed and 2.5 mg all other days of the week. Recheck INR in 1 week, 8/21. Any questions or concerns return call to clinic.          For Pharmacy Admin Tracking Only    Intervention Detail: Dose Adjustment: 1, reason: Therapy Optimization  Total # of Interventions Recommended: 1  Total # of Interventions Accepted: 1  Time Spent (min): 15

## 2023-08-18 ENCOUNTER — HOSPITAL ENCOUNTER (OUTPATIENT)
Dept: ONCOLOGY | Age: 88
Setting detail: INFUSION SERIES
Discharge: HOME | End: 2023-08-18
Payer: MEDICARE

## 2023-08-18 VITALS
RESPIRATION RATE: 16 BRPM | HEART RATE: 65 BPM | DIASTOLIC BLOOD PRESSURE: 58 MMHG | SYSTOLIC BLOOD PRESSURE: 111 MMHG | TEMPERATURE: 97.4 F

## 2023-08-18 DIAGNOSIS — D63.1 ANEMIA IN CHRONIC KIDNEY DISEASE, UNSPECIFIED CKD STAGE: ICD-10-CM

## 2023-08-18 DIAGNOSIS — N18.9 ANEMIA IN CHRONIC KIDNEY DISEASE, UNSPECIFIED CKD STAGE: ICD-10-CM

## 2023-08-18 DIAGNOSIS — D64.9 ANEMIA, UNSPECIFIED TYPE: Primary | ICD-10-CM

## 2023-08-18 DIAGNOSIS — D50.9 IRON DEFICIENCY ANEMIA, UNSPECIFIED IRON DEFICIENCY ANEMIA TYPE: ICD-10-CM

## 2023-08-18 PROCEDURE — 6360000002 HC RX W HCPCS: Performed by: CLINICAL NURSE SPECIALIST

## 2023-08-18 PROCEDURE — 96365 THER/PROPH/DIAG IV INF INIT: CPT

## 2023-08-18 PROCEDURE — 99211 OFF/OP EST MAY X REQ PHY/QHP: CPT

## 2023-08-18 PROCEDURE — 2580000003 HC RX 258: Performed by: CLINICAL NURSE SPECIALIST

## 2023-08-18 RX ORDER — SODIUM CHLORIDE 9 MG/ML
5-250 INJECTION, SOLUTION INTRAVENOUS ONCE
Status: CANCELLED | OUTPATIENT
Start: 2023-08-25 | End: 2023-08-25

## 2023-08-18 RX ORDER — SODIUM CHLORIDE 0.9 % (FLUSH) 0.9 %
5-40 SYRINGE (ML) INJECTION ONCE
Status: DISCONTINUED | OUTPATIENT
Start: 2023-08-18 | End: 2023-08-19 | Stop reason: HOSPADM

## 2023-08-18 RX ORDER — SODIUM CHLORIDE 9 MG/ML
5-250 INJECTION, SOLUTION INTRAVENOUS ONCE
Status: COMPLETED | OUTPATIENT
Start: 2023-08-18 | End: 2023-08-18

## 2023-08-18 RX ORDER — SODIUM CHLORIDE 0.9 % (FLUSH) 0.9 %
5-40 SYRINGE (ML) INJECTION ONCE
Status: CANCELLED | OUTPATIENT
Start: 2023-08-25 | End: 2023-08-25

## 2023-08-18 RX ADMIN — SODIUM CHLORIDE 100 ML/HR: 9 INJECTION, SOLUTION INTRAVENOUS at 09:53

## 2023-08-18 RX ADMIN — Medication 200 MG: at 09:54

## 2023-08-18 NOTE — PROGRESS NOTES
Patient ambulatory to infusion department in wheelchair with family member for first of two venofer infusions. IV obtained, venofer administered. AVS reviewed, no signs and symptoms noted. Patient tolerated well. AVS reviewed and printed given to patient. Patient discharged home in wheelchair with family member will return next Friday.

## 2023-08-21 ENCOUNTER — ANTI-COAG VISIT (OUTPATIENT)
Dept: PHARMACY | Age: 88
End: 2023-08-21

## 2023-08-21 DIAGNOSIS — I48.20 CHRONIC ATRIAL FIBRILLATION (HCC): Primary | ICD-10-CM

## 2023-08-21 LAB — INR BLD: 2.2

## 2023-08-21 NOTE — PROGRESS NOTES
PT 26.7 / INR 2.2 Patient took 1.25mg warfarin on Wed and 2.5mg all other days. Please call Mary Wilson [phone] with warfarin dosing and order for next INR check. Returned call to [facility] and LVM. Instructed for patient to take 1.25 mg on Wed and 2.5 mg all other days of the week. Recheck INR in 1 week, 8/28. Any questions or concerns return call to clinic.          For Pharmacy Admin Tracking Only    Total # of Interventions Recommended: 0  Total # of Interventions Accepted: 0  Time Spent (min): 15

## 2023-08-22 RX ORDER — TORSEMIDE 20 MG/1
40 TABLET ORAL DAILY
Qty: 60 TABLET | Refills: 2 | Status: SHIPPED | OUTPATIENT
Start: 2023-08-22 | End: 2023-08-24 | Stop reason: SDUPTHER

## 2023-08-23 RX ORDER — TORSEMIDE 20 MG/1
40 TABLET ORAL DAILY
Qty: 180 TABLET | OUTPATIENT
Start: 2023-08-23

## 2023-08-24 ENCOUNTER — OFFICE VISIT (OUTPATIENT)
Dept: CARDIOLOGY CLINIC | Age: 88
End: 2023-08-24
Payer: MEDICARE

## 2023-08-24 VITALS
HEIGHT: 70 IN | DIASTOLIC BLOOD PRESSURE: 54 MMHG | OXYGEN SATURATION: 94 % | SYSTOLIC BLOOD PRESSURE: 120 MMHG | BODY MASS INDEX: 20.47 KG/M2 | HEART RATE: 63 BPM | WEIGHT: 143 LBS

## 2023-08-24 DIAGNOSIS — D64.9 ANEMIA, UNSPECIFIED TYPE: ICD-10-CM

## 2023-08-24 DIAGNOSIS — I35.0 SEVERE AORTIC STENOSIS: ICD-10-CM

## 2023-08-24 DIAGNOSIS — I48.20 CHRONIC ATRIAL FIBRILLATION (HCC): ICD-10-CM

## 2023-08-24 DIAGNOSIS — N18.30 CHRONIC RENAL IMPAIRMENT, STAGE 3 (MODERATE), UNSPECIFIED WHETHER STAGE 3A OR 3B CKD (HCC): ICD-10-CM

## 2023-08-24 DIAGNOSIS — I50.42 CHRONIC COMBINED SYSTOLIC AND DIASTOLIC CONGESTIVE HEART FAILURE (HCC): Primary | ICD-10-CM

## 2023-08-24 PROCEDURE — G8427 DOCREV CUR MEDS BY ELIG CLIN: HCPCS | Performed by: CLINICAL NURSE SPECIALIST

## 2023-08-24 PROCEDURE — 1123F ACP DISCUSS/DSCN MKR DOCD: CPT | Performed by: CLINICAL NURSE SPECIALIST

## 2023-08-24 PROCEDURE — 1036F TOBACCO NON-USER: CPT | Performed by: CLINICAL NURSE SPECIALIST

## 2023-08-24 PROCEDURE — G8420 CALC BMI NORM PARAMETERS: HCPCS | Performed by: CLINICAL NURSE SPECIALIST

## 2023-08-24 PROCEDURE — 99214 OFFICE O/P EST MOD 30 MIN: CPT | Performed by: CLINICAL NURSE SPECIALIST

## 2023-08-24 NOTE — PATIENT INSTRUCTIONS
Resume oral iron and ok to take every other take with vitamin C  Continue all current medications  Refilled torsemide  Monthly blood work first week of sept  RTO in 1 month

## 2023-08-24 NOTE — PROGRESS NOTES
suggests grade II diastolic   dysfunction.   -Moderate mitral regurgitation   -The left atrium is dilated. -The aortic valve area is calculated at 1.6 cm2 with a maximum pressure   gradient of 28 mmHg and a mean pressure gradient of 16 mmHg. This is c/w at   least moderate aortic stenosis due to underestimated gradient with decreased   cardiac output.   -Trivial aortic regurgitation.   -The right ventricle is enlarged and decreased in function.   -Moderate tricuspid regurgitation. RVSP 49mmHg.   -The right atrium is dilated. -Pacemaker / ICD lead is visualized in the right atrium.   -Trivial pulmonic regurgitation. -IVC size is normal (<2.1 cm) but collapses < 50% with respiration   consistent with elevated RA pressure (8 mmHg    Assessment:    1. Chronic combined systolic and diastolic congestive heart failure (HCC) bb and sglt2   2. Chronic renal impairment, stage 3 (moderate), unspecified whether stage 3a or 3b CKD ([address])    3. Anemia, unspecified type received IV venofer and on retracrit   4. Chronic atrial fibrillation (HCC) on coumadin managed by [facility]   5.  Severe aortic stenosis declines       Plan:   Patient Instructions   Resume oral iron and ok to take every other take with vitamin C  Continue all current medications  Refilled torsemide  Monthly blood work first week of sept  RTO in 1 month      NYHA IV    I appreciate the opportunity of cooperating in the care of this individual.    PRIYA Sanders - CNS, CNS, 8/24/2023, 1:39 PM

## 2023-08-25 ENCOUNTER — HOSPITAL ENCOUNTER (OUTPATIENT)
Dept: ONCOLOGY | Age: 88
Setting detail: INFUSION SERIES
Discharge: HOME | End: 2023-08-25
Payer: MEDICARE

## 2023-08-25 VITALS
DIASTOLIC BLOOD PRESSURE: 54 MMHG | HEART RATE: 61 BPM | TEMPERATURE: 97 F | SYSTOLIC BLOOD PRESSURE: 117 MMHG | RESPIRATION RATE: 16 BRPM

## 2023-08-25 DIAGNOSIS — D64.9 ANEMIA, UNSPECIFIED TYPE: Primary | ICD-10-CM

## 2023-08-25 DIAGNOSIS — D50.9 IRON DEFICIENCY ANEMIA, UNSPECIFIED IRON DEFICIENCY ANEMIA TYPE: ICD-10-CM

## 2023-08-25 DIAGNOSIS — N18.9 ANEMIA IN CHRONIC KIDNEY DISEASE, UNSPECIFIED CKD STAGE: ICD-10-CM

## 2023-08-25 DIAGNOSIS — D63.1 ANEMIA IN CHRONIC KIDNEY DISEASE, UNSPECIFIED CKD STAGE: ICD-10-CM

## 2023-08-25 PROCEDURE — 96365 THER/PROPH/DIAG IV INF INIT: CPT

## 2023-08-25 PROCEDURE — 2580000003 HC RX 258: Performed by: CLINICAL NURSE SPECIALIST

## 2023-08-25 PROCEDURE — 99211 OFF/OP EST MAY X REQ PHY/QHP: CPT

## 2023-08-25 PROCEDURE — 6360000002 HC RX W HCPCS: Performed by: CLINICAL NURSE SPECIALIST

## 2023-08-25 RX ORDER — SODIUM CHLORIDE 9 MG/ML
5-250 INJECTION, SOLUTION INTRAVENOUS ONCE
Status: COMPLETED | OUTPATIENT
Start: 2023-08-25 | End: 2023-08-25

## 2023-08-25 RX ORDER — SODIUM CHLORIDE 0.9 % (FLUSH) 0.9 %
5-40 SYRINGE (ML) INJECTION ONCE
Status: CANCELLED | OUTPATIENT
Start: 2023-08-25 | End: 2023-08-25

## 2023-08-25 RX ORDER — SODIUM CHLORIDE 0.9 % (FLUSH) 0.9 %
5-40 SYRINGE (ML) INJECTION ONCE
Status: COMPLETED | OUTPATIENT
Start: 2023-08-25 | End: 2023-08-25

## 2023-08-25 RX ORDER — SODIUM CHLORIDE 9 MG/ML
5-250 INJECTION, SOLUTION INTRAVENOUS ONCE
Status: CANCELLED | OUTPATIENT
Start: 2023-08-25 | End: 2023-08-25

## 2023-08-25 RX ADMIN — SODIUM CHLORIDE 100 ML/HR: 9 INJECTION, SOLUTION INTRAVENOUS at 09:38

## 2023-08-25 RX ADMIN — SODIUM CHLORIDE, PRESERVATIVE FREE 10 ML: 5 INJECTION INTRAVENOUS at 09:38

## 2023-08-25 RX ADMIN — Medication 200 MG: at 09:39

## 2023-08-25 NOTE — PROGRESS NOTES
Patient ambulatory to infusion department in wheelchair with family member for two of two venofer infusions. IV obtained, venofer administered. AVS reviewed, no signs and symptoms noted. Patient tolerated well. Patient discharged home in wheelchair with family member will f/u with provider.

## 2023-08-28 ENCOUNTER — ANTI-COAG VISIT (OUTPATIENT)
Dept: PHARMACY | Age: 88
End: 2023-08-28

## 2023-08-28 DIAGNOSIS — I48.20 CHRONIC ATRIAL FIBRILLATION (HCC): Primary | ICD-10-CM

## 2023-08-28 LAB — INR BLD: 2.6

## 2023-08-28 RX ORDER — SPIRONOLACTONE 25 MG/1
TABLET ORAL
Qty: 24 TABLET | Refills: 3 | Status: SHIPPED | OUTPATIENT
Start: 2023-08-28

## 2023-08-28 NOTE — PROGRESS NOTES
INR 2.6 Patient took 1.25mg warfarin on Wednesday and 2.5mg all other days. Please call [facility] [phone] with warfarin dosing and order for next INR check. Returned call to [facility]. Instructed for patient to take 1.25 mg on Wed and 2.5 mg all other days of the week.  Recheck INR in 2 weeks, 9/11      For Pharmacy Admin Tracking Only    Total # of Interventions Recommended: 0  Total # of Interventions Accepted: 0  Time Spent (min): 10

## 2023-09-05 ENCOUNTER — HOSPITAL ENCOUNTER (OUTPATIENT)
Age: 88
Setting detail: SPECIMEN
Discharge: HOME | End: 2023-09-05
Payer: MEDICARE

## 2023-09-05 LAB
ANION GAP SERPL CALCULATED.3IONS-SCNC: 16 MMOL/L (ref 3–16)
BUN SERPL-MCNC: 46 MG/DL (ref 7–20)
CALCIUM SERPL-MCNC: 9.2 MG/DL (ref 8.3–10.6)
CHLORIDE SERPL-SCNC: 97 MMOL/L (ref 99–110)
CO2 SERPL-SCNC: 28 MMOL/L (ref 21–32)
CREAT SERPL-MCNC: 2.3 MG/DL (ref 0.8–1.3)
GFR SERPLBLD CREATININE-BSD FMLA CKD-EPI: 26 ML/MIN/{1.73_M2}
GLUCOSE SERPL-MCNC: 171 MG/DL (ref 70–99)
NT-PROBNP SERPL-MCNC: ABNORMAL PG/ML (ref 0–449)
POTASSIUM SERPL-SCNC: 3.6 MMOL/L (ref 3.5–5.1)
SODIUM SERPL-SCNC: 141 MMOL/L (ref 136–145)

## 2023-09-05 PROCEDURE — 83880 ASSAY OF NATRIURETIC PEPTIDE: CPT

## 2023-09-05 PROCEDURE — 36415 COLL VENOUS BLD VENIPUNCTURE: CPT

## 2023-09-05 PROCEDURE — 80048 BASIC METABOLIC PNL TOTAL CA: CPT

## 2023-09-06 ENCOUNTER — TELEPHONE (OUTPATIENT)
Dept: FAMILY MEDICINE CLINIC | Age: 88
End: 2023-09-06

## 2023-09-06 NOTE — TELEPHONE ENCOUNTER
Pt has been hearing voices a few times during the day  The daughter Osborne Kocher if this could be from any medications that he is currently taking

## 2023-09-06 NOTE — TELEPHONE ENCOUNTER
Please advise Giovanni Denney that there is an outside possibility that Buspar (buspirone) could cause hallucinations. If is more likely to be related to infection or electrolyte imbalance. Hold Buspar for a day or two and bring him in for evaluation if it does not resolve. Alternatively, if he is acting off in other ways (fatigue, febrile, more disoriented) he should be seen sooner (tomorrow).

## 2023-09-07 ENCOUNTER — OFFICE VISIT (OUTPATIENT)
Dept: FAMILY MEDICINE CLINIC | Age: 88
End: 2023-09-07

## 2023-09-07 VITALS
OXYGEN SATURATION: 95 % | DIASTOLIC BLOOD PRESSURE: 66 MMHG | HEART RATE: 66 BPM | RESPIRATION RATE: 18 BRPM | WEIGHT: 146.4 LBS | HEIGHT: 70 IN | SYSTOLIC BLOOD PRESSURE: 132 MMHG | TEMPERATURE: 97.7 F | BODY MASS INDEX: 20.96 KG/M2

## 2023-09-07 DIAGNOSIS — R44.0 AUDITORY HALLUCINATION: Primary | ICD-10-CM

## 2023-09-07 DIAGNOSIS — L89.151 PRESSURE INJURY OF SACRAL REGION, STAGE 1: ICD-10-CM

## 2023-09-07 LAB
BILIRUBIN, POC: NEGATIVE
BLOOD URINE, POC: NORMAL
CLARITY, POC: CLEAR
COLOR, POC: YELLOW
GLUCOSE URINE, POC: NORMAL
KETONES, POC: NEGATIVE
LEUKOCYTE EST, POC: NEGATIVE
NITRITE, POC: NEGATIVE
PH, POC: 5
PROTEIN, POC: NEGATIVE
SPECIFIC GRAVITY, POC: 1.02
UROBILINOGEN, POC: NORMAL

## 2023-09-11 ENCOUNTER — ANTI-COAG VISIT (OUTPATIENT)
Dept: PHARMACY | Age: 88
End: 2023-09-11

## 2023-09-11 DIAGNOSIS — I48.20 CHRONIC ATRIAL FIBRILLATION (HCC): Primary | ICD-10-CM

## 2023-09-11 LAB — INR BLD: 2

## 2023-09-11 NOTE — PROGRESS NOTES
Josefina Burgess called in results for patient. INR 2.0. PT 24. Pt took 1.25 mg on Wed and 2.5 mg all other days of the week. No changes in meds or diet. Advised for patient to continue same weekly dose of warfarin, 1.25 mg on Wed and 2.5 mg all other days of the week. Recheck INR, RTC, in 3 weeks, 10/3. Verified date and time with pt daughter.        For Pharmacy Admin Tracking Only    Total # of Interventions Recommended: 0  Total # of Interventions Accepted: 0  Time Spent (min): 10

## 2023-09-18 ENCOUNTER — HOSPITAL ENCOUNTER (OUTPATIENT)
Age: 88
Setting detail: SPECIMEN
Discharge: HOME | End: 2023-09-18
Payer: MEDICARE

## 2023-09-18 LAB
ANION GAP SERPL CALCULATED.3IONS-SCNC: 11 MMOL/L (ref 3–16)
BUN SERPL-MCNC: 44 MG/DL (ref 7–20)
CALCIUM SERPL-MCNC: 9.1 MG/DL (ref 8.3–10.6)
CHLORIDE SERPL-SCNC: 100 MMOL/L (ref 99–110)
CO2 SERPL-SCNC: 30 MMOL/L (ref 21–32)
CREAT SERPL-MCNC: 2.1 MG/DL (ref 0.8–1.3)
GFR SERPLBLD CREATININE-BSD FMLA CKD-EPI: 29 ML/MIN/{1.73_M2}
GLUCOSE SERPL-MCNC: 122 MG/DL (ref 70–99)
POTASSIUM SERPL-SCNC: 4.1 MMOL/L (ref 3.5–5.1)
SODIUM SERPL-SCNC: 141 MMOL/L (ref 136–145)

## 2023-09-18 PROCEDURE — 36415 COLL VENOUS BLD VENIPUNCTURE: CPT

## 2023-09-18 PROCEDURE — 80048 BASIC METABOLIC PNL TOTAL CA: CPT

## 2023-09-25 RX ORDER — DOXAZOSIN 2 MG/1
2 TABLET ORAL DAILY
Qty: 90 TABLET | Refills: 1 | Status: SHIPPED | OUTPATIENT
Start: 2023-09-25

## 2023-09-26 RX ORDER — TORSEMIDE 20 MG/1
TABLET ORAL
Qty: 180 TABLET | Refills: 0 | OUTPATIENT
Start: 2023-09-26

## 2023-10-03 ENCOUNTER — ANTI-COAG VISIT (OUTPATIENT)
Dept: PHARMACY | Age: 88
End: 2023-10-03
Payer: MEDICARE

## 2023-10-03 DIAGNOSIS — I48.20 CHRONIC ATRIAL FIBRILLATION (HCC): Primary | ICD-10-CM

## 2023-10-03 LAB — INTERNATIONAL NORMALIZATION RATIO, POC: 2.1

## 2023-10-03 PROCEDURE — 99211 OFF/OP EST MAY X REQ PHY/QHP: CPT

## 2023-10-03 PROCEDURE — 85610 PROTHROMBIN TIME: CPT

## 2023-10-03 NOTE — PROGRESS NOTES
daughter, patient's INR were: On 10/10 INR was 3.2 and 32.5        10/11 INR was 3.8 and 38.9 -        10/13 INR was 2.3 and 25.6    he was taking 5mg daily and dose was held 10/10, 10/11, 10/12. Then 3.5 mg on 10/13  []   [x]       Emergency department visit  [x]   []       Other complaints--> Patient has been very tired and not able to function well, getting epoetin alpha injection at hemotologist office --> having trouble with low RBC-->[facility] on Th[facility]      Clinical Outcomes:  Yes     No  []   [x]       Major bleeding event  []   [x]       Thromboembolic event  Patient has 1mg, 2.5mg and 5mg tablets at home  Duration of warfarin Therapy: indefinite  INR Range:  2.0-3.0     Ayala Cochran (daughter) is primary care now- [phone]. RTC after being managed in [address] [place] [address],[address] [facility] for past few months     INR is 2.1 today    Continue weekly dose of 1.25 mg on Wed and 2.5 mg all other days of the week. Encouraged to maintain a consistency of vegetables/salads.    Refilled warfarin at opp   Recheck INR in 4 weeks, 10/31    Consent form signed 1/18/2023    Referring cardiologist will be Dr. Franko Klein  INR (no units)   Date Value   09/11/2023 2.00   08/28/2023 2.60   08/21/2023 2.20   08/14/2023 1.90     For Pharmacy Admin Tracking Only    Intervention Detail: Refill(s) Provided  Total # of Interventions Recommended: 1  Total # of Interventions Accepted: 1  Time Spent (min): 15

## 2023-10-03 NOTE — TELEPHONE ENCOUNTER
Warfarin prescription phoned into [address] to [address] [facility] under Dr. Eugenie Floyd   Warfarin 2.5 mg tabs  Take 1.25 mg on Wed and 2.5 mg all other days of the week   90 days   2 refills

## 2023-10-04 ENCOUNTER — PATIENT MESSAGE (OUTPATIENT)
Dept: CARDIOLOGY CLINIC | Age: 88
End: 2023-10-04

## 2023-10-04 ENCOUNTER — TELEPHONE (OUTPATIENT)
Dept: FAMILY MEDICINE CLINIC | Age: 88
End: 2023-10-04

## 2023-10-04 NOTE — TELEPHONE ENCOUNTER
Will fax lab orders to [facility], LAB ORDERS FAXED [phone] [facility].

## 2023-10-08 PROBLEM — I50.43 CHF (CONGESTIVE HEART FAILURE), NYHA CLASS I, ACUTE ON CHRONIC, COMBINED (HCC): Status: RESOLVED | Noted: 2023-06-03 | Resolved: 2023-10-08

## 2023-10-08 PROBLEM — R60.0 LOCALIZED EDEMA: Status: RESOLVED | Noted: 2019-09-20 | Resolved: 2023-10-08

## 2023-10-08 PROBLEM — S32.810A CLOSED PELVIC RING FRACTURE (HCC): Status: RESOLVED | Noted: 2022-09-24 | Resolved: 2023-10-08

## 2023-10-08 PROBLEM — M19.041 ARTHRITIS OF FINGER OF RIGHT HAND: Status: RESOLVED | Noted: 2023-01-19 | Resolved: 2023-10-08

## 2023-10-10 ENCOUNTER — HOSPITAL ENCOUNTER (OUTPATIENT)
Age: 88
Discharge: HOME | End: 2023-10-10
Payer: MEDICARE

## 2023-10-10 ENCOUNTER — OFFICE VISIT (OUTPATIENT)
Dept: CARDIOLOGY CLINIC | Age: 88
End: 2023-10-10
Payer: MEDICARE

## 2023-10-10 VITALS
HEART RATE: 62 BPM | HEIGHT: 70 IN | WEIGHT: 148 LBS | OXYGEN SATURATION: 95 % | SYSTOLIC BLOOD PRESSURE: 122 MMHG | BODY MASS INDEX: 21.19 KG/M2 | DIASTOLIC BLOOD PRESSURE: 60 MMHG

## 2023-10-10 DIAGNOSIS — N18.30 CHRONIC RENAL IMPAIRMENT, STAGE 3 (MODERATE), UNSPECIFIED WHETHER STAGE 3A OR 3B CKD (HCC): ICD-10-CM

## 2023-10-10 DIAGNOSIS — D64.9 ANEMIA, UNSPECIFIED TYPE: ICD-10-CM

## 2023-10-10 DIAGNOSIS — I50.42 CHRONIC COMBINED SYSTOLIC AND DIASTOLIC CONGESTIVE HEART FAILURE (HCC): ICD-10-CM

## 2023-10-10 DIAGNOSIS — I50.42 CHRONIC COMBINED SYSTOLIC AND DIASTOLIC CONGESTIVE HEART FAILURE (HCC): Primary | ICD-10-CM

## 2023-10-10 DIAGNOSIS — I48.20 CHRONIC ATRIAL FIBRILLATION (HCC): ICD-10-CM

## 2023-10-10 LAB
ANION GAP SERPL CALCULATED.3IONS-SCNC: 14 MMOL/L (ref 3–16)
BUN SERPL-MCNC: 38 MG/DL (ref 7–20)
CALCIUM SERPL-MCNC: 9.4 MG/DL (ref 8.3–10.6)
CHLORIDE SERPL-SCNC: 98 MMOL/L (ref 99–110)
CO2 SERPL-SCNC: 26 MMOL/L (ref 21–32)
CREAT SERPL-MCNC: 2.2 MG/DL (ref 0.8–1.3)
GFR SERPLBLD CREATININE-BSD FMLA CKD-EPI: 28 ML/MIN/{1.73_M2}
GLUCOSE SERPL-MCNC: 120 MG/DL (ref 70–99)
NT-PROBNP SERPL-MCNC: 8092 PG/ML (ref 0–449)
POTASSIUM SERPL-SCNC: 4.6 MMOL/L (ref 3.5–5.1)
SODIUM SERPL-SCNC: 138 MMOL/L (ref 136–145)

## 2023-10-10 PROCEDURE — G8484 FLU IMMUNIZE NO ADMIN: HCPCS | Performed by: CLINICAL NURSE SPECIALIST

## 2023-10-10 PROCEDURE — 1123F ACP DISCUSS/DSCN MKR DOCD: CPT | Performed by: CLINICAL NURSE SPECIALIST

## 2023-10-10 PROCEDURE — 1036F TOBACCO NON-USER: CPT | Performed by: CLINICAL NURSE SPECIALIST

## 2023-10-10 PROCEDURE — 83880 ASSAY OF NATRIURETIC PEPTIDE: CPT

## 2023-10-10 PROCEDURE — 36415 COLL VENOUS BLD VENIPUNCTURE: CPT

## 2023-10-10 PROCEDURE — G8420 CALC BMI NORM PARAMETERS: HCPCS | Performed by: CLINICAL NURSE SPECIALIST

## 2023-10-10 PROCEDURE — 99214 OFFICE O/P EST MOD 30 MIN: CPT | Performed by: CLINICAL NURSE SPECIALIST

## 2023-10-10 PROCEDURE — G8427 DOCREV CUR MEDS BY ELIG CLIN: HCPCS | Performed by: CLINICAL NURSE SPECIALIST

## 2023-10-10 PROCEDURE — 80048 BASIC METABOLIC PNL TOTAL CA: CPT

## 2023-10-10 RX ORDER — FERROUS SULFATE 325(65) MG
325 TABLET ORAL
COMMUNITY

## 2023-10-10 NOTE — PROGRESS NOTES
[facility]  Progress Note    Primary Care Doctor:  Ladarius Dodd MD    Chief Complaint   Patient presents with    1 Month Follow-Up    Congestive Heart Failure        History of Present Illness:  80 y.o. male with history of CAD with CABG, HF, chronic anemia (form of myelodysplastic syndrome), hypertension, chronic renal insufficiency, chronic AF, pacemaker, DM, hyperlipidemia   6/3-7/2023 for shortness of breath, Bnp 32K, cxr with trace left pleural effusion. Not iron deficient. He follows with hematologist at [facility] and has not responded to epogen. He received a unit of blood. Echo with severe aortic stenosis and LVEF of 30-35%. I had the pleasure of seeing Blake Michel in follow up for severe aortic stenosis and systolic heart failure. He is in a wheel chair and with his daughter, Nu Callahan. He is feeling really well. He went to the golf course with his son monica alejandro and rode the cart. He is working hard with PT at home. He continues with nursing and monthly blood work which was done this morning. He is taking all his medications. No new complaints. Edema in lower legs is stable. He is looking forward to his 89th birthday party in November. Past Medical History:   has a past medical history of Actinic keratosis, Actinic keratosis, Atrial fibrillation ([address]), Bilateral carotid artery stenosis, CAD (coronary artery disease), Cellulitis, Diabetes mellitus ([address]), DM (diabetes mellitus), type 2 with peripheral vascular complications (HCC)--s/p amputation great toe post osteomylitis , Glucose intolerance (malabsorption), Hyperlipidemia, Hypertension, Hypertrophy of prostate without urinary obstruction and other lower urinary tract symptoms (LUTS), Intermittent atrial fibrillation ([address]), Kidney stone, Occult blood in stool, and Pacemaker. Surgical History:   has a past surgical history that includes Tonsillectomy and adenoidectomy;  Appendectomy; Kidney stone surgery (1980);

## 2023-10-16 ENCOUNTER — OFFICE VISIT (OUTPATIENT)
Dept: FAMILY MEDICINE CLINIC | Age: 88
End: 2023-10-16

## 2023-10-16 VITALS
HEART RATE: 61 BPM | BODY MASS INDEX: 21.05 KG/M2 | HEIGHT: 70 IN | SYSTOLIC BLOOD PRESSURE: 120 MMHG | WEIGHT: 147 LBS | TEMPERATURE: 98 F | RESPIRATION RATE: 18 BRPM | DIASTOLIC BLOOD PRESSURE: 60 MMHG | OXYGEN SATURATION: 94 %

## 2023-10-16 DIAGNOSIS — F05 SUNDOWNING: ICD-10-CM

## 2023-10-16 DIAGNOSIS — G47.9 SLEEP DISTURBANCE: ICD-10-CM

## 2023-10-16 DIAGNOSIS — F32.1 CURRENT MODERATE EPISODE OF MAJOR DEPRESSIVE DISORDER WITHOUT PRIOR EPISODE (HCC): ICD-10-CM

## 2023-10-16 DIAGNOSIS — E11.51 DM (DIABETES MELLITUS), TYPE 2 WITH PERIPHERAL VASCULAR COMPLICATIONS (HCC): Primary | ICD-10-CM

## 2023-10-16 NOTE — PATIENT INSTRUCTIONS
Please restart Melatonin at prior dose (report to me later what that dose is; usual doses are 3-6 mg, given at dusk, or when feasible). If this does not help sleep adequately, we can discuss increasing his nightly dose of Buspar to 10 mg (but keep AM dose 5 mg).

## 2023-10-23 ENCOUNTER — HOSPITAL ENCOUNTER (OUTPATIENT)
Age: 88
Setting detail: SPECIMEN
Discharge: HOME | End: 2023-10-23
Payer: MEDICARE

## 2023-10-23 LAB
ALBUMIN SERPL-MCNC: 4.3 G/DL (ref 3.4–5)
ANION GAP SERPL CALCULATED.3IONS-SCNC: 12 MMOL/L (ref 3–16)
BACTERIA URNS QL MICRO: NORMAL /HPF
BILIRUB UR QL STRIP.AUTO: NEGATIVE
BUN SERPL-MCNC: 42 MG/DL (ref 7–20)
CALCIUM SERPL-MCNC: 8.9 MG/DL (ref 8.3–10.6)
CHLORIDE SERPL-SCNC: 100 MMOL/L (ref 99–110)
CLARITY UR: CLEAR
CO2 SERPL-SCNC: 30 MMOL/L (ref 21–32)
COLOR UR: YELLOW
CREAT SERPL-MCNC: 1.9 MG/DL (ref 0.8–1.3)
CREAT UR-MCNC: 53.4 MG/DL (ref 39–259)
EPI CELLS #/AREA URNS AUTO: 1 /HPF (ref 0–5)
GFR SERPLBLD CREATININE-BSD FMLA CKD-EPI: 33 ML/MIN/{1.73_M2}
GLUCOSE SERPL-MCNC: 101 MG/DL (ref 70–99)
GLUCOSE UR STRIP.AUTO-MCNC: 500 MG/DL
HGB UR QL STRIP.AUTO: NEGATIVE
HYALINE CASTS #/AREA URNS AUTO: 2 /LPF (ref 0–8)
KETONES UR STRIP.AUTO-MCNC: NEGATIVE MG/DL
LEUKOCYTE ESTERASE UR QL STRIP.AUTO: NEGATIVE
MICROALBUMIN UR DL<=1MG/L-MCNC: 8.6 MG/DL
MICROALBUMIN/CREAT UR: 161 MG/G (ref 0–30)
NITRITE UR QL STRIP.AUTO: NEGATIVE
PH UR STRIP.AUTO: 6.5 [PH] (ref 5–8)
PHOSPHATE SERPL-MCNC: 4.1 MG/DL (ref 2.5–4.9)
POTASSIUM SERPL-SCNC: 4.4 MMOL/L (ref 3.5–5.1)
PROT UR STRIP.AUTO-MCNC: ABNORMAL MG/DL
PROT UR-MCNC: 23 MG/DL
PROT/CREAT UR-RTO: 0.4 MG/DL
RBC CLUMPS #/AREA URNS AUTO: 0 /HPF (ref 0–4)
SODIUM SERPL-SCNC: 142 MMOL/L (ref 136–145)
SP GR UR STRIP.AUTO: 1.01 (ref 1–1.03)
UA COMPLETE W REFLEX CULTURE PNL UR: ABNORMAL
UA DIPSTICK W REFLEX MICRO PNL UR: YES
URN SPEC COLLECT METH UR: ABNORMAL
UROBILINOGEN UR STRIP-ACNC: 0.2 E.U./DL
WBC #/AREA URNS AUTO: 2 /HPF (ref 0–5)

## 2023-10-23 PROCEDURE — 82570 ASSAY OF URINE CREATININE: CPT

## 2023-10-23 PROCEDURE — 80069 RENAL FUNCTION PANEL: CPT

## 2023-10-23 PROCEDURE — 81001 URINALYSIS AUTO W/SCOPE: CPT

## 2023-10-23 PROCEDURE — 82043 UR ALBUMIN QUANTITATIVE: CPT

## 2023-10-23 PROCEDURE — 84156 ASSAY OF PROTEIN URINE: CPT

## 2023-10-23 PROCEDURE — 36415 COLL VENOUS BLD VENIPUNCTURE: CPT

## 2023-10-31 ENCOUNTER — ANTI-COAG VISIT (OUTPATIENT)
Dept: PHARMACY | Age: 88
End: 2023-10-31
Payer: MEDICARE

## 2023-10-31 DIAGNOSIS — I48.91 ATRIAL FIBRILLATION, UNSPECIFIED TYPE (HCC): Primary | ICD-10-CM

## 2023-10-31 LAB — INTERNATIONAL NORMALIZATION RATIO, POC: 2.1

## 2023-10-31 PROCEDURE — 99211 OFF/OP EST MAY X REQ PHY/QHP: CPT

## 2023-10-31 PROCEDURE — 85610 PROTHROMBIN TIME: CPT
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
## 2023-11-07 RX ORDER — BUSPIRONE HYDROCHLORIDE 5 MG/1
5 TABLET ORAL 2 TIMES DAILY
Qty: 60 TABLET | Refills: 1 | Status: SHIPPED | OUTPATIENT
Start: 2023-11-07

## 2023-11-13 PROCEDURE — 93294 REM INTERROG EVL PM/LDLS PM: CPT | Performed by: INTERNAL MEDICINE

## 2023-11-13 PROCEDURE — 93296 REM INTERROG EVL PM/IDS: CPT | Performed by: INTERNAL MEDICINE

## 2023-11-24 NOTE — TELEPHONE ENCOUNTER
Last OV: 10/10/23 NPRG  Next OV: 01/10/23 NPRG  Last Fill:   Torsemide 40 MG TABS 90 tablet 0 10/12/2023     Si mg five days of the week and 40 mg 2 days    Class: NO PRINT

## 2023-11-27 ENCOUNTER — OFFICE VISIT (OUTPATIENT)
Dept: CARDIOLOGY CLINIC | Age: 88
End: 2023-11-27
Payer: MEDICARE

## 2023-11-27 VITALS
SYSTOLIC BLOOD PRESSURE: 128 MMHG | OXYGEN SATURATION: 99 % | BODY MASS INDEX: 22.05 KG/M2 | WEIGHT: 154 LBS | HEART RATE: 60 BPM | DIASTOLIC BLOOD PRESSURE: 64 MMHG | HEIGHT: 70 IN

## 2023-11-27 DIAGNOSIS — I48.20 CHRONIC ATRIAL FIBRILLATION (HCC): ICD-10-CM

## 2023-11-27 DIAGNOSIS — I50.42 CHRONIC COMBINED SYSTOLIC AND DIASTOLIC CONGESTIVE HEART FAILURE (HCC): ICD-10-CM

## 2023-11-27 DIAGNOSIS — N28.9 RENAL INSUFFICIENCY: ICD-10-CM

## 2023-11-27 DIAGNOSIS — E78.00 HYPERCHOLESTEREMIA: ICD-10-CM

## 2023-11-27 DIAGNOSIS — E78.2 MIXED HYPERLIPIDEMIA: Primary | ICD-10-CM

## 2023-11-27 DIAGNOSIS — Z95.0 CARDIAC PACEMAKER: ICD-10-CM

## 2023-11-27 DIAGNOSIS — D64.9 CHRONIC ANEMIA: ICD-10-CM

## 2023-11-27 DIAGNOSIS — I25.10 CORONARY ARTERY DISEASE INVOLVING NATIVE CORONARY ARTERY OF NATIVE HEART WITHOUT ANGINA PECTORIS: ICD-10-CM

## 2023-11-27 DIAGNOSIS — I35.0 NONRHEUMATIC AORTIC VALVE STENOSIS: ICD-10-CM

## 2023-11-27 PROCEDURE — 1123F ACP DISCUSS/DSCN MKR DOCD: CPT | Performed by: INTERNAL MEDICINE

## 2023-11-27 PROCEDURE — 1036F TOBACCO NON-USER: CPT | Performed by: INTERNAL MEDICINE

## 2023-11-27 PROCEDURE — G8484 FLU IMMUNIZE NO ADMIN: HCPCS | Performed by: INTERNAL MEDICINE

## 2023-11-27 PROCEDURE — G8420 CALC BMI NORM PARAMETERS: HCPCS | Performed by: INTERNAL MEDICINE

## 2023-11-27 PROCEDURE — 99214 OFFICE O/P EST MOD 30 MIN: CPT | Performed by: INTERNAL MEDICINE

## 2023-11-27 PROCEDURE — G8427 DOCREV CUR MEDS BY ELIG CLIN: HCPCS | Performed by: INTERNAL MEDICINE

## 2023-11-27 RX ORDER — LANOLIN ALCOHOL/MO/W.PET/CERES
3 CREAM (GRAM) TOPICAL NIGHTLY
COMMUNITY

## 2023-11-27 RX ORDER — ATORVASTATIN CALCIUM 20 MG/1
20 TABLET, FILM COATED ORAL DAILY
Qty: 90 TABLET | Refills: 4 | Status: SHIPPED | OUTPATIENT
Start: 2023-11-27

## 2023-11-27 RX ORDER — TORSEMIDE 20 MG/1
40 TABLET ORAL DAILY
Qty: 180 TABLET | Refills: 1 | Status: SHIPPED | OUTPATIENT
Start: 2023-11-27 | End: 2023-11-27 | Stop reason: SDUPTHER

## 2023-11-27 RX ORDER — AMIODARONE HYDROCHLORIDE 200 MG/1
200 TABLET ORAL DAILY
Qty: 90 TABLET | Refills: 1 | Status: SHIPPED | OUTPATIENT
Start: 2023-11-27

## 2023-11-27 NOTE — PROGRESS NOTES
at 35%. CARDIAC CATH 10/2021  Anatomy:   LM-20% distal  LAD-100% ostial, calcified  Cx-normal  OM1-100% ostial  OM2-20% proximal  RCA-20% proximal, 40% distal, calcified  RPDA-70 to 80% proximal, FFR of 0.76  LVEF-50%  LIMA-LAD: Widely patent  SVG-OM1: Widely patent  Aortic root: Not aneurysmal, no significant aortic insufficiency, 1 patent SVG visualized. PCI: RPDA 80% to 0% with a 3.25 mm x 18 mm Xience Mariela ALEJANDRA      Impression:  1. Severe multivessel CAD. 2.  Patent LIMA-LAD and SVG-OM1 grafts. 3.  Successful PCI with ALEJANDRA x1 to RPDA. 4.  Low normal LV systolic function. Plan:  1. Attempt triple therapy with enteric-coated aspirin 81 mg daily, Plavix 75 mg daily and warfarin to complete 30 days followed by Plavix and warfarin for an additional 5 months then transition to enteric-coated aspirin 81 mg daily and warfarin thereafter. 2.  Hydration. 3.  ACE inhibitor/ARB stopped preprocedure to optimize kidney function given renal insufficiency. Discussed with nephrology who recommends resuming ACE inhibitor/ARB 3 days post procedure. ECHO 1/2023   Summary   -Decreased left ventricular systolic function with abnormal septal motion   related to IVCD and apical hypokinesis. Estimated EF 30-35%. -E/e'=20. Diastolic filling parameters suggests grade II diastolic   dysfunction.   -Moderate mitral regurgitation   -The left atrium is dilated. -The aortic valve area is calculated at 1.6 cm2 with a maximum pressure   gradient of 28 mmHg and a mean pressure gradient of 16 mmHg. This is c/w at   least moderate aortic stenosis due to underestimated gradient with decreased   cardiac output.   -Trivial aortic regurgitation.   -The right ventricle is enlarged and decreased in function.   -Moderate tricuspid regurgitation. RVSP 49mmHg.   -The right atrium is dilated. -Pacemaker / ICD lead is visualized in the right atrium.   -Trivial pulmonic regurgitation.    -IVC size is normal (<2.1 cm) but collapses <

## 2023-11-27 NOTE — PATIENT INSTRUCTIONS
Limit sodium in diet  Can take an extra Torsemide if needed for swelling  Labs today  Follow in March/April

## 2023-11-28 ENCOUNTER — ANTI-COAG VISIT (OUTPATIENT)
Dept: PHARMACY | Age: 88
End: 2023-11-28
Payer: MEDICARE

## 2023-11-28 ENCOUNTER — TELEPHONE (OUTPATIENT)
Dept: CARDIOLOGY CLINIC | Age: 88
End: 2023-11-28

## 2023-11-28 DIAGNOSIS — I48.91 ATRIAL FIBRILLATION, UNSPECIFIED TYPE (HCC): Primary | ICD-10-CM

## 2023-11-28 LAB
ALT SERPL-CCNC: 13 U/L (ref 10–40)
ANION GAP SERPL CALCULATED.3IONS-SCNC: 12 MMOL/L (ref 3–16)
AST SERPL-CCNC: 19 U/L (ref 15–37)
BUN SERPL-MCNC: 32 MG/DL (ref 7–20)
CALCIUM SERPL-MCNC: 9.5 MG/DL (ref 8.3–10.6)
CHLORIDE SERPL-SCNC: 103 MMOL/L (ref 99–110)
CO2 SERPL-SCNC: 29 MMOL/L (ref 21–32)
CREAT SERPL-MCNC: 2 MG/DL (ref 0.8–1.3)
GFR SERPLBLD CREATININE-BSD FMLA CKD-EPI: 31 ML/MIN/{1.73_M2}
GLUCOSE SERPL-MCNC: 143 MG/DL (ref 70–99)
INTERNATIONAL NORMALIZATION RATIO, POC: 2.2
POTASSIUM SERPL-SCNC: 4.1 MMOL/L (ref 3.5–5.1)
SODIUM SERPL-SCNC: 144 MMOL/L (ref 136–145)
TSH SERPL DL<=0.005 MIU/L-ACNC: 2.95 UIU/ML (ref 0.27–4.2)

## 2023-11-28 PROCEDURE — 85610 PROTHROMBIN TIME: CPT

## 2023-11-28 PROCEDURE — 99211 OFF/OP EST MAY X REQ PHY/QHP: CPT

## 2023-11-28 NOTE — TELEPHONE ENCOUNTER
----- Message from Pamela Molina RN sent at 11/28/2023 11:11 AM EST -----  Per [facility]-  Labs stable   Same meds
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
## 2023-12-11 ENCOUNTER — OFFICE VISIT (OUTPATIENT)
Dept: FAMILY MEDICINE CLINIC | Age: 88
End: 2023-12-11
Payer: MEDICARE

## 2023-12-11 VITALS
SYSTOLIC BLOOD PRESSURE: 122 MMHG | DIASTOLIC BLOOD PRESSURE: 64 MMHG | BODY MASS INDEX: 22.28 KG/M2 | OXYGEN SATURATION: 97 % | HEIGHT: 70 IN | TEMPERATURE: 97.8 F | HEART RATE: 63 BPM | RESPIRATION RATE: 18 BRPM | WEIGHT: 155.6 LBS

## 2023-12-11 DIAGNOSIS — J01.90 ACUTE SINUSITIS, RECURRENCE NOT SPECIFIED, UNSPECIFIED LOCATION: Primary | ICD-10-CM

## 2023-12-11 PROCEDURE — G8427 DOCREV CUR MEDS BY ELIG CLIN: HCPCS | Performed by: FAMILY MEDICINE

## 2023-12-11 PROCEDURE — 1123F ACP DISCUSS/DSCN MKR DOCD: CPT | Performed by: FAMILY MEDICINE

## 2023-12-11 PROCEDURE — G8484 FLU IMMUNIZE NO ADMIN: HCPCS | Performed by: FAMILY MEDICINE

## 2023-12-11 PROCEDURE — 99213 OFFICE O/P EST LOW 20 MIN: CPT | Performed by: FAMILY MEDICINE

## 2023-12-11 PROCEDURE — G8420 CALC BMI NORM PARAMETERS: HCPCS | Performed by: FAMILY MEDICINE

## 2023-12-11 PROCEDURE — 1036F TOBACCO NON-USER: CPT | Performed by: FAMILY MEDICINE

## 2023-12-11 RX ORDER — AMOXICILLIN 875 MG/1
875 TABLET, COATED ORAL 2 TIMES DAILY
Qty: 20 TABLET | Refills: 0 | Status: SHIPPED | OUTPATIENT
Start: 2023-12-11 | End: 2023-12-11 | Stop reason: DRUGHIGH

## 2023-12-11 RX ORDER — AMOXICILLIN 500 MG/1
500 CAPSULE ORAL 2 TIMES DAILY
Qty: 14 CAPSULE | Refills: 0 | Status: SHIPPED | OUTPATIENT
Start: 2023-12-11 | End: 2023-12-18

## 2023-12-11 RX ORDER — GUAIFENESIN 600 MG/1
600 TABLET, EXTENDED RELEASE ORAL 2 TIMES DAILY
Qty: 20 TABLET | Refills: 0 | Status: SHIPPED | OUTPATIENT
Start: 2023-12-11 | End: 2023-12-21

## 2023-12-11 RX ORDER — AZELASTINE 1 MG/ML
1 SPRAY, METERED NASAL 2 TIMES DAILY
Qty: 60 ML | Refills: 1 | Status: SHIPPED | OUTPATIENT
Start: 2023-12-11

## 2023-12-27 ENCOUNTER — ANTI-COAG VISIT (OUTPATIENT)
Dept: PHARMACY | Age: 88
End: 2023-12-27
Payer: MEDICARE

## 2023-12-27 DIAGNOSIS — I48.20 CHRONIC ATRIAL FIBRILLATION (HCC): Primary | ICD-10-CM

## 2023-12-27 LAB — INTERNATIONAL NORMALIZATION RATIO, POC: 2.5

## 2023-12-27 PROCEDURE — 99211 OFF/OP EST MAY X REQ PHY/QHP: CPT

## 2023-12-27 PROCEDURE — 85610 PROTHROMBIN TIME: CPT
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
## 2023-12-28 ENCOUNTER — TELEPHONE (OUTPATIENT)
Dept: CARDIOLOGY CLINIC | Age: 88
End: 2023-12-28

## 2023-12-28 RX ORDER — TORSEMIDE 20 MG/1
40 TABLET ORAL DAILY
Qty: 60 TABLET | Refills: 0 | Status: SHIPPED | OUTPATIENT
Start: 2023-12-28

## 2023-12-28 NOTE — TELEPHONE ENCOUNTER
Medication Refill  *Different than script written in chart*  *new pharmacy below*  Medication needing refilled:  Torsemide   Dosage of the medication:  20 mg tablets  How are you taking this medication (QD, BID, TID, QID, PRN):  20 mg five days of the week and 40 mg 2 days   30 or 90 day supply called in:  90 day please  When will you run out of your medication:  Pt has 3 tablets left and daughter would like to pick them up before the holiday  Which Pharmacy are we sending the medication to?:  [id number] [address] - P [phone] - F [phone]      Please call daughter to advise.

## 2023-12-28 NOTE — TELEPHONE ENCOUNTER
Pts daughter came in for prescription for Torsemide 40 MG TABS for pt. NPKV sent script to [facility].

## 2024-01-01 ENCOUNTER — CLINICAL DOCUMENTATION ONLY (OUTPATIENT)
Facility: CLINIC | Age: 89
End: 2024-01-01

## 2024-01-01 ENCOUNTER — PATIENT MESSAGE (OUTPATIENT)
Dept: FAMILY MEDICINE CLINIC | Age: 89
End: 2024-01-01

## 2024-01-01 ENCOUNTER — HOSPITAL ENCOUNTER (EMERGENCY)
Age: 89
Discharge: HOME | End: 2024-02-27
Attending: EMERGENCY MEDICINE
Payer: MEDICARE

## 2024-01-01 ENCOUNTER — OFFICE VISIT (OUTPATIENT)
Dept: FAMILY MEDICINE CLINIC | Age: 89
End: 2024-01-01
Payer: MEDICARE

## 2024-01-01 ENCOUNTER — ANTI-COAG VISIT (OUTPATIENT)
Dept: PHARMACY | Age: 89
End: 2024-01-01

## 2024-01-01 ENCOUNTER — HOSPITAL ENCOUNTER (OUTPATIENT)
Dept: ONCOLOGY | Age: 89
Setting detail: INFUSION SERIES
Discharge: HOME | End: 2024-02-26
Payer: MEDICARE

## 2024-01-01 VITALS
SYSTOLIC BLOOD PRESSURE: 122 MMHG | RESPIRATION RATE: 12 BRPM | OXYGEN SATURATION: 93 % | HEART RATE: 68 BPM | DIASTOLIC BLOOD PRESSURE: 55 MMHG | TEMPERATURE: 98.1 F

## 2024-01-01 VITALS
HEART RATE: 66 BPM | TEMPERATURE: 97.5 F | SYSTOLIC BLOOD PRESSURE: 110 MMHG | WEIGHT: 140 LBS | DIASTOLIC BLOOD PRESSURE: 54 MMHG | OXYGEN SATURATION: 95 % | BODY MASS INDEX: 20.09 KG/M2 | RESPIRATION RATE: 20 BRPM

## 2024-01-01 VITALS
RESPIRATION RATE: 18 BRPM | SYSTOLIC BLOOD PRESSURE: 104 MMHG | HEART RATE: 60 BPM | DIASTOLIC BLOOD PRESSURE: 49 MMHG | OXYGEN SATURATION: 100 % | TEMPERATURE: 97.6 F

## 2024-01-01 DIAGNOSIS — D69.6 THROMBOCYTOPENIA (HCC): ICD-10-CM

## 2024-01-01 DIAGNOSIS — L98.421 SKIN ULCER OF SACRUM, LIMITED TO BREAKDOWN OF SKIN (HCC): ICD-10-CM

## 2024-01-01 DIAGNOSIS — Z09 HOSPITAL DISCHARGE FOLLOW-UP: Primary | ICD-10-CM

## 2024-01-01 DIAGNOSIS — F32.1 CURRENT MODERATE EPISODE OF MAJOR DEPRESSIVE DISORDER WITHOUT PRIOR EPISODE (HCC): ICD-10-CM

## 2024-01-01 DIAGNOSIS — D64.9 CHRONIC ANEMIA: ICD-10-CM

## 2024-01-01 DIAGNOSIS — N17.9 AKI (ACUTE KIDNEY INJURY) (HCC): ICD-10-CM

## 2024-01-01 DIAGNOSIS — R04.0 EPISTAXIS: Primary | ICD-10-CM

## 2024-01-01 DIAGNOSIS — R13.10 DYSPHAGIA, UNSPECIFIED TYPE: ICD-10-CM

## 2024-01-01 DIAGNOSIS — N18.30 STAGE 3 CHRONIC KIDNEY DISEASE, UNSPECIFIED WHETHER STAGE 3A OR 3B CKD (HCC): ICD-10-CM

## 2024-01-01 DIAGNOSIS — E44.1 MILD PROTEIN-CALORIE MALNUTRITION (HCC): ICD-10-CM

## 2024-01-01 LAB
ABO + RH BLD: NORMAL
ABO + RH BLD: NORMAL
ANION GAP SERPL CALCULATED.3IONS-SCNC: 11 MMOL/L (ref 3–16)
BASOPHILS # BLD: 0 K/UL (ref 0–0.2)
BASOPHILS # BLD: 0 K/UL (ref 0–0.2)
BASOPHILS NFR BLD: 0.5 %
BASOPHILS NFR BLD: 0.6 %
BLD GP AB SCN SERPL QL: NORMAL
BLD GP AB SCN SERPL QL: NORMAL
BLOOD BANK DISPENSE STATUS: NORMAL
BLOOD BANK DISPENSE STATUS: NORMAL
BLOOD BANK PRODUCT CODE: NORMAL
BLOOD BANK PRODUCT CODE: NORMAL
BPU ID: NORMAL
BPU ID: NORMAL
BUN SERPL-MCNC: 79 MG/DL (ref 7–20)
CALCIUM SERPL-MCNC: 9.1 MG/DL (ref 8.3–10.6)
CHLORIDE SERPL-SCNC: 107 MMOL/L (ref 99–110)
CO2 SERPL-SCNC: 29 MMOL/L (ref 21–32)
CREAT SERPL-MCNC: 2.7 MG/DL (ref 0.8–1.3)
DEPRECATED RDW RBC AUTO: 16.6 % (ref 12.4–15.4)
DEPRECATED RDW RBC AUTO: 17.2 % (ref 12.4–15.4)
DESCRIPTION BLOOD BANK: NORMAL
DESCRIPTION BLOOD BANK: NORMAL
EOSINOPHIL # BLD: 0.1 K/UL (ref 0–0.6)
EOSINOPHIL # BLD: 0.1 K/UL (ref 0–0.6)
EOSINOPHIL NFR BLD: 1.9 %
EOSINOPHIL NFR BLD: 2.8 %
GFR SERPLBLD CREATININE-BSD FMLA CKD-EPI: 22 ML/MIN/{1.73_M2}
GLUCOSE SERPL-MCNC: 140 MG/DL (ref 70–99)
HCT VFR BLD AUTO: 25.6 % (ref 40.5–52.5)
HCT VFR BLD AUTO: 26.8 % (ref 40.5–52.5)
HGB BLD-MCNC: 8.3 G/DL (ref 13.5–17.5)
HGB BLD-MCNC: 9.1 G/DL (ref 13.5–17.5)
INR PPP: 5.47 (ref 0.84–1.16)
LYMPHOCYTES # BLD: 1 K/UL (ref 1–5.1)
LYMPHOCYTES # BLD: 1.2 K/UL (ref 1–5.1)
LYMPHOCYTES NFR BLD: 24.8 %
LYMPHOCYTES NFR BLD: 31.3 %
MCH RBC QN AUTO: 29.3 PG (ref 26–34)
MCH RBC QN AUTO: 30.2 PG (ref 26–34)
MCHC RBC AUTO-ENTMCNC: 32.5 G/DL (ref 31–36)
MCHC RBC AUTO-ENTMCNC: 33.9 G/DL (ref 31–36)
MCV RBC AUTO: 89.1 FL (ref 80–100)
MCV RBC AUTO: 90.1 FL (ref 80–100)
MONOCYTES # BLD: 0.1 K/UL (ref 0–1.3)
MONOCYTES # BLD: 0.2 K/UL (ref 0–1.3)
MONOCYTES NFR BLD: 2.6 %
MONOCYTES NFR BLD: 5.4 %
NEUTROPHILS # BLD: 2.2 K/UL (ref 1.7–7.7)
NEUTROPHILS # BLD: 2.8 K/UL (ref 1.7–7.7)
NEUTROPHILS NFR BLD: 60 %
NEUTROPHILS NFR BLD: 70.1 %
PATH INTERP BLD-IMP: NORMAL
PATH INTERP BLD-IMP: YES
PLATELET # BLD AUTO: 12 K/UL (ref 135–450)
PLATELET # BLD AUTO: 17 K/UL (ref 135–450)
PLATELET BLD QL SMEAR: ABNORMAL
PMV BLD AUTO: 9.6 FL (ref 5–10.5)
PMV BLD AUTO: 9.7 FL (ref 5–10.5)
POTASSIUM SERPL-SCNC: 4.3 MMOL/L (ref 3.5–5.1)
PROTHROMBIN TIME: 49.3 SEC (ref 11.5–14.8)
RBC # BLD AUTO: 2.84 M/UL (ref 4.2–5.9)
RBC # BLD AUTO: 3.01 M/UL (ref 4.2–5.9)
SLIDE REVIEW: ABNORMAL
SODIUM SERPL-SCNC: 147 MMOL/L (ref 136–145)
WBC # BLD AUTO: 3.7 K/UL (ref 4–11)
WBC # BLD AUTO: 4 K/UL (ref 4–11)

## 2024-01-01 PROCEDURE — 1036F TOBACCO NON-USER: CPT | Performed by: FAMILY MEDICINE

## 2024-01-01 PROCEDURE — 6360000002 HC RX W HCPCS: Performed by: EMERGENCY MEDICINE

## 2024-01-01 PROCEDURE — 86901 BLOOD TYPING SEROLOGIC RH(D): CPT

## 2024-01-01 PROCEDURE — 96375 TX/PRO/DX INJ NEW DRUG ADDON: CPT

## 2024-01-01 PROCEDURE — P9016 RBC LEUKOCYTES REDUCED: HCPCS

## 2024-01-01 PROCEDURE — 99284 EMERGENCY DEPT VISIT MOD MDM: CPT

## 2024-01-01 PROCEDURE — 86900 BLOOD TYPING SEROLOGIC ABO: CPT

## 2024-01-01 PROCEDURE — 1123F ACP DISCUSS/DSCN MKR DOCD: CPT | Performed by: FAMILY MEDICINE

## 2024-01-01 PROCEDURE — G8420 CALC BMI NORM PARAMETERS: HCPCS | Performed by: FAMILY MEDICINE

## 2024-01-01 PROCEDURE — 86850 RBC ANTIBODY SCREEN: CPT

## 2024-01-01 PROCEDURE — 1111F DSCHRG MED/CURRENT MED MERGE: CPT | Performed by: FAMILY MEDICINE

## 2024-01-01 PROCEDURE — G8484 FLU IMMUNIZE NO ADMIN: HCPCS | Performed by: FAMILY MEDICINE

## 2024-01-01 PROCEDURE — 85025 COMPLETE CBC W/AUTO DIFF WBC: CPT

## 2024-01-01 PROCEDURE — 80048 BASIC METABOLIC PNL TOTAL CA: CPT

## 2024-01-01 PROCEDURE — 6370000000 HC RX 637 (ALT 250 FOR IP): Performed by: INTERNAL MEDICINE

## 2024-01-01 PROCEDURE — G8427 DOCREV CUR MEDS BY ELIG CLIN: HCPCS | Performed by: FAMILY MEDICINE

## 2024-01-01 PROCEDURE — 85610 PROTHROMBIN TIME: CPT

## 2024-01-01 PROCEDURE — 96374 THER/PROPH/DIAG INJ IV PUSH: CPT

## 2024-01-01 PROCEDURE — 99211 OFF/OP EST MAY X REQ PHY/QHP: CPT

## 2024-01-01 PROCEDURE — 2580000003 HC RX 258: Performed by: INTERNAL MEDICINE

## 2024-01-01 PROCEDURE — 86923 COMPATIBILITY TEST ELECTRIC: CPT

## 2024-01-01 PROCEDURE — 99215 OFFICE O/P EST HI 40 MIN: CPT | Performed by: FAMILY MEDICINE

## 2024-01-01 PROCEDURE — 36430 TRANSFUSION BLD/BLD COMPNT: CPT

## 2024-01-01 RX ORDER — SODIUM CHLORIDE 9 MG/ML
INJECTION, SOLUTION INTRAVENOUS PRN
Status: DISCONTINUED | OUTPATIENT
Start: 2024-01-01 | End: 2024-01-01

## 2024-01-01 RX ORDER — DIPHENHYDRAMINE HCL 25 MG
25 TABLET ORAL SEE ADMIN INSTRUCTIONS
Status: COMPLETED | OUTPATIENT
Start: 2024-01-01 | End: 2024-01-01

## 2024-01-01 RX ORDER — SODIUM CHLORIDE 0.9 % (FLUSH) 0.9 %
5-40 SYRINGE (ML) INJECTION ONCE
Status: COMPLETED | OUTPATIENT
Start: 2024-01-01 | End: 2024-01-01

## 2024-01-01 RX ORDER — ONDANSETRON 2 MG/ML
4 INJECTION INTRAMUSCULAR; INTRAVENOUS ONCE
Status: COMPLETED | OUTPATIENT
Start: 2024-01-01 | End: 2024-01-01

## 2024-01-01 RX ORDER — HYDROCODONE BITARTRATE AND ACETAMINOPHEN 5; 325 MG/1; MG/1
1 TABLET ORAL ONCE
Status: DISCONTINUED | OUTPATIENT
Start: 2024-01-01 | End: 2024-01-01

## 2024-01-01 RX ORDER — SODIUM CHLORIDE 9 MG/ML
INJECTION, SOLUTION INTRAVENOUS PRN
Status: DISCONTINUED | OUTPATIENT
Start: 2024-01-01 | End: 2024-01-01 | Stop reason: HOSPADM

## 2024-01-01 RX ORDER — BUSPIRONE HYDROCHLORIDE 10 MG/1
10 TABLET ORAL 2 TIMES DAILY
Qty: 60 TABLET | Refills: 2 | Status: SHIPPED | OUTPATIENT
Start: 2024-01-01 | End: 2024-03-02

## 2024-01-01 RX ORDER — ACETAMINOPHEN 325 MG/1
650 TABLET ORAL SEE ADMIN INSTRUCTIONS
Status: COMPLETED | OUTPATIENT
Start: 2024-01-01 | End: 2024-01-01

## 2024-01-01 RX ORDER — SODIUM CHLORIDE, SODIUM LACTATE, POTASSIUM CHLORIDE, AND CALCIUM CHLORIDE .6; .31; .03; .02 G/100ML; G/100ML; G/100ML; G/100ML
500 INJECTION, SOLUTION INTRAVENOUS ONCE
Status: DISCONTINUED | OUTPATIENT
Start: 2024-01-01 | End: 2024-01-01

## 2024-01-01 RX ORDER — SODIUM CHLORIDE, SODIUM LACTATE, POTASSIUM CHLORIDE, CALCIUM CHLORIDE 600; 310; 30; 20 MG/100ML; MG/100ML; MG/100ML; MG/100ML
INJECTION, SOLUTION INTRAVENOUS ONCE
Status: DISCONTINUED | OUTPATIENT
Start: 2024-01-01 | End: 2024-01-01

## 2024-01-01 RX ORDER — MORPHINE SULFATE 2 MG/ML
2 INJECTION, SOLUTION INTRAMUSCULAR; INTRAVENOUS ONCE
Status: COMPLETED | OUTPATIENT
Start: 2024-01-01 | End: 2024-01-01

## 2024-01-01 RX ADMIN — SODIUM CHLORIDE, PRESERVATIVE FREE 10 ML: 5 INJECTION INTRAVENOUS at 13:03

## 2024-01-01 RX ADMIN — ONDANSETRON 4 MG: 2 INJECTION INTRAMUSCULAR; INTRAVENOUS at 07:08

## 2024-01-01 RX ADMIN — ACETAMINOPHEN 650 MG: 325 TABLET ORAL at 12:58

## 2024-01-01 RX ADMIN — DIPHENHYDRAMINE HYDROCHLORIDE 25 MG: 25 TABLET ORAL at 12:58

## 2024-01-01 RX ADMIN — MORPHINE SULFATE 2 MG: 2 INJECTION, SOLUTION INTRAMUSCULAR; INTRAVENOUS at 07:09

## 2024-01-01 ASSESSMENT — PATIENT HEALTH QUESTIONNAIRE - PHQ9
SUM OF ALL RESPONSES TO PHQ9 QUESTIONS 1 & 2: 0
1. LITTLE INTEREST OR PLEASURE IN DOING THINGS: 0
SUM OF ALL RESPONSES TO PHQ QUESTIONS 1-9: 7
4. FEELING TIRED OR HAVING LITTLE ENERGY: 3
10. IF YOU CHECKED OFF ANY PROBLEMS, HOW DIFFICULT HAVE THESE PROBLEMS MADE IT FOR YOU TO DO YOUR WORK, TAKE CARE OF THINGS AT HOME, OR GET ALONG WITH OTHER PEOPLE: 0
SUM OF ALL RESPONSES TO PHQ QUESTIONS 1-9: 7
2. FEELING DOWN, DEPRESSED OR HOPELESS: 0
8. MOVING OR SPEAKING SO SLOWLY THAT OTHER PEOPLE COULD HAVE NOTICED. OR THE OPPOSITE, BEING SO FIGETY OR RESTLESS THAT YOU HAVE BEEN MOVING AROUND A LOT MORE THAN USUAL: 0
9. THOUGHTS THAT YOU WOULD BE BETTER OFF DEAD, OR OF HURTING YOURSELF: 0
5. POOR APPETITE OR OVEREATING: 3
SUM OF ALL RESPONSES TO PHQ QUESTIONS 1-9: 7
SUM OF ALL RESPONSES TO PHQ QUESTIONS 1-9: 7
6. FEELING BAD ABOUT YOURSELF - OR THAT YOU ARE A FAILURE OR HAVE LET YOURSELF OR YOUR FAMILY DOWN: 1
7. TROUBLE CONCENTRATING ON THINGS, SUCH AS READING THE NEWSPAPER OR WATCHING TELEVISION: 0
3. TROUBLE FALLING OR STAYING ASLEEP: 0

## 2024-01-04 DIAGNOSIS — D63.1 ANEMIA IN CHRONIC KIDNEY DISEASE, UNSPECIFIED CKD STAGE: ICD-10-CM

## 2024-01-04 DIAGNOSIS — D63.8 ANEMIA OF CHRONIC DISEASE: ICD-10-CM

## 2024-01-04 DIAGNOSIS — N18.9 ANEMIA IN CHRONIC KIDNEY DISEASE, UNSPECIFIED CKD STAGE: ICD-10-CM

## 2024-01-04 DIAGNOSIS — I12.9 BENIGN HYPERTENSION WITH STAGE 3B CHRONIC KIDNEY DISEASE (HCC): ICD-10-CM

## 2024-01-04 DIAGNOSIS — N18.32 CHRONIC KIDNEY DISEASE, STAGE 3B (HCC): ICD-10-CM

## 2024-01-04 DIAGNOSIS — R80.9 MICROALBUMINURIA: ICD-10-CM

## 2024-01-04 DIAGNOSIS — I50.22 CHRONIC SYSTOLIC (CONGESTIVE) HEART FAILURE (HCC): ICD-10-CM

## 2024-01-04 DIAGNOSIS — N18.32 BENIGN HYPERTENSION WITH STAGE 3B CHRONIC KIDNEY DISEASE (HCC): ICD-10-CM

## 2024-01-04 DIAGNOSIS — R60.0 LOCALIZED EDEMA: ICD-10-CM

## 2024-01-04 LAB
25(OH)D3 SERPL-MCNC: 42.1 NG/ML
BACTERIA URNS QL MICRO: ABNORMAL /HPF
BILIRUB UR QL STRIP.AUTO: NEGATIVE
CLARITY UR: CLEAR
COLOR UR: YELLOW
CREAT UR-MCNC: 55.6 MG/DL (ref 39–259)
DEPRECATED RDW RBC AUTO: 21.4 % (ref 12.4–15.4)
EPI CELLS #/AREA URNS AUTO: 1 /HPF (ref 0–5)
GLUCOSE UR STRIP.AUTO-MCNC: 500 MG/DL
HCT VFR BLD AUTO: 30.1 % (ref 40.5–52.5)
HGB BLD-MCNC: 10.4 G/DL (ref 13.5–17.5)
HGB UR QL STRIP.AUTO: NEGATIVE
HYALINE CASTS #/AREA URNS AUTO: 1 /LPF (ref 0–8)
KETONES UR STRIP.AUTO-MCNC: NEGATIVE MG/DL
LEUKOCYTE ESTERASE UR QL STRIP.AUTO: NEGATIVE
MCH RBC QN AUTO: 34.2 PG (ref 26–34)
MCHC RBC AUTO-ENTMCNC: 34.4 G/DL (ref 31–36)
MCV RBC AUTO: 99.6 FL (ref 80–100)
MICROALBUMIN UR DL<=1MG/L-MCNC: 10.2 MG/DL
MICROALBUMIN/CREAT UR: 183.5 MG/G (ref 0–30)
NITRITE UR QL STRIP.AUTO: NEGATIVE
PH UR STRIP.AUTO: 6 [PH] (ref 5–8)
PLATELET # BLD AUTO: 201 K/UL (ref 135–450)
PMV BLD AUTO: 9.5 FL (ref 5–10.5)
PROT UR STRIP.AUTO-MCNC: 30 MG/DL
PROT UR-MCNC: 0.03 G/DL
PROT UR-MCNC: 25 MG/DL
PTH-INTACT SERPL-MCNC: 102.6 PG/ML (ref 14–72)
RBC # BLD AUTO: 3.02 M/UL (ref 4.2–5.9)
RBC CLUMPS #/AREA URNS AUTO: 0 /HPF (ref 0–4)
SP GR UR STRIP.AUTO: 1.01 (ref 1–1.03)
UA DIPSTICK W REFLEX MICRO PNL UR: YES
URN SPEC COLLECT METH UR: ABNORMAL
UROBILINOGEN UR STRIP-ACNC: 0.2 E.U./DL
WBC # BLD AUTO: 6.5 K/UL (ref 4–11)
WBC #/AREA URNS AUTO: 1 /HPF (ref 0–5)

## 2024-01-04 RX ORDER — BUSPIRONE HYDROCHLORIDE 5 MG/1
5 TABLET ORAL 2 TIMES DAILY
Qty: 60 TABLET | Refills: 1 | Status: SHIPPED | OUTPATIENT
Start: 2024-01-04

## 2024-01-04 NOTE — TELEPHONE ENCOUNTER
Lov 11/27/2023  Labs 11/27/2023  Lrf 8/10/2023 Disp 270+0  Appt 3/14/2024 Dr Diop please advise thanks.

## 2024-01-05 LAB
ALBUMIN SERPL-MCNC: 4.1 G/DL (ref 3.4–5)
ANION GAP SERPL CALCULATED.3IONS-SCNC: 14 MMOL/L (ref 3–16)
BUN SERPL-MCNC: 42 MG/DL (ref 7–20)
CALCIUM SERPL-MCNC: 8.5 MG/DL (ref 8.3–10.6)
CHLORIDE SERPL-SCNC: 105 MMOL/L (ref 99–110)
CO2 SERPL-SCNC: 26 MMOL/L (ref 21–32)
CREAT SERPL-MCNC: 1.9 MG/DL (ref 0.8–1.3)
CREAT UR-MCNC: 54.4 MG/DL (ref 39–259)
GFR SERPLBLD CREATININE-BSD FMLA CKD-EPI: 33 ML/MIN/{1.73_M2}
GLUCOSE SERPL-MCNC: 211 MG/DL (ref 70–99)
KAPPA LC FREE SER-MCNC: 164.17 MG/L (ref 3.3–19.4)
KAPPA LC FREE/LAMBDA FREE SER: 2.07 {RATIO} (ref 0.26–1.65)
LAMBDA LC FREE SERPL-MCNC: 79.12 MG/L (ref 5.71–26.3)
PHOSPHATE SERPL-MCNC: 4.6 MG/DL (ref 2.5–4.9)
POTASSIUM SERPL-SCNC: 4.3 MMOL/L (ref 3.5–5.1)
PROT PATTERN UR ELPH-IMP: NORMAL
PROT UR-MCNC: 29 MG/DL
PROT/CREAT UR-RTO: 0.5 MG/DL
RPT COMMENT: ABNORMAL
SODIUM SERPL-SCNC: 145 MMOL/L (ref 136–145)

## 2024-01-05 RX ORDER — METOPROLOL SUCCINATE 50 MG/1
TABLET, EXTENDED RELEASE ORAL
Qty: 270 TABLET | Refills: 2 | Status: SHIPPED | OUTPATIENT
Start: 2024-01-05

## 2024-01-08 LAB
ALBUMIN SERPL ELPH-MCNC: 3.2 G/DL (ref 3.1–4.9)
ALPHA1 GLOB SERPL ELPH-MCNC: 0.3 G/DL (ref 0.2–0.4)
ALPHA2 GLOB SERPL ELPH-MCNC: 0.6 G/DL (ref 0.4–1.1)
B-GLOBULIN SERPL ELPH-MCNC: 1.3 G/DL (ref 0.9–1.6)
GAMMA GLOB SERPL ELPH-MCNC: 1.3 G/DL (ref 0.6–1.8)
PROT SERPL-MCNC: 6.7 G/DL (ref 6.4–8.2)
SPE/IFE INTERPRETATION: NORMAL

## 2024-01-10 ENCOUNTER — NURSE ONLY (OUTPATIENT)
Dept: CARDIOLOGY CLINIC | Age: 89
End: 2024-01-10

## 2024-01-10 ENCOUNTER — OFFICE VISIT (OUTPATIENT)
Dept: CARDIOLOGY CLINIC | Age: 89
End: 2024-01-10

## 2024-01-10 VITALS
BODY MASS INDEX: 22.76 KG/M2 | DIASTOLIC BLOOD PRESSURE: 60 MMHG | WEIGHT: 159 LBS | SYSTOLIC BLOOD PRESSURE: 90 MMHG | HEART RATE: 90 BPM | OXYGEN SATURATION: 94 % | HEIGHT: 70 IN

## 2024-01-10 DIAGNOSIS — I48.20 CHRONIC ATRIAL FIBRILLATION (HCC): Primary | ICD-10-CM

## 2024-01-10 DIAGNOSIS — I25.5 ISCHEMIC CARDIOMYOPATHY: ICD-10-CM

## 2024-01-10 DIAGNOSIS — Z95.0 CARDIAC PACEMAKER: ICD-10-CM

## 2024-01-10 DIAGNOSIS — I48.20 CHRONIC ATRIAL FIBRILLATION (HCC): ICD-10-CM

## 2024-01-10 NOTE — PROGRESS NOTES
abdominal pain, appetite loss, blood in stools. No change in bowel or bladder habits.  Genitourinary: No dysuria, trouble voiding, or hematuria.  Musculoskeletal:  No gait disturbance, weakness or joint complaints.  Integumentary: No rash or pruritis.  Neurological: No headache, diplopia, change in muscle strength, numbness or tingling. No change in gait, balance, coordination, mood, affect, memory, mentation, behavior.  Psychiatric: No anxiety, no depression.  Endocrine: No malaise, fatigue or temperature intolerance. No excessive thirst, fluid intake, or urination. No tremor.  Hematologic/Lymphatic: No abnormal bruising or bleeding, blood clots or swollen lymph nodes.  Allergic/Immunologic: No nasal congestion or hives.    Physical Examination:    Vitals:    01/10/24 1059   BP: 90/60   Site: Left Upper Arm   Position: Sitting   Cuff Size: Medium Adult   Pulse: 90   SpO2: 94%   Weight: 72.1 kg (159 lb)   Height: 1.778 m (5' 10\")            Constitutional and General Appearance: Warm and dry, no apparent distress, normal coloration  HEENT:  Normocephalic, atraumatic  Respiratory:  Normal excursion and expansion without use of accessory muscles  Resp Auscultation: Normal breath sounds without dullness  Cardiovascular:  The apical impulses not displaced  Heart tones are crisp and normal  JVP normal cm H2O  irregular rate and rhythm with rates of 120s + murmur  Peripheral pulses are symmetrical and full  There is no clubbing, cyanosis of the extremities.  No ankle edema  Pedal Pulses: 2+ and equal   Abdomen:  No masses or tenderness  Liver/Spleen: No Abnormalities Noted  Neurological/Psychiatric:  Alert and oriented in all spheres  Moves all extremities well  Exhibits normal gait balance and coordination  No abnormalities of mood, affect, memory, mentation, or behavior are noted    Lab Data:    CBC:   Lab Results   Component Value Date/Time    WBC 6.5 01/04/2024 11:18 AM    WBC 5.7 08/08/2023 01:00 PM    WBC 5.2

## 2024-01-10 NOTE — PATIENT INSTRUCTIONS
Device clinic today  Will see if Dr Mayers can pace him out of AFlutter  If no to above, will have him take some extra metoprolol and send a remote pacer check in a couple days  Continue all current medications for now  RTO with me in May  Script for torsemide given to daughter

## 2024-01-10 NOTE — TELEPHONE ENCOUNTER
Refill request AMIODARONE 200MG TABLETS     Last OV:1/10/24    Last Lab:NONE    Last EK23    Next Appt:5/10/24 NPRG

## 2024-01-10 NOTE — PROGRESS NOTES
Patient saw NPRG today in office. In AF/AFL at 120 bpm.  Dr. Mayers tried to pace terminate in office today. Unsuccessful in terminating AF/AFL but was able to lower Ventriclar rate to 70 bpm.     Patient will increase metoprolol 50 mg to 1 1/2 tabs twice daily and send in donna transmission on Monday. If still in AF he will need to be scheduled for DCCV.     Will call daughter on Monday with results.

## 2024-01-12 RX ORDER — AMIODARONE HYDROCHLORIDE 200 MG/1
200 TABLET ORAL DAILY
Qty: 90 TABLET | Refills: 1 | Status: SHIPPED | OUTPATIENT
Start: 2024-01-12

## 2024-01-15 ENCOUNTER — TELEPHONE (OUTPATIENT)
Dept: CARDIOLOGY CLINIC | Age: 89
End: 2024-01-15

## 2024-01-15 NOTE — TELEPHONE ENCOUNTER
Spoke with NPSR about the results of the remote interrogation. Per NPSR stay on increased does of metoprolol.     Called and spoke with daughter Daylin and relayed message. She VU. Thanks

## 2024-01-16 ENCOUNTER — TELEPHONE (OUTPATIENT)
Dept: PHARMACY | Age: 89
End: 2024-01-16

## 2024-01-16 NOTE — TELEPHONE ENCOUNTER
Patient's daughter called to inform us that pt has a foot infection and is on steroids and abx. Also increased metoprolol.     - started prednisone taper, finishes tomorrow   - started Augmentin for 10 days     Will have patient take 1.25mg today  and then RTC for further adjustments tomorrow, .    Kathrin Orozco, PharmD, [facility]    For Pharmacy Admin Tracking Only    Intervention Detail: Adherence Monitorin and Dose Adjustment: 1, reason: Interaction  Total # of Interventions Recommended: 2  Total # of Interventions Accepted: 2  Time Spent (min): 15

## 2024-01-17 ENCOUNTER — HOSPITAL ENCOUNTER (OUTPATIENT)
Dept: WOUND CARE | Age: 89
Discharge: HOME | End: 2024-01-17
Attending: PODIATRIST
Payer: MEDICARE

## 2024-01-17 ENCOUNTER — APPOINTMENT (OUTPATIENT)
Dept: GENERAL RADIOLOGY | Age: 89
DRG: 622 | End: 2024-01-17
Payer: MEDICARE

## 2024-01-17 ENCOUNTER — APPOINTMENT (OUTPATIENT)
Dept: CT IMAGING | Age: 89
DRG: 622 | End: 2024-01-17
Payer: MEDICARE

## 2024-01-17 ENCOUNTER — HOSPITAL ENCOUNTER (INPATIENT)
Age: 89
LOS: 12 days | Discharge: HOME | DRG: 622 | End: 2024-01-29
Attending: STUDENT IN AN ORGANIZED HEALTH CARE EDUCATION/TRAINING PROGRAM | Admitting: STUDENT IN AN ORGANIZED HEALTH CARE EDUCATION/TRAINING PROGRAM
Payer: MEDICARE

## 2024-01-17 DIAGNOSIS — Z78.9 NONPALPABLE PULSE: ICD-10-CM

## 2024-01-17 DIAGNOSIS — L97.414 ULCER OF RIGHT HEEL, WITH NECROSIS OF BONE (HCC): ICD-10-CM

## 2024-01-17 DIAGNOSIS — L97.509 DIABETIC FOOT ULCER WITH OSTEOMYELITIS (HCC): ICD-10-CM

## 2024-01-17 DIAGNOSIS — M86.171 ACUTE OSTEOMYELITIS OF PHALANX OF RIGHT FOOT (HCC): ICD-10-CM

## 2024-01-17 DIAGNOSIS — E11.621 DIABETIC FOOT ULCER WITH OSTEOMYELITIS (HCC): ICD-10-CM

## 2024-01-17 DIAGNOSIS — M1A.0711 CHRONIC IDIOPATHIC GOUT INVOLVING TOE OF RIGHT FOOT WITH TOPHUS: Primary | ICD-10-CM

## 2024-01-17 DIAGNOSIS — E11.69 DIABETIC FOOT ULCER WITH OSTEOMYELITIS (HCC): ICD-10-CM

## 2024-01-17 DIAGNOSIS — B99.9 INFECTION REQUIRING CONTACT ISOLATION PRECAUTIONS: ICD-10-CM

## 2024-01-17 DIAGNOSIS — M86.9 OSTEOMYELITIS OF METATARSAL (HCC): Primary | ICD-10-CM

## 2024-01-17 DIAGNOSIS — M86.9 DIABETIC FOOT ULCER WITH OSTEOMYELITIS (HCC): ICD-10-CM

## 2024-01-17 PROBLEM — R70.0 ELEVATED ERYTHROCYTE SEDIMENTATION RATE: Status: ACTIVE | Noted: 2024-01-17

## 2024-01-17 PROBLEM — R79.82 CRP ELEVATED: Status: ACTIVE | Noted: 2024-01-17

## 2024-01-17 LAB
ALBUMIN SERPL-MCNC: 3.6 G/DL (ref 3.4–5)
ALBUMIN/GLOB SERPL: 1 {RATIO} (ref 1.1–2.2)
ALP SERPL-CCNC: 84 U/L (ref 40–129)
ALT SERPL-CCNC: 13 U/L (ref 10–40)
ANION GAP SERPL CALCULATED.3IONS-SCNC: 14 MMOL/L (ref 3–16)
AST SERPL-CCNC: 16 U/L (ref 15–37)
BASOPHILS # BLD: 0 K/UL (ref 0–0.2)
BASOPHILS NFR BLD: 0.1 %
BILIRUB SERPL-MCNC: 1.1 MG/DL (ref 0–1)
BUN SERPL-MCNC: 56 MG/DL (ref 7–20)
CALCIUM SERPL-MCNC: 9.1 MG/DL (ref 8.3–10.6)
CHLORIDE SERPL-SCNC: 99 MMOL/L (ref 99–110)
CK SERPL-CCNC: 23 U/L (ref 39–308)
CO2 SERPL-SCNC: 24 MMOL/L (ref 21–32)
CREAT SERPL-MCNC: 2.1 MG/DL (ref 0.8–1.3)
CRP SERPL-MCNC: 250.4 MG/L (ref 0–5.1)
DEPRECATED RDW RBC AUTO: 21 % (ref 12.4–15.4)
EOSINOPHIL # BLD: 0 K/UL (ref 0–0.6)
EOSINOPHIL NFR BLD: 0 %
ERYTHROCYTE [SEDIMENTATION RATE] IN BLOOD BY WESTERGREN METHOD: 28 MM/HR (ref 0–20)
GFR SERPLBLD CREATININE-BSD FMLA CKD-EPI: 29 ML/MIN/{1.73_M2}
GLUCOSE SERPL-MCNC: 318 MG/DL (ref 70–99)
HCT VFR BLD AUTO: 29.4 % (ref 40.5–52.5)
HGB BLD-MCNC: 10.2 G/DL (ref 13.5–17.5)
INR PPP: 1.83 (ref 0.84–1.16)
LACTATE BLDV-SCNC: 1.1 MMOL/L (ref 0.4–1.9)
LACTATE BLDV-SCNC: 1.9 MMOL/L (ref 0.4–1.9)
LYMPHOCYTES # BLD: 0.5 K/UL (ref 1–5.1)
LYMPHOCYTES NFR BLD: 3.1 %
MCH RBC QN AUTO: 33.9 PG (ref 26–34)
MCHC RBC AUTO-ENTMCNC: 34.6 G/DL (ref 31–36)
MCV RBC AUTO: 97.8 FL (ref 80–100)
MONOCYTES # BLD: 1 K/UL (ref 0–1.3)
MONOCYTES NFR BLD: 6.2 %
NEUTROPHILS # BLD: 14.7 K/UL (ref 1.7–7.7)
NEUTROPHILS NFR BLD: 90.6 %
PLATELET # BLD AUTO: 210 K/UL (ref 135–450)
PMV BLD AUTO: 9 FL (ref 5–10.5)
POTASSIUM SERPL-SCNC: 4.6 MMOL/L (ref 3.5–5.1)
PROT SERPL-MCNC: 7.2 G/DL (ref 6.4–8.2)
PROTHROMBIN TIME: 21.1 SEC (ref 11.5–14.8)
RBC # BLD AUTO: 3 M/UL (ref 4.2–5.9)
SODIUM SERPL-SCNC: 137 MMOL/L (ref 136–145)
WBC # BLD AUTO: 16.3 K/UL (ref 4–11)

## 2024-01-17 PROCEDURE — 85610 PROTHROMBIN TIME: CPT

## 2024-01-17 PROCEDURE — 85652 RBC SED RATE AUTOMATED: CPT

## 2024-01-17 PROCEDURE — 96375 TX/PRO/DX INJ NEW DRUG ADDON: CPT

## 2024-01-17 PROCEDURE — 83605 ASSAY OF LACTIC ACID: CPT

## 2024-01-17 PROCEDURE — 99223 1ST HOSP IP/OBS HIGH 75: CPT | Performed by: INTERNAL MEDICINE

## 2024-01-17 PROCEDURE — 99285 EMERGENCY DEPT VISIT HI MDM: CPT

## 2024-01-17 PROCEDURE — 83036 HEMOGLOBIN GLYCOSYLATED A1C: CPT

## 2024-01-17 PROCEDURE — 6360000002 HC RX W HCPCS: Performed by: INTERNAL MEDICINE

## 2024-01-17 PROCEDURE — 36415 COLL VENOUS BLD VENIPUNCTURE: CPT

## 2024-01-17 PROCEDURE — 99213 OFFICE O/P EST LOW 20 MIN: CPT

## 2024-01-17 PROCEDURE — 85025 COMPLETE CBC W/AUTO DIFF WBC: CPT

## 2024-01-17 PROCEDURE — 6360000002 HC RX W HCPCS: Performed by: PHYSICIAN ASSISTANT

## 2024-01-17 PROCEDURE — 87205 SMEAR GRAM STAIN: CPT

## 2024-01-17 PROCEDURE — 6360000002 HC RX W HCPCS: Performed by: STUDENT IN AN ORGANIZED HEALTH CARE EDUCATION/TRAINING PROGRAM

## 2024-01-17 PROCEDURE — 96365 THER/PROPH/DIAG IV INF INIT: CPT

## 2024-01-17 PROCEDURE — 86140 C-REACTIVE PROTEIN: CPT

## 2024-01-17 PROCEDURE — 73630 X-RAY EXAM OF FOOT: CPT

## 2024-01-17 PROCEDURE — 1200000000 HC SEMI PRIVATE

## 2024-01-17 PROCEDURE — 87186 SC STD MICRODIL/AGAR DIL: CPT

## 2024-01-17 PROCEDURE — 6370000000 HC RX 637 (ALT 250 FOR IP): Performed by: STUDENT IN AN ORGANIZED HEALTH CARE EDUCATION/TRAINING PROGRAM

## 2024-01-17 PROCEDURE — 87077 CULTURE AEROBIC IDENTIFY: CPT

## 2024-01-17 PROCEDURE — 87070 CULTURE OTHR SPECIMN AEROBIC: CPT

## 2024-01-17 PROCEDURE — 87040 BLOOD CULTURE FOR BACTERIA: CPT

## 2024-01-17 PROCEDURE — 2580000003 HC RX 258: Performed by: INTERNAL MEDICINE

## 2024-01-17 PROCEDURE — 73700 CT LOWER EXTREMITY W/O DYE: CPT

## 2024-01-17 PROCEDURE — 2580000003 HC RX 258: Performed by: PHYSICIAN ASSISTANT

## 2024-01-17 PROCEDURE — 80053 COMPREHEN METABOLIC PANEL: CPT

## 2024-01-17 PROCEDURE — 11043 DBRDMT MUSC&/FSCA 1ST 20/<: CPT

## 2024-01-17 PROCEDURE — 82550 ASSAY OF CK (CPK): CPT

## 2024-01-17 RX ORDER — SODIUM CHLORIDE 0.9 % (FLUSH) 0.9 %
5-40 SYRINGE (ML) INJECTION EVERY 12 HOURS SCHEDULED
Status: DISCONTINUED | OUTPATIENT
Start: 2024-01-17 | End: 2024-01-29 | Stop reason: HOSPADM

## 2024-01-17 RX ORDER — SODIUM CHLORIDE 0.9 % (FLUSH) 0.9 %
5-40 SYRINGE (ML) INJECTION PRN
Status: DISCONTINUED | OUTPATIENT
Start: 2024-01-17 | End: 2024-01-29 | Stop reason: HOSPADM

## 2024-01-17 RX ORDER — PANTOPRAZOLE SODIUM 40 MG/10ML
40 INJECTION, POWDER, LYOPHILIZED, FOR SOLUTION INTRAVENOUS DAILY
Status: DISCONTINUED | OUTPATIENT
Start: 2024-01-17 | End: 2024-01-29 | Stop reason: HOSPADM

## 2024-01-17 RX ORDER — ONDANSETRON 4 MG/1
4 TABLET, ORALLY DISINTEGRATING ORAL EVERY 8 HOURS PRN
Status: DISCONTINUED | OUTPATIENT
Start: 2024-01-17 | End: 2024-01-29 | Stop reason: HOSPADM

## 2024-01-17 RX ORDER — SENNA AND DOCUSATE SODIUM 50; 8.6 MG/1; MG/1
1 TABLET, FILM COATED ORAL DAILY PRN
Status: DISCONTINUED | OUTPATIENT
Start: 2024-01-17 | End: 2024-01-29 | Stop reason: HOSPADM

## 2024-01-17 RX ORDER — MAGNESIUM SULFATE IN WATER 40 MG/ML
2000 INJECTION, SOLUTION INTRAVENOUS PRN
Status: DISCONTINUED | OUTPATIENT
Start: 2024-01-17 | End: 2024-01-29 | Stop reason: HOSPADM

## 2024-01-17 RX ORDER — DEXTROSE MONOHYDRATE 100 MG/ML
INJECTION, SOLUTION INTRAVENOUS CONTINUOUS PRN
Status: DISCONTINUED | OUTPATIENT
Start: 2024-01-17 | End: 2024-01-29 | Stop reason: HOSPADM

## 2024-01-17 RX ORDER — WARFARIN SODIUM 2.5 MG/1
1.25 TABLET ORAL
Status: DISCONTINUED | OUTPATIENT
Start: 2024-01-24 | End: 2024-01-18

## 2024-01-17 RX ORDER — ACETAMINOPHEN 650 MG/1
650 SUPPOSITORY RECTAL EVERY 6 HOURS PRN
Status: DISCONTINUED | OUTPATIENT
Start: 2024-01-17 | End: 2024-01-29 | Stop reason: HOSPADM

## 2024-01-17 RX ORDER — SODIUM CHLORIDE 9 MG/ML
INJECTION, SOLUTION INTRAVENOUS PRN
Status: DISCONTINUED | OUTPATIENT
Start: 2024-01-17 | End: 2024-01-29 | Stop reason: HOSPADM

## 2024-01-17 RX ORDER — BUSPIRONE HYDROCHLORIDE 5 MG/1
5 TABLET ORAL 2 TIMES DAILY
Status: DISCONTINUED | OUTPATIENT
Start: 2024-01-17 | End: 2024-01-29 | Stop reason: HOSPADM

## 2024-01-17 RX ORDER — HEPARIN SODIUM 5000 [USP'U]/ML
5000 INJECTION, SOLUTION INTRAVENOUS; SUBCUTANEOUS EVERY 8 HOURS SCHEDULED
Status: DISCONTINUED | OUTPATIENT
Start: 2024-01-17 | End: 2024-01-20

## 2024-01-17 RX ORDER — INSULIN LISPRO 100 [IU]/ML
0-8 INJECTION, SOLUTION INTRAVENOUS; SUBCUTANEOUS EVERY 4 HOURS
Status: DISCONTINUED | OUTPATIENT
Start: 2024-01-17 | End: 2024-01-22

## 2024-01-17 RX ORDER — PREDNISONE 10 MG/1
10 TABLET ORAL DAILY
Status: ON HOLD | COMMUNITY
End: 2024-01-29 | Stop reason: HOSPADM

## 2024-01-17 RX ORDER — POLYETHYLENE GLYCOL 3350 17 G/17G
17 POWDER, FOR SOLUTION ORAL DAILY PRN
Status: DISCONTINUED | OUTPATIENT
Start: 2024-01-17 | End: 2024-01-24

## 2024-01-17 RX ORDER — SODIUM CHLORIDE, SODIUM LACTATE, POTASSIUM CHLORIDE, CALCIUM CHLORIDE 600; 310; 30; 20 MG/100ML; MG/100ML; MG/100ML; MG/100ML
INJECTION, SOLUTION INTRAVENOUS CONTINUOUS
Status: DISCONTINUED | OUTPATIENT
Start: 2024-01-18 | End: 2024-01-19

## 2024-01-17 RX ORDER — GAUZE BANDAGE 2" X 2"
100 BANDAGE TOPICAL DAILY
Status: DISCONTINUED | OUTPATIENT
Start: 2024-01-18 | End: 2024-01-29 | Stop reason: HOSPADM

## 2024-01-17 RX ORDER — GLUCAGON 1 MG/ML
1 KIT INJECTION PRN
Status: DISCONTINUED | OUTPATIENT
Start: 2024-01-17 | End: 2024-01-17 | Stop reason: RX

## 2024-01-17 RX ORDER — ATORVASTATIN CALCIUM 20 MG/1
20 TABLET, FILM COATED ORAL DAILY
Status: DISCONTINUED | OUTPATIENT
Start: 2024-01-18 | End: 2024-01-29 | Stop reason: HOSPADM

## 2024-01-17 RX ORDER — WARFARIN SODIUM 2.5 MG/1
2.5 TABLET ORAL
Status: DISCONTINUED | OUTPATIENT
Start: 2024-01-18 | End: 2024-01-18

## 2024-01-17 RX ORDER — ONDANSETRON 2 MG/ML
4 INJECTION INTRAMUSCULAR; INTRAVENOUS EVERY 6 HOURS PRN
Status: DISCONTINUED | OUTPATIENT
Start: 2024-01-17 | End: 2024-01-29 | Stop reason: HOSPADM

## 2024-01-17 RX ORDER — ACETAMINOPHEN 325 MG/1
650 TABLET ORAL EVERY 6 HOURS PRN
Status: DISCONTINUED | OUTPATIENT
Start: 2024-01-17 | End: 2024-01-29 | Stop reason: HOSPADM

## 2024-01-17 RX ORDER — AMOXICILLIN AND CLAVULANATE POTASSIUM 875; 125 MG/1; MG/1
1 TABLET, FILM COATED ORAL 2 TIMES DAILY
Status: ON HOLD | COMMUNITY

## 2024-01-17 RX ORDER — SPIRONOLACTONE 25 MG/1
25 TABLET ORAL
Status: DISCONTINUED | OUTPATIENT
Start: 2024-01-19 | End: 2024-01-19

## 2024-01-17 RX ORDER — WARFARIN SODIUM 2.5 MG/1
2.5 TABLET ORAL
Status: COMPLETED | OUTPATIENT
Start: 2024-01-17 | End: 2024-01-17

## 2024-01-17 RX ORDER — DOXAZOSIN MESYLATE 1 MG/1
2 TABLET ORAL DAILY
Status: DISCONTINUED | OUTPATIENT
Start: 2024-01-18 | End: 2024-01-20

## 2024-01-17 RX ORDER — AMIODARONE HYDROCHLORIDE 200 MG/1
200 TABLET ORAL DAILY
Status: DISCONTINUED | OUTPATIENT
Start: 2024-01-18 | End: 2024-01-29 | Stop reason: HOSPADM

## 2024-01-17 RX ORDER — 0.9 % SODIUM CHLORIDE 0.9 %
1000 INTRAVENOUS SOLUTION INTRAVENOUS ONCE
Status: COMPLETED | OUTPATIENT
Start: 2024-01-17 | End: 2024-01-17

## 2024-01-17 RX ADMIN — HEPARIN SODIUM 5000 UNITS: 5000 INJECTION INTRAVENOUS; SUBCUTANEOUS at 21:05

## 2024-01-17 RX ADMIN — CEFEPIME 1000 MG: 1 INJECTION, POWDER, FOR SOLUTION INTRAMUSCULAR; INTRAVENOUS at 15:20

## 2024-01-17 RX ADMIN — DAPTOMYCIN 450 MG: 500 INJECTION, POWDER, LYOPHILIZED, FOR SOLUTION INTRAVENOUS at 18:31

## 2024-01-17 RX ADMIN — VANCOMYCIN HYDROCHLORIDE 1000 MG: 1 INJECTION, POWDER, LYOPHILIZED, FOR SOLUTION INTRAVENOUS at 15:55

## 2024-01-17 RX ADMIN — BUSPIRONE HYDROCHLORIDE 5 MG: 5 TABLET ORAL at 21:04

## 2024-01-17 RX ADMIN — SODIUM CHLORIDE 1000 ML: 9 INJECTION, SOLUTION INTRAVENOUS at 15:19

## 2024-01-17 RX ADMIN — WARFARIN SODIUM 2.5 MG: 2.5 TABLET ORAL at 21:05

## 2024-01-17 ASSESSMENT — PAIN DESCRIPTION - FREQUENCY: FREQUENCY: CONTINUOUS

## 2024-01-17 ASSESSMENT — PAIN DESCRIPTION - DESCRIPTORS: DESCRIPTORS: ACHING

## 2024-01-17 ASSESSMENT — PAIN SCALES - GENERAL: PAINLEVEL_OUTOF10: 7

## 2024-01-17 ASSESSMENT — PAIN DESCRIPTION - ORIENTATION: ORIENTATION: RIGHT

## 2024-01-17 ASSESSMENT — PAIN DESCRIPTION - ONSET: ONSET: ON-GOING

## 2024-01-17 ASSESSMENT — PAIN DESCRIPTION - PAIN TYPE: TYPE: CHRONIC PAIN

## 2024-01-17 ASSESSMENT — PAIN DESCRIPTION - LOCATION: LOCATION: FOOT

## 2024-01-17 NOTE — TELEPHONE ENCOUNTER
Wound care called, patient getting admitted, will cancel apt this afternoon and follow up after discharge.     Kathrin Orozco, PharmD, [facility]    For Pharmacy Admin Tracking Only    Intervention Detail: Adherence Monitorin  Total # of Interventions Recommended: 1  Total # of Interventions Accepted: 1  Time Spent (min): 5

## 2024-01-17 NOTE — PROGRESS NOTES
Pharmacy Home Medication Reconciliation Note    A medication reconciliation has been completed for Josep Santos 11/12/1933    Pharmacy: [facility] [address] [place] [address]    Information provided by: Daughter    The patient's home medication list is as follows:  No current facility-administered medications on file prior to encounter.     Current Outpatient Medications on File Prior to Encounter   Medication Sig Dispense Refill    predniSONE (DELTASONE) 10 MG tablet Take 1 tablet by mouth daily      amoxicillin-clavulanate (AUGMENTIN) 875-125 MG per tablet Take 1 tablet by mouth 2 times daily For 10 days      amiodarone (CORDARONE) 200 MG tablet TAKE 1 TABLET BY MOUTH DAILY 90 tablet 1    Torsemide 40 MG TABS 20 mg five days of the week and 40 mg 2 days (Patient taking differently: Take 20-40 mg by mouth See Admin Instructions 20 mg five days of the week and 40 mg on Thursday and Sunday) 90 tablet 2    metoprolol succinate (TOPROL XL) 50 MG extended release tablet TAKE 1 AND 1/2 TABLETS BY MOUTH IN THE MORNING AND AT BEDTIME (Patient taking differently: Take 1.5 tablets by mouth 2 times daily Take 1 1/2 tabs by mouth in the morning and at bedtime per Dr. Mayers) 270 tablet 2    busPIRone (BUSPAR) 5 MG tablet TAKE 1 TABLET BY MOUTH TWICE DAILY 60 tablet 1    azelastine (ASTELIN) 0.1 % nasal spray 1 spray by Nasal route 2 times daily Use in each nostril as directed (Patient not taking: Reported on 1/17/2024) 60 mL 1    melatonin 3 MG TABS tablet Take 1 tablet by mouth nightly      atorvastatin (LIPITOR) 20 MG tablet Take 1 tablet by mouth daily 90 tablet 4    ferrous sulfate (IRON 325) 325 (65 Fe) MG tablet Take 1 tablet by mouth three times a week      doxazosin (CARDURA) 2 MG tablet TAKE 1 TABLET BY MOUTH DAILY 90 tablet 1    spironolactone (ALDACTONE) 25 MG tablet TAKE 1 TABLET BY MOUTH 2 TIMES A WEEK (Patient taking differently: Take 1 tablet by mouth Twice a Week One tab three times a

## 2024-01-17 NOTE — PROGRESS NOTES
Patient seen in ED, room 05.  Admission completed through Medical and Surgical History.  IP nurse will need to begin with Family history and complete the admission including the 4 Eyes Assessment, Immunizations, Vaccines, Rights and Responsibilities, Orientation to room, Plan of Care, Education/Learning Assessment and Education Plan, white board, height and weight, pain assessment and head to toe assessment.  Patient is alert and is being admitted for Osteomyelitis of right foot.  Home Medication reconciliation was completed by Kalina Cantu.  All questions answered.    Per patient verbalization, and daughter is aware, DNR on file is NOT in effect.  He wishes to be a full code.  Admitting physician is aware of desired code status.

## 2024-01-17 NOTE — CONSULTS
Infectious Diseases   Consult Note        Admission Date: 1/17/2024  Hospital Day: Hospital Day: 1   Attending: Kyle Mercado DO  Date of service: 1/17/24     Reason for admission: Acute osteomyelitis of right foot (HCC) [M86.171]    Chief complaint/ Reason for consult: Right diabetic foot infection with osteomyelitis      Microbiology:        I have reviewed allavailable micro lab data and cultures    Blood culture (2/2) - collected on 1/17/2024: in process      Antibiotics and immunizations:       Current antibiotics: All antibiotics and their doses were reviewed by me    Recent Abx Admin                     vancomycin (VANCOCIN) 1,000 mg in sodium chloride 0.9 % 250 mL IVPB (Awob2Smi) (mg) 1,000 mg New Bag 01/17/24 1555    cefepime (MAXIPIME) 1,000 mg in sodium chloride 0.9 % 50 mL IVPB (mini-bag) (mg) 1,000 mg New Bag 01/17/24 1520                      Immunization History: All immunization history was reviewed by me today.    Immunization History   Administered Date(s) Administered    COVID-19, PFIZER Bivalent, DO NOT Dilute, (age 12y+), IM, 30 mcg/0.3 mL 09/26/2022, 05/15/2023    COVID-19, PFIZER GRAY top, DO NOT Dilute, (age 12 y+), IM, 30 mcg/0.3 mL 07/16/2022    COVID-19, PFIZER PURPLE top, DILUTE for use, (age 12 y+), 30mcg/0.3mL 01/20/2021, 02/10/2021, 11/03/2021    COVID-19, PFIZER, (2023-24 formula), (age 12y+), IM, 30mcg/0.3mL 09/26/2023    Influenza Vaccine, unspecified formulation 10/22/2015, 09/13/2016    Influenza Virus Vaccine 10/04/2010, 11/02/2011, 11/25/2013    Influenza, FLUAD, (age 65 y+), Adjuvanted, 0.5mL 10/01/2020, 09/26/2022    Influenza, High Dose (Fluzone 65 yrs and older) 09/12/2014, 09/18/2017, 10/12/2018    Influenza, Triv, inactivated, subunit, adjuvanted, IM (Fluad 65 yrs and older) 09/24/2019    Pneumococcal, PCV-13, PREVNAR 13, (age 6w+), IM, 0.5mL 12/16/2014    Pneumococcal, PPSV23, PNEUMOVAX 23, (age 2y+), SC/IM, 0.5mL 10/02/2008    RSV, ABRYSVO, (age 60y+), PF, IM,  intolerance (malabsorption)     Gout     Hyperlipidemia     Hypertension     Hypertrophy of prostate without urinary obstruction and other lower urinary tract symptoms (LUTS)     Intermittent atrial fibrillation (HCC)     Kidney stone     Occult blood in stool     Hx of    Pacemaker     Permanent         Past Surgical History: All pastsurgical history was reviewed today.    Past Surgical History:   Procedure Laterality Date    ABSCESS DRAINAGE  7/20/15    right foot    APPENDECTOMY      CARDIAC CATHETERIZATION  2003    Left    COLONOSCOPY  03/28/2018    dr ochoa    CORONARY ARTERY BYPASS GRAFT  1996    x3    KIDNEY STONE SURGERY  1980    OTHER SURGICAL HISTORY Right 7/17/15    right fifth toe I and D    PACEMAKER PLACEMENT  2005    WI ESOPHAGOGASTRODUODENOSCOPY TRANSORAL DIAGNOSTIC N/A 11/21/2018    EGD DIAGNOSTIC ONLY performed by Jovan Ochoa MD at [facility]    TOE AMPUTATION Right 7.16.15    right pinkey toe     TONSILLECTOMY AND ADENOIDECTOMY           Family History: All family history was reviewed today.        Problem Relation Age of Onset    Stroke Father     Diabetes Mother          Medications: All current and past medications were reviewed.    Not in a hospital admission.     amiodarone  200 mg Oral Daily    atorvastatin  20 mg Oral Daily    busPIRone  5 mg Oral BID    doxazosin  2 mg Oral Daily    spironolactone  25 mg Oral Q MWF    vitamin B-1  1,000 mg Oral Daily    sodium chloride flush  5-40 mL IntraVENous 2 times per day    heparin (porcine)  5,000 Units SubCUTAneous 3 times per day    [START ON 1/18/2024] cefepime  1,000 mg IntraVENous Q8H    insulin lispro  0-8 Units SubCUTAneous Q4H    pantoprazole  40 mg IntraVENous Daily    DAPTOmycin (CUBICIN) 450 mg in sodium chloride 0.9 % 50 mL IVPB  6 mg/kg (Adjusted) IntraVENous Q48H          REVIEW OF SYSTEMS:       Review of Systems   Constitutional:  Negative for chills, diaphoresis and fever.   HENT:  Negative for ear discharge, ear pain,

## 2024-01-17 NOTE — H&P
Hospital Medicine History & Physical        Name:  Josep Santos  /Age/Sex: 1933  (89 y.o. male)  MRN & CSN:  [id number] & [id number]     PCP: Raul Cuevas MD    Date of Admission: 2024    Date of Service: Patient seen/examined on 2024    Patient Status:  Inpatient - Patient will most likely require more than 48 hours of treatment and requires intensive medical treatment/monitoring     Chief Complaint:    Chief Complaint   Patient presents with    Foot Injury     Pt presents with a possible infection in the rt foot near the base of the great toe. Pt was in the wound care clinic today and fresh packing placed and wound drained and debrided. Pt sent in for admission for IV antibiotics.          History Of Present Illness:     89 y.o. male with PMHx of CAD, DM II, Afib, gout who presented to [facility] with complaints of right foot pain.    Patient states he has been experiencing right foot pain for the last week or so.  He bumped it recently, but unsure of when.  He saw podiatry on  and was put on a steroid pack for redness in his right foot.  His foot then began to open and ooze fluid.  Patient saw his podiatrist again on 1/15 and was placed on an antibiotic for possible foot infection.  His wound got worse and patient followed up with wound clinic and was told to go to the ED.  Patient denies any pain in his right foot except for when pressing on it.  It is currently wrapped in Ace bandages.    Of note, patient had right pinky toe amputation in 2015.    Denies chest pain, shortness of breath, nausea, vomiting, GI/ symptoms, edema, fever, or chills.    Denies tobacco, alcohol, or illicit drug use.      Past Medical History:          Diagnosis Date    Actinic keratosis     Actinic keratosis     Atrial fibrillation (HCC)     Bilateral carotid artery stenosis     CAD (coronary artery disease)     Cellulitis 07/15/2015    right foot    Diabetes mellitus (HCC)   route daily 10/4/22   Litzy Vides MD   vitamin B-1 (THIAMINE) 100 MG tablet Take 10 tablets by mouth daily    Provider, MD Jenaro       Allergies:  Patient has no known allergies.    Social History:      TOBACCO:   reports that he has never smoked. He has never used smokeless tobacco.  ETOH:   reports no history of alcohol use.  E-cigarette/Vaping       Questions Responses    E-cigarette/Vaping Use Never User    Start Date     Passive Exposure     Quit Date     Counseling Given     Comments               Family History:      Reviewed and negative in regards to presenting illness/complaint.        Problem Relation Age of Onset    Stroke Father     Diabetes Mother        REVIEW OF SYSTEMS COMPLETED:   Pertinent positives as noted in the HPI. All other systems reviewed and negative.    PHYSICAL EXAM PERFORMED:    BP 97/60   Pulse 57   Temp 97.4 °F (36.3 °C)   Resp 18   Wt 72.1 kg (159 lb)   SpO2 95%   BMI 22.81 kg/m²     General appearance:  No apparent distress, appears stated age and cooperative.   HEENT:  Normal cephalic, atraumatic without obvious deformity. Pupils equal, round, and reactive to light.  Extra ocular muscles intact. Conjunctivae/corneas clear.  Neck: Supple, with full range of motion. No jugular venous distention. Trachea midline.  Respiratory:  Normal respiratory effort. Clear to auscultation, bilaterally without Rales/Wheezes/Rhonchi.  Cardiovascular:  Regular rate and rhythm with normal S1/S2 without murmurs, rubs or gallops.  1+ pitting edema right lower extremity.  Abdomen: Soft, non-tender, non-distended with normal bowel sounds.  : No CVA tenderness  Musculoskeletal:  No clubbing or cyanosis.  Right foot wrapped in Ace bandages.  Skin: Skin color, texture, turgor normal.  No rashes or lesions.  Neurologic:  Neurovascularly intact without any focal sensory/motor deficits. Cranial nerves: II-XII intact, grossly non-focal.  Psychiatric:  Alert and oriented, thought content

## 2024-01-17 NOTE — PROGRESS NOTES
[facility]  Progress Note and Procedure Note      Josep Santos  AGE: 89 y.o.   GENDER: male  : 1933  TODAY'S DATE:  2024    Subjective:     Chief Complaint   Patient presents with    Wound Check     Right foot  1st visit         HISTORY of PRESENT ILLNESS HPI     Josep Santos is a 89 y.o. male who presents today for wound evaluation.   History of Wound: He has had the wound for a few weeks.  Follows podiatrist who referred him to the wound care center.  He did have a culture on Monday.  His daughter states that the wound was looking worse since the and had significant drainage last night.  He admits to some pain in the foot or tingling.  He denies current nausea, vomiting, fever, chills, shortness of breath or chest pain.  Wound Pain:  mild  Severity:  0 / 10   Wound Type:  diabetic and gouty tophi  Modifying Factors:  edema, lymphedema, diabetes, chronic pressure, decreased mobility, shear force, and uncontrolled gout  Associated Signs/Symptoms:  edema and drainage        PAST MEDICAL HISTORY        Diagnosis Date    Actinic keratosis     Actinic keratosis     Atrial fibrillation (HCC)     Bilateral carotid artery stenosis     CAD (coronary artery disease)     Cellulitis 07/15/2015    right foot    Diabetes mellitus (HCC)     DM (diabetes mellitus), type 2 with peripheral vascular complications ([facility])--s/p amputation great toe post osteomylitis  2015    Glucose intolerance (malabsorption)     Gout     Hyperlipidemia     Hypertension     Hypertrophy of prostate without urinary obstruction and other lower urinary tract symptoms (LUTS)     Intermittent atrial fibrillation (HCC)     Kidney stone     Occult blood in stool     Hx of    Pacemaker     Permanent       PAST SURGICAL HISTORY    Past Surgical History:   Procedure Laterality Date    ABSCESS DRAINAGE  7/20/15    right foot    APPENDECTOMY      CARDIAC CATHETERIZATION      Left    COLONOSCOPY  2018

## 2024-01-17 NOTE — ACP (ADVANCE CARE PLANNING)
Advanced Care Planning Note.    Purpose of Encounter: Advanced care planning in light of osteomyelitis of right foot  Parties In Attendance: Patient,   Decisional Capacity: Yes  Subjective: Right foot pain  Objective: .4.  Creatinine 2.1.  Goals of Care Determination: Patient/POA wishes to seek full treatment  Plan: IV antibiotics.  Podiatry consult.  ID consult.  Labs.  Imaging.  Code Status: Full code.  Patient has DNR paperwork at home, but states he would like to be a full code for now.  Time spent on Advanced care Plannin minutes  Advanced Care Planning Documents: Completed advanced directives on chart, daughter is the POA.    Kyle Mercado,   2024 4:16 PM

## 2024-01-17 NOTE — PATIENT INSTRUCTIONS
[facility]  [address]   [place], [place] [address]  Telephone: [phone]     FAX [phone]    Discharge Instructions    Important reminders:    **If you have any signs and symptoms of illness (Cough, fever, congestion, nausea, vomiting, diarrhea, etc.) please call the wound care center prior to your appointment.    1. Increase Protein intake for optimal wound healing  2. No added salt to reduce any swelling  3. If diabetic, maintain good glucose control  4. If you smoke, smoking prohibits wound healing, we ask that you refrain from smoking.  5. When taking antibiotics take the entire prescription as ordered. Do not stop taking until medication is all gone unless otherwise instructed.   6. Exercise as tolerated.   7. Keep weight off wounds and reposition every 2 hours if applicable.  8. If wound(s) is on your lower extremity, elevate legs to the level of the heart or above for 30 minutes 4-5 times a day and/or when sitting. Avoid standing for long periods of time.   9. Do not get wounds wet in bath or shower unless otherwise instructed by your physician. If your wound is on your foot or leg, you may purchase a cast bag. Please ask at the pharmacy.      If Vascular testing is ordered, please call [address] [place] [address] [phone]) to schedule.    Vascular tests ordered by Wound Care Physicians may take up to 2 hours to complete. Please keep that in mind when scheduling.     If Vascular testing is scheduled, please bring supplies to replace your dressing after testing is done. The vascular department does not stock supplies.     Wound: Right Foot     With each dressing change, rinse wounds with 0.9% Saline. (May use wound wash or soft contact solution. Both can be purchased at a local drug store). If unable to obtain saline, may use a gentle soap and water.    Dressing care: Go to the Emergency room. Light packing with Iodoform, 4x4, roll gauze, light 6 inch ace. Change daily    Home Care

## 2024-01-18 LAB
ALBUMIN SERPL-MCNC: 3.5 G/DL (ref 3.4–5)
ANION GAP SERPL CALCULATED.3IONS-SCNC: 11 MMOL/L (ref 3–16)
BASOPHILS # BLD: 0 K/UL (ref 0–0.2)
BASOPHILS NFR BLD: 0.3 %
BUN SERPL-MCNC: 44 MG/DL (ref 7–20)
CALCIUM SERPL-MCNC: 9.3 MG/DL (ref 8.3–10.6)
CHLORIDE SERPL-SCNC: 107 MMOL/L (ref 99–110)
CO2 SERPL-SCNC: 26 MMOL/L (ref 21–32)
CREAT SERPL-MCNC: 1.6 MG/DL (ref 0.8–1.3)
DEPRECATED RDW RBC AUTO: 20.3 % (ref 12.4–15.4)
EOSINOPHIL # BLD: 0.1 K/UL (ref 0–0.6)
EOSINOPHIL NFR BLD: 0.8 %
EST. AVERAGE GLUCOSE BLD GHB EST-MCNC: 111.2 MG/DL
GFR SERPLBLD CREATININE-BSD FMLA CKD-EPI: 41 ML/MIN/{1.73_M2}
GLUCOSE BLD-MCNC: 165 MG/DL (ref 70–99)
GLUCOSE BLD-MCNC: 170 MG/DL (ref 70–99)
GLUCOSE BLD-MCNC: 221 MG/DL (ref 70–99)
GLUCOSE BLD-MCNC: 238 MG/DL (ref 70–99)
GLUCOSE SERPL-MCNC: 166 MG/DL (ref 70–99)
HBA1C MFR BLD: 5.5 %
HCT VFR BLD AUTO: 28.5 % (ref 40.5–52.5)
HGB BLD-MCNC: 9.7 G/DL (ref 13.5–17.5)
INR PPP: 1.66 (ref 0.84–1.16)
LACTATE BLDV-SCNC: 1.3 MMOL/L (ref 0.4–2)
LYMPHOCYTES # BLD: 0.9 K/UL (ref 1–5.1)
LYMPHOCYTES NFR BLD: 7.7 %
MAGNESIUM SERPL-MCNC: 2.1 MG/DL (ref 1.8–2.4)
MCH RBC QN AUTO: 33.5 PG (ref 26–34)
MCHC RBC AUTO-ENTMCNC: 34 G/DL (ref 31–36)
MCV RBC AUTO: 98.6 FL (ref 80–100)
MONOCYTES # BLD: 0.9 K/UL (ref 0–1.3)
MONOCYTES NFR BLD: 7.3 %
NEUTROPHILS # BLD: 10.1 K/UL (ref 1.7–7.7)
NEUTROPHILS NFR BLD: 83.9 %
PERFORMED ON: ABNORMAL
PHOSPHATE SERPL-MCNC: 3.3 MG/DL (ref 2.5–4.9)
PLATELET # BLD AUTO: 217 K/UL (ref 135–450)
PMV BLD AUTO: 9.2 FL (ref 5–10.5)
POTASSIUM SERPL-SCNC: 4.2 MMOL/L (ref 3.5–5.1)
PROTHROMBIN TIME: 19.5 SEC (ref 11.5–14.8)
RBC # BLD AUTO: 2.89 M/UL (ref 4.2–5.9)
SODIUM SERPL-SCNC: 144 MMOL/L (ref 136–145)
WBC # BLD AUTO: 12 K/UL (ref 4–11)

## 2024-01-18 PROCEDURE — 97116 GAIT TRAINING THERAPY: CPT

## 2024-01-18 PROCEDURE — 2580000003 HC RX 258: Performed by: INTERNAL MEDICINE

## 2024-01-18 PROCEDURE — 99233 SBSQ HOSP IP/OBS HIGH 50: CPT | Performed by: INTERNAL MEDICINE

## 2024-01-18 PROCEDURE — 6370000000 HC RX 637 (ALT 250 FOR IP): Performed by: NURSE PRACTITIONER

## 2024-01-18 PROCEDURE — 6360000002 HC RX W HCPCS: Performed by: STUDENT IN AN ORGANIZED HEALTH CARE EDUCATION/TRAINING PROGRAM

## 2024-01-18 PROCEDURE — 80069 RENAL FUNCTION PANEL: CPT

## 2024-01-18 PROCEDURE — 6370000000 HC RX 637 (ALT 250 FOR IP): Performed by: STUDENT IN AN ORGANIZED HEALTH CARE EDUCATION/TRAINING PROGRAM

## 2024-01-18 PROCEDURE — C8929 TTE W OR WO FOL WCON,DOPPLER: HCPCS

## 2024-01-18 PROCEDURE — 97530 THERAPEUTIC ACTIVITIES: CPT

## 2024-01-18 PROCEDURE — 97166 OT EVAL MOD COMPLEX 45 MIN: CPT

## 2024-01-18 PROCEDURE — 83605 ASSAY OF LACTIC ACID: CPT

## 2024-01-18 PROCEDURE — 85610 PROTHROMBIN TIME: CPT

## 2024-01-18 PROCEDURE — 97162 PT EVAL MOD COMPLEX 30 MIN: CPT

## 2024-01-18 PROCEDURE — 85025 COMPLETE CBC W/AUTO DIFF WBC: CPT

## 2024-01-18 PROCEDURE — 6360000002 HC RX W HCPCS: Performed by: INTERNAL MEDICINE

## 2024-01-18 PROCEDURE — 93922 UPR/L XTREMITY ART 2 LEVELS: CPT

## 2024-01-18 PROCEDURE — 83735 ASSAY OF MAGNESIUM: CPT

## 2024-01-18 PROCEDURE — 93925 LOWER EXTREMITY STUDY: CPT

## 2024-01-18 PROCEDURE — 6360000004 HC RX CONTRAST MEDICATION: Performed by: INTERNAL MEDICINE

## 2024-01-18 PROCEDURE — 2580000003 HC RX 258: Performed by: STUDENT IN AN ORGANIZED HEALTH CARE EDUCATION/TRAINING PROGRAM

## 2024-01-18 PROCEDURE — 1200000000 HC SEMI PRIVATE

## 2024-01-18 PROCEDURE — 36415 COLL VENOUS BLD VENIPUNCTURE: CPT

## 2024-01-18 PROCEDURE — C9113 INJ PANTOPRAZOLE SODIUM, VIA: HCPCS | Performed by: STUDENT IN AN ORGANIZED HEALTH CARE EDUCATION/TRAINING PROGRAM

## 2024-01-18 PROCEDURE — 97535 SELF CARE MNGMENT TRAINING: CPT

## 2024-01-18 PROCEDURE — 6370000000 HC RX 637 (ALT 250 FOR IP): Performed by: INTERNAL MEDICINE

## 2024-01-18 RX ORDER — TORSEMIDE 20 MG/1
20 TABLET ORAL DAILY
Status: DISCONTINUED | OUTPATIENT
Start: 2024-01-18 | End: 2024-01-19

## 2024-01-18 RX ORDER — FLUTICASONE PROPIONATE 50 MCG
2 SPRAY, SUSPENSION (ML) NASAL DAILY
Status: DISCONTINUED | OUTPATIENT
Start: 2024-01-18 | End: 2024-01-29 | Stop reason: HOSPADM

## 2024-01-18 RX ORDER — LANOLIN ALCOHOL/MO/W.PET/CERES
3 CREAM (GRAM) TOPICAL NIGHTLY PRN
Status: DISCONTINUED | OUTPATIENT
Start: 2024-01-18 | End: 2024-01-29 | Stop reason: HOSPADM

## 2024-01-18 RX ADMIN — AMIODARONE HYDROCHLORIDE 200 MG: 200 TABLET ORAL at 08:56

## 2024-01-18 RX ADMIN — DOXAZOSIN 2 MG: 1 TABLET ORAL at 08:56

## 2024-01-18 RX ADMIN — EPOETIN ALFA-EPBX 10000 UNITS: 10000 INJECTION, SOLUTION INTRAVENOUS; SUBCUTANEOUS at 16:48

## 2024-01-18 RX ADMIN — CEFEPIME 1000 MG: 1 INJECTION, POWDER, FOR SOLUTION INTRAMUSCULAR; INTRAVENOUS at 15:24

## 2024-01-18 RX ADMIN — ACETAMINOPHEN 650 MG: 325 TABLET ORAL at 15:37

## 2024-01-18 RX ADMIN — FLUTICASONE PROPIONATE 2 SPRAY: 50 SPRAY, METERED NASAL at 16:51

## 2024-01-18 RX ADMIN — METOPROLOL SUCCINATE 75 MG: 50 TABLET, EXTENDED RELEASE ORAL at 20:26

## 2024-01-18 RX ADMIN — POLYETHYLENE GLYCOL 3350 17 G: 17 POWDER, FOR SOLUTION ORAL at 09:09

## 2024-01-18 RX ADMIN — SODIUM CHLORIDE, POTASSIUM CHLORIDE, SODIUM LACTATE AND CALCIUM CHLORIDE: 600; 310; 30; 20 INJECTION, SOLUTION INTRAVENOUS at 00:56

## 2024-01-18 RX ADMIN — BUSPIRONE HYDROCHLORIDE 5 MG: 5 TABLET ORAL at 08:56

## 2024-01-18 RX ADMIN — SODIUM CHLORIDE, POTASSIUM CHLORIDE, SODIUM LACTATE AND CALCIUM CHLORIDE: 600; 310; 30; 20 INJECTION, SOLUTION INTRAVENOUS at 19:01

## 2024-01-18 RX ADMIN — TORSEMIDE 20 MG: 20 TABLET ORAL at 20:26

## 2024-01-18 RX ADMIN — PANTOPRAZOLE SODIUM 40 MG: 40 INJECTION, POWDER, FOR SOLUTION INTRAVENOUS at 09:12

## 2024-01-18 RX ADMIN — CEFEPIME 1000 MG: 1 INJECTION, POWDER, FOR SOLUTION INTRAMUSCULAR; INTRAVENOUS at 04:05

## 2024-01-18 RX ADMIN — PERFLUTREN 1.5 ML: 6.52 INJECTION, SUSPENSION INTRAVENOUS at 13:00

## 2024-01-18 RX ADMIN — INSULIN LISPRO 2 UNITS: 100 INJECTION, SOLUTION INTRAVENOUS; SUBCUTANEOUS at 16:51

## 2024-01-18 RX ADMIN — STANDARDIZED SENNA CONCENTRATE AND DOCUSATE SODIUM 1 TABLET: 8.6; 5 TABLET ORAL at 09:09

## 2024-01-18 RX ADMIN — Medication 10 ML: at 09:16

## 2024-01-18 RX ADMIN — HEPARIN SODIUM 5000 UNITS: 5000 INJECTION INTRAVENOUS; SUBCUTANEOUS at 06:21

## 2024-01-18 RX ADMIN — MELATONIN TAB 3 MG 3 MG: 3 TAB at 21:57

## 2024-01-18 RX ADMIN — BUSPIRONE HYDROCHLORIDE 5 MG: 5 TABLET ORAL at 20:26

## 2024-01-18 RX ADMIN — HEPARIN SODIUM 5000 UNITS: 5000 INJECTION INTRAVENOUS; SUBCUTANEOUS at 15:25

## 2024-01-18 RX ADMIN — ATORVASTATIN CALCIUM 20 MG: 20 TABLET, FILM COATED ORAL at 08:56

## 2024-01-18 RX ADMIN — HEPARIN SODIUM 5000 UNITS: 5000 INJECTION INTRAVENOUS; SUBCUTANEOUS at 20:26

## 2024-01-18 RX ADMIN — Medication 100 MG: at 08:55

## 2024-01-18 RX ADMIN — ACETAMINOPHEN 650 MG: 325 TABLET ORAL at 09:08

## 2024-01-18 RX ADMIN — Medication 10 ML: at 00:55

## 2024-01-18 ASSESSMENT — PAIN SCALES - GENERAL
PAINLEVEL_OUTOF10: 2
PAINLEVEL_OUTOF10: 0
PAINLEVEL_OUTOF10: 5
PAINLEVEL_OUTOF10: 5
PAINLEVEL_OUTOF10: 0

## 2024-01-18 ASSESSMENT — PAIN SCALES - WONG BAKER: WONGBAKER_NUMERICALRESPONSE: 2

## 2024-01-18 ASSESSMENT — PAIN DESCRIPTION - LOCATION
LOCATION: FOOT
LOCATION: FOOT

## 2024-01-18 NOTE — CONSULTS
Department of Podiatry Consult Note  Resident       Reason for Consult:  Right Foot Infection   Requesting Physician:  Dr. Burns    CHIEF COMPLAINT:  Right foot Infection with possible OM    HISTORY OF PRESENT ILLNESS:    The patient is a 89 y.o. male with significant past medical history as listed below who is consulted to podiatry for right foot infection with possible osteomyelitis.  Of note patient follows with Dr. Marcos George in the outpatient setting.  Patient states that he bumped his right foot last week and noticed a wound and saw Dr. George where he was placed on steroid pack and antibiotics.  He noticed that there was worsening drainage last Friday and was sent to [facility] wound care.  During his appointment yesterday at wound care he was sent in for admission for worsening infection and drainage to his right foot.  Patient states that he has had chronic gout to his bilateral lower extremities for multiple years.  He states that the wound and drainage had become a lot worse.  He denies any malodor.  He states that the wound is painful when it is palpated otherwise it is not painful at baseline due to his neuropathy.  Patient denies any other pedal complaints at this time.  Patient denies any nausea, vomiting, fever, chills, or shortness of breath.    Past Medical History:        Diagnosis Date    Actinic keratosis     Actinic keratosis     Atrial fibrillation (HCC)     Bilateral carotid artery stenosis     CAD (coronary artery disease)     Cellulitis 07/15/2015    right foot    Diabetes mellitus (HCC)     DM (diabetes mellitus), type 2 with peripheral vascular complications ([facility])--s/p amputation great toe post osteomylitis  09/11/2015    Glucose intolerance (malabsorption)     Gout     Hyperlipidemia     Hypertension     Hypertrophy of prostate without urinary obstruction and other lower urinary tract symptoms (LUTS)     Intermittent atrial fibrillation (HCC)     Kidney stone     Occult blood  1st MTP joint as well as the 2nd metatarsal head.  The  appearance could indicate chronic gout.     Similar well-defined erosions with associated periarticular masses involving  the head of the 1st metatarsal and base of the proximal phalanx on the left,  incompletely evaluated.     No acute fracture identified.     Soft Tissue: Soft tissue ulceration is noted at the medial aspect of the 1st  metatarsal head.     Extensive diffuse muscle atrophy is noted bilaterally.  No well-defined fluid  collection.     Joint: Joint alignment is maintained.     IMPRESSION:  1. Chronic well-defined erosions with associated periarticular soft tissue  masses involving the 1st MTP joint and 2nd MTP joint of the right foot.  There are similar changes noted involving the 1st MTP joint of the left foot.  The appearance is strongly suspicious for gout.  2. Soft tissue ulceration adjacent to the 1st metatarsal head on the right,  with suspected superimposed septic arthritis of the right 1st MTP joint.  3. Extensive, diffuse muscle atrophy.       ASSESSMENT/PLAN:   -Osteomyelitis, right foot  -Possible septic joint, right first metatarsophalangeal joint  -Diabetic foot infection, right foot  -Cellulitis, right foot  -Gout, bilateral lower extremity  -Diabetes mellitus with peripheral neuropathy  -History of right fifth digit amputation    -Patient seen and examined at bedside this AM  -VSS, leukocytosis noted (WBC 12.0)  -ESR 28 and .4  -HbA1c pending  -Blood cultures 1 & 2 pending  -Imaging reviewed, impression noted above  -Arterial duplex ordered  -Wound culture: 3+ gram-positive cocci, 2+ WBCs, 1+ epithelial cells  -Bedside I&D procedure noted above  -Continue IV antibiotics per infectious disease recommendations: Daptomycin and cefepime  -Right lower extremity dressed with quarter inch iodoform packing, gauze, DSD, and Ace  -post-op shoe ordered to patient room  -Partial heel weightbearing to right LE  -Lengthy discussion with

## 2024-01-18 NOTE — PROGRESS NOTES
Morning assessment and medications complete, pt cooperated and tolerated well. Medications given per MAR. PRN tylenol given for foot pain 5/10, will reassess. PRN glycolax and senokot given, pt requested because he takes them daily at home. VS stable. A&O X4. BS checked- 165, no Insulin given. Patient clean and dry, ambulates with post op boot to bathroom, tolerates well. Breakfast tray set up. Bed side table, call light and belongings within reach. Bed locked and in lowest position, bed alarm active and audible. All questions and concerns addressed, no further needs at this time.

## 2024-01-18 NOTE — PROGRESS NOTES
Pharmacy to Dose Warfarin    Pharmacy consulted to dose warfarin for Afib.    INR Goal: 2-3    INR today: 1.66    Home dose: 2.5 mg daily except 1.25 on Wed    Assessment/Plan:  - INR subtherapeutic  - podiatry anticipating surgical intervention   - will hold warfarin for now pending plan, placeholder entered into MAR  - appears patient has not been bridged in past, will defer to attending on bridging  - continue heparin at pr[prophylaxis dosing   - Possible concomitant drug-drug interactions include: N/A     Pharmacy will continue to follow.    Maddy Garza, PharmD, [facility]  [id number]  1/18/2024 1:45 PM

## 2024-01-18 NOTE — ED PROVIDER NOTES
[facility]A NURSING  EMERGENCY DEPARTMENT ENCOUNTER        Pt Name: Josep Santos  MRN: [id number]  Birthdate 11/12/1933  Date of evaluation: 1/17/2024  Provider: JESSE Flores  PCP: Raul Cuevas MD  Note Started: 12:16 AM EST 1/18/24      QUETA. I have evaluated this patient.        CHIEF COMPLAINT       Chief Complaint   Patient presents with    Foot Injury     Pt presents with a possible infection in the rt foot near the base of the great toe. Pt was in the wound care clinic today and fresh packing placed and wound drained and debrided. Pt sent in for admission for IV antibiotics.        HISTORY OF PRESENT ILLNESS: 1 or more Elements     History from : Patient    Limitations to history : None    Josep Santos is a 89 y.o. male who presents to the emergency room due to worsening right foot infection.  He was seen by his podiatrist earlier today and was advised to come to the emergency department due to this worsening infection concern for osteomyelitis and to be admitted to the hospital.  Patient states that he has pain at the site but denies any fevers or chills    Nursing Notes were all reviewed and agreed with or any disagreements were addressed in the HPI.    REVIEW OF SYSTEMS :      Review of Systems   Skin:  Positive for wound.        Right foot wounds       Positives and Pertinent negatives as per HPI.     SURGICAL HISTORY     Past Surgical History:   Procedure Laterality Date    ABSCESS DRAINAGE  07/20/2015    right foot    APPENDECTOMY      CARDIAC CATHETERIZATION  2003    Left    COLONOSCOPY  03/28/2018    dr ochoa    CORONARY ARTERY BYPASS GRAFT  1996    x3    KIDNEY STONE SURGERY  1980    OTHER SURGICAL HISTORY Right 07/17/2015    right fifth toe I and D    PACEMAKER PLACEMENT  2005    NE ESOPHAGOGASTRODUODENOSCOPY TRANSORAL DIAGNOSTIC N/A 11/21/2018    EGD DIAGNOSTIC ONLY performed by Jovan Ochoa MD at [facility] ENDOSCOPY    TOE AMPUTATION Right 07/16/2015    right ebonie  who does recommend and agrees with admission states that they will see the patient tomorrow for possible surgery.    Social Determinants : None    Records Reviewed : None    CC/HPI Summary, DDx, ED Course, and Reassessment: 89-year-old male presents emergency room due to worsening right foot infection.    On exam he does have significant wound to the first MTP joint with surrounding erythema and tenderness.  There is packing gauze in place.  He otherwise appears well in no acute distress.  He is afebrile nontachycardic.    X-ray of the right foot does reveal concern for osteomyelitis in the first MTP and second metatarsal and phalanx.    ESR and CRP are significantly elevated today this is a in keeping with osteomyelitis.  CBC with an elevated white blood cell count at 16.3.  Hemoglobin 10.2.  Creatinine of 2.1.    Cefepime and vancomycin was ordered.    Perfect[employer] sent to the hospitalist for admission.    Disposition Considerations (include 1 Tests not done, Shared Decision Making, Pt Expectation of Test or Tx.): Did consider CT scan of the right foot however podiatry did not recommend this as they are planning on for surgery tomorrow anyways.  Appropriate for outpatient management        I am the Primary Clinician of Record.    FINAL IMPRESSION      1. Osteomyelitis of metatarsal (HCC)    2. Acute osteomyelitis of phalanx of right foot (HCC)          DISPOSITION/PLAN     DISPOSITION Admitted 01/17/2024 04:16:28 PM      PATIENT REFERRED TO:  No follow-up provider specified.    DISCHARGE MEDICATIONS:  Current Discharge Medication List          DISCONTINUED MEDICATIONS:  Current Discharge Medication List                 (Please note that portions of this note were completed with a voice recognition program.  Efforts were made to edit the dictations but occasionally words are mis-transcribed.)    JESSE Flores (electronically signed)            Toni Kowalski PA  01/18/24 0019

## 2024-01-18 NOTE — PROGRESS NOTES
[facility] - Inpatient Rehabilitation Department   Phone: [phone]    Physical Therapy    [] Initial Evaluation            [] Daily Treatment Note         [] Discharge Summary      Patient: Josep Santos   : 1933   MRN: [id number]   Date of Service:  2024  Admitting Diagnosis: Osteomyelitis of metatarsal (HCC)  Current Admission Summary: Josep Santos is a 89 y.o. male who presents to the emergency room due to worsening right foot infection.  He was seen by his podiatrist earlier today and was advised to come to the emergency department due to this worsening infection concern for osteomyelitis and to be admitted to the hospital.     Seen by podiatry: Recommending podiatric surgical intervention during this admission, possibly TMA.   -Osteomyelitis, right foot  -Possible septic joint, right first metatarsophalangeal joint  -Diabetic foot infection, right foot  -Cellulitis, right foot  -Gout, bilateral lower extremity  -Diabetes mellitus with peripheral neuropathy  Past Medical History:  has a past medical history of Actinic keratosis, Actinic keratosis, Atrial fibrillation ([facility]), Bilateral carotid artery stenosis, CAD (coronary artery disease), Cellulitis, Diabetes mellitus (HCC), DM (diabetes mellitus), type 2 with peripheral vascular complications ([facility])--s/p amputation great toe post osteomylitis , Glucose intolerance (malabsorption), Gout, Hyperlipidemia, Hypertension, Hypertrophy of prostate without urinary obstruction and other lower urinary tract symptoms (LUTS), Intermittent atrial fibrillation ([facility]), Kidney stone, Occult blood in stool, and Pacemaker.  Past Surgical History:  has a past surgical history that includes Tonsillectomy and adenoidectomy; Appendectomy; Kidney stone surgery (); Coronary artery bypass graft (); Cardiac catheterization (); pacemaker placement (); other surgical history (Right, 2015); Abscess Drainage (2015); Toe

## 2024-01-18 NOTE — PROGRESS NOTES
Infectious Diseases   Progress Note      Admission Date: 1/17/2024  Hospital Day: Hospital Day: 2   Attending: Elidia Burns MD  Date of service: 1/18/2024     Chief complaint/ Reason for consult:     Complicated right diabetic foot infection with concern for osteomyelitis involving the right hallux  Longstanding type 2 diabetes mellitus  Pacemaker in place  Diabetic polyneuropathy type II    Microbiology:        I have reviewed allavailable micro lab data and cultures    Blood culture (2/2) - collected on 1/17/2024: In process      Antibiotics and immunizations:       Current antibiotics: All antibiotics and their doses were reviewed by me    Recent Abx Admin                     cefepime (MAXIPIME) 1,000 mg in sodium chloride 0.9 % 50 mL IVPB (mini-bag) (mg) 1,000 mg New Bag 01/18/24 0405    DAPTOmycin (CUBICIN) 450 mg in sodium chloride 0.9 % 50 mL IVPB (mg) 450 mg New Bag 01/17/24 1831    vancomycin (VANCOCIN) 1,000 mg in sodium chloride 0.9 % 250 mL IVPB (Veoh9Ecj) (mg) 1,000 mg New Bag 01/17/24 1555    cefepime (MAXIPIME) 1,000 mg in sodium chloride 0.9 % 50 mL IVPB (mini-bag) (mg) 1,000 mg New Bag 01/17/24 1520                      Immunization History: All immunization history was reviewed by me today.    Immunization History   Administered Date(s) Administered    COVID-19, PFIZER Bivalent, DO NOT Dilute, (age 12y+), IM, 30 mcg/0.3 mL 09/26/2022, 05/15/2023    COVID-19, PFIZER GRAY top, DO NOT Dilute, (age 12 y+), IM, 30 mcg/0.3 mL 07/16/2022    COVID-19, PFIZER PURPLE top, DILUTE for use, (age 12 y+), 30mcg/0.3mL 01/20/2021, 02/10/2021, 11/03/2021    COVID-19, PFIZER, (2023-24 formula), (age 12y+), IM, 30mcg/0.3mL 09/26/2023    Influenza Vaccine, unspecified formulation 10/22/2015, 09/13/2016    Influenza Virus Vaccine 10/04/2010, 11/02/2011, 11/25/2013    Influenza, FLUAD, (age 65 y+), Adjuvanted, 0.5mL 10/01/2020, 09/26/2022    Influenza, High Dose (Fluzone 65 yrs and older) 09/12/2014,

## 2024-01-18 NOTE — PROGRESS NOTES
[facility] - Inpatient Rehabilitation Department   Phone: [phone]    Occupational Therapy    [x] Initial Evaluation            [] Daily Treatment Note         [] Discharge Summary      Patient: Josep Santos   : 1933   MRN: [id number]   Date of Service:  2024    Admitting Diagnosis:  Osteomyelitis of metatarsal (HCC)  Current Admission Summary: Josep Santos is a 89 y.o. male who presents to the emergency room due to worsening right foot infection.  He was seen by his podiatrist earlier today and was advised to come to the emergency department due to this worsening infection concern for osteomyelitis and to be admitted to the hospital.     Seen by podiatry: Recommending podiatric surgical intervention during this admission, possibly TMA.   -Osteomyelitis, right foot  -Possible septic joint, right first metatarsophalangeal joint  -Diabetic foot infection, right foot  -Cellulitis, right foot  -Gout, bilateral lower extremity  -Diabetes mellitus with peripheral neuropathy  Past Medical History:  has a past medical history of Actinic keratosis, Actinic keratosis, Atrial fibrillation (HCC), Bilateral carotid artery stenosis, CAD (coronary artery disease), Cellulitis, Diabetes mellitus (HCC), DM (diabetes mellitus), type 2 with peripheral vascular complications ([facility])--s/p amputation great toe post osteomylitis , Glucose intolerance (malabsorption), Gout, Hyperlipidemia, Hypertension, Hypertrophy of prostate without urinary obstruction and other lower urinary tract symptoms (LUTS), Intermittent atrial fibrillation ([facility]), Kidney stone, Occult blood in stool, and Pacemaker.  Past Surgical History:  has a past surgical history that includes Tonsillectomy and adenoidectomy; Appendectomy; Kidney stone surgery (); Coronary artery bypass graft (); Cardiac catheterization (); pacemaker placement (); other surgical history (Right, 2015); Abscess Drainage (2015);

## 2024-01-18 NOTE — PROGRESS NOTES
The pt was visited this morning and planned to follow up with. The pts dter and son are present, pt absent at time of visit. The dter request spiritual visits and [employer] communion for the pt. The family appears to be coping well and supportive of the pt. Will continue to follow.

## 2024-01-18 NOTE — PROGRESS NOTES
Hospitalist Progress Note      PCP: Raul Cuevas MD    Date of Admission: 1/17/2024    LOS: 1    Chief Complaint:   Chief Complaint   Patient presents with    Foot Injury     Pt presents with a possible infection in the rt foot near the base of the great toe. Pt was in the wound care clinic today and fresh packing placed and wound drained and debrided. Pt sent in for admission for IV antibiotics.        Case Summary:   89-year-old with history of CAD, type 2 diabetes, atrial fibrillation, gout who presented with right foot erythema and wound oozing with concern for osteomyelitis and possible first MTP Septic arthritis  CT of the foot noted for soft tissue ulceration with first MTP joint septic arthritis      Active Hospital Problems    Diagnosis Date Noted    CAD (coronary artery disease) CABG x3 1996, stenting PDA 10/2021 [I25.10] 05/11/2011     Priority: High    Chronic combined systolic and diastolic congestive heart failure (HCC) [I50.42] 09/27/2022     Priority: Medium    Atrial fibrillation (HCC) [I48.91] 09/27/2022     Priority: Medium    Mixed hyperlipidemia [E78.2] 07/28/2022     Priority: Medium    Osteomyelitis of metatarsal (HCC) [M86.9] 01/17/2024    Acute osteomyelitis of phalanx of right foot (HCC) [M86.171] 01/17/2024    CRP elevated [R79.82] 01/17/2024    Elevated erythrocyte sedimentation rate [R70.0] 01/17/2024    Iron deficiency anemia [D50.9] 08/11/2023    Chronic renal impairment, stage 3 (moderate) (HCC) [N18.30] 12/30/2016    Hypertension [I10] 09/11/2015    Type 2 diabetes mellitus with hyperglycemia, without long-term current use of insulin (HCC) [E11.65] 09/11/2015    Benign prostatic hyperplasia with nocturia [N40.1, R35.1] 05/11/2011         Principal Problem:    Acute osteomyelitis of phalanx of right foot/Osteomyelitis of metatarsal     Right foot wound with cellulitis  - Continue antibiotic coverage as per ID with daptomycin and cefepime  - Follow-up echocardiogram to  124/75   Pulse 91   Temp 97.9 °F (36.6 °C) (Oral)   Resp 18   Ht 1.778 m (5' 10\")   Wt 72.3 kg (159 lb 6.3 oz)   SpO2 93%   BMI 22.87 kg/m²   General appearance: No apparent distress, appears stated age and cooperative.  HEENT: Normocephalic, atraumatic, MMM, No sclera icterus/conjuctival palor  Neck: Supple, no thyromegally. No jugular venous distention.   Respiratory:  Normal respiratory effort. Clear to auscultation, no Rales/Wheezes/Rhonchi.  Cardiovascular: S1/S2 without murmurs, rubs or gallops. RRR  Abdomen: Soft, non-tender, non-distended, bowel sounds present.  Musculoskeletal: No clubbing, cyanosis or edema bilaterally.    Skin: Skin color, texture, turgor normal.  No rashes or lesions.  Neurologic:  Cranial nerves: II-XII intact, CHACHA, No focal sensory/motor deficits  Psychiatric: Alert and oriented, thought content appropriate  Capillary Refill: Brisk,< 3 seconds   Peripheral Pulses: +2 palpable, equal bilaterally       Intake/Output Summary (Last 24 hours) at 1/18/2024 1230  Last data filed at 1/18/2024 0916  Gross per 24 hour   Intake 10 ml   Output --   Net 10 ml       Labs:   Recent Labs     01/17/24  1449 01/17/24  1827 01/18/24  0609   WBC 16.3*  --  12.0*   HGB 10.2*  --  9.7*   HCT 29.4*  --  28.5*     --  217   INR  --  1.83* 1.66*      Recent Labs     01/17/24  1449 01/18/24  0609    144   K 4.6 4.2   CL 99 107   CO2 24 26   BUN 56* 44*   CREATININE 2.1* 1.6*   CALCIUM 9.1 9.3   PHOS  --  3.3   AST 16  --    ALT 13  --    BILITOT 1.1*  --    ALKPHOS 84  --      Recent Labs     01/17/24 1827   CKTOTAL 23*       Urinalysis:    Lab Results   Component Value Date/Time    NITRU Negative 01/04/2024 12:01 PM    WBCUA 1 01/04/2024 12:01 PM    BACTERIA Rare 01/04/2024 12:01 PM    RBCUA 0 01/04/2024 12:01 PM    BLOODU Negative 01/04/2024 12:01 PM    SPECGRAV 1.015 01/04/2024 12:01 PM    GLUCOSEU 500 01/04/2024 12:01 PM    GLUCOSEU NEGATIVE 01/10/2012 07:52 AM       Radiology:  VL

## 2024-01-18 NOTE — PLAN OF CARE
Problem: Discharge Planning  Goal: Discharge to home or other facility with appropriate resources  Outcome: Progressing     Problem: Safety - Adult  Goal: Free from fall injury  Outcome: Progressing  Flowsheets  Taken 1/18/2024 0744 by Domitila Gilman RN  Free From Fall Injury: Instruct family/caregiver on patient safety  Taken 1/17/2024 1954 by Roxana Haro RN  Free From Fall Injury: Instruct family/caregiver on patient safety     Problem: ABCDS Injury Assessment  Goal: Absence of physical injury  Outcome: Progressing  Flowsheets (Taken 1/18/2024 0744)  Absence of Physical Injury: Implement safety measures based on patient assessment

## 2024-01-18 NOTE — PROGRESS NOTES
Clinical Pharmacy Note: Pharmacy to Dose Warfarin    Pharmacy consulted by Dr. Mercado to dose warfarin.    Josep Santos is a 89 y.o. male  is receiving warfarin for indication: a fib.    INR Goal Range: 2.0-3.0  Prior to admission warfarin dosing regimen: 1.25 mg Wednesdays, 2.5 mg all other days of the week    INR today:   Lab Results   Component Value Date/Time    INR 1.83 01/17/2024 06:27 PM       Assessment/Plan:  INR is subtherapeutic on prior to admission dosing regimen.   Possible concomitant drug-drug interactions include: amiodarone   Based on today's assessment, dose warfarin 2.5 mg tonight, then continue outpatient regimen.  Daily INR is ordered. Pharmacy will continue to monitor and make adjustments to regimen as necessary.     Thank you for the consult,     Aruna Meza, PharmD, [facility]  [phone]

## 2024-01-19 PROBLEM — B99.9 INFECTION REQUIRING CONTACT ISOLATION PRECAUTIONS: Status: ACTIVE | Noted: 2024-01-19

## 2024-01-19 PROBLEM — A49.02 MRSA INFECTION (METHICILLIN-RESISTANT STAPHYLOCOCCUS AUREUS): Status: ACTIVE | Noted: 2024-01-19

## 2024-01-19 LAB
ALBUMIN SERPL-MCNC: 3.2 G/DL (ref 3.4–5)
ANION GAP SERPL CALCULATED.3IONS-SCNC: 12 MMOL/L (ref 3–16)
BACTERIA SPEC AEROBE CULT: ABNORMAL
BACTERIA URNS QL MICRO: NORMAL /HPF
BASOPHILS # BLD: 0 K/UL (ref 0–0.2)
BASOPHILS NFR BLD: 0.4 %
BILIRUB UR QL STRIP.AUTO: NEGATIVE
BUN SERPL-MCNC: 42 MG/DL (ref 7–20)
CALCIUM SERPL-MCNC: 8.6 MG/DL (ref 8.3–10.6)
CHLORIDE SERPL-SCNC: 108 MMOL/L (ref 99–110)
CLARITY UR: CLEAR
CO2 SERPL-SCNC: 24 MMOL/L (ref 21–32)
COLOR UR: YELLOW
CREAT SERPL-MCNC: 1.8 MG/DL (ref 0.8–1.3)
DEPRECATED RDW RBC AUTO: 20.1 % (ref 12.4–15.4)
EOSINOPHIL # BLD: 0.1 K/UL (ref 0–0.6)
EOSINOPHIL NFR BLD: 1.1 %
EPI CELLS #/AREA URNS AUTO: 1 /HPF (ref 0–5)
GFR SERPLBLD CREATININE-BSD FMLA CKD-EPI: 35 ML/MIN/{1.73_M2}
GLUCOSE BLD-MCNC: 160 MG/DL (ref 70–99)
GLUCOSE BLD-MCNC: 178 MG/DL (ref 70–99)
GLUCOSE BLD-MCNC: 184 MG/DL (ref 70–99)
GLUCOSE BLD-MCNC: 215 MG/DL (ref 70–99)
GLUCOSE BLD-MCNC: 219 MG/DL (ref 70–99)
GLUCOSE SERPL-MCNC: 171 MG/DL (ref 70–99)
GLUCOSE UR STRIP.AUTO-MCNC: 250 MG/DL
GRAM STN SPEC: ABNORMAL
HCT VFR BLD AUTO: 25.6 % (ref 40.5–52.5)
HGB BLD-MCNC: 8.9 G/DL (ref 13.5–17.5)
HGB UR QL STRIP.AUTO: NEGATIVE
HYALINE CASTS #/AREA URNS AUTO: 7 /LPF (ref 0–8)
INR PPP: 1.78 (ref 0.84–1.16)
KETONES UR STRIP.AUTO-MCNC: NEGATIVE MG/DL
LEUKOCYTE ESTERASE UR QL STRIP.AUTO: NEGATIVE
LYMPHOCYTES # BLD: 0.8 K/UL (ref 1–5.1)
LYMPHOCYTES NFR BLD: 7.8 %
MAGNESIUM SERPL-MCNC: 1.8 MG/DL (ref 1.8–2.4)
MCH RBC QN AUTO: 33.6 PG (ref 26–34)
MCHC RBC AUTO-ENTMCNC: 34.5 G/DL (ref 31–36)
MCV RBC AUTO: 97.3 FL (ref 80–100)
MONOCYTES # BLD: 1 K/UL (ref 0–1.3)
MONOCYTES NFR BLD: 10 %
NEUTROPHILS # BLD: 8.2 K/UL (ref 1.7–7.7)
NEUTROPHILS NFR BLD: 80.7 %
NITRITE UR QL STRIP.AUTO: NEGATIVE
ORGANISM: ABNORMAL
PERFORMED ON: ABNORMAL
PH UR STRIP.AUTO: 5 [PH] (ref 5–8)
PHOSPHATE SERPL-MCNC: 3 MG/DL (ref 2.5–4.9)
PLATELET # BLD AUTO: 234 K/UL (ref 135–450)
PMV BLD AUTO: 9.2 FL (ref 5–10.5)
POTASSIUM SERPL-SCNC: 3.7 MMOL/L (ref 3.5–5.1)
PROT UR STRIP.AUTO-MCNC: 30 MG/DL
PROTHROMBIN TIME: 20.7 SEC (ref 11.5–14.8)
RBC # BLD AUTO: 2.64 M/UL (ref 4.2–5.9)
RBC CLUMPS #/AREA URNS AUTO: 0 /HPF (ref 0–4)
SODIUM SERPL-SCNC: 144 MMOL/L (ref 136–145)
SP GR UR STRIP.AUTO: 1.01 (ref 1–1.03)
UA DIPSTICK W REFLEX MICRO PNL UR: YES
URN SPEC COLLECT METH UR: ABNORMAL
UROBILINOGEN UR STRIP-ACNC: 0.2 E.U./DL
WBC # BLD AUTO: 10.2 K/UL (ref 4–11)
WBC #/AREA URNS AUTO: 1 /HPF (ref 0–5)

## 2024-01-19 PROCEDURE — 2580000003 HC RX 258: Performed by: STUDENT IN AN ORGANIZED HEALTH CARE EDUCATION/TRAINING PROGRAM

## 2024-01-19 PROCEDURE — 87070 CULTURE OTHR SPECIMN AEROBIC: CPT

## 2024-01-19 PROCEDURE — 80069 RENAL FUNCTION PANEL: CPT

## 2024-01-19 PROCEDURE — 81001 URINALYSIS AUTO W/SCOPE: CPT

## 2024-01-19 PROCEDURE — 85610 PROTHROMBIN TIME: CPT

## 2024-01-19 PROCEDURE — 2580000003 HC RX 258: Performed by: INTERNAL MEDICINE

## 2024-01-19 PROCEDURE — 6370000000 HC RX 637 (ALT 250 FOR IP): Performed by: STUDENT IN AN ORGANIZED HEALTH CARE EDUCATION/TRAINING PROGRAM

## 2024-01-19 PROCEDURE — 87205 SMEAR GRAM STAIN: CPT

## 2024-01-19 PROCEDURE — 6360000002 HC RX W HCPCS: Performed by: INTERNAL MEDICINE

## 2024-01-19 PROCEDURE — 87077 CULTURE AEROBIC IDENTIFY: CPT

## 2024-01-19 PROCEDURE — 99221 1ST HOSP IP/OBS SF/LOW 40: CPT | Performed by: SURGERY

## 2024-01-19 PROCEDURE — 99233 SBSQ HOSP IP/OBS HIGH 50: CPT | Performed by: INTERNAL MEDICINE

## 2024-01-19 PROCEDURE — 97116 GAIT TRAINING THERAPY: CPT

## 2024-01-19 PROCEDURE — APPNB30 APP NON BILLABLE TIME 0-30 MINS: Performed by: NURSE PRACTITIONER

## 2024-01-19 PROCEDURE — 6370000000 HC RX 637 (ALT 250 FOR IP): Performed by: INTERNAL MEDICINE

## 2024-01-19 PROCEDURE — 83735 ASSAY OF MAGNESIUM: CPT

## 2024-01-19 PROCEDURE — 1200000000 HC SEMI PRIVATE

## 2024-01-19 PROCEDURE — 36415 COLL VENOUS BLD VENIPUNCTURE: CPT

## 2024-01-19 PROCEDURE — APPSS60 APP SPLIT SHARED TIME 46-60 MINUTES: Performed by: NURSE PRACTITIONER

## 2024-01-19 PROCEDURE — 87186 SC STD MICRODIL/AGAR DIL: CPT

## 2024-01-19 PROCEDURE — 85025 COMPLETE CBC W/AUTO DIFF WBC: CPT

## 2024-01-19 PROCEDURE — 97110 THERAPEUTIC EXERCISES: CPT

## 2024-01-19 PROCEDURE — 6360000002 HC RX W HCPCS: Performed by: STUDENT IN AN ORGANIZED HEALTH CARE EDUCATION/TRAINING PROGRAM

## 2024-01-19 PROCEDURE — C9113 INJ PANTOPRAZOLE SODIUM, VIA: HCPCS | Performed by: STUDENT IN AN ORGANIZED HEALTH CARE EDUCATION/TRAINING PROGRAM

## 2024-01-19 RX ORDER — ASCORBIC ACID 500 MG
500 TABLET ORAL DAILY
Status: DISCONTINUED | OUTPATIENT
Start: 2024-01-19 | End: 2024-01-29 | Stop reason: HOSPADM

## 2024-01-19 RX ORDER — SODIUM CHLORIDE 450 MG/100ML
INJECTION, SOLUTION INTRAVENOUS CONTINUOUS
Status: ACTIVE | OUTPATIENT
Start: 2024-01-19 | End: 2024-01-20

## 2024-01-19 RX ADMIN — DOXAZOSIN 2 MG: 1 TABLET ORAL at 08:44

## 2024-01-19 RX ADMIN — Medication 100 MG: at 08:44

## 2024-01-19 RX ADMIN — ACETAMINOPHEN 650 MG: 325 TABLET ORAL at 22:45

## 2024-01-19 RX ADMIN — CEFEPIME 1000 MG: 1 INJECTION, POWDER, FOR SOLUTION INTRAMUSCULAR; INTRAVENOUS at 13:21

## 2024-01-19 RX ADMIN — INSULIN LISPRO 2 UNITS: 100 INJECTION, SOLUTION INTRAVENOUS; SUBCUTANEOUS at 16:39

## 2024-01-19 RX ADMIN — BUSPIRONE HYDROCHLORIDE 5 MG: 5 TABLET ORAL at 08:44

## 2024-01-19 RX ADMIN — HEPARIN SODIUM 5000 UNITS: 5000 INJECTION INTRAVENOUS; SUBCUTANEOUS at 13:21

## 2024-01-19 RX ADMIN — METOPROLOL SUCCINATE 75 MG: 50 TABLET, EXTENDED RELEASE ORAL at 22:44

## 2024-01-19 RX ADMIN — Medication 10 ML: at 22:45

## 2024-01-19 RX ADMIN — PANTOPRAZOLE SODIUM 40 MG: 40 INJECTION, POWDER, FOR SOLUTION INTRAVENOUS at 08:45

## 2024-01-19 RX ADMIN — BUSPIRONE HYDROCHLORIDE 5 MG: 5 TABLET ORAL at 22:47

## 2024-01-19 RX ADMIN — ACETAMINOPHEN 650 MG: 325 TABLET ORAL at 09:39

## 2024-01-19 RX ADMIN — AMIODARONE HYDROCHLORIDE 200 MG: 200 TABLET ORAL at 08:44

## 2024-01-19 RX ADMIN — HEPARIN SODIUM 5000 UNITS: 5000 INJECTION INTRAVENOUS; SUBCUTANEOUS at 22:46

## 2024-01-19 RX ADMIN — SPIRONOLACTONE 25 MG: 25 TABLET ORAL at 08:44

## 2024-01-19 RX ADMIN — ATORVASTATIN CALCIUM 20 MG: 20 TABLET, FILM COATED ORAL at 08:44

## 2024-01-19 RX ADMIN — Medication 10 ML: at 08:44

## 2024-01-19 RX ADMIN — OXYCODONE HYDROCHLORIDE AND ACETAMINOPHEN 500 MG: 500 TABLET ORAL at 16:38

## 2024-01-19 RX ADMIN — SODIUM CHLORIDE, POTASSIUM CHLORIDE, SODIUM LACTATE AND CALCIUM CHLORIDE: 600; 310; 30; 20 INJECTION, SOLUTION INTRAVENOUS at 05:31

## 2024-01-19 RX ADMIN — METOPROLOL SUCCINATE 75 MG: 50 TABLET, EXTENDED RELEASE ORAL at 08:43

## 2024-01-19 RX ADMIN — CEFEPIME 1000 MG: 1 INJECTION, POWDER, FOR SOLUTION INTRAMUSCULAR; INTRAVENOUS at 03:16

## 2024-01-19 RX ADMIN — SODIUM CHLORIDE: 4.5 INJECTION, SOLUTION INTRAVENOUS at 14:17

## 2024-01-19 RX ADMIN — HEPARIN SODIUM 5000 UNITS: 5000 INJECTION INTRAVENOUS; SUBCUTANEOUS at 05:30

## 2024-01-19 ASSESSMENT — PAIN DESCRIPTION - DESCRIPTORS: DESCRIPTORS: ACHING

## 2024-01-19 ASSESSMENT — PAIN SCALES - GENERAL: PAINLEVEL_OUTOF10: 3

## 2024-01-19 ASSESSMENT — PAIN DESCRIPTION - LOCATION: LOCATION: FOOT

## 2024-01-19 ASSESSMENT — PAIN DESCRIPTION - ORIENTATION: ORIENTATION: RIGHT

## 2024-01-19 NOTE — PROGRESS NOTES
Pt evening shift assessment complete. VSS and medication given as ordered. Pt assessed for comfort needs, pt requesting sleep aide, covering provider notified per pt request.  Pt does not express any additional needs at this time, resting comfortably in bed.

## 2024-01-19 NOTE — PROGRESS NOTES
Pharmacy to Dose Warfarin    Pharmacy consulted to dose warfarin for a fib.    INR Goal: 2.0-3.0    INR today: 1.78    Assessment/Plan:  - INR is subtherapeutic  - Continue holding, pending surgical intervention per podiatry   - continue heparin subQ    Pharmacy will continue to follow.    Aruna Meza, PharmD, [facility]  [phone]

## 2024-01-19 NOTE — PROGRESS NOTES
Infectious Diseases   Progress Note      Admission Date: 1/17/2024  Hospital Day: Hospital Day: 3   Attending: Elidia Burns MD  Date of service: 1/19/2024     Chief complaint/ Reason for consult:     Complicated right diabetic foot infection with concern for osteomyelitis involving the right hallux  Longstanding type 2 diabetes mellitus  Pacemaker in place  Diabetic polyneuropathy type II    Microbiology:        I have reviewed allavailable micro lab data and cultures    Blood culture (2/2) - collected on 1/17/2024: Negative so far  Right foot wound culture: Collected on 1/19/2024: MRSA    Susceptibility       Methicillin-Resistant Staphylococcus aureus     BACTERIAL SUSCEPTIBILITY PANEL BY MIRIAM     clindamycin <=0.25 mcg/mL Sensitive     erythromycin >=8 mcg/mL Resistant     oxacillin >=4 mcg/mL Resistant     tetracycline <=1 mcg/mL Sensitive     trimethoprim-sulfamethoxazole >=320 mcg/mL Resistant     vancomycin 1 mcg/mL Sensitive               Antibiotics and immunizations:       Current antibiotics: All antibiotics and their doses were reviewed by me    Recent Abx Admin                     cefepime (MAXIPIME) 1,000 mg in sodium chloride 0.9 % 50 mL IVPB (mini-bag) (mg) 1,000 mg New Bag 01/19/24 0316     1,000 mg New Bag 01/18/24 1524                      Immunization History: All immunization history was reviewed by me today.    Immunization History   Administered Date(s) Administered    COVID-19, PFIZER Bivalent, DO NOT Dilute, (age 12y+), IM, 30 mcg/0.3 mL 09/26/2022, 05/15/2023    COVID-19, PFIZER GRAY top, DO NOT Dilute, (age 12 y+), IM, 30 mcg/0.3 mL 07/16/2022    COVID-19, PFIZER PURPLE top, DILUTE for use, (age 12 y+), 30mcg/0.3mL 01/20/2021, 02/10/2021, 11/03/2021    COVID-19, PFIZER, (2023-24 formula), (age 12y+), IM, 30mcg/0.3mL 09/26/2023    Influenza Vaccine, unspecified formulation 10/22/2015, 09/13/2016    Influenza Virus Vaccine 10/04/2010, 11/02/2011, 11/25/2013    Influenza, FLUAD,

## 2024-01-19 NOTE — PROGRESS NOTES
Podiatric Surgery Daily Progress Note  Josep Santos      Subjective :   Patient seen and examined this am at the bedside. Patient denies any acute overnight events. Patient denies N/V/F/C/SOB. Patient denies calf pain, thigh pain, chest pain.     Review of Systems: A 12 point review of symptoms is unremarkable with the exception of the chief complaint. Patient specifically denies nausea, fever, vomiting, chills, shortness of breath, chest pain, abdominal pain, constipation or difficulty urinating.       Objective     /68   Pulse 100   Temp 97.8 °F (36.6 °C) (Oral)   Resp 20   Ht 1.778 m (5' 10\")   Wt 72.2 kg (159 lb 2.8 oz)   SpO2 93%   BMI 22.84 kg/m²      I/O:  Intake/Output Summary (Last 24 hours) at 1/19/2024 0741  Last data filed at 1/19/2024 0532  Gross per 24 hour   Intake 2174.17 ml   Output 300 ml   Net 1874.17 ml              Wt Readings from Last 3 Encounters:   01/19/24 72.2 kg (159 lb 2.8 oz)   01/10/24 72.1 kg (159 lb)   01/05/24 72.1 kg (159 lb)       LABS:    Recent Labs     01/18/24  0609 01/19/24  0541   WBC 12.0* 10.2   HGB 9.7* 8.9*   HCT 28.5* 25.6*    234        Recent Labs     01/19/24  0541      K 3.7      CO2 24   PHOS 3.0   BUN 42*   CREATININE 1.8*        Recent Labs     01/17/24  1449 01/17/24  1827 01/18/24  0609 01/19/24  0541   PROT 7.2  --   --   --    INR  --    < > 1.66* 1.78*    < > = values in this interval not displayed.           LOWER EXTREMITY EXAMINATION    Dressing to right LE intact. No strikethrough noted to the external dressing. Seropurulent drainage noted to the internal layers of the dressing.     VASCULAR: DP pulses are faintly palpable 1/4 b/l.  PT pulses are nonpalpable 0/4 b/l. CFT is brisk to the digits of the foot b/l. Skin temperature is warm to cool from proximal to distal with focal calor noted around the first metatarsal phalangeal joint of the right foot.  Nonpitting edema noted, right lower extremity. No pain with calf

## 2024-01-19 NOTE — PLAN OF CARE
Problem: Discharge Planning  Goal: Discharge to home or other facility with appropriate resources  1/18/2024 2116 by Nicolette Mar RN  Outcome: Progressing  Flowsheets  Taken 1/18/2024 2025 by Nicolette Mar RN  Discharge to home or other facility with appropriate resources: Identify barriers to discharge with patient and caregiver  Taken 1/18/2024 0845 by Domitila Gilman RN  Discharge to home or other facility with appropriate resources: Identify barriers to discharge with patient and caregiver  1/18/2024 0748 by Domitila Gilman RN  Outcome: Progressing     Problem: Safety - Adult  Goal: Free from fall injury  1/18/2024 2116 by Nicolette Mar RN  Outcome: Progressing  Flowsheets (Taken 1/18/2024 2113)  Free From Fall Injury: Instruct family/caregiver on patient safety  1/18/2024 0748 by Domitila Gilman RN  Outcome: Progressing  Flowsheets  Taken 1/18/2024 0744 by Domitila Gilman RN  Free From Fall Injury: Instruct family/caregiver on patient safety  Taken 1/17/2024 1954 by Roxana Haro RN  Free From Fall Injury: Instruct family/caregiver on patient safety

## 2024-01-19 NOTE — PLAN OF CARE
Problem: Discharge Planning  Goal: Discharge to home or other facility with appropriate resources  1/19/2024 0800 by Titi Meng, RN  Outcome: Progressing     Problem: Safety - Adult  Goal: Free from fall injury  1/19/2024 0800 by Titi Meng, RN  Outcome: Progressing     Problem: ABCDS Injury Assessment  Goal: Absence of physical injury  Outcome: Progressing  Flowsheets (Taken 1/18/2024 2113 by Nicolette Mar RN)  Absence of Physical Injury: Implement safety measures based on patient assessment     Problem: Pain  Goal: Verbalizes/displays adequate comfort level or baseline comfort level  Outcome: Progressing  Flowsheets  Taken 1/19/2024 0308 by Nicolette Mar RN  Verbalizes/displays adequate comfort level or baseline comfort level: Encourage patient to monitor pain and request assistance  Taken 1/18/2024 2025 by Nicolette Mar RN  Verbalizes/displays adequate comfort level or baseline comfort level: Encourage patient to monitor pain and request assistance

## 2024-01-19 NOTE — DISCHARGE INSTR - COC
Continuity of Care Form  HF Pathway  _x_ Cardiovascular Assessment. Titrate O2 to keep SaO2 greater than 90%    _x_ Daily Weights- Baseline Wt: 140 lb   Call MD if:   3 pound weight gain or loss in one day OR 5 pound weight gain in one week       _x_No added salt diet (3-4 G sodium) and 64 oz fluid restriction      _x_ Labs:   BMP,BNP    Please start on    Frequency:  Weekly x 4              Fax results to: [facility]: [phone]      _x_ Med List attached:   Hold Coreg/Metoprolol if HR less than 45 or patient symptomatic*   Hold ACE/ARB if SBP less than 85 or patient symptomatic*   Do not hold Spironolactone (aldactone) for hypotension/bradycardia   Call MD for questions: [facility]: [phone]    _x_Follow up appointment with cardiology: date/time:  with Kathie Shore      PRIOR TO DISCHARGE HOME FROM FACILITY PLEASE NOTIFY CHF TEAM- CALL [phone] AND LEAVE A MESSAGE    Patient Name: Josep Santos   :  1933  MRN:  [id number]    Admit date:  2024  Discharge date:  24    Code Status Order: Full Code   Advance Directives:     Admitting Physician:  Kyle Mercado DO  PCP: Raul Cuevas MD    Discharging Nurse: Eliceo  Discharging Hospital Unit/Room#: 3AN-3321/3321-01  Discharging Unit Phone Number: [phone]    Emergency Contact:   Extended Emergency Contact Information  Primary Emergency Contact: Daylin Cruz   [facility]  Home Phone: [phone]  Mobile Phone: [phone]  Relation: Child  Secondary Emergency Contact: Karl Santos (Tunde)   [facility]  Home Phone: [phone]  Mobile Phone: [phone]  Relation: Child    Past Surgical History:  Past Surgical History:   Procedure Laterality Date    ABSCESS DRAINAGE  2015    right foot    APPENDECTOMY      CARDIAC CATHETERIZATION  2003    Left    COLONOSCOPY  2018    dr ochoa    CORONARY ARTERY BYPASS GRAFT  1996    x3    KIDNEY STONE SURGERY  1980    OTHER  Description Purulent;Thick 01/17/24 1315   Odor None 01/17/24 1315   Candice-wound Assessment Edematous;Fragile 01/17/24 1315   Margins Undefined edges 01/17/24 1315   Wound Thickness Description not for Pressure Injury Full thickness 01/17/24 1315   Number of days: 2        Elimination:  Continence:   Bowel: No  Bladder: Yes  Urinary Catheter: None   Colostomy/Ileostomy/Ileal Conduit: No       Date of Last BM: 1/29/24    Intake/Output Summary (Last 24 hours) at 1/19/2024 1433  Last data filed at 1/19/2024 1321  Gross per 24 hour   Intake 2164.17 ml   Output 700 ml   Net 1464.17 ml     I/O last 3 completed shifts:  In: 2174.2 [P.O.:480; I.V.:1522.3; IV Piggyback:171.8]  Out: 300 [Urine:300]    Safety Concerns:     None    Impairments/Disabilities:      Hearing    Nutrition Therapy:  Current Nutrition Therapy:   - Oral Diet:  General    Routes of Feeding: Oral  Liquids: Thin Liquids  Daily Fluid Restriction: no  Last Modified Barium Swallow with Video (Video Swallowing Test): not done    Treatments at the Time of Hospital Discharge:   Respiratory Treatments:   Oxygen Therapy:  is not on home oxygen therapy.  Ventilator:    - No ventilator support    Rehab Therapies: Physical Therapy and Occupational Therapy  Weight Bearing Status/Restrictions: No weight bearing restrictions  Other Medical Equipment (for information only, NOT a DME order):    Other Treatments:   Patient's personal belongings (please select all that are sent with patient):       RN SIGNATURE:  Electronically signed by Adriana Hooker RN on 1/29/24 at 3:15 PM EST    CASE MANAGEMENT/SOCIAL WORK SECTION    Inpatient Status Date: 1/17/2024    Readmission Risk Assessment Score:  Readmission Risk              Risk of Unplanned Readmission:  29         Discharging to Facility/ Agency   Name:  [facility]    Address: [address] [place] [address]  Phone: [phone]  Fax: [phone]  :    [address]

## 2024-01-19 NOTE — CONSULTS
Tooth Extraction (09/2023).   Current Medications:    Current Facility-Administered Medications: 0.45 % sodium chloride infusion, , IntraVENous, Continuous  ascorbic acid (VITAMIN C) tablet 500 mg, 500 mg, Oral, Daily  warfarin placeholder: dosing by pharmacy, , Other, RX Placeholder  metoprolol succinate (TOPROL XL) extended release tablet 75 mg, 75 mg, Oral, BID  epoetin nader-epbx (RETACRIT) injection 10,000 Units, 10,000 Units, SubCUTAneous, Q14 Days  fluticasone (FLONASE) 50 MCG/ACT nasal spray 2 spray, 2 spray, Each Nostril, Daily  melatonin tablet 3 mg, 3 mg, Oral, Nightly PRN  amiodarone (CORDARONE) tablet 200 mg, 200 mg, Oral, Daily  atorvastatin (LIPITOR) tablet 20 mg, 20 mg, Oral, Daily  busPIRone (BUSPAR) tablet 5 mg, 5 mg, Oral, BID  doxazosin (CARDURA) tablet 2 mg, 2 mg, Oral, Daily  sennosides-docusate sodium (SENOKOT-S) 8.6-50 MG tablet 1 tablet, 1 tablet, Oral, Daily PRN  thiamine mononitrate tablet 100 mg, 100 mg, Oral, Daily  sodium chloride flush 0.9 % injection 5-40 mL, 5-40 mL, IntraVENous, 2 times per day  sodium chloride flush 0.9 % injection 5-40 mL, 5-40 mL, IntraVENous, PRN  0.9 % sodium chloride infusion, , IntraVENous, PRN  ondansetron (ZOFRAN-ODT) disintegrating tablet 4 mg, 4 mg, Oral, Q8H PRN **OR** ondansetron (ZOFRAN) injection 4 mg, 4 mg, IntraVENous, Q6H PRN  polyethylene glycol (GLYCOLAX) packet 17 g, 17 g, Oral, Daily PRN  acetaminophen (TYLENOL) tablet 650 mg, 650 mg, Oral, Q6H PRN **OR** acetaminophen (TYLENOL) suppository 650 mg, 650 mg, Rectal, Q6H PRN  dextrose bolus 10% 125 mL, 125 mL, IntraVENous, PRN **OR** dextrose bolus 10% 250 mL, 250 mL, IntraVENous, PRN  dextrose 10 % infusion, , IntraVENous, Continuous PRN  magnesium sulfate 2000 mg in 50 mL IVPB premix, 2,000 mg, IntraVENous, PRN  heparin (porcine) injection 5,000 Units, 5,000 Units, SubCUTAneous, 3 times per day  insulin lispro (HUMALOG) injection vial 0-8 Units, 0-8 Units, SubCUTAneous, Q4H  pantoprazole  foot osteomyelitis-on cefepime and daptomycin.  6.  History of hyperlipidemia-on statin    Thank you for the consult.  We will follow this patient along the hospitalization.    Carlos Barger MD

## 2024-01-19 NOTE — PROGRESS NOTES
The pt was seen through the follow up. The pt shared a narrative of times with friends and his time in the hospital. The pt is supported by his dter. The pt shared he would like a prayer,  supportive through prayer and active listening. Pt will receive [employer] communion over the weekend.

## 2024-01-19 NOTE — PROGRESS NOTES
Hospitalist Progress Note      PCP: Raul Cuevas MD    Date of Admission: 1/17/2024    LOS: 2    Chief Complaint:   Chief Complaint   Patient presents with    Foot Injury     Pt presents with a possible infection in the rt foot near the base of the great toe. Pt was in the wound care clinic today and fresh packing placed and wound drained and debrided. Pt sent in for admission for IV antibiotics.        Case Summary:   89-year-old with history of CAD, type 2 diabetes, atrial fibrillation, gout who presented with right foot erythema and wound oozing with concern for osteomyelitis and possible first MTP Septic arthritis  CT of the foot noted for soft tissue ulceration with first MTP joint septic arthritis      Active Hospital Problems    Diagnosis Date Noted    CAD (coronary artery disease) CABG x3 1996, stenting PDA 10/2021 [I25.10] 05/11/2011     Priority: High    Chronic combined systolic and diastolic congestive heart failure (HCC) [I50.42] 09/27/2022     Priority: Medium    Atrial fibrillation (HCC) [I48.91] 09/27/2022     Priority: Medium    Mixed hyperlipidemia [E78.2] 07/28/2022     Priority: Medium    MRSA infection (methicillin-resistant Staphylococcus aureus) [A49.02] 01/19/2024    Infection requiring contact isolation precautions [B99.9] 01/19/2024    Osteomyelitis of metatarsal ([facility]) [M86.9] 01/17/2024    Acute osteomyelitis of phalanx of right foot ([facility]) [M86.171] 01/17/2024    CRP elevated [R79.82] 01/17/2024    Elevated erythrocyte sedimentation rate [R70.0] 01/17/2024    Iron deficiency anemia [D50.9] 08/11/2023    Chronic renal impairment, stage 3 (moderate) ([facility]) [N18.30] 12/30/2016    Hypertension [I10] 09/11/2015    Type 2 diabetes mellitus with hyperglycemia, without long-term current use of insulin ([facility]) [E11.65] 09/11/2015    Benign prostatic hyperplasia with nocturia [N40.1, R35.1] 05/11/2011         Principal Problem:    Acute osteomyelitis of phalanx of right  **OR** ondansetron, polyethylene glycol, acetaminophen **OR** acetaminophen, dextrose bolus **OR** dextrose bolus, dextrose, magnesium sulfate      DVT Prophylaxis: On Coumadin  Diet: ADULT DIET; Regular  Code Status: Full Code        ____________________________________________________________________________    Subjective:   Overnight Events:   Uneventful overnight  No new complaints this morning  Will allow oral diet        Physical Exam:  /64   Pulse 88   Temp 98.2 °F (36.8 °C) (Oral)   Resp 18   Ht 1.778 m (5' 10\")   Wt 72.2 kg (159 lb 2.8 oz)   SpO2 92%   BMI 22.84 kg/m²   General appearance: No apparent distress, appears stated age and cooperative.  HEENT: Normocephalic, atraumatic, MMM, No sclera icterus/conjuctival palor  Neck: Supple, no thyromegally. No jugular venous distention.   Respiratory:  Normal respiratory effort. Clear to auscultation, no Rales/Wheezes/Rhonchi.  Cardiovascular: S1/S2 without murmurs, rubs or gallops. RRR  Abdomen: Soft, non-tender, non-distended, bowel sounds present.  Musculoskeletal: No clubbing, cyanosis or edema bilaterally.    Skin: Skin color, texture, turgor normal.  No rashes or lesions.  Neurologic:  Cranial nerves: II-XII intact, CHACHA, No focal sensory/motor deficits  Psychiatric: Alert and oriented, thought content appropriate  Capillary Refill: Brisk,< 3 seconds   Peripheral Pulses: +2 palpable, equal bilaterally       Intake/Output Summary (Last 24 hours) at 1/19/2024 1413  Last data filed at 1/19/2024 1321  Gross per 24 hour   Intake 2164.17 ml   Output 700 ml   Net 1464.17 ml         Labs:   Recent Labs     01/17/24  1449 01/17/24  1827 01/18/24  0609 01/19/24  0541   WBC 16.3*  --  12.0* 10.2   HGB 10.2*  --  9.7* 8.9*   HCT 29.4*  --  28.5* 25.6*     --  217 234   INR  --  1.83* 1.66* 1.78*        Recent Labs     01/17/24  1449 01/18/24  0609 01/19/24  0541    144 144   K 4.6 4.2 3.7   CL 99 107 108   CO2 24 26 24   BUN 56* 44* 42*

## 2024-01-19 NOTE — CONSULTS
[facility]  Consultation Note    Chief Complaint / Reason for Consultation  PAD with nonhealing foot ulcer    History of Present Illness  Patient is a 89 y.o. male with past medical history of CAD, HTN, HLD, DM, CKD and A fib on coumadin who presented to the ED from podiatrist for concern for worsening right foot infection.  Patient lives at home and uses a cane to walk.  He bumped his right big toe last Friday and started as redness and then progressed to open wound with drainage and swelling.  He follows with Dr. George as his podiatrist and was seen in the wound clinic this Wednesday and was sent to the ED for concern for infection.  Patient does report neuropathy in his feet.  No classic claudication.  Denies tobacco use.  Patient had arterial duplex of his BLE and we have been consulted for evaluation for PAD.     Review of Systems   + right foot wound with drainage.  Denies fevers, chills, chest pain, shortness of breath, nausea, vomiting, hematemesis, diarrhea, constipation, melena, hematochezia, wt changes, vision problems, blindness, hearing problems, facial droop, slurred speech, extremity weakness, extremity numbness, dysuria.    Past Medical History:   Diagnosis Date    Actinic keratosis     Actinic keratosis     Atrial fibrillation (HCC)     Bilateral carotid artery stenosis     CAD (coronary artery disease)     Cellulitis 07/15/2015    right foot    Diabetes mellitus (HCC)     DM (diabetes mellitus), type 2 with peripheral vascular complications (HCC)--s/p amputation great toe post osteomylitis  09/11/2015    Glucose intolerance (malabsorption)     Gout     Hyperlipidemia     Hypertension     Hypertrophy of prostate without urinary obstruction and other lower urinary tract symptoms (LUTS)     Intermittent atrial fibrillation (HCC)     Kidney stone     Occult blood in stool     Hx of    Pacemaker     Permanent       Past Surgical History:   Procedure Laterality Date     artery in the left lower extremity, that  recanalizes distally via collaterals.  Elevated velocity of the proximal PTA suggests a greater than 75% stenosis.  Ultrasound images of the left lower extremity reveal moderate calcific plaque  formation and multiple collaterals throughout.  Biphasic flow starting at the distal popliteal artery and areas of absent flow  in the PTA suggests multi-level disease.    CT right foot 1/17/24:  IMPRESSION:  1. Chronic well-defined erosions with associated periarticular soft tissue  masses involving the 1st MTP joint and 2nd MTP joint of the right foot.  There are similar changes noted involving the 1st MTP joint of the left foot.  The appearance is strongly suspicious for gout.  2. Soft tissue ulceration adjacent to the 1st metatarsal head on the right,  with suspected superimposed septic arthritis of the right 1st MTP joint.  3. Extensive, diffuse muscle atrophy.    Right foot x-ray 1/17/24:  IMPRESSION:  Lucency and cortical destruction consistent with acute osteomyelitis  throughout the 1st metatarsal head, at the lateral aspect of the base of the  1st proximal phalanx and at the medial aspect of the 2nd metatarsal head with  adjacent soft tissue swelling.  No subcutaneous air is noted.       Assessment:   Right foot diabetic ulceration with osteo  Gout  PAD - duplex showing non-compressible vessels with PTA stenosis    DM with peripheral neuropathy - last A1c 5.5 (1/17/24)  HTN  HLD  CAD  CKD - creatinine 1.8  A fib on coumadin - INR 1.78    Plan:  Continue IV antibiotics.  Wound care per podiatry.  Continue to hold coumadin.  Nephrology consulted.   May benefit from right leg angiogram.  Will discuss with Dr. Randle and further recommendations to follow.     Thank you for the consultation.     Patient educated on plan of care and disease process.  All questions answered.        Electronically signed by PRIYA Mcdermott - CNP on 1/19/2024 at 12:30 PM    VASCULAR STAFF  Seen

## 2024-01-19 NOTE — PROGRESS NOTES
elevated height  Home Equipment: rolling walker, rollator - 4 wheeled walker, single point cane, alert button, tri-cane and wheelchair  Transfer Assistance: independent  Ambulation Assistance:independent - started to transition to walking without AD.   ADL Assistance: independent with all ADL's around the home  IADL Assistance: requires assistance with all homemaking tasks; daughter preps meals and pt warms up alone at home; daughter and daughter-in-law helps with all/most IADLs around home such as cleaning, and cooking.  Active :        [] Yes                 [x] No;  Hand Dominance: [] Left                 [x] Right  Current Employment: retired.  Occupation: tax; [employer]  Hobbies: crossword puzzles and makes his own dictionary  Recent Falls: No falls within the last 6 months    Examination   Vision:   Vision Corrective Device: wears glasses at all times  Hearing:   left hearing aid, right hearing aid  Observation:   Dressing on R foot, RA        Subjective  General: Supine in bed upon arrival with alarm on. Agreeable to therapy. Donned pt's surgical shoe RLE and regular shoe LLE.   Pain: Patient does not rate upon questioning \"it feels numb\" (his right foot)  Pain Interventions: not applicable       Functional Mobility  Bed Mobility:  Supine to Sit: minimal assistance  Scooting: stand by assistance  Comments: HOB elevated.   Transfers:  Sit to stand transfer: contact guard assistance  Stand to sit transfer: contact guard assistance  Comments:  Ambulation:  Surface:level surface  Assistive Device: rolling walker  Assistance: contact guard assistance  Distance: 180 ft  Gait Mechanics: reciprocal gait pattern, cues to maintain heel weight bearing, forward flexed posture, steady without LOB.  Comments:  surgical shoe donned prior to ambulation. SOB noted with ambulation. SpO2 96% on RA. Reported SOB after ambulation  Stair Mobility:  Stair mobility not completed on this date.  Comments:  Wheelchair Mobility:  No w/c

## 2024-01-20 ENCOUNTER — APPOINTMENT (OUTPATIENT)
Dept: GENERAL RADIOLOGY | Age: 89
DRG: 622 | End: 2024-01-20
Payer: MEDICARE

## 2024-01-20 PROBLEM — M25.522 LEFT ELBOW PAIN: Status: ACTIVE | Noted: 2024-01-20

## 2024-01-20 LAB
ALBUMIN SERPL-MCNC: 3.1 G/DL (ref 3.4–5)
ANION GAP SERPL CALCULATED.3IONS-SCNC: 10 MMOL/L (ref 3–16)
ANTI-XA UNFRAC HEPARIN: <0.1 IU/ML (ref 0.3–0.7)
ANTI-XA UNFRAC HEPARIN: <0.1 IU/ML (ref 0.3–0.7)
APTT BLD: 42.4 SEC (ref 22.7–35.9)
BASOPHILS # BLD: 0 K/UL (ref 0–0.2)
BASOPHILS NFR BLD: 0.6 %
BUN SERPL-MCNC: 42 MG/DL (ref 7–20)
CALCIUM SERPL-MCNC: 8.5 MG/DL (ref 8.3–10.6)
CHLORIDE SERPL-SCNC: 109 MMOL/L (ref 99–110)
CO2 SERPL-SCNC: 24 MMOL/L (ref 21–32)
CREAT SERPL-MCNC: 1.7 MG/DL (ref 0.8–1.3)
DEPRECATED RDW RBC AUTO: 20.4 % (ref 12.4–15.4)
DEPRECATED RDW RBC AUTO: 20.5 % (ref 12.4–15.4)
EOSINOPHIL # BLD: 0.2 K/UL (ref 0–0.6)
EOSINOPHIL NFR BLD: 2.4 %
GFR SERPLBLD CREATININE-BSD FMLA CKD-EPI: 38 ML/MIN/{1.73_M2}
GLUCOSE BLD-MCNC: 169 MG/DL (ref 70–99)
GLUCOSE BLD-MCNC: 191 MG/DL (ref 70–99)
GLUCOSE BLD-MCNC: 211 MG/DL (ref 70–99)
GLUCOSE BLD-MCNC: 300 MG/DL (ref 70–99)
GLUCOSE SERPL-MCNC: 160 MG/DL (ref 70–99)
HCT VFR BLD AUTO: 23.9 % (ref 40.5–52.5)
HCT VFR BLD AUTO: 25.7 % (ref 40.5–52.5)
HGB BLD-MCNC: 8.3 G/DL (ref 13.5–17.5)
HGB BLD-MCNC: 8.6 G/DL (ref 13.5–17.5)
INR PPP: 1.19 (ref 0.84–1.16)
INR PPP: 1.39 (ref 0.84–1.16)
LYMPHOCYTES # BLD: 1 K/UL (ref 1–5.1)
LYMPHOCYTES NFR BLD: 11.5 %
MAGNESIUM SERPL-MCNC: 2 MG/DL (ref 1.8–2.4)
MCH RBC QN AUTO: 32.9 PG (ref 26–34)
MCH RBC QN AUTO: 33.5 PG (ref 26–34)
MCHC RBC AUTO-ENTMCNC: 33.6 G/DL (ref 31–36)
MCHC RBC AUTO-ENTMCNC: 34.7 G/DL (ref 31–36)
MCV RBC AUTO: 96.7 FL (ref 80–100)
MCV RBC AUTO: 97.8 FL (ref 80–100)
MONOCYTES # BLD: 0.8 K/UL (ref 0–1.3)
MONOCYTES NFR BLD: 10 %
NEUTROPHILS # BLD: 6.4 K/UL (ref 1.7–7.7)
NEUTROPHILS NFR BLD: 75.5 %
PERFORMED ON: ABNORMAL
PHOSPHATE SERPL-MCNC: 2.8 MG/DL (ref 2.5–4.9)
PLATELET # BLD AUTO: 242 K/UL (ref 135–450)
PLATELET # BLD AUTO: 266 K/UL (ref 135–450)
PMV BLD AUTO: 8.9 FL (ref 5–10.5)
PMV BLD AUTO: 9 FL (ref 5–10.5)
POTASSIUM SERPL-SCNC: 4 MMOL/L (ref 3.5–5.1)
PROTHROMBIN TIME: 15.1 SEC (ref 11.5–14.8)
PROTHROMBIN TIME: 17.1 SEC (ref 11.5–14.8)
RBC # BLD AUTO: 2.47 M/UL (ref 4.2–5.9)
RBC # BLD AUTO: 2.62 M/UL (ref 4.2–5.9)
SODIUM SERPL-SCNC: 143 MMOL/L (ref 136–145)
URATE SERPL-MCNC: 9.3 MG/DL (ref 3.5–7.2)
WBC # BLD AUTO: 8.5 K/UL (ref 4–11)
WBC # BLD AUTO: 9.7 K/UL (ref 4–11)

## 2024-01-20 PROCEDURE — 2580000003 HC RX 258: Performed by: STUDENT IN AN ORGANIZED HEALTH CARE EDUCATION/TRAINING PROGRAM

## 2024-01-20 PROCEDURE — 6370000000 HC RX 637 (ALT 250 FOR IP): Performed by: INTERNAL MEDICINE

## 2024-01-20 PROCEDURE — 6360000002 HC RX W HCPCS: Performed by: STUDENT IN AN ORGANIZED HEALTH CARE EDUCATION/TRAINING PROGRAM

## 2024-01-20 PROCEDURE — 85025 COMPLETE CBC W/AUTO DIFF WBC: CPT

## 2024-01-20 PROCEDURE — 85610 PROTHROMBIN TIME: CPT

## 2024-01-20 PROCEDURE — 97116 GAIT TRAINING THERAPY: CPT

## 2024-01-20 PROCEDURE — 2580000003 HC RX 258: Performed by: INTERNAL MEDICINE

## 2024-01-20 PROCEDURE — 6370000000 HC RX 637 (ALT 250 FOR IP): Performed by: NURSE PRACTITIONER

## 2024-01-20 PROCEDURE — 97530 THERAPEUTIC ACTIVITIES: CPT

## 2024-01-20 PROCEDURE — 36415 COLL VENOUS BLD VENIPUNCTURE: CPT

## 2024-01-20 PROCEDURE — 84550 ASSAY OF BLOOD/URIC ACID: CPT

## 2024-01-20 PROCEDURE — 6360000002 HC RX W HCPCS: Performed by: INTERNAL MEDICINE

## 2024-01-20 PROCEDURE — 73070 X-RAY EXAM OF ELBOW: CPT

## 2024-01-20 PROCEDURE — 85520 HEPARIN ASSAY: CPT

## 2024-01-20 PROCEDURE — 85730 THROMBOPLASTIN TIME PARTIAL: CPT

## 2024-01-20 PROCEDURE — 85027 COMPLETE CBC AUTOMATED: CPT

## 2024-01-20 PROCEDURE — 83735 ASSAY OF MAGNESIUM: CPT

## 2024-01-20 PROCEDURE — 80069 RENAL FUNCTION PANEL: CPT

## 2024-01-20 PROCEDURE — 6370000000 HC RX 637 (ALT 250 FOR IP): Performed by: STUDENT IN AN ORGANIZED HEALTH CARE EDUCATION/TRAINING PROGRAM

## 2024-01-20 PROCEDURE — C9113 INJ PANTOPRAZOLE SODIUM, VIA: HCPCS | Performed by: STUDENT IN AN ORGANIZED HEALTH CARE EDUCATION/TRAINING PROGRAM

## 2024-01-20 PROCEDURE — 1200000000 HC SEMI PRIVATE

## 2024-01-20 RX ORDER — HEPARIN SODIUM 1000 [USP'U]/ML
4000 INJECTION, SOLUTION INTRAVENOUS; SUBCUTANEOUS PRN
Status: DISCONTINUED | OUTPATIENT
Start: 2024-01-20 | End: 2024-01-22

## 2024-01-20 RX ORDER — HEPARIN SODIUM 10000 [USP'U]/100ML
5-30 INJECTION, SOLUTION INTRAVENOUS CONTINUOUS
Status: DISCONTINUED | OUTPATIENT
Start: 2024-01-20 | End: 2024-01-22

## 2024-01-20 RX ORDER — HEPARIN SODIUM 1000 [USP'U]/ML
2000 INJECTION, SOLUTION INTRAVENOUS; SUBCUTANEOUS PRN
Status: DISCONTINUED | OUTPATIENT
Start: 2024-01-20 | End: 2024-01-22

## 2024-01-20 RX ORDER — DOXAZOSIN MESYLATE 1 MG/1
1 TABLET ORAL DAILY
Status: DISCONTINUED | OUTPATIENT
Start: 2024-01-21 | End: 2024-01-22

## 2024-01-20 RX ORDER — HEPARIN SODIUM 1000 [USP'U]/ML
4000 INJECTION, SOLUTION INTRAVENOUS; SUBCUTANEOUS ONCE
Status: COMPLETED | OUTPATIENT
Start: 2024-01-20 | End: 2024-01-20

## 2024-01-20 RX ORDER — HYDROCODONE BITARTRATE AND ACETAMINOPHEN 5; 325 MG/1; MG/1
1 TABLET ORAL EVERY 6 HOURS PRN
Status: DISCONTINUED | OUTPATIENT
Start: 2024-01-20 | End: 2024-01-29 | Stop reason: HOSPADM

## 2024-01-20 RX ADMIN — PANTOPRAZOLE SODIUM 40 MG: 40 INJECTION, POWDER, FOR SOLUTION INTRAVENOUS at 09:05

## 2024-01-20 RX ADMIN — ATORVASTATIN CALCIUM 20 MG: 20 TABLET, FILM COATED ORAL at 09:05

## 2024-01-20 RX ADMIN — Medication 100 MG: at 09:05

## 2024-01-20 RX ADMIN — HYDROCODONE BITARTRATE AND ACETAMINOPHEN 1 TABLET: 5; 325 TABLET ORAL at 23:12

## 2024-01-20 RX ADMIN — HEPARIN SODIUM 4000 UNITS: 1000 INJECTION INTRAVENOUS; SUBCUTANEOUS at 18:55

## 2024-01-20 RX ADMIN — Medication 10 ML: at 09:05

## 2024-01-20 RX ADMIN — INSULIN LISPRO 6 UNITS: 100 INJECTION, SOLUTION INTRAVENOUS; SUBCUTANEOUS at 12:16

## 2024-01-20 RX ADMIN — HEPARIN SODIUM 16 UNITS/KG/HR: 5000 INJECTION INTRAVENOUS; SUBCUTANEOUS at 18:58

## 2024-01-20 RX ADMIN — INSULIN LISPRO 2 UNITS: 100 INJECTION, SOLUTION INTRAVENOUS; SUBCUTANEOUS at 17:00

## 2024-01-20 RX ADMIN — BUSPIRONE HYDROCHLORIDE 5 MG: 5 TABLET ORAL at 09:05

## 2024-01-20 RX ADMIN — AMIODARONE HYDROCHLORIDE 200 MG: 200 TABLET ORAL at 09:05

## 2024-01-20 RX ADMIN — CEFEPIME 1000 MG: 1 INJECTION, POWDER, FOR SOLUTION INTRAMUSCULAR; INTRAVENOUS at 15:52

## 2024-01-20 RX ADMIN — BUSPIRONE HYDROCHLORIDE 5 MG: 5 TABLET ORAL at 23:12

## 2024-01-20 RX ADMIN — HEPARIN SODIUM 12 UNITS/KG/HR: 5000 INJECTION INTRAVENOUS; SUBCUTANEOUS at 12:46

## 2024-01-20 RX ADMIN — HYDROCODONE BITARTRATE AND ACETAMINOPHEN 1 TABLET: 5; 325 TABLET ORAL at 11:53

## 2024-01-20 RX ADMIN — METOPROLOL SUCCINATE 75 MG: 50 TABLET, EXTENDED RELEASE ORAL at 09:05

## 2024-01-20 RX ADMIN — FLUTICASONE PROPIONATE 2 SPRAY: 50 SPRAY, METERED NASAL at 09:06

## 2024-01-20 RX ADMIN — OXYCODONE HYDROCHLORIDE AND ACETAMINOPHEN 500 MG: 500 TABLET ORAL at 09:05

## 2024-01-20 RX ADMIN — DOXAZOSIN 2 MG: 1 TABLET ORAL at 09:05

## 2024-01-20 RX ADMIN — CEFEPIME 1000 MG: 1 INJECTION, POWDER, FOR SOLUTION INTRAMUSCULAR; INTRAVENOUS at 06:06

## 2024-01-20 RX ADMIN — HEPARIN SODIUM 4000 UNITS: 1000 INJECTION INTRAVENOUS; SUBCUTANEOUS at 12:57

## 2024-01-20 RX ADMIN — Medication 10 ML: at 23:11

## 2024-01-20 RX ADMIN — MELATONIN TAB 3 MG 3 MG: 3 TAB at 01:04

## 2024-01-20 ASSESSMENT — PAIN SCALES - GENERAL
PAINLEVEL_OUTOF10: 4
PAINLEVEL_OUTOF10: 6

## 2024-01-20 ASSESSMENT — PAIN SCALES - WONG BAKER
WONGBAKER_NUMERICALRESPONSE: 2

## 2024-01-20 ASSESSMENT — PAIN DESCRIPTION - DESCRIPTORS: DESCRIPTORS: ACHING

## 2024-01-20 ASSESSMENT — PAIN - FUNCTIONAL ASSESSMENT: PAIN_FUNCTIONAL_ASSESSMENT: ACTIVITIES ARE NOT PREVENTED

## 2024-01-20 ASSESSMENT — PAIN DESCRIPTION - ORIENTATION: ORIENTATION: LEFT

## 2024-01-20 ASSESSMENT — PAIN DESCRIPTION - LOCATION
LOCATION: ARM
LOCATION: LEG

## 2024-01-20 NOTE — PROGRESS NOTES
Podiatric Surgery Daily Progress Note  Josep Santos      Subjective :   Patient seen and examined this am at the bedside. Patient denies any acute overnight events. Patient denies N/V/F/C/SOB. Patient denies calf pain, thigh pain, chest pain.     Review of Systems: A 12 point review of symptoms is unremarkable with the exception of the chief complaint. Patient specifically denies nausea, fever, vomiting, chills, shortness of breath, chest pain, abdominal pain, constipation or difficulty urinating.       Objective     /61   Pulse (!) 112   Temp 97.8 °F (36.6 °C) (Oral)   Resp 20   Ht 1.778 m (5' 10\")   Wt 73.8 kg (162 lb 11.2 oz)   SpO2 95%   BMI 23.34 kg/m²      I/O:  Intake/Output Summary (Last 24 hours) at 1/20/2024 1022  Last data filed at 1/19/2024 1321  Gross per 24 hour   Intake --   Output 200 ml   Net -200 ml                Wt Readings from Last 3 Encounters:   01/20/24 73.8 kg (162 lb 11.2 oz)   01/10/24 72.1 kg (159 lb)   01/05/24 72.1 kg (159 lb)       LABS:    Recent Labs     01/19/24  0541 01/20/24  0650   WBC 10.2 8.5   HGB 8.9* 8.3*   HCT 25.6* 23.9*    242          Recent Labs     01/20/24  0650      K 4.0      CO2 24   PHOS 2.8   BUN 42*   CREATININE 1.7*          Recent Labs     01/17/24  1449 01/17/24  1827 01/19/24  0541 01/20/24  0650   PROT 7.2  --   --   --    INR  --    < > 1.78* 1.39*    < > = values in this interval not displayed.             LOWER EXTREMITY EXAMINATION    Dressing to right LE intact. No strikethrough noted to the external dressing. Seropurulent drainage noted to the internal layers of the dressing.     VASCULAR: DP pulses are faintly palpable 1/4 b/l.  PT pulses are nonpalpable 0/4 b/l. CFT is brisk to the digits of the foot b/l. Skin temperature is warm to cool from proximal to distal with focal calor noted around the first metatarsal phalangeal joint of the right foot.  Nonpitting edema noted, right lower extremity. No pain with calf

## 2024-01-20 NOTE — PROGRESS NOTES
[facility] - Inpatient Rehabilitation Department   Phone: [phone]    Occupational Therapy    [] Initial Evaluation            [x] Daily Treatment Note         [] Discharge Summary      Patient: Josep Santos   : 1933   MRN: [id number]   Date of Service:  2024    Admitting Diagnosis:  Osteomyelitis of metatarsal (HCC)  Current Admission Summary: Josep Santos is a 89 y.o. male who presents to the emergency room due to worsening right foot infection.  He was seen by his podiatrist earlier today and was advised to come to the emergency department due to this worsening infection concern for osteomyelitis and to be admitted to the hospital.     Seen by podiatry: Recommending podiatric surgical intervention during this admission, possibly TMA.   -Osteomyelitis, right foot  -Possible septic joint, right first metatarsophalangeal joint  -Diabetic foot infection, right foot  -Cellulitis, right foot  -Gout, bilateral lower extremity  -Diabetes mellitus with peripheral neuropathy  Past Medical History:  has a past medical history of Actinic keratosis, Actinic keratosis, Atrial fibrillation (HCC), Bilateral carotid artery stenosis, CAD (coronary artery disease), Cellulitis, Diabetes mellitus (HCC), DM (diabetes mellitus), type 2 with peripheral vascular complications ([facility])--s/p amputation great toe post osteomylitis , Glucose intolerance (malabsorption), Gout, Hyperlipidemia, Hypertension, Hypertrophy of prostate without urinary obstruction and other lower urinary tract symptoms (LUTS), Intermittent atrial fibrillation ([facility]), Kidney stone, Occult blood in stool, and Pacemaker.  Past Surgical History:  has a past surgical history that includes Tonsillectomy and adenoidectomy; Appendectomy; Kidney stone surgery (); Coronary artery bypass graft (); Cardiac catheterization (); pacemaker placement (); other surgical history (Right, 2015); Abscess Drainage (2015);  Standing Balance: fair (-): maintains balance at CGA with use of UE support  Dynamic Standing Balance: fair (-): maintains balance at CGA with use of UE support  Comments:  AM-PAC Inpatient Daily Activity Raw Score: 15    Cognition  Safety Judgement: decreased awareness of need for safety  Comments: Pt requires vcs/visual cues to adhere to heel wc RLE   Orientation:    alert and oriented x 4  Command Following:   WFL     Education  Barriers To Learning: hearing  Patient Education: patient educated on goals, OT role and benefits, plan of care, precautions, ADL adaptive strategies, weight-bearing education, proper use of assistive device/equipment, energy conservation, family education, transfer training, discharge recommendations  Learning Assessment:  patient verbalizes understanding, would benefit from continued reinforcement    Assessment  Activity Tolerance: Good/fair, fatigues quickly  Impairments Requiring Therapeutic Intervention: decreased functional mobility, decreased ADL status, decreased safety awareness, decreased endurance, decreased balance  Prognosis: good  Clinical Assessment: The patient is a 89 y.o. male who presents below their baseline level of function due to above deficits. Pt limited by heel wb RLE (difficulty maintaining and not safe to ambulate due to can't maintain to advance LLE). Pt also limited by pain LUE and unable to bear weight through LUE due to pain. Pt lives alone, not able to maintain RLE heel weight bearing and limited by pain LUE. Pt is not safe to d/c home alone and will need continued inpt therapy at d/c. Continue with POC.  Safety Interventions: patient left in chair, chair alarm in place, call light within reach, gait belt, patient at risk for falls, nurse notified, and family/caregiver present    Plan  Frequency: 1-2 x/per week  Current Treatment Recommendations: strengthening, balance training, functional mobility training, transfer training, gait training, endurance

## 2024-01-20 NOTE — PLAN OF CARE
Problem: Safety - Adult  Goal: Free from fall injury  Outcome: Progressing  Note: Pt remains free from falls.  Safety precautions in place.  Bed in lowest position, bed/chair wheels locked, call light with in reach, bedside table in reach, bed/chair alarm on.      Moderna dose 1 and 2

## 2024-01-20 NOTE — PROGRESS NOTES
Shift assessment completed, see flowsheets.  Medications administered, see MAR.  Plan of care discussed with pt and/or family.  Pt denies further needs at this time.

## 2024-01-20 NOTE — PROGRESS NOTES
Hospitalist Progress Note      PCP: Raul Cuevas MD    Date of Admission: 1/17/2024    LOS: 3    Chief Complaint:   Chief Complaint   Patient presents with    Foot Injury     Pt presents with a possible infection in the rt foot near the base of the great toe. Pt was in the wound care clinic today and fresh packing placed and wound drained and debrided. Pt sent in for admission for IV antibiotics.        Case Summary:   89-year-old with history of CAD, type 2 diabetes, atrial fibrillation, gout who presented with right foot erythema and wound oozing with concern for osteomyelitis and possible first MTP Septic arthritis  CT of the foot noted for soft tissue ulceration with first MTP joint septic arthritis      Active Hospital Problems    Diagnosis Date Noted    CAD (coronary artery disease) CABG x3 1996, stenting PDA 10/2021 [I25.10] 05/11/2011     Priority: High    Chronic combined systolic and diastolic congestive heart failure (HCC) [I50.42] 09/27/2022     Priority: Medium    Atrial fibrillation (HCC) [I48.91] 09/27/2022     Priority: Medium    Mixed hyperlipidemia [E78.2] 07/28/2022     Priority: Medium    Left elbow pain [M25.522] 01/20/2024    MRSA infection (methicillin-resistant Staphylococcus aureus) [A49.02] 01/19/2024    Infection requiring contact isolation precautions [B99.9] 01/19/2024    Osteomyelitis of metatarsal (HCC) [M86.9] 01/17/2024    Acute osteomyelitis of phalanx of right foot (HCC) [M86.171] 01/17/2024    CRP elevated [R79.82] 01/17/2024    Elevated erythrocyte sedimentation rate [R70.0] 01/17/2024    Iron deficiency anemia [D50.9] 08/11/2023    Chronic renal impairment, stage 3 (moderate) ([facility]) [N18.30] 12/30/2016    Hypertension [I10] 09/11/2015    Type 2 diabetes mellitus with hyperglycemia, without long-term current use of insulin ([facility]) [E11.65] 09/11/2015    Benign prostatic hyperplasia with nocturia [N40.1, R35.1] 05/11/2011         Principal Problem:    Acute  osteomyelitis of phalanx of right foot/Osteomyelitis of metatarsal     Right foot wound with cellulitis  PATIENCE elevated bilaterally and unreliable.  Elevated inflammatory markers.  A1c 5.5.  Wound cultures with MRSA.  Blood cultures no growth to date.  - Continue IV antibiotics  - No vascular intervention warranted at this time  - Podiatry and ID following with recommendations  - Plan for surgical intervention with orthopedics for incision and drainage this coming week      Stage III chronic kidney disease: Stable renal function.      Left elbow pain: It is unclear if he hit his elbow on the side rails.  It was tender and swollen at night but improved with ice.  - Get x-ray of the elbow  - Check uric acid  - Pain management    Active Problems:    Mixed hyperlipidemia: On statin    Atrial fibrillation (HCC): Rate around 112.  Will start telemetry.  Continue amiodarone.  Coumadin on hold in view of surgery will start on heparin drip.    Chronic combined systolic and diastolic congestive heart failure (HCC): Remains euvolemic.  Will monitor.    Benign prostatic hyperplasia with nocturia: Stable.  Will monitor for urine retention    Type 2 diabetes mellitus with hyperglycemia, without long-term current use of insulin (HCC): On sliding scale insulin          Medications:  Reviewed  Infusion Medications    sodium chloride      dextrose       Scheduled Medications    [START ON 1/21/2024] doxazosin  1 mg Oral Daily    ascorbic acid  500 mg Oral Daily    warfarin placeholder: dosing by pharmacy   Other RX Placeholder    metoprolol succinate  75 mg Oral BID    epoetin nader-epbx  10,000 Units SubCUTAneous Q14 Days    fluticasone  2 spray Each Nostril Daily    amiodarone  200 mg Oral Daily    atorvastatin  20 mg Oral Daily    busPIRone  5 mg Oral BID    vitamin B-1  100 mg Oral Daily    sodium chloride flush  5-40 mL IntraVENous 2 times per day    heparin (porcine)  5,000 Units SubCUTAneous 3 times per day    insulin lispro  0-8

## 2024-01-20 NOTE — PROGRESS NOTES
[facility]  Progress Note    Reason for Consult: REYNA on CKD 3b  Requesting Physician:  Dr. Burns    CHIEF COMPLAINT:  Foot Injury        Generalized weakness..  Borderline hypotension.  Improving urine output.    Amiodarone.  Cefepime.  Daptomycin.  Cardura 2 mg daily.                            Josep Santos  is 89 y.o. y.o. male with significant past medical history of CKD 3b, baseline creatinine 1.6-1.9, diabetes mellitus type 2, atrial fibrillation, coronary artery disease, osteomyelitis, hyperlipidemia, gout, prostatic hypertrophy who presents with an injury on 1/17/2024.  He was admitted for osteomyelitis of the right foot.  And asked to see the patient since he has underlying CKD and plans for him to undergo angiogram.  Crea was 2.1 on presentation and now at 1.8.  He is on torsemide and spironolactone.  Lowest SBP in the 90s.  Since 1/2024 he has been following up with us in the office.    Past Medical History:     has a past medical history of Actinic keratosis, Actinic keratosis, Atrial fibrillation (HCC), Bilateral carotid artery stenosis, CAD (coronary artery disease), Cellulitis, Diabetes mellitus (HCC), DM (diabetes mellitus), type 2 with peripheral vascular complications (HCC)--s/p amputation great toe post osteomylitis , Glucose intolerance (malabsorption), Gout, Hyperlipidemia, Hypertension, Hypertrophy of prostate without urinary obstruction and other lower urinary tract symptoms (LUTS), Intermittent atrial fibrillation (HCC), Kidney stone, Occult blood in stool, and Pacemaker.   Past Surgical History:     has a past surgical history that includes Tonsillectomy and adenoidectomy; Appendectomy; Kidney stone surgery (1980); Coronary artery bypass graft (1996); Cardiac catheterization (2003); pacemaker placement (2005); other surgical history (Right, 07/17/2015); Abscess Drainage (07/20/2015); Toe amputation (Right, 07/16/2015); Colonoscopy (03/28/2018); pr

## 2024-01-20 NOTE — PROGRESS NOTES
[facility] - Inpatient Rehabilitation Department   Phone: [phone]    Physical Therapy    [] Initial Evaluation            [x] Daily Treatment Note         [] Discharge Summary      Patient: Josep Santos   : 1933   MRN: [id number]   Date of Service:  2024  Admitting Diagnosis: Osteomyelitis of metatarsal (HCC)  Current Admission Summary: Josep Santos is a 89 y.o. male who presents to the emergency room due to worsening right foot infection.  He was seen by his podiatrist earlier today and was advised to come to the emergency department due to this worsening infection concern for osteomyelitis and to be admitted to the hospital.     Seen by podiatry: Recommending podiatric surgical intervention during this admission, possibly TMA.   -Osteomyelitis, right foot  -Possible septic joint, right first metatarsophalangeal joint  -Diabetic foot infection, right foot  -Cellulitis, right foot  -Gout, bilateral lower extremity  -Diabetes mellitus with peripheral neuropathy  Past Medical History:  has a past medical history of Actinic keratosis, Actinic keratosis, Atrial fibrillation (HCC), Bilateral carotid artery stenosis, CAD (coronary artery disease), Cellulitis, Diabetes mellitus (HCC), DM (diabetes mellitus), type 2 with peripheral vascular complications ([facility])--s/p amputation great toe post osteomylitis , Glucose intolerance (malabsorption), Gout, Hyperlipidemia, Hypertension, Hypertrophy of prostate without urinary obstruction and other lower urinary tract symptoms (LUTS), Intermittent atrial fibrillation ([facility]), Kidney stone, Occult blood in stool, and Pacemaker.  Past Surgical History:  has a past surgical history that includes Tonsillectomy and adenoidectomy; Appendectomy; Kidney stone surgery (); Coronary artery bypass graft (); Cardiac catheterization (); pacemaker placement (); other surgical history (Right, 2015); Abscess Drainage (2015); Toe  good: independent with functional balance in unsupported position  Static Standing Balance: fair (-): maintains balance at CGA with use of UE support  Dynamic Standing Balance: fair (-): maintains balance at CGA with use of UE support  Comments:    Other Therapeutic Interventions  Patient is tender to palpation of medial/lateral border of L elbow, increased pain with AROM into elbow flexion and elbow extension. Full sensation and no pain both proximal and distal to elbow, RN notified with a recommendation for x-ray to rule out fx or any additional diagnoses   Education on importance of heel WB at this time and to limit ambulation if L elbow pain is too painful to utilize RW to assist with WB precautions     Functional Outcomes  AM-PAC Inpatient Mobility Raw Score : 16              Cognition  WFL  Orientation:    alert and oriented x 4  Command Following:   WFL    Education  Barriers To Learning: hearing  Patient Education: patient educated on goals, PT role and benefits, plan of care, precautions, weight-bearing education, general safety, functional mobility training, transfer training, discharge recommendations  Learning Assessment:  patient verbalizes understanding, would benefit from continued reinforcement    Assessment  Activity Tolerance: Fair; limited by L elbow pain with AROM and SOB following ambulation  Impairments Requiring Therapeutic Intervention: decreased functional mobility, decreased strength, decreased safety awareness, decreased endurance, decreased balance  Prognosis: good  Clinical Assessment: Patient is 89 y/o male presenting to [facility] secondary to osteomyelitis of R metatarsal. Patient currently presents below baseline function with all mobility. Patient's PLOF includes being independent for transfers and recent use of RW for ambulation at [facility]. Today, patient requires CGA for transfers and ambulation with use of RW. Patient with increased difficulty maintaining R foot WB

## 2024-01-21 ENCOUNTER — APPOINTMENT (OUTPATIENT)
Dept: GENERAL RADIOLOGY | Age: 89
DRG: 622 | End: 2024-01-21
Payer: MEDICARE

## 2024-01-21 LAB
ABO + RH BLD: NORMAL
ALBUMIN SERPL-MCNC: 3.2 G/DL (ref 3.4–5)
ANION GAP SERPL CALCULATED.3IONS-SCNC: 11 MMOL/L (ref 3–16)
ANTI-XA UNFRAC HEPARIN: 0.15 IU/ML (ref 0.3–0.7)
ANTI-XA UNFRAC HEPARIN: 0.18 IU/ML (ref 0.3–0.7)
ANTI-XA UNFRAC HEPARIN: 0.18 IU/ML (ref 0.3–0.7)
ANTI-XA UNFRAC HEPARIN: 0.34 IU/ML (ref 0.3–0.7)
BACTERIA BLD CULT ORG #2: NORMAL
BACTERIA BLD CULT: NORMAL
BACTERIA SPEC AEROBE CULT: ABNORMAL
BASOPHILS # BLD: 0.1 K/UL (ref 0–0.2)
BASOPHILS # BLD: 0.1 K/UL (ref 0–0.2)
BASOPHILS NFR BLD: 0.4 %
BASOPHILS NFR BLD: 0.5 %
BLD GP AB SCN SERPL QL: NORMAL
BUN SERPL-MCNC: 44 MG/DL (ref 7–20)
CALCIUM SERPL-MCNC: 8.3 MG/DL (ref 8.3–10.6)
CHLORIDE SERPL-SCNC: 106 MMOL/L (ref 99–110)
CO2 SERPL-SCNC: 23 MMOL/L (ref 21–32)
CREAT SERPL-MCNC: 1.8 MG/DL (ref 0.8–1.3)
DEPRECATED RDW RBC AUTO: 20.8 % (ref 12.4–15.4)
DEPRECATED RDW RBC AUTO: 21 % (ref 12.4–15.4)
EOSINOPHIL # BLD: 0.1 K/UL (ref 0–0.6)
EOSINOPHIL # BLD: 0.2 K/UL (ref 0–0.6)
EOSINOPHIL NFR BLD: 0.8 %
EOSINOPHIL NFR BLD: 1.2 %
GFR SERPLBLD CREATININE-BSD FMLA CKD-EPI: 35 ML/MIN/{1.73_M2}
GLUCOSE BLD-MCNC: 175 MG/DL (ref 70–99)
GLUCOSE BLD-MCNC: 178 MG/DL (ref 70–99)
GLUCOSE BLD-MCNC: 197 MG/DL (ref 70–99)
GLUCOSE BLD-MCNC: 265 MG/DL (ref 70–99)
GLUCOSE SERPL-MCNC: 188 MG/DL (ref 70–99)
GRAM STN SPEC: ABNORMAL
HCT VFR BLD AUTO: 22.8 % (ref 40.5–52.5)
HCT VFR BLD AUTO: 23.5 % (ref 40.5–52.5)
HGB BLD-MCNC: 7.6 G/DL (ref 13.5–17.5)
HGB BLD-MCNC: 7.9 G/DL (ref 13.5–17.5)
INR PPP: 1.35 (ref 0.84–1.16)
LACTATE BLDV-SCNC: 1.4 MMOL/L (ref 0.4–1.9)
LYMPHOCYTES # BLD: 0.8 K/UL (ref 1–5.1)
LYMPHOCYTES # BLD: 1 K/UL (ref 1–5.1)
LYMPHOCYTES NFR BLD: 4.9 %
LYMPHOCYTES NFR BLD: 7.7 %
MCH RBC QN AUTO: 32.4 PG (ref 26–34)
MCH RBC QN AUTO: 32.5 PG (ref 26–34)
MCHC RBC AUTO-ENTMCNC: 33.4 G/DL (ref 31–36)
MCHC RBC AUTO-ENTMCNC: 33.6 G/DL (ref 31–36)
MCV RBC AUTO: 96.8 FL (ref 80–100)
MCV RBC AUTO: 97 FL (ref 80–100)
MONOCYTES # BLD: 1 K/UL (ref 0–1.3)
MONOCYTES # BLD: 1.1 K/UL (ref 0–1.3)
MONOCYTES NFR BLD: 6.3 %
MONOCYTES NFR BLD: 7.8 %
NEUTROPHILS # BLD: 11.2 K/UL (ref 1.7–7.7)
NEUTROPHILS # BLD: 14.1 K/UL (ref 1.7–7.7)
NEUTROPHILS NFR BLD: 82.8 %
NEUTROPHILS NFR BLD: 87.6 %
ORGANISM: ABNORMAL
PERFORMED ON: ABNORMAL
PHOSPHATE SERPL-MCNC: 3.1 MG/DL (ref 2.5–4.9)
PLATELET # BLD AUTO: 257 K/UL (ref 135–450)
PLATELET # BLD AUTO: 270 K/UL (ref 135–450)
PMV BLD AUTO: 8.7 FL (ref 5–10.5)
PMV BLD AUTO: 9 FL (ref 5–10.5)
POTASSIUM SERPL-SCNC: 4 MMOL/L (ref 3.5–5.1)
PROTHROMBIN TIME: 16.6 SEC (ref 11.5–14.8)
RBC # BLD AUTO: 2.35 M/UL (ref 4.2–5.9)
RBC # BLD AUTO: 2.43 M/UL (ref 4.2–5.9)
SODIUM SERPL-SCNC: 140 MMOL/L (ref 136–145)
WBC # BLD AUTO: 13.6 K/UL (ref 4–11)
WBC # BLD AUTO: 16.1 K/UL (ref 4–11)

## 2024-01-21 PROCEDURE — 85025 COMPLETE CBC W/AUTO DIFF WBC: CPT

## 2024-01-21 PROCEDURE — 85610 PROTHROMBIN TIME: CPT

## 2024-01-21 PROCEDURE — 80069 RENAL FUNCTION PANEL: CPT

## 2024-01-21 PROCEDURE — 6360000002 HC RX W HCPCS: Performed by: INTERNAL MEDICINE

## 2024-01-21 PROCEDURE — 93005 ELECTROCARDIOGRAM TRACING: CPT | Performed by: STUDENT IN AN ORGANIZED HEALTH CARE EDUCATION/TRAINING PROGRAM

## 2024-01-21 PROCEDURE — 6370000000 HC RX 637 (ALT 250 FOR IP): Performed by: INTERNAL MEDICINE

## 2024-01-21 PROCEDURE — 83605 ASSAY OF LACTIC ACID: CPT

## 2024-01-21 PROCEDURE — 99222 1ST HOSP IP/OBS MODERATE 55: CPT | Performed by: STUDENT IN AN ORGANIZED HEALTH CARE EDUCATION/TRAINING PROGRAM

## 2024-01-21 PROCEDURE — 85520 HEPARIN ASSAY: CPT

## 2024-01-21 PROCEDURE — 86850 RBC ANTIBODY SCREEN: CPT

## 2024-01-21 PROCEDURE — P9016 RBC LEUKOCYTES REDUCED: HCPCS

## 2024-01-21 PROCEDURE — 2500000003 HC RX 250 WO HCPCS: Performed by: STUDENT IN AN ORGANIZED HEALTH CARE EDUCATION/TRAINING PROGRAM

## 2024-01-21 PROCEDURE — 87040 BLOOD CULTURE FOR BACTERIA: CPT

## 2024-01-21 PROCEDURE — 2580000003 HC RX 258: Performed by: STUDENT IN AN ORGANIZED HEALTH CARE EDUCATION/TRAINING PROGRAM

## 2024-01-21 PROCEDURE — 2580000003 HC RX 258: Performed by: INTERNAL MEDICINE

## 2024-01-21 PROCEDURE — 36415 COLL VENOUS BLD VENIPUNCTURE: CPT

## 2024-01-21 PROCEDURE — 86923 COMPATIBILITY TEST ELECTRIC: CPT

## 2024-01-21 PROCEDURE — 86901 BLOOD TYPING SEROLOGIC RH(D): CPT

## 2024-01-21 PROCEDURE — 86900 BLOOD TYPING SEROLOGIC ABO: CPT

## 2024-01-21 PROCEDURE — 6370000000 HC RX 637 (ALT 250 FOR IP): Performed by: STUDENT IN AN ORGANIZED HEALTH CARE EDUCATION/TRAINING PROGRAM

## 2024-01-21 PROCEDURE — 71046 X-RAY EXAM CHEST 2 VIEWS: CPT

## 2024-01-21 PROCEDURE — 1200000000 HC SEMI PRIVATE

## 2024-01-21 PROCEDURE — C9113 INJ PANTOPRAZOLE SODIUM, VIA: HCPCS | Performed by: STUDENT IN AN ORGANIZED HEALTH CARE EDUCATION/TRAINING PROGRAM

## 2024-01-21 PROCEDURE — 6360000002 HC RX W HCPCS: Performed by: STUDENT IN AN ORGANIZED HEALTH CARE EDUCATION/TRAINING PROGRAM

## 2024-01-21 RX ORDER — METOPROLOL TARTRATE 1 MG/ML
5 INJECTION, SOLUTION INTRAVENOUS ONCE
Status: COMPLETED | OUTPATIENT
Start: 2024-01-21 | End: 2024-01-21

## 2024-01-21 RX ORDER — METOPROLOL SUCCINATE 50 MG/1
100 TABLET, EXTENDED RELEASE ORAL 2 TIMES DAILY
Status: DISCONTINUED | OUTPATIENT
Start: 2024-01-21 | End: 2024-01-26

## 2024-01-21 RX ADMIN — INSULIN LISPRO 4 UNITS: 100 INJECTION, SOLUTION INTRAVENOUS; SUBCUTANEOUS at 12:06

## 2024-01-21 RX ADMIN — BUSPIRONE HYDROCHLORIDE 5 MG: 5 TABLET ORAL at 08:45

## 2024-01-21 RX ADMIN — Medication 100 MG: at 08:45

## 2024-01-21 RX ADMIN — CEFEPIME 1000 MG: 1 INJECTION, POWDER, FOR SOLUTION INTRAMUSCULAR; INTRAVENOUS at 06:43

## 2024-01-21 RX ADMIN — DOXAZOSIN 1 MG: 1 TABLET ORAL at 08:45

## 2024-01-21 RX ADMIN — METOPROLOL TARTRATE 5 MG: 5 INJECTION INTRAVENOUS at 12:07

## 2024-01-21 RX ADMIN — HEPARIN SODIUM 2000 UNITS: 1000 INJECTION INTRAVENOUS; SUBCUTANEOUS at 19:39

## 2024-01-21 RX ADMIN — ACETAMINOPHEN 650 MG: 325 TABLET ORAL at 06:56

## 2024-01-21 RX ADMIN — AMIODARONE HYDROCHLORIDE 200 MG: 200 TABLET ORAL at 08:45

## 2024-01-21 RX ADMIN — HEPARIN SODIUM 18 UNITS/KG/HR: 5000 INJECTION INTRAVENOUS; SUBCUTANEOUS at 10:13

## 2024-01-21 RX ADMIN — HEPARIN SODIUM 2000 UNITS: 1000 INJECTION INTRAVENOUS; SUBCUTANEOUS at 10:12

## 2024-01-21 RX ADMIN — Medication 10 ML: at 08:49

## 2024-01-21 RX ADMIN — METOPROLOL SUCCINATE 100 MG: 50 TABLET, EXTENDED RELEASE ORAL at 20:46

## 2024-01-21 RX ADMIN — OXYCODONE HYDROCHLORIDE AND ACETAMINOPHEN 500 MG: 500 TABLET ORAL at 08:45

## 2024-01-21 RX ADMIN — CEFEPIME 1000 MG: 1 INJECTION, POWDER, FOR SOLUTION INTRAMUSCULAR; INTRAVENOUS at 14:27

## 2024-01-21 RX ADMIN — METOPROLOL SUCCINATE 75 MG: 50 TABLET, EXTENDED RELEASE ORAL at 08:45

## 2024-01-21 RX ADMIN — HYDROCODONE BITARTRATE AND ACETAMINOPHEN 1 TABLET: 5; 325 TABLET ORAL at 14:31

## 2024-01-21 RX ADMIN — BUSPIRONE HYDROCHLORIDE 5 MG: 5 TABLET ORAL at 20:46

## 2024-01-21 RX ADMIN — PANTOPRAZOLE SODIUM 40 MG: 40 INJECTION, POWDER, FOR SOLUTION INTRAVENOUS at 08:47

## 2024-01-21 RX ADMIN — DAPTOMYCIN 450 MG: 500 INJECTION, POWDER, LYOPHILIZED, FOR SOLUTION INTRAVENOUS at 19:01

## 2024-01-21 RX ADMIN — ATORVASTATIN CALCIUM 20 MG: 20 TABLET, FILM COATED ORAL at 08:45

## 2024-01-21 ASSESSMENT — PAIN SCALES - WONG BAKER
WONGBAKER_NUMERICALRESPONSE: 2

## 2024-01-21 ASSESSMENT — PAIN SCALES - GENERAL: PAINLEVEL_OUTOF10: 5

## 2024-01-21 ASSESSMENT — PAIN DESCRIPTION - LOCATION: LOCATION: ELBOW

## 2024-01-21 ASSESSMENT — PAIN DESCRIPTION - ORIENTATION: ORIENTATION: LEFT

## 2024-01-21 ASSESSMENT — PAIN DESCRIPTION - DESCRIPTORS: DESCRIPTORS: ACHING

## 2024-01-21 NOTE — PROGRESS NOTES
Hospitalist Progress Note      PCP: Raul Cuevas MD    Date of Admission: 1/17/2024    LOS: 4    Chief Complaint:   Chief Complaint   Patient presents with    Foot Injury     Pt presents with a possible infection in the rt foot near the base of the great toe. Pt was in the wound care clinic today and fresh packing placed and wound drained and debrided. Pt sent in for admission for IV antibiotics.        Case Summary:   89-year-old with history of CAD, type 2 diabetes, atrial fibrillation, gout who presented with right foot erythema and wound oozing with concern for osteomyelitis and possible first MTP Septic arthritis  CT of the foot noted for soft tissue ulceration with first MTP joint septic arthritis      Active Hospital Problems    Diagnosis Date Noted    CAD (coronary artery disease) CABG x3 1996, stenting PDA 10/2021 [I25.10] 05/11/2011     Priority: High    Chronic combined systolic and diastolic congestive heart failure (HCC) [I50.42] 09/27/2022     Priority: Medium    Atrial fibrillation (HCC) [I48.91] 09/27/2022     Priority: Medium    Mixed hyperlipidemia [E78.2] 07/28/2022     Priority: Medium    Left elbow pain [M25.522] 01/20/2024    MRSA infection (methicillin-resistant Staphylococcus aureus) [A49.02] 01/19/2024    Infection requiring contact isolation precautions [B99.9] 01/19/2024    Osteomyelitis of metatarsal (HCC) [M86.9] 01/17/2024    Acute osteomyelitis of phalanx of right foot (HCC) [M86.171] 01/17/2024    CRP elevated [R79.82] 01/17/2024    Elevated erythrocyte sedimentation rate [R70.0] 01/17/2024    Iron deficiency anemia [D50.9] 08/11/2023    Chronic renal impairment, stage 3 (moderate) ([facility]) [N18.30] 12/30/2016    Hypertension [I10] 09/11/2015    Type 2 diabetes mellitus with hyperglycemia, without long-term current use of insulin ([facility]) [E11.65] 09/11/2015    Benign prostatic hyperplasia with nocturia [N40.1, R35.1] 05/11/2011         Principal Problem:    Acute

## 2024-01-21 NOTE — CONSULTS
100 MG tablet Take 10 tablets by mouth daily  Provider, MD Jenaro        No Known Allergies    Past Medical History:   Diagnosis Date    Actinic keratosis     Actinic keratosis     Atrial fibrillation (HCC)     Bilateral carotid artery stenosis     CAD (coronary artery disease)     Cellulitis 07/15/2015    right foot    Diabetes mellitus (HCC)     DM (diabetes mellitus), type 2 with peripheral vascular complications (HCC)--s/p amputation great toe post osteomylitis  09/11/2015    Glucose intolerance (malabsorption)     Gout     Hyperlipidemia     Hypertension     Hypertrophy of prostate without urinary obstruction and other lower urinary tract symptoms (LUTS)     Intermittent atrial fibrillation (HCC)     Kidney stone     Occult blood in stool     Hx of    Pacemaker     Permanent       Past Surgical History:   Procedure Laterality Date    ABSCESS DRAINAGE  07/20/2015    right foot    APPENDECTOMY      CARDIAC CATHETERIZATION  2003    Left    COLONOSCOPY  03/28/2018    dr ochoa    CORONARY ARTERY BYPASS GRAFT  1996    x3    KIDNEY STONE SURGERY  1980    OTHER SURGICAL HISTORY Right 07/17/2015    right fifth toe I and D    PACEMAKER PLACEMENT  2005    UT ESOPHAGOGASTRODUODENOSCOPY TRANSORAL DIAGNOSTIC N/A 11/21/2018    EGD DIAGNOSTIC ONLY performed by Jovan Ochoa MD at [facility] ENDOSCOPY    TOE AMPUTATION Right 07/16/2015    right pinkey toe     TONSILLECTOMY AND ADENOIDECTOMY      TOOTH EXTRACTION  09/2023    4 teeth       Social History     Tobacco Use    Smoking status: Never    Smokeless tobacco: Never    Tobacco comments:     counseled on tobacco exposure avoidance   Substance Use Topics    Alcohol use: No     Alcohol/week: 0.0 standard drinks of alcohol        Family History   Problem Relation Age of Onset    Stroke Father     Diabetes Mother        PHYSICAL EXAMINATION:  Vitals:    01/21/24 0045 01/21/24 0056 01/21/24 0600 01/21/24 0841   BP: (!) 119/56 105/67  110/72   Pulse: 75 (!) 115  (!) 118   Resp:   insufficiency.  Discussed with nephrology who recommends resuming ACE inhibitor/ARB 3 days post procedure.     ASSESSMENT/PLAN:  Tachycardia. Could be sinus, but not in pain, dehydrated, or overly anxious and tele reveals more abrupt onset. EKG with what appears to be 2:1 A tach, likely too slow for A flutter. He missed dose of BB last night but was given increased dose this AM. Ultimately his rate is not overly fast. Would not be overly aggressive with rate control as to not drop BP with known AS. Will trial dose of IV Lopressor but may just need to see if increased PO Toprol will take effect  Toe wound infection; MRSA+ on wound cultures; Bcx negative  Plans for debridement tomorrow  With hx AS would avoid large BP fluctuations. Local anesthetic would be preferred, avoid general anesthesia and if needed would recommend cardiac anesthesiologist.   Chronic systolic CHF with LVEF recovered (6/2023 30-35% --> 1/17/2024 50%)  Moderate-Severe AS (Vmax 3.1 cm/s, MG 25, DONAVAN 0.94)  CAD s/p CABG (LIMA-LAD, SVG-OM); PDA s/p PCI 2021  No chest pain  Cont statin  BB  Only coumadin (no ASA) given anemia, no recent PCI  A fib on coumadin/amio  On heparin drip  Metoprolol has been increased by hospitalist  CKD; Cr baseline 1.7-2  Chronic Anemia; hgb near 8-9  HTN  DM    No follow-ups on file.    An  electronic signature was used to authenticate this note.    iTti Loera MD on 1/21/2024 at 9:44 AM

## 2024-01-21 NOTE — PROGRESS NOTES
[facility]  Progress Note    Reason for Consult: REYNA on CKD 3b  Requesting Physician:  Dr. Burns    CHIEF COMPLAINT:  Foot Injury        Generalized weakness..  Borderline hypotension.-Remained stable  Improving urine output.    Amiodarone.  Cefepime.  Daptomycin.  Cardura 2 mg daily.                            Josep Santos  is 89 y.o. y.o. male with significant past medical history of CKD 3b, baseline creatinine 1.6-1.9, diabetes mellitus type 2, atrial fibrillation, coronary artery disease, osteomyelitis, hyperlipidemia, gout, prostatic hypertrophy who presents with an injury on 1/17/2024.  He was admitted for osteomyelitis of the right foot.  And asked to see the patient since he has underlying CKD and plans for him to undergo angiogram.  Crea was 2.1 on presentation and now at 1.8.  He is on torsemide and spironolactone.  Lowest SBP in the 90s.  Since 1/2024 he has been following up with us in the office.    Past Medical History:     has a past medical history of Actinic keratosis, Actinic keratosis, Atrial fibrillation (HCC), Bilateral carotid artery stenosis, CAD (coronary artery disease), Cellulitis, Diabetes mellitus (HCC), DM (diabetes mellitus), type 2 with peripheral vascular complications (HCC)--s/p amputation great toe post osteomylitis , Glucose intolerance (malabsorption), Gout, Hyperlipidemia, Hypertension, Hypertrophy of prostate without urinary obstruction and other lower urinary tract symptoms (LUTS), Intermittent atrial fibrillation ([facility]), Kidney stone, Occult blood in stool, and Pacemaker.   Past Surgical History:     has a past surgical history that includes Tonsillectomy and adenoidectomy; Appendectomy; Kidney stone surgery (1980); Coronary artery bypass graft (1996); Cardiac catheterization (2003); pacemaker placement (2005); other surgical history (Right, 07/17/2015); Abscess Drainage (07/20/2015); Toe amputation (Right, 07/16/2015); Colonoscopy  01/21/2024 06:55 AM    CREATININE 1.8 01/21/2024 06:55 AM    CALCIUM 8.3 01/21/2024 06:55 AM    GFRAA >60 10/03/2022 04:38 AM    GFRAA >60 06/05/2013 08:30 AM    LABGLOM 35 01/21/2024 06:55 AM    GLUCOSE 188 01/21/2024 06:55 AM   Magnesium:    Lab Results   Component Value Date/Time    MG 2.00 01/20/2024 06:50 AM   Phosphorus:    Lab Results   Component Value Date/Time    PHOS 3.1 01/21/2024 06:55 AM     EF 50%    CK 23  Albumin 3.2  Urinalysis pending    CT right foot  1. Chronic well-defined erosions with associated periarticular soft tissue   masses involving the 1st MTP joint and 2nd MTP joint of the right foot.   There are similar changes noted involving the 1st MTP joint of the left foot.   The appearance is strongly suspicious for gout.   2. Soft tissue ulceration adjacent to the 1st metatarsal head on the right,   with suspected superimposed septic arthritis of the right 1st MTP joint.   3. Extensive, diffuse muscle atrophy.     IMPRESSION/RECOMMENDATIONS:        Borderline hypotension.  - Recommend to reduce doxazosin to 1 mg daily.    Rest of the medications are appropriate.    Repeat creatinine is 1.8  - Almost at baseline level.    Medications reviewed and appropriate.    Antibiotic dose appropriate.    Monitor renal functions.    May need a bladder scan.  High complexity.    Not ready for discharge due to borderline hypotension      1.  REYNA on CKD 3b-baseline creatinine 1.6-1.9.  1/2NSS trial x 12 hours.  Start vitamin C.  Hold spironolactone and torsemide for now.  Discussed with patient risks of contrast nephropathy and importance of angiogram in the setting of right foot osteomyelitis.  He expresses understanding and willing to proceed.  2.  Electrolytes are acceptable  3.  Anemia-check iron studies  4.  Hypertension-controlled.  Continue current regimen.  5.  Right foot osteomyelitis-on cefepime and daptomycin.  6.  History of hyperlipidemia-on statin    Thank you for the consult.  We will follow

## 2024-01-21 NOTE — PLAN OF CARE
Podiatric Surgery Plan of Care Note  Josep Santos     Patient will undergo podiatric surgical intervention consisting of right foot incision and drainage with carbone arthroplasty tomorrow afternoon   -Pt made NPO after breakfast tomorrow   -Will hold heparin tomorrow morning    -Nursing comm ordered for dressing change today   -Chest XR ordered, EKG already performed   -Medically cleared per primary   -Nursing comm ordered to obtain consent    Joseph Espinoza DPM  Podiatric Resident PGY-2

## 2024-01-22 ENCOUNTER — APPOINTMENT (OUTPATIENT)
Dept: GENERAL RADIOLOGY | Age: 89
DRG: 622 | End: 2024-01-22
Payer: MEDICARE

## 2024-01-22 ENCOUNTER — ANESTHESIA (OUTPATIENT)
Dept: OPERATING ROOM | Age: 89
End: 2024-01-22
Payer: MEDICARE

## 2024-01-22 ENCOUNTER — ANESTHESIA EVENT (OUTPATIENT)
Dept: OPERATING ROOM | Age: 89
End: 2024-01-22
Payer: MEDICARE

## 2024-01-22 LAB
ALBUMIN SERPL-MCNC: 3.2 G/DL (ref 3.4–5)
ANION GAP SERPL CALCULATED.3IONS-SCNC: 13 MMOL/L (ref 3–16)
ANTI-XA UNFRAC HEPARIN: 0.29 IU/ML (ref 0.3–0.7)
ANTI-XA UNFRAC HEPARIN: <0.1 IU/ML (ref 0.3–0.7)
BASOPHILS # BLD: 0.1 K/UL (ref 0–0.2)
BASOPHILS NFR BLD: 0.4 %
BUN SERPL-MCNC: 52 MG/DL (ref 7–20)
CALCIUM SERPL-MCNC: 8.5 MG/DL (ref 8.3–10.6)
CHLORIDE SERPL-SCNC: 105 MMOL/L (ref 99–110)
CO2 SERPL-SCNC: 21 MMOL/L (ref 21–32)
CREAT SERPL-MCNC: 2 MG/DL (ref 0.8–1.3)
DEPRECATED RDW RBC AUTO: 20.5 % (ref 12.4–15.4)
EKG ATRIAL RATE: 116 BPM
EKG DIAGNOSIS: NORMAL
EKG P-R INTERVAL: 128 MS
EKG Q-T INTERVAL: 398 MS
EKG QRS DURATION: 130 MS
EKG QTC CALCULATION (BAZETT): 553 MS
EKG R AXIS: -33 DEGREES
EKG T AXIS: 33 DEGREES
EKG VENTRICULAR RATE: 116 BPM
EOSINOPHIL # BLD: 0 K/UL (ref 0–0.6)
EOSINOPHIL NFR BLD: 0 %
GFR SERPLBLD CREATININE-BSD FMLA CKD-EPI: 31 ML/MIN/{1.73_M2}
GLUCOSE BLD-MCNC: 160 MG/DL (ref 70–99)
GLUCOSE BLD-MCNC: 170 MG/DL (ref 70–99)
GLUCOSE BLD-MCNC: 182 MG/DL (ref 70–99)
GLUCOSE BLD-MCNC: 187 MG/DL (ref 70–99)
GLUCOSE BLD-MCNC: 191 MG/DL (ref 70–99)
GLUCOSE BLD-MCNC: 220 MG/DL (ref 70–99)
GLUCOSE BLD-MCNC: 268 MG/DL (ref 70–99)
GLUCOSE SERPL-MCNC: 169 MG/DL (ref 70–99)
HCT VFR BLD AUTO: 22.9 % (ref 40.5–52.5)
HGB BLD-MCNC: 7.7 G/DL (ref 13.5–17.5)
INR PPP: 1.34 (ref 0.84–1.16)
LYMPHOCYTES # BLD: 0.8 K/UL (ref 1–5.1)
LYMPHOCYTES NFR BLD: 4.3 %
MCH RBC QN AUTO: 32.5 PG (ref 26–34)
MCHC RBC AUTO-ENTMCNC: 33.5 G/DL (ref 31–36)
MCV RBC AUTO: 97.2 FL (ref 80–100)
MONOCYTES # BLD: 1.2 K/UL (ref 0–1.3)
MONOCYTES NFR BLD: 6.6 %
NEUTROPHILS # BLD: 16.5 K/UL (ref 1.7–7.7)
NEUTROPHILS NFR BLD: 88.7 %
PERFORMED ON: ABNORMAL
PHOSPHATE SERPL-MCNC: 4.1 MG/DL (ref 2.5–4.9)
PLATELET # BLD AUTO: 271 K/UL (ref 135–450)
PMV BLD AUTO: 8.9 FL (ref 5–10.5)
POTASSIUM SERPL-SCNC: 3.8 MMOL/L (ref 3.5–5.1)
PROTHROMBIN TIME: 16.5 SEC (ref 11.5–14.8)
RBC # BLD AUTO: 2.36 M/UL (ref 4.2–5.9)
SODIUM SERPL-SCNC: 139 MMOL/L (ref 136–145)
WBC # BLD AUTO: 18.6 K/UL (ref 4–11)

## 2024-01-22 PROCEDURE — 86403 PARTICLE AGGLUT ANTBDY SCRN: CPT

## 2024-01-22 PROCEDURE — 0QBN0ZZ EXCISION OF RIGHT METATARSAL, OPEN APPROACH: ICD-10-PCS | Performed by: PODIATRIST

## 2024-01-22 PROCEDURE — 7100000000 HC PACU RECOVERY - FIRST 15 MIN: Performed by: PODIATRIST

## 2024-01-22 PROCEDURE — 0QBN0Z2 EXCISION OF RIGHT METATARSAL, SESAMOID BONE(S) 1ST TOE, OPEN APPROACH: ICD-10-PCS | Performed by: PODIATRIST

## 2024-01-22 PROCEDURE — 93971 EXTREMITY STUDY: CPT

## 2024-01-22 PROCEDURE — 6360000002 HC RX W HCPCS: Performed by: STUDENT IN AN ORGANIZED HEALTH CARE EDUCATION/TRAINING PROGRAM

## 2024-01-22 PROCEDURE — 1200000000 HC SEMI PRIVATE

## 2024-01-22 PROCEDURE — 6360000002 HC RX W HCPCS: Performed by: NURSE ANESTHETIST, CERTIFIED REGISTERED

## 2024-01-22 PROCEDURE — 87070 CULTURE OTHR SPECIMN AEROBIC: CPT

## 2024-01-22 PROCEDURE — 2580000003 HC RX 258: Performed by: NURSE ANESTHETIST, CERTIFIED REGISTERED

## 2024-01-22 PROCEDURE — 6360000002 HC RX W HCPCS: Performed by: INTERNAL MEDICINE

## 2024-01-22 PROCEDURE — 73630 X-RAY EXAM OF FOOT: CPT

## 2024-01-22 PROCEDURE — 3700000000 HC ANESTHESIA ATTENDED CARE: Performed by: PODIATRIST

## 2024-01-22 PROCEDURE — 3700000001 HC ADD 15 MINUTES (ANESTHESIA): Performed by: PODIATRIST

## 2024-01-22 PROCEDURE — 36415 COLL VENOUS BLD VENIPUNCTURE: CPT

## 2024-01-22 PROCEDURE — 99232 SBSQ HOSP IP/OBS MODERATE 35: CPT | Performed by: INTERNAL MEDICINE

## 2024-01-22 PROCEDURE — 87077 CULTURE AEROBIC IDENTIFY: CPT

## 2024-01-22 PROCEDURE — 85520 HEPARIN ASSAY: CPT

## 2024-01-22 PROCEDURE — 87075 CULTR BACTERIA EXCEPT BLOOD: CPT

## 2024-01-22 PROCEDURE — 80069 RENAL FUNCTION PANEL: CPT

## 2024-01-22 PROCEDURE — 88304 TISSUE EXAM BY PATHOLOGIST: CPT

## 2024-01-22 PROCEDURE — 99233 SBSQ HOSP IP/OBS HIGH 50: CPT | Performed by: INTERNAL MEDICINE

## 2024-01-22 PROCEDURE — 2580000003 HC RX 258: Performed by: INTERNAL MEDICINE

## 2024-01-22 PROCEDURE — 87102 FUNGUS ISOLATION CULTURE: CPT

## 2024-01-22 PROCEDURE — 6370000000 HC RX 637 (ALT 250 FOR IP): Performed by: INTERNAL MEDICINE

## 2024-01-22 PROCEDURE — 3600000002 HC SURGERY LEVEL 2 BASE: Performed by: PODIATRIST

## 2024-01-22 PROCEDURE — 3600000012 HC SURGERY LEVEL 2 ADDTL 15MIN: Performed by: PODIATRIST

## 2024-01-22 PROCEDURE — 6370000000 HC RX 637 (ALT 250 FOR IP): Performed by: STUDENT IN AN ORGANIZED HEALTH CARE EDUCATION/TRAINING PROGRAM

## 2024-01-22 PROCEDURE — 6360000002 HC RX W HCPCS: Performed by: PODIATRIST

## 2024-01-22 PROCEDURE — 2580000003 HC RX 258

## 2024-01-22 PROCEDURE — 97110 THERAPEUTIC EXERCISES: CPT

## 2024-01-22 PROCEDURE — APPNB30 APP NON BILLABLE TIME 0-30 MINS: Performed by: NURSE PRACTITIONER

## 2024-01-22 PROCEDURE — 6370000000 HC RX 637 (ALT 250 FOR IP): Performed by: PODIATRIST

## 2024-01-22 PROCEDURE — C9113 INJ PANTOPRAZOLE SODIUM, VIA: HCPCS | Performed by: STUDENT IN AN ORGANIZED HEALTH CARE EDUCATION/TRAINING PROGRAM

## 2024-01-22 PROCEDURE — 2580000003 HC RX 258: Performed by: STUDENT IN AN ORGANIZED HEALTH CARE EDUCATION/TRAINING PROGRAM

## 2024-01-22 PROCEDURE — APPSS30 APP SPLIT SHARED TIME 16-30 MINUTES: Performed by: NURSE PRACTITIONER

## 2024-01-22 PROCEDURE — 97530 THERAPEUTIC ACTIVITIES: CPT

## 2024-01-22 PROCEDURE — A4217 STERILE WATER/SALINE, 500 ML: HCPCS | Performed by: PODIATRIST

## 2024-01-22 PROCEDURE — 85610 PROTHROMBIN TIME: CPT

## 2024-01-22 PROCEDURE — 87205 SMEAR GRAM STAIN: CPT

## 2024-01-22 PROCEDURE — 85025 COMPLETE CBC W/AUTO DIFF WBC: CPT

## 2024-01-22 PROCEDURE — 6370000000 HC RX 637 (ALT 250 FOR IP)

## 2024-01-22 PROCEDURE — 87186 SC STD MICRODIL/AGAR DIL: CPT

## 2024-01-22 PROCEDURE — 88311 DECALCIFY TISSUE: CPT

## 2024-01-22 PROCEDURE — 2709999900 HC NON-CHARGEABLE SUPPLY: Performed by: PODIATRIST

## 2024-01-22 PROCEDURE — 97535 SELF CARE MNGMENT TRAINING: CPT

## 2024-01-22 PROCEDURE — 2580000003 HC RX 258: Performed by: PODIATRIST

## 2024-01-22 PROCEDURE — 93010 ELECTROCARDIOGRAM REPORT: CPT | Performed by: INTERNAL MEDICINE

## 2024-01-22 PROCEDURE — 2500000003 HC RX 250 WO HCPCS: Performed by: PODIATRIST

## 2024-01-22 PROCEDURE — 0KBV0ZZ EXCISION OF RIGHT FOOT MUSCLE, OPEN APPROACH: ICD-10-PCS | Performed by: PODIATRIST

## 2024-01-22 PROCEDURE — 7100000001 HC PACU RECOVERY - ADDTL 15 MIN: Performed by: PODIATRIST

## 2024-01-22 RX ORDER — ALLOPURINOL 100 MG/1
100 TABLET ORAL DAILY
Status: DISCONTINUED | OUTPATIENT
Start: 2024-01-22 | End: 2024-01-29 | Stop reason: HOSPADM

## 2024-01-22 RX ORDER — HEPARIN SODIUM 1000 [USP'U]/ML
4000 INJECTION, SOLUTION INTRAVENOUS; SUBCUTANEOUS PRN
Status: DISCONTINUED | OUTPATIENT
Start: 2024-01-23 | End: 2024-01-28

## 2024-01-22 RX ORDER — SODIUM CHLORIDE 0.9 % (FLUSH) 0.9 %
5-40 SYRINGE (ML) INJECTION EVERY 12 HOURS SCHEDULED
Status: DISCONTINUED | OUTPATIENT
Start: 2024-01-22 | End: 2024-01-22 | Stop reason: HOSPADM

## 2024-01-22 RX ORDER — INSULIN LISPRO 100 [IU]/ML
0-8 INJECTION, SOLUTION INTRAVENOUS; SUBCUTANEOUS
Status: DISCONTINUED | OUTPATIENT
Start: 2024-01-23 | End: 2024-01-24

## 2024-01-22 RX ORDER — COLCHICINE 0.6 MG/1
0.6 TABLET ORAL DAILY
Status: DISCONTINUED | OUTPATIENT
Start: 2024-01-22 | End: 2024-01-23

## 2024-01-22 RX ORDER — HEPARIN SODIUM 1000 [USP'U]/ML
2000 INJECTION, SOLUTION INTRAVENOUS; SUBCUTANEOUS PRN
Status: DISCONTINUED | OUTPATIENT
Start: 2024-01-23 | End: 2024-01-28

## 2024-01-22 RX ORDER — FENTANYL CITRATE 50 UG/ML
INJECTION, SOLUTION INTRAMUSCULAR; INTRAVENOUS PRN
Status: DISCONTINUED | OUTPATIENT
Start: 2024-01-22 | End: 2024-01-22 | Stop reason: SDUPTHER

## 2024-01-22 RX ORDER — SODIUM CHLORIDE 9 MG/ML
INJECTION, SOLUTION INTRAVENOUS CONTINUOUS
Status: DISCONTINUED | OUTPATIENT
Start: 2024-01-22 | End: 2024-01-22

## 2024-01-22 RX ORDER — HEPARIN SODIUM 10000 [USP'U]/100ML
5-30 INJECTION, SOLUTION INTRAVENOUS CONTINUOUS
Status: DISCONTINUED | OUTPATIENT
Start: 2024-01-23 | End: 2024-01-28

## 2024-01-22 RX ORDER — MAGNESIUM HYDROXIDE 1200 MG/15ML
LIQUID ORAL CONTINUOUS PRN
Status: COMPLETED | OUTPATIENT
Start: 2024-01-22 | End: 2024-01-22

## 2024-01-22 RX ORDER — SODIUM CHLORIDE 0.9 % (FLUSH) 0.9 %
5-40 SYRINGE (ML) INJECTION PRN
Status: DISCONTINUED | OUTPATIENT
Start: 2024-01-22 | End: 2024-01-22 | Stop reason: HOSPADM

## 2024-01-22 RX ORDER — EMPAGLIFLOZIN 10 MG/1
10 TABLET, FILM COATED ORAL DAILY
Qty: 90 TABLET | Refills: 1 | Status: SHIPPED | OUTPATIENT
Start: 2024-01-22

## 2024-01-22 RX ORDER — BUPIVACAINE HYDROCHLORIDE 5 MG/ML
INJECTION, SOLUTION EPIDURAL; INTRACAUDAL
Status: COMPLETED | OUTPATIENT
Start: 2024-01-22 | End: 2024-01-22

## 2024-01-22 RX ORDER — SODIUM CHLORIDE 9 MG/ML
INJECTION, SOLUTION INTRAVENOUS CONTINUOUS PRN
Status: DISCONTINUED | OUTPATIENT
Start: 2024-01-22 | End: 2024-01-22 | Stop reason: SDUPTHER

## 2024-01-22 RX ORDER — SODIUM CHLORIDE 9 MG/ML
INJECTION, SOLUTION INTRAVENOUS PRN
Status: DISCONTINUED | OUTPATIENT
Start: 2024-01-22 | End: 2024-01-22 | Stop reason: HOSPADM

## 2024-01-22 RX ORDER — INSULIN LISPRO 100 [IU]/ML
0-4 INJECTION, SOLUTION INTRAVENOUS; SUBCUTANEOUS NIGHTLY
Status: DISCONTINUED | OUTPATIENT
Start: 2024-01-22 | End: 2024-01-24

## 2024-01-22 RX ORDER — BACITRACIN ZINC AND POLYMYXIN B SULFATE 500; 1000 [USP'U]/G; [USP'U]/G
OINTMENT TOPICAL
Status: COMPLETED | OUTPATIENT
Start: 2024-01-22 | End: 2024-01-22

## 2024-01-22 RX ORDER — LIDOCAINE HYDROCHLORIDE 10 MG/ML
INJECTION, SOLUTION EPIDURAL; INFILTRATION; INTRACAUDAL; PERINEURAL
Status: COMPLETED | OUTPATIENT
Start: 2024-01-22 | End: 2024-01-22

## 2024-01-22 RX ADMIN — SODIUM CHLORIDE: 9 INJECTION, SOLUTION INTRAVENOUS at 16:21

## 2024-01-22 RX ADMIN — FLUTICASONE PROPIONATE 2 SPRAY: 50 SPRAY, METERED NASAL at 08:51

## 2024-01-22 RX ADMIN — BUSPIRONE HYDROCHLORIDE 5 MG: 5 TABLET ORAL at 21:30

## 2024-01-22 RX ADMIN — PANTOPRAZOLE SODIUM 40 MG: 40 INJECTION, POWDER, FOR SOLUTION INTRAVENOUS at 08:39

## 2024-01-22 RX ADMIN — Medication 10 ML: at 08:40

## 2024-01-22 RX ADMIN — ATORVASTATIN CALCIUM 20 MG: 20 TABLET, FILM COATED ORAL at 08:39

## 2024-01-22 RX ADMIN — OXYCODONE HYDROCHLORIDE AND ACETAMINOPHEN 500 MG: 500 TABLET ORAL at 08:39

## 2024-01-22 RX ADMIN — FENTANYL CITRATE 25 MCG: 50 INJECTION, SOLUTION INTRAMUSCULAR; INTRAVENOUS at 16:46

## 2024-01-22 RX ADMIN — BUSPIRONE HYDROCHLORIDE 5 MG: 5 TABLET ORAL at 08:39

## 2024-01-22 RX ADMIN — Medication 10 ML: at 21:30

## 2024-01-22 RX ADMIN — Medication 100 MG: at 08:39

## 2024-01-22 RX ADMIN — HEPARIN SODIUM 2000 UNITS: 1000 INJECTION INTRAVENOUS; SUBCUTANEOUS at 03:13

## 2024-01-22 RX ADMIN — INSULIN LISPRO 2 UNITS: 100 INJECTION, SOLUTION INTRAVENOUS; SUBCUTANEOUS at 04:55

## 2024-01-22 RX ADMIN — HYDROCODONE BITARTRATE AND ACETAMINOPHEN 1 TABLET: 5; 325 TABLET ORAL at 08:51

## 2024-01-22 RX ADMIN — CEFEPIME 1000 MG: 1 INJECTION, POWDER, FOR SOLUTION INTRAMUSCULAR; INTRAVENOUS at 03:26

## 2024-01-22 ASSESSMENT — PAIN SCALES - GENERAL
PAINLEVEL_OUTOF10: 3
PAINLEVEL_OUTOF10: 9

## 2024-01-22 ASSESSMENT — PAIN DESCRIPTION - DESCRIPTORS: DESCRIPTORS: ACHING;DISCOMFORT

## 2024-01-22 ASSESSMENT — PAIN DESCRIPTION - PAIN TYPE: TYPE: CHRONIC PAIN;ACUTE PAIN

## 2024-01-22 ASSESSMENT — PAIN DESCRIPTION - ONSET: ONSET: ON-GOING

## 2024-01-22 ASSESSMENT — PAIN - FUNCTIONAL ASSESSMENT: PAIN_FUNCTIONAL_ASSESSMENT: PREVENTS OR INTERFERES SOME ACTIVE ACTIVITIES AND ADLS

## 2024-01-22 ASSESSMENT — PAIN DESCRIPTION - LOCATION: LOCATION: ARM;HEAD

## 2024-01-22 ASSESSMENT — PAIN DESCRIPTION - ORIENTATION: ORIENTATION: LEFT;MID

## 2024-01-22 ASSESSMENT — PAIN DESCRIPTION - FREQUENCY: FREQUENCY: INTERMITTENT

## 2024-01-22 ASSESSMENT — ENCOUNTER SYMPTOMS: STRIDOR: 0

## 2024-01-22 NOTE — PROGRESS NOTES
[facility] - Inpatient Rehabilitation Department   Phone: [phone]    Physical Therapy    [] Initial Evaluation            [x] Daily Treatment Note         [] Discharge Summary      Patient: Josep Santos   : 1933   MRN: [id number]   Date of Service:  2024  Admitting Diagnosis: Osteomyelitis of right foot (HCC)  Current Admission Summary: Josep Santos is a 89 y.o. male who presents to the emergency room due to worsening right foot infection.  He was seen by his podiatrist earlier today and was advised to come to the emergency department due to this worsening infection concern for osteomyelitis and to be admitted to the hospital.     Seen by podiatry: Recommending podiatric surgical intervention during this admission, possibly TMA.   -Osteomyelitis, right foot  -Possible septic joint, right first metatarsophalangeal joint  -Diabetic foot infection, right foot  -Cellulitis, right foot  -Gout, bilateral lower extremity  -Diabetes mellitus with peripheral neuropathy  Updated : Planning for OR later today for I&D of R foot.   Past Medical History:  has a past medical history of Actinic keratosis, Actinic keratosis, Atrial fibrillation (HCC), Bilateral carotid artery stenosis, CAD (coronary artery disease), Cellulitis, Diabetes mellitus (HCC), DM (diabetes mellitus), type 2 with peripheral vascular complications ([facility])--s/p amputation great toe post osteomylitis , Glucose intolerance (malabsorption), Gout, Hyperlipidemia, Hypertension, Hypertrophy of prostate without urinary obstruction and other lower urinary tract symptoms (LUTS), Intermittent atrial fibrillation (HCC), Kidney stone, Occult blood in stool, and Pacemaker.  Past Surgical History:  has a past surgical history that includes Tonsillectomy and adenoidectomy; Appendectomy; Kidney stone surgery (); Coronary artery bypass graft (); Cardiac catheterization (); pacemaker placement (); other surgical  then present to assist. Additional stand completed from recliner chair up to STEDY for transfer to [facility]. Patient transferred back to bed at end of session due to fatigue.  Ambulation:  Ambulation not tested on this date secondary to poor standing posture and balance, significant L arm pain/weakness.  Distance:  Gait Mechanics:  Comments:    Stair Mobility:  Stair mobility not completed on this date.  Comments:  Wheelchair Mobility:  No w/c mobility completed on this date.  Comments:  Balance:  Static Sitting Balance: good: independent with functional balance in unsupported position  Dynamic Sitting Balance: fair: maintains balance at CGA without use of UE support  Static Standing Balance: poor (-): requires max (A) to maintain balance  Comments:    Other Therapeutic Interventions  Seated Therapeutic Exercises:   Alternating Marching x10 reps total   LAQ x10 reps total     Functional Outcomes  AM-PAC Inpatient Mobility Raw Score : 8              Cognition  WFL  Comments: RN reports that patient is starting to develop hospital acquired delirium. Patient is very [place], making it difficult to assess cognition this date   Orientation:    alert and oriented x 4  Command Following:   WFL    Education  Barriers To Learning: hearing  Patient Education: patient educated on goals, PT role and benefits, plan of care, precautions, weight-bearing education, general safety, functional mobility training, transfer training, discharge recommendations  Learning Assessment:  patient verbalizes understanding, would benefit from continued reinforcement    Assessment  Activity Tolerance: Poor; patient is limited by poor standing tolerance/balance and significant L arm pain/weakness.   Impairments Requiring Therapeutic Intervention: decreased functional mobility, decreased strength, decreased safety awareness, decreased endurance, decreased balance  Prognosis: good  Clinical Assessment: Patient is 89 y/o male presenting to [facility] secondary to

## 2024-01-22 NOTE — PROGRESS NOTES
[facility] - Inpatient Rehabilitation Department   Phone: [phone]    Occupational Therapy    [] Initial Evaluation            [x] Daily Treatment Note         [] Discharge Summary      Patient: Josep Santos   : 1933   MRN: [id number]   Date of Service:  2024    Admitting Diagnosis:  Osteomyelitis of right foot (HCC)  Current Admission Summary: Josep Santos is a 89 y.o. male who presents to the emergency room due to worsening right foot infection.  He was seen by his podiatrist earlier today and was advised to come to the emergency department due to this worsening infection concern for osteomyelitis and to be admitted to the hospital.     Seen by podiatry: Recommending podiatric surgical intervention during this admission, possibly TMA.   -Osteomyelitis, right foot  -Possible septic joint, right first metatarsophalangeal joint  -Diabetic foot infection, right foot  -Cellulitis, right foot  -Gout, bilateral lower extremity  -Diabetes mellitus with peripheral neuropathy    X-ray left elbow 24:  IMPRESSION:  1. Soft tissue swelling along the left elbow with an associated elbow  effusion.  No discrete fracture is identified.  Follow-up radiographs in 7-10  days may be of benefit if there is concern for an occult fracture.    24--Patient will undergo podiatric surgical intervention consisting of right foot incision and drainage with carbone arthroplasty tomorrow afternoon     Past Medical History:  has a past medical history of Actinic keratosis, Actinic keratosis, Atrial fibrillation ([facility]), Bilateral carotid artery stenosis, CAD (coronary artery disease), Cellulitis, Diabetes mellitus ([facility]), DM (diabetes mellitus), type 2 with peripheral vascular complications ([facility])--s/p amputation great toe post osteomylitis , Glucose intolerance (malabsorption), Gout, Hyperlipidemia, Hypertension, Hypertrophy of prostate without urinary obstruction and other lower urinary tract

## 2024-01-22 NOTE — ANESTHESIA POSTPROCEDURE EVALUATION
Department of Anesthesiology  Postprocedure Note    Patient: Josep Santos  MRN: [id number]  YOB: 1933  Date of evaluation: 1/22/2024    Procedure Summary       Date: 01/22/24 Room / Location: [address]    Anesthesia Start: 1621 Anesthesia Stop: 1722    Procedure: FOOT DEBRIDEMENT INCISION AND DRAINAGE, HEAD ARTHROPLASTY (Right: Foot) Diagnosis:       Acute hematogenous osteomyelitis of right foot (HCC)      (Acute hematogenous osteomyelitis of right foot (HCC) [M86.071])    Surgeons: Frank Jacob DPM Responsible Provider: Elidia Keane MD    Anesthesia Type: MAC ASA Status: 4            Anesthesia Type: No value filed.    Elysia Phase I: Elysia Score: 8    Elysia Phase II:      Anesthesia Post Evaluation    Patient location during evaluation: PACU  Patient participation: complete - patient participated  Level of consciousness: awake  Airway patency: patent  Nausea & Vomiting: no vomiting  Cardiovascular status: hemodynamically stable  Respiratory status: acceptable  Hydration status: euvolemic  There was medical reason for not using a multimodal analgesia pain management approach.Pain management: adequate    No notable events documented.

## 2024-01-22 NOTE — PROGRESS NOTES
[facility]  Cardiology Note  [phone]      Chief Complaint   Patient presents with    Foot Injury     Pt presents with a possible infection in the rt foot near the base of the great toe. Pt was in the wound care clinic today and fresh packing placed and wound drained and debrided. Pt sent in for admission for IV antibiotics.         History of Present Illness:  Josep Santos is a 89 y.o. patient PMHx CAD s/p CABG and PCI in 2021, AF/SSS s/p PPM, ICM with recovered EF, paradoxical low flow low gradient severe AS (declined AVR) who presents with foot infection.    Cardiology is consulted for tachycardia.  Patient is in atypical flutter with a LBBB pattern for which his metoprolol has been increased with improved HR control. No cardiac complaints today but reports R elbow and wrist pain and swelling with tenderness to touch.    Past Medical History:   has a past medical history of Actinic keratosis, Actinic keratosis, Atrial fibrillation (HCC), Bilateral carotid artery stenosis, CAD (coronary artery disease), Cellulitis, Diabetes mellitus (HCC), DM (diabetes mellitus), type 2 with peripheral vascular complications (HCC)--s/p amputation great toe post osteomylitis , Glucose intolerance (malabsorption), Gout, Hyperlipidemia, Hypertension, Hypertrophy of prostate without urinary obstruction and other lower urinary tract symptoms (LUTS), Intermittent atrial fibrillation (HCC), Kidney stone, Occult blood in stool, and Pacemaker.    Surgical History:   has a past surgical history that includes Tonsillectomy and adenoidectomy; Appendectomy; Kidney stone surgery (1980); Coronary artery bypass graft (1996); Cardiac catheterization (2003); pacemaker placement (2005); other surgical history (Right, 07/17/2015); Abscess Drainage (07/20/2015); Toe amputation (Right, 07/16/2015); Colonoscopy (03/28/2018); pr esophagogastroduodenoscopy transoral diagnostic (N/A, 11/21/2018); and Tooth Extraction (09/2023).

## 2024-01-22 NOTE — ANESTHESIA PRE PROCEDURE
Department of Anesthesiology  Preprocedure Note       Name:  Josep Santos   Age:  89 y.o.  :  1933                                          MRN:  [id number]         Date:  2024      Surgeon: Surgeon(s):  Frank Jacob DPM    Procedure: Procedure(s):  FOOT DEBRIDEMENT INCISION AND DRAINAGE, HEAD ARTHROPLASTY    Medications prior to admission:   Prior to Admission medications    Medication Sig Start Date End Date Taking? Authorizing Provider   predniSONE (DELTASONE) 10 MG tablet Take 1 tablet by mouth daily   Yes Jenaro Louise MD   JARDIANCE 10 MG tablet TAKE 1 TABLET BY MOUTH DAILY 24   Raul Cuevas MD   amoxicillin-clavulanate (AUGMENTIN) 875-125 MG per tablet Take 1 tablet by mouth 2 times daily For 10 days    Jenaro Louise MD   amiodarone (CORDARONE) 200 MG tablet TAKE 1 TABLET BY MOUTH DAILY 24   Rene Diop MD   Torsemide 40 MG TABS 20 mg five days of the week and 40 mg 2 days  Patient taking differently: Take 20-40 mg by mouth See Admin Instructions 20 mg five days of the week and 40 mg on Thursday and Andres 1/10/24   Kathie Shore, APRN - CNS   metoprolol succinate (TOPROL XL) 50 MG extended release tablet TAKE 1 AND 1/2 TABLETS BY MOUTH IN THE MORNING AND AT BEDTIME  Patient taking differently: Take 1.5 tablets by mouth 2 times daily Take 1 1/2 tabs by mouth in the morning and at bedtime per Dr. Mayers 24   Kathie Shore, APRN - CNS   busPIRone (BUSPAR) 5 MG tablet TAKE 1 TABLET BY MOUTH TWICE DAILY 24   Raul Cuevas MD   azelastine (ASTELIN) 0.1 % nasal spray 1 spray by Nasal route 2 times daily Use in each nostril as directed  Patient not taking: Reported on 23   Tri, Quique MERCHANT MD   melatonin 3 MG TABS tablet Take 1 tablet by mouth nightly    Jenaro Louise MD   atorvastatin (LIPITOR) 20 MG tablet Take 1 tablet by mouth daily 23   Rene Diop MD   ferrous sulfate (IRON 325) 325

## 2024-01-22 NOTE — PROGRESS NOTES
Phase 1 complete, pt seen by anesthesiologist. VSS, pt resting comfortably. R foot dressing is CDI, ice applied.  Will transfer to 3A, family updated.

## 2024-01-22 NOTE — PROGRESS NOTES
Shift assessment completed, VSS, scheduled mediations given per MAR, pt complained of pain rated 9/10, PRN pain meds given per MAR order, daughter bedside. All pt and daughter questions asked and answered, pt and daughter both verbalized understanding. Breaks locked, bed in lowest position and call light within reach.

## 2024-01-22 NOTE — PROGRESS NOTES
Hospitalist Progress Note      PCP: Raul Cuevas MD    Date of Admission: 1/17/2024    LOS: 5    Chief Complaint:   Chief Complaint   Patient presents with    Foot Injury     Pt presents with a possible infection in the rt foot near the base of the great toe. Pt was in the wound care clinic today and fresh packing placed and wound drained and debrided. Pt sent in for admission for IV antibiotics.        Case Summary:   89-year-old with history of CAD, type 2 diabetes, atrial fibrillation, gout who presented with right foot erythema and wound oozing with concern for osteomyelitis and possible first MTP Septic arthritis  CT of the foot noted for soft tissue ulceration with first MTP joint septic arthritis      Active Hospital Problems    Diagnosis Date Noted    CAD (coronary artery disease) CABG x3 1996, stenting PDA 10/2021 [I25.10] 05/11/2011     Priority: High    Chronic combined systolic and diastolic congestive heart failure (HCC) [I50.42] 09/27/2022     Priority: Medium    Atrial fibrillation (HCC) [I48.91] 09/27/2022     Priority: Medium    Mixed hyperlipidemia [E78.2] 07/28/2022     Priority: Medium    Left elbow pain [M25.522] 01/20/2024    MRSA infection (methicillin-resistant Staphylococcus aureus) [A49.02] 01/19/2024    Infection requiring contact isolation precautions [B99.9] 01/19/2024    Osteomyelitis of right foot (HCC) [M86.9] 01/17/2024    Acute osteomyelitis of phalanx of right foot (HCC) [M86.171] 01/17/2024    CRP elevated [R79.82] 01/17/2024    Elevated erythrocyte sedimentation rate [R70.0] 01/17/2024    Iron deficiency anemia [D50.9] 08/11/2023    Chronic renal impairment, stage 3 (moderate) ([facility]) [N18.30] 12/30/2016    Hypertension [I10] 09/11/2015    Type 2 diabetes mellitus with hyperglycemia, without long-term current use of insulin ([facility]) [E11.65] 09/11/2015    Benign prostatic hyperplasia with nocturia [N40.1, R35.1] 05/11/2011         Principal Problem:    Acute  metatarsal head on the right,   with suspected superimposed septic arthritis of the right 1st MTP joint.   3. Extensive, diffuse muscle atrophy.         XR FOOT RIGHT (MIN 3 VIEWS)   Final Result   Lucency and cortical destruction consistent with acute osteomyelitis   throughout the 1st metatarsal head, at the lateral aspect of the base of the   1st proximal phalanx and at the medial aspect of the 2nd metatarsal head with   adjacent soft tissue swelling.  No subcutaneous air is noted.         VL Extremity Venous Left    (Results Pending)           Elidia Burns MD      Please excuse brevity and/or typos. This report was transcribed using voice recognition software. Every effort was made to ensure accuracy, however, inadvertent computerized transcription errors may be present.

## 2024-01-22 NOTE — PLAN OF CARE
Problem: Discharge Planning  Goal: Discharge to home or other facility with appropriate resources  1/22/2024 1022 by Deysi Reyes RN  Outcome: Progressing  1/22/2024 0138 by Diana Romero RN  Outcome: Progressing     Problem: Safety - Adult  Goal: Free from fall injury  1/22/2024 1022 by Deysi Reyes RN  Outcome: Progressing  Flowsheets (Taken 1/22/2024 1021)  Free From Fall Injury:   Instruct family/caregiver on patient safety   Based on caregiver fall risk screen, instruct family/caregiver to ask for assistance with transferring infant if caregiver noted to have fall risk factors  1/22/2024 0138 by Diana Romero RN  Outcome: Progressing     Problem: ABCDS Injury Assessment  Goal: Absence of physical injury  1/22/2024 1022 by Deysi Reyes RN  Outcome: Progressing  Flowsheets (Taken 1/22/2024 1021)  Absence of Physical Injury: Implement safety measures based on patient assessment  1/22/2024 0138 by Diana Romero RN  Outcome: Progressing     Problem: Pain  Goal: Verbalizes/displays adequate comfort level or baseline comfort level  1/22/2024 1022 by Deysi Reyes RN  Outcome: Progressing  1/22/2024 0138 by Diana Romero RN  Outcome: Progressing     Problem: Skin/Tissue Integrity  Goal: Absence of new skin breakdown  Description: 1.  Monitor for areas of redness and/or skin breakdown  2.  Assess vascular access sites hourly  3.  Every 4-6 hours minimum:  Change oxygen saturation probe site  4.  Every 4-6 hours:  If on nasal continuous positive airway pressure, respiratory therapy assess nares and determine need for appliance change or resting period.  1/22/2024 1022 by Deysi Reyes RN  Outcome: Progressing  1/22/2024 0138 by Diana Romero RN  Outcome: Progressing     Problem: Chronic Conditions and Co-morbidities  Goal: Patient's chronic conditions and co-morbidity symptoms are monitored and maintained or improved  Outcome: Progressing

## 2024-01-22 NOTE — BRIEF OP NOTE
Brief Postoperative Note      Patient: Josep Santos  YOB: 1933  MRN: [id number]    Date of Procedure: 1/22/2024    Pre-Op Diagnosis Codes:     * Acute hematogenous osteomyelitis of right foot (HCC) [M86.071]    Post-Op Diagnosis: Same       Procedure(s):  FOOT DEBRIDEMENT INCISION AND DRAINAGE, CARBONE ARTHROPLASTY    Surgeon(s):  Frank Jacob DPM    Assistant:  First Assistant: Grazyna Rider  Resident: Joseph Espinoza, PGY-2    Anesthesia: Local anesthesia     Hemostasis: anatomic dissection and electrocautery    Injectables: Pre-Op 20 cc of a 1:1 mixture of 1% lidocaine plain and 0.5% marcaine plain, intra op 10cc of 0.5% marcaine plain, and Post-Op 10 cc of 0.5 % Marcaine plain    Materials: 3-0 Vicryl, 3-0 Nylon    Estimated Blood Loss (mL): Minimal    Complications: None    Specimens:   ID Type Source Tests Collected by Time Destination   1 : 1) RIGHT FOOT DEEP TISSUE AEROBIC, ANAEROBIC, ACID FAST, FUNGAL GRAM STAIN Specimen Foot CULTURE, FUNGUS, CULTURE, CSF, CULTURE WITH SMEAR, ACID FAST BACILLIUS, CULTURE, ANAEROBIC AND AEROBIC Frank Jacob DPM 1/22/2024 1638    A : Gouty Tophi Specimen Foot SURGICAL PATHOLOGY Frank Jacob DPM 1/22/2024 1654        Implants:  * No implants in log *      Drains: * No LDAs found *    Findings: Significant amount of gouty tophi expressed from 1st MPJ. Purulence was expressed upon osteotomy of 1st metatarsal. Procedure went as planned.    DISPO: S/p right foot I&D with carbone arthroplasty. Procedure felt to be definitive. Partial weightbearing with heel touch only to RLE. Continue IV antibiotics per ID. Right foot XR ordered. Surgical path and culture obtained. Okay to resume warfarin. Patient stable for discharge pending medical clearance, final ID recs, and placement.    Joseph Espinoza DPM  Podiatric Resident PGY-2  Pager [phone] or Perfect Serve

## 2024-01-22 NOTE — PROGRESS NOTES
[facility] Note    Patient Active Problem List   Diagnosis    CAD (coronary artery disease) CABG x3 1996, stenting PDA 10/2021    Cardiac pacemaker    Benign prostatic hyperplasia with nocturia    Gout-uric acid >7.5--advised proph tx    Hypercholesteremia    Carotid bruit(bilat-nl duplex u/s 10/10)    Vitamin D deficiency-(advised 1000IU/day)    Actinic keratoses    Elevated PSA-(was 4.2 10/10-repeat 6 mo)(was 5.12 1/11-then 4.4 2/12)-sees dr aguillon for this    S/P colonoscopy-4/07-neg per pt,  3/18 repeat colonoscopy polyp-repeat 5 yr    Hearing loss, conductive, bilateral-seeing ent-dr quinn for hearing aides    Encounter for monitoring sotalol therapy    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 Emerson     Chronic anemia    Type 2 diabetes mellitus with hyperglycemia, without long-term current use of insulin (HCC)    Hypertension    Chronic renal impairment, stage 3 (moderate) ([facility])    Hyperkalemia    H/O esophagogastroduodenoscopy  11/18  prominent vessesls vs varices. dr Galindo (done for blood in stool)    Elevated ferritin  - work up Dr. Escobar  genetic screen hemachromatosis negative. 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    Ischemic cardiomyopathy; EF 40-45% 4/22    Chronic pansinusitis    Nonrheumatic aortic valve stenosis- moderate by echo 4/22    Acute on chronic systolic heart failure (HCC)    Mixed hyperlipidemia    Atrial fibrillation (HCC)    Chronic combined systolic and diastolic congestive heart failure (HCC)    Benign essential tremor    Anemia in chronic renal disease    Current moderate episode of major depressive disorder without prior episode (HCC)    Weight loss    Psychophysiological insomnia    Anemia    Iron deficiency anemia    Sundowning    Sleep disturbance    Osteomyelitis of right foot (HCC)    Acute osteomyelitis of phalanx of right foot (HCC)    CRP elevated    Elevated erythrocyte sedimentation rate    MRSA infection (methicillin-resistant Staphylococcus aureus)

## 2024-01-22 NOTE — PROGRESS NOTES
Pre-Operative Note  Resident Note     Patient: Josep Santos     Procedure: Right foot incision and drainage with Travis arthroplasty    Clearance for surgery: Yes, per Internal Medicine    Consent: Procedure was discussed at length with patient and all questions were answered to completion, consent obtained and placed in chart     Labs:        Recent Labs     01/21/24  1238 01/22/24  0614   WBC 16.1* 18.6*   HGB 7.6* 7.7*   HCT 22.8* 22.9*   MCV 97.0 97.2    271        Recent Labs     01/21/24  0655 01/22/24  0614    139   K 4.0 3.8    105   CO2 23 21   PHOS 3.1 4.1   BUN 44* 52*   CREATININE 1.8* 2.0*      No results for input(s): \"AST\", \"ALT\", \"ALB\", \"BILIDIR\", \"BILITOT\", \"ALKPHOS\" in the last 72 hours.   No results for input(s): \"LIPASE\", \"AMYLASE\" in the last 72 hours.     Recent Labs     01/20/24  1300 01/21/24  0655 01/22/24  0614   INR 1.19* 1.35* 1.34*   APTT 42.4*  --   --       No results for input(s): \"CKTOTAL\", \"CKMB\", \"CKMBINDEX\", \"TROPONINI\" in the last 72 hours.    Pre-op Medications:   Current Facility-Administered Medications   Medication Dose Route Frequency Provider Last Rate Last Admin    metoprolol succinate (TOPROL XL) extended release tablet 100 mg  100 mg Oral BID Elidia Burns MD   100 mg at 01/21/24 2046    HYDROcodone-acetaminophen (NORCO) 5-325 MG per tablet 1 tablet  1 tablet Oral Q6H PRN Elidia Burns MD   1 tablet at 01/21/24 1431    doxazosin (CARDURA) tablet 1 mg  1 mg Oral Daily Bartolo Grubbs MD   1 mg at 01/21/24 0845    [Held by provider] heparin (porcine) injection 4,000 Units  4,000 Units IntraVENous PRN Elidia Burns MD   4,000 Units at 01/20/24 1855    [Held by provider] heparin (porcine) injection 2,000 Units  2,000 Units IntraVENous PRN Elidia Burns MD   2,000 Units at 01/22/24 0313    [Held by provider] heparin 25,000 units in dextrose 5% 250 mL (premix) infusion  5-30 Units/kg/hr IntraVENous Continuous [place]  MD Elidia 16.2 mL/hr at 01/22/24 0314 22 Units/kg/hr at 01/22/24 0314    ascorbic acid (VITAMIN C) tablet 500 mg  500 mg Oral Daily Carlos Barger MD   500 mg at 01/21/24 0845    epoetin nader-epbx (RETACRIT) injection 10,000 Units  10,000 Units SubCUTAneous Q14 Days Elidia Burns MD   10,000 Units at 01/18/24 1648    fluticasone (FLONASE) 50 MCG/ACT nasal spray 2 spray  2 spray Each Nostril Daily Elidia Burns MD   2 spray at 01/20/24 0906    melatonin tablet 3 mg  3 mg Oral Nightly PRN Daylin Frausto APRN - NP   3 mg at 01/20/24 0104    amiodarone (CORDARONE) tablet 200 mg  200 mg Oral Daily Kyle Mercado DO   200 mg at 01/21/24 0845    atorvastatin (LIPITOR) tablet 20 mg  20 mg Oral Daily Kyle Mercado DO   20 mg at 01/21/24 0845    busPIRone (BUSPAR) tablet 5 mg  5 mg Oral BID Kyle Mercado DO   5 mg at 01/21/24 2046    sennosides-docusate sodium (SENOKOT-S) 8.6-50 MG tablet 1 tablet  1 tablet Oral Daily PRN Kyle Mercado DO   1 tablet at 01/18/24 0909    thiamine mononitrate tablet 100 mg  100 mg Oral Daily Kyle Mercado DO   100 mg at 01/21/24 0845    sodium chloride flush 0.9 % injection 5-40 mL  5-40 mL IntraVENous 2 times per day Kyle Mercado DO   10 mL at 01/21/24 0849    sodium chloride flush 0.9 % injection 5-40 mL  5-40 mL IntraVENous PRN Kyle Mercado DO        0.9 % sodium chloride infusion   IntraVENous PRN Kyle Mercado DO        ondansetron (ZOFRAN-ODT) disintegrating tablet 4 mg  4 mg Oral Q8H PRN Kyle Mercado DO        Or    ondansetron (ZOFRAN) injection 4 mg  4 mg IntraVENous Q6H PRN Kyle Mercado DO        polyethylene glycol (GLYCOLAX) packet 17 g  17 g Oral Daily PRN Kyle Mercado DO   17 g at 01/18/24 0909    acetaminophen (TYLENOL) tablet 650 mg  650 mg Oral Q6H PRN Kyle Mercado DO   650 mg at 01/21/24 0656    Or    acetaminophen (TYLENOL) suppository 650 mg  650 mg Rectal Q6H PRN Kyle Mercado DO        dextrose bolus 10% 125

## 2024-01-22 NOTE — PROGRESS NOTES
PM assessment complete and documented. Meds given per MAR. External cath in place. Right foot wrapped in ACE bandage. Anti Xa noted. Half bolus of 2,000 given and rated increased to 20 units/kg/hr per protocol. Next anti-Xa due around 0200. Will continue to monitor,.

## 2024-01-22 NOTE — PROGRESS NOTES
Pharmacy to Dose Warfarin    Pharmacy consulted to dose warfarin for Afib.    INR Goal: 2-3    INR today: 1.34    Home dose: 2.5 mg daily except 1.25 on Wed    Assessment/Plan:  - patient going to OR today for podiatry surgical intervention   - continue to hold warfarin, was being bridged with heparin drip  - will continue to monitor for OK to resume warfarin      Pharmacy will continue to follow.    Maddy Garza, PharmD, BCPS  [phone]  1/22/2024 11:00 AM          Updated referral received from patient's new PCP.  Pt is already scheduled with [facility] on 7/22/21. Tracker updated and appt linked.

## 2024-01-22 NOTE — PROGRESS NOTES
Pt arrived to PACU from OR, VSS, pt arouses to voice. R foot dressing is CDI, ice applied. Will continue to monitor.

## 2024-01-22 NOTE — PROGRESS NOTES
Vascular Progress Note    1/22/2024 8:55 AM    Chief complaint / Reason for visit : right foot wound    Subjective:  Patient resting in bed.  He is complaining of left arm and elbow pain and swelling.  VSS, afebrile.     Vital Signs: /60   Pulse (!) 115   Temp 97.7 °F (36.5 °C) (Oral)   Resp 22   Ht 1.778 m (5' 10\")   Wt 72.8 kg (160 lb 7.9 oz)   SpO2 95%   BMI 23.03 kg/m²      I/O:    Intake/Output Summary (Last 24 hours) at 1/22/2024 0855  Last data filed at 1/21/2024 1448  Gross per 24 hour   Intake --   Output 300 ml   Net -300 ml       Physical Exam:   General: no apparent distress, appears stated age  Chest/Lungs: no accessory muscle use  Cardiac:  irregular rhythm  Vascular:  1+ left radial pulse  Extremities: left arm warm, swelling noted and pain to touch.   Right foot wound with dressing intact.    Labs:   Lab Results   Component Value Date/Time     01/22/2024 06:14 AM    K 3.8 01/22/2024 06:14 AM    K 4.6 01/17/2024 02:49 PM     01/22/2024 06:14 AM    CO2 21 01/22/2024 06:14 AM    BUN 52 01/22/2024 06:14 AM    CREATININE 2.0 01/22/2024 06:14 AM    GFRAA >60 10/03/2022 04:38 AM    GFRAA >60 06/05/2013 08:30 AM    LABGLOM 31 01/22/2024 06:14 AM    GLUCOSE 169 01/22/2024 06:14 AM    PHOS 4.1 01/22/2024 06:14 AM    MG 2.00 01/20/2024 06:50 AM    CALCIUM 8.5 01/22/2024 06:14 AM     Lab Results   Component Value Date/Time    WBC 18.6 01/22/2024 06:14 AM    RBC 2.36 01/22/2024 06:14 AM    HGB 7.7 01/22/2024 06:14 AM    HCT 22.9 01/22/2024 06:14 AM    MCV 97.2 01/22/2024 06:14 AM    RDW 20.5 01/22/2024 06:14 AM     01/22/2024 06:14 AM     Lab Results   Component Value Date    INR 1.34 (H) 01/22/2024    PROTIME 16.5 (H) 01/22/2024        Imaging:    BLE arterial duplex 1/18/24:   Conclusions      Summary      ABIs are non-diagnostic, bilaterally, due to arterial calcification      -Occlusion of the right distal PTA artery in the right lower extremity, that   recanalizes distally via

## 2024-01-22 NOTE — PROGRESS NOTES
Infectious Diseases   Progress Note      Admission Date: 1/17/2024  Hospital Day: Hospital Day: 6   Attending: Elidia Burns MD  Date of service: 1/22/2024     Chief complaint/ Reason for consult:     Complicated right diabetic foot infection with concern for osteomyelitis involving the right hallux  Longstanding type 2 diabetes mellitus  Pacemaker in place  Diabetic polyneuropathy type II    Microbiology:        I have reviewed allavailable micro lab data and cultures    Blood culture (2/2) - collected on 1/17/2024: Negative so far  Right foot wound culture: Collected on 1/19/2024: MRSA    Susceptibility       Methicillin-Resistant Staphylococcus aureus     BACTERIAL SUSCEPTIBILITY PANEL BY MIRIAM     clindamycin <=0.25 mcg/mL Sensitive     erythromycin >=8 mcg/mL Resistant     oxacillin >=4 mcg/mL Resistant     tetracycline <=1 mcg/mL Sensitive     trimethoprim-sulfamethoxazole >=320 mcg/mL Resistant     vancomycin 1 mcg/mL Sensitive               Antibiotics and immunizations:       Current antibiotics: All antibiotics and their doses were reviewed by me    Recent Abx Admin                     cefepime (MAXIPIME) 1,000 mg in sodium chloride 0.9 % 50 mL IVPB (mini-bag) (mg) 1,000 mg New Bag 01/22/24 0326     1,000 mg New Bag 01/21/24 1427    DAPTOmycin (CUBICIN) 450 mg in sodium chloride 0.9 % 50 mL IVPB (mg) 450 mg New Bag 01/21/24 1901                      Immunization History: All immunization history was reviewed by me today.    Immunization History   Administered Date(s) Administered    COVID-19, PFIZER Bivalent, DO NOT Dilute, (age 12y+), IM, 30 mcg/0.3 mL 09/26/2022, 05/15/2023    COVID-19, PFIZER GRAY top, DO NOT Dilute, (age 12 y+), IM, 30 mcg/0.3 mL 07/16/2022    COVID-19, PFIZER PURPLE top, DILUTE for use, (age 12 y+), 30mcg/0.3mL 01/20/2021, 02/10/2021, 11/03/2021    COVID-19, PFIZER, (2023-24 formula), (age 12y+), IM, 30mcg/0.3mL 09/26/2023    Influenza Vaccine, unspecified formulation  detail about the side-effects of various antibiotics and things to watch for like new rashes, lip swelling, severe reaction, worsening diarrhea, break through fever etc.  Discussed patient's condition and what to expect. All of the patient's questions were addressed in a satisfactory manner and patient verbalized understanding all instructions.      Level of complexity of visit and medical decision making: High     Risk of Complications/Morbidity: High     Illness(es)/ Infection present that pose threat to bodily function.   There is potential for severe exacerbation of infection/side effects of treatment.  Therapy requires intensive monitoring for antimicrobial agent toxicity.     TIME SPENT TODAY:     - I did a detailed face-to-face evaluation of the patient including interval history and review of systems from patient/available family members/RN/patient care team and did a thorough bedside examination of the patient today.  I spent over  36 minutes today on this complex inpatient encounter (including interval history, physical exam, detailed review of data including labs, cultures, imaging studies and independent interpretation of those, development and implementation of a thorough treatment plan and coordination of complex care). More than 50 percent of this time includes the floor/unit time spent for counseling and coordination of care  including discussions with patient/family/ nursing staff/providers, calls to other physicians or providers, and tasks such as reviewing the medical record in detail/case management discussions and care coordination, interpretation and documentation of clinical findings, arranging subsequent follow up of ordered medications, labs and other testing, placing referrals and communication with patient care teams.       Thank you for involving me in the care of your patient. I will continue to follow. If you have anyadditional questions, please do not hesitate to contact me.

## 2024-01-22 NOTE — DISCHARGE INSTRUCTIONS
[facility]  [address] [facility] [address]   [phone]     Dr. Frank Jacob                                             Post Operative Instructions    1.  Have Prescriptions filled and take as directed.  All medications should be taken with food or milk.    2.  Keep foot elevated six inches above the level of the heart.  Support feet, legs, and knees with pillows.    3.  KEEP FOOT ELEVATED AS MUCH AS POSSIBLE UNTIL YOUR NEXT VISIT    4.  Place an ice pack on the bandaged site for 15 minutes every hour while awake    5.  Keep dressing clean, dry, and intact.  DO NOT REMOVE DRESSING.  CALL THE OFFICE IF BANDAGE COMES OFF.    6.  For the first 7 days after surgery, take temperature by mouth three times a day.  Call the office if greater than 101F.    7.  Ambulate with heel weightbearing to the right lower extremity with surgical shoe or crutches / walker.    8.  All instructions are to be followed until otherwise instructed by your surgeon.    9.  Call our office if you have any concerns or questions which arise.  Our phones are answered 24 hours a day.  [phone]    10.  Your first post-operative appointment is scheduled, call the office for appointment date and time if not known prior to today.

## 2024-01-23 ENCOUNTER — APPOINTMENT (OUTPATIENT)
Dept: GENERAL RADIOLOGY | Age: 89
DRG: 622 | End: 2024-01-23
Payer: MEDICARE

## 2024-01-23 ENCOUNTER — APPOINTMENT (OUTPATIENT)
Dept: CT IMAGING | Age: 89
DRG: 622 | End: 2024-01-23
Payer: MEDICARE

## 2024-01-23 PROBLEM — J81.0 ACUTE PULMONARY EDEMA (HCC): Status: ACTIVE | Noted: 2024-01-23

## 2024-01-23 LAB
ALBUMIN SERPL-MCNC: 3.1 G/DL (ref 3.4–5)
AMMONIA PLAS-SCNC: 22 UMOL/L (ref 16–60)
ANION GAP SERPL CALCULATED.3IONS-SCNC: 15 MMOL/L (ref 3–16)
ANTI-XA UNFRAC HEPARIN: 0.23 IU/ML (ref 0.3–0.7)
ANTI-XA UNFRAC HEPARIN: <0.1 IU/ML (ref 0.3–0.7)
BASE EXCESS BLDA CALC-SCNC: -2.7 MMOL/L (ref -3–3)
BASE EXCESS BLDV CALC-SCNC: -4.5 MMOL/L (ref -3–3)
BASOPHILS # BLD: 0 K/UL (ref 0–0.2)
BASOPHILS NFR BLD: 0.2 %
BLOOD BANK DISPENSE STATUS: NORMAL
BLOOD BANK PRODUCT CODE: NORMAL
BPU ID: NORMAL
BUN SERPL-MCNC: 64 MG/DL (ref 7–20)
CALCIUM SERPL-MCNC: 8.5 MG/DL (ref 8.3–10.6)
CHLORIDE SERPL-SCNC: 105 MMOL/L (ref 99–110)
CK SERPL-CCNC: 111 U/L (ref 39–308)
CO2 BLDA-SCNC: 50.1 MMOL/L
CO2 BLDV-SCNC: 45 MMOL/L
CO2 SERPL-SCNC: 19 MMOL/L (ref 21–32)
COHGB MFR BLDA: 3.2 % (ref 0–1.5)
COHGB MFR BLDV: 4.5 % (ref 0–1.5)
CREAT SERPL-MCNC: 2.4 MG/DL (ref 0.8–1.3)
DEPRECATED RDW RBC AUTO: 21 % (ref 12.4–15.4)
DESCRIPTION BLOOD BANK: NORMAL
EOSINOPHIL # BLD: 0 K/UL (ref 0–0.6)
EOSINOPHIL NFR BLD: 0 %
FERRITIN SERPL IA-MCNC: 2138 NG/ML (ref 30–400)
GFR SERPLBLD CREATININE-BSD FMLA CKD-EPI: 25 ML/MIN/{1.73_M2}
GLUCOSE BLD-MCNC: 246 MG/DL (ref 70–99)
GLUCOSE BLD-MCNC: 254 MG/DL (ref 70–99)
GLUCOSE BLD-MCNC: 265 MG/DL (ref 70–99)
GLUCOSE BLD-MCNC: 275 MG/DL (ref 70–99)
GLUCOSE SERPL-MCNC: 215 MG/DL (ref 70–99)
HCO3 BLDA-SCNC: 21.3 MMOL/L (ref 21–29)
HCO3 BLDV-SCNC: 19 MMOL/L (ref 23–29)
HCT VFR BLD AUTO: 17.4 % (ref 40.5–52.5)
HCT VFR BLD AUTO: 19.5 % (ref 40.5–52.5)
HCT VFR BLD AUTO: 20.7 % (ref 40.5–52.5)
HGB BLD-MCNC: 5.8 G/DL (ref 13.5–17.5)
HGB BLD-MCNC: 6.6 G/DL (ref 13.5–17.5)
HGB BLD-MCNC: 7 G/DL (ref 13.5–17.5)
HGB BLDA-MCNC: 7.8 G/DL (ref 13.5–17.5)
INR PPP: 1.47 (ref 0.84–1.16)
IRON SATN MFR SERPL: 26 % (ref 20–50)
IRON SERPL-MCNC: 29 UG/DL (ref 59–158)
LACTATE BLDV-SCNC: 1.3 MMOL/L (ref 0.4–2)
LOEFFLER MB STN SPEC: NORMAL
LYMPHOCYTES # BLD: 0.6 K/UL (ref 1–5.1)
LYMPHOCYTES NFR BLD: 3.1 %
MCH RBC QN AUTO: 32.7 PG (ref 26–34)
MCHC RBC AUTO-ENTMCNC: 33.8 G/DL (ref 31–36)
MCV RBC AUTO: 96.7 FL (ref 80–100)
METHGB MFR BLDA: 0.6 %
METHGB MFR BLDV: 0.6 %
MONOCYTES # BLD: 1.6 K/UL (ref 0–1.3)
MONOCYTES NFR BLD: 7.8 %
NEUTROPHILS # BLD: 17.7 K/UL (ref 1.7–7.7)
NEUTROPHILS NFR BLD: 88.9 %
NT-PROBNP SERPL-MCNC: ABNORMAL PG/ML (ref 0–449)
O2 CT VFR BLDV CALC: 10 VOL %
O2 THERAPY: ABNORMAL
O2 THERAPY: ABNORMAL
PCO2 BLDA: 32.7 MMHG (ref 35–45)
PCO2 BLDV: 27.8 MMHG (ref 40–50)
PERFORMED ON: ABNORMAL
PH BLDA: 7.42 [PH] (ref 7.35–7.45)
PH BLDV: 7.44 [PH] (ref 7.35–7.45)
PHOSPHATE SERPL-MCNC: 4 MG/DL (ref 2.5–4.9)
PLATELET # BLD AUTO: 296 K/UL (ref 135–450)
PMV BLD AUTO: 9 FL (ref 5–10.5)
PO2 BLDA: 75.4 MMHG (ref 75–108)
PO2 BLDV: 183 MMHG (ref 25–40)
POTASSIUM SERPL-SCNC: 4.2 MMOL/L (ref 3.5–5.1)
PROCALCITONIN SERPL IA-MCNC: 0.96 NG/ML (ref 0–0.15)
PROTHROMBIN TIME: 17.8 SEC (ref 11.5–14.8)
RBC # BLD AUTO: 2.02 M/UL (ref 4.2–5.9)
SAO2 % BLDA: 96.3 %
SAO2 % BLDV: >100 %
SODIUM SERPL-SCNC: 139 MMOL/L (ref 136–145)
TIBC SERPL-MCNC: 113 UG/DL (ref 260–445)
WBC # BLD AUTO: 19.9 K/UL (ref 4–11)

## 2024-01-23 PROCEDURE — 85025 COMPLETE CBC W/AUTO DIFF WBC: CPT

## 2024-01-23 PROCEDURE — 6360000002 HC RX W HCPCS: Performed by: INTERNAL MEDICINE

## 2024-01-23 PROCEDURE — 2580000003 HC RX 258

## 2024-01-23 PROCEDURE — 83550 IRON BINDING TEST: CPT

## 2024-01-23 PROCEDURE — 6370000000 HC RX 637 (ALT 250 FOR IP)

## 2024-01-23 PROCEDURE — C9113 INJ PANTOPRAZOLE SODIUM, VIA: HCPCS

## 2024-01-23 PROCEDURE — 1200000000 HC SEMI PRIVATE

## 2024-01-23 PROCEDURE — 2580000003 HC RX 258: Performed by: INTERNAL MEDICINE

## 2024-01-23 PROCEDURE — 83605 ASSAY OF LACTIC ACID: CPT

## 2024-01-23 PROCEDURE — 85520 HEPARIN ASSAY: CPT

## 2024-01-23 PROCEDURE — 2060000000 HC ICU INTERMEDIATE R&B

## 2024-01-23 PROCEDURE — 99233 SBSQ HOSP IP/OBS HIGH 50: CPT | Performed by: INTERNAL MEDICINE

## 2024-01-23 PROCEDURE — 6360000002 HC RX W HCPCS

## 2024-01-23 PROCEDURE — 36415 COLL VENOUS BLD VENIPUNCTURE: CPT

## 2024-01-23 PROCEDURE — 2500000003 HC RX 250 WO HCPCS: Performed by: INTERNAL MEDICINE

## 2024-01-23 PROCEDURE — 82728 ASSAY OF FERRITIN: CPT

## 2024-01-23 PROCEDURE — 30233N1 TRANSFUSION OF NONAUTOLOGOUS RED BLOOD CELLS INTO PERIPHERAL VEIN, PERCUTANEOUS APPROACH: ICD-10-PCS | Performed by: STUDENT IN AN ORGANIZED HEALTH CARE EDUCATION/TRAINING PROGRAM

## 2024-01-23 PROCEDURE — 83880 ASSAY OF NATRIURETIC PEPTIDE: CPT

## 2024-01-23 PROCEDURE — 6370000000 HC RX 637 (ALT 250 FOR IP): Performed by: NURSE PRACTITIONER

## 2024-01-23 PROCEDURE — 82803 BLOOD GASES ANY COMBINATION: CPT

## 2024-01-23 PROCEDURE — 36430 TRANSFUSION BLD/BLD COMPNT: CPT

## 2024-01-23 PROCEDURE — 85018 HEMOGLOBIN: CPT

## 2024-01-23 PROCEDURE — 80069 RENAL FUNCTION PANEL: CPT

## 2024-01-23 PROCEDURE — 71046 X-RAY EXAM CHEST 2 VIEWS: CPT

## 2024-01-23 PROCEDURE — 83540 ASSAY OF IRON: CPT

## 2024-01-23 PROCEDURE — 84145 PROCALCITONIN (PCT): CPT

## 2024-01-23 PROCEDURE — 82140 ASSAY OF AMMONIA: CPT

## 2024-01-23 PROCEDURE — 82550 ASSAY OF CK (CPK): CPT

## 2024-01-23 PROCEDURE — 36600 WITHDRAWAL OF ARTERIAL BLOOD: CPT

## 2024-01-23 PROCEDURE — 85610 PROTHROMBIN TIME: CPT

## 2024-01-23 PROCEDURE — 73200 CT UPPER EXTREMITY W/O DYE: CPT

## 2024-01-23 PROCEDURE — 85014 HEMATOCRIT: CPT

## 2024-01-23 RX ORDER — LINEZOLID 2 MG/ML
600 INJECTION, SOLUTION INTRAVENOUS EVERY 12 HOURS
Status: DISCONTINUED | OUTPATIENT
Start: 2024-01-23 | End: 2024-01-29 | Stop reason: HOSPADM

## 2024-01-23 RX ORDER — COLCHICINE 0.6 MG/1
0.3 TABLET ORAL DAILY
Status: DISCONTINUED | OUTPATIENT
Start: 2024-01-24 | End: 2024-01-29 | Stop reason: HOSPADM

## 2024-01-23 RX ORDER — SODIUM CHLORIDE 9 MG/ML
INJECTION, SOLUTION INTRAVENOUS PRN
Status: DISCONTINUED | OUTPATIENT
Start: 2024-01-23 | End: 2024-01-29 | Stop reason: HOSPADM

## 2024-01-23 RX ORDER — WARFARIN SODIUM 2.5 MG/1
1.25 TABLET ORAL
Status: DISCONTINUED | OUTPATIENT
Start: 2024-01-24 | End: 2024-01-24

## 2024-01-23 RX ORDER — WARFARIN SODIUM 2.5 MG/1
2.5 TABLET ORAL
Status: DISCONTINUED | OUTPATIENT
Start: 2024-01-23 | End: 2024-01-25

## 2024-01-23 RX ADMIN — Medication 100 MG: at 10:24

## 2024-01-23 RX ADMIN — COLCHICINE 0.6 MG: 0.6 TABLET, FILM COATED ORAL at 10:23

## 2024-01-23 RX ADMIN — SODIUM BICARBONATE: 84 INJECTION, SOLUTION INTRAVENOUS at 12:32

## 2024-01-23 RX ADMIN — INSULIN LISPRO 4 UNITS: 100 INJECTION, SOLUTION INTRAVENOUS; SUBCUTANEOUS at 12:27

## 2024-01-23 RX ADMIN — OXYCODONE HYDROCHLORIDE AND ACETAMINOPHEN 500 MG: 500 TABLET ORAL at 10:24

## 2024-01-23 RX ADMIN — BUSPIRONE HYDROCHLORIDE 5 MG: 5 TABLET ORAL at 20:53

## 2024-01-23 RX ADMIN — LINEZOLID 600 MG: 600 INJECTION, SOLUTION INTRAVENOUS at 18:01

## 2024-01-23 RX ADMIN — Medication 10 ML: at 20:53

## 2024-01-23 RX ADMIN — PANTOPRAZOLE SODIUM 40 MG: 40 INJECTION, POWDER, FOR SOLUTION INTRAVENOUS at 10:24

## 2024-01-23 RX ADMIN — INSULIN LISPRO 2 UNITS: 100 INJECTION, SOLUTION INTRAVENOUS; SUBCUTANEOUS at 10:24

## 2024-01-23 RX ADMIN — Medication 10 ML: at 12:03

## 2024-01-23 RX ADMIN — ATORVASTATIN CALCIUM 20 MG: 20 TABLET, FILM COATED ORAL at 10:24

## 2024-01-23 RX ADMIN — HYDROCODONE BITARTRATE AND ACETAMINOPHEN 1 TABLET: 5; 325 TABLET ORAL at 12:24

## 2024-01-23 RX ADMIN — METOPROLOL SUCCINATE 100 MG: 50 TABLET, EXTENDED RELEASE ORAL at 10:23

## 2024-01-23 RX ADMIN — Medication 16 UNITS/KG/HR: at 02:03

## 2024-01-23 RX ADMIN — HEPARIN SODIUM 4000 UNITS: 1000 INJECTION INTRAVENOUS; SUBCUTANEOUS at 02:01

## 2024-01-23 RX ADMIN — INSULIN LISPRO 4 UNITS: 100 INJECTION, SOLUTION INTRAVENOUS; SUBCUTANEOUS at 18:02

## 2024-01-23 RX ADMIN — FLUTICASONE PROPIONATE 2 SPRAY: 50 SPRAY, METERED NASAL at 10:24

## 2024-01-23 RX ADMIN — ALLOPURINOL 100 MG: 100 TABLET ORAL at 10:24

## 2024-01-23 RX ADMIN — BUSPIRONE HYDROCHLORIDE 5 MG: 5 TABLET ORAL at 12:24

## 2024-01-23 RX ADMIN — AMIODARONE HYDROCHLORIDE 200 MG: 200 TABLET ORAL at 10:24

## 2024-01-23 ASSESSMENT — PAIN SCALES - WONG BAKER: WONGBAKER_NUMERICALRESPONSE: 2

## 2024-01-23 NOTE — PROGRESS NOTES
[facility] Note    Patient Active Problem List   Diagnosis    CAD (coronary artery disease) CABG x3 1996, stenting PDA 10/2021    Cardiac pacemaker    Benign prostatic hyperplasia with nocturia    Gout-uric acid >7.5--advised proph tx    Hypercholesteremia    Carotid bruit(bilat-nl duplex u/s 10/10)    Vitamin D deficiency-(advised 1000IU/day)    Actinic keratoses    Elevated PSA-(was 4.2 10/10-repeat 6 mo)(was 5.12 1/11-then 4.4 2/12)-sees dr aguillon for this    S/P colonoscopy-4/07-neg per pt,  3/18 repeat colonoscopy polyp-repeat 5 yr    Hearing loss, conductive, bilateral-seeing ent-dr quinn for hearing aides    Encounter for monitoring sotalol therapy    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 Emerson     Chronic anemia    Type 2 diabetes mellitus with hyperglycemia, without long-term current use of insulin (HCC)    Hypertension    Chronic renal impairment, stage 3 (moderate) ([facility])    Hyperkalemia    H/O esophagogastroduodenoscopy  11/18  prominent vessesls vs varices. dr Galindo (done for blood in stool)    Elevated ferritin  - work up Dr. Escobar  genetic screen hemachromatosis negative. 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    Ischemic cardiomyopathy; EF 40-45% 4/22    Chronic pansinusitis    Nonrheumatic aortic valve stenosis- moderate by echo 4/22    Acute on chronic systolic heart failure (HCC)    Mixed hyperlipidemia    Atrial fibrillation (HCC)    Chronic combined systolic and diastolic congestive heart failure (HCC)    Benign essential tremor    Anemia in chronic renal disease    Current moderate episode of major depressive disorder without prior episode (HCC)    Weight loss    Psychophysiological insomnia    Anemia    Iron deficiency anemia    Sundowning    Sleep disturbance    Osteomyelitis of right foot (HCC)    Acute osteomyelitis of phalanx of right foot (HCC)    CRP elevated    Elevated erythrocyte sedimentation rate    MRSA infection (methicillin-resistant Staphylococcus aureus)

## 2024-01-23 NOTE — PROGRESS NOTES
Physical/Occupational Therapy  Josep Santos    Attempted to see pt for PT/OT treatment. Chart reviewed. Patient with hemoglobin of 5.8 with orders for PRBC. Will hold therapy at this time and will follow up with pt as medically appropriate. Discussed with nursing. Thanks, Nicolette Almodovar, PT, DPT [id number], Abby Gunter OTR/L [id number]

## 2024-01-23 NOTE — PROGRESS NOTES
STAT anti-xa drawn prior to heparin being started. Result noted, full bolus of 4000 units given per protocol. Spoke with Sal Gomez, pharmacist who stated to start drip at 16 units/kg/hr. Next anti-xa at 0800

## 2024-01-23 NOTE — PROGRESS NOTES
Patient transferred from . Patients daughter at bedside and report received at bedside. Tele applied and patient resting quietly

## 2024-01-23 NOTE — PROGRESS NOTES
Date Value   07/15/2015 4.8     Prothrombin time,(POC) (no units)   Date Value   11/21/2018 13.8     Sample Type (no units)   Date Value   07/15/2015 ZONIA     POC Sodium (mEq/L)   Date Value   07/15/2015 134 (L)     POC Troponin I (ng/ml)   Date Value   01/10/2012 0.00            Assessment:     The patient is a 89 y.o. old male who  has a past medical history of Actinic keratosis, Actinic keratosis, Atrial fibrillation ([facility]), Bilateral carotid artery stenosis, CAD (coronary artery disease), Cellulitis (07/15/2015), Diabetes mellitus ([facility]), DM (diabetes mellitus), type 2 with peripheral vascular complications ([facility])--s/p amputation great toe post osteomylitis  (09/11/2015), Glucose intolerance (malabsorption), Gout, Hyperlipidemia, Hypertension, Hypertrophy of prostate without urinary obstruction and other lower urinary tract symptoms (LUTS), Intermittent atrial fibrillation ([facility]), Kidney stone, Occult blood in stool, and Pacemaker. with following problems:    Complicated right diabetic foot infection with concern for osteomyelitis involving the right hallux-wound culture has grown MRSA, Staph aureus reviewed-has been on IV daptomycin  Longstanding type 2 diabetes mellitus- maintain good glycemic control  Pacemaker in place-2D echo done on January 18, 2024 did not show any valvular vegetations  Pulmonary edema-this is new finding today  Left forearm pain and swelling-venous Doppler study was negative for DVT  Severe anemia-hemoglobin is 5.6  Diabetic polyneuropathy type II  Elevated ESR  Elevated CRP of 250.4  History of gout  Coronary artery disease-stable  Chronic kidney disease stage IV-serum creatinine is 2.4  History of atrial fibrillation  Contact isolation for MRSA      Discussion:      The patient is afebrile.  The patient had a fever of 101.4 last evening.    The patient has undergone right foot surgical I&D surgery yesterday    White cell count is elevated and is 19,900 today.    Elevation in the white  white count and the fever may be postoperative.    Received his IV daptomycin day before yesterday.    Had a chest x-ray done today which is concerning for pulm edema.  Atypical viral infiltrate should be ruled out.    Complaining of Left Forearm Pain and Swelling Again.  No Local Redness or Warmth on Palpation    Serum creatinine is 2.4 today.    Patient has elevated BNP of greater than 54,000.    Drop in hemoglobin noted.  Hemoglobin is 5.8 today    Plan:     Diagnostic Workup:    Will order CT scan of the left forearm without contrast to rule out any abscess or any gout flareup  Continue to follow  fever curve, WBC count and blood cultures.  Continue to monitor blood counts, liver and renal function.    Will order procalcitonin level  Respiratory viral PCR panel for thoroughness of workup  Follow-up on blood cultures from January 21 and foot surgical cultures from yesterday    Antimicrobials:    I will stop IV daptomycin today as daptomycin can sometimes cause eosinophilic pneumonia  Will order IV linezolid 600 mg every 12 hours for gram-positive coverage including empiric MRSA coverage  Severe anemia noted, workup and management per primary team  Will ask  GI team to see to rule out any GI causes for anemia  We will follow up on the culture results and clinical progress and will make further recommendations accordingly.  Contact isolation for MRSA  Continue close vitals monitoring.  Maintain good glycemic control.  Fall precautions.  Aspiration precautions.  Continue to watch for new fever or diarrhea.  DVT prophylaxis.  Discussed all above with patient and RN.  Discussed with his daughter at bedside      Drug Monitoring:    Continue monitoring for antibiotic toxicity as follows: CBC, CMP   Continue to watch for following: new or worsening fever, new hypotension, hives, lip swelling and redness or purulence at vascular access sites.     I/v access Management:    Continue to monitor i.v access sites for erythema,

## 2024-01-23 NOTE — PROGRESS NOTES
V2.0    [facility] [place]      Name:  Josep Santos /Age/Sex: 1933  (89 y.o. male)   MRN & CSN:  [id number] & [id number] Encounter Date/Time: 2024 8:38 AM EST   Location:  [facility]3321/3321-01 PCP: Raul Cuevas MD     Attending:Salvador Wise MD       Hospital Day: 7    Assessment and Recommendations   Josep Santos is a 89 y.o. male who presents with Osteomyelitis of right foot (HCC)      Plan:   Osteomyelitis of the right foot  Patient is status post incision drainage and debridement  Follow-up intraoperative culture  Currently on daptomycin continue IV antibiotics  The recommendation to follow pending cultures    Anemia  -Hemoglobin noted to be 6.8 no acute bleeding noted we will transfuse 1 unit of blood  Check for occult.  Dressing clean looks clean no saturation    Acute encephalopathy  Patient seems more confused today than yesterday check ABG lactic acid ammonia        Diabetes with polyneuropathy  Continue Lantus sliding scale.      Chronic kidney disease  Thing holding stable appreciate nephrology recommendation      Pacemaker    CAD  History of A-fib        Diet ADULT DIET; Regular; 4 carb choices (60 gm/meal)   DVT Prophylaxis    Code Status Full Code   Disposition          Personally reviewed Lab Studies and Imaging         Subjective:     Chief Complaint:     Josep Santos is a 89 y.o. male who presents with osteomyelitis no new changes underwent surgery yesterday patient doing well postoperatively denies any fevers chills      Review of Systems:      Pertinent positives and negatives discussed in HPI    Objective:     Intake/Output Summary (Last 24 hours) at 2024 0838  Last data filed at 2024 0445  Gross per 24 hour   Intake 100 ml   Output 610 ml   Net -510 ml      Vitals:   Vitals:    24 1730 24 2125 24 0445   BP: (!) 117/55 96/63 96/63 110/65   Pulse: 94 (!) 108     Resp: 25 18     Temp: 99.1 °F (37.3 °C) 98 °F (36.7  COMPARISON: 01/17/2024 HISTORY: ORDERING SYSTEM PROVIDED HISTORY: S/p first met head resection TECHNOLOGIST PROVIDED HISTORY: Reason for exam:->S/p first met head resection FINDINGS: Since the previous study, patient has had resection of the 1st metatarsal from the mid metatarsal distally.  Destruction of the proximal aspect of the proximal phalanx is similar in appearance.  Large lytic lesion at the medial aspect of the 2nd metatarsal which was noted on the previous study.  Small osseous fragments in the soft tissues between the 1st and 2nd digits.  The 5th toe has been previously resected.     Postsurgical changes of the foot     VL Extremity Venous Left    Result Date: 1/22/2024  Upper Extremities Veins  Demographics   Patient Name       BERONICA RODRIGUEZ   Date of Study      01/22/2024         Gender              Male   Patient Number     [id number]         Date of Birth       11/12/1933   Visit Number       [id number]          Age                 89 year(s)   Accession Number   [id number]         Room Number         FOR   Corporate ID       [id number]           Sonographer         Alexi Mayo,                                                            [place]   Ordering Physician Giselle Olvera, Interpreting        [facility]                     CNP                Physician           Edd Ricks MD,                                                            [facility]  Procedure Type of Study:   Veins:Upper Extremities Veins, VL EXTREMITY VENOUS DUPLEX LEFT.   Vascular Sonographer Report  Additional Indications:Pain and swelling. Impressions Left Impression No evidence of deep vein thrombosis or superficial vein thrombosis of the left upper extremity or right internal jugular vein and subclavian vein. Conclusions   Summary   No evidence of deep vein thrombosis or superficial vein thrombosis of the  left upper extremity or right internal jugular vein and subclavian vein.   Signature

## 2024-01-23 NOTE — PROGRESS NOTES
Podiatric Surgery Daily Progress Note  Josep Santos      Subjective :   Patient seen and examined this am at the bedside.S/p right foot I&D with carbone arthroplasty (DOS 1/22/24) Patient denies any acute overnight events.Patient does admit to increased shortness of breath today.  Patient denies N/V/F/C. Patient denies calf pain, thigh pain, chest pain.     Review of Systems: A 12 point review of symptoms is unremarkable with the exception of the chief complaint. Patient specifically denies nausea, fever, vomiting, chills, shortness of breath, chest pain, abdominal pain, constipation or difficulty urinating.       Objective     /65   Pulse (!) 108   Temp 98 °F (36.7 °C) (Oral)   Resp 18   Ht 1.778 m (5' 10\")   Wt 72.7 kg (160 lb 4.4 oz)   SpO2 95%   BMI 23.00 kg/m²      I/O:  Intake/Output Summary (Last 24 hours) at 1/23/2024 1006  Last data filed at 1/23/2024 0445  Gross per 24 hour   Intake 100 ml   Output 610 ml   Net -510 ml              Wt Readings from Last 3 Encounters:   01/23/24 72.7 kg (160 lb 4.4 oz)   01/10/24 72.1 kg (159 lb)   01/05/24 72.1 kg (159 lb)       LABS:    Recent Labs     01/22/24  0614 01/23/24  0440   WBC 18.6* 19.9*   HGB 7.7* 6.6*   HCT 22.9* 19.5*    296        Recent Labs     01/23/24  0440      K 4.2      CO2 19*   PHOS 4.0   BUN 64*   CREATININE 2.4*        Recent Labs     01/20/24  1300 01/21/24  0655 01/22/24  0614 01/23/24  0440   INR 1.19*   < > 1.34* 1.47*   APTT 42.4*  --   --   --     < > = values in this interval not displayed.           LOWER EXTREMITY EXAMINATION    Dressing to right lower extremity left clean, dry, and intact.  No strikethrough noted to external dressing.    CFT brisk to digits bilateral.  Sensation diminished at baseline to digits bilateral.  No pain with calf compression bilateral.  Patient able to perform active range of motion to digits bilateral.       IMAGING:  XR Right Foot (1/17/23)  FINDINGS:  Prior amputation  of the 5th toe is again noted.  There is soft tissue  swelling at the forefoot which is most prominent near the head of the 1st  metatarsal.  Multifocal areas of lucency and cortical destruction involve the  1st metatarsal head, the lateral base of the 1st proximal phalanx and the  medial aspect of the head of the 2nd metatarsal.  No subcutaneous air is  identified.  Prominent vascular calcifications are noted.     IMPRESSION:  Lucency and cortical destruction consistent with acute osteomyelitis  throughout the 1st metatarsal head, at the lateral aspect of the base of the  1st proximal phalanx and at the medial aspect of the 2nd metatarsal head with  adjacent soft tissue swelling.  No subcutaneous air is noted.     CT Right Foot (1/17/23)  FINDINGS:  Bones: There are extensive destructive changes involving the 1st metatarsal  head as well as the base of the proximal phalanx of the great toe.  Findings  are suspicious for septic arthritis.  Additionally, there are several  well-defined erosions with associated periarticular soft tissue masses,  involving the 1st MTP joint as well as the 2nd metatarsal head.  The  appearance could indicate chronic gout.     Similar well-defined erosions with associated periarticular masses involving  the head of the 1st metatarsal and base of the proximal phalanx on the left,  incompletely evaluated.     No acute fracture identified.     Soft Tissue: Soft tissue ulceration is noted at the medial aspect of the 1st  metatarsal head.     Extensive diffuse muscle atrophy is noted bilaterally.  No well-defined fluid  collection.     Joint: Joint alignment is maintained.     IMPRESSION:  1. Chronic well-defined erosions with associated periarticular soft tissue  masses involving the 1st MTP joint and 2nd MTP joint of the right foot.  There are similar changes noted involving the 1st MTP joint of the left foot.  The appearance is strongly suspicious for gout.  2. Soft tissue ulceration

## 2024-01-23 NOTE — PROGRESS NOTES
Clinical Pharmacy Note: Warfarin    Josep Santos is a 89 y.o. male  is receiving warfarin for AFIB.  Goal INR 2-3  Previous regimen:  2.5 mg daily except 1.25 on Wed    INR (no units)   Date Value   01/23/2024 1.47 (H)   01/22/2024 1.34 (H)   01/21/2024 1.35 (H)      Latest Reference Range & Units 01/23/24 04:40   Creatinine 0.8 - 1.3 mg/dL 2.4 (H)   (H): Data is abnormally high      Assessment:  warfarin was on hold for podiatry surgical intervention. S/p I&D.  Plan: continue heparin drip, resume warfarin    Daily INR is ordered. Will continue to monitor.  Per pharmacy consult.  Madiha Schofield [facility] PharmD 1/23/2024

## 2024-01-23 NOTE — PROGRESS NOTES
PM assessment complete and documented. Meds given per MAR. Pt is resting in bed. Denies pain at present. Daughter at bedside. Discussed heparin drip will restart at midnight. Will continue to monitor.

## 2024-01-23 NOTE — PROGRESS NOTES
Shift assessment completed. Routine vitals stable. Scheduled medications given. Patient is awake but hallucinating and c/o pain in his L arm. HBG 6.6. MD notified.

## 2024-01-23 NOTE — OP NOTE
were identified and sharply dissected with a 15 blade.  The sesamoids were then removed from the operative site. Attention was then directed towards the remaining devitalized soft tissue. The remaining devitalized tissue was then removed using sharp dissection and rongeur until good bleeding healthy tissue was noted. Attention was then directed towards irrigation.     Using a pulse irrigation system, the surgical site was irrigated using approximately 3 L of normal saline. After copious irrigation the wound was re-inspected and noted to be healthy with good bleeding tissue noted. Because good, bleeding tissue was noted along with the absence of further purulence, the decision was made to proceed with closure.     The deep and subcutaneous layers were closed with 3-0 Vicryl in a box fashion.  The skin was then closed with 3-0 nylon in a simple interrupted fashion.  An additional 10 cc of 0.5% Marcaine plain was then injected proximal to the incision site for patient's postoperative comfort. A soft sterile dressing consisting of Adaptic, dry sterile gauze, ABD pad, Kerlix, and Ace was then applied.     END OF PROCEDURE: The patient tolerated the procedure and anesthesia well. The patient left the OR and was transferred to the PACU with vital signs stable and vascular status intact to all aspects of the right lower extremity. Following short period of postoperative monitoring, the patient will be readmitted to medical floor for further medical management and treatment of their medical comorbid conditions.    Op note was dictated on behalf of Dr. Frank Jacob DPM.     Joseph Espinoza DPM  Podiatric Resident PGY-2  Pager [phone] or Perfect Serve

## 2024-01-24 LAB
ALBUMIN SERPL-MCNC: 2.7 G/DL (ref 3.4–5)
ANION GAP SERPL CALCULATED.3IONS-SCNC: 14 MMOL/L (ref 3–16)
BASOPHILS # BLD: 0 K/UL (ref 0–0.2)
BASOPHILS NFR BLD: 0.3 %
BUN SERPL-MCNC: 69 MG/DL (ref 7–20)
CALCIUM SERPL-MCNC: 8.4 MG/DL (ref 8.3–10.6)
CHLORIDE SERPL-SCNC: 98 MMOL/L (ref 99–110)
CO2 SERPL-SCNC: 21 MMOL/L (ref 21–32)
CREAT SERPL-MCNC: 2.5 MG/DL (ref 0.8–1.3)
DEPRECATED RDW RBC AUTO: 22.4 % (ref 12.4–15.4)
EOSINOPHIL # BLD: 0 K/UL (ref 0–0.6)
EOSINOPHIL NFR BLD: 0.2 %
GFR SERPLBLD CREATININE-BSD FMLA CKD-EPI: 24 ML/MIN/{1.73_M2}
GLUCOSE BLD-MCNC: 231 MG/DL (ref 70–99)
GLUCOSE BLD-MCNC: 269 MG/DL (ref 70–99)
GLUCOSE BLD-MCNC: 273 MG/DL (ref 70–99)
GLUCOSE BLD-MCNC: 290 MG/DL (ref 70–99)
GLUCOSE SERPL-MCNC: 267 MG/DL (ref 70–99)
HCT VFR BLD AUTO: 22.5 % (ref 40.5–52.5)
HGB BLD-MCNC: 7.5 G/DL (ref 13.5–17.5)
INR PPP: 1.19 (ref 0.84–1.16)
INR PPP: 1.24 (ref 0.84–1.16)
LYMPHOCYTES # BLD: 0.7 K/UL (ref 1–5.1)
LYMPHOCYTES NFR BLD: 4.3 %
MCH RBC QN AUTO: 31.2 PG (ref 26–34)
MCHC RBC AUTO-ENTMCNC: 33.4 G/DL (ref 31–36)
MCV RBC AUTO: 93.5 FL (ref 80–100)
MONOCYTES # BLD: 1.1 K/UL (ref 0–1.3)
MONOCYTES NFR BLD: 6.7 %
NEUTROPHILS # BLD: 14.7 K/UL (ref 1.7–7.7)
NEUTROPHILS NFR BLD: 88.5 %
PERFORMED ON: ABNORMAL
PHOSPHATE SERPL-MCNC: 3.8 MG/DL (ref 2.5–4.9)
PLATELET # BLD AUTO: 269 K/UL (ref 135–450)
PMV BLD AUTO: 9 FL (ref 5–10.5)
POTASSIUM SERPL-SCNC: 4 MMOL/L (ref 3.5–5.1)
PROTHROMBIN TIME: 15.1 SEC (ref 11.5–14.8)
PROTHROMBIN TIME: 15.6 SEC (ref 11.5–14.8)
RBC # BLD AUTO: 2.41 M/UL (ref 4.2–5.9)
SODIUM SERPL-SCNC: 133 MMOL/L (ref 136–145)
WBC # BLD AUTO: 16.6 K/UL (ref 4–11)

## 2024-01-24 PROCEDURE — 85025 COMPLETE CBC W/AUTO DIFF WBC: CPT

## 2024-01-24 PROCEDURE — 99231 SBSQ HOSP IP/OBS SF/LOW 25: CPT | Performed by: SURGERY

## 2024-01-24 PROCEDURE — 80069 RENAL FUNCTION PANEL: CPT

## 2024-01-24 PROCEDURE — 2580000003 HC RX 258

## 2024-01-24 PROCEDURE — 36415 COLL VENOUS BLD VENIPUNCTURE: CPT

## 2024-01-24 PROCEDURE — 6370000000 HC RX 637 (ALT 250 FOR IP)

## 2024-01-24 PROCEDURE — 97140 MANUAL THERAPY 1/> REGIONS: CPT

## 2024-01-24 PROCEDURE — 97530 THERAPEUTIC ACTIVITIES: CPT

## 2024-01-24 PROCEDURE — 6370000000 HC RX 637 (ALT 250 FOR IP): Performed by: INTERNAL MEDICINE

## 2024-01-24 PROCEDURE — 85610 PROTHROMBIN TIME: CPT

## 2024-01-24 PROCEDURE — 1200000000 HC SEMI PRIVATE

## 2024-01-24 PROCEDURE — 6360000002 HC RX W HCPCS

## 2024-01-24 PROCEDURE — C9113 INJ PANTOPRAZOLE SODIUM, VIA: HCPCS

## 2024-01-24 PROCEDURE — 6370000000 HC RX 637 (ALT 250 FOR IP): Performed by: HOSPITALIST

## 2024-01-24 PROCEDURE — 99232 SBSQ HOSP IP/OBS MODERATE 35: CPT | Performed by: STUDENT IN AN ORGANIZED HEALTH CARE EDUCATION/TRAINING PROGRAM

## 2024-01-24 PROCEDURE — 6360000002 HC RX W HCPCS: Performed by: INTERNAL MEDICINE

## 2024-01-24 PROCEDURE — 6370000000 HC RX 637 (ALT 250 FOR IP): Performed by: PHYSICIAN ASSISTANT

## 2024-01-24 PROCEDURE — 97535 SELF CARE MNGMENT TRAINING: CPT

## 2024-01-24 PROCEDURE — 99233 SBSQ HOSP IP/OBS HIGH 50: CPT | Performed by: INTERNAL MEDICINE

## 2024-01-24 RX ORDER — BUTALBITAL, ACETAMINOPHEN AND CAFFEINE 50; 325; 40 MG/1; MG/1; MG/1
1 TABLET ORAL ONCE
Status: COMPLETED | OUTPATIENT
Start: 2024-01-24 | End: 2024-01-24

## 2024-01-24 RX ORDER — INSULIN LISPRO 100 [IU]/ML
INJECTION, SOLUTION INTRAVENOUS; SUBCUTANEOUS
Status: COMPLETED
Start: 2024-01-24 | End: 2024-01-24

## 2024-01-24 RX ORDER — POLYETHYLENE GLYCOL 3350 17 G/17G
17 POWDER, FOR SOLUTION ORAL 2 TIMES DAILY
Status: DISCONTINUED | OUTPATIENT
Start: 2024-01-24 | End: 2024-01-29 | Stop reason: HOSPADM

## 2024-01-24 RX ORDER — INSULIN LISPRO 100 [IU]/ML
0-16 INJECTION, SOLUTION INTRAVENOUS; SUBCUTANEOUS
Status: DISCONTINUED | OUTPATIENT
Start: 2024-01-24 | End: 2024-01-29 | Stop reason: HOSPADM

## 2024-01-24 RX ORDER — INSULIN LISPRO 100 [IU]/ML
0-4 INJECTION, SOLUTION INTRAVENOUS; SUBCUTANEOUS NIGHTLY
Status: DISCONTINUED | OUTPATIENT
Start: 2024-01-24 | End: 2024-01-29 | Stop reason: HOSPADM

## 2024-01-24 RX ORDER — WARFARIN SODIUM 2.5 MG/1
1.25 TABLET ORAL
Status: DISCONTINUED | OUTPATIENT
Start: 2024-01-31 | End: 2024-01-25

## 2024-01-24 RX ADMIN — BUSPIRONE HYDROCHLORIDE 5 MG: 5 TABLET ORAL at 22:02

## 2024-01-24 RX ADMIN — SODIUM CHLORIDE: 9 INJECTION, SOLUTION INTRAVENOUS at 15:58

## 2024-01-24 RX ADMIN — POLYETHYLENE GLYCOL 3350 17 G: 17 POWDER, FOR SOLUTION ORAL at 22:02

## 2024-01-24 RX ADMIN — INSULIN LISPRO 4 UNITS: 100 INJECTION, SOLUTION INTRAVENOUS; SUBCUTANEOUS at 09:00

## 2024-01-24 RX ADMIN — METOPROLOL SUCCINATE 100 MG: 50 TABLET, EXTENDED RELEASE ORAL at 22:02

## 2024-01-24 RX ADMIN — OXYCODONE HYDROCHLORIDE AND ACETAMINOPHEN 500 MG: 500 TABLET ORAL at 09:10

## 2024-01-24 RX ADMIN — Medication 10 ML: at 22:03

## 2024-01-24 RX ADMIN — INSULIN LISPRO 8 UNITS: 100 INJECTION, SOLUTION INTRAVENOUS; SUBCUTANEOUS at 13:06

## 2024-01-24 RX ADMIN — ATORVASTATIN CALCIUM 20 MG: 20 TABLET, FILM COATED ORAL at 09:10

## 2024-01-24 RX ADMIN — POLYETHYLENE GLYCOL 3350 17 G: 17 POWDER, FOR SOLUTION ORAL at 09:10

## 2024-01-24 RX ADMIN — FLUTICASONE PROPIONATE 2 SPRAY: 50 SPRAY, METERED NASAL at 09:10

## 2024-01-24 RX ADMIN — ALLOPURINOL 100 MG: 100 TABLET ORAL at 09:10

## 2024-01-24 RX ADMIN — MELATONIN TAB 3 MG 3 MG: 3 TAB at 22:02

## 2024-01-24 RX ADMIN — AMIODARONE HYDROCHLORIDE 200 MG: 200 TABLET ORAL at 09:10

## 2024-01-24 RX ADMIN — LINEZOLID 600 MG: 600 INJECTION, SOLUTION INTRAVENOUS at 16:03

## 2024-01-24 RX ADMIN — HYDROCODONE BITARTRATE AND ACETAMINOPHEN 1 TABLET: 5; 325 TABLET ORAL at 15:37

## 2024-01-24 RX ADMIN — HYDROCODONE BITARTRATE AND ACETAMINOPHEN 1 TABLET: 5; 325 TABLET ORAL at 03:56

## 2024-01-24 RX ADMIN — Medication 10 ML: at 09:10

## 2024-01-24 RX ADMIN — BUSPIRONE HYDROCHLORIDE 5 MG: 5 TABLET ORAL at 09:10

## 2024-01-24 RX ADMIN — PANTOPRAZOLE SODIUM 40 MG: 40 INJECTION, POWDER, FOR SOLUTION INTRAVENOUS at 09:10

## 2024-01-24 RX ADMIN — HYDROCODONE BITARTRATE AND ACETAMINOPHEN 1 TABLET: 5; 325 TABLET ORAL at 09:10

## 2024-01-24 RX ADMIN — COLCHICINE 0.3 MG: 0.6 TABLET, FILM COATED ORAL at 09:10

## 2024-01-24 RX ADMIN — BUTALBITAL, ACETAMINOPHEN, AND CAFFEINE 1 TABLET: 50; 325; 40 TABLET ORAL at 18:39

## 2024-01-24 RX ADMIN — Medication 100 MG: at 09:10

## 2024-01-24 RX ADMIN — LINEZOLID 600 MG: 600 INJECTION, SOLUTION INTRAVENOUS at 03:51

## 2024-01-24 RX ADMIN — INSULIN LISPRO 8 UNITS: 100 INJECTION, SOLUTION INTRAVENOUS; SUBCUTANEOUS at 18:39

## 2024-01-24 RX ADMIN — HYDROCODONE BITARTRATE AND ACETAMINOPHEN 1 TABLET: 5; 325 TABLET ORAL at 22:03

## 2024-01-24 ASSESSMENT — PAIN SCALES - GENERAL
PAINLEVEL_OUTOF10: 10
PAINLEVEL_OUTOF10: 6
PAINLEVEL_OUTOF10: 9
PAINLEVEL_OUTOF10: 7
PAINLEVEL_OUTOF10: 10

## 2024-01-24 ASSESSMENT — PAIN DESCRIPTION - DESCRIPTORS
DESCRIPTORS: ACHING
DESCRIPTORS: ACHING;BURNING
DESCRIPTORS: ACHING

## 2024-01-24 ASSESSMENT — PAIN DESCRIPTION - LOCATION
LOCATION: HEAD
LOCATION: ARM
LOCATION: ARM;FOOT;LEG
LOCATION: ARM;HEAD
LOCATION: HEAD;NECK
LOCATION: NECK

## 2024-01-24 ASSESSMENT — PAIN DESCRIPTION - FREQUENCY: FREQUENCY: INTERMITTENT

## 2024-01-24 ASSESSMENT — PAIN DESCRIPTION - ORIENTATION
ORIENTATION: RIGHT;LEFT;POSTERIOR
ORIENTATION: LEFT
ORIENTATION: RIGHT;LEFT;POSTERIOR
ORIENTATION: RIGHT;LEFT

## 2024-01-24 NOTE — CONSULTS
Gastroenterology Consult Note        Patient: Josep Santos  : 1933  Acct#:      Date:  2024    Subjective:       History of Present Illness  Patient is a 89 y.o.  male admitted with Acute osteomyelitis of right foot (HCC) [M86.171]  Osteomyelitis of metatarsal (HCC) [M86.9]  Acute osteomyelitis of phalanx of right foot (HCC) [M86.171] who is seen in consult for anemia.     History of diabetes, CAD, A-fib on coumadin, CKD on retacrit, s/p pacemaker, MDS followed by [facility].  He was admitted on 2024 for right foot infection with concern for osteomyelitis.  He underwent I&D and debridement yesterday with podiatry.  He has encephalopathy which started in the hospital so daughter gives most of history.  GI consulted for anemia.  Per chart review and daughter.  Patient's last bowel movement was several days ago and was brown.  Daughter reports that he has history of constipation.  Takes MiraLAX daily and senna twice daily.  Can go days without a bowel movement.  He denies any abdominal pain.  No nausea or vomiting.    Past Medical History:   Diagnosis Date    Actinic keratosis     Actinic keratosis     Atrial fibrillation (HCC)     Bilateral carotid artery stenosis     CAD (coronary artery disease)     Cellulitis 07/15/2015    right foot    Diabetes mellitus (HCC)     DM (diabetes mellitus), type 2 with peripheral vascular complications ([facility])--s/p amputation great toe post osteomylitis  2015    Glucose intolerance (malabsorption)     Gout     Hyperlipidemia     Hypertension     Hypertrophy of prostate without urinary obstruction and other lower urinary tract symptoms (LUTS)     Intermittent atrial fibrillation (HCC)     Kidney stone     Occult blood in stool     Hx of    Pacemaker     Permanent      Past Surgical History:   Procedure Laterality Date    ABSCESS DRAINAGE  2015    right foot    APPENDECTOMY      CARDIAC CATHETERIZATION      Left    COLONOSCOPY   tablet TAKE 1 TABLET BY MOUTH TWICE DAILY 60 tablet 1    azelastine (ASTELIN) 0.1 % nasal spray 1 spray by Nasal route 2 times daily Use in each nostril as directed (Patient not taking: Reported on 1/17/2024) 60 mL 1    melatonin 3 MG TABS tablet Take 1 tablet by mouth nightly      atorvastatin (LIPITOR) 20 MG tablet Take 1 tablet by mouth daily 90 tablet 4    ferrous sulfate (IRON 325) 325 (65 Fe) MG tablet Take 1 tablet by mouth three times a week      doxazosin (CARDURA) 2 MG tablet TAKE 1 TABLET BY MOUTH DAILY 90 tablet 1    spironolactone (ALDACTONE) 25 MG tablet TAKE 1 TABLET BY MOUTH 2 TIMES A WEEK (Patient taking differently: Take 1 tablet by mouth Twice a Week One tab three times a week) 24 tablet 3    epoetin nader-epbx (RETACRIT) 92944 UNIT/ML SOLN injection Inject 1 mL into the skin every 14 days 6.6 mL     acetaminophen (TYLENOL) 325 MG tablet Take 2 tablets by mouth 2 times daily 120 tablet     warfarin (COUMADIN) 2.5 MG tablet Take 0.5 tablets by mouth Twice a Week AND 1 tablet Five times weekly. (Patient taking differently: Take 2.5 mg everyday except Wednesday- taking 1.25mg .Managed by [facility]) 72 tablet 3    Polyethylene Glycol 3350 (MIRALAX PO) Take 1 packet by mouth daily      Compression Stockings MISC by Does not apply route 2 each 0    Misc. Devices (FOAM CHAIR CUSHION) MISC Use in w/c.  Dx 1 each 0    sennosides-docusate sodium (SENOKOT-S) 8.6-50 MG tablet Take 1 tablet by mouth daily as needed for Constipation 30 tablet 0    Cholecalciferol (VITAMIN D3) 25 MCG (1000 UT) CAPS Take 1 capsule by mouth daily      fluticasone (FLONASE) 50 MCG/ACT nasal spray 2 sprays by Each Nostril route daily 16 g 0    vitamin B-1 (THIAMINE) 100 MG tablet Take 10 tablets by mouth daily              Allergies  No Known Allergies   Social   Social History     Tobacco Use    Smoking status: Never    Smokeless tobacco: Never    Tobacco comments:     counseled on tobacco exposure avoidance   Substance

## 2024-01-24 NOTE — PROGRESS NOTES
Podiatric Surgery Daily Progress Note  Josep Santos      Subjective :   Patient seen and examined this am at the bedside.S/p right foot I&D with carbone arthroplasty (DOS 1/22/24) Patient denies any acute overnight events. Patient denies N/V/F/C. Patient denies calf pain, thigh pain, chest pain.     Review of Systems: A 12 point review of symptoms is unremarkable with the exception of the chief complaint. Patient specifically denies nausea, fever, vomiting, chills, shortness of breath, chest pain, abdominal pain, constipation or difficulty urinating.       Objective     /66   Pulse 81   Temp 98.4 °F (36.9 °C) (Temporal)   Resp 16   Ht 1.778 m (5' 10\")   Wt 73.5 kg (162 lb)   SpO2 97%   BMI 23.24 kg/m²      I/O:  Intake/Output Summary (Last 24 hours) at 1/24/2024 0717  Last data filed at 1/24/2024 0409  Gross per 24 hour   Intake 300 ml   Output 150 ml   Net 150 ml              Wt Readings from Last 3 Encounters:   01/24/24 73.5 kg (162 lb)   01/10/24 72.1 kg (159 lb)   01/05/24 72.1 kg (159 lb)       LABS:    Recent Labs     01/23/24  0440 01/23/24  1304 01/23/24  2112 01/24/24  0442   WBC 19.9*  --   --  16.6*   HGB 6.6*   < > 7.0* 7.5*   HCT 19.5*   < > 20.7* 22.5*     --   --  269    < > = values in this interval not displayed.        Recent Labs     01/24/24  0442   *   K 4.0   CL 98*   CO2 21   PHOS 3.8   BUN 69*   CREATININE 2.5*        Recent Labs     01/22/24  0614 01/23/24  0440   INR 1.34* 1.47*           LOWER EXTREMITY EXAMINATION    Dressing to right lower extremity left clean, dry, and intact.  No strikethrough noted to external dressing.    CFT brisk to digits bilateral.  Sensation diminished at baseline to digits bilateral.  No pain with calf compression bilateral.  Patient able to perform active range of motion to digits bilateral.       IMAGING:  XR Right Foot (1/17/23)  FINDINGS:  Prior amputation of the 5th toe is again noted.  There is soft tissue  swelling at the

## 2024-01-24 NOTE — PLAN OF CARE
Problem: Discharge Planning  Goal: Discharge to home or other facility with appropriate resources  Outcome: Progressing  Flowsheets (Taken 1/23/2024 1915)  Discharge to home or other facility with appropriate resources:   Identify barriers to discharge with patient and caregiver   Arrange for needed discharge resources and transportation as appropriate   Identify discharge learning needs (meds, wound care, etc)   Arrange for interpreters to assist at discharge as needed   Refer to discharge planning if patient needs post-hospital services based on physician order or complex needs related to functional status, cognitive ability or social support system     Problem: Safety - Adult  Goal: Free from fall injury  Outcome: Progressing     Problem: ABCDS Injury Assessment  Goal: Absence of physical injury  Outcome: Progressing     Problem: Pain  Goal: Verbalizes/displays adequate comfort level or baseline comfort level  Outcome: Progressing  Flowsheets (Taken 1/23/2024 1915)  Verbalizes/displays adequate comfort level or baseline comfort level:   Encourage patient to monitor pain and request assistance   Assess pain using appropriate pain scale   Administer analgesics based on type and severity of pain and evaluate response   Implement non-pharmacological measures as appropriate and evaluate response   Consider cultural and social influences on pain and pain management   Notify Licensed Independent Practitioner if interventions unsuccessful or patient reports new pain     Problem: Skin/Tissue Integrity  Goal: Absence of new skin breakdown  Description: 1.  Monitor for areas of redness and/or skin breakdown  2.  Assess vascular access sites hourly  3.  Every 4-6 hours minimum:  Change oxygen saturation probe site  4.  Every 4-6 hours:  If on nasal continuous positive airway pressure, respiratory therapy assess nares and determine need for appliance change or resting period.  Outcome: Progressing     Problem: Chronic

## 2024-01-24 NOTE — PROGRESS NOTES
[facility] Note    Patient Active Problem List   Diagnosis    CAD (coronary artery disease) CABG x3 1996, stenting PDA 10/2021    Cardiac pacemaker    Benign prostatic hyperplasia with nocturia    Gout-uric acid >7.5--advised proph tx    Hypercholesteremia    Carotid bruit(bilat-nl duplex u/s 10/10)    Vitamin D deficiency-(advised 1000IU/day)    Actinic keratoses    Elevated PSA-(was 4.2 10/10-repeat 6 mo)(was 5.12 1/11-then 4.4 2/12)-sees dr aguillon for this    S/P colonoscopy-4/07-neg per pt,  3/18 repeat colonoscopy polyp-repeat 5 yr    Hearing loss, conductive, bilateral-seeing ent-dr quinn for hearing aides    Encounter for monitoring sotalol therapy    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 Emerson     Chronic anemia    Type 2 diabetes mellitus with hyperglycemia, without long-term current use of insulin (HCC)    Hypertension    Chronic renal impairment, stage 3 (moderate) ([facility])    Hyperkalemia    H/O esophagogastroduodenoscopy  11/18  prominent vessesls vs varices. dr Galindo (done for blood in stool)    Elevated brain natriuretic peptide (BNP) level    Ischemic cardiomyopathy; EF 40-45% 4/22    Chronic pansinusitis    Nonrheumatic aortic valve stenosis- moderate by echo 4/22    Acute on chronic systolic heart failure (HCC)    Mixed hyperlipidemia    Atrial fibrillation (HCC)    Chronic combined systolic and diastolic congestive heart failure (HCC)    Benign essential tremor    Anemia in chronic renal disease    Current moderate episode of major depressive disorder without prior episode (HCC)    Weight loss    Psychophysiological insomnia    Severe anemia    Iron deficiency anemia    Sundowning    Sleep disturbance    Osteomyelitis of right foot (HCC)    Acute osteomyelitis of phalanx of right foot (HCC)    CRP elevated    Elevated erythrocyte sedimentation rate    MRSA infection (methicillin-resistant Staphylococcus aureus)    Infection requiring contact isolation precautions  Pulse 76   Temp 97.4 °F (36.3 °C) (Oral)   Resp 24   Ht 1.778 m (5' 10\")   Wt 73.5 kg (162 lb)   SpO2 90%   BMI 23.24 kg/m²     Wt Readings from Last 3 Encounters:   01/24/24 73.5 kg (162 lb)   01/10/24 72.1 kg (159 lb)   01/05/24 72.1 kg (159 lb)       BP Readings from Last 3 Encounters:   01/24/24 125/64   01/10/24 90/60   01/05/24 (!) 150/65     Chest- rhonchi b/l  Heart-regular  Abd-soft  Ext- no edema    Labs  Hemoglobin   Date Value Ref Range Status   01/24/2024 7.5 (L) 13.5 - 17.5 g/dL Final     Hematocrit   Date Value Ref Range Status   01/24/2024 22.5 (L) 40.5 - 52.5 % Final     WBC   Date Value Ref Range Status   01/24/2024 16.6 (H) 4.0 - 11.0 K/uL Final     Platelets   Date Value Ref Range Status   01/24/2024 269 135 - 450 K/uL Final     Lab Results   Component Value Date    CREATININE 2.5 (H) 01/24/2024    BUN 69 (H) 01/24/2024     (L) 01/24/2024    K 4.0 01/24/2024    CL 98 (L) 01/24/2024    CO2 21 01/24/2024       Ferritin elevated at 2138    VBG - 7.44/28    Assessment/Plan:  1.  REYNA on CKD 3b-baseline creatinine 1.6-1.9.  Hold spironolactone and torsemide for now.  Crea plateauing.  No need for further IVF today.  Would not be pursuing contrast studies now.  2.  Mild Hyponatremia- follow for now.   3.  Anemia-f Follow hgb closely.  Transfuse PRN  4.  Hypertension-controlled.  Continue current regimen.  Just on Metoprolol XL.   5.  Right foot osteomyelitis-on daptomycin.  S/P I&D yesterday.   6.  History of hyperlipidemia-on statin  7.  Low serum HCO3-resp. Alkalosis.  Stop HCO3 infusion.     Carlos Barger MD

## 2024-01-24 NOTE — PROGRESS NOTES
[facility] - Inpatient Rehabilitation Department   Phone: [phone]    Occupational Therapy    [] Initial Evaluation            [x] Daily Treatment Note         [] Discharge Summary      Patient: Josep Santos   : 1933   MRN: [id number]   Date of Service:  2024    Admitting Diagnosis:  Osteomyelitis of right foot (HCC)  Current Admission Summary: Josep Santos is a 89 y.o. male who presents to the emergency room due to worsening right foot infection.  He was seen by his podiatrist earlier today and was advised to come to the emergency department due to this worsening infection concern for osteomyelitis and to be admitted to the hospital.     Seen by podiatry: Recommending podiatric surgical intervention during this admission, possibly TMA.   -Osteomyelitis, right foot  -Possible septic joint, right first metatarsophalangeal joint  -Diabetic foot infection, right foot  -Cellulitis, right foot  -Gout, bilateral lower extremity  -Diabetes mellitus with peripheral neuropathy    X-ray left elbow 24:  IMPRESSION:  1. Soft tissue swelling along the left elbow with an associated elbow  effusion.  No discrete fracture is identified.  Follow-up radiographs in 7-10  days may be of benefit if there is concern for an occult fracture.    : s/p right foot I&D with carbone arthroplasty    Past Medical History:  has a past medical history of Actinic keratosis, Actinic keratosis, Atrial fibrillation ([facility]), Bilateral carotid artery stenosis, CAD (coronary artery disease), Cellulitis, Diabetes mellitus ([facility]), DM (diabetes mellitus), type 2 with peripheral vascular complications ([facility])--s/p amputation great toe post osteomylitis , Glucose intolerance (malabsorption), Gout, Hyperlipidemia, Hypertension, Hypertrophy of prostate without urinary obstruction and other lower urinary tract symptoms (LUTS), Intermittent atrial fibrillation ([facility]), Kidney stone, Occult blood in stool, and

## 2024-01-24 NOTE — PROGRESS NOTES
Patient transferred to 5T room 5561. Family made aware. Bedside report given to JOSHUA Calvillo. All personal items gathered by family members. Telemetry removed and placed back to 3T.

## 2024-01-24 NOTE — PROGRESS NOTES
[facility] - Inpatient Rehabilitation Department   Phone: [phone]    Physical Therapy    [] Initial Evaluation            [x] Daily Treatment Note         [] Discharge Summary      Patient: Josep Santos   : 1933   MRN: [id number]   Date of Service:  2024  Admitting Diagnosis: Osteomyelitis of right foot (HCC)  Current Admission Summary: Josep Santos is a 89 y.o. male who presents to the emergency room due to worsening right foot infection.  He was seen by his podiatrist earlier today and was advised to come to the emergency department due to this worsening infection concern for osteomyelitis and to be admitted to the hospital.     Seen by podiatry: Recommending podiatric surgical intervention during this admission, possibly TMA.   -Osteomyelitis, right foot  -Possible septic joint, right first metatarsophalangeal joint  -Diabetic foot infection, right foot  -Cellulitis, right foot  -Gout, bilateral lower extremity  -Diabetes mellitus with peripheral neuropathy  Updated : Planning for OR later today for I&D of R foot.   Past Medical History:  has a past medical history of Actinic keratosis, Actinic keratosis, Atrial fibrillation (HCC), Bilateral carotid artery stenosis, CAD (coronary artery disease), Cellulitis, Diabetes mellitus (HCC), DM (diabetes mellitus), type 2 with peripheral vascular complications ([facility])--s/p amputation great toe post osteomylitis , Glucose intolerance (malabsorption), Gout, Hyperlipidemia, Hypertension, Hypertrophy of prostate without urinary obstruction and other lower urinary tract symptoms (LUTS), Intermittent atrial fibrillation (HCC), Kidney stone, Occult blood in stool, and Pacemaker.  Past Surgical History:  has a past surgical history that includes Tonsillectomy and adenoidectomy; Appendectomy; Kidney stone surgery (); Coronary artery bypass graft (); Cardiac catheterization (); pacemaker placement (); other surgical  of two people for B rolling and use of Maxi Sridhar to transfer from bed to chair. Patient with continued L UE pain with touch and all mobility. Patient will continue to benefit from skilled PT in order to return to PLOF prior to d/c from hospital.   Safety Interventions: patient left in chair, chair alarm in place, call light within reach, gait belt, patient at risk for falls, nurse notified, and family/caregiver present    Plan  Frequency: 3-5 x/per week  Current Treatment Recommendations: strengthening, balance training, functional mobility training, transfer training, gait training, endurance training, home exercise program, and safety education    Goals  Patient Goals: did not state   Short Term Goals:  Time Frame: discharge  Patient will complete bed mobility at supervision   Patient will complete transfers at stand by assistance   Patient will ambulate 50 ft with use of rolling walker at supervision with heel WBing RLE    Above goals reviewed on 1/24/2024.  All goals are ongoing at this time unless indicated above.      Therapy Session Time      Individual Group Co-treatment   Time In     0814   Time Out     0941   Minutes     87     Timed Code Treatment Minutes:  87 Minutes  Total Treatment Minutes:  87 minutes        Electronically Signed By: Kim Drummond PT, DPT, [id number]

## 2024-01-24 NOTE — PROGRESS NOTES
[facility] Daily Progress Note      Admit Date:  1/17/2024    Chief Complaint: Foot pain    Subjective:  Mr. Santos is in pain today. Lots of pain in L neck and down L arm. No chest pain or pressure. Frustrated that he isnt feeling better. No orthopnea or LE edema. Sx all L arm related    Objective:   /64   Pulse 76   Temp 97.4 °F (36.3 °C) (Oral)   Resp 24   Ht 1.778 m (5' 10\")   Wt 73.5 kg (162 lb)   SpO2 90%   BMI 23.24 kg/m²     Intake/Output Summary (Last 24 hours) at 1/24/2024 1452  Last data filed at 1/24/2024 0409  Gross per 24 hour   Intake 300 ml   Output 150 ml   Net 150 ml       Physical Exam:  General:  Awake, alert, NAD  Skin:  Warm and dry  Neck:  JVD not visibile sitting upright; Lots of pain moving neck down L arm  Chest:  CTAB, Comfortable on room air  Cardiovascular:  RRR S1S2, no S3, 3/6 NICHOLE  Abdomen:  Soft, ND, NT, No HSM  Extremities:  No edema LLE    Medications:    polyethylene glycol  17 g Oral BID    insulin lispro  0-16 Units SubCUTAneous TID WC    insulin lispro  0-4 Units SubCUTAneous Nightly    [START ON 1/31/2024] warfarin  1.25 mg Oral Once per day on Wed    warfarin  2.5 mg Oral Once per day on Sun Mon Tue Thu Fri Sat    colchicine  0.3 mg Oral Daily    linezolid  600 mg IntraVENous Q12H    allopurinol  100 mg Oral Daily    metoprolol succinate  100 mg Oral BID    ascorbic acid  500 mg Oral Daily    epoetin nader-epbx  10,000 Units SubCUTAneous Q14 Days    fluticasone  2 spray Each Nostril Daily    amiodarone  200 mg Oral Daily    atorvastatin  20 mg Oral Daily    busPIRone  5 mg Oral BID    vitamin B-1  100 mg Oral Daily    sodium chloride flush  5-40 mL IntraVENous 2 times per day    pantoprazole  40 mg IntraVENous Daily      sodium chloride      heparin (PORCINE) Infusion 16 Units/kg/hr (01/23/24 0203)    sodium chloride      dextrose       zinc oxide, sodium chloride, heparin (porcine), heparin (porcine), HYDROcodone 5 mg - acetaminophen, melatonin,

## 2024-01-24 NOTE — PROGRESS NOTES
Infectious Diseases   Progress Note      Admission Date: 1/17/2024  Hospital Day: Hospital Day: 8   Attending: Salvador Wise MD  Date of service: 1/24/2024     Chief complaint/ Reason for consult:     Complicated right diabetic foot infection with concern for osteomyelitis involving the right hallux  Longstanding type 2 diabetes mellitus  Pacemaker in place  Diabetic polyneuropathy type II    Microbiology:        I have reviewed allavailable micro lab data and cultures    Blood culture (2/2) - collected on 1/17/2024: Negative so far  Right foot wound culture: Collected on 1/19/2024: MRSA    Susceptibility       Methicillin-Resistant Staphylococcus aureus     BACTERIAL SUSCEPTIBILITY PANEL BY MIRIAM     clindamycin <=0.25 mcg/mL Sensitive     erythromycin >=8 mcg/mL Resistant     oxacillin >=4 mcg/mL Resistant     tetracycline <=1 mcg/mL Sensitive     trimethoprim-sulfamethoxazole >=320 mcg/mL Resistant     vancomycin 1 mcg/mL Sensitive               Antibiotics and immunizations:       Current antibiotics: All antibiotics and their doses were reviewed by me    Recent Abx Admin                     linezolid (ZYVOX) IVPB 600 mg (mg) 600 mg New Bag 01/24/24 0351     600 mg New Bag 01/23/24 1801                      Immunization History: All immunization history was reviewed by me today.    Immunization History   Administered Date(s) Administered    COVID-19, PFIZER Bivalent, DO NOT Dilute, (age 12y+), IM, 30 mcg/0.3 mL 09/26/2022, 05/15/2023    COVID-19, PFIZER GRAY top, DO NOT Dilute, (age 12 y+), IM, 30 mcg/0.3 mL 07/16/2022    COVID-19, PFIZER PURPLE top, DILUTE for use, (age 12 y+), 30mcg/0.3mL 01/20/2021, 02/10/2021, 11/03/2021    COVID-19, PFIZER, (2023-24 formula), (age 12y+), IM, 30mcg/0.3mL 09/26/2023    Influenza Vaccine, unspecified formulation 10/22/2015, 09/13/2016    Influenza Virus Vaccine 10/04/2010, 11/02/2011, 11/25/2013    Influenza, FLUAD, (age 65 y+), Adjuvanted, 0.5mL 10/01/2020, 09/26/2022     Influenza, High Dose (Fluzone 65 yrs and older) 09/12/2014, 09/18/2017, 10/12/2018    Influenza, Triv, inactivated, subunit, adjuvanted, IM (Fluad 65 yrs and older) 09/24/2019    Pneumococcal, PCV-13, PREVNAR 13, (age 6w+), IM, 0.5mL 12/16/2014    Pneumococcal, PPSV23, PNEUMOVAX 23, (age 2y+), SC/IM, 0.5mL 10/02/2008    RSV, ABRYSVO, (age 60y+), PF, IM, 0.5mL 10/03/2023    TD 5LF, TENIVAC, (age 7y+), IM, 0.5mL 09/22/2022    TDaP, ADACEL (age 10y-64y), BOOSTRIX (age 10y+), IM, 0.5mL 10/04/2010, 01/11/2016    Zoster Live (Zostavax) 02/16/2012       Known drug allergies:     All allergies were reviewed and updated    No Known Allergies    Social history:     Social History:  All social andepidemiologic history was reviewed and updated by me today as needed.     Tobacco use:   reports that he has never smoked. He has never used smokeless tobacco.  Alcohol use:   reports no history of alcohol use.  Currently lives in: [place] [id number]   reports no history of drug use.     COVID VACCINATION AND LAB RESULT RECORDS:     Internal Administration   First Dose COVID-19, PFIZER PURPLE top, DILUTE for use, (age 12 y+), 30mcg/0.3mL  01/20/2021   Second Dose COVID-19, PFIZER PURPLE top, DILUTE for use, (age 12 y+), 30mcg/0.3mL   02/10/2021       Last COVID Lab SARS-CoV-2, PCR (no units)   Date Value   12/05/2020 Not Detected     SARS-CoV-2, ISAAC (no units)   Date Value   12/03/2020 NOT DETECTED     SARS-CoV-2, NAAT (no units)   Date Value   06/03/2023 Not Detected     POC ACT LR (sec)   Date Value   10/26/2021 246     POC Anion Gap (no units)   Date Value   07/15/2015 12     POC BUN (mg/dL)   Date Value   07/15/2015 35 (H)     POC Chloride (mEq/L)   Date Value   07/15/2015 99     POC CO2 (mEq/L)   Date Value   01/10/2012 21     POC Creatinine (mg/dL)   Date Value   07/15/2015 1.4 (H)     POC Glucose (mg/dl)   Date Value   01/24/2024 273 (H)     INR,(POC) (no units)   Date Value   12/27/2023 2.5     POC Ionized Calcium (mmol/L)

## 2024-01-24 NOTE — PROGRESS NOTES
filed at 1/24/2024 0409  Gross per 24 hour   Intake 300 ml   Output 150 ml   Net 150 ml      Vitals:   Vitals:    01/24/24 0345 01/24/24 0621 01/24/24 0715 01/24/24 0900   BP: 121/66  (!) 108/49 98/72   Pulse: 81  58 86   Resp: 16  16 18   Temp: 98.4 °F (36.9 °C)  97.4 °F (36.3 °C) 97.8 °F (36.6 °C)   TempSrc: Temporal  Temporal Axillary   SpO2: 97%  91% 93%   Weight:  73.5 kg (162 lb)     Height:             Physical Exam:      General: NAD  Eyes: EOMI  ENT: neck supple  Cardiovascular: Regular rate.  Respiratory: Clear to auscultation  Gastrointestinal: Soft, non tender  Genitourinary: no suprapubic tenderness  Musculoskeletal: No edema  Skin: warm, dry  Neuro: Alert.  Psych: Mood appropriate.         Medications:   Medications:    polyethylene glycol  17 g Oral BID    insulin lispro  0-16 Units SubCUTAneous TID WC    insulin lispro  0-4 Units SubCUTAneous Nightly    [START ON 1/31/2024] warfarin  1.25 mg Oral Once per day on Wed    warfarin  2.5 mg Oral Once per day on Sun Mon Tue Thu Fri Sat    colchicine  0.3 mg Oral Daily    linezolid  600 mg IntraVENous Q12H    allopurinol  100 mg Oral Daily    metoprolol succinate  100 mg Oral BID    ascorbic acid  500 mg Oral Daily    epoetin nader-epbx  10,000 Units SubCUTAneous Q14 Days    fluticasone  2 spray Each Nostril Daily    amiodarone  200 mg Oral Daily    atorvastatin  20 mg Oral Daily    busPIRone  5 mg Oral BID    vitamin B-1  100 mg Oral Daily    sodium chloride flush  5-40 mL IntraVENous 2 times per day    pantoprazole  40 mg IntraVENous Daily      Infusions:    sodium chloride      heparin (PORCINE) Infusion 16 Units/kg/hr (01/23/24 0203)    sodium chloride      dextrose       PRN Meds: zinc oxide, , 4x Daily PRN  sodium chloride, , PRN  heparin (porcine), 4,000 Units, PRN  heparin (porcine), 2,000 Units, PRN  HYDROcodone 5 mg - acetaminophen, 1 tablet, Q6H PRN  melatonin, 3 mg, Nightly PRN  sennosides-docusate sodium, 1 tablet, Daily PRN  sodium chloride  flush, 5-40 mL, PRN  sodium chloride, , PRN  ondansetron, 4 mg, Q8H PRN   Or  ondansetron, 4 mg, Q6H PRN  acetaminophen, 650 mg, Q6H PRN   Or  acetaminophen, 650 mg, Q6H PRN  dextrose bolus, 125 mL, PRN   Or  dextrose bolus, 250 mL, PRN  dextrose, , Continuous PRN  magnesium sulfate, 2,000 mg, PRN        Labs and Imaging   CT RADIUS ULNA LEFT WO CONTRAST    Result Date: 1/23/2024  EXAMINATION: CT OF THE LEFT UPPER EXTREMITY WITHOUT CONTRAST 1/23/2024 2:48 pm TECHNIQUE: CT of the left upper extremity was performed without the administration of intravenous contrast. Multiplanar reformatted images are provided for review. Automated exposure control, iterative reconstruction, and/or weight based adjustment of the mA/kV was utilized to reduce the radiation dose to as low as reasonably achievable. COMPARISON: Radiographs HISTORY ORDERING SYSTEM PROVIDED HISTORY: pain, r/o abscess/gout TECHNOLOGIST PROVIDED HISTORY: Reason for exam:->pain, r/o abscess/gout Reason for Exam: pain, r/o abscess/gout FINDINGS: Bones: No fracture or dislocation of the forearm.  No osseous erosions. The visualized left hip has at least moderate osteoarthritic changes. Diffuse osteopenia of the hip.  No lytic destructive lesions.  No periosteal reaction. Soft Tissue: Extensive edema in the subcutaneous tissues.  There is a partially calcified nodule in the dorsal aspect of the elbow adjacent to the triceps tendon.  However, it is may extend into the olecranon bursa.  It is not causing osseous erosion. The edema extends throughout the forearm.  No other calcified nodules are appreciated.  Dense vascular calcifications.  These are extensive.  The musculature is grossly intact.  No deep space fluid collection. Joint: No significant degenerative changes. No osseous erosions.     1. Partially calcified nodule in the posterior soft tissues.  This is likely gout.  It appears to be in the olecranon bursa. 2. Extensive edema of the forearm.  No other

## 2024-01-24 NOTE — PROGRESS NOTES
Pharmacy to Dose Warfarin    Pharmacy consulted to dose warfarin for Afib.    INR Goal: 2-3    INR today: 1.24    Assessment/Plan:  - INR is subtherapeutic  - Hep gtt remains  - Holding warfarin at this time      Pharmacy will continue to follow.    Enoch Gunter [facility]  [id number]

## 2024-01-24 NOTE — PROGRESS NOTES
VASCULAR    Seen in follow up from original evaluation - now S/P podiatric surgery.  No complaints.    VSS Afeb  Foot wrapped with brisk cap refill    A/P: S/P Travis arthroplasty per podiatry   Tibial level diabetic arterial disease with maintained foot perfusion.   No plans for further vascular workup or diagnostics at this time.   Will see as needed. Please call should there be any issues of concern regarding healing of the foot.     Gray Randle

## 2024-01-25 ENCOUNTER — APPOINTMENT (OUTPATIENT)
Dept: CT IMAGING | Age: 89
DRG: 622 | End: 2024-01-25
Payer: MEDICARE

## 2024-01-25 LAB
ANION GAP SERPL CALCULATED.3IONS-SCNC: 10 MMOL/L (ref 3–16)
BACTERIA BLD CULT ORG #2: NORMAL
BACTERIA BLD CULT: NORMAL
BUN SERPL-MCNC: 74 MG/DL (ref 7–20)
CALCIUM SERPL-MCNC: 8.2 MG/DL (ref 8.3–10.6)
CHLORIDE SERPL-SCNC: 96 MMOL/L (ref 99–110)
CO2 SERPL-SCNC: 24 MMOL/L (ref 21–32)
CREAT SERPL-MCNC: 2.4 MG/DL (ref 0.8–1.3)
DEPRECATED RDW RBC AUTO: 22.2 % (ref 12.4–15.4)
EKG ATRIAL RATE: 70 BPM
EKG DIAGNOSIS: NORMAL
EKG P AXIS: 36 DEGREES
EKG P-R INTERVAL: 210 MS
EKG Q-T INTERVAL: 498 MS
EKG QRS DURATION: 134 MS
EKG QTC CALCULATION (BAZETT): 537 MS
EKG R AXIS: -50 DEGREES
EKG T AXIS: 9 DEGREES
EKG VENTRICULAR RATE: 70 BPM
GFR SERPLBLD CREATININE-BSD FMLA CKD-EPI: 25 ML/MIN/{1.73_M2}
GLUCOSE BLD-MCNC: 178 MG/DL (ref 70–99)
GLUCOSE BLD-MCNC: 202 MG/DL (ref 70–99)
GLUCOSE BLD-MCNC: 206 MG/DL (ref 70–99)
GLUCOSE BLD-MCNC: 232 MG/DL (ref 70–99)
GLUCOSE SERPL-MCNC: 184 MG/DL (ref 70–99)
HCT VFR BLD AUTO: 23.8 % (ref 40.5–52.5)
HGB BLD-MCNC: 8 G/DL (ref 13.5–17.5)
INR PPP: 1.33 (ref 0.84–1.16)
MAGNESIUM SERPL-MCNC: 2.3 MG/DL (ref 1.8–2.4)
MCH RBC QN AUTO: 31.2 PG (ref 26–34)
MCHC RBC AUTO-ENTMCNC: 33.6 G/DL (ref 31–36)
MCV RBC AUTO: 92.8 FL (ref 80–100)
NT-PROBNP SERPL-MCNC: ABNORMAL PG/ML (ref 0–449)
PERFORMED ON: ABNORMAL
PLATELET # BLD AUTO: 309 K/UL (ref 135–450)
PMV BLD AUTO: 9.5 FL (ref 5–10.5)
POTASSIUM SERPL-SCNC: 4.1 MMOL/L (ref 3.5–5.1)
PROTHROMBIN TIME: 16.5 SEC (ref 11.5–14.8)
RBC # BLD AUTO: 2.57 M/UL (ref 4.2–5.9)
SODIUM SERPL-SCNC: 130 MMOL/L (ref 136–145)
WBC # BLD AUTO: 15.3 K/UL (ref 4–11)

## 2024-01-25 PROCEDURE — 93005 ELECTROCARDIOGRAM TRACING: CPT | Performed by: HOSPITALIST

## 2024-01-25 PROCEDURE — 83735 ASSAY OF MAGNESIUM: CPT

## 2024-01-25 PROCEDURE — 97530 THERAPEUTIC ACTIVITIES: CPT

## 2024-01-25 PROCEDURE — 6370000000 HC RX 637 (ALT 250 FOR IP)

## 2024-01-25 PROCEDURE — 6370000000 HC RX 637 (ALT 250 FOR IP): Performed by: HOSPITALIST

## 2024-01-25 PROCEDURE — 99233 SBSQ HOSP IP/OBS HIGH 50: CPT | Performed by: INTERNAL MEDICINE

## 2024-01-25 PROCEDURE — 1200000000 HC SEMI PRIVATE

## 2024-01-25 PROCEDURE — 6360000002 HC RX W HCPCS

## 2024-01-25 PROCEDURE — 6370000000 HC RX 637 (ALT 250 FOR IP): Performed by: INTERNAL MEDICINE

## 2024-01-25 PROCEDURE — C9113 INJ PANTOPRAZOLE SODIUM, VIA: HCPCS

## 2024-01-25 PROCEDURE — 93010 ELECTROCARDIOGRAM REPORT: CPT | Performed by: INTERNAL MEDICINE

## 2024-01-25 PROCEDURE — 6370000000 HC RX 637 (ALT 250 FOR IP): Performed by: PHYSICIAN ASSISTANT

## 2024-01-25 PROCEDURE — 70450 CT HEAD/BRAIN W/O DYE: CPT

## 2024-01-25 PROCEDURE — 36415 COLL VENOUS BLD VENIPUNCTURE: CPT

## 2024-01-25 PROCEDURE — 85027 COMPLETE CBC AUTOMATED: CPT

## 2024-01-25 PROCEDURE — 83880 ASSAY OF NATRIURETIC PEPTIDE: CPT

## 2024-01-25 PROCEDURE — 80048 BASIC METABOLIC PNL TOTAL CA: CPT

## 2024-01-25 PROCEDURE — 85610 PROTHROMBIN TIME: CPT

## 2024-01-25 PROCEDURE — 6360000002 HC RX W HCPCS: Performed by: INTERNAL MEDICINE

## 2024-01-25 PROCEDURE — 2580000003 HC RX 258

## 2024-01-25 RX ORDER — WARFARIN SODIUM 2.5 MG/1
2.5 TABLET ORAL
Status: DISCONTINUED | OUTPATIENT
Start: 2024-01-26 | End: 2024-01-26

## 2024-01-25 RX ORDER — WARFARIN SODIUM 2.5 MG/1
1.25 TABLET ORAL
Status: DISCONTINUED | OUTPATIENT
Start: 2024-01-31 | End: 2024-01-29 | Stop reason: HOSPADM

## 2024-01-25 RX ORDER — WARFARIN SODIUM 5 MG/1
5 TABLET ORAL
Status: COMPLETED | OUTPATIENT
Start: 2024-01-25 | End: 2024-01-25

## 2024-01-25 RX ORDER — TIZANIDINE 4 MG/1
2 TABLET ORAL ONCE
Status: COMPLETED | OUTPATIENT
Start: 2024-01-25 | End: 2024-01-25

## 2024-01-25 RX ADMIN — BUSPIRONE HYDROCHLORIDE 5 MG: 5 TABLET ORAL at 22:15

## 2024-01-25 RX ADMIN — HYDROCODONE BITARTRATE AND ACETAMINOPHEN 1 TABLET: 5; 325 TABLET ORAL at 05:19

## 2024-01-25 RX ADMIN — COLCHICINE 0.3 MG: 0.6 TABLET, FILM COATED ORAL at 09:02

## 2024-01-25 RX ADMIN — LINEZOLID 600 MG: 600 INJECTION, SOLUTION INTRAVENOUS at 03:31

## 2024-01-25 RX ADMIN — TIZANIDINE 2 MG: 4 TABLET ORAL at 11:43

## 2024-01-25 RX ADMIN — POLYETHYLENE GLYCOL 3350 17 G: 17 POWDER, FOR SOLUTION ORAL at 22:22

## 2024-01-25 RX ADMIN — INSULIN LISPRO 4 UNITS: 100 INJECTION, SOLUTION INTRAVENOUS; SUBCUTANEOUS at 09:03

## 2024-01-25 RX ADMIN — BUSPIRONE HYDROCHLORIDE 5 MG: 5 TABLET ORAL at 08:49

## 2024-01-25 RX ADMIN — WARFARIN SODIUM 5 MG: 5 TABLET ORAL at 17:43

## 2024-01-25 RX ADMIN — ATORVASTATIN CALCIUM 20 MG: 20 TABLET, FILM COATED ORAL at 08:49

## 2024-01-25 RX ADMIN — POLYETHYLENE GLYCOL 3350 17 G: 17 POWDER, FOR SOLUTION ORAL at 08:51

## 2024-01-25 RX ADMIN — Medication 10 ML: at 22:16

## 2024-01-25 RX ADMIN — OXYCODONE HYDROCHLORIDE AND ACETAMINOPHEN 500 MG: 500 TABLET ORAL at 08:49

## 2024-01-25 RX ADMIN — LINEZOLID 600 MG: 600 INJECTION, SOLUTION INTRAVENOUS at 15:17

## 2024-01-25 RX ADMIN — Medication 100 MG: at 09:03

## 2024-01-25 RX ADMIN — ALLOPURINOL 100 MG: 100 TABLET ORAL at 08:49

## 2024-01-25 RX ADMIN — Medication 10 ML: at 08:51

## 2024-01-25 RX ADMIN — FLUTICASONE PROPIONATE 2 SPRAY: 50 SPRAY, METERED NASAL at 08:55

## 2024-01-25 RX ADMIN — PANTOPRAZOLE SODIUM 40 MG: 40 INJECTION, POWDER, FOR SOLUTION INTRAVENOUS at 08:51

## 2024-01-25 RX ADMIN — INSULIN LISPRO 4 UNITS: 100 INJECTION, SOLUTION INTRAVENOUS; SUBCUTANEOUS at 18:43

## 2024-01-25 ASSESSMENT — PAIN SCALES - WONG BAKER
WONGBAKER_NUMERICALRESPONSE: 2

## 2024-01-25 ASSESSMENT — PAIN SCALES - GENERAL
PAINLEVEL_OUTOF10: 4
PAINLEVEL_OUTOF10: 7

## 2024-01-25 ASSESSMENT — PAIN DESCRIPTION - LOCATION: LOCATION: GENERALIZED;NECK

## 2024-01-25 NOTE — PROGRESS NOTES
Did not do a \"Infusion Pump Verify\" because it was still showing this patient on a heparin drip.  I will not sign off on the heparin drip as patient is not on a heparin drip on this floor while under my care.

## 2024-01-25 NOTE — PROGRESS NOTES
Nutrition Note    RECOMMENDATIONS  PO Diet: CCC as tolerated  ONS: Magic cup BID  Consider SLP eval d/t concern with choking per Dtr.     NUTRITION ASSESSMENT   Nutrition intervention triggered for LOS. Pt is nutritionally compromised AEB inadequate po intake & physical signs of muscle & fat tissue wasting upon NFPA.  Pt has hx of wt loss, down to 147 lb per dtr, however has gained wt back to UBW in the 160's.  Dtr reports pt choking as he is not completely sitting up for meals because he has a stiff neck.  Pt also has difficulty feeding self d/t a swollen left arm & tremors in rt arm.  May need staff to assist with meals when family not present.  Pt does not like Ensure supplements, but is willing to try magic cups BID.  Will monitor for adequate po & supplement intake to promote healing of surgical wound (dx osteomyelitis rt foot).  May consider SLP if choking persists.     Nutrition Related Findings: +1 BLE edema; LBM 1/18; gluc 184, Na 130  Wounds: Surgical Incision  Nutrition Education:  Education not indicated   Nutrition Goals: PO intake 50% or greater     MALNUTRITION ASSESSMENT   Chronic Illness  Malnutrition Status: At risk for malnutrition (Comment)  Findings of the 6 clinical characteristics of malnutrition:  Energy Intake:  Mild decrease in energy intake (Comment)  Weight Loss:  No significant weight loss     Body Fat Loss:  Mild body fat loss Fat Overlying Ribs, Orbital   Muscle Mass Loss:  Mild muscle mass loss Temples (temporalis)  Fluid Accumulation:  Mild Extremities   Strength:  Not Performed      NUTRITION DIAGNOSIS   Inadequate oral intake related to inadequate protein-energy intake as evidenced by intake 0-25%  Increased nutrient needs related to increase demand for energy/nutrients as evidenced by wounds      CURRENT NUTRITION THERAPIES  ADULT DIET; Regular; 5 carb choices (75 gm/meal)  ADULT ORAL NUTRITION SUPPLEMENT; Lunch, Dinner; Frozen Oral Supplement     PO Intake: 1-25%   PO

## 2024-01-25 NOTE — PROGRESS NOTES
Pharmacy to Dose Warfarin    Pharmacy consulted to dose warfarin for afib.    INR Goal: 2-3    INR today: 1.33    Assessment/Plan:  - INR is subtherapeutic after being held since 1/17  - Will boost with 5 mg today  - Hep gtt bridge cont  - Possible concomitant drug-drug interactions include: Heparin, Allopurinol, Amiodarone     Pharmacy will continue to follow.    Enoch Gunter [facility]  [phone]

## 2024-01-25 NOTE — PROGRESS NOTES
Physical Therapy  Pt JOSELITO for CT scan during therapy attempt. Will follow-up as schedule permits.   Thanks, Ricarda Levine PT, DPT [id number]

## 2024-01-25 NOTE — PROGRESS NOTES
[facility] - Inpatient Rehabilitation Department   Phone: [phone]    Physical Therapy    [] Initial Evaluation            [x] Daily Treatment Note         [] Discharge Summary      Patient: Josep Santos   : 1933   MRN: [id number]   Date of Service:  2024  Admitting Diagnosis: Osteomyelitis of metatarsal (HCC)  Current Admission Summary: Josep Santos is a 89 y.o. male who presents to the emergency room due to worsening right foot infection.  He was seen by his podiatrist earlier today and was advised to come to the emergency department due to this worsening infection concern for osteomyelitis and to be admitted to the hospital.     Seen by podiatry: Recommending podiatric surgical intervention during this admission, possibly TMA.   -Osteomyelitis, right foot  -Possible septic joint, right first metatarsophalangeal joint  -Diabetic foot infection, right foot  -Cellulitis, right foot  -Gout, bilateral lower extremity  -Diabetes mellitus with peripheral neuropathy  Updated : Planning for OR later today for I&D of R foot.   Past Medical History:  has a past medical history of Actinic keratosis, Actinic keratosis, Atrial fibrillation (HCC), Bilateral carotid artery stenosis, CAD (coronary artery disease), Cellulitis, Diabetes mellitus (HCC), DM (diabetes mellitus), type 2 with peripheral vascular complications (HCC)--s/p amputation great toe post osteomylitis , Glucose intolerance (malabsorption), Gout, Hyperlipidemia, Hypertension, Hypertrophy of prostate without urinary obstruction and other lower urinary tract symptoms (LUTS), Intermittent atrial fibrillation (HCC), Kidney stone, Occult blood in stool, and Pacemaker.  Past Surgical History:  has a past surgical history that includes Tonsillectomy and adenoidectomy; Appendectomy; Kidney stone surgery (); Coronary artery bypass graft (); Cardiac catheterization (); pacemaker placement (); other surgical

## 2024-01-25 NOTE — PROGRESS NOTES
Physician Progress Note      PATIENT:               JOSE LOCO  CSN #:                  [id number]  :                       1933  ADMIT DATE:       2024 2:22 PM  DISCH DATE:  RESPONDING  PROVIDER #:        OSEI TORRES          QUERY TEXT:    Patient admitted with diabetic foot ulcer infection concern for Osteomyelitis.   Per vascular , noted to also have \"PAD - diabetic tibial level disease   with maintained perfusion via AISHA/DP and DM with peripheral neuropathy.\" If   possible, please document in progress notes the etiology of the diabetic foot   ulcer:      The medical record reflects the following:  Risk Factors: T2DM, PVD, neuropathy  Clinical Indicators: Per Vascular : \"Reason for Consultation PAD with   nonhealing foot ulcer. PAD - duplex showing non-compressible vessels with PTA   stenosis.\" Per vascular : \"PAD - diabetic tibial level disease with   maintained perfusion via AISHA/DP, DM with peripheral neuropathy.\" Per Podiatry   : \"surgical path: Polarizable crystals consistent with tophi gout.   Vascular surgery with no further plan for intervention. Diabetes mellitus with   peripheral neuropathy. Wound culture: MRSA.\"  Treatment: IV Daptomycin -, IV Cefepime -, Zyvox IV , IV   Vanc x1, bedside I&D, I&D with Travis arthroplasty, xray R foot x2, VL PATIENCE, VL   LE Arteries, wound culture, surgical culture and path, podiatry consult,   vascular consult, ID consult  Options provided:  -- Diabetic Ulcer to right foot suspected due to PAD present on admission  -- Diabetic Ulcer to right foot suspected due to neuropathy present on   admission  -- Diabetic Ulcer to right foot due to neuropathy and PAD present on admission  -- Diabetic Ulcer to right foot not due to neuropathy or PAD present on   admission  -- Other - I will add my own diagnosis  -- Disagree - Not applicable / Not valid  -- Disagree - Clinically unable to determine / Unknown  -- Refer to Clinical  Documentation Reviewer    PROVIDER RESPONSE TEXT:    This patient has a diabetic ulcer to right foot suspected due to neuropathy   present on admission.    Query created by: Grazyna Palmer on 1/25/2024 10:14 AM      Electronically signed by:  OSEI TORRES 1/25/2024 11:27 AM

## 2024-01-25 NOTE — PROGRESS NOTES
[facility] - Inpatient Rehabilitation Department   Phone: [phone]    Occupational Therapy    [] Initial Evaluation            [x] Daily Treatment Note         [] Discharge Summary      Patient: Josep Santos   : 1933   MRN: [id number]   Date of Service:  2024    Admitting Diagnosis:  Osteomyelitis of metatarsal (HCC)  Current Admission Summary: Josep Santos is a 89 y.o. male who presents to the emergency room due to worsening right foot infection.  He was seen by his podiatrist earlier today and was advised to come to the emergency department due to this worsening infection concern for osteomyelitis and to be admitted to the hospital.     Seen by podiatry: Recommending podiatric surgical intervention during this admission, possibly TMA.   -Osteomyelitis, right foot  -Possible septic joint, right first metatarsophalangeal joint  -Diabetic foot infection, right foot  -Cellulitis, right foot  -Gout, bilateral lower extremity  -Diabetes mellitus with peripheral neuropathy    X-ray left elbow 24:  IMPRESSION:  1. Soft tissue swelling along the left elbow with an associated elbow  effusion.  No discrete fracture is identified.  Follow-up radiographs in 7-10  days may be of benefit if there is concern for an occult fracture.    : s/p right foot I&D with carbone arthroplasty    Past Medical History:  has a past medical history of Actinic keratosis, Actinic keratosis, Atrial fibrillation ([facility]), Bilateral carotid artery stenosis, CAD (coronary artery disease), Cellulitis, Diabetes mellitus ([facility]), DM (diabetes mellitus), type 2 with peripheral vascular complications ([facility])--s/p amputation great toe post osteomylitis , Glucose intolerance (malabsorption), Gout, Hyperlipidemia, Hypertension, Hypertrophy of prostate without urinary obstruction and other lower urinary tract symptoms (LUTS), Intermittent atrial fibrillation ([facility]), Kidney stone, Occult blood in stool, and

## 2024-01-25 NOTE — PROGRESS NOTES
Pt heart is 40 dropping into the high 30's. Dr. Wise notifed. Holding metoprolol and amiodarone.

## 2024-01-25 NOTE — PROGRESS NOTES
Patient transferred to 5T from . Patient vital signs are stable. Family is bedside.   Call light is within reach.   NOTE: Was stated in report that patient is not on a heparin drip, that the MD stopped it.  Patient was not brought to 5T on a heparin drip.  However, heparin drip is still running on the MAR.

## 2024-01-25 NOTE — PROGRESS NOTES
1st  metatarsal.  Multifocal areas of lucency and cortical destruction involve the  1st metatarsal head, the lateral base of the 1st proximal phalanx and the  medial aspect of the head of the 2nd metatarsal.  No subcutaneous air is  identified.  Prominent vascular calcifications are noted.     IMPRESSION:  Lucency and cortical destruction consistent with acute osteomyelitis  throughout the 1st metatarsal head, at the lateral aspect of the base of the  1st proximal phalanx and at the medial aspect of the 2nd metatarsal head with  adjacent soft tissue swelling.  No subcutaneous air is noted.     CT Right Foot (1/17/23)  FINDINGS:  Bones: There are extensive destructive changes involving the 1st metatarsal  head as well as the base of the proximal phalanx of the great toe.  Findings  are suspicious for septic arthritis.  Additionally, there are several  well-defined erosions with associated periarticular soft tissue masses,  involving the 1st MTP joint as well as the 2nd metatarsal head.  The  appearance could indicate chronic gout.     Similar well-defined erosions with associated periarticular masses involving  the head of the 1st metatarsal and base of the proximal phalanx on the left,  incompletely evaluated.     No acute fracture identified.     Soft Tissue: Soft tissue ulceration is noted at the medial aspect of the 1st  metatarsal head.     Extensive diffuse muscle atrophy is noted bilaterally.  No well-defined fluid  collection.     Joint: Joint alignment is maintained.     IMPRESSION:  1. Chronic well-defined erosions with associated periarticular soft tissue  masses involving the 1st MTP joint and 2nd MTP joint of the right foot.  There are similar changes noted involving the 1st MTP joint of the left foot.  The appearance is strongly suspicious for gout.  2. Soft tissue ulceration adjacent to the 1st metatarsal head on the right,  with suspected superimposed septic arthritis of the right 1st MTP joint.  3.  noted (WBC 15.3)  -ESR 28 and .4  -HbA1c 5.5  -Imaging reviewed, impression noted above. Chest xray with findings of pulmonary edema but without concern for pulmonary embolism.    -Wound culture: MRSA  -surgical culture: MRSA  -surgical path:Polarizable crystals consistent with tophi gout.   -Blood cultures 1 & 2: NGTD  -Continue IV antibiotics per infectious disease recommendations: Linezolid  -Right lower extremity dressing left clean, dry, intact.   -Partial heel weightbearing to right LE in surgical shoe    DISPO: S/p right foot I&D with carbone arthroplasty. Procedure felt to be definitive. Partial weightbearing with heel touch only to RLE. Labs and imaging reviewed,  Continue IV antibiotics per ID. Patient stable for discharge from a podiatric standpoint when medically optimized. Will plan for dressing change Monday if patient is still in house.    Discussed assessment and plan with Dr. Frank Jacob DPM.    Odell Wise DPM   Podiatric Resident PGY3  Pager [phone] or [place]  1/25/2024, 4:29 PM

## 2024-01-25 NOTE — PROGRESS NOTES
+--------+------+------+ Left UE Vein Measurements 2D Measurements +----------+----------+---------------+----------+ !Location  !Visualized!Compressibility!Thrombosis! +----------+----------+---------------+----------+ !IJV       !Yes       !Yes            !None      ! +----------+----------+---------------+----------+ !SCV       !Yes       !Yes            !None      ! +----------+----------+---------------+----------+ !Innominate!Yes       !Yes            !None      ! +----------+----------+---------------+----------+ !Axillary  !Yes       !Yes            !None      ! +----------+----------+---------------+----------+ !Brachial  !Partial   !Yes            !None      ! +----------+----------+---------------+----------+ !Radial    !Yes       !Yes            !None      ! +----------+----------+---------------+----------+ !Ulnar     !Yes       !Yes            !None      ! +----------+----------+---------------+----------+ !Basilic   !Partial   !Yes            !None      ! +----------+----------+---------------+----------+ !Cephalic  !Yes       !Yes            !None      ! +----------+----------+---------------+----------+ Doppler Measurements +--------+------+------+ !Location!Signal!Reflux! +--------+------+------+ !IJV     !Phasic!      ! +--------+------+------+ !SCV     !Phasic!      ! +--------+------+------+ !Axillary!Phasic!      ! +--------+------+------+    VL DUP LOWER EXTREMITY ARTERIES BILATERAL    Result Date: 1/22/2024  Vascular Lower Extremities Arterial Duplex and Lower Arterial Plethysmography Procedure  Demographics   Patient Name     BERONICA RODRIGUEZ   Date of Study    01/18/2024        Gender             Male   Patient Number   [id number]        Date of Birth      11/12/1933   Visit Number     [id number]         Age                89 year(s)   Accession Number [id number]        Room Number        3321   Corporate ID     [id number]          Sonographer        Elidia Ahn, MELITA   Ordering          emergency medical condition->Emergency Medical Condition (MA) Reason for Exam: osteomyelitis FINDINGS: Bones: There are extensive destructive changes involving the 1st metatarsal head as well as the base of the proximal phalanx of the great toe.  Findings are suspicious for septic arthritis.  Additionally, there are several well-defined erosions with associated periarticular soft tissue masses, involving the 1st MTP joint as well as the 2nd metatarsal head.  The appearance could indicate chronic gout. Similar well-defined erosions with associated periarticular masses involving the head of the 1st metatarsal and base of the proximal phalanx on the left, incompletely evaluated. No acute fracture identified. Soft Tissue: Soft tissue ulceration is noted at the medial aspect of the 1st metatarsal head. Extensive diffuse muscle atrophy is noted bilaterally.  No well-defined fluid collection. Joint: Joint alignment is maintained.     1. Chronic well-defined erosions with associated periarticular soft tissue masses involving the 1st MTP joint and 2nd MTP joint of the right foot. There are similar changes noted involving the 1st MTP joint of the left foot. The appearance is strongly suspicious for gout. 2. Soft tissue ulceration adjacent to the 1st metatarsal head on the right, with suspected superimposed septic arthritis of the right 1st MTP joint. 3. Extensive, diffuse muscle atrophy.     XR FOOT RIGHT (MIN 3 VIEWS)    Result Date: 1/17/2024  EXAMINATION: 3 XRAY VIEWS OF THE RIGHT FOOT 1/17/2024 1:51 pm COMPARISON: 11/06/2015. HISTORY: ORDERING SYSTEM PROVIDED HISTORY: foot wounds TECHNOLOGIST PROVIDED HISTORY: Reason for exam:->foot wounds Reason for Exam: foot wounds FINDINGS: Prior amputation of the 5th toe is again noted.  There is soft tissue swelling at the forefoot which is most prominent near the head of the 1st metatarsal.  Multifocal areas of lucency and cortical destruction involve the 1st metatarsal head, the

## 2024-01-25 NOTE — PLAN OF CARE
Problem: Discharge Planning  Goal: Discharge to home or other facility with appropriate resources  Outcome: Progressing     Problem: Safety - Adult  Goal: Free from fall injury  Outcome: Progressing     Problem: ABCDS Injury Assessment  Goal: Absence of physical injury  Outcome: Progressing     Problem: Pain  Goal: Verbalizes/displays adequate comfort level or baseline comfort level  Outcome: Progressing     Problem: Skin/Tissue Integrity  Goal: Absence of new skin breakdown  Description: 1.  Monitor for areas of redness and/or skin breakdown  2.  Assess vascular access sites hourly  3.  Every 4-6 hours minimum:  Change oxygen saturation probe site  4.  Every 4-6 hours:  If on nasal continuous positive airway pressure, respiratory therapy assess nares and determine need for appliance change or resting period.  Outcome: Progressing     Problem: Chronic Conditions and Co-morbidities  Goal: Patient's chronic conditions and co-morbidity symptoms are monitored and maintained or improved  Outcome: Progressing

## 2024-01-25 NOTE — PROGRESS NOTES
[facility] Note    Patient Active Problem List   Diagnosis    CAD (coronary artery disease) CABG x3 1996, stenting PDA 10/2021    Cardiac pacemaker    Benign prostatic hyperplasia with nocturia    Gout-uric acid >7.5--advised proph tx    Hypercholesteremia    Carotid bruit(bilat-nl duplex u/s 10/10)    Vitamin D deficiency-(advised 1000IU/day)    Actinic keratoses    Elevated PSA-(was 4.2 10/10-repeat 6 mo)(was 5.12 1/11-then 4.4 2/12)-sees dr aguillon for this    S/P colonoscopy-4/07-neg per pt,  3/18 repeat colonoscopy polyp-repeat 5 yr    Hearing loss, conductive, bilateral-seeing ent-dr quinn for hearing aides    Encounter for monitoring sotalol therapy    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 Emerson     Chronic anemia    Type 2 diabetes mellitus with hyperglycemia, without long-term current use of insulin (HCC)    Hypertension    Chronic renal impairment, stage 3 (moderate) ([facility])    Hyperkalemia    H/O esophagogastroduodenoscopy  11/18  prominent vessesls vs varices. dr Galindo (done for blood in stool)    Elevated brain natriuretic peptide (BNP) level    Ischemic cardiomyopathy; EF 40-45% 4/22    Chronic pansinusitis    Nonrheumatic aortic valve stenosis- moderate by echo 4/22    Acute on chronic systolic heart failure (HCC)    Mixed hyperlipidemia    Atrial fibrillation (HCC)    Chronic combined systolic and diastolic congestive heart failure (HCC)    Benign essential tremor    Anemia in chronic renal disease    Current moderate episode of major depressive disorder without prior episode (HCC)    Weight loss    Psychophysiological insomnia    Severe anemia    Iron deficiency anemia    Sundowning    Sleep disturbance    Osteomyelitis of metatarsal (HCC)    Acute osteomyelitis of phalanx of right foot (HCC)    CRP elevated    Elevated erythrocyte sedimentation rate    MRSA infection (methicillin-resistant Staphylococcus aureus)    Infection requiring contact isolation precautions     Left elbow pain    Acute pulmonary edema (HCC)       Past Medical History:   has a past medical history of Actinic keratosis, Actinic keratosis, Atrial fibrillation (HCC), Bilateral carotid artery stenosis, CAD (coronary artery disease), Cellulitis, Diabetes mellitus ([facility]), DM (diabetes mellitus), type 2 with peripheral vascular complications ([facility])--s/p amputation great toe post osteomylitis , Glucose intolerance (malabsorption), Gout, Hyperlipidemia, Hypertension, Hypertrophy of prostate without urinary obstruction and other lower urinary tract symptoms (LUTS), Intermittent atrial fibrillation ([facility]), Kidney stone, Occult blood in stool, and Pacemaker.    Past Social History:   reports that he has never smoked. He has never used smokeless tobacco. He reports that he does not drink alcohol and does not use drugs.    Subjective:    No complaints  Intermittent SOB    Review of Systems   Constitutional:  Positive for fatigue. Negative for activity change, appetite change, chills, fever and unexpected weight change.   HENT:  Negative for congestion and facial swelling.    Eyes:  Negative for photophobia, discharge and redness.   Respiratory:  Negative for cough, chest tightness and shortness of breath.    Cardiovascular:  Negative for chest pain, palpitations and leg swelling.   Gastrointestinal:  Negative for abdominal distention, abdominal pain, blood in stool, constipation, diarrhea, nausea and vomiting.   Endocrine: Negative for cold intolerance, heat intolerance and polyuria.   Genitourinary:  Negative for decreased urine volume, difficulty urinating, flank pain and hematuria.   Musculoskeletal:  Negative for joint swelling and neck pain.   Neurological:  Negative for dizziness, seizures, syncope, speech difficulty, light-headedness and headaches.   Hematological:  Does not bruise/bleed easily.   Psychiatric/Behavioral:  Negative for agitation, confusion and hallucinations.        Objective:  +1.6L since

## 2024-01-25 NOTE — PROGRESS NOTES
Infectious Diseases   Progress Note      Admission Date: 1/17/2024  Hospital Day: Hospital Day: 9   Attending: Salvador Wise MD  Date of service: 1/25/2024     Chief complaint/ Reason for consult:     Complicated right diabetic foot infection with concern for osteomyelitis involving the right hallux  Longstanding type 2 diabetes mellitus  Pacemaker in place  Diabetic polyneuropathy type II    Microbiology:        I have reviewed allavailable micro lab data and cultures    Blood culture (2/2) - collected on 1/17/2024: Negative so far  Right foot wound culture: Collected on 1/19/2024: MRSA    Susceptibility       Methicillin-Resistant Staphylococcus aureus     BACTERIAL SUSCEPTIBILITY PANEL BY MIRIAM     clindamycin <=0.25 mcg/mL Sensitive     erythromycin >=8 mcg/mL Resistant     oxacillin >=4 mcg/mL Resistant     tetracycline <=1 mcg/mL Sensitive     trimethoprim-sulfamethoxazole >=320 mcg/mL Resistant     vancomycin 1 mcg/mL Sensitive               Antibiotics and immunizations:       Current antibiotics: All antibiotics and their doses were reviewed by me    Recent Abx Admin                     linezolid (ZYVOX) IVPB 600 mg (mg) 600 mg New Bag 01/25/24 1517     600 mg New Bag  0331                      Immunization History: All immunization history was reviewed by me today.    Immunization History   Administered Date(s) Administered    COVID-19, PFIZER Bivalent, DO NOT Dilute, (age 12y+), IM, 30 mcg/0.3 mL 09/26/2022, 05/15/2023    COVID-19, PFIZER GRAY top, DO NOT Dilute, (age 12 y+), IM, 30 mcg/0.3 mL 07/16/2022    COVID-19, PFIZER PURPLE top, DILUTE for use, (age 12 y+), 30mcg/0.3mL 01/20/2021, 02/10/2021, 11/03/2021    COVID-19, PFIZER, (2023-24 formula), (age 12y+), IM, 30mcg/0.3mL 09/26/2023    Influenza Vaccine, unspecified formulation 10/22/2015, 09/13/2016    Influenza Virus Vaccine 10/04/2010, 11/02/2011, 11/25/2013    Influenza, FLUAD, (age 65 y+), Adjuvanted, 0.5mL 10/01/2020, 09/26/2022

## 2024-01-26 ENCOUNTER — HOSPITAL ENCOUNTER (OUTPATIENT)
Dept: WOUND CARE | Age: 89
Discharge: HOME | End: 2024-01-26
Attending: PODIATRIST

## 2024-01-26 LAB
ANION GAP SERPL CALCULATED.3IONS-SCNC: 11 MMOL/L (ref 3–16)
ANTI-XA UNFRAC HEPARIN: <0.1 IU/ML (ref 0.3–0.7)
BUN SERPL-MCNC: 72 MG/DL (ref 7–20)
CALCIUM SERPL-MCNC: 8.2 MG/DL (ref 8.3–10.6)
CHLORIDE SERPL-SCNC: 98 MMOL/L (ref 99–110)
CO2 SERPL-SCNC: 23 MMOL/L (ref 21–32)
CREAT SERPL-MCNC: 2.1 MG/DL (ref 0.8–1.3)
DEPRECATED RDW RBC AUTO: 22 % (ref 12.4–15.4)
GFR SERPLBLD CREATININE-BSD FMLA CKD-EPI: 29 ML/MIN/{1.73_M2}
GLUCOSE BLD-MCNC: 161 MG/DL (ref 70–99)
GLUCOSE BLD-MCNC: 209 MG/DL (ref 70–99)
GLUCOSE BLD-MCNC: 238 MG/DL (ref 70–99)
GLUCOSE BLD-MCNC: 242 MG/DL (ref 70–99)
GLUCOSE SERPL-MCNC: 141 MG/DL (ref 70–99)
HCT VFR BLD AUTO: 21.6 % (ref 40.5–52.5)
HGB BLD-MCNC: 7.3 G/DL (ref 13.5–17.5)
INR PPP: 1.32 (ref 0.84–1.16)
MCH RBC QN AUTO: 31.1 PG (ref 26–34)
MCHC RBC AUTO-ENTMCNC: 33.9 G/DL (ref 31–36)
MCV RBC AUTO: 91.9 FL (ref 80–100)
PERFORMED ON: ABNORMAL
PLATELET # BLD AUTO: 318 K/UL (ref 135–450)
PMV BLD AUTO: 8.6 FL (ref 5–10.5)
POTASSIUM SERPL-SCNC: 4.7 MMOL/L (ref 3.5–5.1)
PROTHROMBIN TIME: 16.3 SEC (ref 11.5–14.8)
RBC # BLD AUTO: 2.35 M/UL (ref 4.2–5.9)
REPORT: NORMAL
RESP PATH DNA+RNA PNL NPH NAA+NON-PROBE: NORMAL
SODIUM SERPL-SCNC: 132 MMOL/L (ref 136–145)
WBC # BLD AUTO: 11.7 K/UL (ref 4–11)

## 2024-01-26 PROCEDURE — 6370000000 HC RX 637 (ALT 250 FOR IP): Performed by: PHYSICIAN ASSISTANT

## 2024-01-26 PROCEDURE — 6360000002 HC RX W HCPCS: Performed by: HOSPITALIST

## 2024-01-26 PROCEDURE — 6370000000 HC RX 637 (ALT 250 FOR IP)

## 2024-01-26 PROCEDURE — 6360000002 HC RX W HCPCS: Performed by: INTERNAL MEDICINE

## 2024-01-26 PROCEDURE — 99232 SBSQ HOSP IP/OBS MODERATE 35: CPT | Performed by: INTERNAL MEDICINE

## 2024-01-26 PROCEDURE — 6370000000 HC RX 637 (ALT 250 FOR IP): Performed by: HOSPITALIST

## 2024-01-26 PROCEDURE — C9113 INJ PANTOPRAZOLE SODIUM, VIA: HCPCS

## 2024-01-26 PROCEDURE — 85520 HEPARIN ASSAY: CPT

## 2024-01-26 PROCEDURE — 1200000000 HC SEMI PRIVATE

## 2024-01-26 PROCEDURE — 6370000000 HC RX 637 (ALT 250 FOR IP): Performed by: INTERNAL MEDICINE

## 2024-01-26 PROCEDURE — 2580000003 HC RX 258

## 2024-01-26 PROCEDURE — 6360000002 HC RX W HCPCS

## 2024-01-26 PROCEDURE — 80048 BASIC METABOLIC PNL TOTAL CA: CPT

## 2024-01-26 PROCEDURE — 0202U NFCT DS 22 TRGT SARS-COV-2: CPT

## 2024-01-26 PROCEDURE — 36415 COLL VENOUS BLD VENIPUNCTURE: CPT

## 2024-01-26 PROCEDURE — 85027 COMPLETE CBC AUTOMATED: CPT

## 2024-01-26 PROCEDURE — 85610 PROTHROMBIN TIME: CPT

## 2024-01-26 RX ORDER — SODIUM CHLORIDE 9 MG/ML
INJECTION, SOLUTION INTRAVENOUS
Status: DISPENSED
Start: 2024-01-26 | End: 2024-01-27

## 2024-01-26 RX ORDER — MORPHINE SULFATE 2 MG/ML
2 INJECTION, SOLUTION INTRAMUSCULAR; INTRAVENOUS EVERY 6 HOURS PRN
Status: DISCONTINUED | OUTPATIENT
Start: 2024-01-26 | End: 2024-01-29 | Stop reason: HOSPADM

## 2024-01-26 RX ORDER — WARFARIN SODIUM 5 MG/1
5 TABLET ORAL
Status: COMPLETED | OUTPATIENT
Start: 2024-01-26 | End: 2024-01-26

## 2024-01-26 RX ORDER — TORSEMIDE 20 MG/1
20 TABLET ORAL DAILY
Status: DISCONTINUED | OUTPATIENT
Start: 2024-01-26 | End: 2024-01-29 | Stop reason: HOSPADM

## 2024-01-26 RX ORDER — WARFARIN SODIUM 2.5 MG/1
2.5 TABLET ORAL
Status: DISCONTINUED | OUTPATIENT
Start: 2024-01-27 | End: 2024-01-29 | Stop reason: HOSPADM

## 2024-01-26 RX ORDER — TAMSULOSIN HYDROCHLORIDE 0.4 MG/1
0.4 CAPSULE ORAL DAILY
Status: DISCONTINUED | OUTPATIENT
Start: 2024-01-26 | End: 2024-01-29 | Stop reason: HOSPADM

## 2024-01-26 RX ADMIN — Medication 10 ML: at 08:05

## 2024-01-26 RX ADMIN — PANTOPRAZOLE SODIUM 40 MG: 40 INJECTION, POWDER, FOR SOLUTION INTRAVENOUS at 08:05

## 2024-01-26 RX ADMIN — BUSPIRONE HYDROCHLORIDE 5 MG: 5 TABLET ORAL at 20:12

## 2024-01-26 RX ADMIN — ALLOPURINOL 100 MG: 100 TABLET ORAL at 08:05

## 2024-01-26 RX ADMIN — MELATONIN TAB 3 MG 3 MG: 3 TAB at 02:04

## 2024-01-26 RX ADMIN — LINEZOLID 600 MG: 600 INJECTION, SOLUTION INTRAVENOUS at 03:21

## 2024-01-26 RX ADMIN — OXYCODONE HYDROCHLORIDE AND ACETAMINOPHEN 500 MG: 500 TABLET ORAL at 08:06

## 2024-01-26 RX ADMIN — MORPHINE SULFATE 2 MG: 2 INJECTION, SOLUTION INTRAMUSCULAR; INTRAVENOUS at 16:23

## 2024-01-26 RX ADMIN — HYDROCODONE BITARTRATE AND ACETAMINOPHEN 1 TABLET: 5; 325 TABLET ORAL at 17:59

## 2024-01-26 RX ADMIN — HYDROCODONE BITARTRATE AND ACETAMINOPHEN 1 TABLET: 5; 325 TABLET ORAL at 06:23

## 2024-01-26 RX ADMIN — ACETAMINOPHEN 650 MG: 325 TABLET ORAL at 10:22

## 2024-01-26 RX ADMIN — HYDROCODONE BITARTRATE AND ACETAMINOPHEN 1 TABLET: 5; 325 TABLET ORAL at 11:49

## 2024-01-26 RX ADMIN — POLYETHYLENE GLYCOL 3350 17 G: 17 POWDER, FOR SOLUTION ORAL at 08:05

## 2024-01-26 RX ADMIN — TORSEMIDE 20 MG: 20 TABLET ORAL at 11:49

## 2024-01-26 RX ADMIN — MELATONIN TAB 3 MG 3 MG: 3 TAB at 20:12

## 2024-01-26 RX ADMIN — LINEZOLID 600 MG: 600 INJECTION, SOLUTION INTRAVENOUS at 15:44

## 2024-01-26 RX ADMIN — FLUTICASONE PROPIONATE 2 SPRAY: 50 SPRAY, METERED NASAL at 08:06

## 2024-01-26 RX ADMIN — HYDROCODONE BITARTRATE AND ACETAMINOPHEN 1 TABLET: 5; 325 TABLET ORAL at 00:09

## 2024-01-26 RX ADMIN — WARFARIN SODIUM 5 MG: 5 TABLET ORAL at 17:58

## 2024-01-26 RX ADMIN — BUSPIRONE HYDROCHLORIDE 5 MG: 5 TABLET ORAL at 08:06

## 2024-01-26 RX ADMIN — Medication 12 UNITS/KG/HR: at 22:28

## 2024-01-26 RX ADMIN — ACETAMINOPHEN 650 MG: 325 TABLET ORAL at 22:20

## 2024-01-26 RX ADMIN — ACETAMINOPHEN 650 MG: 325 TABLET ORAL at 04:36

## 2024-01-26 RX ADMIN — HEPARIN SODIUM 2000 UNITS: 1000 INJECTION INTRAVENOUS; SUBCUTANEOUS at 22:58

## 2024-01-26 RX ADMIN — COLCHICINE 0.3 MG: 0.6 TABLET, FILM COATED ORAL at 08:05

## 2024-01-26 RX ADMIN — ATORVASTATIN CALCIUM 20 MG: 20 TABLET, FILM COATED ORAL at 08:05

## 2024-01-26 RX ADMIN — POLYETHYLENE GLYCOL 3350 17 G: 17 POWDER, FOR SOLUTION ORAL at 20:12

## 2024-01-26 RX ADMIN — INSULIN LISPRO 4 UNITS: 100 INJECTION, SOLUTION INTRAVENOUS; SUBCUTANEOUS at 18:01

## 2024-01-26 RX ADMIN — Medication 100 MG: at 08:06

## 2024-01-26 RX ADMIN — Medication 10 ML: at 20:15

## 2024-01-26 RX ADMIN — TAMSULOSIN HYDROCHLORIDE 0.4 MG: 0.4 CAPSULE ORAL at 18:25

## 2024-01-26 ASSESSMENT — PAIN DESCRIPTION - ORIENTATION
ORIENTATION: POSTERIOR
ORIENTATION: LEFT

## 2024-01-26 ASSESSMENT — PAIN DESCRIPTION - FREQUENCY
FREQUENCY: INTERMITTENT
FREQUENCY: CONTINUOUS
FREQUENCY: INTERMITTENT
FREQUENCY: CONTINUOUS
FREQUENCY: CONTINUOUS

## 2024-01-26 ASSESSMENT — PAIN - FUNCTIONAL ASSESSMENT
PAIN_FUNCTIONAL_ASSESSMENT: PREVENTS OR INTERFERES SOME ACTIVE ACTIVITIES AND ADLS

## 2024-01-26 ASSESSMENT — PAIN SCALES - GENERAL
PAINLEVEL_OUTOF10: 6
PAINLEVEL_OUTOF10: 9
PAINLEVEL_OUTOF10: 6
PAINLEVEL_OUTOF10: 10
PAINLEVEL_OUTOF10: 6
PAINLEVEL_OUTOF10: 5
PAINLEVEL_OUTOF10: 6
PAINLEVEL_OUTOF10: 6
PAINLEVEL_OUTOF10: 4
PAINLEVEL_OUTOF10: 5
PAINLEVEL_OUTOF10: 5

## 2024-01-26 ASSESSMENT — PAIN DESCRIPTION - LOCATION
LOCATION: ARM
LOCATION: SHOULDER
LOCATION: ARM
LOCATION: HEAD
LOCATION: NECK;SHOULDER;ARM
LOCATION: BACK;NECK
LOCATION: LEG;NECK;ARM

## 2024-01-26 ASSESSMENT — PAIN DESCRIPTION - DESCRIPTORS
DESCRIPTORS: ACHING

## 2024-01-26 ASSESSMENT — PAIN DESCRIPTION - PAIN TYPE
TYPE: CHRONIC PAIN

## 2024-01-26 ASSESSMENT — PAIN DESCRIPTION - ONSET
ONSET: ON-GOING

## 2024-01-26 NOTE — PROGRESS NOTES
1st  metatarsal.  Multifocal areas of lucency and cortical destruction involve the  1st metatarsal head, the lateral base of the 1st proximal phalanx and the  medial aspect of the head of the 2nd metatarsal.  No subcutaneous air is  identified.  Prominent vascular calcifications are noted.     IMPRESSION:  Lucency and cortical destruction consistent with acute osteomyelitis  throughout the 1st metatarsal head, at the lateral aspect of the base of the  1st proximal phalanx and at the medial aspect of the 2nd metatarsal head with  adjacent soft tissue swelling.  No subcutaneous air is noted.     CT Right Foot (1/17/23)  FINDINGS:  Bones: There are extensive destructive changes involving the 1st metatarsal  head as well as the base of the proximal phalanx of the great toe.  Findings  are suspicious for septic arthritis.  Additionally, there are several  well-defined erosions with associated periarticular soft tissue masses,  involving the 1st MTP joint as well as the 2nd metatarsal head.  The  appearance could indicate chronic gout.     Similar well-defined erosions with associated periarticular masses involving  the head of the 1st metatarsal and base of the proximal phalanx on the left,  incompletely evaluated.     No acute fracture identified.     Soft Tissue: Soft tissue ulceration is noted at the medial aspect of the 1st  metatarsal head.     Extensive diffuse muscle atrophy is noted bilaterally.  No well-defined fluid  collection.     Joint: Joint alignment is maintained.     IMPRESSION:  1. Chronic well-defined erosions with associated periarticular soft tissue  masses involving the 1st MTP joint and 2nd MTP joint of the right foot.  There are similar changes noted involving the 1st MTP joint of the left foot.  The appearance is strongly suspicious for gout.  2. Soft tissue ulceration adjacent to the 1st metatarsal head on the right,  with suspected superimposed septic arthritis of the right 1st MTP joint.  3.  noted (WBC 11.7)  -ESR 28 and .4  -HbA1c 5.5  -Imaging reviewed, impression noted above. Chest xray with findings of pulmonary edema but without concern for pulmonary embolism.    -Wound culture: MRSA  -surgical culture: MRSA  -surgical path:Polarizable crystals consistent with tophi gout.   -Blood cultures 1 & 2: NGTD  -Continue IV antibiotics per infectious disease recommendations: Linezolid  -Right lower extremity dressing left clean, dry, intact.   -Partial heel weightbearing to right LE in surgical shoe    DISPO: S/p right foot I&D with carbone arthroplasty. Procedure felt to be definitive. Partial weightbearing with heel touch only to RLE. Labs and imaging reviewed,  Continue IV antibiotics per ID. Patient stable for discharge from a podiatric standpoint when medically optimized. Will plan for dressing change Monday if patient is still in house.    Discussed assessment and plan with Dr. Frank Jacob DPM.    Odell Wise DPM   Podiatric Resident PGY3  Pager [phone] or [employer]  1/26/2024, 8:30 AM

## 2024-01-26 NOTE — PROGRESS NOTES
arthritis.  Additionally, there are several well-defined erosions with associated periarticular soft tissue masses, involving the 1st MTP joint as well as the 2nd metatarsal head.  The appearance could indicate chronic gout. Similar well-defined erosions with associated periarticular masses involving the head of the 1st metatarsal and base of the proximal phalanx on the left, incompletely evaluated. No acute fracture identified. Soft Tissue: Soft tissue ulceration is noted at the medial aspect of the 1st metatarsal head. Extensive diffuse muscle atrophy is noted bilaterally.  No well-defined fluid collection. Joint: Joint alignment is maintained.     1. Chronic well-defined erosions with associated periarticular soft tissue masses involving the 1st MTP joint and 2nd MTP joint of the right foot. There are similar changes noted involving the 1st MTP joint of the left foot. The appearance is strongly suspicious for gout. 2. Soft tissue ulceration adjacent to the 1st metatarsal head on the right, with suspected superimposed septic arthritis of the right 1st MTP joint. 3. Extensive, diffuse muscle atrophy.     XR FOOT RIGHT (MIN 3 VIEWS)    Result Date: 1/17/2024  EXAMINATION: 3 XRAY VIEWS OF THE RIGHT FOOT 1/17/2024 1:51 pm COMPARISON: 11/06/2015. HISTORY: ORDERING SYSTEM PROVIDED HISTORY: foot wounds TECHNOLOGIST PROVIDED HISTORY: Reason for exam:->foot wounds Reason for Exam: foot wounds FINDINGS: Prior amputation of the 5th toe is again noted.  There is soft tissue swelling at the forefoot which is most prominent near the head of the 1st metatarsal.  Multifocal areas of lucency and cortical destruction involve the 1st metatarsal head, the lateral base of the 1st proximal phalanx and the medial aspect of the head of the 2nd metatarsal.  No subcutaneous air is identified.  Prominent vascular calcifications are noted.     Lucency and cortical destruction consistent with acute osteomyelitis throughout the 1st metatarsal  head, at the lateral aspect of the base of the 1st proximal phalanx and at the medial aspect of the 2nd metatarsal head with adjacent soft tissue swelling.  No subcutaneous air is noted.       CBC:   Recent Labs     01/24/24  0442 01/25/24  0620 01/26/24  0507   WBC 16.6* 15.3* 11.7*   HGB 7.5* 8.0* 7.3*    309 318     BMP:    Recent Labs     01/24/24  0442 01/25/24  0620   * 130*   K 4.0 4.1   CL 98* 96*   CO2 21 24   BUN 69* 74*   CREATININE 2.5* 2.4*   GLUCOSE 267* 184*     Hepatic: No results for input(s): \"AST\", \"ALT\", \"ALB\", \"BILITOT\", \"ALKPHOS\" in the last 72 hours.  Lipids:   Lab Results   Component Value Date/Time    CHOL 84 06/04/2023 04:25 AM    HDL 37 06/04/2023 04:25 AM    HDL 36 05/07/2012 11:00 AM    TRIG 70 06/04/2023 04:25 AM     Hemoglobin A1C:   Lab Results   Component Value Date/Time    LABA1C 5.5 01/17/2024 06:27 PM     TSH:   Lab Results   Component Value Date/Time    TSH 2.64 04/13/2023 12:32 PM     Troponin: No results found for: \"TROPONINT\"  Lactic Acid:   Recent Labs     01/23/24  1520   LACTA 1.3     BNP:   Recent Labs     01/23/24  1304 01/25/24  0553   PROBNP 54,715* 53,593*     UA:  Lab Results   Component Value Date/Time    NITRU Negative 01/19/2024 10:00 PM    COLORU Yellow 01/19/2024 10:00 PM    PHUR 5.0 01/19/2024 10:00 PM    WBCUA 1 01/19/2024 10:00 PM    RBCUA 0 01/19/2024 10:00 PM    BACTERIA None Seen 01/19/2024 10:00 PM    CLARITYU Clear 01/19/2024 10:00 PM    SPECGRAV 1.015 01/19/2024 10:00 PM    LEUKOCYTESUR Negative 01/19/2024 10:00 PM    UROBILINOGEN 0.2 01/19/2024 10:00 PM    BILIRUBINUR Negative 01/19/2024 10:00 PM    BILIRUBINUR negative 09/07/2023 05:00 PM    BILIRUBINUR NEGATIVE 01/10/2012 07:52 AM    BLOODU Negative 01/19/2024 10:00 PM    GLUCOSEU 250 01/19/2024 10:00 PM    GLUCOSEU NEGATIVE 01/10/2012 07:52 AM    KETUA Negative 01/19/2024 10:00 PM     Urine Cultures:   Lab Results   Component Value Date/Time    LABURIN  06/22/2020 11:26 AM     >50,000

## 2024-01-26 NOTE — PROGRESS NOTES
[facility] Note    Patient Active Problem List   Diagnosis    CAD (coronary artery disease) CABG x3 1996, stenting PDA 10/2021    Cardiac pacemaker    Benign prostatic hyperplasia with nocturia    Gout-uric acid >7.5--advised proph tx    Hypercholesteremia    Carotid bruit(bilat-nl duplex u/s 10/10)    Vitamin D deficiency-(advised 1000IU/day)    Actinic keratoses    Elevated PSA-(was 4.2 10/10-repeat 6 mo)(was 5.12 1/11-then 4.4 2/12)-sees dr aguillon for this    S/P colonoscopy-4/07-neg per pt,  3/18 repeat colonoscopy polyp-repeat 5 yr    Hearing loss, conductive, bilateral-seeing ent-dr quinn for hearing aides    Encounter for monitoring sotalol therapy    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 Emerson     Chronic anemia    Type 2 diabetes mellitus with hyperglycemia, without long-term current use of insulin (HCC)    Hypertension    Chronic renal impairment, stage 3 (moderate) ([facility])    Hyperkalemia    H/O esophagogastroduodenoscopy  11/18  prominent vessesls vs varices. dr Galindo (done for blood in stool)    Elevated brain natriuretic peptide (BNP) level    Ischemic cardiomyopathy; EF 40-45% 4/22    Chronic pansinusitis    Nonrheumatic aortic valve stenosis- moderate by echo 4/22    Acute on chronic systolic heart failure (HCC)    Mixed hyperlipidemia    Atrial fibrillation (HCC)    Chronic combined systolic and diastolic congestive heart failure (HCC)    Benign essential tremor    Anemia in chronic renal disease    Current moderate episode of major depressive disorder without prior episode (HCC)    Weight loss    Psychophysiological insomnia    Severe anemia    Iron deficiency anemia    Sundowning    Sleep disturbance    Osteomyelitis of right foot (HCC)    Acute osteomyelitis of phalanx of right foot (HCC)    CRP elevated    Elevated erythrocyte sedimentation rate    MRSA infection (methicillin-resistant Staphylococcus aureus)    Infection requiring contact isolation precautions

## 2024-01-26 NOTE — PLAN OF CARE
Problem: Discharge Planning  Goal: Discharge to home or other facility with appropriate resources  1/25/2024 2310 by Mera Scott RN  Outcome: Progressing  1/25/2024 0912 by Lupe Walsh RN  Outcome: Progressing     Problem: Safety - Adult  Goal: Free from fall injury  1/25/2024 2310 by Mera Scott RN  Outcome: Progressing  1/25/2024 0912 by Lupe Walsh RN  Outcome: Progressing     Problem: ABCDS Injury Assessment  Goal: Absence of physical injury  1/25/2024 2310 by Mera Scott RN  Outcome: Progressing  1/25/2024 0912 by Lupe Walsh RN  Outcome: Progressing     Problem: Pain  Goal: Verbalizes/displays adequate comfort level or baseline comfort level  1/25/2024 2310 by Mera Scott RN  Outcome: Progressing  1/25/2024 0912 by Lupe Walsh RN  Outcome: Progressing     Problem: Skin/Tissue Integrity  Goal: Absence of new skin breakdown  Description: 1.  Monitor for areas of redness and/or skin breakdown  2.  Assess vascular access sites hourly  3.  Every 4-6 hours minimum:  Change oxygen saturation probe site  4.  Every 4-6 hours:  If on nasal continuous positive airway pressure, respiratory therapy assess nares and determine need for appliance change or resting period.  1/25/2024 2310 by Mera Scott RN  Outcome: Progressing  1/25/2024 0912 by Lupe Walsh RN  Outcome: Progressing     Problem: Chronic Conditions and Co-morbidities  Goal: Patient's chronic conditions and co-morbidity symptoms are monitored and maintained or improved  1/25/2024 2310 by Mera Scott RN  Outcome: Progressing  1/25/2024 0912 by Lupe Walsh RN  Outcome: Progressing     Problem: Nutrition Deficit:  Goal: Optimize nutritional status  Outcome: Progressing

## 2024-01-26 NOTE — CONSULTS
Palliative Care:   89 y.o. male with PMHx of CAD, DM II, Afib, gout who presented to [facility] with complaints of right foot pain.     Patient states he has been experiencing right foot pain for the last week or so. He bumped it recently, but unsure of when. He saw podiatry on Friday 1/12 and was put on a steroid pack for redness in his right foot. His foot then began to open and ooze fluid. Patient saw his podiatrist again on 1/15 and was placed on an antibiotic for possible foot infection. His wound got worse and patient followed up with wound clinic and was told to go to the ED. Patient denies any pain in his right foot except for when pressing on it. Admitted 1/17/24 for further evaluation/treatment. IV ATB initiated. Palliative consult placed 1/26/24.    Plan:     Osteomyelitis right foot  -.4; WBC 16.3  -X-ray right foot shows lucency and cortical destruction consistent with acute osteomyelitis through first metatarsal head and lateral aspect of base of first proximal phalanx and medial aspect of second metatarsal head; no air noted  -CT right foot ordered  -Patient may need MRI, though has a pacemaker; need to check if pacemaker is MRI compatible  -Continue cefepime and vancomycin; de-escalate as able  -Blood cultures ordered; results pending  -Daily labs; replace electrolytes as needed  -ID consulted  -Podiatry consulted  XR FOOT RIGHT (MIN 3 VIEWS)   Final Result   Lucency and cortical destruction consistent with acute osteomyelitis   throughout the 1st metatarsal head, at the lateral aspect of the base of the   1st proximal phalanx and at the medial aspect of the 2nd metatarsal head with   adjacent soft tissue swelling.  No subcutaneous air is noted.     Podiatry following: S/p right foot I&D with carbone arthroplasty (DOS 1/22/24)      Recent Labs     01/25/24  0620 01/26/24  0507   WBC 15.3* 11.7*   HGB 8.0* 7.3*   HCT 23.8* 21.6*    318                         Recent Labs

## 2024-01-26 NOTE — PROGRESS NOTES
Infectious Diseases   Progress Note      Admission Date: 1/17/2024  Hospital Day: Hospital Day: 10   Attending: Salvador Wise MD  Date of service: 1/26/2024     Chief complaint/ Reason for consult:     Complicated right diabetic foot infection with concern for osteomyelitis involving the right hallux  Longstanding type 2 diabetes mellitus  Pacemaker in place  Diabetic polyneuropathy type II    Microbiology:        I have reviewed allavailable micro lab data and cultures    Blood culture (2/2) - collected on 1/17/2024: Negative so far  Right foot wound culture: Collected on 1/19/2024: MRSA    Susceptibility       Methicillin-Resistant Staphylococcus aureus     BACTERIAL SUSCEPTIBILITY PANEL BY MIRIAM     clindamycin <=0.25 mcg/mL Sensitive     erythromycin >=8 mcg/mL Resistant     oxacillin >=4 mcg/mL Resistant     tetracycline <=1 mcg/mL Sensitive     trimethoprim-sulfamethoxazole >=320 mcg/mL Resistant     vancomycin 1 mcg/mL Sensitive               Antibiotics and immunizations:       Current antibiotics: All antibiotics and their doses were reviewed by me    Recent Abx Admin                     linezolid (ZYVOX) IVPB 600 mg (mg) 600 mg New Bag 01/26/24 0321     600 mg New Bag 01/25/24 1517                      Immunization History: All immunization history was reviewed by me today.    Immunization History   Administered Date(s) Administered    COVID-19, PFIZER Bivalent, DO NOT Dilute, (age 12y+), IM, 30 mcg/0.3 mL 09/26/2022, 05/15/2023    COVID-19, PFIZER GRAY top, DO NOT Dilute, (age 12 y+), IM, 30 mcg/0.3 mL 07/16/2022    COVID-19, PFIZER PURPLE top, DILUTE for use, (age 12 y+), 30mcg/0.3mL 01/20/2021, 02/10/2021, 11/03/2021    COVID-19, PFIZER, (2023-24 formula), (age 12y+), IM, 30mcg/0.3mL 09/26/2023    Influenza Vaccine, unspecified formulation 10/22/2015, 09/13/2016    Influenza Virus Vaccine 10/04/2010, 11/02/2011, 11/25/2013    Influenza, FLUAD, (age 65 y+), Adjuvanted, 0.5mL 10/01/2020, 09/26/2022     (HCC) E11.65    Hypertension I10    Chronic renal impairment, stage 3 (moderate) (HCC) N18.30    Hyperkalemia E87.5    H/O esophagogastroduodenoscopy  11/18  prominent vessesls vs varices. dr Galindo (done for blood in stool) Z98.890    Elevated brain natriuretic peptide (BNP) level R79.89    Ischemic cardiomyopathy; EF 40-45% 4/22 I25.5    Chronic pansinusitis J32.4    Nonrheumatic aortic valve stenosis- moderate by echo 4/22 I35.0    Acute on chronic systolic heart failure (HCC) I50.23    Mixed hyperlipidemia E78.2    Atrial fibrillation (HCC) I48.91    Chronic combined systolic and diastolic congestive heart failure (HCC) I50.42    Benign essential tremor G25.0    Anemia in chronic renal disease N18.9, D63.1    Current moderate episode of major depressive disorder without prior episode (HCC) F32.1    Weight loss R63.4    Psychophysiological insomnia F51.04    Severe anemia D64.9    Iron deficiency anemia D50.9    Sundowning F05    Sleep disturbance G47.9    Osteomyelitis of right foot (HCC) M86.9    Acute osteomyelitis of phalanx of right foot (HCC) M86.171    CRP elevated R79.82    Elevated erythrocyte sedimentation rate R70.0    MRSA infection (methicillin-resistant Staphylococcus aureus) A49.02    Infection requiring contact isolation precautions B99.9    Left elbow pain M25.522    Acute pulmonary edema (HCC) J81.0       Please note that this chart was generated using Dragon dictation software. Although every effort was made to ensure the accuracy of this automated transcription, some errors in transcription may have occurred inadvertently. If you may need any clarification, please do not hesitate to contact me through EPIC or at the phone number provided below with my electronic signature.  Any pictures or media included in this note were obtained after taking informed verbal consent from the patient and with their approval to include those in the patient's medical record.        Esvin Cobian MD, MPH, FACP,

## 2024-01-26 NOTE — PLAN OF CARE
Problem: Discharge Planning  Goal: Discharge to home or other facility with appropriate resources  1/26/2024 0754 by Titi Meng, RN  Outcome: Progressing     Problem: Safety - Adult  Goal: Free from fall injury  1/26/2024 0754 by Titi Meng, RN  Outcome: Progressing     Problem: ABCDS Injury Assessment  Goal: Absence of physical injury  1/26/2024 0754 by Titi Meng, RN  Outcome: Progressing     Problem: Pain  Goal: Verbalizes/displays adequate comfort level or baseline comfort level  1/26/2024 0754 by Titi Meng, RN  Outcome: Progressing     Problem: Skin/Tissue Integrity  Goal: Absence of new skin breakdown  Description: 1.  Monitor for areas of redness and/or skin breakdown  2.  Assess vascular access sites hourly  3.  Every 4-6 hours minimum:  Change oxygen saturation probe site  4.  Every 4-6 hours:  If on nasal continuous positive airway pressure, respiratory therapy assess nares and determine need for appliance change or resting period.  1/26/2024 0754 by Titi Meng, RN  Outcome: Progressing

## 2024-01-26 NOTE — PROGRESS NOTES
Pharmacy to Dose Warfarin    Pharmacy consulted to dose warfarin for afib.    INR Goal: 2-3    INR today: 1.32    Assessment/Plan:  - INR is subtherapeutic after resuming warfarin yesterday  - Hep gtt bridge continues  - 5mg today  - Discussed with  concerning pt hemoglobin dropping. He wants to continue warfarin at this time  - Possible concomitant drug-drug interactions include: allopurinol and heparin     Pharmacy will continue to follow.    Enoch Gunter formerly [facility]  [id number]

## 2024-01-27 LAB
ANION GAP SERPL CALCULATED.3IONS-SCNC: 12 MMOL/L (ref 3–16)
ANTI-XA UNFRAC HEPARIN: <0.1 IU/ML (ref 0.3–0.7)
ANTI-XA UNFRAC HEPARIN: <0.1 IU/ML (ref 0.3–0.7)
BACTERIA SPEC AEROBE CULT: ABNORMAL
BACTERIA SPEC ANAEROBE CULT: ABNORMAL
BUN SERPL-MCNC: 69 MG/DL (ref 7–20)
CALCIUM SERPL-MCNC: 8.4 MG/DL (ref 8.3–10.6)
CHLORIDE SERPL-SCNC: 99 MMOL/L (ref 99–110)
CO2 SERPL-SCNC: 23 MMOL/L (ref 21–32)
CREAT SERPL-MCNC: 2 MG/DL (ref 0.8–1.3)
DEPRECATED RDW RBC AUTO: 22 % (ref 12.4–15.4)
GFR SERPLBLD CREATININE-BSD FMLA CKD-EPI: 31 ML/MIN/{1.73_M2}
GLUCOSE BLD-MCNC: 190 MG/DL (ref 70–99)
GLUCOSE BLD-MCNC: 194 MG/DL (ref 70–99)
GLUCOSE BLD-MCNC: 199 MG/DL (ref 70–99)
GLUCOSE BLD-MCNC: 253 MG/DL (ref 70–99)
GLUCOSE SERPL-MCNC: 126 MG/DL (ref 70–99)
GRAM STN SPEC: ABNORMAL
HCT VFR BLD AUTO: 23.8 % (ref 40.5–52.5)
HGB BLD-MCNC: 8.1 G/DL (ref 13.5–17.5)
INR PPP: 1.69 (ref 0.84–1.16)
MCH RBC QN AUTO: 31.7 PG (ref 26–34)
MCHC RBC AUTO-ENTMCNC: 33.9 G/DL (ref 31–36)
MCV RBC AUTO: 93.3 FL (ref 80–100)
ORGANISM: ABNORMAL
PERFORMED ON: ABNORMAL
PLATELET # BLD AUTO: 339 K/UL (ref 135–450)
PMV BLD AUTO: 8.9 FL (ref 5–10.5)
POTASSIUM SERPL-SCNC: 4.2 MMOL/L (ref 3.5–5.1)
PROTHROMBIN TIME: 19.8 SEC (ref 11.5–14.8)
RBC # BLD AUTO: 2.55 M/UL (ref 4.2–5.9)
SODIUM SERPL-SCNC: 134 MMOL/L (ref 136–145)
WBC # BLD AUTO: 11.2 K/UL (ref 4–11)

## 2024-01-27 PROCEDURE — 6360000002 HC RX W HCPCS: Performed by: INTERNAL MEDICINE

## 2024-01-27 PROCEDURE — 6370000000 HC RX 637 (ALT 250 FOR IP)

## 2024-01-27 PROCEDURE — 80048 BASIC METABOLIC PNL TOTAL CA: CPT

## 2024-01-27 PROCEDURE — 85520 HEPARIN ASSAY: CPT

## 2024-01-27 PROCEDURE — 85027 COMPLETE CBC AUTOMATED: CPT

## 2024-01-27 PROCEDURE — 6370000000 HC RX 637 (ALT 250 FOR IP): Performed by: INTERNAL MEDICINE

## 2024-01-27 PROCEDURE — C9113 INJ PANTOPRAZOLE SODIUM, VIA: HCPCS

## 2024-01-27 PROCEDURE — 2580000003 HC RX 258

## 2024-01-27 PROCEDURE — 6370000000 HC RX 637 (ALT 250 FOR IP): Performed by: HOSPITALIST

## 2024-01-27 PROCEDURE — 1200000000 HC SEMI PRIVATE

## 2024-01-27 PROCEDURE — 6360000002 HC RX W HCPCS

## 2024-01-27 PROCEDURE — 85610 PROTHROMBIN TIME: CPT

## 2024-01-27 PROCEDURE — 6370000000 HC RX 637 (ALT 250 FOR IP): Performed by: PHYSICIAN ASSISTANT

## 2024-01-27 PROCEDURE — 36415 COLL VENOUS BLD VENIPUNCTURE: CPT

## 2024-01-27 RX ADMIN — OXYCODONE HYDROCHLORIDE AND ACETAMINOPHEN 500 MG: 500 TABLET ORAL at 08:44

## 2024-01-27 RX ADMIN — ALLOPURINOL 100 MG: 100 TABLET ORAL at 08:46

## 2024-01-27 RX ADMIN — TORSEMIDE 20 MG: 20 TABLET ORAL at 08:44

## 2024-01-27 RX ADMIN — HEPARIN SODIUM 2000 UNITS: 1000 INJECTION INTRAVENOUS; SUBCUTANEOUS at 06:32

## 2024-01-27 RX ADMIN — LINEZOLID 600 MG: 600 INJECTION, SOLUTION INTRAVENOUS at 18:01

## 2024-01-27 RX ADMIN — MELATONIN TAB 3 MG 3 MG: 3 TAB at 20:23

## 2024-01-27 RX ADMIN — LINEZOLID 600 MG: 600 INJECTION, SOLUTION INTRAVENOUS at 05:06

## 2024-01-27 RX ADMIN — EMPAGLIFLOZIN 10 MG: 10 TABLET, FILM COATED ORAL at 09:01

## 2024-01-27 RX ADMIN — BUSPIRONE HYDROCHLORIDE 5 MG: 5 TABLET ORAL at 08:45

## 2024-01-27 RX ADMIN — Medication 100 MG: at 08:44

## 2024-01-27 RX ADMIN — BUSPIRONE HYDROCHLORIDE 5 MG: 5 TABLET ORAL at 20:22

## 2024-01-27 RX ADMIN — ATORVASTATIN CALCIUM 20 MG: 20 TABLET, FILM COATED ORAL at 08:45

## 2024-01-27 RX ADMIN — POLYETHYLENE GLYCOL 3350 17 G: 17 POWDER, FOR SOLUTION ORAL at 08:46

## 2024-01-27 RX ADMIN — HYDROCODONE BITARTRATE AND ACETAMINOPHEN 1 TABLET: 5; 325 TABLET ORAL at 00:07

## 2024-01-27 RX ADMIN — TAMSULOSIN HYDROCHLORIDE 0.4 MG: 0.4 CAPSULE ORAL at 08:46

## 2024-01-27 RX ADMIN — COLCHICINE 0.3 MG: 0.6 TABLET, FILM COATED ORAL at 08:45

## 2024-01-27 RX ADMIN — Medication 10 ML: at 08:53

## 2024-01-27 RX ADMIN — POLYETHYLENE GLYCOL 3350 17 G: 17 POWDER, FOR SOLUTION ORAL at 20:30

## 2024-01-27 RX ADMIN — METOPROLOL TARTRATE 25 MG: 25 TABLET, FILM COATED ORAL at 20:22

## 2024-01-27 RX ADMIN — Medication 10 ML: at 20:24

## 2024-01-27 RX ADMIN — WARFARIN SODIUM 2.5 MG: 2.5 TABLET ORAL at 17:48

## 2024-01-27 RX ADMIN — METOPROLOL TARTRATE 25 MG: 25 TABLET, FILM COATED ORAL at 08:44

## 2024-01-27 RX ADMIN — HYDROCODONE BITARTRATE AND ACETAMINOPHEN 1 TABLET: 5; 325 TABLET ORAL at 20:23

## 2024-01-27 RX ADMIN — PANTOPRAZOLE SODIUM 40 MG: 40 INJECTION, POWDER, FOR SOLUTION INTRAVENOUS at 08:47

## 2024-01-27 RX ADMIN — SODIUM CHLORIDE: 9 INJECTION, SOLUTION INTRAVENOUS at 18:00

## 2024-01-27 RX ADMIN — FLUTICASONE PROPIONATE 2 SPRAY: 50 SPRAY, METERED NASAL at 08:47

## 2024-01-27 ASSESSMENT — PAIN SCALES - GENERAL
PAINLEVEL_OUTOF10: 6
PAINLEVEL_OUTOF10: 3
PAINLEVEL_OUTOF10: 7
PAINLEVEL_OUTOF10: 6
PAINLEVEL_OUTOF10: 6
PAINLEVEL_OUTOF10: 4
PAINLEVEL_OUTOF10: 6
PAINLEVEL_OUTOF10: 3

## 2024-01-27 ASSESSMENT — PAIN DESCRIPTION - DESCRIPTORS
DESCRIPTORS: ACHING;DISCOMFORT
DESCRIPTORS: ACHING
DESCRIPTORS: ACHING

## 2024-01-27 ASSESSMENT — PAIN DESCRIPTION - LOCATION
LOCATION: HEAD
LOCATION: BACK
LOCATION: FOOT;NECK;BACK
LOCATION: GENERALIZED

## 2024-01-27 ASSESSMENT — PAIN SCALES - WONG BAKER
WONGBAKER_NUMERICALRESPONSE: 6
WONGBAKER_NUMERICALRESPONSE: 6
WONGBAKER_NUMERICALRESPONSE: 0
WONGBAKER_NUMERICALRESPONSE: 6
WONGBAKER_NUMERICALRESPONSE: 0
WONGBAKER_NUMERICALRESPONSE: 6
WONGBAKER_NUMERICALRESPONSE: 0
WONGBAKER_NUMERICALRESPONSE: 0

## 2024-01-27 ASSESSMENT — PAIN - FUNCTIONAL ASSESSMENT: PAIN_FUNCTIONAL_ASSESSMENT: PREVENTS OR INTERFERES SOME ACTIVE ACTIVITIES AND ADLS

## 2024-01-27 ASSESSMENT — PAIN DESCRIPTION - FREQUENCY: FREQUENCY: CONTINUOUS

## 2024-01-27 ASSESSMENT — PAIN DESCRIPTION - PAIN TYPE
TYPE: CHRONIC PAIN
TYPE: CHRONIC PAIN

## 2024-01-27 ASSESSMENT — PAIN DESCRIPTION - ORIENTATION: ORIENTATION: POSTERIOR

## 2024-01-27 NOTE — PROGRESS NOTES
+-------------------++-----+-----+----------++----+-----+----------+ !Mid SFA            !!55.3 !1.43 !Triphasic !!51.1!1.25 !Triphasic ! +-------------------++-----+-----+----------++----+-----+----------+ !Dist SFA           !!70.9 !1.28 !Triphasic !!54.3!1.06 !Triphasic ! +-------------------++-----+-----+----------++----+-----+----------+ !Prox Popliteal     !!35   !0.49 !Triphasic !!25.1!0.46 !Triphasic ! +-------------------++-----+-----+----------++----+-----+----------+ !Dist Popliteal     !!58.8 !1.68 !Monophasic!!30.7!1.22 !Triphasic ! +-------------------++-----+-----+----------++----+-----+----------+ !Prox PTA           !!24.6 !0.42 !          !!13.1!0.43 !Biphasic  ! +-------------------++-----+-----+----------++----+-----+----------+ !Mid PTA            !!31.1 !1.26 !Monophasic!!18.6!1.42 !Biphasic  ! +-------------------++-----+-----+----------++----+-----+----------+ !Dist PTA           !!0    !0    !Absent    !!0   !0    !Absent    ! +-------------------++-----+-----+----------++----+-----+----------+ !Prox AISHA           !!82.1 !1.4  !Monophasic!!40.8!1.33 !Biphasic  ! +-------------------++-----+-----+----------++----+-----+----------+ !Mid AISHA            !!67.3 !0.82 !Monophasic!!47.9!1.17 !Biphasic  ! +-------------------++-----+-----+----------++----+-----+----------+ !Dist AISHA           !!94.4 !1.4  !Monophasic!!76.3!1.59 !Biphasic  ! +-------------------++-----+-----+----------++----+-----+----------+ !Prox Peroneal      !!38.6 !1.57 !Monophasic!!14.4!1.1  !Biphasic  ! +-------------------++-----+-----+----------++----+-----+----------+ !Mid Peroneal       !!42.6 !1.1  !Monophasic!!26.3!1.83 !Biphasic  ! +-------------------++-----+-----+----------++----+-----+----------+ !Dist Peroneal      !!18.9 !0.44 !Monophasic!!17.9!0.68 !Biphasic  ! +-------------------++-----+-----+----------++----+-----+----------+    XR CHEST (2 VW)    Result Date: 1/21/2024  EXAMINATION: TWO XRAY VIEWS OF THE CHEST  vascular calcifications are noted.     Lucency and cortical destruction consistent with acute osteomyelitis throughout the 1st metatarsal head, at the lateral aspect of the base of the 1st proximal phalanx and at the medial aspect of the 2nd metatarsal head with adjacent soft tissue swelling.  No subcutaneous air is noted.       CBC:   Recent Labs     01/25/24  0620 01/26/24  0507   WBC 15.3* 11.7*   HGB 8.0* 7.3*    318     BMP:    Recent Labs     01/25/24  0620 01/26/24  0507   * 132*   K 4.1 4.7   CL 96* 98*   CO2 24 23   BUN 74* 72*   CREATININE 2.4* 2.1*   GLUCOSE 184* 141*     Hepatic: No results for input(s): \"AST\", \"ALT\", \"ALB\", \"BILITOT\", \"ALKPHOS\" in the last 72 hours.  Lipids:   Lab Results   Component Value Date/Time    CHOL 84 06/04/2023 04:25 AM    HDL 37 06/04/2023 04:25 AM    HDL 36 05/07/2012 11:00 AM    TRIG 70 06/04/2023 04:25 AM     Hemoglobin A1C:   Lab Results   Component Value Date/Time    LABA1C 5.5 01/17/2024 06:27 PM     TSH:   Lab Results   Component Value Date/Time    TSH 2.64 04/13/2023 12:32 PM     Troponin: No results found for: \"TROPONINT\"  Lactic Acid:   No results for input(s): \"LACTA\" in the last 72 hours.    BNP:   Recent Labs     01/25/24  0553   PROBNP 53,593*     UA:  Lab Results   Component Value Date/Time    NITRU Negative 01/19/2024 10:00 PM    COLORU Yellow 01/19/2024 10:00 PM    PHUR 5.0 01/19/2024 10:00 PM    WBCUA 1 01/19/2024 10:00 PM    RBCUA 0 01/19/2024 10:00 PM    BACTERIA None Seen 01/19/2024 10:00 PM    CLARITYU Clear 01/19/2024 10:00 PM    SPECGRAV 1.015 01/19/2024 10:00 PM    LEUKOCYTESUR Negative 01/19/2024 10:00 PM    UROBILINOGEN 0.2 01/19/2024 10:00 PM    BILIRUBINUR Negative 01/19/2024 10:00 PM    BILIRUBINUR negative 09/07/2023 05:00 PM    BILIRUBINUR NEGATIVE 01/10/2012 07:52 AM    BLOODU Negative 01/19/2024 10:00 PM    GLUCOSEU 250 01/19/2024 10:00 PM    GLUCOSEU NEGATIVE 01/10/2012 07:52 AM    KETUA Negative 01/19/2024 10:00 PM     Urine

## 2024-01-27 NOTE — PLAN OF CARE
Dressing to be left clean, dry, intact. Will plan to see the patient Monday for 1st post-operative dressing change.     Plan discussed with ELY Celis DPM   Podiatric Resident PGY2  Pager [phone] or Eladio  1/27/2024, 10:06 AM

## 2024-01-27 NOTE — PROGRESS NOTES
Shift Assessment complete. VSS. Respirations even and unlabored. Calm at time of assessment and trying to rest. Cooperative with assessment and medication. Call light within reach. Fall precautions in place. Bed in low position, locked position.

## 2024-01-27 NOTE — PROGRESS NOTES
[facility] Note    Patient Active Problem List   Diagnosis    CAD (coronary artery disease) CABG x3 1996, stenting PDA 10/2021    Cardiac pacemaker    Benign prostatic hyperplasia with nocturia    Gout-uric acid >7.5--advised proph tx    Hypercholesteremia    Carotid bruit(bilat-nl duplex u/s 10/10)    Vitamin D deficiency-(advised 1000IU/day)    Actinic keratoses    Elevated PSA-(was 4.2 10/10-repeat 6 mo)(was 5.12 1/11-then 4.4 2/12)-sees dr aguillon for this    S/P colonoscopy-4/07-neg per pt,  3/18 repeat colonoscopy polyp-repeat 5 yr    Hearing loss, conductive, bilateral-seeing ent-dr quinn for hearing aides    Encounter for monitoring sotalol therapy    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 Emerson     Chronic anemia    Type 2 diabetes mellitus with hyperglycemia, without long-term current use of insulin (HCC)    Hypertension    Chronic renal impairment, stage 3 (moderate) (Formerly [facility])    Hyperkalemia    H/O esophagogastroduodenoscopy  11/18  prominent vessesls vs varices. dr Galindo (done for blood in stool)    Elevated brain natriuretic peptide (BNP) level    Ischemic cardiomyopathy; EF 40-45% 4/22    Chronic pansinusitis    Nonrheumatic aortic valve stenosis- moderate by echo 4/22    Acute on chronic systolic heart failure (HCC)    Mixed hyperlipidemia    Atrial fibrillation (HCC)    Chronic combined systolic and diastolic congestive heart failure (HCC)    Benign essential tremor    Anemia in chronic renal disease    Current moderate episode of major depressive disorder without prior episode (HCC)    Weight loss    Psychophysiological insomnia    Severe anemia    Iron deficiency anemia    Sundowning    Sleep disturbance    Osteomyelitis of metatarsal (HCC)    Acute osteomyelitis of phalanx of right foot (HCC)    CRP elevated    Elevated erythrocyte sedimentation rate    MRSA infection (methicillin-resistant Staphylococcus aureus)    Infection requiring contact isolation precautions  69.8 kg from 73.9kg from lowest of 72.1kg and highest of 74.1kg.  +600 mL since admission    /64   Pulse 79   Temp 97.4 °F (36.3 °C) (Oral)   Resp 18   Ht 1.778 m (5' 10\")   Wt 69.8 kg (153 lb 14.4 oz)   SpO2 94%   BMI 22.08 kg/m²     Wt Readings from Last 3 Encounters:   01/27/24 69.8 kg (153 lb 14.4 oz)   01/10/24 72.1 kg (159 lb)   01/05/24 72.1 kg (159 lb)       BP Readings from Last 3 Encounters:   01/27/24 107/64   01/10/24 90/60   01/05/24 (!) 150/65     Chest- rhonchi b/l  Heart-regular  Abd-soft  Ext- no edema    Labs  Hemoglobin   Date Value Ref Range Status   01/27/2024 8.1 (L) 13.5 - 17.5 g/dL Final     Hematocrit   Date Value Ref Range Status   01/27/2024 23.8 (L) 40.5 - 52.5 % Final     WBC   Date Value Ref Range Status   01/27/2024 11.2 (H) 4.0 - 11.0 K/uL Final     Platelets   Date Value Ref Range Status   01/27/2024 339 135 - 450 K/uL Final     Lab Results   Component Value Date    CREATININE 2.0 (H) 01/27/2024    BUN 69 (H) 01/27/2024     (L) 01/27/2024    K 4.2 01/27/2024    CL 99 01/27/2024    CO2 23 01/27/2024       Ferritin elevated at 2138    VBG - 7.44/28    Assessment/Plan:  1.  REYNA on CKD 3b-baseline creatinine 1.6-1.9.  Hold spironolactone and torsemide for now.  Crea better.  No need for further IVF today.  Would not be pursuing contrast studies now.  2.  Mild Hyponatremia- better.  Agree with Torsemide 20mg daily.   3.  Anemia-Follow hgb closely.  Transfuse PRN  4.  Hypertension-controlled.  Continue current regimen.  Just on Metoprolol XL.   5.  Right foot osteomyelitis-on daptomycin.  S/P I&D yesterday.   6.  History of hyperlipidemia-on statin  7.  Low serum HCO3-resp. Alkalosis.  Stopped HCO3 infusion.  Serum bicarb now WNL.  8.  Stable renal status    Carlos Barger MD

## 2024-01-27 NOTE — PLAN OF CARE
Problem: Discharge Planning  Goal: Discharge to home or other facility with appropriate resources  1/26/2024 2122 by Jack Queen RN  Outcome: Progressing    Problem: Safety - Adult  Goal: Free from fall injury  1/26/2024 2122 by Jack Queen RN  Outcome: Progressing    Problem: ABCDS Injury Assessment  Goal: Absence of physical injury  1/26/2024 2122 by Jack Queen RN  Outcome: Progressing     Problem: Pain  Goal: Verbalizes/displays adequate comfort level or baseline comfort level  1/26/2024 2122 by Jack Queen RN  Outcome: Progressing     Problem: Skin/Tissue Integrity  Goal: Absence of new skin breakdown  Description: 1.  Monitor for areas of redness and/or skin breakdown  2.  Assess vascular access sites hourly  3.  Every 4-6 hours minimum:  Change oxygen saturation probe site  4.  Every 4-6 hours:  If on nasal continuous positive airway pressure, respiratory therapy assess nares and determine need for appliance change or resting period.  1/26/2024 2122 by Jack Queen RN  Outcome: Progressing     Problem: Chronic Conditions and Co-morbidities  Goal: Patient's chronic conditions and co-morbidity symptoms are monitored and maintained or improved  Outcome: Progressing     Problem: Nutrition Deficit:  Goal: Optimize nutritional status  Outcome: Progressing

## 2024-01-27 NOTE — PROGRESS NOTES
Pharmacy to Dose Warfarin    Pharmacy consulted to dose warfarin for afib.    INR Goal: 2-3    INR today: 1.69    Assessment/Plan:  - INR is subtherapeutic.  - Hep gtt bridge continues  - Due to jump in INR, give warfarin 2.5mg today  - Possible concomitant drug-drug interactions include: allopurinol and heparin     Pharmacy will continue to follow.    Chacha Emanuel, PharmD  PGY-1 Pharmacy Resident  [id number]

## 2024-01-27 NOTE — PROGRESS NOTES
Shift assessment completed. Routine vitals obtained. Scheduled medications given. Patient is awake, alert and oriented. Patient resting comfortably in bed at this time. Call light within reach. Standard safety measures in place. Iv line flushed and infusing. Site clean dry and in tact. No further needs expressed.

## 2024-01-28 LAB
DEPRECATED RDW RBC AUTO: 21.5 % (ref 12.4–15.4)
GLUCOSE BLD-MCNC: 195 MG/DL (ref 70–99)
GLUCOSE BLD-MCNC: 196 MG/DL (ref 70–99)
GLUCOSE BLD-MCNC: 203 MG/DL (ref 70–99)
GLUCOSE BLD-MCNC: 226 MG/DL (ref 70–99)
HCT VFR BLD AUTO: 21.1 % (ref 40.5–52.5)
HCT VFR BLD AUTO: 21.9 % (ref 40.5–52.5)
HGB BLD-MCNC: 7.2 G/DL (ref 13.5–17.5)
HGB BLD-MCNC: 7.6 G/DL (ref 13.5–17.5)
INR PPP: 2.04 (ref 0.84–1.16)
MCH RBC QN AUTO: 31.4 PG (ref 26–34)
MCHC RBC AUTO-ENTMCNC: 34.1 G/DL (ref 31–36)
MCV RBC AUTO: 91.9 FL (ref 80–100)
PERFORMED ON: ABNORMAL
PLATELET # BLD AUTO: 338 K/UL (ref 135–450)
PMV BLD AUTO: 7.9 FL (ref 5–10.5)
PROTHROMBIN TIME: 23 SEC (ref 11.5–14.8)
RBC # BLD AUTO: 2.3 M/UL (ref 4.2–5.9)
WBC # BLD AUTO: 10.8 K/UL (ref 4–11)

## 2024-01-28 PROCEDURE — 6360000002 HC RX W HCPCS

## 2024-01-28 PROCEDURE — 6370000000 HC RX 637 (ALT 250 FOR IP)

## 2024-01-28 PROCEDURE — 6370000000 HC RX 637 (ALT 250 FOR IP): Performed by: PHYSICIAN ASSISTANT

## 2024-01-28 PROCEDURE — 36415 COLL VENOUS BLD VENIPUNCTURE: CPT

## 2024-01-28 PROCEDURE — C9113 INJ PANTOPRAZOLE SODIUM, VIA: HCPCS

## 2024-01-28 PROCEDURE — 85610 PROTHROMBIN TIME: CPT

## 2024-01-28 PROCEDURE — 6360000002 HC RX W HCPCS: Performed by: HOSPITALIST

## 2024-01-28 PROCEDURE — 6370000000 HC RX 637 (ALT 250 FOR IP): Performed by: INTERNAL MEDICINE

## 2024-01-28 PROCEDURE — 2580000003 HC RX 258

## 2024-01-28 PROCEDURE — 85014 HEMATOCRIT: CPT

## 2024-01-28 PROCEDURE — 6370000000 HC RX 637 (ALT 250 FOR IP): Performed by: HOSPITALIST

## 2024-01-28 PROCEDURE — 85027 COMPLETE CBC AUTOMATED: CPT

## 2024-01-28 PROCEDURE — 1200000000 HC SEMI PRIVATE

## 2024-01-28 PROCEDURE — 6360000002 HC RX W HCPCS: Performed by: INTERNAL MEDICINE

## 2024-01-28 PROCEDURE — 85018 HEMOGLOBIN: CPT

## 2024-01-28 RX ADMIN — METOPROLOL TARTRATE 25 MG: 25 TABLET, FILM COATED ORAL at 09:07

## 2024-01-28 RX ADMIN — LINEZOLID 600 MG: 600 INJECTION, SOLUTION INTRAVENOUS at 16:25

## 2024-01-28 RX ADMIN — MELATONIN TAB 3 MG 3 MG: 3 TAB at 21:54

## 2024-01-28 RX ADMIN — BUSPIRONE HYDROCHLORIDE 5 MG: 5 TABLET ORAL at 21:54

## 2024-01-28 RX ADMIN — ALLOPURINOL 100 MG: 100 TABLET ORAL at 09:05

## 2024-01-28 RX ADMIN — TORSEMIDE 20 MG: 20 TABLET ORAL at 09:06

## 2024-01-28 RX ADMIN — POLYETHYLENE GLYCOL 3350 17 G: 17 POWDER, FOR SOLUTION ORAL at 09:19

## 2024-01-28 RX ADMIN — SODIUM CHLORIDE: 9 INJECTION, SOLUTION INTRAVENOUS at 16:24

## 2024-01-28 RX ADMIN — FLUTICASONE PROPIONATE 2 SPRAY: 50 SPRAY, METERED NASAL at 09:25

## 2024-01-28 RX ADMIN — OXYCODONE HYDROCHLORIDE AND ACETAMINOPHEN 500 MG: 500 TABLET ORAL at 09:05

## 2024-01-28 RX ADMIN — MORPHINE SULFATE 2 MG: 2 INJECTION, SOLUTION INTRAMUSCULAR; INTRAVENOUS at 01:57

## 2024-01-28 RX ADMIN — WARFARIN SODIUM 2.5 MG: 2.5 TABLET ORAL at 16:21

## 2024-01-28 RX ADMIN — TAMSULOSIN HYDROCHLORIDE 0.4 MG: 0.4 CAPSULE ORAL at 09:06

## 2024-01-28 RX ADMIN — Medication 100 MG: at 09:06

## 2024-01-28 RX ADMIN — INSULIN LISPRO 4 UNITS: 100 INJECTION, SOLUTION INTRAVENOUS; SUBCUTANEOUS at 17:27

## 2024-01-28 RX ADMIN — PANTOPRAZOLE SODIUM 40 MG: 40 INJECTION, POWDER, FOR SOLUTION INTRAVENOUS at 09:19

## 2024-01-28 RX ADMIN — EMPAGLIFLOZIN 10 MG: 10 TABLET, FILM COATED ORAL at 09:07

## 2024-01-28 RX ADMIN — ATORVASTATIN CALCIUM 20 MG: 20 TABLET, FILM COATED ORAL at 09:06

## 2024-01-28 RX ADMIN — Medication 10 ML: at 22:06

## 2024-01-28 RX ADMIN — POLYETHYLENE GLYCOL 3350 17 G: 17 POWDER, FOR SOLUTION ORAL at 21:54

## 2024-01-28 RX ADMIN — STANDARDIZED SENNA CONCENTRATE AND DOCUSATE SODIUM 1 TABLET: 8.6; 5 TABLET ORAL at 12:07

## 2024-01-28 RX ADMIN — LINEZOLID 600 MG: 600 INJECTION, SOLUTION INTRAVENOUS at 05:32

## 2024-01-28 RX ADMIN — COLCHICINE 0.3 MG: 0.6 TABLET, FILM COATED ORAL at 09:04

## 2024-01-28 RX ADMIN — Medication 10 ML: at 09:24

## 2024-01-28 RX ADMIN — ACETAMINOPHEN 650 MG: 325 TABLET ORAL at 10:24

## 2024-01-28 RX ADMIN — BUSPIRONE HYDROCHLORIDE 5 MG: 5 TABLET ORAL at 09:06

## 2024-01-28 RX ADMIN — METOPROLOL TARTRATE 25 MG: 25 TABLET, FILM COATED ORAL at 21:54

## 2024-01-28 ASSESSMENT — PAIN SCALES - WONG BAKER
WONGBAKER_NUMERICALRESPONSE: 0
WONGBAKER_NUMERICALRESPONSE: 4
WONGBAKER_NUMERICALRESPONSE: 0
WONGBAKER_NUMERICALRESPONSE: 4

## 2024-01-28 ASSESSMENT — PAIN SCALES - GENERAL
PAINLEVEL_OUTOF10: 0
PAINLEVEL_OUTOF10: 0
PAINLEVEL_OUTOF10: 4
PAINLEVEL_OUTOF10: 5
PAINLEVEL_OUTOF10: 0
PAINLEVEL_OUTOF10: 8

## 2024-01-28 ASSESSMENT — PAIN DESCRIPTION - LOCATION
LOCATION: GENERALIZED
LOCATION: RECTUM

## 2024-01-28 ASSESSMENT — PAIN DESCRIPTION - DESCRIPTORS: DESCRIPTORS: ACHING;DISCOMFORT

## 2024-01-28 NOTE — PLAN OF CARE
Problem: Discharge Planning  Goal: Discharge to home or other facility with appropriate resources  Outcome: Progressing     Problem: Safety - Adult  Goal: Free from fall injury  Outcome: Progressing     Problem: ABCDS Injury Assessment  Goal: Absence of physical injury  Outcome: Progressing     Problem: Pain  Goal: Verbalizes/displays adequate comfort level or baseline comfort level  Outcome: Progressing     Problem: Skin/Tissue Integrity  Goal: Absence of new skin breakdown  Description: 1.  Monitor for areas of redness and/or skin breakdown  2.  Assess vascular access sites hourly  3.  Every 4-6 hours minimum:  Change oxygen saturation probe site  4.  Every 4-6 hours:  If on nasal continuous positive airway pressure, respiratory therapy assess nares and determine need for appliance change or resting period.  Outcome: Progressing     Problem: Chronic Conditions and Co-morbidities  Goal: Patient's chronic conditions and co-morbidity symptoms are monitored and maintained or improved  Outcome: Progressing     Problem: Nutrition Deficit:  Goal: Optimize nutritional status  Outcome: Progressing

## 2024-01-28 NOTE — PROGRESS NOTES
Vitals:   Vitals:    01/28/24 0007 01/28/24 0227 01/28/24 0529 01/28/24 0530   BP: 115/65  119/64    Pulse: 70  81    Resp: 18 18 18    Temp: 97.8 °F (36.6 °C)  98.1 °F (36.7 °C)    TempSrc: Axillary  Oral    SpO2: 91%  92%    Weight:    63.5 kg (139 lb 14.4 oz)   Height:             Physical Exam:      General: NAD  Eyes: EOMI  ENT: neck supple  Cardiovascular: Regular rate.  Respiratory: Clear to auscultation  Gastrointestinal: Soft, non tender  Genitourinary: no suprapubic tenderness  Musculoskeletal: No edema  Skin: warm, dry  Neuro: Alert.  Psych: Mood appropriate.         Medications:   Medications:    empagliflozin  10 mg Oral Daily    warfarin  2.5 mg Oral Once per day on Sun Mon Tue Thu Fri Sat    metoprolol tartrate  25 mg Oral BID    torsemide  20 mg Oral Daily    tamsulosin  0.4 mg Oral Daily    [START ON 1/31/2024] warfarin  1.25 mg Oral Once per day on Wed    polyethylene glycol  17 g Oral BID    insulin lispro  0-16 Units SubCUTAneous TID WC    insulin lispro  0-4 Units SubCUTAneous Nightly    colchicine  0.3 mg Oral Daily    linezolid  600 mg IntraVENous Q12H    allopurinol  100 mg Oral Daily    ascorbic acid  500 mg Oral Daily    epoetin nader-epbx  10,000 Units SubCUTAneous Q14 Days    fluticasone  2 spray Each Nostril Daily    [Held by provider] amiodarone  200 mg Oral Daily    atorvastatin  20 mg Oral Daily    busPIRone  5 mg Oral BID    vitamin B-1  100 mg Oral Daily    sodium chloride flush  5-40 mL IntraVENous 2 times per day    pantoprazole  40 mg IntraVENous Daily      Infusions:    sodium chloride      heparin (PORCINE) Infusion 14 Units/kg/hr (01/27/24 0633)    sodium chloride 25 mL/hr at 01/27/24 1800    dextrose       PRN Meds: morphine, 2 mg, Q6H PRN  zinc oxide, , 4x Daily PRN  sodium chloride, , PRN  heparin (porcine), 4,000 Units, PRN  heparin (porcine), 2,000 Units, PRN  HYDROcodone 5 mg - acetaminophen, 1 tablet, Q6H PRN  melatonin, 3 mg, Nightly PRN  sennosides-docusate sodium, 1  KETUA Negative 01/19/2024 10:00 PM     Urine Cultures:   Lab Results   Component Value Date/Time    LABURIN  06/22/2020 11:26 AM     >50,000 CFU/ml Mixed pathogens  Multiple organisms isolated, no predominance. Culture  indicates probable contamination. Please review colony count  and clinical indications to determine if a repeat culture is  necessary. No further workup to be done.       Blood Cultures:   Lab Results   Component Value Date/Time    BC No Growth after 4 days of incubation. 01/21/2024 12:38 PM     Lab Results   Component Value Date/Time    BLOODCULT2 No Growth after 4 days of incubation. 01/21/2024 12:38 PM     Organism:   Lab Results   Component Value Date/Time    ORG Staph aureus MRSA 01/22/2024 04:38 PM         Electronically signed by Salvador Wise MD on 1/28/2024 at 7:52 AM

## 2024-01-28 NOTE — PROGRESS NOTES
[facility] Note    Patient Active Problem List   Diagnosis    CAD (coronary artery disease) CABG x3 1996, stenting PDA 10/2021    Cardiac pacemaker    Benign prostatic hyperplasia with nocturia    Gout-uric acid >7.5--advised proph tx    Hypercholesteremia    Carotid bruit(bilat-nl duplex u/s 10/10)    Vitamin D deficiency-(advised 1000IU/day)    Actinic keratoses    Elevated PSA-(was 4.2 10/10-repeat 6 mo)(was 5.12 1/11-then 4.4 2/12)-sees dr aguillon for this    S/P colonoscopy-4/07-neg per pt,  3/18 repeat colonoscopy polyp-repeat 5 yr    Hearing loss, conductive, bilateral-seeing ent-dr quinn for hearing aides    Encounter for monitoring sotalol therapy    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 Emerson     Chronic anemia    Type 2 diabetes mellitus with hyperglycemia, without long-term current use of insulin (HCC)    Hypertension    Chronic renal impairment, stage 3 (moderate) (Formerly [facility])    Hyperkalemia    H/O esophagogastroduodenoscopy  11/18  prominent vessesls vs varices. dr Galindo (done for blood in stool)    Elevated brain natriuretic peptide (BNP) level    Ischemic cardiomyopathy; EF 40-45% 4/22    Chronic pansinusitis    Nonrheumatic aortic valve stenosis- moderate by echo 4/22    Acute on chronic systolic heart failure (HCC)    Mixed hyperlipidemia    Atrial fibrillation (HCC)    Chronic combined systolic and diastolic congestive heart failure (HCC)    Benign essential tremor    Anemia in chronic renal disease    Current moderate episode of major depressive disorder without prior episode (HCC)    Weight loss    Psychophysiological insomnia    Severe anemia    Iron deficiency anemia    Sundowning    Sleep disturbance    Osteomyelitis of metatarsal (HCC)    Acute osteomyelitis of phalanx of right foot (HCC)    CRP elevated    Elevated erythrocyte sedimentation rate    MRSA infection (methicillin-resistant Staphylococcus aureus)    Infection requiring contact isolation precautions     Left elbow pain    Acute pulmonary edema (HCC)       Past Medical History:   has a past medical history of Actinic keratosis, Actinic keratosis, Atrial fibrillation (HCC), Bilateral carotid artery stenosis, CAD (coronary artery disease), Cellulitis, Diabetes mellitus ([facility]), DM (diabetes mellitus), type 2 with peripheral vascular complications ([facility])--s/p amputation great toe post osteomylitis , Glucose intolerance (malabsorption), Gout, Hyperlipidemia, Hypertension, Hypertrophy of prostate without urinary obstruction and other lower urinary tract symptoms (LUTS), Intermittent atrial fibrillation ([facility]), Kidney stone, Occult blood in stool, and Pacemaker.    Past Social History:   reports that he has never smoked. He has never used smokeless tobacco. He reports that he does not drink alcohol and does not use drugs.    Subjective:    No complaints  Intermittent SOB  Good urine output  Daughter at bedside    Review of Systems   Constitutional:  Positive for fatigue. Negative for activity change, appetite change, chills, fever and unexpected weight change.   HENT:  Negative for congestion and facial swelling.    Eyes:  Negative for photophobia, discharge and redness.   Respiratory:  Negative for cough, chest tightness and shortness of breath.    Cardiovascular:  Negative for chest pain, palpitations and leg swelling.   Gastrointestinal:  Negative for abdominal distention, abdominal pain, blood in stool, constipation, diarrhea, nausea and vomiting.   Endocrine: Negative for cold intolerance, heat intolerance and polyuria.   Genitourinary:  Negative for decreased urine volume, difficulty urinating, flank pain and hematuria.   Musculoskeletal:  Negative for joint swelling and neck pain.   Neurological:  Negative for dizziness, seizures, syncope, speech difficulty, light-headedness and headaches.   Hematological:  Does not bruise/bleed easily.   Psychiatric/Behavioral:  Negative for agitation, confusion and hallucinations.

## 2024-01-28 NOTE — PROGRESS NOTES
Shift assessment completed. Routine vitals stable. Scheduled medications given. Patient is awake, alert and orientedx3. Daughter at bedside. Iv line flushed and capped. Call light within reach. Standard safety measures in place. No further needs expressed.

## 2024-01-29 VITALS
DIASTOLIC BLOOD PRESSURE: 60 MMHG | HEART RATE: 68 BPM | HEIGHT: 70 IN | SYSTOLIC BLOOD PRESSURE: 117 MMHG | RESPIRATION RATE: 18 BRPM | TEMPERATURE: 97.3 F | OXYGEN SATURATION: 96 % | WEIGHT: 140 LBS | BODY MASS INDEX: 20.04 KG/M2

## 2024-01-29 PROBLEM — E44.1 MILD MALNUTRITION (HCC): Chronic | Status: ACTIVE | Noted: 2024-01-29

## 2024-01-29 LAB
ANION GAP SERPL CALCULATED.3IONS-SCNC: 11 MMOL/L (ref 3–16)
BUN SERPL-MCNC: 53 MG/DL (ref 7–20)
CALCIUM SERPL-MCNC: 8.6 MG/DL (ref 8.3–10.6)
CHLORIDE SERPL-SCNC: 100 MMOL/L (ref 99–110)
CO2 SERPL-SCNC: 26 MMOL/L (ref 21–32)
CREAT SERPL-MCNC: 1.7 MG/DL (ref 0.8–1.3)
GFR SERPLBLD CREATININE-BSD FMLA CKD-EPI: 38 ML/MIN/{1.73_M2}
GLUCOSE BLD-MCNC: 180 MG/DL (ref 70–99)
GLUCOSE BLD-MCNC: 201 MG/DL (ref 70–99)
GLUCOSE SERPL-MCNC: 182 MG/DL (ref 70–99)
INR PPP: 2.36 (ref 0.84–1.16)
PERFORMED ON: ABNORMAL
PERFORMED ON: ABNORMAL
POTASSIUM SERPL-SCNC: 4.3 MMOL/L (ref 3.5–5.1)
PROTHROMBIN TIME: 25.6 SEC (ref 11.5–14.8)
SODIUM SERPL-SCNC: 137 MMOL/L (ref 136–145)

## 2024-01-29 PROCEDURE — 6360000002 HC RX W HCPCS: Performed by: INTERNAL MEDICINE

## 2024-01-29 PROCEDURE — 2580000003 HC RX 258

## 2024-01-29 PROCEDURE — 36415 COLL VENOUS BLD VENIPUNCTURE: CPT

## 2024-01-29 PROCEDURE — 6370000000 HC RX 637 (ALT 250 FOR IP)

## 2024-01-29 PROCEDURE — 94760 N-INVAS EAR/PLS OXIMETRY 1: CPT

## 2024-01-29 PROCEDURE — 97530 THERAPEUTIC ACTIVITIES: CPT

## 2024-01-29 PROCEDURE — 97535 SELF CARE MNGMENT TRAINING: CPT

## 2024-01-29 PROCEDURE — 80048 BASIC METABOLIC PNL TOTAL CA: CPT

## 2024-01-29 PROCEDURE — 6370000000 HC RX 637 (ALT 250 FOR IP): Performed by: INTERNAL MEDICINE

## 2024-01-29 PROCEDURE — 6360000002 HC RX W HCPCS

## 2024-01-29 PROCEDURE — C9113 INJ PANTOPRAZOLE SODIUM, VIA: HCPCS

## 2024-01-29 PROCEDURE — 6370000000 HC RX 637 (ALT 250 FOR IP): Performed by: HOSPITALIST

## 2024-01-29 PROCEDURE — 85610 PROTHROMBIN TIME: CPT

## 2024-01-29 RX ORDER — ALLOPURINOL 100 MG/1
100 TABLET ORAL DAILY
Qty: 30 TABLET | Refills: 3 | Status: SHIPPED | OUTPATIENT
Start: 2024-01-29

## 2024-01-29 RX ORDER — TAMSULOSIN HYDROCHLORIDE 0.4 MG/1
0.4 CAPSULE ORAL DAILY
Qty: 30 CAPSULE | Refills: 3 | Status: SHIPPED | OUTPATIENT
Start: 2024-01-29

## 2024-01-29 RX ORDER — COLCHICINE 0.6 MG/1
0.3 TABLET ORAL DAILY
Qty: 30 TABLET | Refills: 3 | Status: SHIPPED | OUTPATIENT
Start: 2024-01-29

## 2024-01-29 RX ORDER — HYDROCODONE BITARTRATE AND ACETAMINOPHEN 5; 325 MG/1; MG/1
1 TABLET ORAL EVERY 6 HOURS PRN
Qty: 12 TABLET | Refills: 0 | Status: SHIPPED | OUTPATIENT
Start: 2024-01-29 | End: 2024-02-01

## 2024-01-29 RX ORDER — ASCORBIC ACID 500 MG
500 TABLET ORAL DAILY
Qty: 30 TABLET | Refills: 3 | Status: SHIPPED | OUTPATIENT
Start: 2024-01-29

## 2024-01-29 RX ORDER — LINEZOLID 600 MG/1
600 TABLET, FILM COATED ORAL 2 TIMES DAILY
Qty: 44 TABLET | Refills: 0 | Status: SHIPPED | OUTPATIENT
Start: 2024-01-29 | End: 2024-02-20

## 2024-01-29 RX ADMIN — TAMSULOSIN HYDROCHLORIDE 0.4 MG: 0.4 CAPSULE ORAL at 10:08

## 2024-01-29 RX ADMIN — EMPAGLIFLOZIN 10 MG: 10 TABLET, FILM COATED ORAL at 09:50

## 2024-01-29 RX ADMIN — TORSEMIDE 20 MG: 20 TABLET ORAL at 09:50

## 2024-01-29 RX ADMIN — PANTOPRAZOLE SODIUM 40 MG: 40 INJECTION, POWDER, FOR SOLUTION INTRAVENOUS at 09:54

## 2024-01-29 RX ADMIN — ALLOPURINOL 100 MG: 100 TABLET ORAL at 09:46

## 2024-01-29 RX ADMIN — OXYCODONE HYDROCHLORIDE AND ACETAMINOPHEN 500 MG: 500 TABLET ORAL at 09:45

## 2024-01-29 RX ADMIN — LINEZOLID 600 MG: 600 INJECTION, SOLUTION INTRAVENOUS at 04:16

## 2024-01-29 RX ADMIN — FLUTICASONE PROPIONATE 2 SPRAY: 50 SPRAY, METERED NASAL at 10:01

## 2024-01-29 RX ADMIN — Medication 100 MG: at 09:48

## 2024-01-29 RX ADMIN — ATORVASTATIN CALCIUM 20 MG: 20 TABLET, FILM COATED ORAL at 09:47

## 2024-01-29 RX ADMIN — BUSPIRONE HYDROCHLORIDE 5 MG: 5 TABLET ORAL at 09:50

## 2024-01-29 RX ADMIN — HYDROCODONE BITARTRATE AND ACETAMINOPHEN 1 TABLET: 5; 325 TABLET ORAL at 16:33

## 2024-01-29 RX ADMIN — Medication 10 ML: at 10:09

## 2024-01-29 RX ADMIN — LINEZOLID 600 MG: 600 INJECTION, SOLUTION INTRAVENOUS at 16:42

## 2024-01-29 RX ADMIN — COLCHICINE 0.3 MG: 0.6 TABLET, FILM COATED ORAL at 09:49

## 2024-01-29 RX ADMIN — HYDROCODONE BITARTRATE AND ACETAMINOPHEN 1 TABLET: 5; 325 TABLET ORAL at 09:42

## 2024-01-29 RX ADMIN — METOPROLOL TARTRATE 25 MG: 25 TABLET, FILM COATED ORAL at 09:47

## 2024-01-29 ASSESSMENT — ENCOUNTER SYMPTOMS
CONSTIPATION: 0
FACIAL SWELLING: 0
DIARRHEA: 0
ABDOMINAL DISTENTION: 0
BLOOD IN STOOL: 0
NAUSEA: 0
CHEST TIGHTNESS: 0
VOMITING: 0
EYE DISCHARGE: 0
EYE REDNESS: 0
COUGH: 0
SHORTNESS OF BREATH: 0
ABDOMINAL PAIN: 0
PHOTOPHOBIA: 0

## 2024-01-29 ASSESSMENT — PAIN DESCRIPTION - LOCATION
LOCATION: NECK

## 2024-01-29 ASSESSMENT — PAIN SCALES - GENERAL
PAINLEVEL_OUTOF10: 6
PAINLEVEL_OUTOF10: 4
PAINLEVEL_OUTOF10: 8
PAINLEVEL_OUTOF10: 8

## 2024-01-29 NOTE — CARE COORDINATION
01/29/24 1133   IMM Letter   IMM Letter given to Patient/Family/Significant other/Guardian/POA/by: Given to Patient's Daughter-In-Law, Brittany Tom, by . Daughter-In-Law signed IMM Letter.   IMM Letter date given: 01/29/24   IMM Letter time given: 1131     Electronically signed by JACQUELINE Fish on 1/29/2024 at 11:33 AM    
Case Management Assessment  Initial Evaluation    Date/Time of Evaluation: 1/19/2024 2:53 PM  Assessment Completed by: Karen Dempsey    If patient is discharged prior to next notation, then this note serves as note for discharge by case management.    Patient Name: Josep Santos                   YOB: 1933  Diagnosis: Acute osteomyelitis of right foot (HCC) [M86.171]  Osteomyelitis of metatarsal (HCC) [M86.9]  Acute osteomyelitis of phalanx of right foot (HCC) [M86.171]                   Date / Time: 1/17/2024  2:22 PM    Patient Admission Status: Inpatient   Readmission Risk (Low < 19, Mod (19-27), High > 27): Readmission Risk Score: 21.5    Current PCP: Raul Cuevas MD  PCP verified by CM? Yes    Chart Reviewed: Yes      History Provided by: Child/Family  Patient Orientation: Alert and Oriented, Person, Place, Situation    Patient Cognition: Alert    Hospitalization in the last 30 days (Readmission):  No    If yes, Readmission Assessment in CM Navigator will be completed.    Advance Directives:      Code Status: Full Code   Patient's Primary Decision Maker is: Named in Scanned ACP Document    Primary Decision Maker: Daylin Cruz - Child - [phone]    Secondary Decision Maker: Karl Santos (Tunde) - Child - [phone]    Secondary Decision Maker: Rene Santos - Child - [phone]    Discharge Planning:    Patient lives with: Alone Type of Home: House  Primary Care Giver: Family  Patient Support Systems include: Children   Current Financial resources: Medicare  Current community resources: None  Current services prior to admission: Other (Comment) (life alert)            Current DME:              Type of Home Care services:  OT, PT    ADLS  Prior functional level: Assistance with the following:, Shopping, Housework, Cooking  Current functional level: Assistance with the following:, Mobility, Shopping, Housework, Cooking, Toileting, Dressing, Bathing    PT AM-PAC: 
Discharge Plan:  Patient discharge to:    [facility]  [address] [address]  Phone: [phone]  Fax: [phone]      KEESHA faxed ROMAN and AVS to [phone]    SABRINA Fenton to call report to [phone]    Medical transport with [facility], 5:00pm  time     [facility] admissions staff, Daylin ([phone]), advised of discharge and in agreement.    Daughter-In-Law, Brittany Santos (present in patient's room), advised of discharge and in agreement.    HENS completed.    SW intervention complete.        Mignon PHILLIPS, LISW-S, [facility]  Social Work -   [phone]    Electronically signed by JACQUELINE Fish on 1/29/2024 at 3:18 PM    
Discharge Planning Note Re: Home Health Care     CM/SW noted consult for discharge planning. Chart reviewed. Noted recommendations for home health care.  CM met with patient and daugher. Introduced self and explained role of CM and discharge planning.    Patient is agreeable to home health on dc.     [place] of choice list was provided with basic dialogue that supports the patient's individualized plan of care/goals, treatment preferences and shares the quality data associated with the providers. [x] Yes [] No.  Patient and daugher have reviewed the provided list and made selections.    Referral made to [facility].   Pending [facility] order for  []RN [x]PT  [x]OT  []HHA  []SW  []  SLP    CM/SW will follow-up on referrals and provide any additional documentation necessary to facilitate placement.      BEA LlanosN, RN  RN Case Manager   
Discharge Planning.     PT/OT worked with the pt and now are recommending SNF. The SW spoke with the pt and the pt's daughter Daylin who are agreeable. The SW provided the pt's daughter with a SNF list to review. The SW will follow up at a later time with their SNF choices.      Electronically signed by JACQUELINE Lu on 1/22/2024 at 9:06 AM   
Discharge Planning.     The  spoke with the pt's daughter Daylin who requested referrals sent to the following SNF's       [facility]-No beds available until maybe Thursday.      [facility] on [facility]     [facility]-Waiting on a response.      Electronically signed by JACQUELINE Lu on 1/23/2024 at 10:24 AM   
Discharge Planning.     The SW spoke with Екатерина the admissions coordinator with [facility] who informed the SW that they can accept but they will not have a bed available until Thursday.     The SW spoke with the pt's family who informed the SW that at this time [facility] is there first choice but will be doing a tour on Thursday to see the facility.     The SW will continue to follow and update discharge plan as needed.       Electronically signed by JACQUELINE Lu on 1/23/2024 at 3:53 PM    
Discharge Planning:     (FABY) received a call from Daylin at [facility] who report the patient is on a high cost medication-Epoetin taken every other Thursday. FABY LVM to talk other options.    Update: Facility can accept the patient with the Epoetin. No concerns from the Director of Nursing.    Electronically signed by Scotty Clay on 1/26/24 at 8:16 AM EST   
Discharge Planning:     (FABY) spoke with patient's Son, Moshe (on emergency contact list), who agreed to patient going to [facility] at 5:00pm today. Son reported multiple concerns with medication administration, physical care of patient and lack of communication of hospital staff while in the hospital. CM informed Son of IMM Letter given to family earlier today. Son declined wanting to appeal discharge and reported that he wants patient to go to [facility] today. Son agreed to 5T Supervisor, Marcos, discussing Son's medical concerns for patient with him. 5T Supervisor, Marcos, agreed to speak with patient's Son who is present in patient's room.      Mignon PHILLIPS, DAVIDS, [facility]  Social Work -   [phone]    Electronically signed by JACQUELINE Fihs on 1/29/2024 at 3:38 PM    
[facility]    Received referral for homecare services. Will follow patient for homecare services at discharge. Should Pt DC over weekend, fax face sheet, order, ROMAN, and H&P to [facility].   Electronically signed by Carlton Beatty LPN on 1/19/24 at 2:32 PM VALENTINE Beatty LPN  [facility] Care Transition Nurse  [phone]    
Patient admitted over weekend with right foot ulcer.  Patient seen by podiatry and vascular surgery. Patient scheduled for procedure to day. Will follow orders as given by podiatry. BEA PATRICKN, RN, CWOCN  Inpatient  Wound/Ostomy Care  [phone]   
as needed  Provide incontinence care as needed  Float heels off of bed  Apply foam dressing to left heel.    Specialty Bed Required : Yes ,patient has kishore score of 13  [x] Low Air Loss   [x] Pressure Redistribution  [] Fluid Immersion  [] Bariatric  [] Total Pressure Relief  [] Other:     Current Diet: ADULT DIET; Regular; 5 carb choices (75 gm/meal)  ADULT ORAL NUTRITION SUPPLEMENT; Lunch, Dinner; Frozen Oral Supplement  Dietician consult:  Yes    Discharge Plan:  Placement for patient upon discharge: skilled nursing    Patient appropriate for Outpatient Wound Care Center: Yes    Referrals:  []   [] Home Health Care  [] Supplies  [] Other    Patient/Caregiver Teaching: Sons at bedside  Level of patient/caregiver understanding able to:   [x] Indicates understanding       [x] Needs reinforcement  [] Unsuccessful      [] Verbal Understanding  [] Demonstrated understanding       [] No evidence of learning  [] Refused teaching         [] N/A       Electronically signed by GABBIE PATRICK, RN, CWOCN  Inpatient  Wound/Ostomy Care  [phone] [facility] on 1/26/2024 at 3:43 PM

## 2024-01-29 NOTE — PROGRESS NOTES
[facility] Note    Patient Active Problem List   Diagnosis    CAD (coronary artery disease) CABG x3 1996, stenting PDA 10/2021    Cardiac pacemaker    Benign prostatic hyperplasia with nocturia    Gout-uric acid >7.5--advised proph tx    Hypercholesteremia    Carotid bruit(bilat-nl duplex u/s 10/10)    Vitamin D deficiency-(advised 1000IU/day)    Actinic keratoses    Elevated PSA-(was 4.2 10/10-repeat 6 mo)(was 5.12 1/11-then 4.4 2/12)-sees dr aguillon for this    S/P colonoscopy-4/07-neg per pt,  3/18 repeat colonoscopy polyp-repeat 5 yr    Hearing loss, conductive, bilateral-seeing ent-dr quinn for hearing aides    Encounter for monitoring sotalol therapy    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 Emerson     Chronic anemia    Type 2 diabetes mellitus with hyperglycemia, without long-term current use of insulin (HCC)    Hypertension    Chronic renal impairment, stage 3 (moderate) ([facility])    Hyperkalemia    H/O esophagogastroduodenoscopy  11/18  prominent vessesls vs varices. dr Galindo (done for blood in stool)    Elevated brain natriuretic peptide (BNP) level    Ischemic cardiomyopathy; EF 40-45% 4/22    Chronic pansinusitis    Nonrheumatic aortic valve stenosis- moderate by echo 4/22    Acute on chronic systolic heart failure (HCC)    Mixed hyperlipidemia    Atrial fibrillation (HCC)    Chronic combined systolic and diastolic congestive heart failure (HCC)    Benign essential tremor    Anemia in chronic renal disease    Current moderate episode of major depressive disorder without prior episode (HCC)    Weight loss    Psychophysiological insomnia    Severe anemia    Iron deficiency anemia    Sundowning    Sleep disturbance    Osteomyelitis of metatarsal (HCC)    Acute osteomyelitis of phalanx of right foot (HCC)    CRP elevated    Elevated erythrocyte sedimentation rate    MRSA infection (methicillin-resistant Staphylococcus aureus)    Infection requiring contact isolation precautions

## 2024-01-29 NOTE — PROGRESS NOTES
Nutrition Note    RECOMMENDATIONS  PO Diet: Modify diet to 5 CCC/easy to chew   ONS: Continue magic cup BID  Nutrition Support: None     NUTRITION ASSESSMENT   Pt seen for follow up assessment. Pt soundly sleeping upon RD visit; spoke with daughter in law at bedside. Pt with poor PO intake, consuming less than 50% of meals. Was able to eat 2 slices of das and 2/3 of his sausage this morning. Pt's daughter in law states pt only has eight teeth which makes chewing difficult; will add easy to chew modifier. Pt with magic cup ordered BID but daughter in law is not sure if pt is eating this; asked her to encourage pt to consume supplements.     Nutrition Related Findings: +1 RLE edema. +1 pitting LLE edema. Disoriented to time and situation. LBM 1/28.  Wounds: Surgical Incision  Nutrition Education:  Education not indicated   Nutrition Goals: PO intake 50% or greater     MALNUTRITION ASSESSMENT   Chronic Illness  Malnutrition Status: Mild malnutrition  Findings of the 6 clinical characteristics of malnutrition:  Energy Intake:  Mild decrease in energy intake (Comment)  Weight Loss:  No significant weight loss     Body Fat Loss:  Mild body fat loss Fat Overlying Ribs, Orbital   Muscle Mass Loss:  Mild muscle mass loss Temples (temporalis)  Fluid Accumulation:  Mild Extremities   Strength:  Not Performed      NUTRITION DIAGNOSIS   Inadequate protein-energy intake related to inadequate protein-energy intake, biting/chewing (masticatory) difficulty as evidenced by intake 0-25%, intake 26-50%  Increased nutrient needs related to increase demand for energy/nutrients as evidenced by wounds    CURRENT NUTRITION THERAPIES  ADULT DIET; Regular; 5 carb choices (75 gm/meal)  ADULT ORAL NUTRITION SUPPLEMENT; Lunch, Dinner; Frozen Oral Supplement     PO Intake: 1-25%, 26-50%   PO Supplement Intake:Unable to assess    ANTHROPOMETRICS  Current Height: 177.8 cm (5' 10\")  Current Weight - Scale: 63.5 kg (140 lb)    Ideal Body Weight

## 2024-01-29 NOTE — PROGRESS NOTES
Physician Progress Note      PATIENT:               JOSE LOCO  CSN #:                  [id number]  :                       1933  ADMIT DATE:       2024 2:22 PM  DISCH DATE:  RESPONDING  PROVIDER #:        Salvador Wise MD          QUERY TEXT:    Patient admitted  with complicated right diabetic foot ulcer infection   with concern for osteomyelitis. Noted documentation of Right foot   osteomyelitis on  by attending. In order to support the diagnosis of   osteomyelitis, please include additional clinical indicators in your   documentation.  Or please document if the diagnosis of Right foot   osteomyelitis has been ruled out after further study.    The medical record reflects the following:  Risk Factors:  Surgical Pathology: Bone, right foot, excision: Polarizable   crystals consistent with tophi, right diabetic foot infection, T2DM,   neuropathy  Clinical Indicators:  Surgical Pathology: Polarizable crystals consistent   with tophi gout.\"XrayRfoot: Lucency and cortical destruction consistent with   acute osteomyelitis throughout the 1st metatarsal head, at the lateral aspect   of the base of the 1st proximal phalanx and at the medial aspect of the 2nd   metatarsal head.  CTFoot: Chronic well-defined erosions with associated   periarticular soft tissue masses...The appearance is strongly suspicious for   gout. 2. Soft tissue ulceration adjacent to the 1st metatarsal head on the   right, with suspected superimposed septic arthri  Treatment:  IV Daptomycin -, IV Cefepime -, Zyvox IV , IV   Vanc x1, bedside I&D , I&D with Travis arthroplasty , xray R foot x2,   CT Foot, VL PATIENCE, VL LE Arteries, wound culture, surgical culture and path,   podiatry consult, vascular consult, ID consult  Options provided:  -- Right foot osteomyelitis present on admission as evidenced by, Please   document evidence.  -- Right foot osteomyelitis was ruled out  -- Other - I will  valid  -- Disagree - Clinically unable to determine / Unknown  -- Refer to Clinical Documentation Reviewer    PROVIDER RESPONSE TEXT:    This patient has a diabetic ulcer to right foot suspected due to neuropathy   present on admission.    Query created by: Grazyna Palmer on 1/29/2024 11:52 AM      QUERY TEXT:    Patient admitted 1/17 with complicated right diabetic foot ulcer infection   concern for Osteomyelitis.  Pt noted to have pulmonary edema. If possible,   please document in the progress notes and discharge summary if you are   evaluating and/or treating any of the following:    The medical record reflects the following:  Risk Factors: Torsemide held 1/19-1/25; CHF, 1 unit blood given for anemia   1/23, 1L NS bolus in ED, LR 75ml/hr 1/18-1/19, NaHCO3 1/23-1/24 80ml/hr   surgical procedure 1/22,  Clinical Indicators: ProBNP 1/23 54,715 to 1/25: 53,593; CXR 1/23:   Cardiomegaly with findings of pulmonary edema. Per Podiatry 1/23: \"Chest xray   ordered given new onset shortness of breath.\" Per ID 1/23: \"Had a chest x-ray   done today which is concerning for pulm edema. Pulmonary edema-this is new   finding today.\" Per PCP 1/24: \"Chronic CHF Patient x-ray showing some   pulmonary congestion.  BNP is elevated,-Follows cardiology and will consult   will probably resume torsemide if okay with allowing renal function.\" Per   Cardiology 1/24: \"Chronic systolic CHF with LVEF recovered (6/2  Treatment: Aldactone x1 on 1/19; Scheduled Torsemide 20mg daily on 1/26,   amiodarone, cardura, jardiance, Lopressor, metoprolol succinate, ProBNP x2,,   CXR x2, Cardiology consult, Nephrology consult.  Options provided:  -- Acute pulmonary edema due to acute exacerbation of systolic CHF  -- Noncardiogenic acute pulmonary edema  -- Other - I will add my own diagnosis  -- Disagree - Not applicable / Not valid  -- Disagree - Clinically unable to determine / Unknown  -- Refer to Clinical Documentation Reviewer    PROVIDER RESPONSE

## 2024-01-29 NOTE — PROGRESS NOTES
Podiatric Surgery Daily Progress Note  Josep Santos      Subjective :   Patient seen and examined this am at the bedside.Patient is s/p right foot I&D with carbone arthroplasty (DOS 1/22/24). Patient denies any acute overnight events. Patient denies N/V/F/C. Patient denies calf pain, thigh pain, chest pain.     Review of Systems: A 12 point review of symptoms is unremarkable with the exception of the chief complaint. Patient specifically denies nausea, fever, vomiting, chills, shortness of breath, chest pain, abdominal pain, constipation or difficulty urinating.       Objective     /71   Pulse 81   Temp 98.1 °F (36.7 °C) (Oral)   Resp 16   Ht 1.778 m (5' 10\")   Wt 63.5 kg (140 lb)   SpO2 95%   BMI 20.09 kg/m²      I/O:  Intake/Output Summary (Last 24 hours) at 1/29/2024 1017  Last data filed at 1/28/2024 2206  Gross per 24 hour   Intake 10 ml   Output 1400 ml   Net -1390 ml                Wt Readings from Last 3 Encounters:   01/29/24 63.5 kg (140 lb)   01/10/24 72.1 kg (159 lb)   01/05/24 72.1 kg (159 lb)       LABS:    Recent Labs     01/27/24  0426 01/28/24  0642 01/28/24  1241   WBC 11.2* 10.8  --    HGB 8.1* 7.2* 7.6*   HCT 23.8* 21.1* 21.9*    338  --           Recent Labs     01/29/24  0634      K 4.3      CO2 26   BUN 53*   CREATININE 1.7*          Recent Labs     01/28/24  0642 01/29/24  0634   INR 2.04* 2.36*             LOWER EXTREMITY EXAMINATION    Dressing to right LE intact. No strikethrough noted to the external dressing. Seropurulent drainage noted to the internal layers of the dressing.      VASCULAR: DP pulses are faintly palpable 1/4 b/l.  PT pulses are nonpalpable 0/4 b/l. CFT is brisk to the digits of the foot b/l. Skin temperature is warm to cool from proximal to distal with no focal calor noted. Nonpitting edema noted to right LE, improving 2/2 compression. No pain with calf compression b/l.     NEUROLOGIC: Gross and epicritic sensation is diminished b/l.

## 2024-01-29 NOTE — DISCHARGE SUMMARY
[facility]    [facility] Demographics    Patient. Josep Santos  Date of Birth. 11/12/1933  MRN. [id number]     Primary care provider. Raul Cuevas MD  (Tel: [phone])    Admit date: 1/17/2024    Discharge date (blank if same as Note Date):   Note Date: 1/29/2024     Reason for Hospitalization.   Chief Complaint   Patient presents with    Foot Injury     Pt presents with a possible infection in the rt foot near the base of the great toe. Pt was in the wound care clinic today and fresh packing placed and wound drained and debrided. Pt sent in for admission for IV antibiotics.            Problem-based Hospital Course.  Right foot osteomyelitis  Patient underwent incision drainage by podiatry.  Based on culture patient agree with IV Zyvox patient transition to p.o. Zyvox will continue course based on ID recommendation we will follow-up outpatient and make further recommendation      Hospital course complicated anemia needing blood transfusion no signs of bleeding hemoglobin stable at the time of discharge  Chronic CHF  A-fib rate control cardiac meds readjusted amiodarone was discontinued due to persistent bradycardia on low-dose beta-blocker may need to titrate upwards    Acute on chronic kidney disease improved evaluated by nephrology creatinine baseline        Consults.  IP CONSULT TO PODIATRY  IP CONSULT TO PHARMACY  IP CONSULT TO INFECTIOUS DISEASES  IP CONSULT TO SPIRITUAL SERVICES  IP CONSULT TO VASCULAR SURGERY  IP CONSULT TO NEPHROLOGY  IP CONSULT TO CARDIOLOGY  IP CONSULT TO GI  IP CONSULT TO CARDIOLOGY  IP CONSULT TO PHARMACY  IP CONSULT TO PALLIATIVE CARE  IP CONSULT TO CARDIOLOGY    Physical examination on discharge day.   /71   Pulse 81   Temp 98.1 °F (36.7 °C) (Oral)   Resp 16   Ht 1.778 m (5' 10\")   Wt 63.5 kg (140  lb)   SpO2 95%   BMI 20.09 kg/m²   General appearance.  Alert. Looks comfortable.  HEENT. Sclera clear. Moist mucus membranes.  Cardiovascular. Regular rate and rhythm, normal S1, S2. No murmur.   Respiratory. Not using accessory muscles.Clear to auscultation bilaterally, no wheeze.  Gastrointestinal. Abdomen soft, non-tender, not distended, normal bowel sounds  Neurology. Facial symmetry. No speech deficits. Moving all extremities equally.  Extremities. No edema in lower extremities.  Skin. Warm, dry, normal turgor    Condition at time of discharge stable     Medication instructions provided to patient at discharge.     Medication List        START taking these medications      allopurinol 100 MG tablet  Commonly known as: ZYLOPRIM  Take 1 tablet by mouth daily     ascorbic acid 500 MG tablet  Commonly known as: VITAMIN C  Take 1 tablet by mouth daily     colchicine 0.6 MG tablet  Commonly known as: COLCRYS  Take 0.5 tablets by mouth daily     HYDROcodone-acetaminophen 5-325 MG per tablet  Commonly known as: NORCO  Take 1 tablet by mouth every 6 hours as needed for Pain for up to 3 days. Max Daily Amount: 4 tablets     linezolid 600 MG tablet  Commonly known as: Zyvox  Take 1 tablet by mouth 2 times daily for 22 days     metoprolol tartrate 25 MG tablet  Commonly known as: LOPRESSOR  Take 1 tablet by mouth 2 times daily     tamsulosin 0.4 MG capsule  Commonly known as: FLOMAX  Take 1 capsule by mouth daily            CHANGE how you take these medications      spironolactone 25 MG tablet  Commonly known as: ALDACTONE  TAKE 1 TABLET BY MOUTH 2 TIMES A WEEK  What changed: See the new instructions.     Torsemide 40 MG Tabs  20 mg five days of the week and 40 mg 2 days  What changed:   how much to take  how to take this  when to take this  additional instructions     warfarin 2.5 MG tablet  Commonly known as: Coumadin  Take as directed. If you are unsure how to take this medication, talk to your nurse or

## 2024-02-02 ENCOUNTER — TELEPHONE (OUTPATIENT)
Dept: CARDIOLOGY CLINIC | Age: 89
End: 2024-02-02

## 2024-02-02 NOTE — TELEPHONE ENCOUNTER
I want to know what his Hemoglobin is and abbi told me they were going to recheck Hgb and then order blood

## 2024-02-02 NOTE — TELEPHONE ENCOUNTER
Alize with [facility] states an order is needed to do infusion today. Please fax to [phone]. Please advise

## 2024-02-02 NOTE — TELEPHONE ENCOUNTER
Spoke to Alize.  They did not redraw as per the first conversation with RGNP.     They will redraw as a STAT now.    Pt is to be transfused if less than 8.0 per RGNP and send him to ED at this point to be transfused since it likely will not be able to be done at the Infusion Center due to late time of 11:15am.  Kathie spoke to Alize and clarified instructions.

## 2024-02-05 ENCOUNTER — HOSPITAL ENCOUNTER (INPATIENT)
Age: 89
LOS: 1 days | Discharge: HOME | DRG: 812 | End: 2024-02-07
Attending: EMERGENCY MEDICINE | Admitting: INTERNAL MEDICINE
Payer: MEDICARE

## 2024-02-05 DIAGNOSIS — D64.9 ANEMIA, UNSPECIFIED TYPE: Primary | ICD-10-CM

## 2024-02-05 DIAGNOSIS — M86.9 OSTEOMYELITIS OF METATARSAL (HCC): ICD-10-CM

## 2024-02-05 PROBLEM — I48.0 INTERMITTENT ATRIAL FIBRILLATION (HCC): Status: ACTIVE | Noted: 2022-09-27

## 2024-02-05 LAB
ABO + RH BLD: NORMAL
ALBUMIN SERPL-MCNC: 3.4 G/DL (ref 3.4–5)
ALBUMIN/GLOB SERPL: 1.1 {RATIO} (ref 1.1–2.2)
ALP SERPL-CCNC: 90 U/L (ref 40–129)
ALT SERPL-CCNC: 12 U/L (ref 10–40)
ANION GAP SERPL CALCULATED.3IONS-SCNC: 12 MMOL/L (ref 3–16)
AST SERPL-CCNC: 17 U/L (ref 15–37)
BASOPHILS # BLD: 0 K/UL (ref 0–0.2)
BASOPHILS NFR BLD: 0.9 %
BILIRUB SERPL-MCNC: 0.8 MG/DL (ref 0–1)
BLD GP AB SCN SERPL QL: NORMAL
BLOOD BANK DISPENSE STATUS: NORMAL
BLOOD BANK DISPENSE STATUS: NORMAL
BLOOD BANK PRODUCT CODE: NORMAL
BLOOD BANK PRODUCT CODE: NORMAL
BPU ID: NORMAL
BPU ID: NORMAL
BUN SERPL-MCNC: 60 MG/DL (ref 7–20)
CALCIUM SERPL-MCNC: 8.9 MG/DL (ref 8.3–10.6)
CHLORIDE SERPL-SCNC: 102 MMOL/L (ref 99–110)
CO2 SERPL-SCNC: 28 MMOL/L (ref 21–32)
CREAT SERPL-MCNC: 2 MG/DL (ref 0.8–1.3)
DEPRECATED RDW RBC AUTO: 21.7 % (ref 12.4–15.4)
DESCRIPTION BLOOD BANK: NORMAL
DESCRIPTION BLOOD BANK: NORMAL
EKG ATRIAL RATE: 64 BPM
EKG DIAGNOSIS: NORMAL
EKG P AXIS: 55 DEGREES
EKG P-R INTERVAL: 220 MS
EKG Q-T INTERVAL: 416 MS
EKG QRS DURATION: 134 MS
EKG QTC CALCULATION (BAZETT): 429 MS
EKG R AXIS: -50 DEGREES
EKG T AXIS: 49 DEGREES
EKG VENTRICULAR RATE: 64 BPM
EOSINOPHIL # BLD: 0.1 K/UL (ref 0–0.6)
EOSINOPHIL NFR BLD: 2 %
FUNGUS SPEC CULT: NORMAL
GFR SERPLBLD CREATININE-BSD FMLA CKD-EPI: 31 ML/MIN/{1.73_M2}
GLUCOSE SERPL-MCNC: 135 MG/DL (ref 70–99)
HCT VFR BLD AUTO: 18.1 % (ref 40.5–52.5)
HCT VFR BLD AUTO: 25.2 % (ref 40.5–52.5)
HCT VFR BLD AUTO: 25.5 % (ref 40.5–52.5)
HGB BLD-MCNC: 6.2 G/DL (ref 13.5–17.5)
HGB BLD-MCNC: 8.6 G/DL (ref 13.5–17.5)
HGB BLD-MCNC: 8.7 G/DL (ref 13.5–17.5)
INR PPP: 2.96 (ref 0.84–1.16)
LOEFFLER MB STN SPEC: NORMAL
LYMPHOCYTES # BLD: 0.7 K/UL (ref 1–5.1)
LYMPHOCYTES NFR BLD: 15.3 %
MCH RBC QN AUTO: 31.2 PG (ref 26–34)
MCHC RBC AUTO-ENTMCNC: 34.2 G/DL (ref 31–36)
MCV RBC AUTO: 91.4 FL (ref 80–100)
MONOCYTES # BLD: 0.3 K/UL (ref 0–1.3)
MONOCYTES NFR BLD: 5.4 %
NEUTROPHILS # BLD: 3.6 K/UL (ref 1.7–7.7)
NEUTROPHILS NFR BLD: 76.4 %
PLATELET # BLD AUTO: 125 K/UL (ref 135–450)
PMV BLD AUTO: 8.7 FL (ref 5–10.5)
POTASSIUM SERPL-SCNC: 4.1 MMOL/L (ref 3.5–5.1)
PROT SERPL-MCNC: 6.6 G/DL (ref 6.4–8.2)
PROTHROMBIN TIME: 30.6 SEC (ref 11.5–14.8)
RBC # BLD AUTO: 1.98 M/UL (ref 4.2–5.9)
SODIUM SERPL-SCNC: 142 MMOL/L (ref 136–145)
WBC # BLD AUTO: 4.8 K/UL (ref 4–11)

## 2024-02-05 PROCEDURE — 99285 EMERGENCY DEPT VISIT HI MDM: CPT

## 2024-02-05 PROCEDURE — 86901 BLOOD TYPING SEROLOGIC RH(D): CPT

## 2024-02-05 PROCEDURE — 83540 ASSAY OF IRON: CPT

## 2024-02-05 PROCEDURE — G0378 HOSPITAL OBSERVATION PER HR: HCPCS

## 2024-02-05 PROCEDURE — 93010 ELECTROCARDIOGRAM REPORT: CPT | Performed by: INTERNAL MEDICINE

## 2024-02-05 PROCEDURE — 83550 IRON BINDING TEST: CPT

## 2024-02-05 PROCEDURE — P9016 RBC LEUKOCYTES REDUCED: HCPCS

## 2024-02-05 PROCEDURE — 36430 TRANSFUSION BLD/BLD COMPNT: CPT

## 2024-02-05 PROCEDURE — 86850 RBC ANTIBODY SCREEN: CPT

## 2024-02-05 PROCEDURE — 85025 COMPLETE CBC W/AUTO DIFF WBC: CPT

## 2024-02-05 PROCEDURE — 36415 COLL VENOUS BLD VENIPUNCTURE: CPT

## 2024-02-05 PROCEDURE — 86900 BLOOD TYPING SEROLOGIC ABO: CPT

## 2024-02-05 PROCEDURE — 85610 PROTHROMBIN TIME: CPT

## 2024-02-05 PROCEDURE — 80053 COMPREHEN METABOLIC PANEL: CPT

## 2024-02-05 PROCEDURE — 86923 COMPATIBILITY TEST ELECTRIC: CPT

## 2024-02-05 PROCEDURE — 30233N1 TRANSFUSION OF NONAUTOLOGOUS RED BLOOD CELLS INTO PERIPHERAL VEIN, PERCUTANEOUS APPROACH: ICD-10-PCS | Performed by: INTERNAL MEDICINE

## 2024-02-05 PROCEDURE — 93005 ELECTROCARDIOGRAM TRACING: CPT | Performed by: EMERGENCY MEDICINE

## 2024-02-05 PROCEDURE — 85018 HEMOGLOBIN: CPT

## 2024-02-05 PROCEDURE — 6370000000 HC RX 637 (ALT 250 FOR IP): Performed by: INTERNAL MEDICINE

## 2024-02-05 PROCEDURE — 85014 HEMATOCRIT: CPT

## 2024-02-05 RX ORDER — SENNA AND DOCUSATE SODIUM 50; 8.6 MG/1; MG/1
1 TABLET, FILM COATED ORAL DAILY PRN
Status: DISCONTINUED | OUTPATIENT
Start: 2024-02-05 | End: 2024-02-07 | Stop reason: HOSPADM

## 2024-02-05 RX ORDER — WARFARIN SODIUM 2.5 MG/1
1.25 TABLET ORAL SEE ADMIN INSTRUCTIONS
Status: DISCONTINUED | OUTPATIENT
Start: 2024-02-05 | End: 2024-02-05

## 2024-02-05 RX ORDER — ALLOPURINOL 100 MG/1
100 TABLET ORAL DAILY
Status: DISCONTINUED | OUTPATIENT
Start: 2024-02-05 | End: 2024-02-07 | Stop reason: HOSPADM

## 2024-02-05 RX ORDER — GAUZE BANDAGE 2" X 2"
100 BANDAGE TOPICAL DAILY
Status: DISCONTINUED | OUTPATIENT
Start: 2024-02-05 | End: 2024-02-07 | Stop reason: HOSPADM

## 2024-02-05 RX ORDER — SPIRONOLACTONE 25 MG/1
25 TABLET ORAL
Status: DISCONTINUED | OUTPATIENT
Start: 2024-02-05 | End: 2024-02-07 | Stop reason: HOSPADM

## 2024-02-05 RX ORDER — HYDROCODONE BITARTRATE AND ACETAMINOPHEN 5; 325 MG/1; MG/1
1 TABLET ORAL EVERY 6 HOURS PRN
COMMUNITY

## 2024-02-05 RX ORDER — COLCHICINE 0.6 MG/1
0.3 TABLET ORAL DAILY
Status: DISCONTINUED | OUTPATIENT
Start: 2024-02-05 | End: 2024-02-07 | Stop reason: HOSPADM

## 2024-02-05 RX ORDER — SODIUM CHLORIDE 9 MG/ML
INJECTION, SOLUTION INTRAVENOUS PRN
Status: DISCONTINUED | OUTPATIENT
Start: 2024-02-05 | End: 2024-02-07 | Stop reason: HOSPADM

## 2024-02-05 RX ORDER — HYDROCODONE BITARTRATE AND ACETAMINOPHEN 5; 325 MG/1; MG/1
1 TABLET ORAL EVERY 6 HOURS PRN
Status: DISCONTINUED | OUTPATIENT
Start: 2024-02-05 | End: 2024-02-07 | Stop reason: HOSPADM

## 2024-02-05 RX ORDER — LANOLIN ALCOHOL/MO/W.PET/CERES
3 CREAM (GRAM) TOPICAL
Status: DISCONTINUED | OUTPATIENT
Start: 2024-02-05 | End: 2024-02-07 | Stop reason: HOSPADM

## 2024-02-05 RX ORDER — BISACODYL 10 MG
10 SUPPOSITORY, RECTAL RECTAL DAILY PRN
Status: DISCONTINUED | OUTPATIENT
Start: 2024-02-05 | End: 2024-02-07 | Stop reason: HOSPADM

## 2024-02-05 RX ORDER — FERROUS SULFATE 325(65) MG
325 TABLET ORAL
Status: DISCONTINUED | OUTPATIENT
Start: 2024-02-05 | End: 2024-02-07 | Stop reason: HOSPADM

## 2024-02-05 RX ORDER — MENTHOL AND ZINC OXIDE .44; 20.625 G/100G; G/100G
1 OINTMENT TOPICAL 2 TIMES DAILY PRN
COMMUNITY

## 2024-02-05 RX ORDER — FLUTICASONE PROPIONATE 50 MCG
2 SPRAY, SUSPENSION (ML) NASAL DAILY
Status: DISCONTINUED | OUTPATIENT
Start: 2024-02-06 | End: 2024-02-07 | Stop reason: HOSPADM

## 2024-02-05 RX ORDER — BISACODYL 10 MG
10 SUPPOSITORY, RECTAL RECTAL DAILY PRN
COMMUNITY

## 2024-02-05 RX ORDER — TORSEMIDE 20 MG/1
20 TABLET ORAL SEE ADMIN INSTRUCTIONS
Status: ON HOLD | COMMUNITY
End: 2024-02-07 | Stop reason: HOSPADM

## 2024-02-05 RX ORDER — ASCORBIC ACID 500 MG
500 TABLET ORAL DAILY
Status: DISCONTINUED | OUTPATIENT
Start: 2024-02-06 | End: 2024-02-07 | Stop reason: HOSPADM

## 2024-02-05 RX ORDER — MAGNESIUM HYDROXIDE/ALUMINUM HYDROXICE/SIMETHICONE 120; 1200; 1200 MG/30ML; MG/30ML; MG/30ML
30 SUSPENSION ORAL EVERY 4 HOURS PRN
Status: DISCONTINUED | OUTPATIENT
Start: 2024-02-05 | End: 2024-02-07 | Stop reason: HOSPADM

## 2024-02-05 RX ORDER — BUSPIRONE HYDROCHLORIDE 5 MG/1
5 TABLET ORAL 2 TIMES DAILY
Status: DISCONTINUED | OUTPATIENT
Start: 2024-02-05 | End: 2024-02-07 | Stop reason: HOSPADM

## 2024-02-05 RX ORDER — ATORVASTATIN CALCIUM 20 MG/1
20 TABLET, FILM COATED ORAL NIGHTLY
Status: DISCONTINUED | OUTPATIENT
Start: 2024-02-05 | End: 2024-02-07 | Stop reason: HOSPADM

## 2024-02-05 RX ORDER — MAGNESIUM HYDROXIDE/ALUMINUM HYDROXICE/SIMETHICONE 120; 1200; 1200 MG/30ML; MG/30ML; MG/30ML
30 SUSPENSION ORAL EVERY 4 HOURS PRN
COMMUNITY

## 2024-02-05 RX ORDER — TAMSULOSIN HYDROCHLORIDE 0.4 MG/1
0.4 CAPSULE ORAL DAILY
Status: DISCONTINUED | OUTPATIENT
Start: 2024-02-05 | End: 2024-02-07 | Stop reason: HOSPADM

## 2024-02-05 RX ORDER — WARFARIN SODIUM 2.5 MG/1
2.5 TABLET ORAL DAILY
Status: DISCONTINUED | OUTPATIENT
Start: 2024-02-05 | End: 2024-02-05

## 2024-02-05 RX ORDER — POLYETHYLENE GLYCOL 3350 17 G/17G
17 POWDER, FOR SOLUTION ORAL DAILY
Status: DISCONTINUED | OUTPATIENT
Start: 2024-02-05 | End: 2024-02-07 | Stop reason: HOSPADM

## 2024-02-05 RX ORDER — VITAMIN B COMPLEX
1000 TABLET ORAL DAILY
Status: DISCONTINUED | OUTPATIENT
Start: 2024-02-05 | End: 2024-02-07 | Stop reason: HOSPADM

## 2024-02-05 RX ORDER — TORSEMIDE 20 MG/1
20 TABLET ORAL
Status: DISCONTINUED | OUTPATIENT
Start: 2024-02-07 | End: 2024-02-07 | Stop reason: HOSPADM

## 2024-02-05 RX ORDER — BENZOCAINE/MENTHOL 6 MG-10 MG
1 LOZENGE MUCOUS MEMBRANE 2 TIMES DAILY
Status: DISCONTINUED | OUTPATIENT
Start: 2024-02-05 | End: 2024-02-07 | Stop reason: HOSPADM

## 2024-02-05 RX ORDER — BENZOCAINE/MENTHOL 6 MG-10 MG
1 LOZENGE MUCOUS MEMBRANE 2 TIMES DAILY
COMMUNITY

## 2024-02-05 RX ORDER — ACETAMINOPHEN 325 MG/1
650 TABLET ORAL 2 TIMES DAILY
Status: DISCONTINUED | OUTPATIENT
Start: 2024-02-05 | End: 2024-02-07 | Stop reason: HOSPADM

## 2024-02-05 RX ADMIN — SPIRONOLACTONE 25 MG: 25 TABLET ORAL at 17:42

## 2024-02-05 RX ADMIN — FERROUS SULFATE TAB 325 MG (65 MG ELEMENTAL FE) 325 MG: 325 (65 FE) TAB at 17:42

## 2024-02-05 RX ADMIN — TAMSULOSIN HYDROCHLORIDE 0.4 MG: 0.4 CAPSULE ORAL at 17:42

## 2024-02-05 RX ADMIN — COLCHICINE 0.3 MG: 0.6 TABLET, FILM COATED ORAL at 17:43

## 2024-02-05 RX ADMIN — METOPROLOL TARTRATE 25 MG: 25 TABLET, FILM COATED ORAL at 19:49

## 2024-02-05 RX ADMIN — EMPAGLIFLOZIN 10 MG: 10 TABLET, FILM COATED ORAL at 17:42

## 2024-02-05 RX ADMIN — Medication 100 MG: at 17:43

## 2024-02-05 RX ADMIN — POLYETHYLENE GLYCOL 3350 17 G: 17 POWDER, FOR SOLUTION ORAL at 17:43

## 2024-02-05 RX ADMIN — ALLOPURINOL 100 MG: 100 TABLET ORAL at 17:43

## 2024-02-05 RX ADMIN — HYDROCODONE BITARTRATE AND ACETAMINOPHEN 1 TABLET: 5; 325 TABLET ORAL at 19:48

## 2024-02-05 RX ADMIN — ATORVASTATIN CALCIUM 20 MG: 20 TABLET, FILM COATED ORAL at 19:49

## 2024-02-05 RX ADMIN — ACETAMINOPHEN 650 MG: 325 TABLET ORAL at 19:48

## 2024-02-05 RX ADMIN — Medication 1000 UNITS: at 17:42

## 2024-02-05 RX ADMIN — BUSPIRONE HYDROCHLORIDE 5 MG: 5 TABLET ORAL at 19:49

## 2024-02-05 ASSESSMENT — PAIN SCALES - GENERAL
PAINLEVEL_OUTOF10: 0
PAINLEVEL_OUTOF10: 2
PAINLEVEL_OUTOF10: 6
PAINLEVEL_OUTOF10: 0

## 2024-02-05 ASSESSMENT — PAIN - FUNCTIONAL ASSESSMENT
PAIN_FUNCTIONAL_ASSESSMENT: PREVENTS OR INTERFERES SOME ACTIVE ACTIVITIES AND ADLS
PAIN_FUNCTIONAL_ASSESSMENT: 0-10

## 2024-02-05 ASSESSMENT — PAIN DESCRIPTION - DESCRIPTORS
DESCRIPTORS: ACHING
DESCRIPTORS: ACHING

## 2024-02-05 ASSESSMENT — LIFESTYLE VARIABLES
HOW MANY STANDARD DRINKS CONTAINING ALCOHOL DO YOU HAVE ON A TYPICAL DAY: PATIENT DOES NOT DRINK
HOW OFTEN DO YOU HAVE A DRINK CONTAINING ALCOHOL: NEVER

## 2024-02-05 ASSESSMENT — PAIN DESCRIPTION - LOCATION
LOCATION: FOOT
LOCATION: ELBOW

## 2024-02-05 NOTE — ED NOTES
Pt voided per bed pan  Skin care completed post void  Redness noted to coccyx  Brief applied    Pt taking sips of po water  Pt denies complaint at this time  On 2 liters per NC of oxygen  Daughter and pt updated on plan of care.

## 2024-02-05 NOTE — ED NOTES
Feeling well. RLPs. Rev warnings/when to call.   3rd T labs with nv Report given to Zahraa LEWIS.

## 2024-02-05 NOTE — ED NOTES
ED TO INPATIENT SBAR HANDOFF    Patient Name: Josep Santos   :  1933  89 y.o.   MRN:  [id number]  Preferred Name  Jean Pierre  ED Room #:  ED-0022/22  Family/Caregiver Present yes   Restraints no   Sitter no   Sepsis Risk Score Sepsis Risk Score: 3.12    Situation  Code Status: Full Code No additional code details.    Allergies: Patient has no known allergies.  Weight: Patient Vitals for the past 96 hrs (Last 3 readings):   Weight   24 1002 65.8 kg (145 lb)     Arrived from: nursing home  Chief Complaint:   Chief Complaint   Patient presents with    Shortness of Breath     Patient comes to ER via EMS from [facility] for shortness of breath. Patient reports around 3:30 am he woke up feeling more short of breath than usual. Patient is due for blood transfusion today, this would be his second one. Patient arrives to ER on home O2 2L NC at 100%.      Hospital Problem/Diagnosis:  Principal Problem:    Anemia  Resolved Problems:    * No resolved hospital problems. *    Imaging:   No orders to display     Abnormal labs:   Abnormal Labs Reviewed   CBC WITH AUTO DIFFERENTIAL - Abnormal; Notable for the following components:       Result Value    RBC 1.98 (*)     Hemoglobin 6.2 (*)     Hematocrit 18.1 (*)     RDW 21.7 (*)     Platelets 125 (*)     Lymphocytes Absolute 0.7 (*)     All other components within normal limits    Narrative:     CALL  [place]  [facility] tel. [phone],  Hematology results called to and read back by rigoberto yanes rn, 2024  07:17, by GLAMI   COMPREHENSIVE METABOLIC PANEL W/ REFLEX TO MG FOR LOW K - Abnormal; Notable for the following components:    Glucose 135 (*)     BUN 60 (*)     Creatinine 2.0 (*)     Est, Glom Filt Rate 31 (*)     All other components within normal limits     Critical values: yes     Abnormal Assessment Findings: 6.2/18.1 H/H, pale, increased fatigue    Background  History:   Past Medical History:   Diagnosis Date    Actinic keratosis     Actinic

## 2024-02-05 NOTE — ED PROVIDER NOTES
[facility] EMERGENCY DEPARTMENT  EMERGENCY DEPARTMENTENCOUNTER      Pt Name: Josep Santos  MRN: [id number]  Birthdate 11/12/1933  Date ofevaluation: 2/5/2024  Provider: Kyle Luevano MD    CHIEF COMPLAINT       Chief Complaint   Patient presents with    Shortness of Breath     Patient comes to ER via EMS from [facility] for shortness of breath. Patient reports around 3:30 am he woke up feeling more short of breath than usual. Patient is due for blood transfusion today, this would be his second one. Patient arrives to ER on home O2 2L NC at 100%.        HPI    HISTORY OF PRESENT ILLNESS   (Location/Symptom, Timing/Onset,Context/Setting, Quality, Duration, Modifying Factors, Severity)  Note limiting factors.   Josep Santos is a 89 y.o. male who presents to the emergency department with generalized weakness and shortness of breath.  This is a 89-year-old male with a history of chronic kidney disease and ongoing anemia who presents with increasing weakness and shortness of breath.  The patient has a history of anemia and was due to have a transfusion today.  The patient's labs were checked yesterday and he was noticed to be around 6-1/2 g of hemoglobin.  He denies any rectal bleeding.  He denies any dark stools.        NursingNotes were reviewed.    Review of Systems    REVIEW OF SYSTEMS    (2-9 systems for level 4, 10 or more for level 5)     Review of Systems   Constitutional: Negative for fever.   HENT: Negative for rhinorrhea and sore throat.    Eyes: Negative for redness.   Respiratory: Negative for shortness of breath.    Cardiovascular: Negative for chest pain.   Gastrointestinal: Negative for abdominal pain.   Genitourinary: Negative for flank pain.   Neurological: Negative for headaches.   Hematological: Negative for adenopathy.   Psychiatric/Behavioral: Negative for confusion.              Except as noted above the remainder of the review of systems was reviewed

## 2024-02-05 NOTE — CARE COORDINATION
02/05/24 1533   Readmission Assessment   Number of Days since last admission? 8-30 days   Previous Disposition SNF   Who is being Interviewed Caregiver  (Daughter-Екатерина)   What was the patient's/caregiver's perception as to why they think they needed to return back to the hospital? Other (Comment)  (The pt returned to the hospital for a transfusion)   Did you visit your Primary Care Physician after you left the hospital, before you returned this time? No   Why weren't you able to visit your PCP? Other (Comment)  (The pt has been in a SNF since the pt's last discharge.)   Did you see a specialist, such as Cardiac, Pulmonary, Orthopedic Physician, etc. after you left the hospital? No   Who advised the patient to return to the hospital? Physician   Does the patient report anything that got in the way of taking their medications? No   In our efforts to provide the best possible care to you and others like you, can you think of anything that we could have done to help you after you left the hospital the first time, so that you might not have needed to return so soon? Other (Comment)  (n/a)

## 2024-02-05 NOTE — CARE COORDINATION
Case Management Assessment  Initial Evaluation    Date/Time of Evaluation: 2/5/2024 3:26 PM  Assessment Completed by: JACQUELINE Lu    If patient is discharged prior to next notation, then this note serves as note for discharge by case management.    Patient Name: Josep Santos                   YOB: 1933  Diagnosis: Anemia [D64.9]  Anemia, unspecified type [D64.9]                   Date / Time: 2/5/2024  5:54 AM    Patient Admission Status: Observation   Readmission Risk (Low < 19, Mod (19-27), High > 27): Readmission Risk Score: 24.6    Current PCP: Raul Cuevas MD  PCP verified by CM? Yes    Chart Reviewed: Yes      History Provided by: Other (see comment) (The SW spoke with the pt's daughter Daylin)  Patient Orientation: Other (see comment) (The SW spoke with the pt's daughter Daylin)    Patient Cognition: Other (see comment) (The SW spoke with the pt's daughter Daylin)    Hospitalization in the last 30 days (Readmission):  Yes    If yes, Readmission Assessment in CM Navigator will be completed.    Advance Directives:      Code Status: Full Code   Patient's Primary Decision Maker is: Named in Scanned ACP Document    Primary Decision Maker: Daylin Cruz - Child - [phone]    Secondary Decision Maker: Karl Santos (Tunde) - Child - [phone]    Secondary Decision Maker: Rene Santos - Child - [phone]    Discharge Planning:    Patient expects to discharge to: Skilled nursing facility  Plan for transportation at discharge: Other (see comment) (The pt will need medical trasnport at discharge.)    Financial    Payor: MEDICARE / Plan: MEDICARE PART A AND B / Product Type: *No Product type* /         Current Nursing Home Information:  Patient admitted from:  [facility]  [address] [place] [address]  [id number]  Phone: [phone]  Fax:[phone]     Call to Daylin, admissions staff, at [facility] who

## 2024-02-06 ENCOUNTER — ANESTHESIA EVENT (OUTPATIENT)
Dept: ENDOSCOPY | Age: 89
DRG: 812 | End: 2024-02-06
Payer: MEDICARE

## 2024-02-06 ENCOUNTER — ANESTHESIA (OUTPATIENT)
Dept: ENDOSCOPY | Age: 89
DRG: 812 | End: 2024-02-06
Payer: MEDICARE

## 2024-02-06 LAB
ANION GAP SERPL CALCULATED.3IONS-SCNC: 11 MMOL/L (ref 3–16)
BUN SERPL-MCNC: 44 MG/DL (ref 7–20)
CALCIUM SERPL-MCNC: 8.9 MG/DL (ref 8.3–10.6)
CHLORIDE SERPL-SCNC: 104 MMOL/L (ref 99–110)
CO2 SERPL-SCNC: 29 MMOL/L (ref 21–32)
CREAT SERPL-MCNC: 1.6 MG/DL (ref 0.8–1.3)
CRP SERPL-MCNC: 23.8 MG/L (ref 0–5.1)
DEPRECATED RDW RBC AUTO: 18.7 % (ref 12.4–15.4)
ERYTHROCYTE [SEDIMENTATION RATE] IN BLOOD BY WESTERGREN METHOD: 18 MM/HR (ref 0–20)
GFR SERPLBLD CREATININE-BSD FMLA CKD-EPI: 41 ML/MIN/{1.73_M2}
GLUCOSE BLD-MCNC: 131 MG/DL (ref 70–99)
GLUCOSE BLD-MCNC: 134 MG/DL (ref 70–99)
GLUCOSE BLD-MCNC: 136 MG/DL (ref 70–99)
GLUCOSE SERPL-MCNC: 121 MG/DL (ref 70–99)
HCT VFR BLD AUTO: 23.7 % (ref 40.5–52.5)
HCT VFR BLD AUTO: 24.8 % (ref 40.5–52.5)
HCT VFR BLD AUTO: 25.4 % (ref 40.5–52.5)
HGB BLD-MCNC: 8 G/DL (ref 13.5–17.5)
HGB BLD-MCNC: 8.4 G/DL (ref 13.5–17.5)
HGB BLD-MCNC: 8.6 G/DL (ref 13.5–17.5)
INR PPP: 2.9 (ref 0.84–1.16)
IRON SATN MFR SERPL: ABNORMAL % (ref 20–50)
IRON SERPL-MCNC: 165 UG/DL (ref 59–158)
MCH RBC QN AUTO: 30.1 PG (ref 26–34)
MCHC RBC AUTO-ENTMCNC: 33.8 G/DL (ref 31–36)
MCV RBC AUTO: 88.9 FL (ref 80–100)
PERFORMED ON: ABNORMAL
PLATELET # BLD AUTO: 95 K/UL (ref 135–450)
PLATELET BLD QL SMEAR: ABNORMAL
PMV BLD AUTO: 9.1 FL (ref 5–10.5)
POTASSIUM SERPL-SCNC: 4.3 MMOL/L (ref 3.5–5.1)
PROTHROMBIN TIME: 30.1 SEC (ref 11.5–14.8)
RBC # BLD AUTO: 2.79 M/UL (ref 4.2–5.9)
SLIDE REVIEW: ABNORMAL
SODIUM SERPL-SCNC: 144 MMOL/L (ref 136–145)
TIBC SERPL-MCNC: ABNORMAL UG/DL (ref 260–445)
WBC # BLD AUTO: 5.7 K/UL (ref 4–11)

## 2024-02-06 PROCEDURE — 3700000000 HC ANESTHESIA ATTENDED CARE: Performed by: INTERNAL MEDICINE

## 2024-02-06 PROCEDURE — 2580000003 HC RX 258: Performed by: NURSE ANESTHETIST, CERTIFIED REGISTERED

## 2024-02-06 PROCEDURE — G0378 HOSPITAL OBSERVATION PER HR: HCPCS

## 2024-02-06 PROCEDURE — 85014 HEMATOCRIT: CPT

## 2024-02-06 PROCEDURE — 3609017100 HC EGD: Performed by: INTERNAL MEDICINE

## 2024-02-06 PROCEDURE — 36415 COLL VENOUS BLD VENIPUNCTURE: CPT

## 2024-02-06 PROCEDURE — 80048 BASIC METABOLIC PNL TOTAL CA: CPT

## 2024-02-06 PROCEDURE — 6360000002 HC RX W HCPCS: Performed by: NURSE ANESTHETIST, CERTIFIED REGISTERED

## 2024-02-06 PROCEDURE — 6370000000 HC RX 637 (ALT 250 FOR IP): Performed by: INTERNAL MEDICINE

## 2024-02-06 PROCEDURE — 0DJ08ZZ INSPECTION OF UPPER INTESTINAL TRACT, VIA NATURAL OR ARTIFICIAL OPENING ENDOSCOPIC: ICD-10-PCS | Performed by: INTERNAL MEDICINE

## 2024-02-06 PROCEDURE — 85610 PROTHROMBIN TIME: CPT

## 2024-02-06 PROCEDURE — 85018 HEMOGLOBIN: CPT

## 2024-02-06 PROCEDURE — 85652 RBC SED RATE AUTOMATED: CPT

## 2024-02-06 PROCEDURE — 85027 COMPLETE CBC AUTOMATED: CPT

## 2024-02-06 PROCEDURE — 1200000000 HC SEMI PRIVATE

## 2024-02-06 PROCEDURE — 86140 C-REACTIVE PROTEIN: CPT

## 2024-02-06 PROCEDURE — 2500000003 HC RX 250 WO HCPCS: Performed by: NURSE ANESTHETIST, CERTIFIED REGISTERED

## 2024-02-06 PROCEDURE — 7100000000 HC PACU RECOVERY - FIRST 15 MIN: Performed by: INTERNAL MEDICINE

## 2024-02-06 PROCEDURE — 7100000001 HC PACU RECOVERY - ADDTL 15 MIN: Performed by: INTERNAL MEDICINE

## 2024-02-06 PROCEDURE — 2709999900 HC NON-CHARGEABLE SUPPLY: Performed by: INTERNAL MEDICINE

## 2024-02-06 RX ORDER — SODIUM CHLORIDE 9 MG/ML
INJECTION, SOLUTION INTRAVENOUS CONTINUOUS PRN
Status: DISCONTINUED | OUTPATIENT
Start: 2024-02-06 | End: 2024-02-06 | Stop reason: SDUPTHER

## 2024-02-06 RX ORDER — PROPOFOL 10 MG/ML
INJECTION, EMULSION INTRAVENOUS PRN
Status: DISCONTINUED | OUTPATIENT
Start: 2024-02-06 | End: 2024-02-06 | Stop reason: SDUPTHER

## 2024-02-06 RX ORDER — LIDOCAINE HYDROCHLORIDE 20 MG/ML
INJECTION, SOLUTION EPIDURAL; INFILTRATION; INTRACAUDAL; PERINEURAL PRN
Status: DISCONTINUED | OUTPATIENT
Start: 2024-02-06 | End: 2024-02-06 | Stop reason: SDUPTHER

## 2024-02-06 RX ADMIN — PROPOFOL 100 MCG/KG/MIN: 10 INJECTION, EMULSION INTRAVENOUS at 10:31

## 2024-02-06 RX ADMIN — COLCHICINE 0.3 MG: 0.6 TABLET, FILM COATED ORAL at 16:17

## 2024-02-06 RX ADMIN — POLYETHYLENE GLYCOL 3350 17 G: 17 POWDER, FOR SOLUTION ORAL at 16:19

## 2024-02-06 RX ADMIN — ATORVASTATIN CALCIUM 20 MG: 20 TABLET, FILM COATED ORAL at 20:08

## 2024-02-06 RX ADMIN — HYDROCORTISONE 1 G: 0.01 CREAM TOPICAL at 20:15

## 2024-02-06 RX ADMIN — POLYETHYLENE GLYCOL-3350 AND ELECTROLYTES 4000 ML: 236; 6.74; 5.86; 2.97; 22.74 POWDER, FOR SOLUTION ORAL at 17:07

## 2024-02-06 RX ADMIN — OXYCODONE HYDROCHLORIDE AND ACETAMINOPHEN 500 MG: 500 TABLET ORAL at 16:17

## 2024-02-06 RX ADMIN — Medication 1000 UNITS: at 16:17

## 2024-02-06 RX ADMIN — BUSPIRONE HYDROCHLORIDE 5 MG: 5 TABLET ORAL at 20:08

## 2024-02-06 RX ADMIN — BUSPIRONE HYDROCHLORIDE 5 MG: 5 TABLET ORAL at 16:17

## 2024-02-06 RX ADMIN — Medication 100 MG: at 16:17

## 2024-02-06 RX ADMIN — SODIUM CHLORIDE: 9 INJECTION, SOLUTION INTRAVENOUS at 10:23

## 2024-02-06 RX ADMIN — TAMSULOSIN HYDROCHLORIDE 0.4 MG: 0.4 CAPSULE ORAL at 16:17

## 2024-02-06 RX ADMIN — METOPROLOL TARTRATE 25 MG: 25 TABLET, FILM COATED ORAL at 16:17

## 2024-02-06 RX ADMIN — EMPAGLIFLOZIN 10 MG: 10 TABLET, FILM COATED ORAL at 16:17

## 2024-02-06 RX ADMIN — LIDOCAINE HYDROCHLORIDE 100 MG: 20 INJECTION, SOLUTION EPIDURAL; INFILTRATION; INTRACAUDAL; PERINEURAL at 10:30

## 2024-02-06 RX ADMIN — ALLOPURINOL 100 MG: 100 TABLET ORAL at 16:17

## 2024-02-06 RX ADMIN — ACETAMINOPHEN 650 MG: 325 TABLET ORAL at 20:06

## 2024-02-06 RX ADMIN — METOPROLOL TARTRATE 25 MG: 25 TABLET, FILM COATED ORAL at 20:08

## 2024-02-06 RX ADMIN — PROPOFOL 50 MG: 10 INJECTION, EMULSION INTRAVENOUS at 10:30

## 2024-02-06 ASSESSMENT — ENCOUNTER SYMPTOMS: STRIDOR: 0

## 2024-02-06 ASSESSMENT — PAIN DESCRIPTION - DESCRIPTORS: DESCRIPTORS: ACHING

## 2024-02-06 ASSESSMENT — PAIN - FUNCTIONAL ASSESSMENT
PAIN_FUNCTIONAL_ASSESSMENT: 0-10
PAIN_FUNCTIONAL_ASSESSMENT: ACTIVITIES ARE NOT PREVENTED

## 2024-02-06 ASSESSMENT — PAIN SCALES - GENERAL
PAINLEVEL_OUTOF10: 1
PAINLEVEL_OUTOF10: 0
PAINLEVEL_OUTOF10: 4
PAINLEVEL_OUTOF10: 0

## 2024-02-06 ASSESSMENT — PAIN DESCRIPTION - ORIENTATION: ORIENTATION: LEFT

## 2024-02-06 ASSESSMENT — PAIN DESCRIPTION - LOCATION: LOCATION: FOOT

## 2024-02-06 NOTE — PLAN OF CARE
Problem: Chronic Conditions and Co-morbidities  Goal: Patient's chronic conditions and co-morbidity symptoms are monitored and maintained or improved  Outcome: Progressing  Flowsheets (Taken 2/6/2024 0632)  Care Plan - Patient's Chronic Conditions and Co-Morbidity Symptoms are Monitored and Maintained or Improved:   Monitor and assess patient's chronic conditions and comorbid symptoms for stability, deterioration, or improvement   Collaborate with multidisciplinary team to address chronic and comorbid conditions and prevent exacerbation or deterioration   Update acute care plan with appropriate goals if chronic or comorbid symptoms are exacerbated and prevent overall improvement and discharge     Problem: Discharge Planning  Goal: Discharge to home or other facility with appropriate resources  Outcome: Progressing  Flowsheets (Taken 2/6/2024 0632)  Discharge to home or other facility with appropriate resources:   Identify barriers to discharge with patient and caregiver   Arrange for needed discharge resources and transportation as appropriate   Identify discharge learning needs (meds, wound care, etc)   Arrange for interpreters to assist at discharge as needed   Refer to discharge planning if patient needs post-hospital services based on physician order or complex needs related to functional status, cognitive ability or social support system     Problem: Pain  Goal: Verbalizes/displays adequate comfort level or baseline comfort level  Outcome: Progressing  Flowsheets (Taken 2/6/2024 0632)  Verbalizes/displays adequate comfort level or baseline comfort level:   Encourage patient to monitor pain and request assistance   Administer analgesics based on type and severity of pain and evaluate response   Assess pain using appropriate pain scale   Implement non-pharmacological measures as appropriate and evaluate response   Consider cultural and social influences on pain and pain management   Notify Licensed Independent 
first chemo tx tomorrow

## 2024-02-06 NOTE — H&P
[place] [address]                              HISTORY AND PHYSICAL    PATIENT NAME: JOSE LOCO                 :        1933  MED REC NO:   [id number]                          ROOM:  ACCOUNT NO:   [id number]                           ADMIT DATE: 2024  PROVIDER:     Irwin Limon MD    HISTORY OF PRESENT ILLNESS:  The patient is a 89-year-old white American  gentleman came to the emergency room with critically low hemoglobin  reported at the nursing home.  The patient is a resident at [facility] where he is in a rehab.  The patient is  moderately disoriented and having memory loss.  Not very accurate in  giving history, but maintains a reasonable communication.  He woke up,  feeling more short of breath than usual.  The patient was due for a  blood transfusion today.  The patient was brought to the emergency room,  was on 2 liters nasal cannula with some respiratory distress.  Denied  any chest pain.  Did admit to generalized weakness and shortness of  breath.    PAST MEDICAL HISTORY:  Pertinent for chronic kidney disease, chronic  anemia of renal disease, actinic keratosis plus osteomyelitis of the  right ankle and foot; atrial fibrillation; bilateral carotid stenosis;  atherosclerotic heart disease; leg cellulitis; chronic combined systolic  and diastolic heart failure; chronic kidney disease stage III; type 2  diabetes mellitus, uncontrolled; peripheral arterial disease; gout;  hyperlipidemia; hypertension; intermittent atrial fibrillation;  iron-deficiency anemia; ischemic cardiomyopathy; kidney stones; MRSA  infection of the foot; nonrheumatic aortic valve stenosis; occult blood  in the stool; pacemaker; pyogenic arthritis.    PAST SURGICAL HISTORY:  Pertinent for kidney stone surgery, coronary  artery bypass, cardiac catheterization, pacemaker placement, toe  amputation,

## 2024-02-06 NOTE — ANESTHESIA PRE PROCEDURE
Department of Anesthesiology  Preprocedure Note       Name:  Josep Santos   Age:  89 y.o.  :  1933                                          MRN:  [id number]         Date:  2024      Surgeon: Surgeon(s):  Keith Conway MD    Procedure: Procedure(s):  EGD DIAGNOSTIC ONLY    Medications prior to admission:   Prior to Admission medications    Medication Sig Start Date End Date Taking? Authorizing Provider   torsemide (DEMADEX) 20 MG tablet Take 1 tablet by mouth See Admin Instructions One tablet by mouth on Monday, Tuesday, Thursday, Friday and Saturday   Yes Jenaro Louise MD   hydrocortisone 1 % cream Apply 1 g topically 2 times daily Apply topically 2 times daily.   Yes Jenaro Louise MD   menthol-zinc oxide (CALMOSEPTINE) 0.44-20.625 % OINT ointment Apply 1 g topically 2 times daily as needed (barrier due to incontinence) Max 30 ml per day.   Yes Jenaro Louise MD   bisacodyl (DULCOLAX) 10 MG suppository Place 1 suppository rectally daily as needed for Constipation   Yes Jenaro Louise MD   HYDROcodone-acetaminophen (NORCO) 5-325 MG per tablet Take 1 tablet by mouth every 6 hours as needed for Pain.   Yes Jenaro Louise MD   aluminum & magnesium hydroxide-simethicone (MAALOX) 200-200-20 MG/5ML SUSP suspension Take 30 mLs by mouth every 4 hours as needed for Indigestion   Yes Jenaro Louise MD   warfarin (COUMADIN) 1.25 mg TABS Take 1 split-tab by mouth See Admin Instructions Daily on Wednesday and     Jenaro Louise MD   allopurinol (ZYLOPRIM) 100 MG tablet Take 1 tablet by mouth daily 24   Salvador Wise MD   ascorbic acid (VITAMIN C) 500 MG tablet Take 1 tablet by mouth daily 24   Salvador Wise MD   colchicine (COLCRYS) 0.6 MG tablet Take 0.5 tablets by mouth daily 24   Salvador Wise MD   metoprolol tartrate (LOPRESSOR) 25 MG tablet Take 1 tablet by mouth 2 times daily 24   Salvador Wise MD   tamsulosin (FLOMAX) 0.4 MG

## 2024-02-06 NOTE — ANESTHESIA POSTPROCEDURE EVALUATION
Department of Anesthesiology  Postprocedure Note    Patient: Josep Santos  MRN: [id number]  YOB: 1933  Date of evaluation: 2/6/2024    Procedure Summary       Date: 02/06/24 Room / Location: [place] [address] / [facility]    Anesthesia Start: 1023 Anesthesia Stop: 1039    Procedure: EGD DIAGNOSTIC ONLY (Abdomen) Diagnosis:       Anemia, unspecified type      (Anemia, unspecified type [D64.9])    Surgeons: Keith Conway MD Responsible Provider: Claudette Choi MD    Anesthesia Type: MAC ASA Status: 4            Anesthesia Type: No value filed.    Elysia Phase I: Elysia Score: 10    Elysia Phase II:      Anesthesia Post Evaluation    Patient location during evaluation: bedside  Patient participation: complete - patient participated  Level of consciousness: awake and alert  Pain score: 1  Airway patency: patent  Nausea & Vomiting: no vomiting  Cardiovascular status: hemodynamically stable  Respiratory status: nonlabored ventilation  Hydration status: stable  Multimodal analgesia pain management approach  Pain management: adequate    No notable events documented.

## 2024-02-06 NOTE — CONSULTS
Gastroenterology Consult Note        Patient: Josep Santos  : 1933  Acct#:      Date:  2024    Subjective:       History of Present Illness  Patient is a 89 y.o.  male admitted with Anemia [D64.9]  Anemia, unspecified type [D64.9] who is seen in consult for Anemia.   History of diabetes, CAD, A-fib on coumadin, CKD on retacrit, s/p pacemaker, MDS followed by [facility].  He was admitted on 2024 for right foot infection with concern for osteomyelitis. 2 weeks ago was admitted for osteomyelitis of the foot. Noted to be anemic. Dr. Fontaine saw for consult, brown stool was noted on DEBORAH, he recovered with 1 unit of PRBC. Labs were consisted with anemia of chronic disease. The family opted to hold off on endoscopy at that time. Now readmitted for generalized weakness and shortness of breath. Labs were checked yesterday as an outpatient found to be 6.2. 2 unit PRBC ordered and transfusing      Past Medical History:   Diagnosis Date    Actinic keratosis     Acute osteomyelitis of right ankle or foot (HCC) 2024    Atrial fibrillation (HCC)     Bilateral carotid artery stenosis     CAD (coronary artery disease)     Cellulitis 07/15/2015    right foot    Chronic combined systolic (congestive) and diastolic (congestive) heart failure (HCC) 2024    Chronic kidney disease (CKD), stage III (moderate) (HCC) 2024    Diabetes mellitus (HCC)     DM (diabetes mellitus), type 2 with peripheral vascular complications ([facility])--s/p amputation great toe post osteomylitis  2015    Glucose intolerance (malabsorption)     Gout     Hyperlipidemia     Hypertension     Hypertrophy of prostate without urinary obstruction and other lower urinary tract symptoms (LUTS)     Intermittent atrial fibrillation (HCC)     Iron deficiency anemia, unspecified 2024    Ischemic cardiomyopathy 2024    Kidney stone     Methicillin resistant Staphylococcus aureus infection, unspecified site 
COLONOSCOPY  03/28/2018    dr ochoa    CORONARY ARTERY BYPASS GRAFT  1996    x3    FOOT DEBRIDEMENT Right 1/22/2024    FOOT DEBRIDEMENT INCISION AND DRAINAGE, HEAD ARTHROPLASTY performed by Frank Jacob DPM at [facility] OR    KIDNEY STONE SURGERY  1980    OTHER SURGICAL HISTORY Right 07/17/2015    right fifth toe I and D    PACEMAKER PLACEMENT  2005    MN ESOPHAGOGASTRODUODENOSCOPY TRANSORAL DIAGNOSTIC N/A 11/21/2018    EGD DIAGNOSTIC ONLY performed by Jovan Ochoa MD at [facility] ENDOSCOPY    TOE AMPUTATION Right 07/16/2015    right pinkey toe     TONSILLECTOMY AND ADENOIDECTOMY      TOOTH EXTRACTION  09/2023    4 teeth    UPPER GASTROINTESTINAL ENDOSCOPY N/A 2/6/2024    EGD DIAGNOSTIC ONLY performed by Keith Conway MD at [facility] ENDOSCOPY            Current Medications:     acetaminophen  650 mg Oral BID    allopurinol  100 mg Oral Daily    ascorbic acid  500 mg Oral Daily    atorvastatin  20 mg Oral Nightly    busPIRone  5 mg Oral BID    Vitamin D  1,000 Units Oral Daily    colchicine  0.3 mg Oral Daily    [START ON 2/15/2024] epoetin nader-epbx  10,000 Units SubCUTAneous Q14 Days    ferrous sulfate  325 mg Oral Once per day on Mon Wed Fri    fluticasone  2 spray Each Nostril Daily    hydrocortisone  1 g Topical BID    empagliflozin  10 mg Oral Daily    metoprolol tartrate  25 mg Oral BID    polyethylene glycol  17 g Oral Daily    spironolactone  25 mg Oral Once per day on Mon Thu    tamsulosin  0.4 mg Oral Daily    [START ON 2/7/2024] torsemide  20 mg Oral Once per day on Sun Wed    vitamin B-1  100 mg Oral Daily           Allergies:    No Known Allergies     Social History:    reports that he has never smoked. He has never used smokeless tobacco. He reports that he does not drink alcohol and does not use drugs.         Family History:     family history includes Diabetes in his mother; Stroke in his father.         ROS:  14 point review of systems performed negative except for as documented in the 
infection  -Patient may full weightbear in surgical shoe  -Patient stable for discharge from podiatric standpoint pending medical clearance. Podiatry will sign off at this time; please re-consult as necessary      DISPO:S/p right foot I&D with carbone arthroplasty (DOS 1/22/24). Every other suture removed without incient. All imaging and labs reviewed. No antibiotics necessary from podiatric standpoint. Partial weightbearing with heel touch in surgical shoe to RLE. Patient stable for discharge from podiatric standpoint pending medical clearance. We will continue to monitor     - The patient will be staffed with Dr. Elton Quintero, [facility].    Joseph Espinoza DPM  Podiatric Resident PGY-2

## 2024-02-06 NOTE — BRIEF OP NOTE
Brief Postoperative Note      Patient: Josep Santos  YOB: 1933  MRN: [id number]    Date of Procedure: 2/6/2024    Pre-Op Diagnosis Codes:     * Anemia, unspecified type [D64.9]       Procedure(s):  EGD DIAGNOSTIC ONLY    Surgeon(s):  Keith Conway MD    Anesthesia: Monitor Anesthesia Care    Estimated Blood Loss (mL): Minimal    Complications: None    Findings:   No active upper GI bleeding. At 30 cm, a large submucosal lesion was encountered, this is consistent with an esophageal duplication cyst.  At 37 cm, small isolated esophageal varix. Patchy gastritis in the fundus.    Plans:  Clear liquid diet.  Monitor H&H.  Transfuse to keep hemoglobin above 8.  Will slowly prep the patient today and tomorrow; plan for colonoscopy on Thursday. Check RUQ US with dopplers    ADDENDUM  I discussed findings/plans with daughter, she requests hem/onc consult, as patient has MDS and follows with Dr. Luisito Escobar.    Electronically signed by Keith Conway MD on 2/6/2024 at 10:46 AM

## 2024-02-07 ENCOUNTER — APPOINTMENT (OUTPATIENT)
Dept: ULTRASOUND IMAGING | Age: 89
DRG: 812 | End: 2024-02-07
Payer: MEDICARE

## 2024-02-07 VITALS
HEART RATE: 78 BPM | SYSTOLIC BLOOD PRESSURE: 137 MMHG | RESPIRATION RATE: 16 BRPM | OXYGEN SATURATION: 91 % | WEIGHT: 143 LBS | DIASTOLIC BLOOD PRESSURE: 70 MMHG | TEMPERATURE: 97.9 F | BODY MASS INDEX: 20.52 KG/M2

## 2024-02-07 LAB
ANION GAP SERPL CALCULATED.3IONS-SCNC: 13 MMOL/L (ref 3–16)
BUN SERPL-MCNC: 37 MG/DL (ref 7–20)
CALCIUM SERPL-MCNC: 8.9 MG/DL (ref 8.3–10.6)
CHLORIDE SERPL-SCNC: 107 MMOL/L (ref 99–110)
CO2 SERPL-SCNC: 26 MMOL/L (ref 21–32)
CREAT SERPL-MCNC: 1.3 MG/DL (ref 0.8–1.3)
DEPRECATED RDW RBC AUTO: 19 % (ref 12.4–15.4)
FERRITIN SERPL IA-MCNC: 1980 NG/ML (ref 30–400)
FOLATE SERPL-MCNC: 7.68 NG/ML (ref 4.78–24.2)
GFR SERPLBLD CREATININE-BSD FMLA CKD-EPI: 52 ML/MIN/{1.73_M2}
GLUCOSE SERPL-MCNC: 112 MG/DL (ref 70–99)
HAPTOGLOB SERPL-MCNC: 106 MG/DL (ref 30–200)
HCT VFR BLD AUTO: 25.6 % (ref 40.5–52.5)
HGB BLD-MCNC: 8.9 G/DL (ref 13.5–17.5)
INR PPP: 2.91 (ref 0.84–1.16)
LDH SERPL L TO P-CCNC: 225 U/L (ref 100–190)
MCH RBC QN AUTO: 31 PG (ref 26–34)
MCHC RBC AUTO-ENTMCNC: 34.8 G/DL (ref 31–36)
MCV RBC AUTO: 89.1 FL (ref 80–100)
PLATELET # BLD AUTO: 78 K/UL (ref 135–450)
PMV BLD AUTO: 9.3 FL (ref 5–10.5)
POTASSIUM SERPL-SCNC: 4.1 MMOL/L (ref 3.5–5.1)
PROTHROMBIN TIME: 30.2 SEC (ref 11.5–14.8)
RBC # BLD AUTO: 2.87 M/UL (ref 4.2–5.9)
SODIUM SERPL-SCNC: 146 MMOL/L (ref 136–145)
VIT B12 SERPL-MCNC: 600 PG/ML (ref 211–911)
WBC # BLD AUTO: 5.4 K/UL (ref 4–11)

## 2024-02-07 PROCEDURE — 97116 GAIT TRAINING THERAPY: CPT

## 2024-02-07 PROCEDURE — 85027 COMPLETE CBC AUTOMATED: CPT

## 2024-02-07 PROCEDURE — 6370000000 HC RX 637 (ALT 250 FOR IP): Performed by: INTERNAL MEDICINE

## 2024-02-07 PROCEDURE — 82607 VITAMIN B-12: CPT

## 2024-02-07 PROCEDURE — 76705 ECHO EXAM OF ABDOMEN: CPT

## 2024-02-07 PROCEDURE — 93975 VASCULAR STUDY: CPT

## 2024-02-07 PROCEDURE — 97530 THERAPEUTIC ACTIVITIES: CPT

## 2024-02-07 PROCEDURE — 82746 ASSAY OF FOLIC ACID SERUM: CPT

## 2024-02-07 PROCEDURE — 85610 PROTHROMBIN TIME: CPT

## 2024-02-07 PROCEDURE — 83010 ASSAY OF HAPTOGLOBIN QUANT: CPT

## 2024-02-07 PROCEDURE — 97535 SELF CARE MNGMENT TRAINING: CPT

## 2024-02-07 PROCEDURE — 82728 ASSAY OF FERRITIN: CPT

## 2024-02-07 PROCEDURE — 36415 COLL VENOUS BLD VENIPUNCTURE: CPT

## 2024-02-07 PROCEDURE — 97165 OT EVAL LOW COMPLEX 30 MIN: CPT

## 2024-02-07 PROCEDURE — 84630 ASSAY OF ZINC: CPT

## 2024-02-07 PROCEDURE — 82525 ASSAY OF COPPER: CPT

## 2024-02-07 PROCEDURE — 80048 BASIC METABOLIC PNL TOTAL CA: CPT

## 2024-02-07 PROCEDURE — 6360000002 HC RX W HCPCS: Performed by: STUDENT IN AN ORGANIZED HEALTH CARE EDUCATION/TRAINING PROGRAM

## 2024-02-07 PROCEDURE — 83615 LACTATE (LD) (LDH) ENZYME: CPT

## 2024-02-07 PROCEDURE — 97161 PT EVAL LOW COMPLEX 20 MIN: CPT

## 2024-02-07 RX ORDER — LINEZOLID 600 MG/1
600 TABLET, FILM COATED ORAL 2 TIMES DAILY
Status: DISCONTINUED | OUTPATIENT
Start: 2024-02-07 | End: 2024-02-07 | Stop reason: HOSPADM

## 2024-02-07 RX ORDER — HYDROCODONE BITARTRATE AND ACETAMINOPHEN 5; 325 MG/1; MG/1
1 TABLET ORAL EVERY 6 HOURS PRN
Qty: 12 TABLET | Refills: 0 | Status: SHIPPED | OUTPATIENT
Start: 2024-02-07 | End: 2024-02-10

## 2024-02-07 RX ADMIN — METOPROLOL TARTRATE 25 MG: 25 TABLET, FILM COATED ORAL at 09:30

## 2024-02-07 RX ADMIN — BUSPIRONE HYDROCHLORIDE 5 MG: 5 TABLET ORAL at 09:30

## 2024-02-07 RX ADMIN — HYDROCORTISONE 1 G: 0.01 CREAM TOPICAL at 09:35

## 2024-02-07 RX ADMIN — EPOETIN ALFA-EPBX 10000 UNITS: 10000 INJECTION, SOLUTION INTRAVENOUS; SUBCUTANEOUS at 10:27

## 2024-02-07 RX ADMIN — COLCHICINE 0.3 MG: 0.6 TABLET, FILM COATED ORAL at 09:54

## 2024-02-07 RX ADMIN — TORSEMIDE 20 MG: 20 TABLET ORAL at 09:30

## 2024-02-07 RX ADMIN — TAMSULOSIN HYDROCHLORIDE 0.4 MG: 0.4 CAPSULE ORAL at 09:30

## 2024-02-07 RX ADMIN — EMPAGLIFLOZIN 10 MG: 10 TABLET, FILM COATED ORAL at 09:30

## 2024-02-07 RX ADMIN — ACETAMINOPHEN 650 MG: 325 TABLET ORAL at 09:30

## 2024-02-07 RX ADMIN — OXYCODONE HYDROCHLORIDE AND ACETAMINOPHEN 500 MG: 500 TABLET ORAL at 09:30

## 2024-02-07 RX ADMIN — FERROUS SULFATE TAB 325 MG (65 MG ELEMENTAL FE) 325 MG: 325 (65 FE) TAB at 09:31

## 2024-02-07 RX ADMIN — LINEZOLID 600 MG: 600 TABLET, FILM COATED ORAL at 10:26

## 2024-02-07 RX ADMIN — Medication 1000 UNITS: at 09:30

## 2024-02-07 RX ADMIN — FLUTICASONE PROPIONATE 2 SPRAY: 50 SPRAY, METERED NASAL at 09:35

## 2024-02-07 RX ADMIN — Medication 100 MG: at 09:30

## 2024-02-07 RX ADMIN — ALLOPURINOL 100 MG: 100 TABLET ORAL at 09:30

## 2024-02-07 NOTE — PROGRESS NOTES
Gastroenterology Progress Note    Josep Santos is a 89 y.o. male patient.  1. Anemia, unspecified type    2. Osteomyelitis of metatarsal (HCC)        Admission Date: 2024  Hospital Day: Hospital Day: 3  Attending: Irwin Limon MD  Date of service: 24    SUBJECTIVE:    No overt GI bleeding.  Patient feels fine.  Family upset about being discharged today.    ROS:  Cardiovascular ROS: no chest pain or dyspnea on exertion  Gastrointestinal ROS: see above  Respiratory ROS: no cough, shortness of breath, or wheezing    Physical    VITALS:  /70   Pulse 78   Temp 97.9 °F (36.6 °C) (Oral)   Resp 16   Wt 64.9 kg (143 lb)   SpO2 91%   BMI 20.52 kg/m²   TEMPERATURE:  Current - Temp: 97.9 °F (36.6 °C); Max - Temp  Av.7 °F (36.5 °C)  Min: 97.4 °F (36.3 °C)  Max: 97.9 °F (36.6 °C)    NAD  RRR, Nl s1s2  Lungs CTA Bilaterally, normal effort  Abdomen soft, ND, NT, no HSM, Bowel sounds normal  AAOx3, No asterixis   Digital rectal exam: Anal sphincter appears to be tight, I suspect patient might have a component of anal stenosis    Data      CBC:   Recent Labs     24  0641 24  1651 24  1428 24  0638   WBC 4.8  --  5.7  --  5.4   RBC 1.98*  --  2.79*  --  2.87*   HGB 6.2*   < > 8.4* 8.6* 8.9*   HCT 18.1*   < > 24.8* 25.4* 25.6*   *  --  95*  --  78*   MCV 91.4  --  88.9  --  89.1   MCH 31.2  --  30.1  --  31.0   MCHC 34.2  --  33.8  --  34.8   RDW 21.7*  --  18.7*  --  19.0*    < > = values in this interval not displayed.        BMP:  Recent Labs     24  0641 24  0518 24  0637    144 146*   K 4.1 4.3 4.1    104 107   CO2 28 29 26   BUN 60* 44* 37*   CREATININE 2.0* 1.6* 1.3   CALCIUM 8.9 8.9 8.9   GLUCOSE 135* 121* 112*        Hepatic Function Panel:   Recent Labs     24  0641   AST 17   ALT 12   BILITOT 0.8   ALKPHOS 90       No results for input(s): \"LIPASE\", \"AMYLASE\" in the last 72 hours.  Recent Labs     
Diet:  Cardiac/Carb controlled - mechanical soft texture, regular thin consistency liquids.    Patient has order for compression hose each morning and off at HS for edema. He is to be weighed daily.    His fluid restriction is 1920 ml daily.    He has an order for Contact Isolation due to MDRO in his wound.    He is a Full Code.  Patient has DNR-CC on file dated 6/6/23.  Will need to verify which is appropriate and desired.    He is allowed heel touch weight bearing to right lower extremity.    Per the CHF clinic:  Hold Metoprolol if Hr less than 45 or patient is symptomatic.  Hold ACE/ARB if SBP less than 85 or patient symptomatic.  Do not hold Spironolactone (aldactone) for hypotension/bradycardia.    Keep foot elevated six inches above the level of the heart.  Support foot, leg and knees with pillows.  Elevate foot as much as possible.  Apply ice pack on the bandaged site for 15 minutes every hour while awake.    Admission initiated from paperwork provided from [facility].  Patient taken to 3305 prior to face to face contact with this patient.  
His anemia is Chronic and now stable after PRBC , he does not have an active GI blood loss or acute blood loss elsewhere , he should be discharged with out patient f/u with heme Onc , GI and and Oncology has no acute emergent intervention at this time   
Hospital Problems             Last Modified POA    * (Principal) Anemia 2/5/2024 Yes    Hyperlipidemia 2/5/2024 Yes    Aortic stenosis, moderate 2/5/2024 Yes    Intermittent atrial fibrillation (HCC) 2/5/2024 Yes    Chronic combined systolic (congestive) and diastolic (congestive) heart failure (HCC) 2/5/2024 Yes    Pacemaker 2/5/2024 Yes    Overview Addendum 11/25/2014  7:44 AM by Radha Wolff     Patient has [employer] Dual Chamber Pacemaker, replaced 11-14-14         DM (diabetes mellitus), type 2 with peripheral vascular complications (HCC) 2/5/2024 Yes    Chronic kidney disease (CKD), stage III (moderate) (HCC) 2/5/2024 Yes    Bilateral carotid artery stenosis 2/5/2024 Yes   H&P dictated     
Physical Therapy    [facility] Department   Phone: [phone]    Physical Therapy    [x] Initial Evaluation            [] Daily Treatment Note         [] Discharge Summary      Patient: Josep Santos   : 1933   MRN: [id number]   Date of Service:  2024  Admitting Diagnosis: Anemia  Current Admission Summary: Josep Santos is a 89 y.o. male who presents to the emergency department with generalized weakness and shortness of breath.  This is a 89-year-old male with a history of chronic kidney disease and ongoing anemia who presents with increasing weakness and shortness of breath.  The patient has a history of anemia and was due to have a transfusion today.  The patient's labs were checked yesterday and he was noticed to be around 6-1/2 g of hemoglobin.  He denies any rectal bleeding.  He denies any dark stools. Patient had EGD that did not show any GI bleed.   Past Medical History:  has a past medical history of Actinic keratosis, Acute osteomyelitis of right ankle or foot ([facility]), Atrial fibrillation ([facility]), Bilateral carotid artery stenosis, CAD (coronary artery disease), Cellulitis, Chronic combined systolic (congestive) and diastolic (congestive) heart failure ([facility]), Chronic kidney disease (CKD), stage III (moderate) ([facility]), Diabetes mellitus ([facility]), DM (diabetes mellitus), type 2 with peripheral vascular complications ([facility])--s/p amputation great toe post osteomylitis , Glucose intolerance (malabsorption), Gout, Hyperlipidemia, Hypertension, Hypertrophy of prostate without urinary obstruction and other lower urinary tract symptoms (LUTS), Intermittent atrial fibrillation ([facility]), Iron deficiency anemia, unspecified, Ischemic cardiomyopathy, Kidney stone, Methicillin resistant Staphylococcus aureus infection, unspecified site, Nonrheumatic aortic valve stenosis, Occult blood in stool, Pacemaker, and Pyogenic arthritis, unspecified ([facility]).  Past Surgical History:  
Pt  was at rehab for last admission when became short of breath, low Hgbn, 2 units given today.  GI saw pt and advised will do EGD in AM.  NPO after midnight.  Daughter at bedside and will be here early in AM to be at bedside.    
Pt arrived from endo to PACU bay 4. Reported received from endo staff. Pt asleep, minimally arousable to voice. Pt arrives with nasal cannula in place, infusing 2L oxygen, vitals as charted.   
Pt arrived on unit from ED.  AOx4, VSS.  Wound dressing on right foot has drainage, pt has surgery 2 weeks ago at [facility] on right foot.  Will change dressing and will msg NICHOLAS.    
Pt stable and able to be transferred from PACU to room 3305. A&O , VSS, with no complaints at this time. 3A called and notified that pt is being transferred out of PACU and to room.    
Report called to Ching LEWIS, v/u. Pt taken back to floor, all belongings with pt.   
Teaching / education initiated regarding perioperative experience, expectations, and pain management during stay. Patient verbalized understanding.  
normal S1 and S2, no S3 or S4, and no murmur noted  ABDOMEN:  soft BS + non tender   MUSCULOSKELETAL:  trace edema   NEUROLOGIC:  grossly intact   SKIN:  marked pallor  and no bruising or bleeding    Data      Recent Results (from the past 96 hour(s))   EKG 12 Lead    Collection Time: 02/05/24  6:02 AM   Result Value Ref Range    Ventricular Rate 64 BPM    Atrial Rate 64 BPM    P-R Interval 220 ms    QRS Duration 134 ms    Q-T Interval 416 ms    QTc Calculation (Bazett) 429 ms    P Axis 55 degrees    R Axis -50 degrees    T Axis 49 degrees    Diagnosis       Sinus rhythm with 1st degree A-V blockPossible Left atrial enlargementLeft axis deviationNon-specific intra-ventricular conduction blockCannot rule out Anteroseptal infarct , age undeterminedAbnormal ECGConfirmed by OMAR KEMP ([id number]) on 2/5/2024 8:06:05 PM     CBC with Auto Differential    Collection Time: 02/05/24  6:41 AM   Result Value Ref Range    WBC 4.8 4.0 - 11.0 K/uL    RBC 1.98 (L) 4.20 - 5.90 M/uL    Hemoglobin 6.2 (LL) 13.5 - 17.5 g/dL    Hematocrit 18.1 (LL) 40.5 - 52.5 %    MCV 91.4 80.0 - 100.0 fL    MCH 31.2 26.0 - 34.0 pg    MCHC 34.2 31.0 - 36.0 g/dL    RDW 21.7 (H) 12.4 - 15.4 %    Platelets 125 (L) 135 - 450 K/uL    MPV 8.7 5.0 - 10.5 fL    Neutrophils % 76.4 %    Lymphocytes % 15.3 %    Monocytes % 5.4 %    Eosinophils % 2.0 %    Basophils % 0.9 %    Neutrophils Absolute 3.6 1.7 - 7.7 K/uL    Lymphocytes Absolute 0.7 (L) 1.0 - 5.1 K/uL    Monocytes Absolute 0.3 0.0 - 1.3 K/uL    Eosinophils Absolute 0.1 0.0 - 0.6 K/uL    Basophils Absolute 0.0 0.0 - 0.2 K/uL   CMP w/ Reflex to MG    Collection Time: 02/05/24  6:41 AM   Result Value Ref Range    Sodium 142 136 - 145 mmol/L    Potassium reflex Magnesium 4.1 3.5 - 5.1 mmol/L    Chloride 102 99 - 110 mmol/L    CO2 28 21 - 32 mmol/L    Anion Gap 12 3 - 16    Glucose 135 (H) 70 - 99 mg/dL    BUN 60 (H) 7 - 20 mg/dL    Creatinine 2.0 (H) 0.8 - 1.3 mg/dL    Est, Glom Filt Rate 31 (A) >60    Calcium 
Interventions    Functional Outcomes  -PAC Inpatient Daily Activity Raw Score: 16    Cognition  WFL  Orientation:    alert and oriented x 4  Command Following:   WFL     Education  Barriers To Learning: hearing  Patient Education: patient educated on goals, OT role and benefits, plan of care, weight-bearing education, proper use of assistive device/equipment, energy conservation, transfer training, discharge recommendations  Learning Assessment:  patient verbalizes understanding, would benefit from continued reinforcement    Assessment  Activity Tolerance: Pt tolerated good. Pt able to tolerate several bedside ADLs this date as well as fxnl mobility with 2ww. Pt required occ rest breaks t/o tasks.   Impairments Requiring Therapeutic Intervention: decreased functional mobility, decreased ADL status, decreased strength, decreased endurance, decreased balance, decreased posture  Prognosis: good  Clinical Assessment: The patient is a 89 y.o. male who presents below their baseline level of function due to above deficits. Pt demo'd good activity tolerance this date and only required occ rest breaks due to SOB. Pt compliant with WB precautions. Pt would benefit from continued OT to increase strength, balance, activity tolerance, endurance, safety, and independence with ADL tasks to promote safe d/c and return to PLOF.   Safety Interventions: patient left in chair, chair alarm in place, call light within reach, gait belt, patient at risk for falls, nurse notified, and family/caregiver present    Plan  Frequency: 3-5 x/per week  Current Treatment Recommendations: strengthening, balance training, functional mobility training, transfer training, gait training, endurance training, ADL/self-care training, and safety education    Goals  Patient Goals: to return home   Short Term Goals:  Time Frame: upon d/c  Patient will complete lower body ADL at contact guard assistance   Patient will complete toileting at contact guard

## 2024-02-07 NOTE — FLOWSHEET NOTE
Patient discharged with transport company to facility, IV taken out, patient transported out on stretcher, [facility]

## 2024-02-07 NOTE — DISCHARGE INSTR - COC
UPPER GASTROINTESTINAL ENDOSCOPY N/A 2/6/2024    EGD DIAGNOSTIC ONLY performed by Keith Conway MD at [facility] ENDOSCOPY       Immunization History:   Immunization History   Administered Date(s) Administered    COVID-19, PFIZER Bivalent, DO NOT Dilute, (age 12y+), IM, 30 mcg/0.3 mL 09/26/2022, 05/15/2023    COVID-19, PFIZER GRAY top, DO NOT Dilute, (age 12 y+), IM, 30 mcg/0.3 mL 07/16/2022    COVID-19, PFIZER PURPLE top, DILUTE for use, (age 12 y+), 30mcg/0.3mL 01/20/2021, 02/10/2021, 11/03/2021    COVID-19, PFIZER, (2023-24 formula), (age 12y+), IM, 30mcg/0.3mL 09/26/2023    Influenza Vaccine, unspecified formulation 10/22/2015, 09/13/2016    Influenza Virus Vaccine 10/04/2010, 11/02/2011, 11/25/2013    Influenza, FLUAD, (age 65 y+), Adjuvanted, 0.5mL 10/01/2020, 09/26/2022    Influenza, High Dose (Fluzone 65 yrs and older) 09/12/2014, 09/18/2017, 10/12/2018    Influenza, Triv, inactivated, subunit, adjuvanted, IM (Fluad 65 yrs and older) 09/24/2019    Pneumococcal, PCV-13, PREVNAR 13, (age 6w+), IM, 0.5mL 12/16/2014    Pneumococcal, PPSV23, PNEUMOVAX 23, (age 2y+), SC/IM, 0.5mL 10/02/2008    RSV, ABRYSVO, (age 60y+), PF, IM, 0.5mL 10/03/2023    TD 5LF, TENIVAC, (age 7y+), IM, 0.5mL 09/22/2022    TDaP, ADACEL (age 10y-64y), BOOSTRIX (age 10y+), IM, 0.5mL 10/04/2010, 01/11/2016    Zoster Live (Zostavax) 02/16/2012       Active Problems:  Patient Active Problem List   Diagnosis Code    CAD (coronary artery disease) CABG x3 1996, stenting PDA 10/2021 I25.10    Pacemaker Z95.0    Benign prostatic hyperplasia with nocturia N40.1, R35.1    Gout-uric acid >7.5--advised proph tx M10.9    Hyperlipidemia E78.5    Carotid bruit(bilat-nl duplex u/s 10/10) R09.89    Vitamin D deficiency-(advised 1000IU/day) E55.9    Actinic keratoses L57.0    Elevated PSA-(was 4.2 10/10-repeat 6 mo)(was 5.12 1/11-then 4.4 2/12)-sees dr aguillon for this R97.20    S/P colonoscopy-4/07-neg per pt,  3/18 repeat colonoscopy polyp-repeat 5 yr Z98.890

## 2024-02-07 NOTE — CARE COORDINATION
02/07/24 1341   IMM Letter   IMM Letter given to Patient/Family/Significant other/Guardian/POA/by: Daughter declined to sign IMM at this time.   IMM Letter date given: 02/07/24   IMM Letter time given: 4383        Reviewed as complete

## 2024-02-07 NOTE — CARE COORDINATION
Case Management -  Discharge Note      Patient Name: Josep Santos                   YOB: 1933            Readmission Risk (Low < 19, Mod (19-27), High > 27): Readmission Risk Score: 28    Current PCP: Raul Cuevas MD    ([facility]) Important Message from Medicare:    Date: 2/7/2024    PT AM-PAC: 14 /24  OT AM-PAC: 16 /24    Patient/patient representative has been educated on the benefits of skilled nursing facility  as well as the possible risks of declining recommended services. Patient/patient representative has acknowledged the information provided and decided on the following discharge plan. Patient/ patient representative has been provided freedom of choice regarding service provider, supported by basic dialogue that supports the patient's individualized plan of care/goals.    Patient noted to have a discharge order.  Pt has been medically cleared for transition to Skilled Nursing Rehab Facility    Patient discharged to   [address]  [place] [id number]  Phone: [phone]  Fax:[phone]        Pre-cert required/obtained:  No pre-cert needed    Transportation scheduled for 6:00pm  Transportation provided by [facility]  ([phone])   AVS faxed and agency notified:  Yes   The following prescriptions sent with pt:   Family Notified:  Yes    Nurse to call report to facility        Financial    Payor: MEDICARE / Plan: MEDICARE PART A AND B / Product Type: *No Product type* /     Electronically signed by JACQUELINE Lu on 2/7/2024 at 1:16 PM

## 2024-02-09 LAB
COPPER SERPL-MCNC: 156.2 UG/DL (ref 70–140)
ZINC SERPL-MCNC: 71.1 UG/DL (ref 60–120)

## 2024-02-12 LAB
FUNGUS SPEC CULT: NORMAL
LOEFFLER MB STN SPEC: NORMAL

## 2024-02-13 ENCOUNTER — TELEPHONE (OUTPATIENT)
Dept: CARDIOLOGY CLINIC | Age: 89
End: 2024-02-13

## 2024-02-13 NOTE — TELEPHONE ENCOUNTER
Informed Refusal for Blood Component Transfusion Note    I have discussed with the daughter the rationale for blood component transfusion; its benefits in treating or preventing fatigue, organ damage, or death; and its risk which includes mild transfusion reactions, rare risk of blood borne infection, or more serious but rare reactions. I have discussed the alternatives to transfusion, including the risk and consequences of not receiving transfusion. The daughter had an opportunity to ask questions and had REFUSED to proceed with transfusion of blood components.    Electronically signed by PRIYA WEINSTEIN on 2/13/24 at 3:52 PM EST     I spoke to Cristhian his daughter and patient scheduled for blood transfusion at 1030 am tomorrow 2/14/2024.  Cristhian will make a follow up appt with his hematologist Dr Smith  I spoke to Bertha at [facility] and she is aware of appt

## 2024-02-13 NOTE — TELEPHONE ENCOUNTER
Hemoglobin level of 7.4, and per daughter, MMK he wanted him to have a transfusion.    Please advise if pt should go to ER, or how this should be set up since he is over 7.

## 2024-02-14 ENCOUNTER — OFFICE VISIT (OUTPATIENT)
Dept: ORTHOPEDIC SURGERY | Age: 89
End: 2024-02-14

## 2024-02-14 ENCOUNTER — HOSPITAL ENCOUNTER (OUTPATIENT)
Dept: ONCOLOGY | Age: 89
Setting detail: INFUSION SERIES
Discharge: HOME | End: 2024-02-14
Payer: MEDICARE

## 2024-02-14 VITALS
OXYGEN SATURATION: 99 % | HEART RATE: 65 BPM | TEMPERATURE: 96.8 F | RESPIRATION RATE: 14 BRPM | SYSTOLIC BLOOD PRESSURE: 108 MMHG | DIASTOLIC BLOOD PRESSURE: 60 MMHG

## 2024-02-14 VITALS — RESPIRATION RATE: 16 BRPM | BODY MASS INDEX: 20.47 KG/M2 | WEIGHT: 143 LBS | HEIGHT: 70 IN

## 2024-02-14 DIAGNOSIS — N18.30 STAGE 3 CHRONIC KIDNEY DISEASE, UNSPECIFIED WHETHER STAGE 3A OR 3B CKD (HCC): Primary | ICD-10-CM

## 2024-02-14 DIAGNOSIS — M1A.0410 CHRONIC GOUT OF RIGHT HAND, UNSPECIFIED CAUSE: Primary | ICD-10-CM

## 2024-02-14 LAB
ABO + RH BLD: NORMAL
BLOOD BANK DISPENSE STATUS: NORMAL
BLOOD BANK PRODUCT CODE: NORMAL
BPU ID: NORMAL
DESCRIPTION BLOOD BANK: NORMAL

## 2024-02-14 PROCEDURE — 36430 TRANSFUSION BLD/BLD COMPNT: CPT

## 2024-02-14 PROCEDURE — 86850 RBC ANTIBODY SCREEN: CPT

## 2024-02-14 PROCEDURE — 99211 OFF/OP EST MAY X REQ PHY/QHP: CPT

## 2024-02-14 PROCEDURE — 86901 BLOOD TYPING SEROLOGIC RH(D): CPT

## 2024-02-14 PROCEDURE — P9016 RBC LEUKOCYTES REDUCED: HCPCS

## 2024-02-14 PROCEDURE — 86923 COMPATIBILITY TEST ELECTRIC: CPT

## 2024-02-14 PROCEDURE — 86900 BLOOD TYPING SEROLOGIC ABO: CPT

## 2024-02-14 RX ORDER — SODIUM CHLORIDE 0.9 % (FLUSH) 0.9 %
5-40 SYRINGE (ML) INJECTION ONCE
Status: DISCONTINUED | OUTPATIENT
Start: 2024-02-14 | End: 2024-02-15 | Stop reason: HOSPADM

## 2024-02-14 RX ORDER — SODIUM CHLORIDE 9 MG/ML
INJECTION, SOLUTION INTRAVENOUS PRN
Status: DISCONTINUED | OUTPATIENT
Start: 2024-02-14 | End: 2024-02-15 | Stop reason: HOSPADM

## 2024-02-14 NOTE — PROGRESS NOTES
Patient to infusion center in wheelchair with daughter at side.  Plan of care reviewed.  Patient with no questions at this time.  Patient to receive 1 unit PRBC, no pre medications ordered, consent signed, IV started, blood specimen sent to lab to type/cross and prepare blood product.  Will await blood bank to prepare unit to infuse.  Patient ordered lunch, no other needs stated.

## 2024-02-14 NOTE — PROGRESS NOTES
Blood transfusion given per [facility] policy. Transfusion complete. No signs of an adverse reaction. Given avs with signs and symptoms of a delayed reaction and when to call . All questions answered. Discharged per ambulatory with family member.

## 2024-02-14 NOTE — PROGRESS NOTES
Mr. Josep Santos is a 89 y.o. right handed man  who is seen today in Hand Surgical Consultation at the request of Raul Cuevas MD.    He is seen today regarding a several years  history of right Middle Finger pain, enlargement, and other changes from his known tophaceous gout  .  He was seen for this concern by his primary care physician; previous treatment has included  medical management .  He reports moderate pain, stiffness, and enlargement located in the Right Middle Finger PIP Joint, no tenderness of the remaining hand, wrist, or elbow.  He  notes today, no neurologic symptoms in the hand. Symptoms are worsening over time.      I have today reviewed with Josep Santos the clinically relevant, past medical history, medications, allergies,  family history, social history, and Review Of Systems & I have documented any details relevant to today's presenting complaints in my history above.  Mr. Josep Santos's self-reported past medical history, medications, allergies,  family history, social history, and Review Of Systems have been scanned into the chart under the \"Media\" tab.    Physical Exam:  Mr. Josep Santos's most recent vitals:  Vitals  Respirations: 16  Height: 177.8 cm (5' 10\")  Weight - Scale: 64.9 kg (143 lb)    He is well nourished, oriented to person, place & time.  He demonstrates appropriate mood and affect as well as normal gait and station.    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  There is no evidence of gross joint instability bilaterally.  Sensation is subjectively present in the Whole Hand bilaterally.  Vascular examination reveals normal and good capillary refill

## 2024-02-14 NOTE — PATIENT INSTRUCTIONS
Thank you for choosing [facility] for your Hand and Upper Extremity needs.  If we can be of any further assistance to you, please do not hesitate to contact us.    Office Phone Number:  [phone]  or  [phone]

## 2024-02-19 LAB
FUNGUS SPEC CULT: NORMAL
LOEFFLER MB STN SPEC: NORMAL

## 2024-02-22 PROBLEM — R63.4 WEIGHT LOSS: Status: RESOLVED | Noted: 2023-01-01 | Resolved: 2024-01-01

## 2024-02-22 PROBLEM — R13.10 DYSPHAGIA: Status: ACTIVE | Noted: 2024-01-01

## 2024-02-22 PROBLEM — M86.171 ACUTE OSTEOMYELITIS OF PHALANX OF RIGHT FOOT (HCC): Status: RESOLVED | Noted: 2024-01-17 | Resolved: 2024-02-22

## 2024-02-22 PROBLEM — M25.522 LEFT ELBOW PAIN: Status: RESOLVED | Noted: 2024-01-01 | Resolved: 2024-01-01

## 2024-02-22 PROBLEM — E46 PROTEIN CALORIE MALNUTRITION (HCC): Status: ACTIVE | Noted: 2024-01-01

## 2024-02-22 PROBLEM — M86.9 OSTEOMYELITIS OF METATARSAL (HCC): Status: RESOLVED | Noted: 2024-01-01 | Resolved: 2024-01-01

## 2024-02-22 PROBLEM — D64.9 SEVERE ANEMIA: Status: RESOLVED | Noted: 2023-01-01 | Resolved: 2024-01-01

## 2024-02-22 PROBLEM — J81.0 ACUTE PULMONARY EDEMA (HCC): Status: RESOLVED | Noted: 2024-01-23 | Resolved: 2024-02-22

## 2024-02-22 PROBLEM — I50.23 ACUTE ON CHRONIC SYSTOLIC HEART FAILURE (HCC): Status: RESOLVED | Noted: 2022-06-06 | Resolved: 2024-01-01

## 2024-02-22 NOTE — PROGRESS NOTES
sodium (SENOKOT-S) 8.6-50 MG tablet Take 1 tablet by mouth daily as needed for Constipation 30 tablet 0    Cholecalciferol (VITAMIN D3) 25 MCG (1000 UT) CAPS Take 1 capsule by mouth daily      fluticasone (FLONASE) 50 MCG/ACT nasal spray 2 sprays by Each Nostril route daily 16 g 0    vitamin B-1 (THIAMINE) 100 MG tablet Take 1 tablet by mouth daily          Medications patient taking as of now reconciled against medications ordered at time of hospital discharge: Yes    Review of Systems As above     Objective:    BP (!) 110/54 (Site: Right Upper Arm, Position: Sitting, Cuff Size: Medium Adult)   Pulse 66   Temp 97.5 °F (36.4 °C) (Oral)   Resp 20   Wt 63.5 kg (140 lb)   SpO2 95%   BMI 20.09 kg/m²   Physical Exam  Vitals and nursing note reviewed.   Constitutional:       General: He is not in acute distress.     Appearance: Normal appearance. He is well-developed. He is not diaphoretic.   Cardiovascular:      Rate and Rhythm: Normal rate and regular rhythm.      Pulses: Normal pulses.      Heart sounds: Murmur (diffuse systolic murmur) heard.   Pulmonary:      Effort: Pulmonary effort is normal. No respiratory distress.      Breath sounds: Normal breath sounds. No wheezing or rales.   Genitourinary:     Comments: Rectum: redness and multiple shallow ulcers bilat buttocks, within cleft, 2-7 cm superior to anus. This is the tender eugenia.  Musculoskeletal:      Cervical back: Normal range of motion.      Right lower leg: No edema.      Left lower leg: No edema.   Skin:     General: Skin is warm and dry.   Neurological:      General: No focal deficit present.      Mental Status: He is alert and oriented to person, place, and time. Mental status is at baseline.   Psychiatric:         Mood and Affect: Mood normal.         Behavior: Behavior normal.         Thought Content: Thought content normal.         Judgment: Judgment normal.         An electronic signature was used to authenticate this note.  --Raul Mcleod

## 2024-02-23 ENCOUNTER — TELEPHONE (OUTPATIENT)
Dept: CARDIOLOGY CLINIC | Age: 89
End: 2024-02-23

## 2024-02-23 NOTE — TELEPHONE ENCOUNTER
Spoke to patient's floor nurse she will contact [facility] about lab result.

## 2024-02-23 NOTE — TELEPHONE ENCOUNTER
Irma states pt Hemoglobin is 7.3 and asking if pt needs a blood transfusion. Please call to discuss.

## 2024-02-26 LAB
FUNGUS SPEC CULT: NORMAL
LOEFFLER MB STN SPEC: NORMAL

## 2024-02-26 NOTE — PROGRESS NOTES
Message left with [facility] in regards to Platelet level of 17. Order parameters were given to Transfuse if less than 10.

## 2024-02-26 NOTE — PROGRESS NOTES
Blood transfusion given per [facility] policy. Transfusion complete. No signs of an adverse reaction. Given avs with signs and symptoms of a delayed reaction and when to call Dr. All questions answered. Discharged per ambulatory with family member. Pastoral care contacted for patient while in infusion center for support.  Pastoral care spoke with patient.

## 2024-02-27 NOTE — ED NOTES
Daughter at bedside, plan is for pt to go back to [facility] as a DNRCC, to meet with hospice at 1400 today.  Working on transport at this time.

## 2024-02-27 NOTE — ED PROVIDER NOTES
(HCC)--s/p amputation great toe post osteomylitis , Glucose intolerance (malabsorption), Gout, Hyperlipidemia, Hypertension, Hypertrophy of prostate without urinary obstruction and other lower urinary tract symptoms (LUTS), Intermittent atrial fibrillation (HCC), Iron deficiency anemia, unspecified, Ischemic cardiomyopathy, Kidney stone, Methicillin resistant Staphylococcus aureus infection, unspecified site, Nonrheumatic aortic valve stenosis, Occult blood in stool, Pacemaker, and Pyogenic arthritis, unspecified (HCC).  Records Reviewed:  Outpatient notes  Disposition Considerations (Tests not ordered but considered, Shared Decision Making, Pt Expectation of Test or Tx.):  MR imaging not deemed clinically necessary in the ED setting    PROCEDURES: I had him blow his nose to clear out any clots.  He has been given Afrin prior to the procedure.  Placed a 7.5 Rhino Rocket to the left naris and inflated the balloon with 8 mL of air.  He tolerated procedure well.  The briskness of the bleeding decreased.    CRITICAL CARE:  Total critical care time of 31 minutes (excludes any time for procedures).  This includes but not limited to vital sign monitoring, medication and/or clinical response to medications, interpretation of diagnostics, review of nursing notes, pertinent record review, discussions about patient condition, consultation time, documentation time.  Critical care due to the patient's bleeding.    CONSULTATIONS:  hospice    Josep Santos is a 89 y.o. male who presented because of nosebleed.  He has a history of myelodysplasia.  He just received blood product red cells yesterday.  He has a steve coagulopathy today resulting in a nosebleed.  The bleeding seems to have either stopped or significantly slowed down after the Rhino Rocket was placed.  His H&H is still within acceptable range.  He has thrombocytopenia, worsened.  I considered giving him blood products.  He continues to be of sound mind and has

## 2024-02-27 NOTE — ED NOTES
Mouth care completed  Pt awaiting transport back to [facility]  Family at bedside  No bleeding from his nose at this time

## 2024-02-29 PROBLEM — I48.0 INTERMITTENT ATRIAL FIBRILLATION (HCC): Status: RESOLVED | Noted: 2022-09-27 | Resolved: 2024-01-01

## 2024-03-07 NOTE — TELEPHONE ENCOUNTER
From: Josep Santos  To: Dr. Raul Cuevas  Sent: 3/1/2024 7:44 AM EST  Subject: My dad    I wanted to let you know we put my dad in Hospice on Tuesday and he passed away this morning. I wanted to personally thank you for taking such wonderful and compassionate care of my dad for the past few years. He was able to make it to his 89th birthday because of you. Thank you so much. Cristhian Cruz and the rest of Jean Pierre Santos’s family.

## (undated) DEVICE — AIR/WATER CLEANING ADAPTER FOR OLYMPUS® GI ENDOSCOPE: Brand: BULLDOG®

## (undated) DEVICE — SUTURE ETHLN SZ 3-0 L18IN NONABSORBABLE BLK PS-2 L19MM 3/8 1669H

## (undated) DEVICE — BANDAGE,GAUZE,BULKEE II,4.5"X4.1YD,STRL: Brand: MEDLINE

## (undated) DEVICE — BW-412T DISP COMBO CLEANING BRUSH: Brand: SINGLE USE COMBINATION CLEANING BRUSH

## (undated) DEVICE — ENDOSCOPIC KIT 6X3/16 FT COLON W/ 1.1 OZ 2 GWN W/O BRSH

## (undated) DEVICE — VALVE SUCTION AIR H2O SET ORCA POD + DISP

## (undated) DEVICE — BANDAGE GZ W2XL75IN ST RAYON POLY CNFRM STRTCH LTWT

## (undated) DEVICE — ELECTRODE PT RET AD L9FT HI MOIST COND ADH HYDRGEL CORDED

## (undated) DEVICE — MOUTHPIECE ENDOSCP L CTRL OPN AND SIDE PORTS DISP

## (undated) DEVICE — GOWN AURORA NONREINF LG: Brand: MEDLINE INDUSTRIES, INC.

## (undated) DEVICE — CONTAINER,SPECIMEN,OR STERILE,4OZ: Brand: MEDLINE

## (undated) DEVICE — PACK PROCEDURE SURG EXTREMITY MFFOP CUST

## (undated) DEVICE — GLOVE ORTHO 8   MSG9480

## (undated) DEVICE — INTENDED FOR TISSUE SEPARATION, AND OTHER PROCEDURES THAT REQUIRE A SHARP SURGICAL BLADE TO PUNCTURE OR CUT.: Brand: BARD-PARKER ® STAINLESS STEEL BLADES

## (undated) DEVICE — BANDAGE COMPR W4INXL5YD BGE HI E W/ REM CLP SURE-WRAP

## (undated) DEVICE — TOWEL,OR,DSP,ST,BLUE,STD,4/PK,20PK/CS: Brand: MEDLINE

## (undated) DEVICE — SOLUTION IRRIG 500ML 0.9% SOD CHLO USP POUR PLAS BTL

## (undated) DEVICE — MASC TURNOVER KIT: Brand: MEDLINE INDUSTRIES, INC.

## (undated) DEVICE — SYRINGE, LUER LOCK, 10ML: Brand: MEDLINE

## (undated) DEVICE — DRESSING PETRO W3XL3IN OIL EMUL N ADH GZ KNIT IMPREG CELOS

## (undated) DEVICE — SOLUTION IV IRRIG WATER 500ML POUR BRL ST 2F7113

## (undated) DEVICE — HYPODERMIC SAFETY NEEDLE: Brand: MAGELLAN

## (undated) DEVICE — MEDICINE CUP, GRADUATED, STER: Brand: MEDLINE

## (undated) DEVICE — STOCKINETTE,IMPERVIOUS,12X48,STERILE: Brand: MEDLINE

## (undated) DEVICE — PRECISION THIN (9.0 X 0.38 X 31.0MM)

## (undated) DEVICE — GAUZE,SPONGE,4"X4",8PLY,STRL,LF,10/TRAY: Brand: MEDLINE

## (undated) DEVICE — BANDAGE COMPR W4INXL12FT E DISP ESMARCH EVEN

## (undated) DEVICE — T-DRAPE,EXTREMITY,STERILE: Brand: MEDLINE

## (undated) DEVICE — HANDPIECE SET WITH HIGH FLOW TIP AND SUCTION TUBE: Brand: INTERPULSE